# Patient Record
Sex: MALE | Race: WHITE | NOT HISPANIC OR LATINO | Employment: OTHER | URBAN - METROPOLITAN AREA
[De-identification: names, ages, dates, MRNs, and addresses within clinical notes are randomized per-mention and may not be internally consistent; named-entity substitution may affect disease eponyms.]

---

## 2017-01-11 ENCOUNTER — HOSPITAL ENCOUNTER (EMERGENCY)
Facility: MEDICAL CENTER | Age: 72
End: 2017-01-12
Attending: EMERGENCY MEDICINE
Payer: MEDICARE

## 2017-01-11 ENCOUNTER — APPOINTMENT (OUTPATIENT)
Dept: RADIOLOGY | Facility: MEDICAL CENTER | Age: 72
End: 2017-01-11
Attending: EMERGENCY MEDICINE
Payer: MEDICARE

## 2017-01-11 ENCOUNTER — APPOINTMENT (OUTPATIENT)
Dept: RADIOLOGY | Facility: MEDICAL CENTER | Age: 72
End: 2017-01-11
Payer: MEDICARE

## 2017-01-11 DIAGNOSIS — R07.9 CHEST PAIN, UNSPECIFIED TYPE: ICD-10-CM

## 2017-01-11 DIAGNOSIS — R10.13 EPIGASTRIC PAIN: ICD-10-CM

## 2017-01-11 LAB
ALBUMIN SERPL BCP-MCNC: 3.8 G/DL (ref 3.2–4.9)
ALBUMIN/GLOB SERPL: 1.3 G/DL
ALP SERPL-CCNC: 32 U/L (ref 30–99)
ALT SERPL-CCNC: 16 U/L (ref 2–50)
ANION GAP SERPL CALC-SCNC: 16 MMOL/L (ref 0–11.9)
APTT PPP: 30.2 SEC (ref 24.7–36)
AST SERPL-CCNC: 13 U/L (ref 12–45)
BASOPHILS # BLD AUTO: 0.6 % (ref 0–1.8)
BASOPHILS # BLD: 0.05 K/UL (ref 0–0.12)
BILIRUB SERPL-MCNC: 0.9 MG/DL (ref 0.1–1.5)
BNP SERPL-MCNC: 37 PG/ML (ref 0–100)
BUN SERPL-MCNC: 21 MG/DL (ref 8–22)
CALCIUM SERPL-MCNC: 9.5 MG/DL (ref 8.5–10.5)
CHLORIDE SERPL-SCNC: 98 MMOL/L (ref 96–112)
CO2 SERPL-SCNC: 20 MMOL/L (ref 20–33)
CREAT SERPL-MCNC: 0.98 MG/DL (ref 0.5–1.4)
DEPRECATED D DIMER PPP IA-ACNC: 426 NG/ML(D-DU)
EKG IMPRESSION: NORMAL
EOSINOPHIL # BLD AUTO: 0.05 K/UL (ref 0–0.51)
EOSINOPHIL NFR BLD: 0.6 % (ref 0–6.9)
ERYTHROCYTE [DISTWIDTH] IN BLOOD BY AUTOMATED COUNT: 44.2 FL (ref 35.9–50)
GFR SERPL CREATININE-BSD FRML MDRD: >60 ML/MIN/1.73 M 2
GLOBULIN SER CALC-MCNC: 2.9 G/DL (ref 1.9–3.5)
GLUCOSE SERPL-MCNC: 196 MG/DL (ref 65–99)
HCT VFR BLD AUTO: 45.7 % (ref 42–52)
HGB BLD-MCNC: 15.4 G/DL (ref 14–18)
IMM GRANULOCYTES # BLD AUTO: 0.04 K/UL (ref 0–0.11)
IMM GRANULOCYTES NFR BLD AUTO: 0.5 % (ref 0–0.9)
INR PPP: 0.99 (ref 0.87–1.13)
LACTATE BLD-SCNC: 1.4 MMOL/L (ref 0.5–2)
LIPASE SERPL-CCNC: 18 U/L (ref 11–82)
LYMPHOCYTES # BLD AUTO: 0.8 K/UL (ref 1–4.8)
LYMPHOCYTES NFR BLD: 9.3 % (ref 22–41)
MCH RBC QN AUTO: 29.9 PG (ref 27–33)
MCHC RBC AUTO-ENTMCNC: 33.7 G/DL (ref 33.7–35.3)
MCV RBC AUTO: 88.7 FL (ref 81.4–97.8)
MONOCYTES # BLD AUTO: 0.55 K/UL (ref 0–0.85)
MONOCYTES NFR BLD AUTO: 6.4 % (ref 0–13.4)
NEUTROPHILS # BLD AUTO: 7.09 K/UL (ref 1.82–7.42)
NEUTROPHILS NFR BLD: 82.6 % (ref 44–72)
NRBC # BLD AUTO: 0 K/UL
NRBC BLD AUTO-RTO: 0 /100 WBC
PLATELET # BLD AUTO: 279 K/UL (ref 164–446)
PMV BLD AUTO: 10.2 FL (ref 9–12.9)
POTASSIUM SERPL-SCNC: 4.2 MMOL/L (ref 3.6–5.5)
PROT SERPL-MCNC: 6.7 G/DL (ref 6–8.2)
PROTHROMBIN TIME: 13.4 SEC (ref 12–14.6)
RBC # BLD AUTO: 5.15 M/UL (ref 4.7–6.1)
SODIUM SERPL-SCNC: 134 MMOL/L (ref 135–145)
TROPONIN I SERPL-MCNC: <0.01 NG/ML (ref 0–0.04)
WBC # BLD AUTO: 8.6 K/UL (ref 4.8–10.8)

## 2017-01-11 PROCEDURE — 83605 ASSAY OF LACTIC ACID: CPT

## 2017-01-11 PROCEDURE — 85025 COMPLETE CBC W/AUTO DIFF WBC: CPT

## 2017-01-11 PROCEDURE — 71010 DX-CHEST-LIMITED (1 VIEW): CPT

## 2017-01-11 PROCEDURE — 36415 COLL VENOUS BLD VENIPUNCTURE: CPT

## 2017-01-11 PROCEDURE — 85730 THROMBOPLASTIN TIME PARTIAL: CPT

## 2017-01-11 PROCEDURE — 93005 ELECTROCARDIOGRAM TRACING: CPT

## 2017-01-11 PROCEDURE — 85379 FIBRIN DEGRADATION QUANT: CPT

## 2017-01-11 PROCEDURE — 84484 ASSAY OF TROPONIN QUANT: CPT

## 2017-01-11 PROCEDURE — 99284 EMERGENCY DEPT VISIT MOD MDM: CPT

## 2017-01-11 PROCEDURE — 83880 ASSAY OF NATRIURETIC PEPTIDE: CPT

## 2017-01-11 PROCEDURE — 85610 PROTHROMBIN TIME: CPT

## 2017-01-11 PROCEDURE — 83690 ASSAY OF LIPASE: CPT

## 2017-01-11 PROCEDURE — 80053 COMPREHEN METABOLIC PANEL: CPT

## 2017-01-11 ASSESSMENT — ENCOUNTER SYMPTOMS
DIAPHORESIS: 1
FEVER: 0
BLOOD IN STOOL: 0
SHORTNESS OF BREATH: 0
ROS GI COMMENTS: NEGATIVE HEMATEMESIS
COUGH: 0
NAUSEA: 1
ABDOMINAL PAIN: 0
DIARRHEA: 0
VOMITING: 1

## 2017-01-11 ASSESSMENT — PAIN SCALES - GENERAL: PAINLEVEL_OUTOF10: 5

## 2017-01-11 NOTE — ED AVS SNAPSHOT
1/12/2017          Kevin Jackson  [address] [place] [address]    Dear Kevin:    [facility] wants to ensure your discharge home is safe and you or your loved ones have had all your questions answered regarding your care after you leave the hospital.    You may receive a telephone call within two days of your discharge.  This call is to make certain you understand your discharge instructions as well as ensure we provided you with the best care possible during your stay with us.     The call will only last approximately 3-5 minutes and will be done by a nurse.    Once again, we want to ensure your discharge home is safe and that you have a clear understanding of any next steps in your care.  If you have any questions or concerns, please do not hesitate to contact us, we are here for you.  Thank you for choosing [facility] for your healthcare needs.    Sincerely,    Moisés Carreno    [facility]

## 2017-01-11 NOTE — ED AVS SNAPSHOT
After Visit Summary                                                                                                                Kevin Jackson   MRN: [id number]    Department:  [facility], Emergency Dept   Date of Visit:  1/11/2017            [facility], Emergency Dept    [address]    [facility] [address]    Phone:  [phone]      You were seen by     Oren Mar M.D.      Your Diagnosis Was     Chest pain, unspecified type     R07.9       These are the medications you received during your hospitalization from 01/11/2017 2031 to 01/12/2017 0235     Date/Time Order Dose Route Action    01/12/2017 0054 iohexol (OMNIPAQUE) 350 mg/mL 70 mL Intravenous Given      Follow-up Information     1. Follow up with Gallito Sprague M.D.. Call today.    Specialty:  Cardiology    Why:  at 8 am to be seen asap    Contact information    [address]  [address]  [facility] [address] [phone]        Medication Information     Review all of your home medications and newly ordered medications with your primary doctor and/or pharmacist as soon as possible. Follow medication instructions as directed by your doctor and/or pharmacist.     Please keep your complete medication list with you and share with your physician. Update the information when medications are discontinued, doses are changed, or new medications (including over-the-counter products) are added; and carry medication information at all times in the event of emergency situations.               Medication List      ASK your doctor about these medications        Instructions    aspirin EC 81 MG Tbec   Commonly known as:  ECOTRIN    Take 81 mg by mouth every day.   Dose:  81 mg       atorvastatin 40 MG Tabs   Commonly known as:  LIPITOR    Take 80 mg by mouth every evening.   Dose:  80 mg       * Dulaglutide 0.75 MG/0.5ML Sopn    Inject  as instructed.       * TRULICITY 0.75 MG/0.5ML Sopn   Generic drug:  Dulaglutide     Inject  as instructed.       HUMALOG MIX 50/50 (50-50) 100 UNIT/ML Susp   Generic drug:  INSULIN LISP PROT & LISP (HUM)    Inject 30 Units as instructed.   Dose:  30 Units       INVOKANA 100 MG Tabs   Generic drug:  Canagliflozin    Take 1 Tab by mouth every day.   Dose:  1 Tab       levothyroxine 50 MCG Tabs   Commonly known as:  SYNTHROID    Take 1 Tab by mouth every morning before breakfast.   Dose:  50 mcg       losartan-hydrochlorothiazide 50-12.5 MG per tablet   Commonly known as:  HYZAAR    Take 1 Tab by mouth every day.   Dose:  1 Tab       metformin 1000 MG tablet   Commonly known as:  GLUCOPHAGE    Take 1,000 mg by mouth 2 times a day, with meals.   Dose:  1000 mg       NON SPECIFIED    Shingles vaccine       primidone 50 MG Tabs   Commonly known as:  MYSOLINE    Doctor's comments:  Dr smith in [place]   Take 1 Tab by mouth every evening.   Dose:  50 mg       propranolol  MG Cp24   Commonly known as:  INDERAL LA    Take 1 Cap by mouth every day.   Dose:  120 mg       TOUJEO SOLOSTAR SC    Inject  as instructed.       TRILIPIX 135 MG Cpdr   Generic drug:  choline fenofibrate    Take  by mouth.       vitamin D 1000 UNIT Tabs   Commonly known as:  cholecalciferol    Take 1,000 Units by mouth 3 times a day.   Dose:  1000 Units       * Notice:  This list has 2 medication(s) that are the same as other medications prescribed for you. Read the directions carefully, and ask your doctor or other care provider to review them with you.            Procedures and tests performed during your visit     APTT    BTYPE NATRIURETIC PEPTIDE    CARDIAC MONITORING    CBC WITH DIFFERENTIAL    COMP METABOLIC PANEL    CONSENT FOR CONTRAST INJECTION    CT-CTA CHEST PULMONARY ARTERY W/ RECONS    D-DIMER    DX-CHEST-LIMITED (1 VIEW)    EKG (NOW)    ESTIMATED GFR    LACTIC ACID    LIPASE    O2 Protocol    PROTHROMBIN TIME    SALINE LOCK    TROPONIN    URINALYSIS        Discharge Instructions       You are leaving Against  My advice. If you change your mind, have new chest pain, shortness of breath or pass out return. Call Dr. Sprague's office at 8 am to be seen ASAP.   Chest Pain, Nonspecific  It is often hard to give a specific diagnosis for the cause of chest pain. There is always a chance that your pain could be related to something serious, like a heart attack or a blood clot in the lungs. You need to follow up with your caregiver for further evaluation. More lab tests or other studies such as X-rays, electrocardiography, stress testing, or cardiac imaging may be needed to find the cause of your pain.  Most of the time, nonspecific chest pain improves within 2 to 3 days with rest and mild pain medicine. For the next few days, avoid physical exertion or activities that bring on pain. Do not smoke. Avoid drinking alcohol. Call your caregiver for routine follow-up as advised.   SEEK IMMEDIATE MEDICAL CARE IF:  · You develop increased chest pain or pain that radiates to the arm, neck, jaw, back, or abdomen.   · You develop shortness of breath, increased coughing, or you start coughing up blood.   · You have severe back or abdominal pain, nausea, or vomiting.   · You develop severe weakness, fainting, fever, or chills.   Document Released: 12/18/2006 Document Revised: 03/11/2013 Document Reviewed: 06/06/2008  [employer]® Patient Information ©2013 [employer].          Patient Information     Patient Information    Following emergency treatment: all patient requiring follow-up care must return either to a private physician or a clinic if your condition worsens before you are able to obtain further medical attention, please return to the emergency room.     Billing Information    At [facility], we work to make the billing process streamlined for our patients.  Our Representatives are here to answer any questions you may have regarding your hospital bill.  If you have insurance coverage and have supplied your insurance information to  us, we will submit a claim to your insurer on your behalf.  Should you have any questions regarding your bill, we can be reached online or by phone as follows:  Online: You are able pay your bills online or live chat with our representatives about any billing questions you may have. We are here to help Monday - Friday from 8:00am to 7:30pm and 9:00am - 12:00pm on Saturdays.  Please visit [email or web address]  for more information.   Phone:  [phone] or [phone]    Please note that your emergency physician, surgeon, pathologist, radiologist, anesthesiologist, and other specialists are not employed by [facility] and will therefore bill separately for their services.  Please contact them directly for any questions concerning their bills at the numbers below:     Emergency Physician Services:  [phone]  [facility]:  [phone]  [facility]:  [phone]  [facility]:  [phone]    1. Your final bill may vary from the amount quoted upon discharge if all procedures are not complete at that time, or if your doctor has additional procedures of which we are not aware. You will receive an additional bill if you return to the Emergency Department at [facility] for suture removal regardless of the facility of which the sutures were placed.     2. Please arrange for settlement of this account at the emergency registration.    3. All self-pay accounts are due in full at the time of treatment.  If you are unable to meet this obligation then payment is expected within 4-5 days.     4. If you have had radiology studies (CT, X-ray, Ultrasound, MRI), you have received a preliminary result during your emergency department visit. Please contact the radiology department [phone] to receive a copy of your final result. Please discuss the Final result with your primary physician or with the follow up physician provided.     Crisis  Hotline:  [place] Crisis Hotline:  2-007-XRPTNOV or 1-394.618.4134  [place] Crisis Hotline:    1-991.699.5352 or 622-063-4088         ED Discharge Follow Up Questions    1. In order to provide you with very good care, we would like to follow up with a phone call in the next few days.  May we have your permission to contact you?     YES /  NO    2. What is the best phone number to call you? (       )_____-__________    3. What is the best time to call you?      Morning  /  Afternoon  /  Evening                   Patient Signature:  ____________________________________________________________    Date:  ____________________________________________________________      Your appointments     May 31, 2017  9:40 AM   Established Patient with Ky Hernandez M.D.   [facility] [address] [place] [address] ([facility])    [address] [place] [phone] [phone]           You will be receiving a confirmation call a few days before your appointment from our automated call confirmation system.

## 2017-01-12 ENCOUNTER — APPOINTMENT (OUTPATIENT)
Dept: RADIOLOGY | Facility: MEDICAL CENTER | Age: 72
End: 2017-01-12
Attending: EMERGENCY MEDICINE
Payer: MEDICARE

## 2017-01-12 VITALS
OXYGEN SATURATION: 90 % | TEMPERATURE: 98.2 F | WEIGHT: 219.58 LBS | BODY MASS INDEX: 29.1 KG/M2 | RESPIRATION RATE: 16 BRPM | HEIGHT: 73 IN | HEART RATE: 84 BPM | SYSTOLIC BLOOD PRESSURE: 98 MMHG | DIASTOLIC BLOOD PRESSURE: 65 MMHG

## 2017-01-12 LAB
APPEARANCE UR: CLEAR
BILIRUB UR QL STRIP.AUTO: NEGATIVE
COLOR UR: YELLOW
GLUCOSE UR STRIP.AUTO-MCNC: >1000 MG/DL
KETONES UR STRIP.AUTO-MCNC: 40 MG/DL
LEUKOCYTE ESTERASE UR QL STRIP.AUTO: NEGATIVE
MICRO URNS: ABNORMAL
NITRITE UR QL STRIP.AUTO: NEGATIVE
PH UR STRIP.AUTO: 5 [PH]
PROT UR QL STRIP: NEGATIVE MG/DL
RBC UR QL AUTO: NEGATIVE
SP GR UR STRIP.AUTO: 1.03

## 2017-01-12 PROCEDURE — 71275 CT ANGIOGRAPHY CHEST: CPT

## 2017-01-12 PROCEDURE — 700117 HCHG RX CONTRAST REV CODE 255: Performed by: EMERGENCY MEDICINE

## 2017-01-12 PROCEDURE — 81003 URINALYSIS AUTO W/O SCOPE: CPT

## 2017-01-12 RX ADMIN — IOHEXOL 70 ML: 350 INJECTION, SOLUTION INTRAVENOUS at 00:54

## 2017-01-12 NOTE — DISCHARGE INSTRUCTIONS
You are leaving Against My advice. If you change your mind, have new chest pain, shortness of breath or pass out return. Call Dr. Sprague's office at 8 am to be seen ASAP.   Chest Pain, Nonspecific  It is often hard to give a specific diagnosis for the cause of chest pain. There is always a chance that your pain could be related to something serious, like a heart attack or a blood clot in the lungs. You need to follow up with your caregiver for further evaluation. More lab tests or other studies such as X-rays, electrocardiography, stress testing, or cardiac imaging may be needed to find the cause of your pain.  Most of the time, nonspecific chest pain improves within 2 to 3 days with rest and mild pain medicine. For the next few days, avoid physical exertion or activities that bring on pain. Do not smoke. Avoid drinking alcohol. Call your caregiver for routine follow-up as advised.   SEEK IMMEDIATE MEDICAL CARE IF:  · You develop increased chest pain or pain that radiates to the arm, neck, jaw, back, or abdomen.   · You develop shortness of breath, increased coughing, or you start coughing up blood.   · You have severe back or abdominal pain, nausea, or vomiting.   · You develop severe weakness, fainting, fever, or chills.   Document Released: 12/18/2006 Document Revised: 03/11/2013 Document Reviewed: 06/06/2008  [employer]® Patient Information ©2013 [employer].

## 2017-01-12 NOTE — ED NOTES
dutch ambulated down hallway and maintained O2 sat of 92-94% on room air.  Denies and SOB with exertion.

## 2017-01-12 NOTE — ED NOTES
Patient to ED triage with complaints of epigastric pain, started at 0700 this am. Reports he cannot get comfortable. Does not radiate. Associated nausea. Denies SOB. Has not been able to eat today. He is a diabetic and has hx of hyperlipidemia. Reports FSBS 180 at 2000 today.     Pt educated on ED process and asked to wait in lobby until appropriate bed assignment can be made. Patient educated on importance of alerting staff to new or worsening symptoms or concerns.

## 2017-01-12 NOTE — ED PROVIDER NOTES
ED Provider Note    Scribed for Oren Mar M.D. by Marilee Arias. 1/11/2017, 11:04 PM.    Primary care provider: Ky Hernandez M.D.  Means of arrival: walk-in  History obtained from: patient  History limited by: none    CHIEF COMPLAINT  Chief Complaint   Patient presents with   • Epigastric Pain     since this am - associated nausea   • Chest Pain       HPI  Kevin Jackson is a 71 y.o. male who presents to the Emergency Department for severe chest pain described as a sharp pain onset earlier this morning. The pain did not radiate any where and it is not modified with movement, sitting up or laying down. He experienced associated sweating while walking into the ER tonight as well nausea and vomiting right after the onset of the chest pain which seemed to relieve the pain.  . Patient has never had a heart attack or stents placed and he does not normally where oxygen at home. He took 81mg of Aspirin this morning.  No complaints of blood in his vomit, shortness of breath, leg pain, leg swelling, abdominal pain, diarrhea, fevers, cough. blood in his stool, black stool. No recent travel, history of ulcers, or history of cancer. Patient's mother had a heart attack at 86 and father at 72.  At this time patient reports feeling 70-80% better with no pain but still an uncomfortable feeling is present.     REVIEW OF SYSTEMS  Review of Systems   Constitutional: Positive for diaphoresis. Negative for fever.   Respiratory: Negative for cough and shortness of breath.    Cardiovascular: Positive for chest pain. Negative for leg swelling.   Gastrointestinal: Positive for nausea and vomiting. Negative for abdominal pain, diarrhea, blood in stool and melena.        Negative hematemesis    All other systems reviewed and are negative.      PAST MEDICAL HISTORY   has a past medical history of Hyperlipidemia; Hypertension; Diabetes; Tremors of nervous system; and Muscle disorder.    SURGICAL HISTORY   has past surgical history  "that includes other orthopedic surgery; carpal tunnel release; and amputation, toe (2/2013).    SOCIAL HISTORY  Social History   Substance Use Topics   • Smoking status: Never Smoker    • Smokeless tobacco: Never Used   • Alcohol Use: No      History   Drug Use No       FAMILY HISTORY  Family History   Problem Relation Age of Onset   • Arthritis Mother    • Cancer Mother    • Hypertension Mother    • Heart Disease Mother    • Cancer Father    • Arthritis Father    • Genetic Father    • Diabetes Father    • Hyperlipidemia Father    • Stroke Father    • Heart Disease Father        CURRENT MEDICATIONS  Home Medications     Reviewed by Moisés Hill R.N. (Registered Nurse) on 01/11/17 at 2105  Med List Status: Partial    Medication Last Dose Status    aspirin EC (ECOTRIN) 81 MG TBEC 1/11/2017 Active    atorvastatin (LIPITOR) 40 MG TABS 1/10/2017 Active    Canagliflozin (INVOKANA) 100 MG TABS 1/11/2017 Active    Choline Fenofibrate (TRILIPIX) 135 MG CPDR 1/11/2017 Active    Dulaglutide (TRULICITY) 0.75 MG/0.5ML Solution Pen-injector monday Active    Dulaglutide 0.75 MG/0.5ML Solution Pen-injector 11/29/2016 Active    Insulin Glargine (TOUJEO SOLOSTAR SC) monday Active    INSULIN LISP PROT & LISP, HUM, (HUMALOG MIX 50/50) (50-50) 100 UNIT/ML Suspension 1/10/2017 Active    levothyroxine (SYNTHROID) 50 MCG TABS 1/11/2017 Active    losartan-hydrochlorothiazide (HYZAAR) 50-12.5 MG per tablet 11/29/2016 Active    metformin (GLUCOPHAGE) 1000 MG tablet 1/11/2017 Active    NON SPECIFIED 3/8/2016 Active    primidone (MYSOLINE) 50 MG Tab 1/10/2017 Active    propranolol CR (INDERAL LA) 120 MG CAPSULE SR 24 HR 11/29/2016 Active    vitamin D (CHOLECALCIFEROL) 1000 UNIT TABS 11/29/2016 Active                ALLERGIES  Allergies   Allergen Reactions   • Lipitor [Atorvastatin Calcium]      Pt tells me this medication had more of a \"side effect\" of \"dizziness\"; denies allergies   • Zocor [Simvastatin - High Dose]      Pt tells me this " "medication gave him \"side effect\" of feeling nauseated; denies allergies to medications       PHYSICAL EXAM  VITAL SIGNS: BP 98/65 mmHg  Pulse 91  Temp(Src) 36.8 °C (98.2 °F)  Resp 16  Ht 1.854 m (6' 1\")  Wt 99.6 kg (219 lb 9.3 oz)  BMI 28.98 kg/m2  SpO2 93%    Constitutional: Well developed, Well nourished, mild distress.   HENT: Normocephalic, Atraumatic, Oropharynx moist.   Eyes: Conjunctiva normal, No discharge.   Cardiovascular: Normal heart rate, Normal rhythm, No murmurs, equal pulses.   Pulmonary: Normal breath sounds, No respiratory distress, No wheezing, No rales, No rhonchi.  Chest: No reproducible chest wall tenderness or deformity.   Abdomen:Soft, No tenderness, No masses, no rebound, no guarding. Negative Leal's sign  Back: No CVA tenderness.   Musculoskeletal: No major deformities noted, No tenderness. No calf tenderness no chords  Skin: Warm, Dry, No erythema, No rash.   Neurologic: Alert & oriented x 3, Normal motor function,  No focal deficits noted.   Psychiatric: Affect normal, Judgment normal, Mood normal.     LABS  Results for orders placed or performed during the hospital encounter of 01/11/17   TROPONIN   Result Value Ref Range    Troponin I <0.01 0.00 - 0.04 ng/mL   BTYPE NATRIURETIC PEPTIDE   Result Value Ref Range    B Natriuretic Peptide 37 0 - 100 pg/mL   CBC WITH DIFFERENTIAL   Result Value Ref Range    WBC 8.6 4.8 - 10.8 K/uL    RBC 5.15 4.70 - 6.10 M/uL    Hemoglobin 15.4 14.0 - 18.0 g/dL    Hematocrit 45.7 42.0 - 52.0 %    MCV 88.7 81.4 - 97.8 fL    MCH 29.9 27.0 - 33.0 pg    MCHC 33.7 33.7 - 35.3 g/dL    RDW 44.2 35.9 - 50.0 fL    Platelet Count 279 164 - 446 K/uL    MPV 10.2 9.0 - 12.9 fL    Neutrophils-Polys 82.60 (H) 44.00 - 72.00 %    Lymphocytes 9.30 (L) 22.00 - 41.00 %    Monocytes 6.40 0.00 - 13.40 %    Eosinophils 0.60 0.00 - 6.90 %    Basophils 0.60 0.00 - 1.80 %    Immature Granulocytes 0.50 0.00 - 0.90 %    Nucleated RBC 0.00 /100 WBC    Neutrophils (Absolute) " 7.09 1.82 - 7.42 K/uL    Lymphs (Absolute) 0.80 (L) 1.00 - 4.80 K/uL    Monos (Absolute) 0.55 0.00 - 0.85 K/uL    Eos (Absolute) 0.05 0.00 - 0.51 K/uL    Baso (Absolute) 0.05 0.00 - 0.12 K/uL    Immature Granulocytes (abs) 0.04 0.00 - 0.11 K/uL    NRBC (Absolute) 0.00 K/uL   COMP METABOLIC PANEL   Result Value Ref Range    Sodium 134 (L) 135 - 145 mmol/L    Potassium 4.2 3.6 - 5.5 mmol/L    Chloride 98 96 - 112 mmol/L    Co2 20 20 - 33 mmol/L    Anion Gap 16.0 (H) 0.0 - 11.9    Glucose 196 (H) 65 - 99 mg/dL    Bun 21 8 - 22 mg/dL    Creatinine 0.98 0.50 - 1.40 mg/dL    Calcium 9.5 8.5 - 10.5 mg/dL    AST(SGOT) 13 12 - 45 U/L    ALT(SGPT) 16 2 - 50 U/L    Alkaline Phosphatase 32 30 - 99 U/L    Total Bilirubin 0.9 0.1 - 1.5 mg/dL    Albumin 3.8 3.2 - 4.9 g/dL    Total Protein 6.7 6.0 - 8.2 g/dL    Globulin 2.9 1.9 - 3.5 g/dL    A-G Ratio 1.3 g/dL   PROTHROMBIN TIME   Result Value Ref Range    PT 13.4 12.0 - 14.6 sec    INR 0.99 0.87 - 1.13   APTT   Result Value Ref Range    APTT 30.2 24.7 - 36.0 sec   LIPASE   Result Value Ref Range    Lipase 18 11 - 82 U/L   ESTIMATED GFR   Result Value Ref Range    GFR If African American >60 >60 mL/min/1.73 m 2    GFR If Non African American >60 >60 mL/min/1.73 m 2   LACTIC ACID   Result Value Ref Range    Lactic Acid 1.4 0.5 - 2.0 mmol/L   D-DIMER   Result Value Ref Range    D-Dimer Screen 426 (H) <250 ng/mL(D-DU)   URINALYSIS   Result Value Ref Range    Color Yellow     Character Clear     Specific Gravity 1.032 <1.035    Ph 5.0 5.0-8.0    Glucose >1000 (A) Negative mg/dL    Ketones 40 (A) Negative mg/dL    Protein Negative Negative mg/dL    Bilirubin Negative Negative    Nitrite Negative Negative    Leukocyte Esterase Negative Negative    Occult Blood Negative Negative    Micro Urine Req see below    EKG (NOW)   Result Value Ref Range    Report       [facility] Emergency Dept[facility]    Test Date:  2017-01-11  Pt Name:    YINA CHANDRA                  Department:  ER  MRN:        [id number]                      Room:  Gender:     NATALY                            Technician: [id number]  :        1945                   Requested By:ER TRIAGE PROTOCOL  Order #:    [id number]                    Reading MD:    Measurements  Intervals                                Axis  Rate:       92                           P:          47  NY:         176                          QRS:        -3  QRSD:       82                           T:          15  QT:         368  QTc:        456    Interpretive Statements  SINUS RHYTHM  BORDERLINE T WAVE ABNORMALITIES  No previous ECG available for comparison         All labs reviewed by me.    EKG  12 Lead EKG interpreted by me shows a normal sinus rhythm at a rate of 70. Axis normal. No ST elevations. T wave flattening 1,2,3, AVF, and AVL.   No  Old EKG. Final impression: non specific T wave flattening inferior and lateral leads.    RADIOLOGY  CT-CTA CHEST PULMONARY ARTERY W/ RECONS   Final Result         1.  No pulmonary embolus appreciated.   2.  Slight irregular hepatic contour suggests changes of mild cirrhosis.   3.  Atherosclerosis and atherosclerotic coronary artery disease      DX-CHEST-LIMITED (1 VIEW)   Final Result         1.  No acute cardiopulmonary disease.        The radiologist's interpretation of all radiological studies have been reviewed by me.    COURSE & MEDICAL DECISION MAKING  Pertinent Labs & Imaging studies reviewed. (See chart for details)    11:04 PM - Patient seen and examined at bedside. Ordered chest xray, Lactic Acid, D-Dimer, UA, Estimated GFR, Troponin, BTYPE Natriuretic Peptide, CBC, CMP, Prothrombin Time, APTT, Lipase, EKG to evaluate his symptoms. The differential diagnoses include but are not limited to: MI, PE, Pancreatitis. I informed the patient of the possible origins to his pain and that I would evaluate for both cardiac as well as pulmonary.     1:48 AM I re-evaluated patient at bedside and informed him that his  scans showed no sign of blood clots and that heart enzymes came back normal. Because of his medical history I informed him that I would like to keep him in the hospital over night and get a stress test tomorrow. Against my recommendations, and after several warnings, patient wished to sign out AMA.    2:10 AM Paged Dr. Pichardo, Cardiology    2:15 AM Spoke with Dr. Pichardo, Cardiology, about the patient's condition and a follow up for the patient. He recommends the patient call at 8AM tomorrow morning to schedule an appointment      The patient is leaving against medical advice.  I discussed with the patient the risks of leaving without receiving appropriate care to include permanent disability or death.  I have discussed possible alternatives.  The patient is not intoxicated.  The patient is a capable decision maker and understands the risks and benefits of their decision.  While I certainly disagree with the patient's decision, I respect the patient's autonomy and will not keep them here against their will.  They understand that their decision to leave can be reversed at any time and they can return to us at any time for any reason at all.      Medical Decision Making: Patient presents with chest pain and some shortness of breath as well as diaphoresis. I was concerned this could be acute coronary syndrome given the patient's risk factors. Patient's initial EKG is unremarkable. He has some flattening T waves are nonspecific. His initial troponin was negative. Patient did have a positive d-dimer therefore CT of the chest was done the rule out pulmonary embolism this is negative. Given this I think the patient would've benefited from a stress test but patient refused. Therefore to make sure the patient had adequate follow-up at the called and discussed the case with the patient's cardiologist. Do not think his aortic dissection CT of the chest was done does not show any dissection was a repair tearing did not go  patient's back.     The patient will return for new or worsening symptoms and is stable at the time of discharge.    The patient is referred to a primary physician for blood pressure management, diabetic screening, and for all other preventative health concerns.    DISPOSITION:  Patient will be discharged home in stable condition AMA.    FOLLOW UP:  Gallito Sprague M.D.  [address]  [address]  [facility] [address]  [phone]    Call today  at 8 am to be seen asap         FINAL IMPRESSION  1. Chest pain, unspecified type    2. Epigastric pain          Marilee ASTORGA (Scribe), am scribing for, and in the presence of, Oren Mar M.D.    Electronically signed by: Marilee Arias (Davibgema), 1/11/2017    Oren ASTORGA M.D. personally performed the services described in this documentation, as scribed by Marilee Arias in my presence, and it is both accurate and complete.    The note accurately reflects work and decisions made by me.  Oren Mar  1/12/2017  7:37 AM

## 2017-02-13 ENCOUNTER — OFFICE VISIT (OUTPATIENT)
Dept: URGENT CARE | Facility: CLINIC | Age: 72
End: 2017-02-13
Payer: MEDICARE

## 2017-02-13 VITALS
OXYGEN SATURATION: 93 % | BODY MASS INDEX: 29.93 KG/M2 | DIASTOLIC BLOOD PRESSURE: 80 MMHG | WEIGHT: 225.8 LBS | SYSTOLIC BLOOD PRESSURE: 140 MMHG | HEART RATE: 72 BPM | TEMPERATURE: 98.4 F | HEIGHT: 73 IN | RESPIRATION RATE: 16 BRPM

## 2017-02-13 DIAGNOSIS — E11.9 TYPE 2 DIABETES MELLITUS WITHOUT COMPLICATION, UNSPECIFIED LONG TERM INSULIN USE STATUS: ICD-10-CM

## 2017-02-13 DIAGNOSIS — J06.9 URI, ACUTE: ICD-10-CM

## 2017-02-13 DIAGNOSIS — R05.9 COUGH: ICD-10-CM

## 2017-02-13 PROCEDURE — G8482 FLU IMMUNIZE ORDER/ADMIN: HCPCS | Performed by: PHYSICIAN ASSISTANT

## 2017-02-13 PROCEDURE — 1036F TOBACCO NON-USER: CPT | Performed by: PHYSICIAN ASSISTANT

## 2017-02-13 PROCEDURE — 3045F PR MOST RECENT HEMOGLOBIN A1C LEVEL 7.0-9.0%: CPT | Performed by: PHYSICIAN ASSISTANT

## 2017-02-13 PROCEDURE — G8420 CALC BMI NORM PARAMETERS: HCPCS | Performed by: PHYSICIAN ASSISTANT

## 2017-02-13 PROCEDURE — 3017F COLORECTAL CA SCREEN DOC REV: CPT | Performed by: PHYSICIAN ASSISTANT

## 2017-02-13 PROCEDURE — 4040F PNEUMOC VAC/ADMIN/RCVD: CPT | Performed by: PHYSICIAN ASSISTANT

## 2017-02-13 PROCEDURE — 1101F PT FALLS ASSESS-DOCD LE1/YR: CPT | Performed by: PHYSICIAN ASSISTANT

## 2017-02-13 PROCEDURE — 99214 OFFICE O/P EST MOD 30 MIN: CPT | Mod: 25 | Performed by: PHYSICIAN ASSISTANT

## 2017-02-13 ASSESSMENT — ENCOUNTER SYMPTOMS
HEADACHES: 1
CHILLS: 0
SORE THROAT: 0
FEVER: 0
SPUTUM PRODUCTION: 0
DIARRHEA: 0
SHORTNESS OF BREATH: 0
DIZZINESS: 0
COUGH: 1
MYALGIAS: 1
ABDOMINAL PAIN: 0
NAUSEA: 0

## 2017-02-13 NOTE — MR AVS SNAPSHOT
"        Kevin Jackson   2017 8:30 AM   Office Visit   MRN: [id number]    Department:  [facility]   Dept Phone:  [phone]    Description:  Male : 1945   Provider:  Jose Luis Graves PA-C           Reason for Visit     Cough runny eyes,head ache x4days       Allergies as of 2017     Allergen Noted Reactions    Lipitor [Atorvastatin Calcium] 10/03/2011       Pt tells me this medication had more of a \"side effect\" of \"dizziness\"; denies allergies    Zocor [Simvastatin - High Dose] 10/03/2011       Pt tells me this medication gave him \"side effect\" of feeling nauseated; denies allergies to medications      You were diagnosed with     URI, acute   [868939]       Cough   [786.2.ICD-9-CM]         Vital Signs     Blood Pressure Pulse Temperature Respirations Height Weight    140/80 mmHg 72 36.9 °C (98.4 °F) 16 1.854 m (6' 1\") 102.422 kg (225 lb 12.8 oz)    Body Mass Index Oxygen Saturation Smoking Status             29.80 kg/m2 93% Never Smoker          Basic Information     Date Of Birth Sex Race Ethnicity Preferred Language    1945 Male White Non- English      Your appointments     May 31, 2017  9:40 AM   Established Patient with Ky Hernandez M.D.   [facility] [address] [place] [address] [id number] [phone]           You will be receiving a confirmation call a few days before your appointment from our automated call confirmation system.              Problem List              ICD-10-CM Priority Class Noted - Resolved    Coarse tremors- dr smith, neurologist, [place] G25.2   10/3/2011 - Present    Mixed hyperlipidemia- dr dwyer E78.2   10/3/2011 - Present    Essential hypertension I10   10/3/2011 - Present    Hypovitaminosis D E55.9   10/3/2011 - Present    Uncontrolled type 2 diabetes mellitus with complication, without long-term current use of insulin (HCC)-uncontrolled, with complications- dr browning E11.8, E11.65   10/2/2013 " - Present    BMI 31.0-31.9,adult Z68.31   10/2/2013 - Present      Health Maintenance        Date Due Completion Dates    IMM DTaP/Tdap/Td Vaccine (1 - Tdap) 5/26/1965 ---    IMM ZOSTER VACCINE 5/26/2006 ---    URINE ACR / MICROALBUMIN 8/2/2014 8/2/2013 (N/S), 9/4/2012    Override on 8/2/2013: (N/S)    RETINAL SCREENING 9/3/2015 9/3/2014    FASTING LIPID PROFILE 11/5/2015 11/5/2014, 1/15/2013, 9/4/2012    DIABETES MONOFILAMENT / LE EXAM 3/8/2017 3/8/2016, 3/8/2016, 11/5/2014 (N/S), 10/2/2013 (N/S)    Override on 11/5/2014: (N/S)    Override on 10/2/2013: (N/S)    A1C SCREENING 5/29/2017 11/29/2016, 11/5/2014, 8/2/2013 (N/S), 1/14/2013, 9/4/2012    Override on 8/2/2013: (N/S) (=9.3 DR ABBOTT)    SERUM CREATININE 1/11/2018 1/11/2017, 11/5/2014, 2/15/2013, 2/8/2013, 2/1/2013, 1/25/2013, 1/18/2013, 1/17/2013, 1/16/2013, 1/15/2013, 1/14/2013, 9/4/2012    COLONOSCOPY 12/11/2019 12/11/2014            Current Immunizations     13-VALENT PCV PREVNAR 11/29/2016    Influenza TIV (IM) 10/2/2013, 11/19/2012    Influenza Vaccine Adult HD 11/29/2016, 11/5/2014    Pneumococcal polysaccharide vaccine (PPSV-23) 11/19/2012      Below and/or attached are the medications your provider expects you to take. Review all of your home medications and newly ordered medications with your provider and/or pharmacist. Follow medication instructions as directed by your provider and/or pharmacist. Please keep your medication list with you and share with your provider. Update the information when medications are discontinued, doses are changed, or new medications (including over-the-counter products) are added; and carry medication information at all times in the event of emergency situations     Allergies:  LIPITOR - (reactions not documented)     ZOCOR - (reactions not documented)               Medications  Valid as of: February 13, 2017 -  9:07 AM    Generic Name Brand Name Tablet Size Instructions for use    Aspirin (Tablet Delayed Response)  ECOTRIN 81 MG Take 81 mg by mouth every day.        Atorvastatin Calcium (Tab) LIPITOR 40 MG Take 80 mg by mouth every evening.        Canagliflozin (Tab) Canagliflozin 100 MG Take 1 Tab by mouth every day.        Cholecalciferol (Tab) cholecalciferol 1000 UNIT Take 1,000 Units by mouth 3 times a day.        Choline Fenofibrate (CAPSULE DELAYED RELEASE) TRILIPIX 135 MG Take  by mouth.        Dulaglutide (Solution Pen-injector) Dulaglutide 0.75 MG/0.5ML Inject  as instructed.        Dulaglutide (Solution Pen-injector) Dulaglutide 0.75 MG/0.5ML Inject  as instructed.        Insulin Glargine   Inject  as instructed.        Insulin Lispro Prot & Lispro (Suspension) INSULIN LISP PROT & LISP (HUM) (50-50) 100 UNIT/ML Inject 30 Units as instructed.        Levothyroxine Sodium (Tab) SYNTHROID 50 MCG Take 1 Tab by mouth every morning before breakfast.        Losartan Potassium-HCTZ (Tab) HYZAAR 50-12.5 MG Take 1 Tab by mouth every day.        MetFORMIN HCl (Tab) GLUCOPHAGE 1000 MG Take 1,000 mg by mouth 2 times a day, with meals.        NON SPECIFIED   Shingles vaccine        Primidone (Tab) MYSOLINE 50 MG Take 1 Tab by mouth every evening.        Propranolol HCl (CAPSULE SR 24 HR) INDERAL  MG Take 1 Cap by mouth every day.        .                 Medicines prescribed today were sent to:     [id number] [place] - [address]    [address] [facility] [address]    Phone: [phone] Fax: [phone]    Open 24 Hours?: No      Medication refill instructions:       If your prescription bottle indicates you have medication refills left, it is not necessary to call your provider’s office. Please contact your pharmacy and they will refill your medication.    If your prescription bottle indicates you do not have any refills left, you may request refills at any time through one of the following ways: The online "[employer] system (except Urgent Care), by calling your provider’s office, or by asking your pharmacy to  contact your provider’s office with a refill request. Medication refills are processed only during regular business hours and may not be available until the next business day. Your provider may request additional information or to have a follow-up visit with you prior to refilling your medication.   *Please Note: Medication refills are assigned a new Rx number when refilled electronically. Your pharmacy may indicate that no refills were authorized even though a new prescription for the same medication is available at the pharmacy. Please request the medicine by name with the pharmacy before contacting your provider for a refill.           EverZero Access Code: [email or web address]  Expires: 3/15/2017  8:31 AM    EverZero  A secure, online tool to manage your health information     [employer]’s EverZero® is a secure, online tool that connects you to your personalized health information from the privacy of your home -- day or night - making it very easy for you to manage your healthcare. Once the activation process is completed, you can even access your medical information using the EverZero rudy, which is available for free in the Apple Rudy store or Google Play store.     EverZero provides the following levels of access (as shown below):   My Chart Features   [facility] Doctor [facility]  Specialists [facility]  Urgent  Care Non-[facility]  Primary Care  Doctor   Email your healthcare team securely and privately 24/7 X X X    Manage appointments: schedule your next appointment; view details of past/upcoming appointments X      Request prescription refills. X      View recent personal medical records, including lab and immunizations X X X X   View health record, including health history, allergies, medications X X X X   Read reports about your outpatient visits, procedures, consult and ER notes X X X X   See your discharge summary, which is a recap of your hospital and/or ER visit that includes your diagnosis, lab results, and  care plan. X X       How to register for [employer]:  1. Go to  [email or web address].  2. Click on the Sign Up Now box, which takes you to the New Member Sign Up page. You will need to provide the following information:  a. Enter your Hungrio Access Code exactly as it appears at the top of this page. (You will not need to use this code after you’ve completed the sign-up process. If you do not sign up before the expiration date, you must request a new code.)   b. Enter your date of birth.   c. Enter your home email address.   d. Click Submit, and follow the next screen’s instructions.  3. Create a Hungrio ID. This will be your Hungrio login ID and cannot be changed, so think of one that is secure and easy to remember.  4. Create a [employer] password. You can change your password at any time.  5. Enter your Password Reset Question and Answer. This can be used at a later time if you forget your password.   6. Enter your e-mail address. This allows you to receive e-mail notifications when new information is available in Hungrio.  7. Click Sign Up. You can now view your health information.    For assistance activating your Hungrio account, call [phone]

## 2017-02-13 NOTE — PROGRESS NOTES
"Subjective:      Kevin Jackson is a 71 y.o. male who presents with Cough    Current medications reviewed.  Past Medical History   Diagnosis Date   • Hyperlipidemia    • Hypertension    • Diabetes    • Tremors of nervous system    • Muscle disorder      Social History   Substance Use Topics   • Smoking status: Never Smoker    • Smokeless tobacco: Never Used   • Alcohol Use: No     Family History Reviewed: noncontributory      4-5 days with head ache, watery eyes, ears plugged but not painful, dry cough.   Denies sinus drainage, red/blurred vision, fevers.  Has been using DaQuill with decongestant and mucinex    Cough  Associated symptoms include headaches and myalgias. Pertinent negatives include no chest pain, chills, ear pain, fever, rash, sore throat or shortness of breath.       Review of Systems   Constitutional: Positive for malaise/fatigue. Negative for fever and chills.   HENT: Positive for congestion. Negative for ear pain and sore throat.    Respiratory: Positive for cough. Negative for sputum production and shortness of breath.    Cardiovascular: Negative for chest pain.   Gastrointestinal: Negative for nausea, abdominal pain and diarrhea.   Musculoskeletal: Positive for myalgias. Negative for joint pain.   Skin: Negative for rash.   Neurological: Positive for headaches. Negative for dizziness.   All other systems reviewed and are negative.         Objective:     /80 mmHg  Pulse 72  Temp(Src) 36.9 °C (98.4 °F)  Resp 16  Ht 1.854 m (6' 1\")  Wt 102.422 kg (225 lb 12.8 oz)  BMI 29.80 kg/m2  SpO2 93%     Physical Exam   Constitutional: He is oriented to person, place, and time. He appears well-developed and well-nourished.   HENT:   Right Ear: Tympanic membrane and ear canal normal. No middle ear effusion.   Left Ear: Ear canal normal. A middle ear effusion (100% cloudy) is present.   Nose: Mucosal edema (mild, mild erythema) present. Right sinus exhibits no maxillary sinus tenderness and no " frontal sinus tenderness. Left sinus exhibits no maxillary sinus tenderness and no frontal sinus tenderness.   Mouth/Throat: Posterior oropharyngeal erythema (mild) present. No posterior oropharyngeal edema.   Eyes: Pupils are equal, round, and reactive to light.   Cardiovascular: Normal rate and regular rhythm.    Pulmonary/Chest: Effort normal. He has decreased breath sounds in the right middle field and the left middle field. He has no wheezes. He has no rales.   Neurological: He is alert and oriented to person, place, and time.   Skin: Skin is warm and dry.   Nursing note and vitals reviewed.              Assessment/Plan:     1. URI, acute     2. Cough     3. Type 2 diabetes mellitus without complication, unspecified long term insulin use status (HCC)       Explained illness is associated with a virus and supportive treatment and rest is recommended treatment.  Illness should resolve on it's own with Rx meds. OTC meds discussed for symptom control for diabetes.  Follow up with pcp in 1-2 weeks if sx are worsening.  Patient reports understanding and agrees with plan.

## 2017-02-21 ENCOUNTER — OFFICE VISIT (OUTPATIENT)
Dept: URGENT CARE | Facility: CLINIC | Age: 72
End: 2017-02-21
Payer: MEDICARE

## 2017-02-21 VITALS
OXYGEN SATURATION: 98 % | HEART RATE: 72 BPM | DIASTOLIC BLOOD PRESSURE: 64 MMHG | WEIGHT: 224.6 LBS | TEMPERATURE: 96.6 F | SYSTOLIC BLOOD PRESSURE: 134 MMHG | RESPIRATION RATE: 16 BRPM | HEIGHT: 73 IN | BODY MASS INDEX: 29.77 KG/M2

## 2017-02-21 DIAGNOSIS — J01.40 ACUTE NON-RECURRENT PANSINUSITIS: ICD-10-CM

## 2017-02-21 PROCEDURE — 99214 OFFICE O/P EST MOD 30 MIN: CPT | Performed by: PHYSICIAN ASSISTANT

## 2017-02-21 PROCEDURE — 4040F PNEUMOC VAC/ADMIN/RCVD: CPT | Performed by: PHYSICIAN ASSISTANT

## 2017-02-21 PROCEDURE — G8420 CALC BMI NORM PARAMETERS: HCPCS | Performed by: PHYSICIAN ASSISTANT

## 2017-02-21 PROCEDURE — 1036F TOBACCO NON-USER: CPT | Performed by: PHYSICIAN ASSISTANT

## 2017-02-21 PROCEDURE — G8432 DEP SCR NOT DOC, RNG: HCPCS | Performed by: PHYSICIAN ASSISTANT

## 2017-02-21 PROCEDURE — G8482 FLU IMMUNIZE ORDER/ADMIN: HCPCS | Performed by: PHYSICIAN ASSISTANT

## 2017-02-21 PROCEDURE — 3017F COLORECTAL CA SCREEN DOC REV: CPT | Performed by: PHYSICIAN ASSISTANT

## 2017-02-21 PROCEDURE — 1101F PT FALLS ASSESS-DOCD LE1/YR: CPT | Performed by: PHYSICIAN ASSISTANT

## 2017-02-21 RX ORDER — AMOXICILLIN AND CLAVULANATE POTASSIUM 875; 125 MG/1; MG/1
1 TABLET, FILM COATED ORAL 2 TIMES DAILY
Qty: 20 TAB | Refills: 0 | Status: SHIPPED | OUTPATIENT
Start: 2017-02-21 | End: 2017-05-09

## 2017-02-21 ASSESSMENT — ENCOUNTER SYMPTOMS
SORE THROAT: 0
SPUTUM PRODUCTION: 0
ABDOMINAL PAIN: 0
HEADACHES: 1
CHILLS: 0
VOMITING: 0
SINUS PRESSURE: 1
WHEEZING: 0
DIZZINESS: 0
SWOLLEN GLANDS: 1
NAUSEA: 0
DIARRHEA: 0
FEVER: 0
PALPITATIONS: 0
COUGH: 1
MYALGIAS: 1
SHORTNESS OF BREATH: 0

## 2017-02-21 NOTE — PROGRESS NOTES
Subjective:      Kevin Jackson is a 71 y.o. male who presents with URI            Sinus Problem  This is a new problem. The current episode started 1 to 4 weeks ago. The problem has been gradually worsening since onset. There has been no fever. Associated symptoms include congestion, coughing, ear pain, headaches, sinus pressure and swollen glands. Pertinent negatives include no chills, shortness of breath or sore throat. Past treatments include oral decongestants and acetaminophen (OTC cough and cold). The treatment provided mild relief.       PMH:  has a past medical history of Hyperlipidemia; Hypertension; Diabetes; Tremors of nervous system; and Muscle disorder. He also has no past medical history of CAD (coronary artery disease).  MEDS:   Current outpatient prescriptions:   •  DM-Phenylephrine-Acetaminophen (SUYAPA-SELTZER PLS SINUS & COUGH PO), Take  by mouth., Disp: , Rfl:   •  DM-Phenylephrine-Acetaminophen (DAY TIME COLD/FLU RELIEF PO), Take  by mouth., Disp: , Rfl:   •  Dextromethorphan-Guaifenesin (TUSSIN DM PO), Take  by mouth., Disp: , Rfl:   •  Dulaglutide (TRULICITY) 0.75 MG/0.5ML Solution Pen-injector, Inject  as instructed., Disp: , Rfl:   •  INSULIN LISP PROT & LISP, HUM, (HUMALOG MIX 50/50) (50-50) 100 UNIT/ML Suspension, Inject 30 Units as instructed., Disp: , Rfl:   •  propranolol CR (INDERAL LA) 120 MG CAPSULE SR 24 HR, Take 1 Cap by mouth every day., Disp: 90 Cap, Rfl: 3  •  primidone (MYSOLINE) 50 MG Tab, Take 1 Tab by mouth every evening., Disp: 90 Tab, Rfl: 3  •  Canagliflozin (INVOKANA) 100 MG TABS, Take 1 Tab by mouth every day., Disp: , Rfl:   •  atorvastatin (LIPITOR) 40 MG TABS, Take 80 mg by mouth every evening., Disp: , Rfl:   •  levothyroxine (SYNTHROID) 50 MCG TABS, Take 1 Tab by mouth every morning before breakfast., Disp: 90 Tab, Rfl: 1  •  metformin (GLUCOPHAGE) 1000 MG tablet, Take 1,000 mg by mouth 2 times a day, with meals., Disp: , Rfl:   •  Choline Fenofibrate (TRILIPIX)  "135 MG CPDR, Take  by mouth., Disp: , Rfl:   •  aspirin EC (ECOTRIN) 81 MG TBEC, Take 81 mg by mouth every day., Disp: , Rfl:   •  vitamin D (CHOLECALCIFEROL) 1000 UNIT TABS, Take 1,000 Units by mouth 3 times a day., Disp: , Rfl:   •  losartan-hydrochlorothiazide (HYZAAR) 50-12.5 MG per tablet, Take 1 Tab by mouth every day., Disp: , Rfl:   •  Dulaglutide 0.75 MG/0.5ML Solution Pen-injector, Inject  as instructed., Disp: , Rfl:   •  Insulin Glargine (TOUJEO SOLOSTAR SC), Inject  as instructed., Disp: , Rfl:   •  NON SPECIFIED, Shingles vaccine, Disp: 1 Each, Rfl: 0  ALLERGIES:   Allergies   Allergen Reactions   • Lipitor [Atorvastatin Calcium]      Pt tells me this medication had more of a \"side effect\" of \"dizziness\"; denies allergies   • Zocor [Simvastatin - High Dose]      Pt tells me this medication gave him \"side effect\" of feeling nauseated; denies allergies to medications     SURGHX:   Past Surgical History   Procedure Laterality Date   • Other orthopedic surgery       right foot    • Carpal tunnel release     • Amputation, toe  2/2013     all 5 toes left foot     SOCHX:  reports that he has never smoked. He has never used smokeless tobacco. He reports that he does not drink alcohol or use illicit drugs.  FH: family history includes Arthritis in his father and mother; Cancer in his father and mother; Diabetes in his father; Genetic in his father; Heart Disease in his father and mother; Hyperlipidemia in his father; Hypertension in his mother; Stroke in his father.    Review of Systems   Constitutional: Negative for fever and chills.   HENT: Positive for congestion, ear pain and sinus pressure. Negative for sore throat.    Respiratory: Positive for cough. Negative for sputum production, shortness of breath and wheezing.    Cardiovascular: Negative for chest pain, palpitations and leg swelling.   Gastrointestinal: Negative for nausea, vomiting, abdominal pain and diarrhea.   Musculoskeletal: Positive for " "myalgias.   Neurological: Positive for headaches. Negative for dizziness.       Medications, Allergies, and current problem list reviewed today in Epic     Objective:     /64 mmHg  Pulse 72  Temp(Src) 35.9 °C (96.6 °F)  Resp 16  Ht 1.854 m (6' 0.99\")  Wt 101.878 kg (224 lb 9.6 oz)  BMI 29.64 kg/m2  SpO2 98%     Physical Exam   Constitutional: He is oriented to person, place, and time. He appears well-developed and well-nourished. No distress.   HENT:   Head: Normocephalic and atraumatic.   Right Ear: Hearing and external ear normal. Tympanic membrane is erythematous.   Left Ear: Hearing and external ear normal. Tympanic membrane is erythematous.   Nose: Mucosal edema present. Right sinus exhibits maxillary sinus tenderness and frontal sinus tenderness. Left sinus exhibits maxillary sinus tenderness.   Mouth/Throat: Normal dentition. No dental caries. Posterior oropharyngeal erythema present. No oropharyngeal exudate.   Eyes: Conjunctivae and EOM are normal. Pupils are equal, round, and reactive to light. Right eye exhibits no discharge. Left eye exhibits no discharge. No scleral icterus.   Neck: Normal range of motion. Neck supple.   Cardiovascular: Normal rate, regular rhythm and normal heart sounds.    Pulmonary/Chest: Effort normal and breath sounds normal. No respiratory distress. He has no wheezes.   Lymphadenopathy:        Head (right side): Submandibular adenopathy present.        Head (left side): Submandibular adenopathy present.     He has no cervical adenopathy.        Right cervical: No superficial cervical adenopathy present.       Left cervical: No superficial cervical adenopathy present.   Neurological: He is alert and oriented to person, place, and time.   Skin: Skin is warm and dry. He is not diaphoretic. No cyanosis. Nails show no clubbing.   Psychiatric: He has a normal mood and affect. His behavior is normal. Judgment and thought content normal.   Nursing note and vitals " reviewed.              Assessment/Plan:     1. Acute non-recurrent pansinusitis  amoxicillin-clavulanate (AUGMENTIN) 875-125 MG Tab     Take full course of antibiotic  Tylenol and Motrin for fever and pain  OTC meds as discussed  Increase fluids and rest  Nasal spray, nasal wash, Radha pot  Return to clinic or go to ED if symptoms worsen or persist. Indications for ED discussed at length. Patient voices understanding. Follow-up with your primary care provider in 3-5 days. Red flags discussed.    Please note that this dictation was created using voice recognition software. I have made every reasonable attempt to correct obvious errors, but I expect that there are errors of grammar and possibly content that I did not discover before finalizing the note.

## 2017-02-21 NOTE — MR AVS SNAPSHOT
"        Kevin Jackson   2017 8:15 AM   Office Visit   MRN: [id number]    Department:  [facility]   Dept Phone:  [phone]    Description:  Male : 1945   Provider:  Blake Luu PA-C           Reason for Visit     URI x2 weeks not feeling better since last visit, stuffy nose, fluid in ears, congestion, cough      Allergies as of 2017     Allergen Noted Reactions    Lipitor [Atorvastatin Calcium] 10/03/2011       Pt tells me this medication had more of a \"side effect\" of \"dizziness\"; denies allergies    Zocor [Simvastatin - High Dose] 10/03/2011       Pt tells me this medication gave him \"side effect\" of feeling nauseated; denies allergies to medications      You were diagnosed with     Acute non-recurrent pansinusitis   [[id number]]         Vital Signs     Blood Pressure Pulse Temperature Respirations Height Weight    134/64 mmHg 72 35.9 °C (96.6 °F) 16 1.854 m (6' 0.99\") 101.878 kg (224 lb 9.6 oz)    Body Mass Index Oxygen Saturation Smoking Status             29.64 kg/m2 98% Never Smoker          Basic Information     Date Of Birth Sex Race Ethnicity Preferred Language    1945 Male White Non- English      Your appointments     May 31, 2017  9:40 AM   Established Patient with Ky Hernandez M.D.   [address] [id number] [phone]           You will be receiving a confirmation call a few days before your appointment from our automated call confirmation system.              Problem List              ICD-10-CM Priority Class Noted - Resolved    Coarse tremors- dr smith, neurologist, [place] G25.2   10/3/2011 - Present    Mixed hyperlipidemia- dr dwyer E78.2   10/3/2011 - Present    Essential hypertension I10   10/3/2011 - Present    Hypovitaminosis D E55.9   10/3/2011 - Present    Uncontrolled type 2 diabetes mellitus with complication, without long-term current use of insulin (HCC)-uncontrolled, " with complications- dr browning E11.8, E11.65   10/2/2013 - Present    BMI 31.0-31.9,adult Z68.31   10/2/2013 - Present      Health Maintenance        Date Due Completion Dates    IMM DTaP/Tdap/Td Vaccine (1 - Tdap) 5/26/1965 ---    IMM ZOSTER VACCINE 5/26/2006 ---    URINE ACR / MICROALBUMIN 8/2/2014 8/2/2013 (N/S), 9/4/2012    Override on 8/2/2013: (N/S)    RETINAL SCREENING 9/3/2015 9/3/2014    FASTING LIPID PROFILE 11/5/2015 11/5/2014, 1/15/2013, 9/4/2012    DIABETES MONOFILAMENT / LE EXAM 3/8/2017 3/8/2016, 3/8/2016, 11/5/2014 (N/S), 10/2/2013 (N/S)    Override on 11/5/2014: (N/S)    Override on 10/2/2013: (N/S)    A1C SCREENING 5/29/2017 11/29/2016, 11/5/2014, 8/2/2013 (N/S), 1/14/2013, 9/4/2012    Override on 8/2/2013: (N/S) (=9.3 DR ABBOTT)    SERUM CREATININE 1/11/2018 1/11/2017, 11/5/2014, 2/15/2013, 2/8/2013, 2/1/2013, 1/25/2013, 1/18/2013, 1/17/2013, 1/16/2013, 1/15/2013, 1/14/2013, 9/4/2012    COLONOSCOPY 12/11/2019 12/11/2014            Current Immunizations     13-VALENT PCV PREVNAR 11/29/2016    Influenza TIV (IM) 10/2/2013, 11/19/2012    Influenza Vaccine Adult HD 11/29/2016, 11/5/2014    Pneumococcal polysaccharide vaccine (PPSV-23) 11/19/2012      Below and/or attached are the medications your provider expects you to take. Review all of your home medications and newly ordered medications with your provider and/or pharmacist. Follow medication instructions as directed by your provider and/or pharmacist. Please keep your medication list with you and share with your provider. Update the information when medications are discontinued, doses are changed, or new medications (including over-the-counter products) are added; and carry medication information at all times in the event of emergency situations     Allergies:  LIPITOR - (reactions not documented)     ZOCOR - (reactions not documented)               Medications  Valid as of: February 21, 2017 -  8:28 AM    Generic Name Brand Name Tablet Size  Instructions for use    Amoxicillin-Pot Clavulanate (Tab) AUGMENTIN 875-125 MG Take 1 Tab by mouth 2 times a day.        Aspirin (Tablet Delayed Response) ECOTRIN 81 MG Take 81 mg by mouth every day.        Atorvastatin Calcium (Tab) LIPITOR 40 MG Take 80 mg by mouth every evening.        Canagliflozin (Tab) Canagliflozin 100 MG Take 1 Tab by mouth every day.        Cholecalciferol (Tab) cholecalciferol 1000 UNIT Take 1,000 Units by mouth 3 times a day.        Choline Fenofibrate (CAPSULE DELAYED RELEASE) TRILIPIX 135 MG Take  by mouth.        Dextromethorphan-Guaifenesin   Take  by mouth.        DM-Phenylephrine-Acetaminophen   Take  by mouth.        DM-Phenylephrine-Acetaminophen   Take  by mouth.        Dulaglutide (Solution Pen-injector) Dulaglutide 0.75 MG/0.5ML Inject  as instructed.        Dulaglutide (Solution Pen-injector) Dulaglutide 0.75 MG/0.5ML Inject  as instructed.        Insulin Glargine   Inject  as instructed.        Insulin Lispro Prot & Lispro (Suspension) INSULIN LISP PROT & LISP (HUM) (50-50) 100 UNIT/ML Inject 30 Units as instructed.        Levothyroxine Sodium (Tab) SYNTHROID 50 MCG Take 1 Tab by mouth every morning before breakfast.        Losartan Potassium-HCTZ (Tab) HYZAAR 50-12.5 MG Take 1 Tab by mouth every day.        MetFORMIN HCl (Tab) GLUCOPHAGE 1000 MG Take 1,000 mg by mouth 2 times a day, with meals.        NON SPECIFIED   Shingles vaccine        Primidone (Tab) MYSOLINE 50 MG Take 1 Tab by mouth every evening.        Propranolol HCl (CAPSULE SR 24 HR) INDERAL  MG Take 1 Cap by mouth every day.        .                 Medicines prescribed today were sent to:     [id number] - [place] - [address]    [address] [place] [address]    Phone: [phone] Fax: [phone]    Open 24 Hours?: No      Medication refill instructions:       If your prescription bottle indicates you have medication refills left, it is not necessary to call your provider’s office.  Please contact your pharmacy and they will refill your medication.    If your prescription bottle indicates you do not have any refills left, you may request refills at any time through one of the following ways: The online AdviseHub system (except Urgent Care), by calling your provider’s office, or by asking your pharmacy to contact your provider’s office with a refill request. Medication refills are processed only during regular business hours and may not be available until the next business day. Your provider may request additional information or to have a follow-up visit with you prior to refilling your medication.   *Please Note: Medication refills are assigned a new Rx number when refilled electronically. Your pharmacy may indicate that no refills were authorized even though a new prescription for the same medication is available at the pharmacy. Please request the medicine by name with the pharmacy before contacting your provider for a refill.           AdviseHub Access Code: [email or web address]  Expires: 3/15/2017  8:31 AM    AdviseHub  A secure, online tool to manage your health information     [employer]’s AdviseHub® is a secure, online tool that connects you to your personalized health information from the privacy of your home -- day or night - making it very easy for you to manage your healthcare. Once the activation process is completed, you can even access your medical information using the AdviseHub rudy, which is available for free in the Apple Rudy store or Google Play store.     AdviseHub provides the following levels of access (as shown below):   My Chart Features   [facility] Doctor [facility]  Specialists [facility]  Urgent  Care Non-[facility]  Primary Care  Doctor   Email your healthcare team securely and privately 24/7 X X X    Manage appointments: schedule your next appointment; view details of past/upcoming appointments X      Request prescription refills. X      View recent personal medical records,  including lab and immunizations X X X X   View health record, including health history, allergies, medications X X X X   Read reports about your outpatient visits, procedures, consult and ER notes X X X X   See your discharge summary, which is a recap of your hospital and/or ER visit that includes your diagnosis, lab results, and care plan. X X       How to register for EpiSensor:  1. Go to  [email or web address].  2. Click on the Sign Up Now box, which takes you to the New Member Sign Up page. You will need to provide the following information:  a. Enter your EpiSensor Access Code exactly as it appears at the top of this page. (You will not need to use this code after you’ve completed the sign-up process. If you do not sign up before the expiration date, you must request a new code.)   b. Enter your date of birth.   c. Enter your home email address.   d. Click Submit, and follow the next screen’s instructions.  3. Create a EpiSensor ID. This will be your EpiSensor login ID and cannot be changed, so think of one that is secure and easy to remember.  4. Create a EpiSensor password. You can change your password at any time.  5. Enter your Password Reset Question and Answer. This can be used at a later time if you forget your password.   6. Enter your e-mail address. This allows you to receive e-mail notifications when new information is available in EpiSensor.  7. Click Sign Up. You can now view your health information.    For assistance activating your EpiSensor account, call [phone]

## 2017-03-09 DIAGNOSIS — G25.2 COARSE TREMORS: ICD-10-CM

## 2017-03-09 RX ORDER — PROPRANOLOL HYDROCHLORIDE 120 MG/1
120 CAPSULE, EXTENDED RELEASE ORAL DAILY
Qty: 90 CAP | Refills: 4 | Status: SHIPPED | OUTPATIENT
Start: 2017-03-09 | End: 2018-02-21 | Stop reason: SDUPTHER

## 2017-03-10 NOTE — TELEPHONE ENCOUNTER
Phone Number Called: [phone] (home)     Message: Left message for patient about prescription being approved.    Left Message for patient to call back: yes

## 2017-03-30 ENCOUNTER — TELEPHONE (OUTPATIENT)
Dept: MEDICAL GROUP | Age: 72
End: 2017-03-30

## 2017-03-30 DIAGNOSIS — Z11.59 NEED FOR HEPATITIS C SCREENING TEST: ICD-10-CM

## 2017-03-30 DIAGNOSIS — E78.2 MIXED HYPERLIPIDEMIA: ICD-10-CM

## 2017-03-30 DIAGNOSIS — Z12.5 SCREENING FOR PROSTATE CANCER: ICD-10-CM

## 2017-03-30 NOTE — TELEPHONE ENCOUNTER
1. Caller Name: Kevin                                         Call Back Number: [phone] (home)       Patient approves a detailed voicemail message: no    Patient requesting routine labs for his upcoming visit.  Patient also requesting lab orders for hep c and psa.  Please advise.

## 2017-03-31 NOTE — TELEPHONE ENCOUNTER
Phone Number Called: [phone]    Message: Called pt, no answer, left message that labs ordered.    Left Message for patient to call back: yes, if any questions

## 2017-04-27 ENCOUNTER — PATIENT OUTREACH (OUTPATIENT)
Dept: HEALTH INFORMATION MANAGEMENT | Facility: OTHER | Age: 72
End: 2017-04-27

## 2017-04-27 NOTE — PROGRESS NOTES
Outcome: Left Message    WebIZ Checked & Epic Updated:  yes    [facility] Verified: no    Attempt # 1

## 2017-05-01 NOTE — PROGRESS NOTES
Attempt #:1    WebIZ Checked & Epic Updated: yes  [facility]Connect Verified: no  Verify PCP: yes    Communication Preference Obtained: yes     Review Care Team: yes    Annual Wellness Visit Scheduling  1. Scheduling Status:Scheduled        Care Gap Scheduling (Attempt to Schedule EACH Overdue Care Gap!)     Health Maintenance Due   Topic Date Due   • IMM DTaP/Tdap/Td Vaccine (1 - Tdap) UNABLE TO DISCUSS   • URINE ACR / MICROALBUMIN  ORDERS IN    • RETINAL SCREENING  UNABLE TO DISCUSS   • FASTING LIPID PROFILE  ORDERS IN    • Annual Wellness Visit  SCHEDULED   • DIABETES MONOFILAMENT / LE EXAM  UNABLE TO DISCUSS          MyChart Activation: declined  MyChart Rudy: no  Virtual Visits: no  Opt In to Text Messages: no

## 2017-05-02 ENCOUNTER — TELEPHONE (OUTPATIENT)
Dept: MEDICAL GROUP | Age: 72
End: 2017-05-02

## 2017-05-02 NOTE — TELEPHONE ENCOUNTER
Phone Number Called: [phone] (home)     Message: returning patient vm regarding lab orders.  Left message for patient to return call.      Left Message for patient to call back: yes

## 2017-05-03 ENCOUNTER — TELEPHONE (OUTPATIENT)
Dept: MEDICAL GROUP | Age: 72
End: 2017-05-03

## 2017-05-03 DIAGNOSIS — E08.00 DIABETES MELLITUS DUE TO UNDERLYING CONDITION WITH HYPEROSMOLARITY WITHOUT COMA, WITHOUT LONG-TERM CURRENT USE OF INSULIN (HCC): ICD-10-CM

## 2017-05-03 NOTE — TELEPHONE ENCOUNTER
Order    MICROALBUMIN CREAT RATIO URINE [[id number]] (Order [id number])        Order was for clinic collect on 03/30/2017. If this is not what you intended to do, please, re-order as lab collect and discontinue original order. Thank you.    Pended through

## 2017-05-03 NOTE — TELEPHONE ENCOUNTER
Phone Number Called: [phone] (home)     Message: spoke with patient who states he needed lab orders.  Informed patient that labs have been orders, printed, and ready for pick-up.    Left Message for patient to call back: no

## 2017-05-09 ENCOUNTER — OFFICE VISIT (OUTPATIENT)
Dept: MEDICAL GROUP | Facility: MEDICAL CENTER | Age: 72
End: 2017-05-09
Payer: MEDICARE

## 2017-05-09 VITALS
HEART RATE: 68 BPM | TEMPERATURE: 97.2 F | WEIGHT: 222 LBS | DIASTOLIC BLOOD PRESSURE: 86 MMHG | OXYGEN SATURATION: 97 % | BODY MASS INDEX: 30.07 KG/M2 | SYSTOLIC BLOOD PRESSURE: 152 MMHG | HEIGHT: 72 IN

## 2017-05-09 DIAGNOSIS — E55.9 HYPOVITAMINOSIS D: ICD-10-CM

## 2017-05-09 DIAGNOSIS — Z00.00 MEDICARE ANNUAL WELLNESS VISIT, SUBSEQUENT: Primary | ICD-10-CM

## 2017-05-09 DIAGNOSIS — I10 ESSENTIAL HYPERTENSION: ICD-10-CM

## 2017-05-09 DIAGNOSIS — G25.2 COARSE TREMORS: ICD-10-CM

## 2017-05-09 DIAGNOSIS — E78.2 MIXED HYPERLIPIDEMIA: ICD-10-CM

## 2017-05-09 PROCEDURE — 1036F TOBACCO NON-USER: CPT | Performed by: NURSE PRACTITIONER

## 2017-05-09 PROCEDURE — G0439 PPPS, SUBSEQ VISIT: HCPCS | Performed by: NURSE PRACTITIONER

## 2017-05-09 PROCEDURE — G8510 SCR DEP NEG, NO PLAN REQD: HCPCS | Performed by: NURSE PRACTITIONER

## 2017-05-09 PROCEDURE — 3046F HEMOGLOBIN A1C LEVEL >9.0%: CPT | Mod: 8P | Performed by: NURSE PRACTITIONER

## 2017-05-09 RX ORDER — GLIMEPIRIDE 1 MG/1
1 TABLET ORAL EVERY MORNING
COMMUNITY
End: 2019-03-12

## 2017-05-09 ASSESSMENT — PATIENT HEALTH QUESTIONNAIRE - PHQ9: CLINICAL INTERPRETATION OF PHQ2 SCORE: 0

## 2017-05-09 NOTE — MR AVS SNAPSHOT
"        Kevin Jackson   2017 9:00 AM   Office Visit   MRN: [id number]    Department:  [facility]   Dept Phone:  [phone]    Description:  Male : 1945   Provider:  ALE Chiang           Reason for Visit     Annual Exam subsequent      Allergies as of 2017     Allergen Noted Reactions    Zocor [Simvastatin - High Dose] 10/03/2011       Pt tells me this medication gave him \"side effect\" of feeling nauseated; denies allergies to medications      You were diagnosed with     Medicare annual wellness visit, subsequent   [326377]  -  Primary     Coarse tremors   [593334]       Mixed hyperlipidemia   [272.2.ICD-9-CM]       Essential hypertension   [5818180]       Hypovitaminosis D   [426492]       Uncontrolled type 2 diabetes mellitus with complication, without long-term current use of insulin (CMS-[facility])   [0822909]       BMI 31.0-31.9,adult   [417722]         Vital Signs     Blood Pressure Pulse Temperature Height Weight Body Mass Index    152/86 mmHg 68 36.2 °C (97.2 °F) 1.829 m (6' 0.01\") 100.699 kg (222 lb) 30.10 kg/m2    Oxygen Saturation Smoking Status                97% Never Smoker           Basic Information     Date Of Birth Sex Race Ethnicity Preferred Language    1945 Male White Non- English      Your appointments     May 31, 2017  9:40 AM   Established Patient with Ky Hernandez M.D.   [address] [id number] [phone]           You will be receiving a confirmation call a few days before your appointment from our automated call confirmation system.              Problem List              ICD-10-CM Priority Class Noted - Resolved    Coarse tremors- dr smith, neurologist, [place] G25.2   10/3/2011 - Present    Mixed hyperlipidemia- dr dwyer E78.2   10/3/2011 - Present    Essential hypertension I10   10/3/2011 - Present    Hypovitaminosis D E55.9   10/3/2011 - Present    Uncontrolled " type 2 diabetes mellitus with complication, without long-term current use of insulin (HCC)-uncontrolled, with complications- dr browning E11.8, E11.65   10/2/2013 - Present    BMI 31.0-31.9,adult Z68.31   10/2/2013 - Present      Health Maintenance        Date Due Completion Dates    IMM DTaP/Tdap/Td Vaccine (1 - Tdap) 5/26/1965 ---    URINE ACR / MICROALBUMIN 8/2/2014 8/2/2013 (N/S), 9/4/2012    Override on 8/2/2013: (N/S)    RETINAL SCREENING 9/3/2015 9/3/2014    FASTING LIPID PROFILE 11/5/2015 11/5/2014, 1/15/2013, 9/4/2012    DIABETES MONOFILAMENT / LE EXAM 3/8/2017 3/8/2016, 3/8/2016, 11/5/2014 (N/S), 10/2/2013 (N/S)    Override on 11/5/2014: (N/S)    Override on 10/2/2013: (N/S)    A1C SCREENING 5/29/2017 11/29/2016, 11/5/2014, 8/2/2013 (N/S), 1/14/2013, 9/4/2012    Override on 8/2/2013: (N/S) (=9.3 DR ABBOTT)    SERUM CREATININE 1/11/2018 1/11/2017, 11/5/2014, 2/15/2013, 2/8/2013, 2/1/2013, 1/25/2013, 1/18/2013, 1/17/2013, 1/16/2013, 1/15/2013, 1/14/2013, 9/4/2012    COLONOSCOPY 12/11/2019 12/11/2014            Current Immunizations     13-VALENT PCV PREVNAR 11/29/2016    Influenza TIV (IM) 10/2/2013, 11/19/2012    Influenza Vaccine Adult HD 11/29/2016, 11/5/2014    Pneumococcal polysaccharide vaccine (PPSV-23) 11/19/2012    SHINGLES VACCINE 12/16/2014      Below and/or attached are the medications your provider expects you to take. Review all of your home medications and newly ordered medications with your provider and/or pharmacist. Follow medication instructions as directed by your provider and/or pharmacist. Please keep your medication list with you and share with your provider. Update the information when medications are discontinued, doses are changed, or new medications (including over-the-counter products) are added; and carry medication information at all times in the event of emergency situations     Allergies:  ZOCOR - (reactions not documented)               Medications  Valid as of: May 09, 2017 -   9:13 AM    Generic Name Brand Name Tablet Size Instructions for use    Aspirin (Tablet Delayed Response) ECOTRIN 81 MG Take 81 mg by mouth every day.        Atorvastatin Calcium (Tab) LIPITOR 40 MG Take 80 mg by mouth every evening.        Canagliflozin (Tab) Canagliflozin 100 MG Take 1 Tab by mouth every day.        Cholecalciferol (Tab) cholecalciferol 1000 UNIT Take 1,000 Units by mouth 3 times a day.        Choline Fenofibrate (CAPSULE DELAYED RELEASE) TRILIPIX 135 MG Take  by mouth.        Exenatide (Pen-injector) Exenatide ER 2 MG Inject  as instructed.        Glimepiride (Tab) AMARYL 1 MG Take 1 mg by mouth every morning.        Insulin Lispro Prot & Lispro (Suspension) INSULIN LISP PROT & LISP (HUM) (50-50) 100 UNIT/ML Inject 30 Units as instructed.        Levothyroxine Sodium (Tab) SYNTHROID 50 MCG Take 1 Tab by mouth every morning before breakfast.        Losartan Potassium-HCTZ (Tab) HYZAAR 50-12.5 MG Take 1 Tab by mouth every day.        MetFORMIN HCl (Tab) GLUCOPHAGE 1000 MG Take 1,000 mg by mouth 2 times a day, with meals.        NON SPECIFIED   Shingles vaccine        Primidone (Tab) MYSOLINE 50 MG Take 1 Tab by mouth every evening.        Propranolol HCl (CAPSULE SR 24 HR) INDERAL  MG Take 1 Cap by mouth every day.        .                 Medicines prescribed today were sent to:     [facility] [address]    [address] [place] [address] #[facility] [address]    Phone: [phone] Fax: [phone]    Open 24 Hours?: No      Medication refill instructions:       If your prescription bottle indicates you have medication refills left, it is not necessary to call your provider’s office. Please contact your pharmacy and they will refill your medication.    If your prescription bottle indicates you do not have any refills left, you may request refills at any time through one of the following ways: The online HomeTouch system (except Urgent Care), by calling your provider’s  office, or by asking your pharmacy to contact your provider’s office with a refill request. Medication refills are processed only during regular business hours and may not be available until the next business day. Your provider may request additional information or to have a follow-up visit with you prior to refilling your medication.   *Please Note: Medication refills are assigned a new Rx number when refilled electronically. Your pharmacy may indicate that no refills were authorized even though a new prescription for the same medication is available at the pharmacy. Please request the medicine by name with the pharmacy before contacting your provider for a refill.        Instructions    Continue with care through Ky Hernandez M.D..  Next Medicare Annual Wellness Visit is due in one year.    Continue care with specialists you are seeing for your chronic problems.    Attached is some preventative information for you to read.          Fall Prevention and Home Safety  Falls cause injuries and can affect all age groups. It is possible to prevent falls.   HOW TO PREVENT FALLS  · Wear shoes with rubber soles that do not have an opening for your toes.   · Keep the inside and outside of your house well lit.   · Use night lights throughout your home.   · Remove clutter from floors.   · Clean up floor spills.   · Remove throw rugs or fasten them to the floor with carpet tape.   · Do not place electrical cords across pathways.   · Put grab bars by your tub, shower, and toilet. Do not use towel bars as grab bars.   · Put handrails on both sides of the stairway. Fix loose handrails.   · Do not climb on stools or stepladders, if possible.   · Do not wax your floors.   · Repair uneven or unsafe sidewalks, walkways, or stairs.   · Keep items you use a lot within reach.   · Be aware of pets.   · Keep emergency numbers next to the telephone.   · Put smoke detectors in your home and near bedrooms.   Ask your doctor what other  things you can do to prevent falls.  Document Released: 10/14/2010 Document Revised: 06/18/2013 Document Reviewed: 03/19/2013  WhoWantsMeCare® Patient Information ©2013 [facility].    Exercise to Stay Healthy      Exercise helps you become and stay healthy.  EXERCISE IDEAS AND TIPS  Choose exercises that:  · You enjoy.   · Fit into your day.   You do not need to exercise really hard to be healthy. You can do exercises at a slow or medium level and stay healthy. You can:  · Stretch before and after working out.   · Try yoga, Pilates, or pati chi.   · Lift weights.   · Walk fast, swim, jog, run, climb stairs, bicycle, dance, or rollerskate.   · Take aerobic classes.   Exercises that burn about 150 calories:  · Running 1 ½ miles in 15 minutes.   · Playing volleyball for 45 to 60 minutes.   · Washing and waxing a car for 45 to 60 minutes.   · Playing touch football for 45 minutes.   · Walking 1 ¾ miles in 35 minutes.   · Pushing a stroller 1 ½ miles in 30 minutes.   · Playing basketball for 30 minutes.   · Raking leaves for 30 minutes.   · Bicycling 5 miles in 30 minutes.   · Walking 2 miles in 30 minutes.   · Dancing for 30 minutes.   · Shoveling snow for 15 minutes.   · Swimming laps for 20 minutes.   · Walking up stairs for 15 minutes.   · Bicycling 4 miles in 15 minutes.   · Gardening for 30 to 45 minutes.   · Jumping rope for 15 minutes.   · Washing windows or floors for 45 to 60 minutes.     One suggestion is to start walking for 10 minutes after each meal.  This will help with digestion and help you to metabolize your meal.       For Weight Loss -   Recommend portion sizes with more fruits/vegetables/high fiber foods.  Stay away from white bread/pastas/white rice/white potatoes.  Increase Fluid intake to 6-8 glasses of water daily.   Eliminate soda's (diet and regular) from your fluid intake.      Document Released: 01/20/2012 Document Revised: 03/11/2013 Document Reviewed: 01/20/2012  ExitCare® Patient Information  ©2013 [employer].    Fat and Cholesterol Control Diet  Cholesterol is a wax-like substance. It comes from your liver and is found in certain foods. There is good (HDL) and bad (LDL) cholesterol. Too much cholesterol in your blood can affect your heart. Certain foods can lower or raise your cholesterol. Eat foods that are low in cholesterol.  Saturated and trans fats are bad fats found in foods that will raise your cholesterol. Do not eat foods that are high in saturated and trans fats.  FOODS HIGHER IN SATURATED AND TRANS FATS  · Dairy products, such as whole milk, eggs, cheese, cream, and butter.   · Fatty meats, such as hot dogs, sausage, and salami.   · Fried foods.   · Trans fats which are found in margarine and pre-made cookies, crackers, and baked goods.   · Tropical oils, such as coconut and palm oils.   Read package labels at the store. Do not buy products that use saturated or trans fats or hydrogenated oils. Find foods labeled:  · Low-fat.   · Low-saturated fat.   · Trans-fat-free.   · Low-cholesterol.   FOODS LOWER IN CHOLESTEROL  ·  Fruit.   · Vegetables.   · Beans, peas, and lentils.   · Fish.   · Lean meat, such as chicken (without skin) or ground turkey.   · Grains, such as barley, rice, couscous, bulgur wheat, and pasta.   · Heart-healthy tub margarine.   PREPARING YOUR FOOD  · Broil, bake, steam, or roast foods. Do not lundberg food.   · Use non-stick cooking sprays.   · Use lemon or herbs to flavor food instead of using butter or stick margarine.   · Use nonfat yogurt, salsa, or low-fat dressings for salads.   Document Released: 06/18/2013 Document Reviewed: 06/18/2013  ExitCare® Patient Information ©2013 [employer].    Recommend annual flu vaccine.  Next due in Fall, 2015.  If you decide not to have the flu vaccine, recommend good handwashing and staying out of crowds during flu season.        Basic Carbohydrate Counting for Diabetes Mellitus  Carbohydrate counting is a method for keeping track  "of the amount of carbohydrates you eat. Eating carbohydrates naturally increases the level of sugar (glucose) in your blood, so it is important for you to know the amount that is okay for you to have in every meal. Carbohydrate counting helps keep the level of glucose in your blood within normal limits. The amount of carbohydrates allowed is different for every person. A dietitian can help you calculate the amount that is right for you. Once you know the amount of carbohydrates you can have, you can count the carbohydrates in the foods you want to eat.  Carbohydrates are found in the following foods:  · Grains, such as breads and cereals.  · Dried beans and soy products.  · Starchy vegetables, such as potatoes, peas, and corn.  · Fruit and fruit juices.  · Milk and yogurt.  · Sweets and snack foods, such as cake, cookies, candy, chips, soft drinks, and fruit drinks.  CARBOHYDRATE COUNTING  There are two ways to count the carbohydrates in your food. You can use either of the methods or a combination of both.  Reading the \"Nutrition Facts\" on Packaged Food  The \"Nutrition Facts\" is an area that is included on the labels of almost all packaged food and beverages in the [place]. It includes the serving size of that food or beverage and information about the nutrients in each serving of the food, including the grams (g) of carbohydrate per serving.   Decide the number of servings of this food or beverage that you will be able to eat or drink. Multiply that number of servings by the number of grams of carbohydrate that is listed on the label for that serving. The total will be the amount of carbohydrates you will be having when you eat or drink this food or beverage.  Learning Standard Serving Sizes of Food  When you eat food that is not packaged or does not include \"Nutrition Facts\" on the label, you need to measure the servings in order to count the amount of carbohydrates. A serving of most carbohydrate-rich " foods contains about 15 g of carbohydrates. The following list includes serving sizes of carbohydrate-rich foods that provide 15 g of carbohydrate per serving:    · 1 slice of bread (1 oz) or 1 six-inch tortilla.    · ½ of a hamburger bun or English muffin.  · 4-6 crackers.  · ¾ cup unsweetened dry cereal.    · ½ cup hot cereal.  ·  cup rice or pasta.    · ½ cup mashed potatoes or ¼ of a large baked potato.  · 1 cup fresh fruit or one small piece of fruit.    · ½ cup canned or frozen fruit or fruit juice.  · 1 cup milk.  ·  cup plain fat-free yogurt or yogurt sweetened with artificial sweeteners.  · ½ cup cooked dried beans or starchy vegetable, such as peas, corn, or potatoes.    Decide the number of standard-size servings that you will eat. Multiply that number of servings by 15 (the grams of carbohydrates in that serving). For example, if you eat 2 cups of strawberries, you will have eaten 2 servings and 30 g of carbohydrates (2 servings x 15 g = 30 g). For foods such as soups and casseroles, in which more than one food is mixed in, you will need to count the carbohydrates in each food that is included.  EXAMPLE OF CARBOHYDRATE COUNTING  Sample Dinner   · 3 oz chicken breast.  ·  cup of brown rice.  · ½ cup of corn.  · 1 cup milk.    · 1 cup strawberries with sugar-free whipped topping.    Carbohydrate Calculation   Step 1: Identify the foods that contain carbohydrates:   · Rice.    · Corn.    · Milk.    · Strawberries.  Step 2: Calculate the number of servings eaten of each:   · 2 servings of rice.    · 1 serving of corn.    · 1 serving of milk.    · 1 serving of strawberries.  Step 3:  Multiply each of those number of servings by 15 g:   · 2 servings of rice x 15 g = 30 g.    · 1 serving of corn x 15 g = 15 g.    · 1 serving of milk x 15 g = 15 g.    · 1 serving of strawberries x 15 g = 15 g.  Step 4: Add together all of the amounts to find the total grams of carbohydrates eaten: 30 g + 15 g + 15 g + 15 g = 75  g.     This information is not intended to replace advice given to you by your health care provider. Make sure you discuss any questions you have with your health care provider.     Document Released: 12/18/2006 Document Revised: 01/08/2016 Document Reviewed: 11/14/2014  [employer] Interactive Patient Education ©2016 [employer].            Cloud Theory Access Code: [email or web address]  Expires: 6/8/2017  9:13 AM    Cloud Theory  A secure, online tool to manage your health information     [email or web address]’s Cloud Theory® is a secure, online tool that connects you to your personalized health information from the privacy of your home -- day or night - making it very easy for you to manage your healthcare. Once the activation process is completed, you can even access your medical information using the Cloud Theory rudy, which is available for free in the Apple Rudy store or Google Play store.     Cloud Theory provides the following levels of access (as shown below):   My Chart Features   [facility] Primary Care Doctor [facility]  Specialists [facility]  Urgent  [facility]  Primary Care  Doctor   Email your healthcare team securely and privately 24/7 X X X    Manage appointments: schedule your next appointment; view details of past/upcoming appointments X      Request prescription refills. X      View recent personal medical records, including lab and immunizations X X X X   View health record, including health history, allergies, medications X X X X   Read reports about your outpatient visits, procedures, consult and ER notes X X X X   See your discharge summary, which is a recap of your hospital and/or ER visit that includes your diagnosis, lab results, and care plan. X X       How to register for Cloud Theory:  1. Go to  [email or web address].  2. Click on the Sign Up Now box, which takes you to the New Member Sign Up page. You will need to provide the following information:  a. Enter your Cloud Theory Access Code exactly as it appears at the top of this page.  (You will not need to use this code after you’ve completed the sign-up process. If you do not sign up before the expiration date, you must request a new code.)   b. Enter your date of birth.   c. Enter your home email address.   d. Click Submit, and follow the next screen’s instructions.  3. Create a Newstag ID. This will be your Newstag login ID and cannot be changed, so think of one that is secure and easy to remember.  4. Create a Newstag password. You can change your password at any time.  5. Enter your Password Reset Question and Answer. This can be used at a later time if you forget your password.   6. Enter your e-mail address. This allows you to receive e-mail notifications when new information is available in Newstag.  7. Click Sign Up. You can now view your health information.    For assistance activating your Newstag account, call [phone]

## 2017-05-09 NOTE — ASSESSMENT & PLAN NOTE
Patient aware of relationship between weight and health and working on diet  Followed by Ky Hernandez M.D..

## 2017-05-09 NOTE — ASSESSMENT & PLAN NOTE
Chronic condition managed with current medical regimen  1/11/2017   Glucose 196 (H) 65 - 99 mg/dL Final          11/2016   Glycohemoglobin      8.4   Homes testing    Continue with current meds, recent formulary change, pt not happy with new meds  Followed by endocrinology q 3 months

## 2017-05-09 NOTE — PATIENT INSTRUCTIONS
Continue with care through Ky Hernandez M.D..  Next Medicare Annual Wellness Visit is due in one year.    Continue care with specialists you are seeing for your chronic problems.    Attached is some preventative information for you to read.          Fall Prevention and Home Safety  Falls cause injuries and can affect all age groups. It is possible to prevent falls.   HOW TO PREVENT FALLS  · Wear shoes with rubber soles that do not have an opening for your toes.   · Keep the inside and outside of your house well lit.   · Use night lights throughout your home.   · Remove clutter from floors.   · Clean up floor spills.   · Remove throw rugs or fasten them to the floor with carpet tape.   · Do not place electrical cords across pathways.   · Put grab bars by your tub, shower, and toilet. Do not use towel bars as grab bars.   · Put handrails on both sides of the stairway. Fix loose handrails.   · Do not climb on stools or stepladders, if possible.   · Do not wax your floors.   · Repair uneven or unsafe sidewalks, walkways, or stairs.   · Keep items you use a lot within reach.   · Be aware of pets.   · Keep emergency numbers next to the telephone.   · Put smoke detectors in your home and near bedrooms.   Ask your doctor what other things you can do to prevent falls.  Document Released: 10/14/2010 Document Revised: 06/18/2013 Document Reviewed: 03/19/2013  ExitCare® Patient Information ©2013 [employer].    Exercise to Stay Healthy      Exercise helps you become and stay healthy.  EXERCISE IDEAS AND TIPS  Choose exercises that:  · You enjoy.   · Fit into your day.   You do not need to exercise really hard to be healthy. You can do exercises at a slow or medium level and stay healthy. You can:  · Stretch before and after working out.   · Try yoga, Pilates, or pati chi.   · Lift weights.   · Walk fast, swim, jog, run, climb stairs, bicycle, dance, or rollerskate.   · Take aerobic classes.   Exercises that burn about 150  calories:  · Running 1 ½ miles in 15 minutes.   · Playing volleyball for 45 to 60 minutes.   · Washing and waxing a car for 45 to 60 minutes.   · Playing touch football for 45 minutes.   · Walking 1 ¾ miles in 35 minutes.   · Pushing a stroller 1 ½ miles in 30 minutes.   · Playing basketball for 30 minutes.   · Raking leaves for 30 minutes.   · Bicycling 5 miles in 30 minutes.   · Walking 2 miles in 30 minutes.   · Dancing for 30 minutes.   · Shoveling snow for 15 minutes.   · Swimming laps for 20 minutes.   · Walking up stairs for 15 minutes.   · Bicycling 4 miles in 15 minutes.   · Gardening for 30 to 45 minutes.   · Jumping rope for 15 minutes.   · Washing windows or floors for 45 to 60 minutes.     One suggestion is to start walking for 10 minutes after each meal.  This will help with digestion and help you to metabolize your meal.       For Weight Loss -   Recommend portion sizes with more fruits/vegetables/high fiber foods.  Stay away from white bread/pastas/white rice/white potatoes.  Increase Fluid intake to 6-8 glasses of water daily.   Eliminate soda's (diet and regular) from your fluid intake.      Document Released: 01/20/2012 Document Revised: 03/11/2013 Document Reviewed: 01/20/2012  ExitCare® Patient Information ©2013 [employer].    Fat and Cholesterol Control Diet  Cholesterol is a wax-like substance. It comes from your liver and is found in certain foods. There is good (HDL) and bad (LDL) cholesterol. Too much cholesterol in your blood can affect your heart. Certain foods can lower or raise your cholesterol. Eat foods that are low in cholesterol.  Saturated and trans fats are bad fats found in foods that will raise your cholesterol. Do not eat foods that are high in saturated and trans fats.  FOODS HIGHER IN SATURATED AND TRANS FATS  · Dairy products, such as whole milk, eggs, cheese, cream, and butter.   · Fatty meats, such as hot dogs, sausage, and salami.   · Fried foods.   · Trans fats which  are found in margarine and pre-made cookies, crackers, and baked goods.   · Tropical oils, such as coconut and palm oils.   Read package labels at the store. Do not buy products that use saturated or trans fats or hydrogenated oils. Find foods labeled:  · Low-fat.   · Low-saturated fat.   · Trans-fat-free.   · Low-cholesterol.   FOODS LOWER IN CHOLESTEROL  ·  Fruit.   · Vegetables.   · Beans, peas, and lentils.   · Fish.   · Lean meat, such as chicken (without skin) or ground turkey.   · Grains, such as barley, rice, couscous, bulgur wheat, and pasta.   · Heart-healthy tub margarine.   PREPARING YOUR FOOD  · Broil, bake, steam, or roast foods. Do not lundberg food.   · Use non-stick cooking sprays.   · Use lemon or herbs to flavor food instead of using butter or stick margarine.   · Use nonfat yogurt, salsa, or low-fat dressings for salads.   Document Released: 06/18/2013 Document Reviewed: 06/18/2013  ExitCare® Patient Information ©2013 [employer].    Recommend annual flu vaccine.  Next due in Fall, 2015.  If you decide not to have the flu vaccine, recommend good handwashing and staying out of crowds during flu season.        Basic Carbohydrate Counting for Diabetes Mellitus  Carbohydrate counting is a method for keeping track of the amount of carbohydrates you eat. Eating carbohydrates naturally increases the level of sugar (glucose) in your blood, so it is important for you to know the amount that is okay for you to have in every meal. Carbohydrate counting helps keep the level of glucose in your blood within normal limits. The amount of carbohydrates allowed is different for every person. A dietitian can help you calculate the amount that is right for you. Once you know the amount of carbohydrates you can have, you can count the carbohydrates in the foods you want to eat.  Carbohydrates are found in the following foods:  · Grains, such as breads and cereals.  · Dried beans and soy products.  · Starchy vegetables,  "such as potatoes, peas, and corn.  · Fruit and fruit juices.  · Milk and yogurt.  · Sweets and snack foods, such as cake, cookies, candy, chips, soft drinks, and fruit drinks.  CARBOHYDRATE COUNTING  There are two ways to count the carbohydrates in your food. You can use either of the methods or a combination of both.  Reading the \"Nutrition Facts\" on Packaged Food  The \"Nutrition Facts\" is an area that is included on the labels of almost all packaged food and beverages in the [place]. It includes the serving size of that food or beverage and information about the nutrients in each serving of the food, including the grams (g) of carbohydrate per serving.   Decide the number of servings of this food or beverage that you will be able to eat or drink. Multiply that number of servings by the number of grams of carbohydrate that is listed on the label for that serving. The total will be the amount of carbohydrates you will be having when you eat or drink this food or beverage.  Learning Standard Serving Sizes of Food  When you eat food that is not packaged or does not include \"Nutrition Facts\" on the label, you need to measure the servings in order to count the amount of carbohydrates. A serving of most carbohydrate-rich foods contains about 15 g of carbohydrates. The following list includes serving sizes of carbohydrate-rich foods that provide 15 g of carbohydrate per serving:    · 1 slice of bread (1 oz) or 1 six-inch tortilla.    · ½ of a hamburger bun or English muffin.  · 4-6 crackers.  · ¾ cup unsweetened dry cereal.    · ½ cup hot cereal.  ·  cup rice or pasta.    · ½ cup mashed potatoes or ¼ of a large baked potato.  · 1 cup fresh fruit or one small piece of fruit.    · ½ cup canned or frozen fruit or fruit juice.  · 1 cup milk.  ·  cup plain fat-free yogurt or yogurt sweetened with artificial sweeteners.  · ½ cup cooked dried beans or starchy vegetable, such as peas, corn, or potatoes.    Decide the number " of standard-size servings that you will eat. Multiply that number of servings by 15 (the grams of carbohydrates in that serving). For example, if you eat 2 cups of strawberries, you will have eaten 2 servings and 30 g of carbohydrates (2 servings x 15 g = 30 g). For foods such as soups and casseroles, in which more than one food is mixed in, you will need to count the carbohydrates in each food that is included.  EXAMPLE OF CARBOHYDRATE COUNTING  Sample Dinner   · 3 oz chicken breast.  ·  cup of brown rice.  · ½ cup of corn.  · 1 cup milk.    · 1 cup strawberries with sugar-free whipped topping.    Carbohydrate Calculation   Step 1: Identify the foods that contain carbohydrates:   · Rice.    · Corn.    · Milk.    · Strawberries.  Step 2: Calculate the number of servings eaten of each:   · 2 servings of rice.    · 1 serving of corn.    · 1 serving of milk.    · 1 serving of strawberries.  Step 3:  Multiply each of those number of servings by 15 g:   · 2 servings of rice x 15 g = 30 g.    · 1 serving of corn x 15 g = 15 g.    · 1 serving of milk x 15 g = 15 g.    · 1 serving of strawberries x 15 g = 15 g.  Step 4: Add together all of the amounts to find the total grams of carbohydrates eaten: 30 g + 15 g + 15 g + 15 g = 75 g.     This information is not intended to replace advice given to you by your health care provider. Make sure you discuss any questions you have with your health care provider.     Document Released: 12/18/2006 Document Revised: 01/08/2016 Document Reviewed: 11/14/2014  [employer] Interactive Patient Education ©2016 [employer]

## 2017-05-09 NOTE — ASSESSMENT & PLAN NOTE
Chronic condition managed with current medical regimen  Last labs ~ 3 yrs ago WNL   Continue with current meds, diet and exercise  Followed by Ky Hernandez M.D..

## 2017-05-09 NOTE — ASSESSMENT & PLAN NOTE
Chronic condition managed with current medical regimen  Stable per review   Continue with current meds  Followed by Ky Hernandez M.D..

## 2017-05-09 NOTE — PROGRESS NOTES
CC:   Medicare Annual Wellness Visit    HPI:  Kevin is a 71 y.o. here for Medicare Annual Wellness Visit    Patient Active Problem List    Diagnosis Date Noted   • Uncontrolled type 2 diabetes mellitus with complication, without long-term current use of insulin (HCC)-uncontrolled, with complications- dr browning 10/02/2013   • BMI 31.0-31.9,adult 10/02/2013   • Coarse tremors- dr smith, neurologist, [place] 10/03/2011   • Mixed hyperlipidemia- dr dwyer 10/03/2011   • Essential hypertension 10/03/2011   • Hypovitaminosis D 10/03/2011     Current Outpatient Prescriptions   Medication Sig Dispense Refill   • Exenatide ER (BYDUREON) 2 MG Pen-injector Inject  as instructed.     • glimepiride (AMARYL) 1 MG tablet Take 1 mg by mouth every morning.     • propranolol CR (INDERAL LA) 120 MG CAPSULE SR 24 HR Take 1 Cap by mouth every day. 90 Cap 4   • INSULIN LISP PROT & LISP, HUM, (HUMALOG MIX 50/50) (50-50) 100 UNIT/ML Suspension Inject 30 Units as instructed.     • primidone (MYSOLINE) 50 MG Tab Take 1 Tab by mouth every evening. 90 Tab 3   • Canagliflozin (INVOKANA) 100 MG TABS Take 1 Tab by mouth every day.     • NON SPECIFIED Shingles vaccine 1 Each 0   • atorvastatin (LIPITOR) 40 MG TABS Take 80 mg by mouth every evening.     • levothyroxine (SYNTHROID) 50 MCG TABS Take 1 Tab by mouth every morning before breakfast. 90 Tab 1   • metformin (GLUCOPHAGE) 1000 MG tablet Take 1,000 mg by mouth 2 times a day, with meals.     • Choline Fenofibrate (TRILIPIX) 135 MG CPDR Take  by mouth.     • aspirin EC (ECOTRIN) 81 MG TBEC Take 81 mg by mouth every day.     • vitamin D (CHOLECALCIFEROL) 1000 UNIT TABS Take 1,000 Units by mouth 3 times a day.     • losartan-hydrochlorothiazide (HYZAAR) 50-12.5 MG per tablet Take 1 Tab by mouth every day.       No current facility-administered medications for this visit.      Current supplements: no  Chronic narcotic pain medicines: no  Allergies: Zocor  Exercise: yes  Current social  contact/activities: Has support of family and friends  Socially rides motorcycle friends    Screening:  Depression Screening    Little interest or pleasure in doing things?  0 - not at all  Feeling down, depressed , or hopeless? 0 - not at all  Trouble falling or staying asleep, or sleeping too much?     Feeling tired or having little energy?     Poor appetite or overeating?     Feeling bad about yourself - or that you are a failure or have let yourself or your family down?    Trouble concentrating on things, such as reading the newspaper or watching television?    Moving or speaking so slowly that other people could have noticed.  Or the opposite - being so fidgety or restless that you have been moving around a lot more than usual?     Thoughts that you would be better off dead, or of hurting yourself?     Patient Health Questionnaire Score:    If depressive symptoms identified deferred to follow up visit unless specifically addressed in assessment and plan.      Screening for Cognitive Impairment    Three Minute Recall (banana, sunrise, fence)  3/3    Draw clock face with all 12 numbers set to the hand to show 10 minures past 11 o'clock  1 5  If cognitive concerns identified deferred to follow up visit unless specifically addressed in assessment and plan.    Fall Risk Assessment    Has the patient had two or more falls in the last year or any fall with injury in the last year?  No  If Fall Risk identified deferred to follow up visit unless specifically addressed in assessment and plan.    Safety Assessment    Throw rugs on floor.  Yes  Handrails on all stairs.  Yes  Good lighting in all hallways.  Yes  Difficulty hearing.  No  Patient counseled about all safety risks that were identified.    Functional Assessment ADLs    Are there any barriers preventing you from cooking for yourself or meeting nutritional needs?  No.    Are there any barriers preventing you from driving safely or obtaining transportation?  No.     Are there any barriers preventing you from using a telephone or calling for help?  No.    Are there any barriers preventing you from shopping?  No.    Are there any barriers preventing you from taking care of your own finances?  No.    Are there any barriers preventing you from managing your medications?  No.    Are currently engaing any exercise or physical activity?  Yes.       Health Maintenance Summary                IMM DTaP/Tdap/Td Vaccine Overdue 5/26/1965     URINE ACR / MICROALBUMIN Overdue 8/2/2014      Not specified 8/2/2013      Patient has more history with this topic...    RETINAL SCREENING Overdue 9/3/2015      Done 9/3/2014 AMB REFERRAL FOR RETINAL SCREENING EXAM    FASTING LIPID PROFILE Overdue 11/5/2015      Done 11/5/2014 LIPID PROFILE     Patient has more history with this topic...    Annual Wellness Visit Overdue 8/17/2016      Done 8/17/2015     DIABETES MONOFILAMENT / LE EXAM Overdue 3/8/2017      Done 3/8/2016 AMB DIABETIC MONOFILAMENT LOWER EXTREMITY EXAM     Patient has more history with this topic...    A1C SCREENING Next Due 5/29/2017      Done 11/29/2016 POCT A1C     Patient has more history with this topic...    SERUM CREATININE Next Due 1/11/2018      Done 1/11/2017 COMP METABOLIC PANEL (A)     Patient has more history with this topic...    COLONOSCOPY Next Due 12/11/2019      Done 12/11/2014 AMB REFERRAL TO GI FOR COLONOSCOPY          Patient Care Team:  Ky Hernandez M.D. as PCP - General  Jesse Pérez M.D. as Consulting Physician (Neurology)  Stephanie Patterson M.D. as Consulting Physician (Endocrinology)  Rusty Britton M.D. as Consulting Physician (Urology)  Gallito Sprague M.D. as Consulting Physician (Cardiology)  Rusty Gonzales M.D. as Consulting Physician (Ophthalmology)        Social History   Substance Use Topics   • Smoking status: Never Smoker    • Smokeless tobacco: Never Used   • Alcohol Use: 0.6 oz/week     1 Cans of beer per week       Family History  "  Problem Relation Age of Onset   • Arthritis Mother    • Cancer Mother    • Hypertension Mother    • Heart Disease Mother    • Cancer Father      colon   • Arthritis Father    • Genetic Father    • Diabetes Father    • Hyperlipidemia Father    • Stroke Father    • Heart Disease Father      He  has a past medical history of Hyperlipidemia; Hypertension; Diabetes; Tremors of nervous system; and Muscle disorder. He also has no past medical history of CAD (coronary artery disease).   Past Surgical History   Procedure Laterality Date   • Other orthopedic surgery       right foot    • Carpal tunnel release     • Amputation, toe  2/2013     all 5 toes left foot       ROS:    Ostomy or other tubes or amputations: no  Chronic oxygen use no  Last eye exam 9/2016  : Denies incontinence.   Gait: Uses no assistive device   Hearing excellent.    Dentition good    Exam: Blood pressure 152/86, pulse 68, temperature 36.2 °C (97.2 °F), height 1.829 m (6' 0.01\"), weight 100.699 kg (222 lb), SpO2 97 %. Body mass index is 30.1 kg/(m^2).  Alert, oriented in no acute distress.  Eye contact is good, speech goal directed, affect calm      Assessment and Plan. The following treatment and monitoring plan is recommended for all problems as listed below:   Coarse tremors- dr pérez, neurologist, [place]  Chronic condition managed with current medical regimen  Stable per review, states most of time well controlled with episodes unable to hold a fork or cup   Continue with current meds  Followed by neuro Dr Pérez q 12 months       Mixed hyperlipidemia- dr dwyer  Chronic condition managed with current medical regimen  Last labs ~ 3 yrs ago WNL   Continue with current meds, diet and exercise  Followed by Ky Hernandez M.D..        Essential hypertension  Chronic condition managed with current medical regimen  Stable per review   Continue with current meds  Followed by Ky Hernandez M.D..        Hypovitaminosis D  Chronic condition " managed with current medical regimen  Stable per review   Continue with current meds  Followed by Ky Hernandez M.D..        Uncontrolled type 2 diabetes mellitus with complication, without long-term current use of insulin (HCC)-uncontrolled, with complications- dr browning  Chronic condition managed with current medical regimen  1/11/2017   Glucose 196 (H) 65 - 99 mg/dL Final          11/2016   Glycohemoglobin      8.4   Homes testing    Continue with current meds, recent formulary change, pt not happy with new meds  Followed by endocrinology q 3 months        BMI 31.0-31.9,adult  Patient aware of relationship between weight and health and working on diet  Followed by Ky Hernandez M.D..         Health Care Screening recommendations reviewed with patient today: per Patient Instructions  DPA/Advanced directive: .has an advanced directive - recom a copy be provided    Next office visit for recheck of chronic medical conditions is due with Ky Hernandez M.D. 5/31/2017

## 2017-05-24 LAB — HBA1C MFR BLD: 8.5 % (ref ?–5.8)

## 2017-05-26 ENCOUNTER — TELEPHONE (OUTPATIENT)
Dept: MEDICAL GROUP | Age: 72
End: 2017-05-26

## 2017-05-26 NOTE — TELEPHONE ENCOUNTER
----- Message from Daniela Dyson M.D. sent at 5/26/2017 12:49 PM PDT -----  Plan to discuss the results with Dr. Hernandez in upcoming appointment on 5/31/17.    Daniela Dyson M.D., covering for Dr. Hernandez

## 2017-05-26 NOTE — TELEPHONE ENCOUNTER
Phone Number Called: [phone] (home)     Message: LVM for patient to call back regarding note below    Left Message for patient to call back: yes

## 2017-05-30 NOTE — TELEPHONE ENCOUNTER
Phone Number Called: [phone] (home)     Message: patient informed and reminded of upcoming appt    Left Message for patient to call back: no

## 2017-05-31 ENCOUNTER — OFFICE VISIT (OUTPATIENT)
Dept: MEDICAL GROUP | Age: 72
End: 2017-05-31
Payer: MEDICARE

## 2017-05-31 ENCOUNTER — HOSPITAL ENCOUNTER (OUTPATIENT)
Dept: LAB | Facility: MEDICAL CENTER | Age: 72
End: 2017-05-31
Attending: INTERNAL MEDICINE
Payer: MEDICARE

## 2017-05-31 VITALS
HEART RATE: 66 BPM | HEIGHT: 72 IN | SYSTOLIC BLOOD PRESSURE: 126 MMHG | OXYGEN SATURATION: 93 % | BODY MASS INDEX: 30.61 KG/M2 | WEIGHT: 226 LBS | DIASTOLIC BLOOD PRESSURE: 54 MMHG | TEMPERATURE: 97.2 F

## 2017-05-31 DIAGNOSIS — I10 ESSENTIAL HYPERTENSION: ICD-10-CM

## 2017-05-31 DIAGNOSIS — E55.9 HYPOVITAMINOSIS D: ICD-10-CM

## 2017-05-31 DIAGNOSIS — G25.2 COARSE TREMORS: ICD-10-CM

## 2017-05-31 DIAGNOSIS — Z11.59 NEED FOR HEPATITIS C SCREENING TEST: ICD-10-CM

## 2017-05-31 DIAGNOSIS — Z23 NEED FOR VACCINATION: ICD-10-CM

## 2017-05-31 DIAGNOSIS — E78.2 MIXED HYPERLIPIDEMIA: ICD-10-CM

## 2017-05-31 LAB — HCV AB SER QL: NEGATIVE

## 2017-05-31 PROCEDURE — 86803 HEPATITIS C AB TEST: CPT | Mod: GY

## 2017-05-31 PROCEDURE — 36415 COLL VENOUS BLD VENIPUNCTURE: CPT | Mod: GY

## 2017-05-31 PROCEDURE — 99214 OFFICE O/P EST MOD 30 MIN: CPT | Performed by: INTERNAL MEDICINE

## 2017-05-31 ASSESSMENT — ENCOUNTER SYMPTOMS
CONSTITUTIONAL NEGATIVE: 1
RESPIRATORY NEGATIVE: 1
EYES NEGATIVE: 1
GASTROINTESTINAL NEGATIVE: 1
PSYCHIATRIC NEGATIVE: 1
NEUROLOGICAL NEGATIVE: 1
MUSCULOSKELETAL NEGATIVE: 1
CARDIOVASCULAR NEGATIVE: 1
HYPERTENSION: 1

## 2017-05-31 NOTE — Clinical Note
Request for Medical Records    Patient Name: Kevin Jackson    : 1945      Dear Doctor: Hudson Luna    The above named patient receives primary care at the [facility] by Ky Hernandez M.D..  The patient informs us that you are his eye care Provider.    Please fax a copy of the most recent eye exam to [phone] or answer the  questions below and fax this sheet back to us at the above number.  Attached is a signed Release of Information.      Date of last eye exam: _____________    Retinal eye exam summary:        Please select the choice(s) that apply.    ____ No diabetic retinopathy    ____    Diabetic retinopathy present      Printed Name and Credentials: __________________________________    Signature of Eye Care Provider: _________________________________    We appreciate your assistance and collaboration in providing efficient patient care!    Kindest Regards,    DARIANA JORDAN  [address] [place] [address] [phone]

## 2017-05-31 NOTE — MR AVS SNAPSHOT
"        Kevin Jackson   2017 9:40 AM   Office Visit   MRN: [id number]    Department:  [address] [place] [address]   Dept Phone:  [phone]    Description:  Male : 1945   Provider:  Ky Hernandez M.D.           Reason for Visit     Hypertension lab review      Allergies as of 2017     Allergen Noted Reactions    Zocor [Simvastatin - High Dose] 10/03/2011       Pt tells me this medication gave him \"side effect\" of feeling nauseated; denies allergies to medications      You were diagnosed with     Need for hepatitis C screening test   [232307]       Uncontrolled type 2 diabetes mellitus with complication, without long-term current use of insulin (CMS-[facility])   [9069786]       Mixed hyperlipidemia   [272.2.ICD-9-CM]       Essential hypertension   [2773381]       Hypovitaminosis D   [324187]       Coarse tremors   [505634]       Need for vaccination   [719396]         Vital Signs     Blood Pressure Pulse Temperature Height Weight Body Mass Index    126/54 mmHg 66 36.2 °C (97.2 °F) 1.829 m (6') 102.513 kg (226 lb) 30.64 kg/m2    Oxygen Saturation Smoking Status                93% Never Smoker           Basic Information     Date Of Birth Sex Race Ethnicity Preferred Language    1945 Male White Non- English      Your appointments     2017  8:20 AM   Established Patient with Ky Hernandez M.D.   [address] [id number] [phone]           You will be receiving a confirmation call a few days before your appointment from our automated call confirmation system.              Problem List              ICD-10-CM Priority Class Noted - Resolved    Coarse tremors- dr smith, neurologist, [place] G25.2   10/3/2011 - Present    Mixed hyperlipidemia- dr dwyer E78.2   10/3/2011 - Present    Essential hypertension I10   10/3/2011 - Present    Hypovitaminosis D E55.9   10/3/2011 - Present    Uncontrolled type 2 diabetes " mellitus with complication, without long-term current use of insulin (HCC)-uncontrolled, with complications- dr browning E11.8, E11.65   10/2/2013 - Present    BMI 31.0-31.9,adult Z68.31   10/2/2013 - Present      Health Maintenance        Date Due Completion Dates    IMM DTaP/Tdap/Td Vaccine (1 - Tdap) 5/26/1965 ---    RETINAL SCREENING 9/3/2015 9/3/2014    DIABETES MONOFILAMENT / LE EXAM 3/8/2017 3/8/2016, 3/8/2016, 11/5/2014 (N/S), 10/2/2013 (N/S)    Override on 11/5/2014: (N/S)    Override on 10/2/2013: (N/S)    A1C SCREENING 11/24/2017 5/24/2017, 5/24/2017, 11/29/2016, 11/5/2014, 8/2/2013 (N/S), 1/14/2013, 9/4/2012    Override on 8/2/2013: (N/S) (=9.3 DR ABBOTT)    FASTING LIPID PROFILE 5/24/2018 5/24/2017, 11/5/2014, 1/15/2013, 9/4/2012    URINE ACR / MICROALBUMIN 5/24/2018 5/24/2017, 8/2/2013 (N/S), 9/4/2012    Override on 8/2/2013: (N/S)    SERUM CREATININE 5/24/2018 5/24/2017, 1/11/2017, 11/5/2014, 2/15/2013, 2/8/2013, 2/1/2013, 1/25/2013, 1/18/2013, 1/17/2013, 1/16/2013, 1/15/2013, 1/14/2013, 9/4/2012    COLONOSCOPY 12/11/2019 12/11/2014            Current Immunizations     13-VALENT PCV PREVNAR 11/29/2016    Influenza TIV (IM) 10/2/2013, 11/19/2012    Influenza Vaccine Adult HD 11/29/2016, 11/5/2014    Pneumococcal polysaccharide vaccine (PPSV-23) 11/19/2012    SHINGLES VACCINE 12/16/2014    Tdap Vaccine  Incomplete      Below and/or attached are the medications your provider expects you to take. Review all of your home medications and newly ordered medications with your provider and/or pharmacist. Follow medication instructions as directed by your provider and/or pharmacist. Please keep your medication list with you and share with your provider. Update the information when medications are discontinued, doses are changed, or new medications (including over-the-counter products) are added; and carry medication information at all times in the event of emergency situations     Allergies:  ZOCOR - (reactions  not documented)               Medications  Valid as of: May 31, 2017 - 10:15 AM    Generic Name Brand Name Tablet Size Instructions for use    Aspirin (Tablet Delayed Response) ECOTRIN 81 MG Take 81 mg by mouth every day.        Atorvastatin Calcium (Tab) LIPITOR 40 MG Take 80 mg by mouth every evening.        Canagliflozin (Tab) Canagliflozin 100 MG Take 1 Tab by mouth every day.        Cholecalciferol (Tab) cholecalciferol 1000 UNIT Take 1,000 Units by mouth 3 times a day.        Choline Fenofibrate (CAPSULE DELAYED RELEASE) TRILIPIX 135 MG Take  by mouth.        Exenatide (Pen-injector) Exenatide ER 2 MG Inject  as instructed.        Glimepiride (Tab) AMARYL 1 MG Take 1 mg by mouth every morning.        Insulin Lispro Prot & Lispro (Suspension) INSULIN LISP PROT & LISP (HUM) (50-50) 100 UNIT/ML Inject 30 Units as instructed.        Levothyroxine Sodium (Tab) SYNTHROID 50 MCG Take 1 Tab by mouth every morning before breakfast.        Losartan Potassium-HCTZ (Tab) HYZAAR 50-12.5 MG Take 1 Tab by mouth every day.        MetFORMIN HCl (Tab) GLUCOPHAGE 1000 MG Take 1,000 mg by mouth 2 times a day, with meals.        NON SPECIFIED   Shingles vaccine        Primidone (Tab) MYSOLINE 50 MG Take 1 Tab by mouth every evening.        Propranolol HCl (CAPSULE SR 24 HR) INDERAL  MG Take 1 Cap by mouth every day.        .                 Medicines prescribed today were sent to:     [facility] [address] [place] [id number] [facility] [address]    [address] [facility] #[facility] [address]    Phone: [phone] Fax: [phone]    Open 24 Hours?: No      Medication refill instructions:       If your prescription bottle indicates you have medication refills left, it is not necessary to call your provider’s office. Please contact your pharmacy and they will refill your medication.    If your prescription bottle indicates you do not have any refills left, you may request refills at any time through one of the following ways: The  online FaceAlert[employer] system (except Urgent Care), by calling your provider’s office, or by asking your pharmacy to contact your provider’s office with a refill request. Medication refills are processed only during regular business hours and may not be available until the next business day. Your provider may request additional information or to have a follow-up visit with you prior to refilling your medication.   *Please Note: Medication refills are assigned a new Rx number when refilled electronically. Your pharmacy may indicate that no refills were authorized even though a new prescription for the same medication is available at the pharmacy. Please request the medicine by name with the pharmacy before contacting your provider for a refill.        Your To Do List     Future Labs/Procedures Complete By Expires    HEP C VIRUS ANTIBODY  As directed 5/31/2018         FaceAlerta Access Code: [email or web address]  Expires: 6/8/2017  9:13 AM    FaceAlerta  A secure, online tool to manage your health information     [employer]’s FaceAlerta® is a secure, online tool that connects you to your personalized health information from the privacy of your home -- day or night - making it very easy for you to manage your healthcare. Once the activation process is completed, you can even access your medical information using the FaceAlerta rudy, which is available for free in the Apple Rudy store or Google Play store.     FaceAlerta provides the following levels of access (as shown below):   My Chart Features   [facility] Doctor [facility]  Urgent  Care Non-[facility]  Primary Care  Doctor   Email your healthcare team securely and privately 24/7 X X X    Manage appointments: schedule your next appointment; view details of past/upcoming appointments X      Request prescription refills. X      View recent personal medical records, including lab and immunizations X X X X   View health record, including health history, allergies,  medications X X X X   Read reports about your outpatient visits, procedures, consult and ER notes X X X X   See your discharge summary, which is a recap of your hospital and/or ER visit that includes your diagnosis, lab results, and care plan. X X       How to register for [facility]:  1. Go to  [email or web address].  2. Click on the Sign Up Now box, which takes you to the New Member Sign Up page. You will need to provide the following information:  a. Enter your [facility] Access Code exactly as it appears at the top of this page. (You will not need to use this code after you’ve completed the sign-up process. If you do not sign up before the expiration date, you must request a new code.)   b. Enter your date of birth.   c. Enter your home email address.   d. Click Submit, and follow the next screen’s instructions.  3. Create a [facility] ID. This will be your [facility] login ID and cannot be changed, so think of one that is secure and easy to remember.  4. Create a [facility] password. You can change your password at any time.  5. Enter your Password Reset Question and Answer. This can be used at a later time if you forget your password.   6. Enter your e-mail address. This allows you to receive e-mail notifications when new information is available in [facility].  7. Click Sign Up. You can now view your health information.    For assistance activating your [facility] account, call [phone]

## 2017-05-31 NOTE — PROGRESS NOTES
Subjective:      Kevin Jackson is a 72 y.o. male who presents with Hypertension  The patient is here for followup of chronic medical problems listed below. The patient is compliant with medications and having no side effects from them. Denies chest pain, abdominal pain, dyspnea, myalgias, or cough.   Patient Active Problem List    Diagnosis Date Noted   • Uncontrolled type 2 diabetes mellitus with complication, without long-term current use of insulin (HCC)-uncontrolled, with complications- dr browning 10/02/2013   • BMI 31.0-31.9,adult 10/02/2013   • Coarse tremors- dr smith, neurologist, [place] 10/03/2011   • Mixed hyperlipidemia- dr dwyer 10/03/2011   • Essential hypertension 10/03/2011   • Hypovitaminosis D 10/03/2011        .[facility]  Outpatient Prescriptions Prior to Visit   Medication Sig Dispense Refill   • Exenatide ER (BYDUREON) 2 MG Pen-injector Inject  as instructed.     • glimepiride (AMARYL) 1 MG tablet Take 1 mg by mouth every morning.     • propranolol CR (INDERAL LA) 120 MG CAPSULE SR 24 HR Take 1 Cap by mouth every day. 90 Cap 4   • INSULIN LISP PROT & LISP, HUM, (HUMALOG MIX 50/50) (50-50) 100 UNIT/ML Suspension Inject 30 Units as instructed.     • primidone (MYSOLINE) 50 MG Tab Take 1 Tab by mouth every evening. 90 Tab 3   • Canagliflozin (INVOKANA) 100 MG TABS Take 1 Tab by mouth every day.     • atorvastatin (LIPITOR) 40 MG TABS Take 80 mg by mouth every evening.     • levothyroxine (SYNTHROID) 50 MCG TABS Take 1 Tab by mouth every morning before breakfast. 90 Tab 1   • metformin (GLUCOPHAGE) 1000 MG tablet Take 1,000 mg by mouth 2 times a day, with meals.     • Choline Fenofibrate (TRILIPIX) 135 MG CPDR Take  by mouth.     • aspirin EC (ECOTRIN) 81 MG TBEC Take 81 mg by mouth every day.     • vitamin D (CHOLECALCIFEROL) 1000 UNIT TABS Take 1,000 Units by mouth 3 times a day.     • losartan-hydrochlorothiazide (HYZAAR) 50-12.5 MG per tablet Take 1 Tab by mouth every day.     • NON SPECIFIED  "Shingles vaccine 1 Each 0     No facility-administered medications prior to visit.     Allergies   Allergen Reactions   • Zocor [Simvastatin - High Dose]      Pt tells me this medication gave him \"side effect\" of feeling nauseated; denies allergies to medications              Hypertension        Review of Systems   Constitutional: Negative.    HENT: Negative.    Eyes: Negative.    Respiratory: Negative.    Cardiovascular: Negative.    Gastrointestinal: Negative.    Genitourinary: Negative.    Musculoskeletal: Negative.    Skin: Negative.    Neurological: Negative.    Endo/Heme/Allergies: Negative.    Psychiatric/Behavioral: Negative.           Objective:     /54 mmHg  Pulse 66  Temp(Src) 36.2 °C (97.2 °F)  Ht 1.829 m (6')  Wt 102.513 kg (226 lb)  BMI 30.64 kg/m2  SpO2 93%     Physical Exam   Constitutional: He is oriented to person, place, and time. He appears well-developed and well-nourished. No distress.   HENT:   Head: Normocephalic and atraumatic.   Right Ear: External ear normal.   Left Ear: External ear normal.   Mouth/Throat: Oropharynx is clear and moist. No oropharyngeal exudate.   Eyes: Conjunctivae and EOM are normal. Pupils are equal, round, and reactive to light. Right eye exhibits no discharge.   Neck: Normal range of motion. Neck supple. No JVD present. No tracheal deviation present. No thyromegaly present.   Cardiovascular: Normal rate, regular rhythm, normal heart sounds and intact distal pulses.  Exam reveals no gallop.    Pulmonary/Chest: Effort normal and breath sounds normal. No respiratory distress. He has no wheezes. He has no rales. He exhibits no tenderness.   Abdominal: Soft. Bowel sounds are normal. He exhibits no distension and no mass. There is no tenderness. There is no rebound and no guarding. No hernia.   Genitourinary: Guaiac negative stool. No penile tenderness.   Musculoskeletal: He exhibits no edema or tenderness.   Lymphadenopathy:     He has no cervical adenopathy. "   Neurological: He is alert and oriented to person, place, and time. He has normal reflexes. He displays normal reflexes. No cranial nerve deficit. He exhibits normal muscle tone. Coordination normal.   Skin: Skin is warm and dry. No rash noted. He is not diaphoretic. No erythema. No pallor.   Psychiatric: He has a normal mood and affect. His behavior is normal. Judgment and thought content normal.   Nursing note and vitals reviewed.    Abstract on 05/26/2017   Component Date Value   • Glycohemoglobin 05/24/2017 8.5       Lab Results   Component Value Date/Time    GLYCOHEMOGLOBIN 8.5 05/24/2017    GLYCOHEMOGLOBIN 8.4 11/29/2016 02:22 PM     Lab Results   Component Value Date/Time    SODIUM 134* 01/11/2017 09:05 PM    POTASSIUM 4.2 01/11/2017 09:05 PM    CHLORIDE 98 01/11/2017 09:05 PM    CO2 20 01/11/2017 09:05 PM    GLUCOSE 196* 01/11/2017 09:05 PM    BUN 21 01/11/2017 09:05 PM    CREATININE 0.98 01/11/2017 09:05 PM    ALKALINE PHOSPHATASE 32 01/11/2017 09:05 PM    AST(SGOT) 13 01/11/2017 09:05 PM    ALT(SGPT) 16 01/11/2017 09:05 PM    TOTAL BILIRUBIN 0.9 01/11/2017 09:05 PM     Lab Results   Component Value Date/Time    INR 0.99 01/11/2017 09:05 PM    INR 0.99 01/14/2013 05:50 PM     Lab Results   Component Value Date/Time    CHOLESTEROL, 11/05/2014 10:08 AM    LDL 83 11/05/2014 10:08 AM    HDL 46 11/05/2014 10:08 AM    TRIGLYCERIDES 119 11/05/2014 10:08 AM       No results found for: TESTOSTERONE  No results found for: TSH  Lab Results   Component Value Date/Time    FREE T-4 1.01 11/05/2014 10:08 AM    FREE T-4 1.13 01/14/2013 05:50 PM     No results found for: URICACID  No components found for: VITB12  Lab Results   Component Value Date/Time    25-HYDROXY   VITAMIN D 25 27* 11/05/2014 10:08 AM    25-HYDROXY   VITAMIN D 25 23* 09/04/2012 12:37 PM                  Assessment/Plan:     1. Need for hepatitis C screening test     - HEP C VIRUS ANTIBODY; Future    2. Uncontrolled type 2 diabetes mellitus with  complication, without long-term current use of insulin (HCC)-uncontrolled, with complications- dr browning   Under good control. Continue same regimen.    - Diabetic Monofilament Lower Extremity Exam    3. Mixed hyperlipidemia- dr dwyer Under good control. Continue same regimen.       4. Essential hypertension Under good control. Continue same regimen.       5. Hypovitaminosis D Under good control. Continue same regimen.        6. Coarse tremors- dr smith, neurologist, [place] Under good control. Continue same regimen.       7. Need for vaccination Under good control. Continue same regimen.     - TDAP VACCINE =>7YO IM    30 minute face-to-face encounter took place today.  More than half of this time was spent in the coordination of care of the above problems, as well as counseling.

## 2017-05-31 NOTE — Clinical Note
[facility]  Ky Hernandez M.D.  [address] [facility] [facility] [address]  [place] [address]  Fax: [phone]   Authorization for Release/Disclosure of   Protected Health Information   Name: YINA JACKSON : 1945 SSN: [id number]   Address: [address] [place] [address] [place] [address] Phone:    [phone] (home)    I authorize the entity listed below to release/disclose the PHI below to:   [facility]/Ky Hernandez M.D. and Ky Hernandez M.D.   Provider or Entity Name:  Dr. Hudson Luna   Address   City, [facility], [facility]   Phone:      Fax:     Reason for request: continuity of care   Information to be released:    [  ] LAST COLONOSCOPY,  including any PATH REPORT and follow-up  [  ] LAST FIT/COLOGUARD RESULT [  ] LAST DEXA  [  ] LAST MAMMOGRAM  [  ] LAST PAP  [  ] LAST LABS [  ] RETINA EXAM REPORT  [  ] IMMUNIZATION RECORDS  [  ] Release all info      [  ] Check here and initial the line next to each item to release ALL health information INCLUDING  _____ Care and treatment for drug and / or alcohol abuse  _____ HIV testing, infection status, or AIDS  _____ Genetic Testing    DATES OF SERVICE OR TIME PERIOD TO BE DISCLOSED: _____________  I understand and acknowledge that:  * This Authorization may be revoked at any time by you in writing, except if your health information has already been used or disclosed.  * Your health information that will be used or disclosed as a result of you signing this authorization could be re-disclosed by the recipient. If this occurs, your re-disclosed health information may no longer be protected by State or Federal laws.  * You may refuse to sign this Authorization. Your refusal will not affect your ability to obtain treatment.  * This Authorization becomes effective upon signing and will  on (date) __________.      If no date is indicated, this Authorization will  one (1) year from the signature date.    Name: Yina Jackson    Signature:   Date:     2017       PLEASE  FAX REQUESTED RECORDS BACK TO: [phone]

## 2017-08-15 ENCOUNTER — OFFICE VISIT (OUTPATIENT)
Dept: MEDICAL GROUP | Age: 72
End: 2017-08-15
Payer: MEDICARE

## 2017-08-15 ENCOUNTER — HOSPITAL ENCOUNTER (OUTPATIENT)
Facility: MEDICAL CENTER | Age: 72
End: 2017-08-15
Attending: INTERNAL MEDICINE
Payer: MEDICARE

## 2017-08-15 ENCOUNTER — HOSPITAL ENCOUNTER (OUTPATIENT)
Dept: LAB | Facility: MEDICAL CENTER | Age: 72
End: 2017-08-15
Attending: INTERNAL MEDICINE
Payer: MEDICARE

## 2017-08-15 VITALS
HEART RATE: 65 BPM | TEMPERATURE: 97.2 F | OXYGEN SATURATION: 96 % | WEIGHT: 225 LBS | SYSTOLIC BLOOD PRESSURE: 122 MMHG | HEIGHT: 72 IN | DIASTOLIC BLOOD PRESSURE: 60 MMHG | BODY MASS INDEX: 30.48 KG/M2

## 2017-08-15 DIAGNOSIS — G89.29 CHRONIC ABDOMINAL PAIN: ICD-10-CM

## 2017-08-15 DIAGNOSIS — K21.00 GASTROESOPHAGEAL REFLUX DISEASE WITH ESOPHAGITIS: ICD-10-CM

## 2017-08-15 DIAGNOSIS — I10 ESSENTIAL HYPERTENSION: ICD-10-CM

## 2017-08-15 DIAGNOSIS — E78.2 MIXED HYPERLIPIDEMIA: ICD-10-CM

## 2017-08-15 DIAGNOSIS — R10.9 CHRONIC ABDOMINAL PAIN: ICD-10-CM

## 2017-08-15 DIAGNOSIS — E03.4 HYPOTHYROIDISM DUE TO ACQUIRED ATROPHY OF THYROID: ICD-10-CM

## 2017-08-15 DIAGNOSIS — E55.9 HYPOVITAMINOSIS D: ICD-10-CM

## 2017-08-15 DIAGNOSIS — E66.9 OBESITY (BMI 30.0-34.9): ICD-10-CM

## 2017-08-15 LAB
BASOPHILS # BLD AUTO: 0.9 % (ref 0–1.8)
BASOPHILS # BLD: 0.06 K/UL (ref 0–0.12)
CREAT UR-MCNC: 40.2 MG/DL
EOSINOPHIL # BLD AUTO: 0.21 K/UL (ref 0–0.51)
EOSINOPHIL NFR BLD: 3.1 % (ref 0–6.9)
ERYTHROCYTE [DISTWIDTH] IN BLOOD BY AUTOMATED COUNT: 46.5 FL (ref 35.9–50)
ERYTHROCYTE [SEDIMENTATION RATE] IN BLOOD BY WESTERGREN METHOD: 9 MM/HOUR (ref 0–20)
HBA1C MFR BLD: 7.7 % (ref ?–5.8)
HCT VFR BLD AUTO: 43.3 % (ref 42–52)
HGB BLD-MCNC: 14.4 G/DL (ref 14–18)
IMM GRANULOCYTES # BLD AUTO: 0.02 K/UL (ref 0–0.11)
IMM GRANULOCYTES NFR BLD AUTO: 0.3 % (ref 0–0.9)
INT CON NEG: NEGATIVE
INT CON POS: POSITIVE
LYMPHOCYTES # BLD AUTO: 2.14 K/UL (ref 1–4.8)
LYMPHOCYTES NFR BLD: 31.2 % (ref 22–41)
MCH RBC QN AUTO: 30.1 PG (ref 27–33)
MCHC RBC AUTO-ENTMCNC: 33.3 G/DL (ref 33.7–35.3)
MCV RBC AUTO: 90.6 FL (ref 81.4–97.8)
MICROALBUMIN UR-MCNC: <0.7 MG/DL
MICROALBUMIN/CREAT UR: NORMAL MG/G (ref 0–30)
MONOCYTES # BLD AUTO: 0.66 K/UL (ref 0–0.85)
MONOCYTES NFR BLD AUTO: 9.6 % (ref 0–13.4)
NEUTROPHILS # BLD AUTO: 3.76 K/UL (ref 1.82–7.42)
NEUTROPHILS NFR BLD: 54.9 % (ref 44–72)
NRBC # BLD AUTO: 0 K/UL
NRBC BLD AUTO-RTO: 0 /100 WBC
PLATELET # BLD AUTO: 331 K/UL (ref 164–446)
PMV BLD AUTO: 10.6 FL (ref 9–12.9)
RBC # BLD AUTO: 4.78 M/UL (ref 4.7–6.1)
WBC # BLD AUTO: 6.9 K/UL (ref 4.8–10.8)

## 2017-08-15 PROCEDURE — 99000 SPECIMEN HANDLING OFFICE-LAB: CPT | Performed by: INTERNAL MEDICINE

## 2017-08-15 PROCEDURE — 99215 OFFICE O/P EST HI 40 MIN: CPT | Mod: 25 | Performed by: INTERNAL MEDICINE

## 2017-08-15 PROCEDURE — 83036 HEMOGLOBIN GLYCOSYLATED A1C: CPT | Performed by: INTERNAL MEDICINE

## 2017-08-15 PROCEDURE — 85652 RBC SED RATE AUTOMATED: CPT

## 2017-08-15 PROCEDURE — 36415 COLL VENOUS BLD VENIPUNCTURE: CPT

## 2017-08-15 PROCEDURE — 83013 H PYLORI (C-13) BREATH: CPT

## 2017-08-15 PROCEDURE — 85025 COMPLETE CBC W/AUTO DIFF WBC: CPT

## 2017-08-15 RX ORDER — DULAGLUTIDE 0.75 MG/.5ML
INJECTION, SOLUTION SUBCUTANEOUS
Refills: 3 | COMMUNITY
Start: 2017-07-18 | End: 2019-03-12 | Stop reason: SDUPTHER

## 2017-08-15 RX ORDER — CANAGLIFLOZIN 300 MG/1
150 TABLET, FILM COATED ORAL
Refills: 1 | COMMUNITY
Start: 2017-07-17 | End: 2018-03-12

## 2017-08-15 RX ORDER — OMEPRAZOLE 40 MG/1
40 CAPSULE, DELAYED RELEASE ORAL DAILY
Qty: 30 CAP | Refills: 0 | Status: SHIPPED | OUTPATIENT
Start: 2017-08-15 | End: 2019-03-12

## 2017-08-15 RX ORDER — INSULIN LISPRO 100 [IU]/ML
INJECTION, SUSPENSION SUBCUTANEOUS
Refills: 0 | COMMUNITY
Start: 2017-07-25 | End: 2019-03-12 | Stop reason: SDUPTHER

## 2017-08-15 ASSESSMENT — ENCOUNTER SYMPTOMS
ROS GI COMMENTS: 1
MUSCULOSKELETAL NEGATIVE: 1
NEUROLOGICAL NEGATIVE: 1
PSYCHIATRIC NEGATIVE: 1
GASTROINTESTINAL NEGATIVE: 1
CARDIOVASCULAR NEGATIVE: 1
RESPIRATORY NEGATIVE: 1
CONSTITUTIONAL NEGATIVE: 1
EYES NEGATIVE: 1

## 2017-08-15 NOTE — MR AVS SNAPSHOT
"        Kevin Jackson   8/15/2017 9:20 AM   Office Visit   MRN: [id number]    Department:  [address] [place] [address]   Dept Phone:  [phone]    Description:  Male : 1945   Provider:  Ky Hernandez M.D.           Reason for Visit     GI Problem abdominal pain x 6 months, pt notices it more when he coughs or sneezes and also about half hour after he eats       Allergies as of 8/15/2017     Allergen Noted Reactions    Zocor [Simvastatin - High Dose] 10/03/2011       Pt tells me this medication gave him \"side effect\" of feeling nauseated; denies allergies to medications      You were diagnosed with     Chronic abdominal pain   [997763]       Gastroesophageal reflux disease with esophagitis   [6966901]       Uncontrolled type 2 diabetes mellitus with complication, without long-term current use of insulin (CMS-[facility])   [0938008]       Obesity (BMI 30.0-34.9)   [467265]       Essential hypertension   [6958818]       Mixed hyperlipidemia   [272.2.ICD-9-CM]       Hypovitaminosis D   [679983]       Hypothyroidism due to acquired atrophy of thyroid   [9569111]         Vital Signs     Blood Pressure Pulse Temperature Height Weight Body Mass Index    122/60 mmHg 65 36.2 °C (97.2 °F) 1.829 m (6' 0.01\") 102.059 kg (225 lb) 30.51 kg/m2    Oxygen Saturation Smoking Status                96% Never Smoker           Basic Information     Date Of Birth Sex Race Ethnicity Preferred Language    1945 Male White Non- English      Your appointments     Sep 13, 2017  9:20 AM   Established Patient with Ky Hernandez M.D.   [place] [address] [facility] [id number] [phone]           You will be receiving a confirmation call a few days before your appointment from our automated call confirmation system.            2017  8:20 AM   Established Patient with Ky Hernandez M.D.   [place] [address] [facility] " [id number]   [phone]           You will be receiving a confirmation call a few days before your appointment from our automated call confirmation system.              Problem List              ICD-10-CM Priority Class Noted - Resolved    Coarse tremors- dr smith, neurologist, [place] G25.2   10/3/2011 - Present    Mixed hyperlipidemia- dr dwyer E78.2   10/3/2011 - Present    Essential hypertension I10   10/3/2011 - Present    Hypovitaminosis D E55.9   10/3/2011 - Present    Uncontrolled type 2 diabetes mellitus with complication, without long-term current use of insulin (HCC)-uncontrolled, with complications- dr browning E11.8, E11.65   10/2/2013 - Present    Obesity (BMI 30.0-34.9) E66.9   10/2/2013 - Present    Chronic abdominal pain R10.9, G89.29   8/15/2017 - Present    Gastroesophageal reflux disease with esophagitis K21.0   8/15/2017 - Present    Hypothyroidism due to acquired atrophy of thyroid- dr browning E03.4   8/15/2017 - Present      Health Maintenance        Date Due Completion Dates    IMM DTaP/Tdap/Td Vaccine (1 - Tdap) 5/26/1965 ---    RETINAL SCREENING 9/3/2015 9/3/2014    IMM INFLUENZA (1) 9/1/2017 11/29/2016, 11/5/2014, 10/2/2013, 11/19/2012    A1C SCREENING 11/24/2017 5/24/2017, 5/24/2017, 11/29/2016, 11/5/2014, 8/2/2013 (N/S), 1/14/2013, 9/4/2012    Override on 8/2/2013: (N/S) (=9.3 DR ABBOTT)    FASTING LIPID PROFILE 5/24/2018 5/24/2017, 11/5/2014, 1/15/2013, 9/4/2012    URINE ACR / MICROALBUMIN 5/24/2018 5/24/2017, 8/2/2013 (N/S), 9/4/2012    Override on 8/2/2013: (N/S)    SERUM CREATININE 5/24/2018 5/24/2017, 1/11/2017, 11/5/2014, 2/15/2013, 2/8/2013, 2/1/2013, 1/25/2013, 1/18/2013, 1/17/2013, 1/16/2013, 1/15/2013, 1/14/2013, 9/4/2012    DIABETES MONOFILAMENT / LE EXAM 5/31/2018 5/31/2017, 3/8/2016, 3/8/2016, 11/5/2014 (N/S), 10/2/2013 (N/S)    Override on 11/5/2014: (N/S)    Override on 10/2/2013: (N/S)    COLONOSCOPY 12/11/2019 12/11/2014            Current Immunizations     13-VALENT  PCV PREVNAR 11/29/2016    Influenza TIV (IM) 10/2/2013, 11/19/2012    Influenza Vaccine Adult HD 11/29/2016, 11/5/2014    Pneumococcal polysaccharide vaccine (PPSV-23) 11/19/2012    SHINGLES VACCINE 12/16/2014    Tdap Vaccine  Deferred (Patient Refused)      Below and/or attached are the medications your provider expects you to take. Review all of your home medications and newly ordered medications with your provider and/or pharmacist. Follow medication instructions as directed by your provider and/or pharmacist. Please keep your medication list with you and share with your provider. Update the information when medications are discontinued, doses are changed, or new medications (including over-the-counter products) are added; and carry medication information at all times in the event of emergency situations     Allergies:  ZOCOR - (reactions not documented)               Medications  Valid as of: August 15, 2017 - 10:30 AM    Generic Name Brand Name Tablet Size Instructions for use    Aspirin (Tablet Delayed Response) ECOTRIN 81 MG Take 81 mg by mouth every day.        Atorvastatin Calcium (Tab) LIPITOR 40 MG Take 80 mg by mouth every evening.        Canagliflozin (Tab) Canagliflozin 100 MG Take 1 Tab by mouth every day.        Canagliflozin (Tab) INVOKANA 300  mg.        Cholecalciferol (Tab) cholecalciferol 1000 UNIT Take 1,000 Units by mouth 3 times a day.        Choline Fenofibrate (CAPSULE DELAYED RELEASE) TRILIPIX 135 MG Take  by mouth.        Dulaglutide (Solution Pen-injector) TRULICITY 0.75 MG/0.5ML         Exenatide (Pen-injector) Exenatide ER 2 MG Inject  as instructed.        Glimepiride (Tab) AMARYL 1 MG Take 1 mg by mouth every morning.        Insulin Lispro Prot & Lispro (Suspension) INSULIN LISP PROT & LISP (HUM) (50-50) 100 UNIT/ML Inject 30 Units as instructed.        Insulin Lispro Prot & Lispro (Suspension Pen-injector) HUMALOG MIX 50/50 KWIKPEN (50-50) 100 UNIT/ML         Levothyroxine  Sodium (Tab) SYNTHROID 50 MCG Take 1 Tab by mouth every morning before breakfast.        Losartan Potassium-HCTZ (Tab) HYZAAR 50-12.5 MG Take 1 Tab by mouth every day.        MetFORMIN HCl (Tab) GLUCOPHAGE 1000 MG Take 1,000 mg by mouth 2 times a day, with meals.        NON SPECIFIED   Shingles vaccine        Omeprazole (CAPSULE DELAYED RELEASE) PRILOSEC 40 MG Take 1 Cap by mouth every day.        Primidone (Tab) MYSOLINE 50 MG Take 1 Tab by mouth every evening.        Propranolol HCl (CAPSULE SR 24 HR) INDERAL  MG Take 1 Cap by mouth every day.        .                 Medicines prescribed today were sent to:     [facility], [place] - [address]    [address] [facility] [address]    Phone: [phone] Fax: [phone]    Open 24 Hours?: No      Medication refill instructions:       If your prescription bottle indicates you have medication refills left, it is not necessary to call your provider’s office. Please contact your pharmacy and they will refill your medication.    If your prescription bottle indicates you do not have any refills left, you may request refills at any time through one of the following ways: The online [employer] system (except Urgent Care), by calling your provider’s office, or by asking your pharmacy to contact your provider’s office with a refill request. Medication refills are processed only during regular business hours and may not be available until the next business day. Your provider may request additional information or to have a follow-up visit with you prior to refilling your medication.   *Please Note: Medication refills are assigned a new Rx number when refilled electronically. Your pharmacy may indicate that no refills were authorized even though a new prescription for the same medication is available at the pharmacy. Please request the medicine by name with the pharmacy before contacting your provider for a refill.        Your To Do List      Future Labs/Procedures Complete By Expires    CBC WITH DIFFERENTIAL  As directed 8/15/2018    CT-ABDOMEN-PELVIS WITH & W/O  As directed 8/15/2018    H. PYLORI, UREA BREATH TEST, ADULT  As directed 8/15/2018    MICROALBUMIN CREAT RATIO URINE  As directed 8/15/2018    US-ABDOMEN COMPLETE SURVEY  As directed 8/15/2018    WESTERGREN SED RATE  As directed 8/15/2018         [employer] Access Code: Activation code not generated  Current [employer] Status: Active

## 2017-08-16 ENCOUNTER — TELEPHONE (OUTPATIENT)
Dept: MEDICAL GROUP | Age: 72
End: 2017-08-16

## 2017-08-16 LAB — UREA BREATH TEST QL: NEGATIVE

## 2017-08-16 NOTE — PROGRESS NOTES
"Subjective:      Kevin Jackson is a 72 y.o. male who presents with GI Problem  The patient is here for followup of chronic medical problems listed below. The patient is compliant with medications and having no side effects from them. Denies chest pain, abdominal pain, dyspnea, myalgias, or cough.   Patient Active Problem List    Diagnosis Date Noted   • Chronic abdominal pain 08/15/2017   • Gastroesophageal reflux disease with esophagitis 08/15/2017   • Hypothyroidism due to acquired atrophy of thyroid- dr abbott 08/15/2017   • Uncontrolled type 2 diabetes mellitus with complication, without long-term current use of insulin (HCC)-uncontrolled, with complications- dr browning 10/02/2013   • Obesity (BMI 30.0-34.9) 10/02/2013   • Coarse tremors- dr smith, neurologist, [place] 10/03/2011   • Mixed hyperlipidemia- dr dwyer 10/03/2011   • Essential hypertension 10/03/2011   • Hypovitaminosis D 10/03/2011     Allergies   Allergen Reactions   • Zocor [Simvastatin - High Dose]      Pt tells me this medication gave him \"side effect\" of feeling nauseated; denies allergies to medications     Outpatient Prescriptions Prior to Visit   Medication Sig Dispense Refill   • glimepiride (AMARYL) 1 MG tablet Take 1 mg by mouth every morning.     • propranolol CR (INDERAL LA) 120 MG CAPSULE SR 24 HR Take 1 Cap by mouth every day. 90 Cap 4   • INSULIN LISP PROT & LISP, HUM, (HUMALOG MIX 50/50) (50-50) 100 UNIT/ML Suspension Inject 30 Units as instructed.     • NON SPECIFIED Shingles vaccine 1 Each 0   • atorvastatin (LIPITOR) 40 MG TABS Take 80 mg by mouth every evening.     • levothyroxine (SYNTHROID) 50 MCG TABS Take 1 Tab by mouth every morning before breakfast. 90 Tab 1   • metformin (GLUCOPHAGE) 1000 MG tablet Take 1,000 mg by mouth 2 times a day, with meals.     • aspirin EC (ECOTRIN) 81 MG TBEC Take 81 mg by mouth every day.     • vitamin D (CHOLECALCIFEROL) 1000 UNIT TABS Take 1,000 Units by mouth 3 times a day.     • " "losartan-hydrochlorothiazide (HYZAAR) 50-12.5 MG per tablet Take 1 Tab by mouth every day.     • Exenatide ER (BYDUREON) 2 MG Pen-injector Inject  as instructed.     • primidone (MYSOLINE) 50 MG Tab Take 1 Tab by mouth every evening. (Patient not taking: Reported on 8/15/2017) 90 Tab 3   • Canagliflozin (INVOKANA) 100 MG TABS Take 1 Tab by mouth every day.     • Choline Fenofibrate (TRILIPIX) 135 MG CPDR Take  by mouth.       No facility-administered medications prior to visit.              GI Problem        Review of Systems   Constitutional: Negative.    HENT: Negative.    Eyes: Negative.    Respiratory: Negative.    Cardiovascular: Negative.    Gastrointestinal: Negative.         1   Genitourinary: Negative.    Musculoskeletal: Negative.    Skin: Negative.    Neurological: Negative.    Endo/Heme/Allergies: Negative.    Psychiatric/Behavioral: Negative.           Objective:     /60 mmHg  Pulse 65  Temp(Src) 36.2 °C (97.2 °F)  Ht 1.829 m (6' 0.01\")  Wt 102.059 kg (225 lb)  BMI 30.51 kg/m2  SpO2 96%     Physical Exam   Constitutional: He is oriented to person, place, and time. He appears well-developed and well-nourished. No distress.   HENT:   Head: Normocephalic and atraumatic.   Right Ear: External ear normal.   Left Ear: External ear normal.   Mouth/Throat: Oropharynx is clear and moist. No oropharyngeal exudate.   Eyes: Conjunctivae and EOM are normal. Pupils are equal, round, and reactive to light. Right eye exhibits no discharge.   Neck: Normal range of motion. Neck supple. No JVD present. No tracheal deviation present. No thyromegaly present.   Cardiovascular: Normal rate, regular rhythm, normal heart sounds and intact distal pulses.  Exam reveals no gallop.    Pulmonary/Chest: Effort normal and breath sounds normal. No respiratory distress. He has no wheezes. He has no rales. He exhibits no tenderness.   Abdominal: Soft. Bowel sounds are normal. He exhibits no distension and no mass. There is no " tenderness. There is no rebound and no guarding. No hernia.   Genitourinary: Guaiac negative stool. No penile tenderness.   Musculoskeletal: He exhibits no edema or tenderness.   Lymphadenopathy:     He has no cervical adenopathy.   Neurological: He is alert and oriented to person, place, and time. He has normal reflexes. He displays normal reflexes. No cranial nerve deficit. He exhibits normal muscle tone. Coordination normal.   Skin: Skin is warm and dry. No rash noted. He is not diaphoretic. No erythema. No pallor.   Psychiatric: He has a normal mood and affect. His behavior is normal. Judgment and thought content normal.   Nursing note and vitals reviewed.  No visits with results within 1 Month(s) from this visit.  Latest known visit with results is:    Hospital Outpatient Visit on 05/31/2017   Component Date Value   • Hepatitis C Antibody 05/31/2017 Negative       Lab Results   Component Value Date/Time    GLYCOHEMOGLOBIN 7.7 08/15/2017 10:30 AM    GLYCOHEMOGLOBIN 8.5 05/24/2017     Lab Results   Component Value Date/Time    SODIUM 134* 01/11/2017 09:05 PM    POTASSIUM 4.2 01/11/2017 09:05 PM    CHLORIDE 98 01/11/2017 09:05 PM    CO2 20 01/11/2017 09:05 PM    GLUCOSE 196* 01/11/2017 09:05 PM    BUN 21 01/11/2017 09:05 PM    CREATININE 0.98 01/11/2017 09:05 PM    ALKALINE PHOSPHATASE 32 01/11/2017 09:05 PM    AST(SGOT) 13 01/11/2017 09:05 PM    ALT(SGPT) 16 01/11/2017 09:05 PM    TOTAL BILIRUBIN 0.9 01/11/2017 09:05 PM     Lab Results   Component Value Date/Time    INR 0.99 01/11/2017 09:05 PM    INR 0.99 01/14/2013 05:50 PM     Lab Results   Component Value Date/Time    CHOLESTEROL, 11/05/2014 10:08 AM    LDL 83 11/05/2014 10:08 AM    HDL 46 11/05/2014 10:08 AM    TRIGLYCERIDES 119 11/05/2014 10:08 AM       No results found for: TESTOSTERONE  No results found for: TSH  Lab Results   Component Value Date/Time    FREE T-4 1.01 11/05/2014 10:08 AM    FREE T-4 1.13 01/14/2013 05:50 PM     No results found  for: URICACID  No components found for: VITB12  Lab Results   Component Value Date/Time    25-HYDROXY   VITAMIN D 25 27* 11/05/2014 10:08 AM    25-HYDROXY   VITAMIN D 25 23* 09/04/2012 12:37 PM                    Assessment/Plan:     1. Chronic abdominal pain-this is new problem which has been going for 6 months. It is not related to food or position or exertion. It occurs daily and has diffuse aching pain that radiates in the back. Abdomen CT scan of his abdomen rule out pancreatic CVA and ultrasound rule out gallstones. He'll need referral to GI if these are negative for upper endoscopy to rule out ulcer. Finally we will order H. pylori breath test and start on a trial of omeprazole for one month 40 a day and see him back at that time to assess response.     - CT-ABDOMEN-PELVIS WITH & W/O; Future  - US-ABDOMEN COMPLETE SURVEY; Future  - POCT Urinalysis  - CBC WITH DIFFERENTIAL; Future  - WESTERGREN SED RATE; Future  - H. PYLORI, UREA BREATH TEST, ADULT; Future  - omeprazole (PRILOSEC) 40 MG delayed-release capsule; Take 1 Cap by mouth every day.  Dispense: 30 Cap; Refill: 0    2. Gastroesophageal reflux disease with esophagitisUnder good control. Continue same regimen.        3. Uncontrolled type 2 diabetes mellitus with complication, without long-term current use of insulin (HCC)-uncontrolled, with complications- dr Landrum good control. Continue same regimen.   - TRULICITY 0.75 MG/0.5ML Solution Pen-injector; ; Refill: 3  - HUMALOG MIX 50/50 KWIKPEN (50-50) 100 UNIT/ML Suspension Pen-injector; ; Refill: 0  - INVOKANA 300 MG Tab; 150 mg.; Refill: 1  - POCT  A1C  - MICROALBUMIN CREAT RATIO URINE; Future    4. Obesity (BMI 30.0-34.9)   diet/exercise/lose 15 lbs.; patient counseled         5. Essential hypertensionUnder good control. Continue same regimen.          6. Mixed hyperlipidemia- dr Tovar good control. Continue same regimen.       7. Hypovitaminosis DUnder good control. Continue same regimen.         8. Hypothyroidism due to acquired atrophy of thyroid- dr Landrum good control. Continue same regimen.      40 minute face-to-face encounter took place today.  More than half of this time was spent in the coordination of care of the above problems, as well as counseling.

## 2017-08-16 NOTE — TELEPHONE ENCOUNTER
Phone Number Called: [phone] (home)     Message: LM for patient to return call regarding his results.    Left Message for patient to call back: yes

## 2017-08-16 NOTE — TELEPHONE ENCOUNTER
----- Message from Ky Hernandez M.D. sent at 8/16/2017  2:24 PM PDT -----  All tests were negative or normal including the breath test done to check for possible H. pylori infection.

## 2017-08-22 ENCOUNTER — TELEPHONE (OUTPATIENT)
Dept: MEDICAL GROUP | Age: 72
End: 2017-08-22

## 2017-08-22 DIAGNOSIS — I10 ESSENTIAL HYPERTENSION: ICD-10-CM

## 2017-08-23 ENCOUNTER — HOSPITAL ENCOUNTER (OUTPATIENT)
Dept: LAB | Facility: MEDICAL CENTER | Age: 72
End: 2017-08-23
Attending: FAMILY MEDICINE
Payer: MEDICARE

## 2017-08-23 DIAGNOSIS — I10 ESSENTIAL HYPERTENSION: ICD-10-CM

## 2017-08-23 LAB
ANION GAP SERPL CALC-SCNC: 13 MMOL/L (ref 0–11.9)
BUN SERPL-MCNC: 22 MG/DL (ref 8–22)
CALCIUM SERPL-MCNC: 10.4 MG/DL (ref 8.5–10.5)
CHLORIDE SERPL-SCNC: 102 MMOL/L (ref 96–112)
CO2 SERPL-SCNC: 24 MMOL/L (ref 20–33)
CREAT SERPL-MCNC: 1.06 MG/DL (ref 0.5–1.4)
FASTING STATUS PATIENT QL REPORTED: NORMAL
GFR SERPL CREATININE-BSD FRML MDRD: >60 ML/MIN/1.73 M 2
GLUCOSE SERPL-MCNC: 132 MG/DL (ref 65–99)
POTASSIUM SERPL-SCNC: 4.3 MMOL/L (ref 3.6–5.5)
SODIUM SERPL-SCNC: 139 MMOL/L (ref 135–145)

## 2017-08-23 PROCEDURE — 80048 BASIC METABOLIC PNL TOTAL CA: CPT

## 2017-08-23 PROCEDURE — 36415 COLL VENOUS BLD VENIPUNCTURE: CPT

## 2017-08-23 NOTE — TELEPHONE ENCOUNTER
1. Caller Name: [facility]                                         Call Back Number: [phone]      Patient approves a detailed voicemail message: no    pt needs a BUN ordered in order to have imaging done.

## 2017-08-24 ENCOUNTER — APPOINTMENT (OUTPATIENT)
Dept: LAB | Facility: MEDICAL CENTER | Age: 72
End: 2017-08-24
Payer: MEDICARE

## 2017-09-04 ENCOUNTER — HOSPITAL ENCOUNTER (OUTPATIENT)
Dept: RADIOLOGY | Facility: MEDICAL CENTER | Age: 72
End: 2017-09-04
Attending: INTERNAL MEDICINE
Payer: MEDICARE

## 2017-09-04 DIAGNOSIS — K21.00 GASTROESOPHAGEAL REFLUX DISEASE WITH ESOPHAGITIS: ICD-10-CM

## 2017-09-04 DIAGNOSIS — R10.9 CHRONIC ABDOMINAL PAIN: ICD-10-CM

## 2017-09-04 DIAGNOSIS — G89.29 CHRONIC ABDOMINAL PAIN: ICD-10-CM

## 2017-09-04 PROCEDURE — 700117 HCHG RX CONTRAST REV CODE 255: Performed by: INTERNAL MEDICINE

## 2017-09-04 PROCEDURE — 74177 CT ABD & PELVIS W/CONTRAST: CPT

## 2017-09-04 PROCEDURE — 76700 US EXAM ABDOM COMPLETE: CPT

## 2017-09-04 RX ADMIN — IOHEXOL 100 ML: 350 INJECTION, SOLUTION INTRAVENOUS at 10:35

## 2017-09-07 ENCOUNTER — TELEPHONE (OUTPATIENT)
Dept: MEDICAL GROUP | Age: 72
End: 2017-09-07

## 2017-09-07 DIAGNOSIS — N40.1 BENIGN NON-NODULAR PROSTATIC HYPERPLASIA WITH LOWER URINARY TRACT SYMPTOMS: ICD-10-CM

## 2017-09-12 ENCOUNTER — TELEPHONE (OUTPATIENT)
Dept: MEDICAL GROUP | Age: 72
End: 2017-09-12

## 2017-09-12 NOTE — TELEPHONE ENCOUNTER
ESTABLISHED PATIENT PRE-VISIT PLANNING     Note: Patient will not be contacted if there is no indication to call.     1.  Reviewed notes from the last few office visits within the medical group: Yes    2.  If any orders were placed at last visit or intended to be done for this visit (i.e. 6 mos follow-up), do we have Results/Consult Notes?        •  Labs - Labs ordered, completed and results are in chart.       •  Imaging - Imaging ordered, completed and results are in chart.       •  Referrals - Referral ordered, patient has NOT been seen.    3. Is this appointment scheduled as a Hospital Follow-Up? No    4.  Immunizations were updated in Epic using WebIZ?: Epic matches WebIZ       •  Web Iz Recommendations: FLU and TD    5.  Patient is due for the following Health Maintenance Topics:   Health Maintenance Due   Topic Date Due   • IMM DTaP/Tdap/Td Vaccine (1 - Tdap) 05/26/1965   • RETINAL SCREENING  09/03/2015   • IMM INFLUENZA (1) 09/01/2017           6.  Patient was NOT informed to arrive 15 min prior to their scheduled appointment and bring in their medication bottles.

## 2017-09-13 ENCOUNTER — OFFICE VISIT (OUTPATIENT)
Dept: MEDICAL GROUP | Age: 72
End: 2017-09-13
Payer: MEDICARE

## 2017-09-13 VITALS
HEIGHT: 72 IN | SYSTOLIC BLOOD PRESSURE: 118 MMHG | WEIGHT: 226 LBS | BODY MASS INDEX: 30.61 KG/M2 | OXYGEN SATURATION: 90 % | HEART RATE: 78 BPM | TEMPERATURE: 96.4 F | DIASTOLIC BLOOD PRESSURE: 80 MMHG

## 2017-09-13 DIAGNOSIS — K21.00 GASTROESOPHAGEAL REFLUX DISEASE WITH ESOPHAGITIS: ICD-10-CM

## 2017-09-13 DIAGNOSIS — Z23 NEED FOR INFLUENZA VACCINATION: ICD-10-CM

## 2017-09-13 DIAGNOSIS — R10.9 CHRONIC ABDOMINAL PAIN: ICD-10-CM

## 2017-09-13 DIAGNOSIS — E03.4 HYPOTHYROIDISM DUE TO ACQUIRED ATROPHY OF THYROID: ICD-10-CM

## 2017-09-13 DIAGNOSIS — N40.1 BENIGN NON-NODULAR PROSTATIC HYPERPLASIA WITH LOWER URINARY TRACT SYMPTOMS: ICD-10-CM

## 2017-09-13 DIAGNOSIS — E55.9 HYPOVITAMINOSIS D: ICD-10-CM

## 2017-09-13 DIAGNOSIS — G89.29 CHRONIC ABDOMINAL PAIN: ICD-10-CM

## 2017-09-13 DIAGNOSIS — E66.9 OBESITY (BMI 30.0-34.9): ICD-10-CM

## 2017-09-13 DIAGNOSIS — G25.2 COARSE TREMORS: ICD-10-CM

## 2017-09-13 DIAGNOSIS — I10 ESSENTIAL HYPERTENSION: ICD-10-CM

## 2017-09-13 DIAGNOSIS — E78.2 MIXED HYPERLIPIDEMIA: ICD-10-CM

## 2017-09-13 PROCEDURE — 99214 OFFICE O/P EST MOD 30 MIN: CPT | Mod: 25 | Performed by: INTERNAL MEDICINE

## 2017-09-13 PROCEDURE — 90662 IIV NO PRSV INCREASED AG IM: CPT | Performed by: INTERNAL MEDICINE

## 2017-09-13 PROCEDURE — G0008 ADMIN INFLUENZA VIRUS VAC: HCPCS | Performed by: INTERNAL MEDICINE

## 2017-09-13 RX ORDER — TAMSULOSIN HYDROCHLORIDE 0.4 MG/1
0.4 CAPSULE ORAL
Qty: 90 CAP | Refills: 4 | Status: SHIPPED | OUTPATIENT
Start: 2017-09-13 | End: 2018-09-30 | Stop reason: SDUPTHER

## 2017-09-13 ASSESSMENT — ENCOUNTER SYMPTOMS
CONSTITUTIONAL NEGATIVE: 1
NEUROLOGICAL NEGATIVE: 1
PSYCHIATRIC NEGATIVE: 1
EYES NEGATIVE: 1
RESPIRATORY NEGATIVE: 1
GASTROINTESTINAL NEGATIVE: 1
MUSCULOSKELETAL NEGATIVE: 1
CARDIOVASCULAR NEGATIVE: 1

## 2017-09-13 NOTE — LETTER
[facility]  Ky Hernandez M.D.  [address]  [place] [address]  Fax: [phone]   Authorization for Release/Disclosure of   Protected Health Information   Name: YINA JACKSON : 1945 SSN: [id number]   Address: [address] [place] [address]   [place] [address] Phone:    [phone] (home)    I authorize the entity listed below to release/disclose the PHI below to:   [facility]/Ky Hernandez M.D. and Ky Hernandez M.D.   Provider or Entity Name:  Rusty Gonzales M.D.   Address   [facility]   Phone:      Fax:  [phone]   Reason for request: continuity of care   Information to be released:    [  ] LAST COLONOSCOPY,  including any PATH REPORT and follow-up  [  ] LAST FIT/COLOGUARD RESULT [  ] LAST DEXA  [  ] LAST MAMMOGRAM  [  ] LAST PAP  [  ] LAST LABS [ X ] RETINA EXAM REPORT  [  ] IMMUNIZATION RECORDS  [  ] Release all info      [  ] Check here and initial the line next to each item to release ALL health information INCLUDING  _____ Care and treatment for drug and / or alcohol abuse  _____ HIV testing, infection status, or AIDS  _____ Genetic Testing    DATES OF SERVICE OR TIME PERIOD TO BE DISCLOSED: __2016___________  I understand and acknowledge that:  * This Authorization may be revoked at any time by you in writing, except if your health information has already been used or disclosed.  * Your health information that will be used or disclosed as a result of you signing this authorization could be re-disclosed by the recipient. If this occurs, your re-disclosed health information may no longer be protected by State or Federal laws.  * You may refuse to sign this Authorization. Your refusal will not affect your ability to obtain treatment.  * This Authorization becomes effective upon signing and will  on (date) __________.      If no date is indicated, this Authorization will  one (1) year from the signature date.    Name: Yina Jackson    Signature:   Date:          9/13/2017       PLEASE FAX REQUESTED RECORDS BACK TO: [phone]

## 2017-09-13 NOTE — PROGRESS NOTES
"Subjective:      Kevin Jackson is a 72 y.o. male who presents with Abdominal Pain (CT scan review)  The patient is here for followup of chronic medical problems listed below. The patient is compliant with medications and having no side effects from them. Denies chest pain, abdominal pain, dyspnea, myalgias, or cough.   Patient Active Problem List    Diagnosis Date Noted   • Chronic abdominal pain 08/15/2017   • Gastroesophageal reflux disease with esophagitis 08/15/2017   • Hypothyroidism due to acquired atrophy of thyroid- dr browning 08/15/2017   • Benign non-nodular prostatic hyperplasia with lower urinary tract symptoms 11/05/2014   • Uncontrolled type 2 diabetes mellitus with complication, without long-term current use of insulin (HCC)-uncontrolled, with complications- dr browning 10/02/2013   • Obesity (BMI 30.0-34.9) 10/02/2013   • Coarse tremors- dr smith, neurologist, [place] 10/03/2011   • Mixed hyperlipidemia- dr dwyer 10/03/2011   • Essential hypertension 10/03/2011   • Hypovitaminosis D 10/03/2011     Allergies   Allergen Reactions   • Zocor [Simvastatin - High Dose]      Pt tells me this medication gave him \"side effect\" of feeling nauseated; denies allergies to medications         Outpatient Medications Prior to Visit   Medication Sig Dispense Refill   • TRULICITY 0.75 MG/0.5ML Solution Pen-injector   3   • HUMALOG MIX 50/50 KWIKPEN (50-50) 100 UNIT/ML Suspension Pen-injector   0   • INVOKANA 300 MG Tab 150 mg.  1   • omeprazole (PRILOSEC) 40 MG delayed-release capsule Take 1 Cap by mouth every day. 30 Cap 0   • Exenatide ER (BYDUREON) 2 MG Pen-injector Inject  as instructed.     • glimepiride (AMARYL) 1 MG tablet Take 1 mg by mouth every morning.     • propranolol CR (INDERAL LA) 120 MG CAPSULE SR 24 HR Take 1 Cap by mouth every day. 90 Cap 4   • INSULIN LISP PROT & LISP, HUM, (HUMALOG MIX 50/50) (50-50) 100 UNIT/ML Suspension Inject 30 Units as instructed.     • primidone (MYSOLINE) 50 MG Tab " Take 1 Tab by mouth every evening. (Patient not taking: Reported on 8/15/2017) 90 Tab 3   • Canagliflozin (INVOKANA) 100 MG TABS Take 1 Tab by mouth every day.     • NON SPECIFIED Shingles vaccine 1 Each 0   • atorvastatin (LIPITOR) 40 MG TABS Take 80 mg by mouth every evening.     • levothyroxine (SYNTHROID) 50 MCG TABS Take 1 Tab by mouth every morning before breakfast. 90 Tab 1   • metformin (GLUCOPHAGE) 1000 MG tablet Take 1,000 mg by mouth 2 times a day, with meals.     • Choline Fenofibrate (TRILIPIX) 135 MG CPDR Take  by mouth.     • aspirin EC (ECOTRIN) 81 MG TBEC Take 81 mg by mouth every day.     • vitamin D (CHOLECALCIFEROL) 1000 UNIT TABS Take 1,000 Units by mouth 3 times a day.     • losartan-hydrochlorothiazide (HYZAAR) 50-12.5 MG per tablet Take 1 Tab by mouth every day.       No facility-administered medications prior to visit.              HPI    Review of Systems   Constitutional: Negative.    HENT: Negative.    Eyes: Negative.    Respiratory: Negative.    Cardiovascular: Negative.    Gastrointestinal: Negative.    Genitourinary: Negative.    Musculoskeletal: Negative.    Skin: Negative.    Neurological: Negative.    Endo/Heme/Allergies: Negative.    Psychiatric/Behavioral: Negative.           Objective:     /80   Pulse 78   Temp (!) 35.8 °C (96.4 °F)   Ht 1.829 m (6')   Wt 102.5 kg (226 lb)   SpO2 90%   BMI 30.65 kg/m²      Physical Exam   Constitutional: He is oriented to person, place, and time. He appears well-developed and well-nourished. No distress.   HENT:   Head: Normocephalic and atraumatic.   Right Ear: External ear normal.   Left Ear: External ear normal.   Nose: Nose normal.   Mouth/Throat: Oropharynx is clear and moist. No oropharyngeal exudate.   Eyes: Conjunctivae and EOM are normal. Pupils are equal, round, and reactive to light. Right eye exhibits no discharge. Left eye exhibits no discharge. No scleral icterus.   Neck: Normal range of motion. Neck supple. No JVD  present. No tracheal deviation present. No thyromegaly present.   Cardiovascular: Normal rate, regular rhythm, normal heart sounds and intact distal pulses.  Exam reveals no gallop and no friction rub.    No murmur heard.  Pulmonary/Chest: Effort normal and breath sounds normal. No stridor. No respiratory distress. He has no wheezes. He has no rales. He exhibits no tenderness.   Abdominal: Soft. Bowel sounds are normal. He exhibits no distension and no mass. There is no tenderness. There is no rebound and no guarding.   Musculoskeletal: Normal range of motion. He exhibits no edema or tenderness.   Lymphadenopathy:     He has no cervical adenopathy.   Neurological: He is alert and oriented to person, place, and time. He has normal reflexes. He displays normal reflexes. He exhibits normal muscle tone. Coordination normal.   Skin: Skin is warm and dry. No rash noted. He is not diaphoretic. No erythema. No pallor.   Psychiatric: He has a normal mood and affect. His behavior is normal. Judgment and thought content normal.     Hospital Outpatient Visit on 08/23/2017   Component Date Value   • Sodium 08/23/2017 139    • Potassium 08/23/2017 4.3    • Chloride 08/23/2017 102    • Co2 08/23/2017 24    • Glucose 08/23/2017 132*   • Bun 08/23/2017 22    • Creatinine 08/23/2017 1.06    • Calcium 08/23/2017 10.4    • Anion Gap 08/23/2017 13.0*   • Fasting Status 08/23/2017 Non-Fasting    • GFR If  08/23/2017 >60    • GFR If Non  Ameri* 08/23/2017 >60    Hospital Outpatient Visit on 08/15/2017   Component Date Value   • WBC 08/15/2017 6.9    • RBC 08/15/2017 4.78    • Hemoglobin 08/15/2017 14.4    • Hematocrit 08/15/2017 43.3    • MCV 08/15/2017 90.6    • MCH 08/15/2017 30.1    • MCHC 08/15/2017 33.3*   • RDW 08/15/2017 46.5    • Platelet Count 08/15/2017 331    • MPV 08/15/2017 10.6    • Neutrophils-Polys 08/15/2017 54.90    • Lymphocytes 08/15/2017 31.20    • Monocytes 08/15/2017 9.60    • Eosinophils  08/15/2017 3.10    • Basophils 08/15/2017 0.90    • Immature Granulocytes 08/15/2017 0.30    • Nucleated RBC 08/15/2017 0.00    • Neutrophils (Absolute) 08/15/2017 3.76    • Lymphs (Absolute) 08/15/2017 2.14    • Monos (Absolute) 08/15/2017 0.66    • Eos (Absolute) 08/15/2017 0.21    • Baso (Absolute) 08/15/2017 0.06    • Immature Granulocytes (a* 08/15/2017 0.02    • NRBC (Absolute) 08/15/2017 0.00    • Sed Rate Westergren 08/15/2017 9    • H Pylori Breath Test 08/16/2017 Negative    • Height 08/16/2017 73    • Weight 08/16/2017 224    Hospital Outpatient Visit on 08/15/2017   Component Date Value   • Creatinine, Urine 08/15/2017 40.20    • Microalbumin, Urine Rand* 08/15/2017 <0.7    • Micro Alb Creat Ratio 08/15/2017 see below    Office Visit on 08/15/2017   Component Date Value   • Glycohemoglobin 08/15/2017 7.7    • Internal Control Negative 08/15/2017 Negative    • Internal Control Positive 08/15/2017 Positive       Lab Results   Component Value Date/Time    HBA1C 7.7 08/15/2017 10:30 AM    HBA1C 8.5 05/24/2017     Lab Results   Component Value Date/Time    SODIUM 139 08/23/2017 11:08 AM    POTASSIUM 4.3 08/23/2017 11:08 AM    CHLORIDE 102 08/23/2017 11:08 AM    CO2 24 08/23/2017 11:08 AM    GLUCOSE 132 (H) 08/23/2017 11:08 AM    BUN 22 08/23/2017 11:08 AM    CREATININE 1.06 08/23/2017 11:08 AM    ALKPHOSPHAT 32 01/11/2017 09:05 PM    ASTSGOT 13 01/11/2017 09:05 PM    ALTSGPT 16 01/11/2017 09:05 PM    TBILIRUBIN 0.9 01/11/2017 09:05 PM     Lab Results   Component Value Date/Time    INR 0.99 01/11/2017 09:05 PM    INR 0.99 01/14/2013 05:50 PM     Lab Results   Component Value Date/Time    CHOLSTRLTOT 153 11/05/2014 10:08 AM    LDL 83 11/05/2014 10:08 AM    HDL 46 11/05/2014 10:08 AM    TRIGLYCERIDE 119 11/05/2014 10:08 AM       No results found for: TESTOSTERONE  No results found for: TSH  Lab Results   Component Value Date/Time    FREET4 1.01 11/05/2014 10:08 AM    FREET4 1.13 01/14/2013 05:50 PM     No  results found for: URICACID  No components found for: VITB12  Lab Results   Component Value Date/Time    25HYDROXY 27 (L) 11/05/2014 10:08 AM    25HYDROXY 23 (L) 09/04/2012 12:37 PM               Assessment/Plan:     1. Chronic abdominal pain         This has persisted but is somewhat tolerable and better.   He thinks as a cause the pain from one of his diabetic meds the BOYD LIC IT Y. He is willing to put up with this however to keep his blood sugar under good control.    CT of his abdomen was unremarkable except for some mild prostate enlargement and   prostate complications.      2. Hypothyroidism due to acquired atrophy of thyroid- dr browning   Under good control. Continue same regimen.    - TSH; Future    3. Gastroesophageal reflux disease with esophagitis    Under good control. Continue same regimen.  4. Benign non-nodular prostatic hyperplasia with lower urinary tract symptoms      - tamsulosin (FLOMAX) 0.4 MG capsule; Take 1 Cap by mouth ONE-HALF HOUR AFTER BREAKFAST.  Dispense: 90 Cap; Refill: 4    5. Uncontrolled type 2 diabetes mellitus with complication, without long-term current use of insulin (HCC)-uncontrolled, with complications- dr browning    Under good control. Continue same regimen.  - COMP METABOLIC PANEL; Future  - LIPID PROFILE; Future  - CBC WITH DIFFERENTIAL; Future  - HEMOGLOBIN A1C; Future  - MICROALBUMIN CREAT RATIO URINE; Future  - Diabetic Monofilament Lower Extremity Exam  - REFERRAL TO OPHTHALMOLOGY    6. Obesity (BMI 30.0-34.9)    diet/exercise/lose 15 lbs.; patient counseled      7. Coarse tremors- dr smith, neurologist, [place]     Under good control. Continue same regimen.    8. Mixed hyperlipidemia- dr dwyer      Under good control. Continue same regimen.  9. Essential hypertension      Under good control. Continue same regimen.    10. Hypovitaminosis D Under good control. Continue same regimen.   - VITAMIN D,25 HYDROXY; Future    11. Need for influenza vaccination     -  INFLUENZA VACCINE, HIGH DOSE (65+ ONLY)    30 minute face-to-face encounter took place today.  More than half of this time was spent in the coordination of care of the above problems, as well as counseling.

## 2017-10-25 ENCOUNTER — OFFICE VISIT (OUTPATIENT)
Dept: MEDICAL GROUP | Age: 72
End: 2017-10-25
Payer: MEDICARE

## 2017-10-25 VITALS
TEMPERATURE: 97.9 F | HEIGHT: 72 IN | OXYGEN SATURATION: 94 % | HEART RATE: 77 BPM | WEIGHT: 227 LBS | DIASTOLIC BLOOD PRESSURE: 60 MMHG | BODY MASS INDEX: 30.75 KG/M2 | SYSTOLIC BLOOD PRESSURE: 122 MMHG

## 2017-10-25 DIAGNOSIS — E03.4 HYPOTHYROIDISM DUE TO ACQUIRED ATROPHY OF THYROID: ICD-10-CM

## 2017-10-25 DIAGNOSIS — E78.2 MIXED HYPERLIPIDEMIA: ICD-10-CM

## 2017-10-25 DIAGNOSIS — E66.9 OBESITY (BMI 30.0-34.9): ICD-10-CM

## 2017-10-25 DIAGNOSIS — I10 ESSENTIAL HYPERTENSION: ICD-10-CM

## 2017-10-25 DIAGNOSIS — B09 VIRAL EXANTHEM: ICD-10-CM

## 2017-10-25 PROCEDURE — 99214 OFFICE O/P EST MOD 30 MIN: CPT | Performed by: INTERNAL MEDICINE

## 2017-10-25 RX ORDER — HYDROXYZINE 50 MG/1
50 TABLET, FILM COATED ORAL 3 TIMES DAILY PRN
Qty: 30 TAB | Refills: 0 | Status: SHIPPED | OUTPATIENT
Start: 2017-10-25 | End: 2019-03-12

## 2017-10-25 RX ORDER — TRIAMCINOLONE ACETONIDE 40 MG/ML
40 INJECTION, SUSPENSION INTRA-ARTICULAR; INTRAMUSCULAR ONCE
Status: COMPLETED | OUTPATIENT
Start: 2017-10-25 | End: 2017-10-25

## 2017-10-25 RX ADMIN — TRIAMCINOLONE ACETONIDE 40 MG: 40 INJECTION, SUSPENSION INTRA-ARTICULAR; INTRAMUSCULAR at 16:57

## 2017-10-25 ASSESSMENT — ENCOUNTER SYMPTOMS
CARDIOVASCULAR NEGATIVE: 1
MUSCULOSKELETAL NEGATIVE: 1
CONSTITUTIONAL NEGATIVE: 1
NEUROLOGICAL NEGATIVE: 1
EYES NEGATIVE: 1
PSYCHIATRIC NEGATIVE: 1
RESPIRATORY NEGATIVE: 1
GASTROINTESTINAL NEGATIVE: 1

## 2017-10-26 NOTE — PROGRESS NOTES
"Subjective:      Kevin Jackson is a 72 y.o. male who presents with Medication Reaction (possible reaction to Tamsulosin, pt has itching all over body x 2 weeks)  The patient is here for followup of chronic medical problems listed below. The patient is compliant with medications and having no side effects from them. Denies chest pain, abdominal pain, dyspnea, myalgias, or cough.   Patient Active Problem List    Diagnosis Date Noted   • Viral exanthem 10/25/2017   • Chronic abdominal pain 08/15/2017   • Gastroesophageal reflux disease with esophagitis 08/15/2017   • Hypothyroidism due to acquired atrophy of thyroid- dr browning 08/15/2017   • Benign non-nodular prostatic hyperplasia with lower urinary tract symptoms 11/05/2014   • Uncontrolled type 2 diabetes mellitus with complication, without long-term current use of insulin (HCC)-uncontrolled, with complications- dr browning 10/02/2013   • Obesity (BMI 30.0-34.9) 10/02/2013   • Coarse tremors- dr smith, neurologist, [place] 10/03/2011   • Mixed hyperlipidemia- dr dwyer 10/03/2011   • Essential hypertension 10/03/2011   • Hypovitaminosis D 10/03/2011     Allergies   Allergen Reactions   • Zocor [Simvastatin - High Dose]      Pt tells me this medication gave him \"side effect\" of feeling nauseated; denies allergies to medications     Outpatient Medications Prior to Visit   Medication Sig Dispense Refill   • tamsulosin (FLOMAX) 0.4 MG capsule Take 1 Cap by mouth ONE-HALF HOUR AFTER BREAKFAST. 90 Cap 4   • TRULICITY 0.75 MG/0.5ML Solution Pen-injector   3   • HUMALOG MIX 50/50 KWIKPEN (50-50) 100 UNIT/ML Suspension Pen-injector   0   • INVOKANA 300 MG Tab 150 mg.  1   • omeprazole (PRILOSEC) 40 MG delayed-release capsule Take 1 Cap by mouth every day. 30 Cap 0   • glimepiride (AMARYL) 1 MG tablet Take 1 mg by mouth every morning.     • propranolol CR (INDERAL LA) 120 MG CAPSULE SR 24 HR Take 1 Cap by mouth every day. 90 Cap 4   • INSULIN LISP PROT & LISP, HUM, " "(HUMALOG MIX 50/50) (50-50) 100 UNIT/ML Suspension Inject 30 Units as instructed.     • primidone (MYSOLINE) 50 MG Tab Take 1 Tab by mouth every evening. 90 Tab 3   • NON SPECIFIED Shingles vaccine 1 Each 0   • atorvastatin (LIPITOR) 40 MG TABS Take 80 mg by mouth every evening.     • levothyroxine (SYNTHROID) 50 MCG TABS Take 1 Tab by mouth every morning before breakfast. 90 Tab 1   • metformin (GLUCOPHAGE) 1000 MG tablet Take 1,000 mg by mouth 2 times a day, with meals.     • aspirin EC (ECOTRIN) 81 MG TBEC Take 81 mg by mouth every day.     • vitamin D (CHOLECALCIFEROL) 1000 UNIT TABS Take 1,000 Units by mouth 3 times a day.     • losartan-hydrochlorothiazide (HYZAAR) 50-12.5 MG per tablet Take 1 Tab by mouth every day.     • Exenatide ER (BYDUREON) 2 MG Pen-injector Inject  as instructed.     • Canagliflozin (INVOKANA) 100 MG TABS Take 1 Tab by mouth every day.     • Choline Fenofibrate (TRILIPIX) 135 MG CPDR Take  by mouth.       No facility-administered medications prior to visit.        I   HPI    Review of Systems   Constitutional: Negative.    HENT: Negative.    Eyes: Negative.    Respiratory: Negative.    Cardiovascular: Negative.    Gastrointestinal: Negative.    Genitourinary: Negative.    Musculoskeletal: Negative.    Skin: Negative.    Neurological: Negative.    Endo/Heme/Allergies: Negative.    Psychiatric/Behavioral: Negative.           Objective:     /60   Pulse 77   Temp 36.6 °C (97.9 °F)   Ht 1.829 m (6' 0.01\")   Wt 103 kg (227 lb)   SpO2 94%   BMI 30.78 kg/m²      Physical Exam   Constitutional: He is oriented to person, place, and time. He appears well-developed and well-nourished. No distress.   HENT:   Head: Normocephalic and atraumatic.   Right Ear: External ear normal.   Left Ear: External ear normal.   Nose: Nose normal.   Mouth/Throat: Oropharynx is clear and moist. No oropharyngeal exudate.   Eyes: Conjunctivae and EOM are normal. Pupils are equal, round, and reactive to " light. Right eye exhibits no discharge. Left eye exhibits no discharge. No scleral icterus.   Neck: Normal range of motion. Neck supple. No JVD present. No tracheal deviation present. No thyromegaly present.   Cardiovascular: Normal rate, regular rhythm, normal heart sounds and intact distal pulses.  Exam reveals no gallop and no friction rub.    No murmur heard.  Pulmonary/Chest: Effort normal and breath sounds normal. No stridor. No respiratory distress. He has no wheezes. He has no rales. He exhibits no tenderness.   Abdominal: Soft. Bowel sounds are normal. He exhibits no distension and no mass. There is no tenderness. There is no rebound and no guarding.   Musculoskeletal: Normal range of motion. He exhibits no edema or tenderness.   Lymphadenopathy:     He has no cervical adenopathy.   Neurological: He is alert and oriented to person, place, and time. He has normal reflexes. He displays normal reflexes. He exhibits normal muscle tone. Coordination normal.   Skin: Skin is warm and dry. No rash noted. He is not diaphoretic. No erythema. No pallor.   Psychiatric: He has a normal mood and affect. His behavior is normal. Judgment and thought content normal.          No visits with results within 1 Month(s) from this visit.   Latest known visit with results is:   Hospital Outpatient Visit on 08/23/2017   Component Date Value   • Sodium 08/23/2017 139    • Potassium 08/23/2017 4.3    • Chloride 08/23/2017 102    • Co2 08/23/2017 24    • Glucose 08/23/2017 132*   • Bun 08/23/2017 22    • Creatinine 08/23/2017 1.06    • Calcium 08/23/2017 10.4    • Anion Gap 08/23/2017 13.0*   • Fasting Status 08/23/2017 Non-Fasting    • GFR If  08/23/2017 >60    • GFR If Non  Ameri* 08/23/2017 >60       Lab Results   Component Value Date/Time    HBA1C 7.7 08/15/2017 10:30 AM    HBA1C 8.5 05/24/2017     Lab Results   Component Value Date/Time    SODIUM 139 08/23/2017 11:08 AM    POTASSIUM 4.3 08/23/2017 11:08 AM     CHLORIDE 102 08/23/2017 11:08 AM    CO2 24 08/23/2017 11:08 AM    GLUCOSE 132 (H) 08/23/2017 11:08 AM    BUN 22 08/23/2017 11:08 AM    CREATININE 1.06 08/23/2017 11:08 AM    ALKPHOSPHAT 32 01/11/2017 09:05 PM    ASTSGOT 13 01/11/2017 09:05 PM    ALTSGPT 16 01/11/2017 09:05 PM    TBILIRUBIN 0.9 01/11/2017 09:05 PM     Lab Results   Component Value Date/Time    INR 0.99 01/11/2017 09:05 PM    INR 0.99 01/14/2013 05:50 PM     Lab Results   Component Value Date/Time    CHOLSTRLTOT 153 11/05/2014 10:08 AM    LDL 83 11/05/2014 10:08 AM    HDL 46 11/05/2014 10:08 AM    TRIGLYCERIDE 119 11/05/2014 10:08 AM       No results found for: TESTOSTERONE  No results found for: TSH  Lab Results   Component Value Date/Time    FREET4 1.01 11/05/2014 10:08 AM    FREET4 1.13 01/14/2013 05:50 PM     No results found for: URICACID  No components found for: VITB12  Lab Results   Component Value Date/Time    25HYDROXY 27 (L) 11/05/2014 10:08 AM    25HYDROXY 23 (L) 09/04/2012 12:37 PM          Assessment/Plan:     1. Viral exanthem- new prob  Kenalog 40 im and atarax 50 tid prn       2. Uncontrolled type 2 diabetes mellitus with complication, without long-term current use of insulin (HCC)-uncontrolled, with complications- dr browning   Under good control. Continue same regimen.  - Diabetic Monofilament Lower Extremity Exam  - REFERRAL TO OPHTHALMOLOGY    3. Essential hypertension    Under good control. Continue same regimen.    4. Mixed hyperlipidemia- dr dweyr   Under good control. Continue same regimen.    5. Obesity (BMI 30.0-34.9)    diet/exercise/lose 15 lbs.; patient counseled    - Patient identified as having weight management issue.  Appropriate orders and counseling given.    6. Hypothyroidism due to acquired atrophy of thyroid- dr browning         Under good control. Continue same regimen.    30 minute face-to-face encounter took place today.  More than half of this time was spent in the coordination of care of the above problems,  as well as counseling.

## 2017-11-01 ENCOUNTER — TELEPHONE (OUTPATIENT)
Dept: MEDICAL GROUP | Age: 72
End: 2017-11-01

## 2017-11-01 NOTE — TELEPHONE ENCOUNTER
MEDICATION PRIOR AUTHORIZATION NEEDED:    1. Name of Medication: hydrOXYzine HCl (ATARAX) 50 MG Tab    2. Requested By (Name of Pharmacy):   [facility] #[id number] - [place], [place] - [address]  [address] [place] [address]  Phone: [phone] Fax: [phone]     3. Is insurance on file current? Yes Cardholder ID : [id number]    4. What is the name & phone number of the 3rd party payor? [phone]    Would you like a PAR started?

## 2017-11-07 NOTE — TELEPHONE ENCOUNTER
1. Name of Medication & Dose: Hydroxyzine    2. Name of Prescription Coverage Company & Phone #: [employer]    3. Date Prior Auth Submitted: 11/07/17    4. What information was given to obtain insurance decision? Medication prescribed for itching.     5. Prior Auth Letter Approved or Denied?  Status:Approved;Coverage Start Date:10/08/2017;Coverage End Date:11/07/2018;    6. Action Taken: Pharmacy/Patient Notified yes

## 2017-11-07 NOTE — TELEPHONE ENCOUNTER
Ky Hernandez M.D.  Vidya Hartman, [facility]   Caller: Unspecified (6 days ago,  2:34 PM)             Yes

## 2017-11-22 LAB
25(OH)D3+25(OH)D2 SERPL-MCNC: 31.7 NG/ML (ref 30–100)
ALBUMIN SERPL-MCNC: 4.2 G/DL (ref 3.5–4.8)
ALBUMIN/CREAT UR: <4.3 MG/G CREAT (ref 0–30)
ALBUMIN/GLOB SERPL: 1.7 {RATIO} (ref 1.2–2.2)
ALP SERPL-CCNC: 27 IU/L (ref 39–117)
ALT SERPL-CCNC: 21 IU/L (ref 0–44)
AST SERPL-CCNC: 16 IU/L (ref 0–40)
BASOPHILS # BLD AUTO: 0 X10E3/UL (ref 0–0.2)
BASOPHILS NFR BLD AUTO: 1 %
BILIRUB SERPL-MCNC: 0.4 MG/DL (ref 0–1.2)
BUN SERPL-MCNC: 30 MG/DL (ref 8–27)
BUN/CREAT SERPL: 24 (ref 10–24)
CALCIUM SERPL-MCNC: 10.1 MG/DL (ref 8.6–10.2)
CHLORIDE SERPL-SCNC: 98 MMOL/L (ref 96–106)
CHOLEST SERPL-MCNC: 135 MG/DL (ref 100–199)
CO2 SERPL-SCNC: 26 MMOL/L (ref 18–29)
COMMENT 011824: NORMAL
CREAT SERPL-MCNC: 1.24 MG/DL (ref 0.76–1.27)
CREAT UR-MCNC: 69.3 MG/DL
EOSINOPHIL # BLD AUTO: 0.1 X10E3/UL (ref 0–0.4)
EOSINOPHIL NFR BLD AUTO: 2 %
ERYTHROCYTE [DISTWIDTH] IN BLOOD BY AUTOMATED COUNT: 14.5 % (ref 12.3–15.4)
GFR SERPLBLD CREATININE-BSD FMLA CKD-EPI: 58 ML/MIN/1.73
GFR SERPLBLD CREATININE-BSD FMLA CKD-EPI: 67 ML/MIN/1.73
GLOBULIN SER CALC-MCNC: 2.5 G/DL (ref 1.5–4.5)
GLUCOSE SERPL-MCNC: 183 MG/DL (ref 65–99)
HBA1C MFR BLD: 8.9 % (ref 4.8–5.6)
HCT VFR BLD AUTO: 43.7 % (ref 37.5–51)
HDLC SERPL-MCNC: 49 MG/DL
HGB BLD-MCNC: 14.6 G/DL (ref 12.6–17.7)
IMM GRANULOCYTES # BLD: 0 X10E3/UL (ref 0–0.1)
IMM GRANULOCYTES NFR BLD: 0 %
IMMATURE CELLS  115398: NORMAL
LDLC SERPL CALC-MCNC: 74 MG/DL (ref 0–99)
LYMPHOCYTES # BLD AUTO: 2.3 X10E3/UL (ref 0.7–3.1)
LYMPHOCYTES NFR BLD AUTO: 34 %
MCH RBC QN AUTO: 29.9 PG (ref 26.6–33)
MCHC RBC AUTO-ENTMCNC: 33.4 G/DL (ref 31.5–35.7)
MCV RBC AUTO: 89 FL (ref 79–97)
MICROALBUMIN UR-MCNC: <3 UG/ML
MONOCYTES # BLD AUTO: 0.7 X10E3/UL (ref 0.1–0.9)
MONOCYTES NFR BLD AUTO: 10 %
MORPHOLOGY BLD-IMP: NORMAL
NEUTROPHILS # BLD AUTO: 3.5 X10E3/UL (ref 1.4–7)
NEUTROPHILS NFR BLD AUTO: 53 %
NRBC BLD AUTO-RTO: NORMAL %
PLATELET # BLD AUTO: 340 X10E3/UL (ref 150–379)
POTASSIUM SERPL-SCNC: 4.2 MMOL/L (ref 3.5–5.2)
PROT SERPL-MCNC: 6.7 G/DL (ref 6–8.5)
RBC # BLD AUTO: 4.89 X10E6/UL (ref 4.14–5.8)
SODIUM SERPL-SCNC: 138 MMOL/L (ref 134–144)
TRIGL SERPL-MCNC: 59 MG/DL (ref 0–149)
TSH SERPL DL<=0.005 MIU/L-ACNC: 2.02 UIU/ML (ref 0.45–4.5)
VLDLC SERPL CALC-MCNC: 12 MG/DL (ref 5–40)
WBC # BLD AUTO: 6.6 X10E3/UL (ref 3.4–10.8)

## 2017-12-07 ENCOUNTER — OFFICE VISIT (OUTPATIENT)
Dept: MEDICAL GROUP | Age: 72
End: 2017-12-07
Payer: MEDICARE

## 2017-12-07 VITALS
BODY MASS INDEX: 30.75 KG/M2 | HEART RATE: 68 BPM | HEIGHT: 72 IN | WEIGHT: 227 LBS | DIASTOLIC BLOOD PRESSURE: 60 MMHG | SYSTOLIC BLOOD PRESSURE: 102 MMHG | TEMPERATURE: 98.1 F | OXYGEN SATURATION: 97 %

## 2017-12-07 DIAGNOSIS — E66.9 OBESITY (BMI 30.0-34.9): ICD-10-CM

## 2017-12-07 DIAGNOSIS — G25.2 COARSE TREMORS: ICD-10-CM

## 2017-12-07 DIAGNOSIS — E55.9 HYPOVITAMINOSIS D: ICD-10-CM

## 2017-12-07 DIAGNOSIS — K21.00 GASTROESOPHAGEAL REFLUX DISEASE WITH ESOPHAGITIS: ICD-10-CM

## 2017-12-07 DIAGNOSIS — G89.29 CHRONIC ABDOMINAL PAIN: ICD-10-CM

## 2017-12-07 DIAGNOSIS — E03.4 HYPOTHYROIDISM DUE TO ACQUIRED ATROPHY OF THYROID: ICD-10-CM

## 2017-12-07 DIAGNOSIS — B09 VIRAL EXANTHEM: ICD-10-CM

## 2017-12-07 DIAGNOSIS — E78.2 MIXED HYPERLIPIDEMIA: ICD-10-CM

## 2017-12-07 DIAGNOSIS — I10 ESSENTIAL HYPERTENSION: ICD-10-CM

## 2017-12-07 DIAGNOSIS — R10.9 CHRONIC ABDOMINAL PAIN: ICD-10-CM

## 2017-12-07 DIAGNOSIS — N40.1 BENIGN NON-NODULAR PROSTATIC HYPERPLASIA WITH LOWER URINARY TRACT SYMPTOMS: ICD-10-CM

## 2017-12-07 PROCEDURE — 99215 OFFICE O/P EST HI 40 MIN: CPT | Performed by: INTERNAL MEDICINE

## 2017-12-07 RX ORDER — ATORVASTATIN CALCIUM 80 MG/1
80 TABLET, FILM COATED ORAL EVERY EVENING
Qty: 90 TAB | Refills: 4 | Status: SHIPPED | DISCHARGE
Start: 2017-12-07 | End: 2019-03-12 | Stop reason: SDUPTHER

## 2017-12-07 ASSESSMENT — ENCOUNTER SYMPTOMS
PSYCHIATRIC NEGATIVE: 1
NEUROLOGICAL NEGATIVE: 1
CARDIOVASCULAR NEGATIVE: 1
MUSCULOSKELETAL NEGATIVE: 1
CONSTITUTIONAL NEGATIVE: 1
EYES NEGATIVE: 1
RESPIRATORY NEGATIVE: 1
GASTROINTESTINAL NEGATIVE: 1

## 2017-12-07 NOTE — PROGRESS NOTES
"Subjective:      Kevin Jackson is a 72 y.o. male who presents with Hypertension (evaluation on blood work results )  The patient is here for followup of chronic medical problems listed below. The patient is compliant with medications and having no side effects from them. Denies chest pain, abdominal pain, dyspnea, myalgias, or cough.   Patient Active Problem List    Diagnosis Date Noted   • Viral exanthem 10/25/2017   • Chronic abdominal pain 08/15/2017   • Gastroesophageal reflux disease with esophagitis 08/15/2017   • Hypothyroidism due to acquired atrophy of thyroid- dr browning 08/15/2017   • Benign non-nodular prostatic hyperplasia with lower urinary tract symptoms 11/05/2014   • Uncontrolled type 2 diabetes mellitus with complication, without long-term current use of insulin (HCC)-uncontrolled, with complications- dr browning 10/02/2013   • Obesity (BMI 30.0-34.9) 10/02/2013   • Coarse tremors- dr smith, neurologist, [place] 10/03/2011   • Mixed hyperlipidemia- dr dwyer 10/03/2011   • Essential hypertension 10/03/2011   • Hypovitaminosis D 10/03/2011     Allergies   Allergen Reactions   • Zocor [Simvastatin - High Dose]      Pt tells me this medication gave him \"side effect\" of feeling nauseated; denies allergies to medications     Outpatient Medications Prior to Visit   Medication Sig Dispense Refill   • hydrOXYzine HCl (ATARAX) 50 MG Tab Take 1 Tab by mouth 3 times a day as needed for Itching. 30 Tab 0   • tamsulosin (FLOMAX) 0.4 MG capsule Take 1 Cap by mouth ONE-HALF HOUR AFTER BREAKFAST. 90 Cap 4   • HUMALOG MIX 50/50 KWIKPEN (50-50) 100 UNIT/ML Suspension Pen-injector   0   • INVOKANA 300 MG Tab 150 mg.  1   • propranolol CR (INDERAL LA) 120 MG CAPSULE SR 24 HR Take 1 Cap by mouth every day. 90 Cap 4   • primidone (MYSOLINE) 50 MG Tab Take 1 Tab by mouth every evening. 90 Tab 3   • NON SPECIFIED Shingles vaccine 1 Each 0   • atorvastatin (LIPITOR) 40 MG TABS Take 80 mg by mouth every evening.     • " "levothyroxine (SYNTHROID) 50 MCG TABS Take 1 Tab by mouth every morning before breakfast. 90 Tab 1   • metformin (GLUCOPHAGE) 1000 MG tablet Take 1,000 mg by mouth 2 times a day, with meals.     • aspirin EC (ECOTRIN) 81 MG TBEC Take 81 mg by mouth every day.     • vitamin D (CHOLECALCIFEROL) 1000 UNIT TABS Take 1,000 Units by mouth 3 times a day.     • TRULICITY 0.75 MG/0.5ML Solution Pen-injector   3   • omeprazole (PRILOSEC) 40 MG delayed-release capsule Take 1 Cap by mouth every day. (Patient not taking: Reported on 12/7/2017) 30 Cap 0   • Exenatide ER (BYDUREON) 2 MG Pen-injector Inject  as instructed.     • glimepiride (AMARYL) 1 MG tablet Take 1 mg by mouth every morning.     • INSULIN LISP PROT & LISP, HUM, (HUMALOG MIX 50/50) (50-50) 100 UNIT/ML Suspension Inject 30 Units as instructed.     • Canagliflozin (INVOKANA) 100 MG TABS Take 1 Tab by mouth every day.     • Choline Fenofibrate (TRILIPIX) 135 MG CPDR Take  by mouth.     • losartan-hydrochlorothiazide (HYZAAR) 50-12.5 MG per tablet Take 1 Tab by mouth every day.       No facility-administered medications prior to visit.                HPI    Review of Systems   Constitutional: Negative.    HENT: Negative.    Eyes: Negative.    Respiratory: Negative.    Cardiovascular: Negative.    Gastrointestinal: Negative.    Genitourinary: Negative.    Musculoskeletal: Negative.    Skin: Negative.    Neurological: Negative.    Endo/Heme/Allergies: Negative.    Psychiatric/Behavioral: Negative.           Objective:     /60   Pulse 68   Temp 36.7 °C (98.1 °F)   Ht 1.829 m (6' 0.01\")   Wt 103 kg (227 lb)   SpO2 97%   BMI 30.78 kg/m²      Physical Exam   Constitutional: He is oriented to person, place, and time. He appears well-developed and well-nourished. No distress.   HENT:   Head: Normocephalic and atraumatic.   Right Ear: External ear normal.   Left Ear: External ear normal.   Nose: Nose normal.   Mouth/Throat: Oropharynx is clear and moist. No " oropharyngeal exudate.   Eyes: Conjunctivae and EOM are normal. Pupils are equal, round, and reactive to light. Right eye exhibits no discharge. Left eye exhibits no discharge. No scleral icterus.   Neck: Normal range of motion. Neck supple. No JVD present. No tracheal deviation present. No thyromegaly present.   Cardiovascular: Normal rate, regular rhythm, normal heart sounds and intact distal pulses.  Exam reveals no gallop and no friction rub.    No murmur heard.  Pulmonary/Chest: Effort normal and breath sounds normal. No stridor. No respiratory distress. He has no wheezes. He has no rales. He exhibits no tenderness.   Abdominal: Soft. Bowel sounds are normal. He exhibits no distension and no mass. There is no tenderness. There is no rebound and no guarding.   Musculoskeletal: Normal range of motion. He exhibits no edema or tenderness.   Lymphadenopathy:     He has no cervical adenopathy.   Neurological: He is alert and oriented to person, place, and time. He has normal reflexes. He displays normal reflexes. He exhibits normal muscle tone. Coordination normal.   Skin: Skin is warm and dry. No rash noted. He is not diaphoretic. No erythema. No pallor.   Psychiatric: He has a normal mood and affect. His behavior is normal. Judgment and thought content normal.     Orders Only on 11/21/2017   Component Date Value   • WBC 11/22/2017 6.6    • RBC 11/22/2017 4.89    • Hemoglobin 11/22/2017 14.6    • Hematocrit 11/22/2017 43.7    • MCV 11/22/2017 89    • MCH 11/22/2017 29.9    • MCHC 11/22/2017 33.4    • RDW 11/22/2017 14.5    • Platelet Count 11/22/2017 340    • Neutrophils-Polys 11/22/2017 53    • Lymphocytes 11/22/2017 34    • Monocytes 11/22/2017 10    • Eosinophils 11/22/2017 2    • Basophils 11/22/2017 1    • Immature Cells 11/22/2017 CANCELED    • Neutrophils (Absolute) 11/22/2017 3.5    • Lymphs (Absolute) 11/22/2017 2.3    • Monos (Absolute) 11/22/2017 0.7    • Eos (Absolute) 11/22/2017 0.1    • Baso (Absolute)  11/22/2017 0.0    • Immature Granulocytes 11/22/2017 0    • Immature Granulocytes (a* 11/22/2017 0.0    • Nucleated RBC 11/22/2017 CANCELED    • Comments-Diff 11/22/2017 CANCELED    • Glucose 11/22/2017 183*   • Bun 11/22/2017 30*   • Creatinine 11/22/2017 1.24    • GFR If Non  [employer]meri* 11/22/2017 58*   • GFR If  11/22/2017 67    • Bun-Creatinine Ratio 11/22/2017 24    • Sodium 11/22/2017 138    • Potassium 11/22/2017 4.2    • Chloride 11/22/2017 98    • Co2 11/22/2017 26    • Calcium 11/22/2017 10.1    • Total Protein 11/22/2017 6.7    • Albumin 11/22/2017 4.2    • Globulin 11/22/2017 2.5    • A-G Ratio 11/22/2017 1.7    • Total Bilirubin 11/22/2017 0.4    • Alkaline Phosphatase 11/22/2017 27*   • AST(SGOT) 11/22/2017 16    • ALT(SGPT) 11/22/2017 21    • Cholesterol,Tot 11/22/2017 135    • Triglycerides 11/22/2017 59    • HDL 11/22/2017 49    • VLDL Cholesterol Calc 11/22/2017 12    • LDL 11/22/2017 74    • Comment: 11/22/2017 CANCELED    • Creatinine, Random Urine 11/22/2017 69.3    • Microalbumin, Urine Rand* 11/22/2017 <3.0    • Albumin / Creatinine Rat* 11/22/2017 <4.3    • Glycohemoglobin 11/22/2017 8.9*   • TSH 11/22/2017 2.020    • 25-Hydroxy   Vitamin D 25 11/22/2017 31.7       Lab Results   Component Value Date/Time    HBA1C 8.9 (H) 11/21/2017 07:21 AM    HBA1C 7.7 08/15/2017 10:30 AM    HBA1C 8.5 05/24/2017     Lab Results   Component Value Date/Time    SODIUM 138 11/21/2017 07:21 AM    SODIUM 139 08/23/2017 11:08 AM    POTASSIUM 4.2 11/21/2017 07:21 AM    POTASSIUM 4.3 08/23/2017 11:08 AM    CHLORIDE 98 11/21/2017 07:21 AM    CHLORIDE 102 08/23/2017 11:08 AM    CO2 26 11/21/2017 07:21 AM    CO2 24 08/23/2017 11:08 AM    GLUCOSE 183 (H) 11/21/2017 07:21 AM    GLUCOSE 132 (H) 08/23/2017 11:08 AM    BUN 30 (H) 11/21/2017 07:21 AM    BUN 22 08/23/2017 11:08 AM    CREATININE 1.24 11/21/2017 07:21 AM    CREATININE 1.06 08/23/2017 11:08 AM    BUNCREATRAT 24 11/21/2017 07:21 AM     ALKPHOSPHAT 27 (L) 11/21/2017 07:21 AM    ALKPHOSPHAT 32 01/11/2017 09:05 PM    ASTSGOT 16 11/21/2017 07:21 AM    ASTSGOT 13 01/11/2017 09:05 PM    ALTSGPT 21 11/21/2017 07:21 AM    ALTSGPT 16 01/11/2017 09:05 PM    TBILIRUBIN 0.4 11/21/2017 07:21 AM    TBILIRUBIN 0.9 01/11/2017 09:05 PM     Lab Results   Component Value Date/Time    INR 0.99 01/11/2017 09:05 PM    INR 0.99 01/14/2013 05:50 PM     Lab Results   Component Value Date/Time    CHOLSTRLTOT 135 11/21/2017 07:21 AM    CHOLSTRLTOT 153 11/05/2014 10:08 AM    LDL 74 11/21/2017 07:21 AM    LDL 83 11/05/2014 10:08 AM    HDL 49 11/21/2017 07:21 AM    HDL 46 11/05/2014 10:08 AM    TRIGLYCERIDE 59 11/21/2017 07:21 AM    TRIGLYCERIDE 119 11/05/2014 10:08 AM       No results found for: TESTOSTERONE  Lab Results   Component Value Date/Time    TSH 2.020 11/21/2017 07:21 AM     Lab Results   Component Value Date/Time    FREET4 1.01 11/05/2014 10:08 AM    FREET4 1.13 01/14/2013 05:50 PM     No results found for: URICACID  No components found for: VITB12  Lab Results   Component Value Date/Time    25HYDROXY 31.7 11/21/2017 07:21 AM    25HYDROXY 27 (L) 11/05/2014 10:08 AM    25HYDROXY 23 (L) 09/04/2012 12:37 PM                Assessment/Plan:     1. Uncontrolled type 2 diabetes mellitus with complication, without long-term current use of insulin (HCC)-uncontrolled, with complications- dr browning   per dr browning    2. Essential hypertension   Under good control. Continue same regimen.    3. Mixed hyperlipidemia- dr dwyer    Under good control. Continue same regimen.      4. Obesity (BMI 30.0-34.9)    diet/exercise/lose 15 lbs.; patient counseled      5. Chronic abdominal pain   neg ct abd; w/u in progress    6. Gastroesophageal reflux disease with esophagitis   Under good control. Continue same regimen.      7. Hypothyroidism due to acquired atrophy of thyroid- dr browning    Under good control. Continue same regimen.  8. Viral exanthem   rresolved    9. Benign non-nodular  prostatic hyperplasia with lower urinary tract symptoms    Under good control. Continue same regimen.    10. Hypovitaminosis D    Under good control. Continue same regimen.    11. Coarse tremors- dr smith, neurologist, [place]        Under good control. Continue same regimen.      40 minute face-to-face encounter took place today.  More than half of this time was spent in the coordination of care of the above problems, as well as counseling.

## 2018-01-01 NOTE — ED AVS SNAPSHOT
[employer] Access Code: Activation code not generated  Current [employer] Status: Patient Declined    [employer]  A secure, online tool to manage your health information     [employer]’s [employer]® is a secure, online tool that connects you to your personalized health information from the privacy of your home -- day or night - making it very easy for you to manage your healthcare. Once the activation process is completed, you can even access your medical information using the [employer] rudy, which is available for free in the Apple Rudy store or Google Play store.     [employer] provides the following levels of access (as shown below):   My Chart Features   [facility] Primary Care Doctor [facility]  Specialists [facility]  Urgent  Care Non-[facility]  Primary Care  Doctor   Email your healthcare team securely and privately 24/7 X X X X   Manage appointments: schedule your next appointment; view details of past/upcoming appointments X      Request prescription refills. X      View recent personal medical records, including lab and immunizations X X X X   View health record, including health history, allergies, medications X X X X   Read reports about your outpatient visits, procedures, consult and ER notes X X X X   See your discharge summary, which is a recap of your hospital and/or ER visit that includes your diagnosis, lab results, and care plan. X X       How to register for [employer]:  1. Go to  [email or web address].  2. Click on the Sign Up Now box, which takes you to the New Member Sign Up page. You will need to provide the following information:  a. Enter your [employer] Access Code exactly as it appears at the top of this page. (You will not need to use this code after you’ve completed the sign-up process. If you do not sign up before the expiration date, you must request a new code.)   b. Enter your date of birth.   c. Enter your home email address.   d. Click Submit, and follow the next screen’s instructions.  3. Create a [employer] ID.  This will be your [employer] login ID and cannot be changed, so think of one that is secure and easy to remember.  4. Create a [employer] password. You can change your password at any time.  5. Enter your Password Reset Question and Answer. This can be used at a later time if you forget your password.   6. Enter your e-mail address. This allows you to receive e-mail notifications when new information is available in [employer].  7. Click Sign Up. You can now view your health information.    For assistance activating your [employer] account, call [phone]         Jaundice, , from prematurity

## 2018-01-11 ENCOUNTER — OFFICE VISIT (OUTPATIENT)
Dept: URGENT CARE | Facility: CLINIC | Age: 73
End: 2018-01-11
Payer: MEDICARE

## 2018-01-11 VITALS
SYSTOLIC BLOOD PRESSURE: 104 MMHG | DIASTOLIC BLOOD PRESSURE: 80 MMHG | RESPIRATION RATE: 16 BRPM | HEIGHT: 72 IN | WEIGHT: 223 LBS | HEART RATE: 82 BPM | BODY MASS INDEX: 30.2 KG/M2 | OXYGEN SATURATION: 91 % | TEMPERATURE: 98 F

## 2018-01-11 DIAGNOSIS — J06.9 VIRAL URI WITH COUGH: ICD-10-CM

## 2018-01-11 PROCEDURE — 99213 OFFICE O/P EST LOW 20 MIN: CPT | Performed by: NURSE PRACTITIONER

## 2018-01-11 ASSESSMENT — ENCOUNTER SYMPTOMS
COUGH: 1
NAUSEA: 0
FEVER: 0
SHORTNESS OF BREATH: 0
CHILLS: 0
SORE THROAT: 0
SPUTUM PRODUCTION: 0
EYE DISCHARGE: 0
HEADACHES: 0
ORTHOPNEA: 0
MYALGIAS: 1
WHEEZING: 0
DIARRHEA: 0

## 2018-01-12 NOTE — PROGRESS NOTES
Subjective:      Kevin Jackson is a 72 y.o. male who presents with Sinus Problem (x 3 days, nasal congestion, stuffy and runny nose, watery eyes, ear fullness ) and Cough (x 3 days, nonproductive cough and bodyaches)            HPI New problem. Mr. Jackson is a 72 year old male with 3 day history of cough and congestion as well as watery eyes. Denies fever, chills, sore throat, headache, shortness of breath. He has not really taken any medication for this. Has had flu shot.  Zocor [simvastatin - high dose]  Current Outpatient Prescriptions on File Prior to Visit   Medication Sig Dispense Refill   • atorvastatin (LIPITOR) 80 MG tablet Take 1 Tab by mouth every evening. 90 Tab 4   • hydrOXYzine HCl (ATARAX) 50 MG Tab Take 1 Tab by mouth 3 times a day as needed for Itching. 30 Tab 0   • tamsulosin (FLOMAX) 0.4 MG capsule Take 1 Cap by mouth ONE-HALF HOUR AFTER BREAKFAST. 90 Cap 4   • TRULICITY 0.75 MG/0.5ML Solution Pen-injector   3   • HUMALOG MIX 50/50 KWIKPEN (50-50) 100 UNIT/ML Suspension Pen-injector   0   • omeprazole (PRILOSEC) 40 MG delayed-release capsule Take 1 Cap by mouth every day. 30 Cap 0   • glimepiride (AMARYL) 1 MG tablet Take 1 mg by mouth every morning.     • propranolol CR (INDERAL LA) 120 MG CAPSULE SR 24 HR Take 1 Cap by mouth every day. 90 Cap 4   • INSULIN LISP PROT & LISP, HUM, (HUMALOG MIX 50/50) (50-50) 100 UNIT/ML Suspension Inject 30 Units as instructed.     • primidone (MYSOLINE) 50 MG Tab Take 1 Tab by mouth every evening. 90 Tab 3   • Canagliflozin (INVOKANA) 100 MG TABS Take 1 Tab by mouth every day.     • levothyroxine (SYNTHROID) 50 MCG TABS Take 1 Tab by mouth every morning before breakfast. 90 Tab 1   • metformin (GLUCOPHAGE) 1000 MG tablet Take 1,000 mg by mouth 2 times a day, with meals.     • Choline Fenofibrate (TRILIPIX) 135 MG CPDR Take  by mouth.     • aspirin EC (ECOTRIN) 81 MG TBEC Take 81 mg by mouth every day.     • vitamin D (CHOLECALCIFEROL) 1000 UNIT TABS Take  "1,000 Units by mouth 3 times a day.     • losartan-hydrochlorothiazide (HYZAAR) 50-12.5 MG per tablet Take 1 Tab by mouth every day.     • INVOKANA 300 MG Tab 150 mg.  1   • Exenatide ER (BYDUREON) 2 MG Pen-injector Inject  as instructed.     • NON SPECIFIED Shingles vaccine 1 Each 0     No current facility-administered medications on file prior to visit.      Social History     Social History   • Marital status: Single     Spouse name: N/A   • Number of children: N/A   • Years of education: N/A     Occupational History   • Not on file.     Social History Main Topics   • Smoking status: Never Smoker   • Smokeless tobacco: Never Used   • Alcohol use 0.6 oz/week     1 Cans of beer per week   • Drug use: No   • Sexual activity: Yes     Partners: Female     Other Topics Concern   • Not on file     Social History Narrative   • No narrative on file     family history includes Arthritis in his father and mother; Cancer in his father and mother; Diabetes in his father; Genetic in his father; Heart Disease in his father and mother; Hyperlipidemia in his father; Hypertension in his mother; Stroke in his father.      Review of Systems   Constitutional: Positive for malaise/fatigue. Negative for chills and fever.   HENT: Positive for congestion. Negative for sore throat.    Eyes: Negative for discharge.   Respiratory: Positive for cough. Negative for sputum production, shortness of breath and wheezing.    Cardiovascular: Negative for chest pain and orthopnea.   Gastrointestinal: Negative for diarrhea and nausea.   Musculoskeletal: Positive for myalgias.   Neurological: Negative for headaches.   Endo/Heme/Allergies: Negative for environmental allergies.          Objective:     /80   Pulse 82   Temp 36.7 °C (98 °F)   Resp 16   Ht 1.829 m (6' 0.01\")   Wt 101.2 kg (223 lb)   SpO2 91%   BMI 30.24 kg/m²      Physical Exam   Constitutional: He is oriented to person, place, and time. He appears well-developed and " well-nourished. No distress.   Well appearing   HENT:   Head: Normocephalic and atraumatic.   Right Ear: External ear and ear canal normal. Tympanic membrane is not injected and not perforated. No middle ear effusion.   Left Ear: External ear and ear canal normal. Tympanic membrane is not injected and not perforated.  No middle ear effusion.   Nose: Mucosal edema present.   Mouth/Throat: Posterior oropharyngeal erythema present. No oropharyngeal exudate.   Eyes: Conjunctivae are normal. Right eye exhibits no discharge. Left eye exhibits no discharge.   Neck: Normal range of motion. Neck supple.   Cardiovascular: Normal rate, regular rhythm and normal heart sounds.    No murmur heard.  Pulmonary/Chest: Effort normal and breath sounds normal. No respiratory distress.   Musculoskeletal: Normal range of motion.   Normal movement of all 4 extremities.   Lymphadenopathy:     He has no cervical adenopathy.        Right: No supraclavicular adenopathy present.        Left: No supraclavicular adenopathy present.   Neurological: He is alert and oriented to person, place, and time. Gait normal.   Skin: Skin is warm and dry.   Psychiatric: He has a normal mood and affect. His behavior is normal. Thought content normal.   Nursing note and vitals reviewed.              Assessment/Plan:     1. Viral URI with cough       Viral illness at this time with no indication for antibiotics. Reviewed with patient expected course of illness and also reviewed OTC medications that may be used for symptom relief. Follow up 7-10 days if not improving.

## 2018-02-20 ENCOUNTER — HOSPITAL ENCOUNTER (OUTPATIENT)
Dept: LAB | Facility: MEDICAL CENTER | Age: 73
End: 2018-02-20
Attending: PHYSICIAN ASSISTANT
Payer: MEDICARE

## 2018-02-20 ENCOUNTER — HOSPITAL ENCOUNTER (OUTPATIENT)
Dept: LAB | Facility: MEDICAL CENTER | Age: 73
End: 2018-02-20
Attending: INTERNAL MEDICINE
Payer: MEDICARE

## 2018-02-20 DIAGNOSIS — N40.1 BENIGN NON-NODULAR PROSTATIC HYPERPLASIA WITH LOWER URINARY TRACT SYMPTOMS: ICD-10-CM

## 2018-02-20 DIAGNOSIS — E78.2 MIXED HYPERLIPIDEMIA: ICD-10-CM

## 2018-02-20 DIAGNOSIS — E03.4 HYPOTHYROIDISM DUE TO ACQUIRED ATROPHY OF THYROID: ICD-10-CM

## 2018-02-20 LAB
25(OH)D3 SERPL-MCNC: 24 NG/ML (ref 30–100)
ALBUMIN SERPL BCP-MCNC: 3.9 G/DL (ref 3.2–4.9)
ALBUMIN/GLOB SERPL: 1.4 G/DL
ALP SERPL-CCNC: 18 U/L (ref 30–99)
ALT SERPL-CCNC: 24 U/L (ref 2–50)
ANION GAP SERPL CALC-SCNC: 8 MMOL/L (ref 0–11.9)
AST SERPL-CCNC: 18 U/L (ref 12–45)
BASOPHILS # BLD AUTO: 0.6 % (ref 0–1.8)
BASOPHILS # BLD: 0.05 K/UL (ref 0–0.12)
BILIRUB SERPL-MCNC: 0.4 MG/DL (ref 0.1–1.5)
BUN SERPL-MCNC: 20 MG/DL (ref 8–22)
CALCIUM SERPL-MCNC: 9.9 MG/DL (ref 8.5–10.5)
CHLORIDE SERPL-SCNC: 102 MMOL/L (ref 96–112)
CHOLEST SERPL-MCNC: 103 MG/DL (ref 100–199)
CO2 SERPL-SCNC: 28 MMOL/L (ref 20–33)
CREAT SERPL-MCNC: 1.24 MG/DL (ref 0.5–1.4)
CREAT UR-MCNC: 88.9 MG/DL
EOSINOPHIL # BLD AUTO: 0.15 K/UL (ref 0–0.51)
EOSINOPHIL NFR BLD: 1.9 % (ref 0–6.9)
ERYTHROCYTE [DISTWIDTH] IN BLOOD BY AUTOMATED COUNT: 49.4 FL (ref 35.9–50)
EST. AVERAGE GLUCOSE BLD GHB EST-MCNC: 189 MG/DL
GLOBULIN SER CALC-MCNC: 2.8 G/DL (ref 1.9–3.5)
GLUCOSE SERPL-MCNC: 179 MG/DL (ref 65–99)
HBA1C MFR BLD: 8.2 % (ref 0–5.6)
HCT VFR BLD AUTO: 44.1 % (ref 42–52)
HDLC SERPL-MCNC: 46 MG/DL
HGB BLD-MCNC: 14.4 G/DL (ref 14–18)
IMM GRANULOCYTES # BLD AUTO: 0.03 K/UL (ref 0–0.11)
IMM GRANULOCYTES NFR BLD AUTO: 0.4 % (ref 0–0.9)
LDLC SERPL CALC-MCNC: 46 MG/DL
LYMPHOCYTES # BLD AUTO: 2.3 K/UL (ref 1–4.8)
LYMPHOCYTES NFR BLD: 29.3 % (ref 22–41)
MCH RBC QN AUTO: 29.6 PG (ref 27–33)
MCHC RBC AUTO-ENTMCNC: 32.7 G/DL (ref 33.7–35.3)
MCV RBC AUTO: 90.7 FL (ref 81.4–97.8)
MICROALBUMIN UR-MCNC: <0.7 MG/DL
MICROALBUMIN/CREAT UR: NORMAL MG/G (ref 0–30)
MONOCYTES # BLD AUTO: 0.77 K/UL (ref 0–0.85)
MONOCYTES NFR BLD AUTO: 9.8 % (ref 0–13.4)
NEUTROPHILS # BLD AUTO: 4.55 K/UL (ref 1.82–7.42)
NEUTROPHILS NFR BLD: 58 % (ref 44–72)
NRBC # BLD AUTO: 0 K/UL
NRBC BLD-RTO: 0 /100 WBC
PLATELET # BLD AUTO: 405 K/UL (ref 164–446)
PMV BLD AUTO: 10.7 FL (ref 9–12.9)
POTASSIUM SERPL-SCNC: 4.1 MMOL/L (ref 3.6–5.5)
PROT SERPL-MCNC: 6.7 G/DL (ref 6–8.2)
PSA SERPL-MCNC: 1.91 NG/ML (ref 0–4)
RBC # BLD AUTO: 4.86 M/UL (ref 4.7–6.1)
SODIUM SERPL-SCNC: 138 MMOL/L (ref 135–145)
T4 FREE SERPL-MCNC: 1.23 NG/DL (ref 0.53–1.43)
TRIGL SERPL-MCNC: 57 MG/DL (ref 0–149)
TSH SERPL DL<=0.005 MIU/L-ACNC: 1.21 UIU/ML (ref 0.38–5.33)
WBC # BLD AUTO: 7.9 K/UL (ref 4.8–10.8)

## 2018-02-20 PROCEDURE — 82570 ASSAY OF URINE CREATININE: CPT

## 2018-02-20 PROCEDURE — 84443 ASSAY THYROID STIM HORMONE: CPT

## 2018-02-20 PROCEDURE — 84439 ASSAY OF FREE THYROXINE: CPT

## 2018-02-20 PROCEDURE — 82043 UR ALBUMIN QUANTITATIVE: CPT

## 2018-02-20 PROCEDURE — 83036 HEMOGLOBIN GLYCOSYLATED A1C: CPT | Mod: GA

## 2018-02-20 PROCEDURE — 36415 COLL VENOUS BLD VENIPUNCTURE: CPT

## 2018-02-20 PROCEDURE — 85025 COMPLETE CBC W/AUTO DIFF WBC: CPT

## 2018-02-20 PROCEDURE — 80053 COMPREHEN METABOLIC PANEL: CPT

## 2018-02-20 PROCEDURE — 80061 LIPID PANEL: CPT

## 2018-02-20 PROCEDURE — 82306 VITAMIN D 25 HYDROXY: CPT

## 2018-02-20 PROCEDURE — 84153 ASSAY OF PSA TOTAL: CPT

## 2018-02-21 DIAGNOSIS — G25.2 COARSE TREMORS: ICD-10-CM

## 2018-02-22 RX ORDER — PROPRANOLOL HYDROCHLORIDE 120 MG/1
CAPSULE, EXTENDED RELEASE ORAL
Qty: 90 CAP | Refills: 4 | Status: SHIPPED | OUTPATIENT
Start: 2018-02-22 | End: 2019-03-03 | Stop reason: SDUPTHER

## 2018-03-08 ENCOUNTER — OFFICE VISIT (OUTPATIENT)
Dept: MEDICAL GROUP | Age: 73
End: 2018-03-08
Payer: MEDICARE

## 2018-03-08 VITALS
DIASTOLIC BLOOD PRESSURE: 68 MMHG | HEIGHT: 72 IN | TEMPERATURE: 97.3 F | BODY MASS INDEX: 30.88 KG/M2 | WEIGHT: 228 LBS | OXYGEN SATURATION: 97 % | SYSTOLIC BLOOD PRESSURE: 104 MMHG | HEART RATE: 72 BPM

## 2018-03-08 DIAGNOSIS — E55.9 HYPOVITAMINOSIS D: ICD-10-CM

## 2018-03-08 DIAGNOSIS — E78.2 MIXED HYPERLIPIDEMIA: ICD-10-CM

## 2018-03-08 DIAGNOSIS — G25.2 COARSE TREMORS: ICD-10-CM

## 2018-03-08 DIAGNOSIS — E66.9 OBESITY (BMI 30.0-34.9): ICD-10-CM

## 2018-03-08 DIAGNOSIS — K21.00 GASTROESOPHAGEAL REFLUX DISEASE WITH ESOPHAGITIS: ICD-10-CM

## 2018-03-08 DIAGNOSIS — E03.4 HYPOTHYROIDISM DUE TO ACQUIRED ATROPHY OF THYROID: ICD-10-CM

## 2018-03-08 DIAGNOSIS — N40.1 BENIGN NON-NODULAR PROSTATIC HYPERPLASIA WITH LOWER URINARY TRACT SYMPTOMS: ICD-10-CM

## 2018-03-08 DIAGNOSIS — I10 ESSENTIAL HYPERTENSION: ICD-10-CM

## 2018-03-08 PROBLEM — B09 VIRAL EXANTHEM: Status: RESOLVED | Noted: 2017-10-25 | Resolved: 2018-03-08

## 2018-03-08 PROBLEM — G89.29 CHRONIC ABDOMINAL PAIN: Status: RESOLVED | Noted: 2017-08-15 | Resolved: 2018-03-08

## 2018-03-08 PROBLEM — R10.9 CHRONIC ABDOMINAL PAIN: Status: RESOLVED | Noted: 2017-08-15 | Resolved: 2018-03-08

## 2018-03-08 PROCEDURE — 99214 OFFICE O/P EST MOD 30 MIN: CPT | Performed by: INTERNAL MEDICINE

## 2018-03-08 RX ORDER — CHLORTHALIDONE 25 MG/1
25 TABLET ORAL DAILY
COMMUNITY
End: 2019-03-12 | Stop reason: SDUPTHER

## 2018-03-08 ASSESSMENT — ENCOUNTER SYMPTOMS
PSYCHIATRIC NEGATIVE: 1
CONSTITUTIONAL NEGATIVE: 1
NEUROLOGICAL NEGATIVE: 1
EYES NEGATIVE: 1
RESPIRATORY NEGATIVE: 1
CARDIOVASCULAR NEGATIVE: 1
MUSCULOSKELETAL NEGATIVE: 1
GASTROINTESTINAL NEGATIVE: 1

## 2018-03-08 NOTE — PROGRESS NOTES
"Subjective:      Kevin Jackson is a 72 y.o. male who presents with No chief complaint on file.  The patient is here for followup of chronic medical problems listed below. The patient is compliant with medications and having no side effects from them. Denies chest pain, abdominal pain, dyspnea, myalgias, or cough.   Patient Active Problem List    Diagnosis Date Noted   • Gastroesophageal reflux disease with esophagitis 08/15/2017   • Hypothyroidism due to acquired atrophy of thyroid- dr browning 08/15/2017   • Benign non-nodular prostatic hyperplasia with lower urinary tract symptoms 11/05/2014   • Uncontrolled type 2 diabetes mellitus with complication, without long-term current use of insulin (HCC)-uncontrolled, with complications- dr browning 10/02/2013   • Obesity (BMI 30.0-34.9) 10/02/2013   • Coarse tremors- dr smith, neurologist, [place] 10/03/2011   • Mixed hyperlipidemia- dr dwyer 10/03/2011   • Essential hypertension 10/03/2011   • Hypovitaminosis D 10/03/2011     Allergies   Allergen Reactions   • Zocor [Simvastatin - High Dose]      Pt tells me this medication gave him \"side effect\" of feeling nauseated; denies allergies to medications     Outpatient Medications Prior to Visit   Medication Sig Dispense Refill   • atorvastatin (LIPITOR) 80 MG tablet Take 1 Tab by mouth every evening. 90 Tab 4   • tamsulosin (FLOMAX) 0.4 MG capsule Take 1 Cap by mouth ONE-HALF HOUR AFTER BREAKFAST. 90 Cap 4   • TRULICITY 0.75 MG/0.5ML Solution Pen-injector   3   • HUMALOG MIX 50/50 KWIKPEN (50-50) 100 UNIT/ML Suspension Pen-injector   0   • INVOKANA 300 MG Tab 150 mg.  1   • glimepiride (AMARYL) 1 MG tablet Take 1 mg by mouth every morning.     • INSULIN LISP PROT & LISP, HUM, (HUMALOG MIX 50/50) (50-50) 100 UNIT/ML Suspension Inject 30 Units as instructed.     • primidone (MYSOLINE) 50 MG Tab Take 1 Tab by mouth every evening. 90 Tab 3   • levothyroxine (SYNTHROID) 50 MCG TABS Take 1 Tab by mouth every morning before " breakfast. 90 Tab 1   • metformin (GLUCOPHAGE) 1000 MG tablet Take 1,000 mg by mouth 2 times a day, with meals.     • Choline Fenofibrate (TRILIPIX) 135 MG CPDR Take  by mouth.     • aspirin EC (ECOTRIN) 81 MG TBEC Take 81 mg by mouth every day.     • vitamin D (CHOLECALCIFEROL) 1000 UNIT TABS Take 1,000 Units by mouth 3 times a day.     • losartan-hydrochlorothiazide (HYZAAR) 50-12.5 MG per tablet Take 1 Tab by mouth every day.     • propranolol CR (INDERAL LA) 120 MG CAPSULE SR 24 HR TAKE 1 CAPSULE BY MOUTH ONCE DAILY (GENERIC FOR INDERAL ER) 90 Cap 4   • hydrOXYzine HCl (ATARAX) 50 MG Tab Take 1 Tab by mouth 3 times a day as needed for Itching. 30 Tab 0   • omeprazole (PRILOSEC) 40 MG delayed-release capsule Take 1 Cap by mouth every day. 30 Cap 0   • Exenatide ER (BYDUREON) 2 MG Pen-injector Inject  as instructed.     • NON SPECIFIED Shingles vaccine 1 Each 0   • Canagliflozin (INVOKANA) 100 MG TABS Take 1 Tab by mouth every day.       No facility-administered medications prior to visit.                HPI    Review of Systems   Constitutional: Negative.    HENT: Negative.    Eyes: Negative.    Respiratory: Negative.    Cardiovascular: Negative.    Gastrointestinal: Negative.    Genitourinary: Negative.    Musculoskeletal: Negative.    Skin: Negative.    Neurological: Negative.    Endo/Heme/Allergies: Negative.    Psychiatric/Behavioral: Negative.           Objective:     /68   Pulse 72   Temp 36.3 °C (97.3 °F)   Ht 1.829 m (6')   Wt 103.4 kg (228 lb)   SpO2 97%   BMI 30.92 kg/m²      Physical Exam   Constitutional: He is oriented to person, place, and time. He appears well-developed and well-nourished. No distress.   HENT:   Head: Normocephalic and atraumatic.   Right Ear: External ear normal.   Left Ear: External ear normal.   Nose: Nose normal.   Mouth/Throat: Oropharynx is clear and moist. No oropharyngeal exudate.   Eyes: Conjunctivae and EOM are normal. Pupils are equal, round, and reactive to  light. Right eye exhibits no discharge. Left eye exhibits no discharge. No scleral icterus.   Neck: Normal range of motion. Neck supple. No JVD present. No tracheal deviation present. No thyromegaly present.   Cardiovascular: Normal rate, regular rhythm, normal heart sounds and intact distal pulses.  Exam reveals no gallop and no friction rub.    No murmur heard.  Pulmonary/Chest: Effort normal and breath sounds normal. No stridor. No respiratory distress. He has no wheezes. He has no rales. He exhibits no tenderness.   Abdominal: Soft. Bowel sounds are normal. He exhibits no distension and no mass. There is no tenderness. There is no rebound and no guarding.   Musculoskeletal: Normal range of motion. He exhibits no edema or tenderness.   Lymphadenopathy:     He has no cervical adenopathy.   Neurological: He is alert and oriented to person, place, and time. He has normal reflexes. He displays normal reflexes. He exhibits normal muscle tone. Coordination normal.   Skin: Skin is warm and dry. No rash noted. He is not diaphoretic. No erythema. No pallor.   Psychiatric: He has a normal mood and affect. His behavior is normal. Judgment and thought content normal.     Hospital Outpatient Visit on 02/20/2018   Component Date Value   • 25-Hydroxy   Vitamin D 25 02/20/2018 24*   • Free T-4 02/20/2018 1.23    Hospital Outpatient Visit on 02/20/2018   Component Date Value   • Prostatic Specific Antig* 02/20/2018 1.91    • Sodium 02/20/2018 138    • Potassium 02/20/2018 4.1    • Chloride 02/20/2018 102    • Co2 02/20/2018 28    • Anion Gap 02/20/2018 8.0    • Glucose 02/20/2018 179*   • Bun 02/20/2018 20    • Creatinine 02/20/2018 1.24    • Calcium 02/20/2018 9.9    • AST(SGOT) 02/20/2018 18    • ALT(SGPT) 02/20/2018 24    • Alkaline Phosphatase 02/20/2018 18*   • Total Bilirubin 02/20/2018 0.4    • Albumin 02/20/2018 3.9    • Total Protein 02/20/2018 6.7    • Globulin 02/20/2018 2.8    • A-G Ratio 02/20/2018 1.4    • WBC  02/20/2018 7.9    • RBC 02/20/2018 4.86    • Hemoglobin 02/20/2018 14.4    • Hematocrit 02/20/2018 44.1    • MCV 02/20/2018 90.7    • MCH 02/20/2018 29.6    • MCHC 02/20/2018 32.7*   • RDW 02/20/2018 49.4    • Platelet Count 02/20/2018 405    • MPV 02/20/2018 10.7    • Neutrophils-Polys 02/20/2018 58.00    • Lymphocytes 02/20/2018 29.30    • Monocytes 02/20/2018 9.80    • Eosinophils 02/20/2018 1.90    • Basophils 02/20/2018 0.60    • Immature Granulocytes 02/20/2018 0.40    • Nucleated RBC 02/20/2018 0.00    • Neutrophils (Absolute) 02/20/2018 4.55    • Lymphs (Absolute) 02/20/2018 2.30    • Monos (Absolute) 02/20/2018 0.77    • Eos (Absolute) 02/20/2018 0.15    • Baso (Absolute) 02/20/2018 0.05    • Immature Granulocytes (a* 02/20/2018 0.03    • NRBC (Absolute) 02/20/2018 0.00    • Glycohemoglobin 02/20/2018 8.2*   • Est Avg Glucose 02/20/2018 189    • Creatinine, Urine 02/20/2018 88.90    • Microalbumin, Urine Rand* 02/20/2018 <0.7    • Micro Alb Creat Ratio 02/20/2018 see below    • TSH 02/20/2018 1.210    • Cholesterol,Tot 02/20/2018 103    • Triglycerides 02/20/2018 57    • HDL 02/20/2018 46    • LDL 02/20/2018 46    • GFR If  02/20/2018 >60    • GFR If Non  Ameri* 02/20/2018 57*      Lab Results   Component Value Date/Time    HBA1C 8.2 (H) 02/20/2018 08:21 AM    HBA1C 8.9 (H) 11/21/2017 07:21 AM    HBA1C 7.7 08/15/2017 10:30 AM     Lab Results   Component Value Date/Time    SODIUM 138 02/20/2018 08:21 AM    POTASSIUM 4.1 02/20/2018 08:21 AM    CHLORIDE 102 02/20/2018 08:21 AM    CO2 28 02/20/2018 08:21 AM    GLUCOSE 179 (H) 02/20/2018 08:21 AM    BUN 20 02/20/2018 08:21 AM    CREATININE 1.24 02/20/2018 08:21 AM    BUNCREATRAT 24 11/21/2017 07:21 AM    ALKPHOSPHAT 18 (L) 02/20/2018 08:21 AM    ASTSGOT 18 02/20/2018 08:21 AM    ALTSGPT 24 02/20/2018 08:21 AM    TBILIRUBIN 0.4 02/20/2018 08:21 AM     Lab Results   Component Value Date/Time    INR 0.99 01/11/2017 09:05 PM    INR 0.99  01/14/2013 05:50 PM     Lab Results   Component Value Date/Time    CHOLSTRLTOT 103 02/20/2018 08:21 AM    LDL 46 02/20/2018 08:21 AM    HDL 46 02/20/2018 08:21 AM    TRIGLYCERIDE 57 02/20/2018 08:21 AM       No results found for: TESTOSTERONE  Lab Results   Component Value Date/Time    TSH 2.020 11/21/2017 07:21 AM     Lab Results   Component Value Date/Time    FREET4 1.23 02/20/2018 08:18 AM    FREET4 1.01 11/05/2014 10:08 AM     No results found for: URICACID  No components found for: VITB12  Lab Results   Component Value Date/Time    25HYDROXY 24 (L) 02/20/2018 08:18 AM    25HYDROXY 31.7 11/21/2017 07:21 AM    25HYDROXY 27 (L) 11/05/2014 10:08 AM                 Assessment/Plan:     1. Uncontrolled type 2 diabetes mellitus with complication, without long-term current use of insulin (HCC)-uncontrolled, with complications- dr browning       Under good control. Continue same regimen.'    - COMP METABOLIC PANEL; Future  - LIPID PROFILE; Future  - CBC WITH DIFFERENTIAL; Future  - MICROALBUMIN CREAT RATIO URINE; Future    2. Hypothyroidism due to acquired atrophy of thyroid- dr browning        Under good control. Continue same regimen.'- TSH; Future    3. Gastroesophageal reflux disease with esophagitis         Under good control. Continue same regimen.'  4. Benign non-nodular prostatic hyperplasia with lower urinary tract symptoms         Under good control. Continue same regimen.'    5. Obesity (BMI 30.0-34.9)    diet/exercise/lose 15 lbs.; patient counseled      6. Mixed hyperlipidemia- dr dwyer         Under good control. Continue same regimen.'    7. Essential hypertension        Under good control. Continue same regimen.'  - chlorthalidone (HYGROTON) 25 MG Tab; Take 25 mg by mouth every day.    8. Hypovitaminosis D        Under good control. Continue same regimen.'    9. Coarse tremors- dr smith, neurologist, [place]         Under good control. Continue same regimen.'    30 minute face-to-face encounter took  place today.  More than half of this time was spent in the coordination of care of the above problems, as well as counseling.

## 2018-05-16 ENCOUNTER — HOSPITAL ENCOUNTER (OUTPATIENT)
Dept: LAB | Facility: MEDICAL CENTER | Age: 73
End: 2018-05-16
Attending: INTERNAL MEDICINE
Payer: MEDICARE

## 2018-05-16 DIAGNOSIS — E03.4 HYPOTHYROIDISM DUE TO ACQUIRED ATROPHY OF THYROID: ICD-10-CM

## 2018-05-16 LAB
ALBUMIN SERPL BCP-MCNC: 3.8 G/DL (ref 3.2–4.9)
ALBUMIN/GLOB SERPL: 1.5 G/DL
ALP SERPL-CCNC: 21 U/L (ref 30–99)
ALT SERPL-CCNC: 30 U/L (ref 2–50)
ANION GAP SERPL CALC-SCNC: 7 MMOL/L (ref 0–11.9)
AST SERPL-CCNC: 19 U/L (ref 12–45)
BASOPHILS # BLD AUTO: 0.8 % (ref 0–1.8)
BASOPHILS # BLD: 0.05 K/UL (ref 0–0.12)
BILIRUB SERPL-MCNC: 0.4 MG/DL (ref 0.1–1.5)
BUN SERPL-MCNC: 24 MG/DL (ref 8–22)
CALCIUM SERPL-MCNC: 10.1 MG/DL (ref 8.5–10.5)
CHLORIDE SERPL-SCNC: 105 MMOL/L (ref 96–112)
CHOLEST SERPL-MCNC: 129 MG/DL (ref 100–199)
CO2 SERPL-SCNC: 27 MMOL/L (ref 20–33)
CREAT SERPL-MCNC: 1.26 MG/DL (ref 0.5–1.4)
CREAT UR-MCNC: 88.3 MG/DL
EOSINOPHIL # BLD AUTO: 0.2 K/UL (ref 0–0.51)
EOSINOPHIL NFR BLD: 3.2 % (ref 0–6.9)
ERYTHROCYTE [DISTWIDTH] IN BLOOD BY AUTOMATED COUNT: 49 FL (ref 35.9–50)
GLOBULIN SER CALC-MCNC: 2.6 G/DL (ref 1.9–3.5)
GLUCOSE SERPL-MCNC: 186 MG/DL (ref 65–99)
HCT VFR BLD AUTO: 43.1 % (ref 42–52)
HDLC SERPL-MCNC: 46 MG/DL
HGB BLD-MCNC: 13.9 G/DL (ref 14–18)
IMM GRANULOCYTES # BLD AUTO: 0.02 K/UL (ref 0–0.11)
IMM GRANULOCYTES NFR BLD AUTO: 0.3 % (ref 0–0.9)
LDLC SERPL CALC-MCNC: 63 MG/DL
LYMPHOCYTES # BLD AUTO: 2.33 K/UL (ref 1–4.8)
LYMPHOCYTES NFR BLD: 36.9 % (ref 22–41)
MCH RBC QN AUTO: 29.6 PG (ref 27–33)
MCHC RBC AUTO-ENTMCNC: 32.3 G/DL (ref 33.7–35.3)
MCV RBC AUTO: 91.9 FL (ref 81.4–97.8)
MICROALBUMIN UR-MCNC: <0.7 MG/DL
MICROALBUMIN/CREAT UR: NORMAL MG/G (ref 0–30)
MONOCYTES # BLD AUTO: 0.66 K/UL (ref 0–0.85)
MONOCYTES NFR BLD AUTO: 10.4 % (ref 0–13.4)
NEUTROPHILS # BLD AUTO: 3.06 K/UL (ref 1.82–7.42)
NEUTROPHILS NFR BLD: 48.4 % (ref 44–72)
NRBC # BLD AUTO: 0 K/UL
NRBC BLD-RTO: 0 /100 WBC
PLATELET # BLD AUTO: 367 K/UL (ref 164–446)
PMV BLD AUTO: 11 FL (ref 9–12.9)
POTASSIUM SERPL-SCNC: 3.9 MMOL/L (ref 3.6–5.5)
PROT SERPL-MCNC: 6.4 G/DL (ref 6–8.2)
RBC # BLD AUTO: 4.69 M/UL (ref 4.7–6.1)
SODIUM SERPL-SCNC: 139 MMOL/L (ref 135–145)
TRIGL SERPL-MCNC: 100 MG/DL (ref 0–149)
TSH SERPL DL<=0.005 MIU/L-ACNC: 1.77 UIU/ML (ref 0.38–5.33)
WBC # BLD AUTO: 6.3 K/UL (ref 4.8–10.8)

## 2018-05-16 PROCEDURE — 82570 ASSAY OF URINE CREATININE: CPT

## 2018-05-16 PROCEDURE — 80053 COMPREHEN METABOLIC PANEL: CPT

## 2018-05-16 PROCEDURE — 36415 COLL VENOUS BLD VENIPUNCTURE: CPT

## 2018-05-16 PROCEDURE — 80061 LIPID PANEL: CPT

## 2018-05-16 PROCEDURE — 85025 COMPLETE CBC W/AUTO DIFF WBC: CPT

## 2018-05-16 PROCEDURE — 84443 ASSAY THYROID STIM HORMONE: CPT

## 2018-05-16 PROCEDURE — 82043 UR ALBUMIN QUANTITATIVE: CPT

## 2018-08-21 LAB — HBA1C MFR BLD: 8.1 % (ref ?–5.8)

## 2018-09-10 ENCOUNTER — OFFICE VISIT (OUTPATIENT)
Dept: MEDICAL GROUP | Age: 73
End: 2018-09-10
Payer: MEDICARE

## 2018-09-10 VITALS
OXYGEN SATURATION: 91 % | WEIGHT: 226 LBS | HEIGHT: 72 IN | RESPIRATION RATE: 14 BRPM | HEART RATE: 61 BPM | SYSTOLIC BLOOD PRESSURE: 112 MMHG | TEMPERATURE: 97 F | BODY MASS INDEX: 30.61 KG/M2 | DIASTOLIC BLOOD PRESSURE: 64 MMHG

## 2018-09-10 DIAGNOSIS — N40.1 BENIGN NON-NODULAR PROSTATIC HYPERPLASIA WITH LOWER URINARY TRACT SYMPTOMS: ICD-10-CM

## 2018-09-10 DIAGNOSIS — I10 ESSENTIAL HYPERTENSION: ICD-10-CM

## 2018-09-10 DIAGNOSIS — K21.00 GASTROESOPHAGEAL REFLUX DISEASE WITH ESOPHAGITIS: ICD-10-CM

## 2018-09-10 DIAGNOSIS — E66.9 OBESITY (BMI 30.0-34.9): ICD-10-CM

## 2018-09-10 DIAGNOSIS — E78.2 MIXED HYPERLIPIDEMIA: ICD-10-CM

## 2018-09-10 DIAGNOSIS — G25.2 COARSE TREMORS: ICD-10-CM

## 2018-09-10 DIAGNOSIS — Z23 NEED FOR INFLUENZA VACCINATION: ICD-10-CM

## 2018-09-10 DIAGNOSIS — E03.4 HYPOTHYROIDISM DUE TO ACQUIRED ATROPHY OF THYROID: ICD-10-CM

## 2018-09-10 DIAGNOSIS — E55.9 HYPOVITAMINOSIS D: ICD-10-CM

## 2018-09-10 PROCEDURE — 99214 OFFICE O/P EST MOD 30 MIN: CPT | Mod: 25 | Performed by: INTERNAL MEDICINE

## 2018-09-10 PROCEDURE — G0008 ADMIN INFLUENZA VIRUS VAC: HCPCS | Performed by: INTERNAL MEDICINE

## 2018-09-10 PROCEDURE — 90662 IIV NO PRSV INCREASED AG IM: CPT | Performed by: INTERNAL MEDICINE

## 2018-09-10 ASSESSMENT — PATIENT HEALTH QUESTIONNAIRE - PHQ9: CLINICAL INTERPRETATION OF PHQ2 SCORE: 0

## 2018-09-10 ASSESSMENT — ENCOUNTER SYMPTOMS
CARDIOVASCULAR NEGATIVE: 1
EYES NEGATIVE: 1
CONSTITUTIONAL NEGATIVE: 1
RESPIRATORY NEGATIVE: 1
MUSCULOSKELETAL NEGATIVE: 1
PSYCHIATRIC NEGATIVE: 1
NEUROLOGICAL NEGATIVE: 1
GASTROINTESTINAL NEGATIVE: 1

## 2018-09-10 ASSESSMENT — PAIN SCALES - GENERAL: PAINLEVEL: NO PAIN

## 2018-09-10 NOTE — PROGRESS NOTES
"Subjective:      Kevin Jackson is a 73 y.o. male who presents with No chief complaint on file.    The patient is here for followup of chronic medical problems listed below. The patient is compliant with medications and having no side effects from them. Denies chest pain, abdominal pain, dyspnea, myalgias, or cough.   Patient Active Problem List    Diagnosis Date Noted   • Gastroesophageal reflux disease with esophagitis 08/15/2017   • Hypothyroidism due to acquired atrophy of thyroid- dr browning 08/15/2017   • Benign non-nodular prostatic hyperplasia with lower urinary tract symptoms 11/05/2014   • Uncontrolled type 2 diabetes mellitus with complication, with long-term current use of insulin ([facility])- dr browning 10/02/2013   • Obesity (BMI 30.0-34.9) 10/02/2013   • Coarse tremors- dr smith, neurologist, [place] 10/03/2011   • Mixed hyperlipidemia- dr dwyer 10/03/2011   • Essential hypertension 10/03/2011   • Hypovitaminosis D 10/03/2011     Allergies   Allergen Reactions   • Zocor [Simvastatin - High Dose]      Pt tells me this medication gave him \"side effect\" of feeling nauseated; denies allergies to medications     Outpatient Medications Prior to Visit   Medication Sig Dispense Refill   • chlorthalidone (HYGROTON) 25 MG Tab Take 25 mg by mouth every day.     • propranolol CR (INDERAL LA) 120 MG CAPSULE SR 24 HR TAKE 1 CAPSULE BY MOUTH ONCE DAILY (GENERIC FOR INDERAL ER) 90 Cap 4   • atorvastatin (LIPITOR) 80 MG tablet Take 1 Tab by mouth every evening. 90 Tab 4   • hydrOXYzine HCl (ATARAX) 50 MG Tab Take 1 Tab by mouth 3 times a day as needed for Itching. 30 Tab 0   • tamsulosin (FLOMAX) 0.4 MG capsule Take 1 Cap by mouth ONE-HALF HOUR AFTER BREAKFAST. 90 Cap 4   • TRULICITY 0.75 MG/0.5ML Solution Pen-injector   3   • HUMALOG MIX 50/50 KWIKPEN (50-50) 100 UNIT/ML Suspension Pen-injector   0   • INVOKANA 300 MG Tab 150 mg.  1   • omeprazole (PRILOSEC) 40 MG delayed-release capsule Take 1 Cap by mouth every " day. 30 Cap 0   • Exenatide ER (BYDUREON) 2 MG Pen-injector Inject  as instructed.     • glimepiride (AMARYL) 1 MG tablet Take 1 mg by mouth every morning.     • INSULIN LISP PROT & LISP, HUM, (HUMALOG MIX 50/50) (50-50) 100 UNIT/ML Suspension Inject 30 Units as instructed.     • primidone (MYSOLINE) 50 MG Tab Take 1 Tab by mouth every evening. 90 Tab 3   • NON SPECIFIED Shingles vaccine 1 Each 0   • levothyroxine (SYNTHROID) 50 MCG TABS Take 1 Tab by mouth every morning before breakfast. 90 Tab 1   • metformin (GLUCOPHAGE) 1000 MG tablet Take 1,000 mg by mouth 2 times a day, with meals.     • Choline Fenofibrate (TRILIPIX) 135 MG CPDR Take  by mouth.     • aspirin EC (ECOTRIN) 81 MG TBEC Take 81 mg by mouth every day.     • vitamin D (CHOLECALCIFEROL) 1000 UNIT TABS Take 1,000 Units by mouth 3 times a day.     • losartan-hydrochlorothiazide (HYZAAR) 50-12.5 MG per tablet Take 1 Tab by mouth every day.       No facility-administered medications prior to visit.              HPI    Review of Systems   Constitutional: Negative.    HENT: Negative.    Eyes: Negative.    Respiratory: Negative.    Cardiovascular: Negative.    Gastrointestinal: Negative.    Genitourinary: Negative.    Musculoskeletal: Negative.    Skin: Negative.    Neurological: Negative.    Endo/Heme/Allergies: Negative.    Psychiatric/Behavioral: Negative.           Objective:     /64   Pulse 61   Temp 36.1 °C (97 °F)   Resp 14   Ht 1.829 m (6')   Wt 102.5 kg (226 lb)   SpO2 91%   BMI 30.65 kg/m²      Physical Exam   Constitutional: He is oriented to person, place, and time. He appears well-developed and well-nourished. No distress.   HENT:   Head: Normocephalic and atraumatic.   Right Ear: External ear normal.   Left Ear: External ear normal.   Nose: Nose normal.   Mouth/Throat: Oropharynx is clear and moist. No oropharyngeal exudate.   Eyes: Pupils are equal, round, and reactive to light. Conjunctivae and EOM are normal. Right eye exhibits  no discharge. Left eye exhibits no discharge. No scleral icterus.   Neck: Normal range of motion. Neck supple. No JVD present. No tracheal deviation present. No thyromegaly present.   Cardiovascular: Normal rate, regular rhythm, normal heart sounds and intact distal pulses.  Exam reveals no gallop and no friction rub.    No murmur heard.  Pulmonary/Chest: Effort normal and breath sounds normal. No stridor. No respiratory distress. He has no wheezes. He has no rales. He exhibits no tenderness.   Abdominal: Soft. Bowel sounds are normal. He exhibits no distension and no mass. There is no tenderness. There is no rebound and no guarding.   Musculoskeletal: Normal range of motion. He exhibits no edema or tenderness.   Lymphadenopathy:     He has no cervical adenopathy.   Neurological: He is alert and oriented to person, place, and time. He has normal reflexes. He displays normal reflexes. He exhibits normal muscle tone. Coordination normal.   Skin: Skin is warm and dry. No rash noted. He is not diaphoretic. No erythema. No pallor.   Psychiatric: He has a normal mood and affect. His behavior is normal. Judgment and thought content normal.     No visits with results within 1 Month(s) from this visit.   Latest known visit with results is:   Hospital Outpatient Visit on 05/16/2018   Component Date Value   • TSH 05/16/2018 1.770    • Sodium 05/16/2018 139    • Potassium 05/16/2018 3.9    • Chloride 05/16/2018 105    • Co2 05/16/2018 27    • Anion Gap 05/16/2018 7.0    • Glucose 05/16/2018 186*   • Bun 05/16/2018 24*   • Creatinine 05/16/2018 1.26    • Calcium 05/16/2018 10.1    • AST(SGOT) 05/16/2018 19    • ALT(SGPT) 05/16/2018 30    • Alkaline Phosphatase 05/16/2018 21*   • Total Bilirubin 05/16/2018 0.4    • Albumin 05/16/2018 3.8    • Total Protein 05/16/2018 6.4    • Globulin 05/16/2018 2.6    • A-G Ratio 05/16/2018 1.5    • Cholesterol,Tot 05/16/2018 129    • Triglycerides 05/16/2018 100    • HDL 05/16/2018 46    • LDL  05/16/2018 63    • WBC 05/16/2018 6.3    • RBC 05/16/2018 4.69*   • Hemoglobin 05/16/2018 13.9*   • Hematocrit 05/16/2018 43.1    • MCV 05/16/2018 91.9    • MCH 05/16/2018 29.6    • MCHC 05/16/2018 32.3*   • RDW 05/16/2018 49.0    • Platelet Count 05/16/2018 367    • MPV 05/16/2018 11.0    • Neutrophils-Polys 05/16/2018 48.40    • Lymphocytes 05/16/2018 36.90    • Monocytes 05/16/2018 10.40    • Eosinophils 05/16/2018 3.20    • Basophils 05/16/2018 0.80    • Immature Granulocytes 05/16/2018 0.30    • Nucleated RBC 05/16/2018 0.00    • Neutrophils (Absolute) 05/16/2018 3.06    • Lymphs (Absolute) 05/16/2018 2.33    • Monos (Absolute) 05/16/2018 0.66    • Eos (Absolute) 05/16/2018 0.20    • Baso (Absolute) 05/16/2018 0.05    • Immature Granulocytes (a* 05/16/2018 0.02    • NRBC (Absolute) 05/16/2018 0.00    • Creatinine, Urine 05/16/2018 88.30    • Microalbumin, Urine Rand* 05/16/2018 <0.7    • Micro Alb Creat Ratio 05/16/2018 see below    • GFR If  05/16/2018 >60    • GFR If Non  Ameri* 05/16/2018 56*      Lab Results   Component Value Date/Time    HBA1C 8.1 08/21/2018    HBA1C 8.2 (H) 02/20/2018 08:21 AM     Lab Results   Component Value Date/Time    SODIUM 139 05/16/2018 06:23 AM    POTASSIUM 3.9 05/16/2018 06:23 AM    CHLORIDE 105 05/16/2018 06:23 AM    CO2 27 05/16/2018 06:23 AM    GLUCOSE 186 (H) 05/16/2018 06:23 AM    BUN 24 (H) 05/16/2018 06:23 AM    CREATININE 1.26 05/16/2018 06:23 AM    BUNCREATRAT 24 11/21/2017 07:21 AM    ALKPHOSPHAT 21 (L) 05/16/2018 06:23 AM    ASTSGOT 19 05/16/2018 06:23 AM    ALTSGPT 30 05/16/2018 06:23 AM    TBILIRUBIN 0.4 05/16/2018 06:23 AM     Lab Results   Component Value Date/Time    INR 0.99 01/11/2017 09:05 PM    INR 0.99 01/14/2013 05:50 PM     Lab Results   Component Value Date/Time    CHOLSTRLTOT 129 05/16/2018 06:23 AM    LDL 63 05/16/2018 06:23 AM    HDL 46 05/16/2018 06:23 AM    TRIGLYCERIDE 100 05/16/2018 06:23 AM       No results found for:  TESTOSTERONE  Lab Results   Component Value Date/Time    TSH 2.020 11/21/2017 07:21 AM     Lab Results   Component Value Date/Time    FREET4 1.23 02/20/2018 08:18 AM    FREET4 1.01 11/05/2014 10:08 AM     No results found for: URICACID  No components found for: VITB12  Lab Results   Component Value Date/Time    25HYDROXY 24 (L) 02/20/2018 08:18 AM    25HYDROXY 31.7 11/21/2017 07:21 AM    25HYDROXY 27 (L) 11/05/2014 10:08 AM                 Assessment/Plan:     1. Uncontrolled type 2 diabetes mellitus with complication, with long-term current use of insulin (HCC)- dr browning   Under good control. Continue same regimen.    - POCT Hemoglobin A1C  - REFERRAL TO OPHTHALMOLOGIC VITREORETINAL SURGERY  - HEMOGLOBIN A1C; Future  - MICROALBUMIN CREAT RATIO URINE; Future  - Diabetic Monofilament LE Exam    2. Mixed hyperlipidemia- dr dwyer      Under good control. Continue same regimen.- LIPID PROFILE; Future  - CBC WITH DIFFERENTIAL; Future  - COMP METABOLIC PANEL; Future  - TSH; Future    3. Essential hypertension Under good control. Continue same regimen.       4. Coarse tremors- dr smith, neurologist, [place] Under good control. Continue same regimen.     5. Hypovitaminosis D Under good control. Continue same regimen.       6. Obesity (BMI 30.0-34.9) diet/exercise/lose 15 lbs.; patient counseled       - Patient identified as having weight management issue.  Appropriate orders and counseling given.    7. Benign non-nodular prostatic hyperplasia with lower urinary tract symptoms     Under good control. Continue same regimen.    8. Gastroesophageal reflux disease with esophagitis      Under good control. Continue same regimen.    9. Hypothyroidism due to acquired atrophy of thyroid- dr browning     Under good control. Continue same regimen.- TSH; Future    10. Need for influenza vaccination     - INFLUENZA VACCINE, HIGH DOSE (65+ ONLY)    30 minute face-to-face encounter took place today.  More than half of this time was  spent in the coordination of care of the above problems, as well as counseling.

## 2018-10-04 ENCOUNTER — APPOINTMENT (RX ONLY)
Dept: URBAN - METROPOLITAN AREA CLINIC 20 | Facility: CLINIC | Age: 73
Setting detail: DERMATOLOGY
End: 2018-10-04

## 2018-10-04 DIAGNOSIS — L82.1 OTHER SEBORRHEIC KERATOSIS: ICD-10-CM

## 2018-10-04 PROBLEM — D48.5 NEOPLASM OF UNCERTAIN BEHAVIOR OF SKIN: Status: ACTIVE | Noted: 2018-10-04

## 2018-10-04 PROBLEM — L57.0 ACTINIC KERATOSIS: Status: ACTIVE | Noted: 2018-10-04

## 2018-10-04 PROCEDURE — ? BIOPSY BY SHAVE METHOD

## 2018-10-04 PROCEDURE — 11100: CPT

## 2018-10-04 PROCEDURE — 99212 OFFICE O/P EST SF 10 MIN: CPT | Mod: 25

## 2018-10-04 ASSESSMENT — LOCATION DETAILED DESCRIPTION DERM: LOCATION DETAILED: RIGHT MEDIAL UPPER BACK

## 2018-10-04 ASSESSMENT — LOCATION ZONE DERM: LOCATION ZONE: TRUNK

## 2018-10-04 ASSESSMENT — LOCATION SIMPLE DESCRIPTION DERM: LOCATION SIMPLE: RIGHT UPPER BACK

## 2018-10-04 NOTE — PROCEDURE: BIOPSY BY SHAVE METHOD
Lab Facility: 
Anesthesia Volume In Cc: 0.5
Biopsy Type: H and E
Type Of Destruction Used: Curettage
Detail Level: Detailed
Anesthesia Type: 1% lidocaine with 1:100,000 epinephrine and 408mcg clindamycin/ml and a 1:10 solution of 8.4% sodium bicarbonate
Bill 79147 For Specimen Handling/Conveyance To Laboratory?: no
Lab: [id number]
Wound Care: Vaseline
X Size Of Lesion In Cm: 0
Electrodesiccation And Curettage Text: The wound bed was treated with electrodesiccation and curettage after the biopsy was performed.
Electrodesiccation Text: The wound bed was treated with electrodesiccation after the biopsy was performed.
Notification Instructions: Patient will be notified of biopsy results. However, patient instructed to call the office if not contacted within 2 weeks.
Size Of Lesion In Cm: 1.3
Dressing: Band-Aid
Was A Bandage Applied: Yes
Consent: Written consent was obtained and risks were reviewed including but not limited to scarring, infection, bleeding, scabbing, incomplete removal, nerve damage and allergy to anesthesia.
Silver Nitrate Text: The wound bed was treated with silver nitrate after the biopsy was performed.
Depth Of Biopsy: dermis
Post-Care Instructions: I reviewed with the patient in detail post-care instructions. Patient is to keep the biopsy site dry overnight, and then apply bacitracin twice daily until healed. Patient may apply hydrogen peroxide soaks to remove any crusting.
Hemostasis: Drysol and Electrocautery
Biopsy Method: Personna blade
Billing Type: Third-Party Bill

## 2018-10-12 ENCOUNTER — RX ONLY (OUTPATIENT)
Age: 73
Setting detail: RX ONLY
End: 2018-10-12

## 2018-10-12 RX ORDER — CLOBETASOL PROPIONATE 0.5 MG/G
CREAM TOPICAL
Qty: 1 | Refills: 1 | Status: ERX

## 2018-10-29 ENCOUNTER — HOSPITAL ENCOUNTER (OUTPATIENT)
Dept: LAB | Facility: MEDICAL CENTER | Age: 73
End: 2018-10-29
Attending: UROLOGY
Payer: MEDICARE

## 2018-10-29 LAB — PSA SERPL-MCNC: 2.17 NG/ML (ref 0–4)

## 2018-10-29 PROCEDURE — 36415 COLL VENOUS BLD VENIPUNCTURE: CPT

## 2018-10-29 PROCEDURE — 84153 ASSAY OF PSA TOTAL: CPT

## 2018-12-06 ENCOUNTER — APPOINTMENT (RX ONLY)
Dept: URBAN - METROPOLITAN AREA CLINIC 20 | Facility: CLINIC | Age: 73
Setting detail: DERMATOLOGY
End: 2018-12-06

## 2018-12-06 DIAGNOSIS — L20.89 OTHER ATOPIC DERMATITIS: ICD-10-CM

## 2018-12-06 PROBLEM — L20.84 INTRINSIC (ALLERGIC) ECZEMA: Status: ACTIVE | Noted: 2018-12-06

## 2018-12-06 PROCEDURE — ? ADDITIONAL NOTES

## 2018-12-06 PROCEDURE — ? INTRALESIONAL KENALOG

## 2018-12-06 PROCEDURE — ? COUNSELING

## 2018-12-06 PROCEDURE — 11900 INJECT SKIN LESIONS </W 7: CPT

## 2018-12-06 ASSESSMENT — LOCATION ZONE DERM: LOCATION ZONE: TRUNK

## 2018-12-06 ASSESSMENT — LOCATION SIMPLE DESCRIPTION DERM: LOCATION SIMPLE: ABDOMEN

## 2018-12-06 ASSESSMENT — LOCATION DETAILED DESCRIPTION DERM: LOCATION DETAILED: PERIUMBILICAL SKIN

## 2018-12-06 NOTE — PROCEDURE: INTRALESIONAL KENALOG
Kenalog Preparation: Kenalog
Include Z78.9 (Other Specified Conditions Influencing Health Status) As An Associated Diagnosis?: No
X Size Of Lesion In Cm (Optional): 0
Detail Level: Detailed
Concentration Of Kenalog Solution Injected (Mg/Ml): 20.0
Medical Necessity Clause: This procedure was medically necessary because the lesions that were treated were:
Size Of Lesion (Optional): 1.5
Total Volume (Ccs): 0.1
Consent: The risks of atrophy were reviewed with the patient.

## 2019-01-07 ENCOUNTER — APPOINTMENT (RX ONLY)
Dept: URBAN - METROPOLITAN AREA CLINIC 20 | Facility: CLINIC | Age: 74
Setting detail: DERMATOLOGY
End: 2019-01-07

## 2019-01-07 DIAGNOSIS — L20.89 OTHER ATOPIC DERMATITIS: ICD-10-CM

## 2019-01-07 PROBLEM — L20.84 INTRINSIC (ALLERGIC) ECZEMA: Status: ACTIVE | Noted: 2019-01-07

## 2019-01-07 PROCEDURE — ? COUNSELING

## 2019-01-07 PROCEDURE — ? ADDITIONAL NOTES

## 2019-01-07 PROCEDURE — 99212 OFFICE O/P EST SF 10 MIN: CPT

## 2019-01-07 PROCEDURE — ? PRESCRIPTION MEDICATION MANAGEMENT

## 2019-01-07 ASSESSMENT — LOCATION ZONE DERM: LOCATION ZONE: TRUNK

## 2019-01-07 ASSESSMENT — LOCATION DETAILED DESCRIPTION DERM: LOCATION DETAILED: PERIUMBILICAL SKIN

## 2019-01-07 ASSESSMENT — LOCATION SIMPLE DESCRIPTION DERM: LOCATION SIMPLE: ABDOMEN

## 2019-01-07 NOTE — PROCEDURE: PRESCRIPTION MEDICATION MANAGEMENT
Detail Level: Zone
Modify Regimen: Clobetasol Cream once a day every other day. 2 weeks on 1 week off.
Render In Strict Bullet Format?: No

## 2019-02-04 ENCOUNTER — OFFICE VISIT (OUTPATIENT)
Dept: MEDICAL GROUP | Age: 74
End: 2019-02-04
Payer: MEDICARE

## 2019-02-04 VITALS
BODY MASS INDEX: 31.34 KG/M2 | SYSTOLIC BLOOD PRESSURE: 122 MMHG | TEMPERATURE: 96.5 F | HEART RATE: 74 BPM | OXYGEN SATURATION: 90 % | HEIGHT: 72 IN | WEIGHT: 231.4 LBS | DIASTOLIC BLOOD PRESSURE: 80 MMHG

## 2019-02-04 DIAGNOSIS — E03.4 HYPOTHYROIDISM DUE TO ACQUIRED ATROPHY OF THYROID: ICD-10-CM

## 2019-02-04 DIAGNOSIS — E78.2 MIXED HYPERLIPIDEMIA: ICD-10-CM

## 2019-02-04 DIAGNOSIS — K21.00 GASTROESOPHAGEAL REFLUX DISEASE WITH ESOPHAGITIS: ICD-10-CM

## 2019-02-04 DIAGNOSIS — E66.9 OBESITY (BMI 30.0-34.9): ICD-10-CM

## 2019-02-04 DIAGNOSIS — N40.1 BENIGN NON-NODULAR PROSTATIC HYPERPLASIA WITH LOWER URINARY TRACT SYMPTOMS: ICD-10-CM

## 2019-02-04 DIAGNOSIS — I10 ESSENTIAL HYPERTENSION: ICD-10-CM

## 2019-02-04 DIAGNOSIS — R94.31 ABNORMAL FINDING ON EKG: ICD-10-CM

## 2019-02-04 PROCEDURE — 99215 OFFICE O/P EST HI 40 MIN: CPT | Performed by: INTERNAL MEDICINE

## 2019-02-04 PROCEDURE — 93000 ELECTROCARDIOGRAM COMPLETE: CPT | Performed by: INTERNAL MEDICINE

## 2019-02-04 ASSESSMENT — ENCOUNTER SYMPTOMS
PSYCHIATRIC NEGATIVE: 1
RESPIRATORY NEGATIVE: 1
CONSTITUTIONAL NEGATIVE: 1
CARDIOVASCULAR NEGATIVE: 1
NEUROLOGICAL NEGATIVE: 1
EYES NEGATIVE: 1
GASTROINTESTINAL NEGATIVE: 1
MUSCULOSKELETAL NEGATIVE: 1

## 2019-02-04 ASSESSMENT — PATIENT HEALTH QUESTIONNAIRE - PHQ9: CLINICAL INTERPRETATION OF PHQ2 SCORE: 0

## 2019-02-04 NOTE — PROGRESS NOTES
Subjective:      Kevin Jackson is a 73 y.o. male who presents for an Abnormal EKG        HPI  He had an abnormal EKG completed on 01/26/19 at [facility] which indicated atrial fibrillation. He does not have a copy of the EKG for me to review however. Negative for palpitations, chest pain or shortness of breath.  He is followed by Dr. Sprague (Cardiology). He had a tooth extraction completed recently and has since taken an excessive amount of Tylenol on a daily basis for residual dental pain and right ear pain.      In addition, chronic medical problems listed below were reviewed. He has a history of uncontrolled type II diabetes. He was evaluated by Dr. Browning (Endocrinology) in 12/2018 and had an A1c of 8.9 in her office.       Patient Active Problem List   Diagnosis   • Coarse tremors- dr smith, neurologist, [place]   • Mixed hyperlipidemia- dr sprague   • Essential hypertension   • Hypovitaminosis D   • Uncontrolled type 2 diabetes mellitus with complication, with long-term current use of insulin ([facility])- dr browning   • Obesity (BMI 30.0-34.9)   • Benign non-nodular prostatic hyperplasia with lower urinary tract symptoms   • Gastroesophageal reflux disease with esophagitis   • Hypothyroidism due to acquired atrophy of thyroid- dr browning       Outpatient Medications Prior to Visit   Medication Sig Dispense Refill   • tamsulosin (FLOMAX) 0.4 MG capsule TAKE ONE CAPSULE BY MOUTH 30 MINUTES AFTER BREAKFAST DAILY 90 Cap 4   • chlorthalidone (HYGROTON) 25 MG Tab Take 25 mg by mouth every day.     • propranolol CR (INDERAL LA) 120 MG CAPSULE SR 24 HR TAKE 1 CAPSULE BY MOUTH ONCE DAILY (GENERIC FOR INDERAL ER) 90 Cap 4   • atorvastatin (LIPITOR) 80 MG tablet Take 1 Tab by mouth every evening. 90 Tab 4   • hydrOXYzine HCl (ATARAX) 50 MG Tab Take 1 Tab by mouth 3 times a day as needed for Itching. 30 Tab 0   • TRULICITY 0.75 MG/0.5ML Solution Pen-injector   3   • HUMALOG MIX 50/50 KWIKPEN (50-50) 100 UNIT/ML Suspension  "Pen-injector   0   • omeprazole (PRILOSEC) 40 MG delayed-release capsule Take 1 Cap by mouth every day. 30 Cap 0   • glimepiride (AMARYL) 1 MG tablet Take 1 mg by mouth every morning.     • primidone (MYSOLINE) 50 MG Tab Take 1 Tab by mouth every evening. 90 Tab 3   • NON SPECIFIED Shingles vaccine 1 Each 0   • levothyroxine (SYNTHROID) 50 MCG TABS Take 1 Tab by mouth every morning before breakfast. 90 Tab 1   • metformin (GLUCOPHAGE) 1000 MG tablet Take 1,000 mg by mouth 2 times a day, with meals.     • Choline Fenofibrate (TRILIPIX) 135 MG CPDR Take  by mouth.     • aspirin EC (ECOTRIN) 81 MG TBEC Take 81 mg by mouth every day.     • losartan-hydrochlorothiazide (HYZAAR) 50-12.5 MG per tablet Take 1 Tab by mouth every day.     • INVOKANA 300 MG Tab 150 mg.  1   • Exenatide ER (BYDUREON) 2 MG Pen-injector Inject  as instructed.     • INSULIN LISP PROT & LISP, HUM, (HUMALOG MIX 50/50) (50-50) 100 UNIT/ML Suspension Inject 30 Units as instructed.     • vitamin D (CHOLECALCIFEROL) 1000 UNIT TABS Take 1,000 Units by mouth 3 times a day.       No facility-administered medications prior to visit.         Allergies   Allergen Reactions   • Zocor [Simvastatin - High Dose]      Pt tells me this medication gave him \"side effect\" of feeling nauseated; denies allergies to medications       Review of Systems   Constitutional: Negative.    HENT: Positive for ear pain (right).         Positive dental pain.   Eyes: Negative.    Respiratory: Negative.    Cardiovascular: Negative.    Gastrointestinal: Negative.    Genitourinary: Negative.    Musculoskeletal: Negative.    Skin: Negative.    Neurological: Negative.    Endo/Heme/Allergies: Negative.    Psychiatric/Behavioral: Negative.    All other systems reviewed and are negative.    See HPI for further details.       Objective:     /80 (BP Location: Left arm, Patient Position: Sitting, BP Cuff Size: Adult)   Pulse 74   Temp 35.8 °C (96.5 °F) (Temporal)   Ht 1.829 m (6')   " Wt 105 kg (231 lb 6.4 oz)   SpO2 90%   BMI 31.38 kg/m²     Physical Exam   Constitutional: Oriented to person, place, and time. Appears well-developed and well-nourished. No distress.   Head: Normocephalic and atraumatic.   Right Ear: External ear normal.   Left Ear: External ear normal.   Nose: Nose normal.   Mouth/Throat: Oropharynx is clear and moist. No oropharyngeal exudate.   Eyes: Pupils are equal, round, and reactive to light. Conjunctivae and EOM are normal. Right eye exhibits no discharge. Left eye exhibits no discharge. No scleral icterus.   Neck: Normal range of motion. Neck supple. No JVD present. No tracheal deviation present. No thyromegaly present. No bruit.  Cardiovascular: Normal rate, regular rhythm, normal heart sounds and intact distal pulses.  Exam reveals no gallop and no friction rub.    No murmur heard.  Pulmonary/Chest: Effort normal. No stridor. No respiratory distress. No wheezing or rales. No tenderness.   Abdominal: Soft. Bowel sounds are normal. No distension and no mass. There is no tenderness. There is no rebound and no guarding. No hernia. No pulsatile masses.  Musculoskeletal: Normal range of motion No edema or tenderness.   Lymphadenopathy: No cervical adenopathy.   Neurological: Alert and oriented to person, place, and time. Normal reflexes. Normal reflexes. No cranial nerve deficit. Normal muscle tone. Coordination normal.   Skin: Skin is warm and dry. No rash noted. Not diaphoretic. No erythema. No pallor.   Psychiatric: Normal mood and affect. Behavior is normal. Judgment and thought content normal.     Nursing note and vitals reviewed.       No visits with results within 1 Month(s) from this visit.   Latest known visit with results is:   Hospital Outpatient Visit on 10/29/2018   Component Date Value   • Prostatic Specific Antig* 10/29/2018 2.17       Lab Results   Component Value Date/Time    HBA1C 8.1 08/21/2018    HBA1C 8.2 (H) 02/20/2018 08:21 AM     Lab Results    Component Value Date/Time    SODIUM 139 05/16/2018 06:23 AM    POTASSIUM 3.9 05/16/2018 06:23 AM    CHLORIDE 105 05/16/2018 06:23 AM    CO2 27 05/16/2018 06:23 AM    GLUCOSE 186 (H) 05/16/2018 06:23 AM    BUN 24 (H) 05/16/2018 06:23 AM    CREATININE 1.26 05/16/2018 06:23 AM    BUNCREATRAT 24 11/21/2017 07:21 AM    ALKPHOSPHAT 21 (L) 05/16/2018 06:23 AM    ASTSGOT 19 05/16/2018 06:23 AM    ALTSGPT 30 05/16/2018 06:23 AM    TBILIRUBIN 0.4 05/16/2018 06:23 AM     Lab Results   Component Value Date/Time    INR 0.99 01/11/2017 09:05 PM    INR 0.99 01/14/2013 05:50 PM     Lab Results   Component Value Date/Time    CHOLSTRLTOT 129 05/16/2018 06:23 AM    LDL 63 05/16/2018 06:23 AM    HDL 46 05/16/2018 06:23 AM    TRIGLYCERIDE 100 05/16/2018 06:23 AM       No results found for: TESTOSTERONE  Lab Results   Component Value Date/Time    TSH 2.020 11/21/2017 07:21 AM     Lab Results   Component Value Date/Time    FREET4 1.23 02/20/2018 08:18 AM    FREET4 1.01 11/05/2014 10:08 AM     No results found for: URICACID  No components found for: VITB12  Lab Results   Component Value Date/Time    25HYDROXY 24 (L) 02/20/2018 08:18 AM    25HYDROXY 31.7 11/21/2017 07:21 AM    25HYDROXY 27 (L) 11/05/2014 10:08 AM          Assessment/Plan:     1. Abnormal finding on EKG-the EKG done in office today and reviewed by me clearly shows normal sinus rhythm.  However does show a lot of artifact in lead I, and perhaps this is what was seen on the patient's [facility] screening.  We will need to see the actual tracing from that encounter to determine whether or not he truly has paroxysmal atrial fibrillation or not.    I have pended an order for a 24-hour Holter monitor to look for paroxysmal atrial fib pending review of this EKG.  If the EKG reviewed by me and/or his cardiologist does confirm sinus rhythm then it will be up to the patient and his cardiologist as to whether or not to do an event monitor to look for PAF.      Normal EKG in office with  a normal sinus rhythm. Plan to evaluate for arrhythmia with holter monitor testing.  - EKG  - HOLTER MONITOR/EVENT RECORDER -Cardiology Performed; Future    2. Uncontrolled type 2 diabetes mellitus with complication, with long-term current use of insulin ([facility])- dr browning  Continue the same regimen as prescribed by Dr. rBowning.    3. Obesity (BMI 30.0-34.9)  Advised patient diet/exercise/lose 15 lbs.       4. Mixed hyperlipidemia- dr dwyer  Continue the same regimen of Atorvastatin.    5. Essential hypertension  Blood pressure is stable at 122/80 today.  Advised patient follow the DASH diet as well as maintain a low sodium diet. Continue the same medication regimen.    6. Hypothyroidism due to acquired atrophy of thyroid- dr browning  Continue the same regimen of Levothyroxine.    7. Gastroesophageal reflux disease with esophagitis  Under good control.     8. Benign non-nodular prostatic hyperplasia with lower urinary tract symptoms  Under good control.      --Follow up as scheduled.    30 minute face-to-face encounter took place today.  More than half of this time was spent in the coordination of care of the above problems, as well as counseling.      IFrancisca (Scribe), am scribing for, and in the presence of, Ky Hernandez M.D.    Electronically signed by: Francisca Arroyo (Dav[place]), 2/4/2019    IKy M.D., personally performed the services described in this documentation, as scribed by Francisca Arroyo in my presence, and it is both accurate and complete.

## 2019-02-06 ENCOUNTER — TELEPHONE (OUTPATIENT)
Dept: MEDICAL GROUP | Age: 74
End: 2019-02-06

## 2019-02-07 NOTE — TELEPHONE ENCOUNTER
Please call patient and tell him I reviewed the EKG tracing he brought off from his [facility] screening results recently.    His EKG does not show atrial fibrillation.  Rather  shows a normal sinus rhythm with some baseline artifact that can be confused with atrial fibrillation.    Therefore since his EKG in the office was normal with no atrial fibrillation yesterday, and because [facility] without atrial fibrillation he can follow-up with his cardiology Dr. for further evaluation and management of this.  I do not feel he needs to proceed with a Holter monitor at this point.  That would be up to his cardiologist.    Also I cannot find the EKG that we did yesterday anywhere in his chart.  Please find it and scan it in.  If you cannot find and have the patient come back and do another EKG so we can put it in the chart and documented.  If you do find it please Let me know where it is in the chart so I can re-review it.

## 2019-02-11 NOTE — TELEPHONE ENCOUNTER
Phone Number Called: [phone] (home)       Message: LVM for patient to call us back to discuss note below.     Left Message for patient to call back: Yes

## 2019-02-20 ENCOUNTER — HOSPITAL ENCOUNTER (OUTPATIENT)
Dept: LAB | Facility: MEDICAL CENTER | Age: 74
End: 2019-02-20
Attending: PHYSICIAN ASSISTANT
Payer: MEDICARE

## 2019-02-20 LAB
25(OH)D3 SERPL-MCNC: 26 NG/ML (ref 30–100)
ALBUMIN SERPL BCP-MCNC: 4 G/DL (ref 3.2–4.9)
ALBUMIN/GLOB SERPL: 1.7 G/DL
ALP SERPL-CCNC: 20 U/L (ref 30–99)
ALT SERPL-CCNC: 28 U/L (ref 2–50)
ANION GAP SERPL CALC-SCNC: 7 MMOL/L (ref 0–11.9)
AST SERPL-CCNC: 20 U/L (ref 12–45)
BILIRUB SERPL-MCNC: 0.4 MG/DL (ref 0.1–1.5)
BUN SERPL-MCNC: 27 MG/DL (ref 8–22)
CALCIUM SERPL-MCNC: 10.6 MG/DL (ref 8.5–10.5)
CHLORIDE SERPL-SCNC: 100 MMOL/L (ref 96–112)
CHOLEST SERPL-MCNC: 140 MG/DL (ref 100–199)
CO2 SERPL-SCNC: 31 MMOL/L (ref 20–33)
CREAT SERPL-MCNC: 1.31 MG/DL (ref 0.5–1.4)
CREAT UR-MCNC: 106.1 MG/DL
EST. AVERAGE GLUCOSE BLD GHB EST-MCNC: 212 MG/DL
FASTING STATUS PATIENT QL REPORTED: NORMAL
GLOBULIN SER CALC-MCNC: 2.3 G/DL (ref 1.9–3.5)
GLUCOSE SERPL-MCNC: 140 MG/DL (ref 65–99)
HBA1C MFR BLD: 9 % (ref 0–5.6)
HDLC SERPL-MCNC: 45 MG/DL
LDLC SERPL CALC-MCNC: 71 MG/DL
MICROALBUMIN UR-MCNC: <0.7 MG/DL
MICROALBUMIN/CREAT UR: NORMAL MG/G (ref 0–30)
POTASSIUM SERPL-SCNC: 4.1 MMOL/L (ref 3.6–5.5)
PROT SERPL-MCNC: 6.3 G/DL (ref 6–8.2)
SODIUM SERPL-SCNC: 138 MMOL/L (ref 135–145)
T4 FREE SERPL-MCNC: 1.32 NG/DL (ref 0.53–1.43)
TRIGL SERPL-MCNC: 120 MG/DL (ref 0–149)
TSH SERPL DL<=0.005 MIU/L-ACNC: 1.24 UIU/ML (ref 0.38–5.33)

## 2019-02-20 PROCEDURE — 80061 LIPID PANEL: CPT

## 2019-02-20 PROCEDURE — 82043 UR ALBUMIN QUANTITATIVE: CPT

## 2019-02-20 PROCEDURE — 36415 COLL VENOUS BLD VENIPUNCTURE: CPT | Mod: GA

## 2019-02-20 PROCEDURE — 80053 COMPREHEN METABOLIC PANEL: CPT

## 2019-02-20 PROCEDURE — 82570 ASSAY OF URINE CREATININE: CPT

## 2019-02-20 PROCEDURE — 84443 ASSAY THYROID STIM HORMONE: CPT

## 2019-02-20 PROCEDURE — 82306 VITAMIN D 25 HYDROXY: CPT

## 2019-02-20 PROCEDURE — 84439 ASSAY OF FREE THYROXINE: CPT

## 2019-02-20 PROCEDURE — 83036 HEMOGLOBIN GLYCOSYLATED A1C: CPT | Mod: GA

## 2019-02-21 ENCOUNTER — APPOINTMENT (RX ONLY)
Dept: URBAN - METROPOLITAN AREA CLINIC 20 | Facility: CLINIC | Age: 74
Setting detail: DERMATOLOGY
End: 2019-02-21

## 2019-02-21 DIAGNOSIS — L20.89 OTHER ATOPIC DERMATITIS: ICD-10-CM

## 2019-02-21 DIAGNOSIS — L82.1 OTHER SEBORRHEIC KERATOSIS: ICD-10-CM

## 2019-02-21 PROBLEM — L20.84 INTRINSIC (ALLERGIC) ECZEMA: Status: ACTIVE | Noted: 2019-02-21

## 2019-02-21 PROCEDURE — ? ADDITIONAL NOTES

## 2019-02-21 PROCEDURE — ? COUNSELING

## 2019-02-21 PROCEDURE — ? PRESCRIPTION SAMPLES PROVIDED

## 2019-02-21 PROCEDURE — 99213 OFFICE O/P EST LOW 20 MIN: CPT

## 2019-02-21 ASSESSMENT — LOCATION SIMPLE DESCRIPTION DERM
LOCATION SIMPLE: LEFT FOREARM
LOCATION SIMPLE: ABDOMEN

## 2019-02-21 ASSESSMENT — LOCATION ZONE DERM
LOCATION ZONE: ARM
LOCATION ZONE: TRUNK

## 2019-02-21 ASSESSMENT — LOCATION DETAILED DESCRIPTION DERM
LOCATION DETAILED: PERIUMBILICAL SKIN
LOCATION DETAILED: LEFT VENTRAL PROXIMAL FOREARM

## 2019-02-21 NOTE — PROCEDURE: ADDITIONAL NOTES
Detail Level: Simple
Additional Notes: We will start using duoderm. Apply small piece to affected area and leave it for three days, wash the area and reapply.
Additional Notes: Pt has had for many years.  Resolving at this time with nothing susp. of cancer.  Pt to email photo in one month.  He will also RTC if lesion changes at all.

## 2019-03-03 DIAGNOSIS — G25.2 COARSE TREMORS: ICD-10-CM

## 2019-03-05 ENCOUNTER — APPOINTMENT (RX ONLY)
Dept: URBAN - METROPOLITAN AREA CLINIC 20 | Facility: CLINIC | Age: 74
Setting detail: DERMATOLOGY
End: 2019-03-05

## 2019-03-05 DIAGNOSIS — L20.89 OTHER ATOPIC DERMATITIS: ICD-10-CM

## 2019-03-05 PROBLEM — L20.84 INTRINSIC (ALLERGIC) ECZEMA: Status: ACTIVE | Noted: 2019-03-05

## 2019-03-05 PROCEDURE — ? ADDITIONAL NOTES

## 2019-03-05 PROCEDURE — 99212 OFFICE O/P EST SF 10 MIN: CPT

## 2019-03-05 PROCEDURE — ? COUNSELING

## 2019-03-05 RX ORDER — PROPRANOLOL HYDROCHLORIDE 120 MG/1
CAPSULE, EXTENDED RELEASE ORAL
Qty: 90 CAP | Refills: 4 | Status: SHIPPED | OUTPATIENT
Start: 2019-03-05 | End: 2019-03-12 | Stop reason: SDUPTHER

## 2019-03-05 ASSESSMENT — LOCATION DETAILED DESCRIPTION DERM: LOCATION DETAILED: PERIUMBILICAL SKIN

## 2019-03-05 ASSESSMENT — LOCATION ZONE DERM: LOCATION ZONE: TRUNK

## 2019-03-05 ASSESSMENT — LOCATION SIMPLE DESCRIPTION DERM: LOCATION SIMPLE: ABDOMEN

## 2019-03-05 NOTE — PROCEDURE: ADDITIONAL NOTES
Detail Level: Simple
Additional Notes: Irritated from adhesive on duoderm.  Discontinue.  [facility] only.  If lesion flares or changes, RTC.

## 2019-03-12 ENCOUNTER — TELEPHONE (OUTPATIENT)
Dept: MEDICAL GROUP | Age: 74
End: 2019-03-12

## 2019-03-12 ENCOUNTER — OFFICE VISIT (OUTPATIENT)
Dept: MEDICAL GROUP | Age: 74
End: 2019-03-12
Payer: MEDICARE

## 2019-03-12 VITALS
OXYGEN SATURATION: 90 % | TEMPERATURE: 97.3 F | HEIGHT: 72 IN | WEIGHT: 227.6 LBS | SYSTOLIC BLOOD PRESSURE: 104 MMHG | HEART RATE: 70 BPM | BODY MASS INDEX: 30.83 KG/M2 | DIASTOLIC BLOOD PRESSURE: 62 MMHG

## 2019-03-12 DIAGNOSIS — G25.0 BENIGN ESSENTIAL TREMOR: ICD-10-CM

## 2019-03-12 DIAGNOSIS — E55.9 HYPOVITAMINOSIS D: ICD-10-CM

## 2019-03-12 DIAGNOSIS — K21.00 GASTROESOPHAGEAL REFLUX DISEASE WITH ESOPHAGITIS: ICD-10-CM

## 2019-03-12 DIAGNOSIS — E66.9 OBESITY (BMI 30.0-34.9): ICD-10-CM

## 2019-03-12 DIAGNOSIS — I10 ESSENTIAL HYPERTENSION: ICD-10-CM

## 2019-03-12 DIAGNOSIS — E78.2 MIXED HYPERLIPIDEMIA: ICD-10-CM

## 2019-03-12 DIAGNOSIS — E03.4 HYPOTHYROIDISM DUE TO ACQUIRED ATROPHY OF THYROID: ICD-10-CM

## 2019-03-12 DIAGNOSIS — N40.1 BENIGN NON-NODULAR PROSTATIC HYPERPLASIA WITH LOWER URINARY TRACT SYMPTOMS: ICD-10-CM

## 2019-03-12 PROCEDURE — 99214 OFFICE O/P EST MOD 30 MIN: CPT | Performed by: INTERNAL MEDICINE

## 2019-03-12 RX ORDER — PROPRANOLOL HYDROCHLORIDE 120 MG/1
120 CAPSULE, EXTENDED RELEASE ORAL EVERY MORNING
Qty: 90 CAP | Refills: 4
Start: 2019-03-12 | End: 2020-03-23

## 2019-03-12 RX ORDER — LOSARTAN POTASSIUM AND HYDROCHLOROTHIAZIDE 12.5; 5 MG/1; MG/1
1 TABLET ORAL DAILY
Qty: 30 TAB | Refills: 11 | Status: SHIPPED | DISCHARGE
Start: 2019-03-12 | End: 2019-08-08 | Stop reason: CLARIF

## 2019-03-12 RX ORDER — PRIMIDONE 50 MG/1
50 TABLET ORAL EVERY EVENING
Qty: 90 TAB | Refills: 3 | Status: SHIPPED | DISCHARGE
Start: 2019-03-12 | End: 2019-03-12 | Stop reason: SDUPTHER

## 2019-03-12 RX ORDER — CHLORTHALIDONE 25 MG/1
25 TABLET ORAL DAILY
Qty: 30 TAB | Refills: 11 | Status: ON HOLD | DISCHARGE
Start: 2019-03-12 | End: 2019-09-19

## 2019-03-12 RX ORDER — DULAGLUTIDE 0.75 MG/.5ML
1.5 INJECTION, SOLUTION SUBCUTANEOUS
Qty: 5 PEN | Refills: 11 | Status: SHIPPED | DISCHARGE
Start: 2019-03-12 | End: 2019-04-02

## 2019-03-12 RX ORDER — GLIMEPIRIDE 4 MG/1
4 TABLET ORAL EVERY MORNING
Qty: 30 TAB | Refills: 11 | Status: SHIPPED | DISCHARGE
Start: 2019-03-12

## 2019-03-12 RX ORDER — LEVOTHYROXINE SODIUM 0.05 MG/1
50 TABLET ORAL
Qty: 30 TAB | Refills: 11 | Status: SHIPPED | DISCHARGE
Start: 2019-03-12

## 2019-03-12 RX ORDER — ATORVASTATIN CALCIUM 80 MG/1
80 TABLET, FILM COATED ORAL EVERY EVENING
Qty: 90 TAB | Refills: 4 | Status: SHIPPED | DISCHARGE
Start: 2019-03-12 | End: 2019-12-06

## 2019-03-12 RX ORDER — INSULIN LISPRO 100 [IU]/ML
30 INJECTION, SUSPENSION SUBCUTANEOUS DAILY
Qty: 10 PEN | Refills: 11 | Status: ON HOLD | DISCHARGE
Start: 2019-03-12 | End: 2019-09-05

## 2019-03-12 RX ORDER — TAMSULOSIN HYDROCHLORIDE 0.4 MG/1
0.4 CAPSULE ORAL DAILY
Qty: 90 CAP | Refills: 4 | Status: ON HOLD | DISCHARGE
Start: 2019-03-12 | End: 2019-09-19 | Stop reason: SDUPTHER

## 2019-03-12 RX ORDER — PRIMIDONE 50 MG/1
50 TABLET ORAL EVERY EVENING
Qty: 90 TAB | Refills: 3 | Status: SHIPPED | DISCHARGE
Start: 2019-03-12 | End: 2021-09-30

## 2019-03-12 RX ORDER — LOSARTAN POTASSIUM AND HYDROCHLOROTHIAZIDE 12.5; 5 MG/1; MG/1
1 TABLET ORAL DAILY
Qty: 30 TAB | Refills: 11 | Status: SHIPPED | OUTPATIENT
Start: 2019-03-12 | End: 2019-03-12 | Stop reason: SDUPTHER

## 2019-03-12 ASSESSMENT — ENCOUNTER SYMPTOMS
GASTROINTESTINAL NEGATIVE: 1
CONSTITUTIONAL NEGATIVE: 1
RESPIRATORY NEGATIVE: 1
NEUROLOGICAL NEGATIVE: 1
CARDIOVASCULAR NEGATIVE: 1
EYES NEGATIVE: 1
MUSCULOSKELETAL NEGATIVE: 1
PSYCHIATRIC NEGATIVE: 1

## 2019-03-12 NOTE — PROGRESS NOTES
Subjective:      Kevin Jackson is a 73 y.o. male who presents with Follow-Up        HPI    The patient is here for followup of chronic medical problems listed below. The patient is compliant with medications and having no side effects from them. Denies chest pain, abdominal pain, dyspnea, myalgias, or cough. Patient forgot to bring his medication list with him today.    He has a history of uncontrolled type II diabetes. He takes Humalog insluin, Trulicity, and glipizide. He was evaluated by Dr. Patterson, Endocrinology, in 12/2018 and had an A1c of 8.9 in her office. Most recent A1C from 2/20/2019 is 9.0 with an elevated glucose of 140 as well. He is no longer taking his Invokana or Bydureon.    For his hypertension, he is taking both 25 mg chlorthalidone twice daily and losartan-hydrochlorothiazide. He knows he is taking his losartan, but is unsure if the tablet also contains hydrochlorothiazide. He is unsure who prescribes his chlorthalidone.    Regarding his hyperlipidemia, he is taking atorvastatin prescribed by Dr. Dwyer. His podiatrist, Dr. Son, prescribed him Pletal. His most recent vascular non invasive was normal.    He will see Dr. Dwyer who would like to have the patient wear a Holter monitor because of his prior abnormal EKG from 1/26/2019 demonstrating atrial fibrillation. He is taking 81 mg aspirin, be he is not taking a blood thinner.    For his BPH, he is taking Flomax prescribed by his urologist.    His Neurologist, Dr. Smith, prescribes his primidone for his coarse tremor. He takes his propanolol with his primidone because it makes him feel sedated.    For his GERD, he is no longer taking omeprazole.    The patient's Atarax was prescribed for a rash two years ago. He is no longer taking the Atarax.                Patient Active Problem List   Diagnosis   • Coarse tremors- dr smith, neurologist, [place]   • Mixed hyperlipidemia- dr dwyer   • Essential hypertension   • Hypovitaminosis D    • Uncontrolled type 2 diabetes mellitus with complication, with long-term current use of insulin ([facility])- dr browning   • Obesity (BMI 30.0-34.9)   • Benign non-nodular prostatic hyperplasia with lower urinary tract symptoms   • Gastroesophageal reflux disease with esophagitis   • Hypothyroidism due to acquired atrophy of thyroid- dr browning       Outpatient Medications Prior to Visit   Medication Sig Dispense Refill   • propranolol CR (INDERAL LA) 120 MG CAPSULE SR 24 HR TAKE ONE CAPSULE BY MOUTH EVERY DAY 90 Cap 4   • tamsulosin (FLOMAX) 0.4 MG capsule TAKE ONE CAPSULE BY MOUTH 30 MINUTES AFTER BREAKFAST DAILY 90 Cap 4   • chlorthalidone (HYGROTON) 25 MG Tab Take 25 mg by mouth every day.     • atorvastatin (LIPITOR) 80 MG tablet Take 1 Tab by mouth every evening. 90 Tab 4   • TRULICITY 0.75 MG/0.5ML Solution Pen-injector   3   • HUMALOG MIX 50/50 KWIKPEN (50-50) 100 UNIT/ML Suspension Pen-injector   0   • glimepiride (AMARYL) 1 MG tablet Take 1 mg by mouth every morning.     • primidone (MYSOLINE) 50 MG Tab Take 1 Tab by mouth every evening. 90 Tab 3   • levothyroxine (SYNTHROID) 50 MCG TABS Take 1 Tab by mouth every morning before breakfast. 90 Tab 1   • metformin (GLUCOPHAGE) 1000 MG tablet Take 1,000 mg by mouth 2 times a day, with meals.     • Choline Fenofibrate (TRILIPIX) 135 MG CPDR Take  by mouth.     • aspirin EC (ECOTRIN) 81 MG TBEC Take 81 mg by mouth every day.     • vitamin D (CHOLECALCIFEROL) 1000 UNIT TABS Take 1,000 Units by mouth 3 times a day.     • losartan-hydrochlorothiazide (HYZAAR) 50-12.5 MG per tablet Take 1 Tab by mouth every day.     • hydrOXYzine HCl (ATARAX) 50 MG Tab Take 1 Tab by mouth 3 times a day as needed for Itching. 30 Tab 0   • INVOKANA 300 MG Tab 150 mg.  1   • omeprazole (PRILOSEC) 40 MG delayed-release capsule Take 1 Cap by mouth every day. 30 Cap 0   • Exenatide ER (BYDUREON) 2 MG Pen-injector Inject  as instructed.     • INSULIN LISP PROT & LISP, HUM, (HUMALOG MIX 50/50)  "(50-50) 100 UNIT/ML Suspension Inject 30 Units as instructed.     • NON SPECIFIED Shingles vaccine 1 Each 0     No facility-administered medications prior to visit.         Allergies   Allergen Reactions   • Zocor [Simvastatin - High Dose]      Pt tells me this medication gave him \"side effect\" of feeling nauseated; denies allergies to medications       Review of Systems   Constitutional: Negative.    HENT: Negative.    Eyes: Negative.    Respiratory: Negative.    Cardiovascular: Negative.    Gastrointestinal: Negative.    Genitourinary: Negative.    Musculoskeletal: Negative.    Skin: Negative.    Neurological: Negative.    Endo/Heme/Allergies: Negative.    Psychiatric/Behavioral: Negative.             Objective:     /62 (BP Location: Left arm, Patient Position: Sitting, BP Cuff Size: Adult)   Pulse 70   Temp 36.3 °C (97.3 °F) (Temporal)   Ht 1.829 m (6')   Wt 103.2 kg (227 lb 9.6 oz)   SpO2 90%   BMI 30.87 kg/m²     Physical Exam   Constitutional: Oriented to person, place, and time. Appears well-developed and well-nourished. No distress.   Head: Normocephalic and atraumatic.   Right Ear: External ear normal.   Left Ear: External ear normal.   Nose: Nose normal.   Mouth/Throat: Oropharynx is clear and moist. No oropharyngeal exudate.   Eyes: Pupils are equal, round, and reactive to light. Conjunctivae and EOM are normal. Right eye exhibits no discharge. Left eye exhibits no discharge. No scleral icterus.   Neck: Normal range of motion. Neck supple. No JVD present. No tracheal deviation present. No thyromegaly present.   Cardiovascular: Normal rate, regular rhythm, normal heart sounds and intact distal pulses.  Exam reveals no gallop and no friction rub.    No murmur heard.  Pulmonary/Chest: Effort normal. No stridor. No respiratory distress. No wheezing or rales. No tenderness.   Musculoskeletal: Normal range of motion No edema or tenderness.   Lymphadenopathy: No cervical adenopathy.   Neurological: " Alert and oriented to person, place, and time. Normal reflexes. Normal reflexes. No cranial nerve deficit. Normal muscle tone. Coordination normal.   Skin: Skin is warm and dry. No rash noted. Not diaphoretic. No erythema. No pallor.   Psychiatric: Normal mood and affect. Behavior is normal. Judgment and thought content normal.   Nursing note and vitals reviewed.      Lab Results   Component Value Date/Time    HBA1C 9.0 (H) 02/20/2019 06:32 AM    HBA1C 8.1 08/21/2018     Lab Results   Component Value Date/Time    SODIUM 138 02/20/2019 06:32 AM    POTASSIUM 4.1 02/20/2019 06:32 AM    CHLORIDE 100 02/20/2019 06:32 AM    CO2 31 02/20/2019 06:32 AM    GLUCOSE 140 (H) 02/20/2019 06:32 AM    BUN 27 (H) 02/20/2019 06:32 AM    CREATININE 1.31 02/20/2019 06:32 AM    BUNCREATRAT 24 11/21/2017 07:21 AM    ALKPHOSPHAT 20 (L) 02/20/2019 06:32 AM    ASTSGOT 20 02/20/2019 06:32 AM    ALTSGPT 28 02/20/2019 06:32 AM    TBILIRUBIN 0.4 02/20/2019 06:32 AM     Lab Results   Component Value Date/Time    INR 0.99 01/11/2017 09:05 PM    INR 0.99 01/14/2013 05:50 PM     Lab Results   Component Value Date/Time    CHOLSTRLTOT 140 02/20/2019 06:32 AM    LDL 71 02/20/2019 06:32 AM    HDL 45 02/20/2019 06:32 AM    TRIGLYCERIDE 120 02/20/2019 06:32 AM       No results found for: TESTOSTERONE  Lab Results   Component Value Date/Time    TSH 2.020 11/21/2017 07:21 AM     Lab Results   Component Value Date/Time    FREET4 1.32 02/20/2019 06:32 AM    FREET4 1.23 02/20/2018 08:18 AM     No results found for: URICACID  No components found for: VITB12  Lab Results   Component Value Date/Time    25HYDROXY 26 (L) 02/20/2019 06:32 AM    25HYDROXY 24 (L) 02/20/2018 08:18 AM          Assessment/Plan:     1. Uncontrolled type 2 diabetes mellitus with complication, with long-term current use of insulin ([facility])- dr patterson  Most recent A1C from 2/20/2019 is 9.0 with an elevated glucose of 140 as well. This is followed by Dr. Patterson, Endocrinology. Continue the same  regimen as prescribed by Dr. Browning.  - Lipid Profile; Future  - Comp Metabolic Panel; Future  - CBC WITH DIFFERENTIAL; Future  - HEMOGLOBIN A1C; Future  - MICROALBUMIN CREAT RATIO URINE; Future    2. Mixed hyperlipidemia- dr dwyer  Continue the same regimen of Atorvastatin. If his circulation is normal there is no real indication for the vasodilator Pletal prescribed by his podiatrist. If his feet feel better then he may continue to take it.  - Comp Metabolic Panel; Future    3. Hypothyroidism due to acquired atrophy of thyroid- dr browning  Continue the same regimen of Levothyroxine.  - TSH; Future    4. Benign non-nodular prostatic hyperplasia with lower urinary tract symptoms  Under good control. Continue same regimen of Flomax prescribed by his urologist.    5. Obesity (BMI 30.0-34.9)   diet/exercise/lose 15 lbs.; patient counseled    6. Gastroesophageal reflux disease with esophagitis  Stable. He is no longer taking omeprazole.    7. Essential hypertension  Under good control. Continue same regimen. I will contact his pharmacy regarding who prescribes his chlorthalidone and if his losartan prescription also contains hydrochlorothiazide.    8. Hypovitaminosis D  Under good control. Continue same regimen.      40 minute face-to-face encounter took place today.  More than half of this time was spent in the coordination of care of the above problems, as well as counseling.     I, Fredo Gustafson (Scribe), am scribing for, and in the presence of, Ky Hernandez M.D..    Electronically signed by: Fredo Gustafson (Thaddeus), 3/12/2019    I, Ky Hernandez M.D., personally performed the services described in this documentation, as scribed by Fredo Gustafson in my presence, and it is both accurate and complete.

## 2019-03-12 NOTE — TELEPHONE ENCOUNTER
Called pharmacy [id number] - [place], [place] - [address]  Phone: [phone] Fax: [phone]      Patient current list of medications:  Tamsulosin 0.4MG  Jardiance 25MG   Losartan-hydrochlorothiazide 50-12.5MG   Gabapentin 100MG    Chlorithalidone   [id number]  Primidone 50mg  glimeperide 4MG  Humalog 30IU  Trulicity 1.5MG  Metformin 1000MG  Fenofribate Acid 135MG

## 2019-03-13 ENCOUNTER — TELEPHONE (OUTPATIENT)
Dept: MEDICAL GROUP | Age: 74
End: 2019-03-13

## 2019-03-13 DIAGNOSIS — Z13.9 ENCOUNTER FOR SCREENING: ICD-10-CM

## 2019-03-13 NOTE — TELEPHONE ENCOUNTER
Pt dropped off records from another provider's office.     Please feel free to call the patient should there be any questions

## 2019-03-29 ENCOUNTER — TELEPHONE (OUTPATIENT)
Dept: MEDICAL GROUP | Age: 74
End: 2019-03-29

## 2019-03-29 NOTE — TELEPHONE ENCOUNTER
PT called stating he had be referred to [facility] for a test with Simona Urbano, however he does not know what the test is or who Dr. Urbano is. I did not see any recent orders or referrals in his chart.

## 2019-03-29 NOTE — TELEPHONE ENCOUNTER
I do not know either.  He would have to call Dr. Branham's office and find out the answers his questions.  It might possibly have something to do with his request to have the life screening battery of tests done, but I am not sure.

## 2019-04-01 NOTE — TELEPHONE ENCOUNTER
1. Caller Name: Kevin-patient, [phone] (home) called again questioning about a referral to [facility] Vyas. Advised pt per Dr. Hernandez notes on 03/29/19 to contact Dr. Vyas's office to obtain the answers to his referral question. Provided Pt w/[facility] scheduling and referrals phone number: [phone].

## 2019-04-02 ENCOUNTER — OFFICE VISIT (OUTPATIENT)
Dept: MEDICAL GROUP | Age: 74
End: 2019-04-02
Payer: MEDICARE

## 2019-04-02 VITALS
DIASTOLIC BLOOD PRESSURE: 66 MMHG | TEMPERATURE: 96.7 F | HEIGHT: 72 IN | BODY MASS INDEX: 30.53 KG/M2 | HEART RATE: 67 BPM | OXYGEN SATURATION: 96 % | SYSTOLIC BLOOD PRESSURE: 108 MMHG | WEIGHT: 225.4 LBS

## 2019-04-02 DIAGNOSIS — G25.0 BENIGN ESSENTIAL TREMOR: ICD-10-CM

## 2019-04-02 DIAGNOSIS — I73.9 PVD (PERIPHERAL VASCULAR DISEASE) (HCC): ICD-10-CM

## 2019-04-02 DIAGNOSIS — I48.91 ATRIAL FIBRILLATION, TRANSIENT (HCC): ICD-10-CM

## 2019-04-02 DIAGNOSIS — E66.9 OBESITY (BMI 30.0-34.9): ICD-10-CM

## 2019-04-02 DIAGNOSIS — E03.4 HYPOTHYROIDISM DUE TO ACQUIRED ATROPHY OF THYROID: ICD-10-CM

## 2019-04-02 DIAGNOSIS — E78.2 MIXED HYPERLIPIDEMIA: ICD-10-CM

## 2019-04-02 DIAGNOSIS — E08.41 DIABETIC MONONEUROPATHY ASSOCIATED WITH DIABETES MELLITUS DUE TO UNDERLYING CONDITION (HCC): ICD-10-CM

## 2019-04-02 DIAGNOSIS — I10 ESSENTIAL HYPERTENSION: ICD-10-CM

## 2019-04-02 PROBLEM — G60.9 IDIOPATHIC PERIPHERAL NEUROPATHY: Status: RESOLVED | Noted: 2019-04-02 | Resolved: 2019-04-02

## 2019-04-02 PROBLEM — G60.9 IDIOPATHIC PERIPHERAL NEUROPATHY: Status: ACTIVE | Noted: 2019-04-02

## 2019-04-02 PROCEDURE — 99213 OFFICE O/P EST LOW 20 MIN: CPT | Performed by: INTERNAL MEDICINE

## 2019-04-02 RX ORDER — GABAPENTIN 300 MG/1
600-1200 CAPSULE ORAL 3 TIMES DAILY
COMMUNITY
Start: 2019-03-25 | End: 2019-09-26

## 2019-04-02 RX ORDER — SEMAGLUTIDE 1.34 MG/ML
0.25 INJECTION, SOLUTION SUBCUTANEOUS
Status: ON HOLD | DISCHARGE
Start: 2019-04-02 | End: 2019-09-05

## 2019-04-02 RX ORDER — SEMAGLUTIDE 1.34 MG/ML
INJECTION, SOLUTION SUBCUTANEOUS
COMMUNITY
Start: 2019-03-27 | End: 2019-04-02 | Stop reason: SDUPTHER

## 2019-04-02 RX ORDER — PENTOXIFYLLINE 400 MG/1
TABLET, EXTENDED RELEASE ORAL
COMMUNITY
Start: 2019-01-08 | End: 2019-04-02 | Stop reason: SDUPTHER

## 2019-04-02 RX ORDER — PENTOXIFYLLINE 400 MG/1
400 TABLET, EXTENDED RELEASE ORAL
Qty: 90 TAB | Status: SHIPPED | DISCHARGE
Start: 2019-04-02 | End: 2019-09-26

## 2019-04-02 ASSESSMENT — ENCOUNTER SYMPTOMS
DIZZINESS: 1
RESPIRATORY NEGATIVE: 1
CONSTITUTIONAL NEGATIVE: 1
CARDIOVASCULAR NEGATIVE: 1
GASTROINTESTINAL NEGATIVE: 1
EYES NEGATIVE: 1
PSYCHIATRIC NEGATIVE: 1

## 2019-04-02 NOTE — PROGRESS NOTES
Subjective:      Kevin Jackson is a 73 y.o. male who presents with Follow-Up        HPI    PVD (peripheral vascular disease)   Patient is reporting chronic pain to the plantar surface of his feet bilaterally which has become severe over the last few months. His pain is exacerbated with walking and he experiences only mild relief at rest. He is currently on pentoxifylline 400 mg.     The patient is also here for followup of chronic medical problems listed below. The patient is compliant with medications and having no side effects from them. Denies chest pain, abdominal pain, dyspnea, myalgias, or cough.    Uncontrolled type 2 diabetes mellitus with complication  Patient was started on Ozempic 0.25 mg weekly on 3/27/19 for regulation of his type 2 diabetes. He also takes glimepiride 4 mg, humalog 30 units, and metformin 1000 mg x2 daily. He is followed by Dr. Patterson, endocrinology. A1C on 2/20/19 was 9.0.    Essential hypertension  He is taking chlorthalidone 25 mg and losartan-HTC 50-12.5 mg for treatment of his hypertension but is experiencing some recent dizziness.     Mixed hyperlipidemia  Patient takes atorvastatin 80 mg PO for regulation of his chronic hyperlipidemia. Lipid profile on 2/20/19 shows a total cholesterol of 140, triglycerides of 120, HDL of 45, and LDL of 71. He does not smoke cigarettes.    Atrial fibrillation  Patient is followed by Dr. Sprague, cardiology. Dr. Sprague did not notice a-fib on an EKG conducted in office on 3/12/19. Patient was going to be placed on a Zio patch to assess for intermittent a-fib as both the EKG in-office and during his life screening did not reveal any abnormalities. Technicians at Dr. Sprague's office stated that the Zio patch is no longer used for this purpose. Patient denies heart palpitations.     Patient Active Problem List   Diagnosis   • Mixed hyperlipidemia- dr sprague   • Essential hypertension- DR SPRAGUE   • Hypovitaminosis D   • Uncontrolled type 2 diabetes  mellitus with complication, with long-term current use of insulin (HCC)- dr browning   • Obesity (BMI 30.0-34.9)   • Benign non-nodular prostatic hyperplasia with lower urinary tract symptoms- [facility]   • Gastroesophageal reflux disease with esophagitis- PPI PRN   • Hypothyroidism due to acquired atrophy of thyroid- dr browning   • Benign essential tremor- DR OLIVAS, [facility]   • Encounter for screening- [facility] screening 2019- neg  abd US for AAA, MARELY, carotid US, EKG (no A.Fib to my review)       Outpatient Medications Prior to Visit   Medication Sig Dispense Refill   • glimepiride (AMARYL) 4 MG Tab Take 1 Tab by mouth every morning. 30 Tab 11   • HUMALOG MIX 50/50 KWIKPEN (50-50) 100 UNIT/ML Suspension Pen-injector Inject 30 Units as instructed every day. 10 PEN 11   • Empagliflozin (JARDIANCE) 25 MG Tab Take 25 mg by mouth every day. 90 Tab 4   • metformin (GLUCOPHAGE) 1000 MG tablet Take 1 Tab by mouth 2 times a day, with meals. 60 Tab 11   • propranolol CR (INDERAL LA) 120 MG CAPSULE SR 24 HR Take 1 Cap by mouth every morning. 90 Cap 4   • tamsulosin (FLOMAX) 0.4 MG capsule Take 1 Cap by mouth every day. 90 Cap 4   • primidone (MYSOLINE) 50 MG Tab Take 1 Tab by mouth every evening. 90 Tab 3   • atorvastatin (LIPITOR) 80 MG tablet Take 1 Tab by mouth every evening. 90 Tab 4   • chlorthalidone (HYGROTON) 25 MG Tab Take 1 Tab by mouth every day. 30 Tab 11   • choline fenofibrate (TRILIPIX) 135 MG CAPSULE DELAYED RELEASE Take 1 Cap by mouth every day. 30 Tab 11   • levothyroxine (SYNTHROID) 50 MCG Tab Take 1 Tab by mouth every morning before breakfast. 30 Tab 11   • losartan-hydrochlorothiazide (HYZAAR) 50-12.5 MG per tablet Take 1 Tab by mouth every day. 30 Tab 11   • aspirin EC (ECOTRIN) 81 MG TBEC Take 81 mg by mouth every day.     • vitamin D (CHOLECALCIFEROL) 1000 UNIT TABS Take 1,000 Units by mouth 3 times a day.     • TRULICITY 0.75 MG/0.5ML Solution Pen-injector Inject 1.5 mg as instructed every 7 days.  "(Patient not taking: Reported on 4/2/2019) 5 PEN 11     No facility-administered medications prior to visit.         Allergies   Allergen Reactions   • Zocor [Simvastatin - High Dose]      Pt tells me this medication gave him \"side effect\" of feeling nauseated; denies allergies to medications       Review of Systems   Constitutional: Negative.    HENT: Negative.    Eyes: Negative.    Respiratory: Negative.    Cardiovascular: Negative.    Gastrointestinal: Negative.    Genitourinary: Negative.    Musculoskeletal:        Positive for bilateral foot pain.   Skin: Negative.    Neurological: Positive for dizziness.   Endo/Heme/Allergies: Negative.    Psychiatric/Behavioral: Negative.    All other systems reviewed and are negative.           Objective:     /66 (BP Location: Left arm, Patient Position: Sitting, BP Cuff Size: Adult)   Pulse 67   Temp 35.9 °C (96.7 °F) (Temporal)   Ht 1.829 m (6')   Wt 102.2 kg (225 lb 6.4 oz)   SpO2 96%   BMI 30.57 kg/m²     Physical Exam   Constitutional: Oriented to person, place, and time. Appears well-developed and well-nourished. No distress.   Head: Normocephalic and atraumatic.   Right Ear: External ear normal.   Left Ear: External ear normal.   Nose: Nose normal.   Mouth/Throat: Oropharynx is clear and moist. No oropharyngeal exudate.   Eyes: Pupils are equal, round, and reactive to light. Conjunctivae and EOM are normal. Right eye exhibits no discharge. Left eye exhibits no discharge. No scleral icterus.   Neck: Normal range of motion. Neck supple. No JVD present. No tracheal deviation present. No thyromegaly present.   Cardiovascular: Normal rate, regular rhythm, normal heart sounds. Absent DP and PT pulses bilaterally.  Exam reveals no gallop and no friction rub.    No murmur heard.  Pulmonary/Chest: Effort normal. No stridor. No respiratory distress. No wheezing or rales. No tenderness.   Abdominal: Soft. Bowel sounds are normal. No distension and no mass. There is " no tenderness. There is no rebound and no guarding. No hernia.   Musculoskeletal: Normal range of motion No edema or tenderness.   Lymphadenopathy: No cervical adenopathy.   Neurological: Alert and oriented to person, place, and time. Normal reflexes. Normal reflexes. No cranial nerve deficit. Normal muscle tone. Coordination normal. Diminished sensation in feet.  Skin: Third right toe has is entirely erythematous and has 1 cm area of erythema with ulceration over dorsal mid-toe. Skin is warm and dry. No rash noted. Not diaphoretic. No pallor.   Psychiatric: Normal mood and affect. Behavior is normal. Judgment and thought content normal.   Nursing note and vitals reviewed.    Lab Results   Component Value Date/Time    HBA1C 9.0 (H) 02/20/2019 06:32 AM    HBA1C 8.1 08/21/2018     Lab Results   Component Value Date/Time    SODIUM 138 02/20/2019 06:32 AM    POTASSIUM 4.1 02/20/2019 06:32 AM    CHLORIDE 100 02/20/2019 06:32 AM    CO2 31 02/20/2019 06:32 AM    GLUCOSE 140 (H) 02/20/2019 06:32 AM    BUN 27 (H) 02/20/2019 06:32 AM    CREATININE 1.31 02/20/2019 06:32 AM    BUNCREATRAT 24 11/21/2017 07:21 AM    ALKPHOSPHAT 20 (L) 02/20/2019 06:32 AM    ASTSGOT 20 02/20/2019 06:32 AM    ALTSGPT 28 02/20/2019 06:32 AM    TBILIRUBIN 0.4 02/20/2019 06:32 AM     Lab Results   Component Value Date/Time    INR 0.99 01/11/2017 09:05 PM    INR 0.99 01/14/2013 05:50 PM     Lab Results   Component Value Date/Time    CHOLSTRLTOT 140 02/20/2019 06:32 AM    LDL 71 02/20/2019 06:32 AM    HDL 45 02/20/2019 06:32 AM    TRIGLYCERIDE 120 02/20/2019 06:32 AM       No results found for: TESTOSTERONE  Lab Results   Component Value Date/Time    TSH 2.020 11/21/2017 07:21 AM     Lab Results   Component Value Date/Time    FREET4 1.32 02/20/2019 06:32 AM    FREET4 1.23 02/20/2018 08:18 AM     No results found for: URICACID  No components found for: VITB12  Lab Results   Component Value Date/Time    25HYDROXY 26 (L) 02/20/2019 06:32 AM    25HYDROXY 24  (L) 02/20/2018 08:18 AM          Assessment/Plan:     1. PVD (peripheral vascular disease) ([facility])  Patient reports worsening pain to his bilateral feet which is likely due to vascular disease. He can continue follow up with Dr. Sprague but he will be referred to vascular surgery as well. He is currently taking pentoxifylline. Will continue to monitor.   - REFERRAL TO VASCULAR SURGERY  - pentoxifylline CR (TRENTAL) 400 MG CR tablet; Take 1 Tab by mouth 3 times a day, with meals.  Dispense: 90 Tab    2. Mixed hyperlipidemia- dr sprague  Under good control with atorvastatin. Continue same regimen. Followed by Dr. Sprague.    3. Essential hypertension- DR SPRAGUE  Under good control with chlorthalidone and losartan. Continue same regimen. Followed by Dr. Sprague.    4. Uncontrolled type 2 diabetes mellitus with complication, with long-term current use of insulin ([facility])- dr browning  Under good control with glimepiride, humalog, metformin, and ozempic. Continue same regimen.   - OZEMPIC 0.25 or 0.5 MG/DOSE Solution Pen-injector; Inject 0.25 mg as instructed every 7 days.    5. Obesity (BMI 30.0-34.9)  Advised diet/exercise/lose 15 lbs.; patient counseled     6. Hypothyroidism due to acquired atrophy of thyroid- dr browning  Under good control. Continue same regimen. Followed by Dr. Browning.     7. Benign essential tremor- DR PÉREZ, [facility]  Under good control. Followed by Dr. Pérez, neurology.    8. Atrial fibrillation, transient ([facility])  Recent EKG's were negative for atrial fibrillation. Ordered a Holter monitor to assess for intermittent a-fib. He will continue to see Dr. Sprague for further treatment.   - Holter Monitor / Event Recorder; Future    9. Diabetic mononeuropathy associated with diabetes mellitus due to underlying condition ([facility])  Patient is currently taking gabapentin for treatment of his neuropathy. Will continue to monitor.   - gabapentin (NEURONTIN) 100 MG Cap;        Patient agrees to return for follow up in 3  months.    40 minute face-to-face encounter took place today.  More than half of this time was spent in the coordination of care of the above problems, as well as counseling.     IChintan (Scribe), am scribing for, and in the presence of, Ky Hernandez M.D..    Electronically signed by: Chintan Rutledge (Scribe), 4/2/2019    I, Ky Hernandez M.D., personally performed the services described in this documentation, as scribed by Chintan Rutledge in my presence, and it is both accurate and complete.

## 2019-04-17 ENCOUNTER — APPOINTMENT (RX ONLY)
Dept: URBAN - METROPOLITAN AREA CLINIC 20 | Facility: CLINIC | Age: 74
Setting detail: DERMATOLOGY
End: 2019-04-17

## 2019-04-17 DIAGNOSIS — L23.0 ALLERGIC CONTACT DERMATITIS DUE TO METALS: ICD-10-CM

## 2019-04-17 DIAGNOSIS — L21.8 OTHER SEBORRHEIC DERMATITIS: ICD-10-CM

## 2019-04-17 DIAGNOSIS — D18.0 HEMANGIOMA: ICD-10-CM

## 2019-04-17 DIAGNOSIS — L81.4 OTHER MELANIN HYPERPIGMENTATION: ICD-10-CM

## 2019-04-17 DIAGNOSIS — L82.1 OTHER SEBORRHEIC KERATOSIS: ICD-10-CM

## 2019-04-17 DIAGNOSIS — D22 MELANOCYTIC NEVI: ICD-10-CM

## 2019-04-17 PROBLEM — D18.01 HEMANGIOMA OF SKIN AND SUBCUTANEOUS TISSUE: Status: ACTIVE | Noted: 2019-04-17

## 2019-04-17 PROBLEM — D48.5 NEOPLASM OF UNCERTAIN BEHAVIOR OF SKIN: Status: ACTIVE | Noted: 2019-04-17

## 2019-04-17 PROBLEM — D22.5 MELANOCYTIC NEVI OF TRUNK: Status: ACTIVE | Noted: 2019-04-17

## 2019-04-17 PROCEDURE — ? BIOPSY BY SHAVE METHOD

## 2019-04-17 PROCEDURE — 11102 TANGNTL BX SKIN SINGLE LES: CPT

## 2019-04-17 PROCEDURE — ? COUNSELING

## 2019-04-17 PROCEDURE — ? ADDITIONAL NOTES

## 2019-04-17 PROCEDURE — 99214 OFFICE O/P EST MOD 30 MIN: CPT | Mod: 25

## 2019-04-17 ASSESSMENT — LOCATION SIMPLE DESCRIPTION DERM
LOCATION SIMPLE: INFERIOR FOREHEAD
LOCATION SIMPLE: RIGHT CHEEK
LOCATION SIMPLE: LEFT FOREARM
LOCATION SIMPLE: LEFT CHEEK
LOCATION SIMPLE: ABDOMEN
LOCATION SIMPLE: CHEST
LOCATION SIMPLE: RIGHT FOREARM
LOCATION SIMPLE: UPPER BACK
LOCATION SIMPLE: NOSE

## 2019-04-17 ASSESSMENT — LOCATION DETAILED DESCRIPTION DERM
LOCATION DETAILED: RIGHT CENTRAL MALAR CHEEK
LOCATION DETAILED: RIGHT VENTRAL PROXIMAL FOREARM
LOCATION DETAILED: LEFT VENTRAL PROXIMAL FOREARM
LOCATION DETAILED: INFERIOR THORACIC SPINE
LOCATION DETAILED: SUPERIOR THORACIC SPINE
LOCATION DETAILED: INFERIOR MID FOREHEAD
LOCATION DETAILED: NASAL DORSUM
LOCATION DETAILED: LEFT MEDIAL INFERIOR CHEST
LOCATION DETAILED: LEFT CENTRAL MALAR CHEEK
LOCATION DETAILED: PERIUMBILICAL SKIN

## 2019-04-17 ASSESSMENT — SEVERITY ASSESSMENT: SEVERITY: MILD

## 2019-04-17 ASSESSMENT — LOCATION ZONE DERM
LOCATION ZONE: NOSE
LOCATION ZONE: ARM
LOCATION ZONE: FACE
LOCATION ZONE: TRUNK

## 2019-04-17 NOTE — PROCEDURE: BIOPSY BY SHAVE METHOD
Detail Level: Detailed
Biopsy Method: Personna blade
Curettage Text: The wound bed was treated with curettage after the biopsy was performed.
Notification Instructions: Patient will be notified of biopsy results. However, patient instructed to call the office if not contacted within 2 weeks.
Lab: [id number]
Bill For Surgical Tray: no
Silver Nitrate Text: The wound bed was treated with silver nitrate after the biopsy was performed.
Hemostasis: Drysol
Cryotherapy Text: The wound bed was treated with cryotherapy after the biopsy was performed.
Lab Facility: 
Consent: Written consent was obtained and risks were reviewed including but not limited to scarring, infection, bleeding, scabbing, incomplete removal, nerve damage and allergy to anesthesia.
Anesthesia Type: 1% lidocaine with 1:100,000 epinephrine and a 1:10 solution of 8.4% sodium bicarbonate
Size Of Lesion In Cm: 1
X Size Of Lesion In Cm: 0
Biopsy Type: H and E
Depth Of Biopsy: dermis
Wound Care: Petrolatum
Render Post-Care Instructions In Note?: yes
Electrodesiccation Text: The wound bed was treated with electrodesiccation after the biopsy was performed.
Dressing: pressure dressing with telfa
Post-Care Instructions: I reviewed with the patient in detail post-care instructions. Patient is to keep the biopsy site dry overnight. Gentle cleansing daily.  Apply petroleum ointment daily until healed. Patient may apply hydrogen peroxide soaks to remove any crusting.
Type Of Destruction Used: Curettage
Billing Type: Third-Party Bill
Anesthesia Volume In Cc: 2
Electrodesiccation And Curettage Text: The wound bed was treated with electrodesiccation and curettage after the biopsy was performed.

## 2019-04-17 NOTE — PROCEDURE: ADDITIONAL NOTES
Detail Level: Detailed
Additional Notes: advised to use clobetasol as needed. Cover metal button on pants with duct tape

## 2019-05-14 ENCOUNTER — NON-PROVIDER VISIT (OUTPATIENT)
Dept: CARDIOLOGY | Facility: MEDICAL CENTER | Age: 74
End: 2019-05-14
Payer: MEDICARE

## 2019-05-14 ENCOUNTER — TELEPHONE (OUTPATIENT)
Dept: CARDIOLOGY | Facility: MEDICAL CENTER | Age: 74
End: 2019-05-14

## 2019-05-14 DIAGNOSIS — I48.91 ATRIAL FIBRILLATION, TRANSIENT (HCC): ICD-10-CM

## 2019-05-15 ENCOUNTER — APPOINTMENT (RX ONLY)
Dept: URBAN - METROPOLITAN AREA CLINIC 20 | Facility: CLINIC | Age: 74
Setting detail: DERMATOLOGY
End: 2019-05-15

## 2019-05-15 PROBLEM — C44.41 BASAL CELL CARCINOMA OF SKIN OF SCALP AND NECK: Status: ACTIVE | Noted: 2019-05-15

## 2019-05-15 PROCEDURE — ? COUNSELING

## 2019-05-15 PROCEDURE — 17272 DSTR MAL LES S/N/H/F/G 1.1-2: CPT

## 2019-05-15 PROCEDURE — ? CURETTAGE AND DESTRUCTION

## 2019-05-15 NOTE — PROCEDURE: CURETTAGE AND DESTRUCTION
Render Post-Care Instructions In Note?: yes
Anesthesia Volume In Cc: 3
Bill For Surgical Tray: no
Additional Information: (Optional): The wound was cleaned, and a pressure dressing was applied.  The patient received detailed post-op instructions.
Body Location Override (Optional - Billing Will Still Be Based On Selected Body Map Location If Applicable): right posterior neck
Post-Care Instructions: I reviewed with the patient in detail post-care instructions. Patient is to keep the area dry for 48 hours, and not to engage in any swimming until the area is healed. Should the patient develop any fevers, chills, bleeding, severe pain patient will contact the office immediately.
Size Of Lesion After Curettage: 1.4
What Was Performed First?: Curettage
Accession # (Optional): [id number]
Bill As A Line Item Or As Units: Line Item
Cautery Type: electrodesiccation
Size Of Lesion In Cm: 1
Consent was obtained from the patient. The risks, benefits and alternatives to therapy were discussed in detail. Specifically, the risks of infection, scarring, bleeding, prolonged wound healing, nerve injury, incomplete removal, allergy to anesthesia and recurrence were addressed. Alternatives to ED&C, such as: surgical removal and XRT were also discussed.  Prior to the procedure, the treatment site was clearly identified and confirmed by the patient. All components of Universal Protocol/PAUSE Rule completed.
Anesthesia Type: 1% lidocaine with epinephrine
Detail Level: Detailed

## 2019-06-03 PROCEDURE — 0298T PR EXT ECG > 48HR TO 21 DAY REVIEW AND INTERPRETATN: CPT | Performed by: INTERNAL MEDICINE

## 2019-06-03 PROCEDURE — 0296T PR EXT ECG > 48HR TO 21 DAY RCRD W/CONECT INTL RCRD: CPT | Performed by: INTERNAL MEDICINE

## 2019-06-04 ENCOUNTER — HOSPITAL ENCOUNTER (OUTPATIENT)
Dept: LAB | Facility: MEDICAL CENTER | Age: 74
End: 2019-06-04
Attending: INTERNAL MEDICINE
Payer: MEDICARE

## 2019-06-04 DIAGNOSIS — E03.4 HYPOTHYROIDISM DUE TO ACQUIRED ATROPHY OF THYROID: ICD-10-CM

## 2019-06-04 DIAGNOSIS — E78.2 MIXED HYPERLIPIDEMIA: ICD-10-CM

## 2019-06-04 LAB
ALBUMIN SERPL BCP-MCNC: 3.7 G/DL (ref 3.2–4.9)
ALBUMIN/GLOB SERPL: 1.4 G/DL
ALP SERPL-CCNC: 22 U/L (ref 30–99)
ALT SERPL-CCNC: 22 U/L (ref 2–50)
ANION GAP SERPL CALC-SCNC: 8 MMOL/L (ref 0–11.9)
AST SERPL-CCNC: 19 U/L (ref 12–45)
BASOPHILS # BLD AUTO: 0.7 % (ref 0–1.8)
BASOPHILS # BLD: 0.05 K/UL (ref 0–0.12)
BILIRUB SERPL-MCNC: 0.4 MG/DL (ref 0.1–1.5)
BUN SERPL-MCNC: 32 MG/DL (ref 8–22)
CALCIUM SERPL-MCNC: 10.2 MG/DL (ref 8.5–10.5)
CHLORIDE SERPL-SCNC: 99 MMOL/L (ref 96–112)
CHOLEST SERPL-MCNC: 116 MG/DL (ref 100–199)
CO2 SERPL-SCNC: 27 MMOL/L (ref 20–33)
CREAT SERPL-MCNC: 1.94 MG/DL (ref 0.5–1.4)
CREAT UR-MCNC: 102.9 MG/DL
EOSINOPHIL # BLD AUTO: 0.22 K/UL (ref 0–0.51)
EOSINOPHIL NFR BLD: 3.3 % (ref 0–6.9)
ERYTHROCYTE [DISTWIDTH] IN BLOOD BY AUTOMATED COUNT: 50.1 FL (ref 35.9–50)
EST. AVERAGE GLUCOSE BLD GHB EST-MCNC: 186 MG/DL
FASTING STATUS PATIENT QL REPORTED: NORMAL
GLOBULIN SER CALC-MCNC: 2.7 G/DL (ref 1.9–3.5)
GLUCOSE SERPL-MCNC: 132 MG/DL (ref 65–99)
HBA1C MFR BLD: 8.1 % (ref 0–5.6)
HCT VFR BLD AUTO: 41.3 % (ref 42–52)
HDLC SERPL-MCNC: 45 MG/DL
HGB BLD-MCNC: 13.3 G/DL (ref 14–18)
IMM GRANULOCYTES # BLD AUTO: 0.03 K/UL (ref 0–0.11)
IMM GRANULOCYTES NFR BLD AUTO: 0.4 % (ref 0–0.9)
LDLC SERPL CALC-MCNC: 54 MG/DL
LYMPHOCYTES # BLD AUTO: 2.02 K/UL (ref 1–4.8)
LYMPHOCYTES NFR BLD: 30.1 % (ref 22–41)
MCH RBC QN AUTO: 29.6 PG (ref 27–33)
MCHC RBC AUTO-ENTMCNC: 32.2 G/DL (ref 33.7–35.3)
MCV RBC AUTO: 91.8 FL (ref 81.4–97.8)
MICROALBUMIN UR-MCNC: <0.7 MG/DL
MICROALBUMIN/CREAT UR: NORMAL MG/G (ref 0–30)
MONOCYTES # BLD AUTO: 0.74 K/UL (ref 0–0.85)
MONOCYTES NFR BLD AUTO: 11 % (ref 0–13.4)
NEUTROPHILS # BLD AUTO: 3.66 K/UL (ref 1.82–7.42)
NEUTROPHILS NFR BLD: 54.5 % (ref 44–72)
NRBC # BLD AUTO: 0 K/UL
NRBC BLD-RTO: 0 /100 WBC
PLATELET # BLD AUTO: 452 K/UL (ref 164–446)
PMV BLD AUTO: 10.6 FL (ref 9–12.9)
POTASSIUM SERPL-SCNC: 4.1 MMOL/L (ref 3.6–5.5)
PROT SERPL-MCNC: 6.4 G/DL (ref 6–8.2)
RBC # BLD AUTO: 4.5 M/UL (ref 4.7–6.1)
SODIUM SERPL-SCNC: 134 MMOL/L (ref 135–145)
TRIGL SERPL-MCNC: 87 MG/DL (ref 0–149)
TSH SERPL DL<=0.005 MIU/L-ACNC: 2.1 UIU/ML (ref 0.38–5.33)
WBC # BLD AUTO: 6.7 K/UL (ref 4.8–10.8)

## 2019-06-04 PROCEDURE — 80061 LIPID PANEL: CPT

## 2019-06-04 PROCEDURE — 84443 ASSAY THYROID STIM HORMONE: CPT

## 2019-06-04 PROCEDURE — 85025 COMPLETE CBC W/AUTO DIFF WBC: CPT

## 2019-06-04 PROCEDURE — 82043 UR ALBUMIN QUANTITATIVE: CPT

## 2019-06-04 PROCEDURE — 36415 COLL VENOUS BLD VENIPUNCTURE: CPT

## 2019-06-04 PROCEDURE — 82570 ASSAY OF URINE CREATININE: CPT

## 2019-06-04 PROCEDURE — 83036 HEMOGLOBIN GLYCOSYLATED A1C: CPT | Mod: GA

## 2019-06-04 PROCEDURE — 80053 COMPREHEN METABOLIC PANEL: CPT

## 2019-06-18 ENCOUNTER — TELEPHONE (OUTPATIENT)
Dept: MEDICAL GROUP | Age: 74
End: 2019-06-18

## 2019-06-18 NOTE — TELEPHONE ENCOUNTER
VOICEMAIL  1. Caller Name: Kevin Jackson                        Call Back Number: [phone] (home)       2. Message: Our office called him in regards to his monitor. Check encounters and there was no  Documentation.   3. Patient approves office to leave a detailed voicemail/MyChart message: no

## 2019-06-18 NOTE — TELEPHONE ENCOUNTER
Phone Number Called: [phone] (home)       Call outcome: spoke to patient regarding message below    Message: patient was wondering about Ziopatch results. Informed him of Doctor Mary's note sent on 06/03/2019. Explained to him that Dr. Hernandez can answer further questions at upcoming appointment tomorrow at 3:40pm

## 2019-06-19 ENCOUNTER — APPOINTMENT (OUTPATIENT)
Dept: MEDICAL GROUP | Age: 74
End: 2019-06-19
Payer: MEDICARE

## 2019-06-20 ENCOUNTER — OFFICE VISIT (OUTPATIENT)
Dept: MEDICAL GROUP | Age: 74
End: 2019-06-20
Payer: MEDICARE

## 2019-06-20 VITALS
SYSTOLIC BLOOD PRESSURE: 118 MMHG | TEMPERATURE: 96.8 F | HEART RATE: 83 BPM | HEIGHT: 72 IN | WEIGHT: 222.4 LBS | DIASTOLIC BLOOD PRESSURE: 78 MMHG | BODY MASS INDEX: 30.12 KG/M2 | OXYGEN SATURATION: 92 %

## 2019-06-20 DIAGNOSIS — E66.9 OBESITY (BMI 30.0-34.9): ICD-10-CM

## 2019-06-20 DIAGNOSIS — L97.512 DIABETIC ULCER OF TOE OF RIGHT FOOT ASSOCIATED WITH TYPE 2 DIABETES MELLITUS, WITH FAT LAYER EXPOSED (HCC): ICD-10-CM

## 2019-06-20 DIAGNOSIS — E03.4 HYPOTHYROIDISM DUE TO ACQUIRED ATROPHY OF THYROID: ICD-10-CM

## 2019-06-20 DIAGNOSIS — I73.9 PVD (PERIPHERAL VASCULAR DISEASE) (HCC): ICD-10-CM

## 2019-06-20 DIAGNOSIS — K21.00 GASTROESOPHAGEAL REFLUX DISEASE WITH ESOPHAGITIS: ICD-10-CM

## 2019-06-20 DIAGNOSIS — E55.9 HYPOVITAMINOSIS D: ICD-10-CM

## 2019-06-20 DIAGNOSIS — E11.621 DIABETIC ULCER OF TOE OF RIGHT FOOT ASSOCIATED WITH TYPE 2 DIABETES MELLITUS, WITH FAT LAYER EXPOSED (HCC): ICD-10-CM

## 2019-06-20 DIAGNOSIS — G25.0 BENIGN ESSENTIAL TREMOR: ICD-10-CM

## 2019-06-20 DIAGNOSIS — I48.91 ATRIAL FIBRILLATION, TRANSIENT (HCC): ICD-10-CM

## 2019-06-20 DIAGNOSIS — E08.41 DIABETIC MONONEUROPATHY ASSOCIATED WITH DIABETES MELLITUS DUE TO UNDERLYING CONDITION (HCC): ICD-10-CM

## 2019-06-20 DIAGNOSIS — I10 ESSENTIAL HYPERTENSION: ICD-10-CM

## 2019-06-20 DIAGNOSIS — I47.29 NONSUSTAINED VENTRICULAR TACHYCARDIA (HCC): ICD-10-CM

## 2019-06-20 DIAGNOSIS — E78.2 MIXED HYPERLIPIDEMIA: ICD-10-CM

## 2019-06-20 DIAGNOSIS — N40.1 BENIGN NON-NODULAR PROSTATIC HYPERPLASIA WITH LOWER URINARY TRACT SYMPTOMS: ICD-10-CM

## 2019-06-20 PROCEDURE — 99215 OFFICE O/P EST HI 40 MIN: CPT | Performed by: INTERNAL MEDICINE

## 2019-06-20 RX ORDER — CEPHALEXIN 500 MG/1
CAPSULE ORAL
COMMUNITY
Start: 2019-06-14 | End: 2019-08-08 | Stop reason: SDUPTHER

## 2019-06-20 ASSESSMENT — ENCOUNTER SYMPTOMS
PSYCHIATRIC NEGATIVE: 1
RESPIRATORY NEGATIVE: 1
CONSTITUTIONAL NEGATIVE: 1
EYES NEGATIVE: 1
CARDIOVASCULAR NEGATIVE: 1
NEUROLOGICAL NEGATIVE: 1
GASTROINTESTINAL NEGATIVE: 1
MUSCULOSKELETAL NEGATIVE: 1

## 2019-06-20 NOTE — PROGRESS NOTES
Subjective:      Kevin Jackson is a 74 y.o. male who presents with Follow-Up        HPI    The patient is here for followup of chronic medical problems listed below. The patient is compliant with medications and having no side effects from them. Denies chest pain, abdominal pain, dyspnea, myalgias, or cough.      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 He is tolerating these medications well without side effects. He currently has a wound on the tip of his right second toe. The infection has not reached bone. Patient is being seen at wound care up at [facility] and has dried gangrene at the tip of the toe. There are plans to treat the toe with hyperbarics followed by surgery to remove the tip of it, which will be performed by Dr. Son.. He is taking Keflex 500 mg to help prevent any worsening of the infection. Patient followed by Dr. Bates, as well as Dr. Mohsen, (Cardiology) for treatment of these symptoms  And has plans to have an angiopassty performed on the right leg in July 2019. Glucose from 6/4/19 was elevated at 132 and A1C is elevated at 8.1. Patient is also followed By Dr. Son for podiatry.    Mixed hyperlipidemia  Patient is taking choline fenofibrate 135 mg, atorvastatin 80 mg,  which he is tolerating well without side effects. Lipid panel from 6/4/19 shows all results are within normal limits. He was previously seeing Dr. Sprague, however is looking into leaving due to complications with being seen.    Hypothyroidism due to acquired atrophy of thyroid- dr browning  Patient is taking levothyroxine 50 mcg, which he is tolerating well without side effects.    Gastroesophageal reflux disease with esophagitis  Patient is manging through his own efforts of a controlled diet. He has no new complaints or concerns at this time.    Benign non-nodular  prostatic hyperplasia with lower urinary tract symptoms- [facility]  Patient is taking tamsulosin 0.4 mg, which he is tolerating well without side effects. He does not complain of any urinary retention or frequency today.    Benign essential tremor  Patient is taking primidone 50 mg, propanolol 120 mg. He is tolerating these medications well without side effects. He is followed by Dr. Rapp for neurologic needs.    Nonsustained ventricular tachycardia  It was previously unclear whether the patient has atrial fibrillation or not. He wore a Zio patch from may 14th to may 28th which showed he has a heart murmur however no other findings have been found. He is currently taking aspirin 81 mg. Further review was able to locate the Zio patch results which show he has episodes of PVC's, however Atrial Fibrillation was not identified.     PVD (peripheral vascular disease)  Patient is taking pentoxifyline 400 mg, which he is tolerating well without side effects.    Diabetic mononeuropathy associated with diabetes mellitus due to underlying condition  Patient is taking gabapentin 100 mg, which he is tolerating well without side effects.     Essential hypertension- DR SPRAGUE  Patient is taking Losartan-HCTZ 50-12.5 mg, chlorthalidone 25 mg, , which he is tolerating well without side effects. Pressures today are 118/78. Patient was previously following Dr. Sprague however is looking into leaving due to complications with seeing him.    Hypovitaminosis D  Patient is taking cholecalciferol 1000 units, which he is tolerating well without side effects.      Patient Active Problem List   Diagnosis   • Mixed hyperlipidemia- dr sprague   • Essential hypertension- DR SPRAGUE   • Hypovitaminosis D   • Uncontrolled type 2 diabetes mellitus with complication, with long-term current use of insulin (HCC)- dr browning   • Obesity (BMI 30.0-34.9)   • Benign non-nodular prostatic hyperplasia with lower urinary tract symptoms- [facility]   •  Gastroesophageal reflux disease with esophagitis- PPI PRN   • Hypothyroidism due to acquired atrophy of thyroid- dr browning   • Benign essential tremor- DR OLIVAS, [facility]   • Encounter for screening- [facility] screening 2019- neg  abd US for AAA, MARELY, carotid US, EKG (no A.Fib to my review)   • PVD (peripheral vascular disease) (HCC)   • Atrial fibrillation, transient (HCC)   • Diabetic mononeuropathy associated with diabetes mellitus due to underlying condition (HCC)       Outpatient Medications Prior to Visit   Medication Sig Dispense Refill   • gabapentin (NEURONTIN) 100 MG Cap      • OZEMPIC 0.25 or 0.5 MG/DOSE Solution Pen-injector Inject 0.25 mg as instructed every 7 days.     • pentoxifylline CR (TRENTAL) 400 MG CR tablet Take 1 Tab by mouth 3 times a day, with meals. 90 Tab    • glimepiride (AMARYL) 4 MG Tab Take 1 Tab by mouth every morning. 30 Tab 11   • HUMALOG MIX 50/50 KWIKPEN (50-50) 100 UNIT/ML Suspension Pen-injector Inject 30 Units as instructed every day. 10 PEN 11   • Empagliflozin (JARDIANCE) 25 MG Tab Take 25 mg by mouth every day. 90 Tab 4   • metformin (GLUCOPHAGE) 1000 MG tablet Take 1 Tab by mouth 2 times a day, with meals. 60 Tab 11   • propranolol CR (INDERAL LA) 120 MG CAPSULE SR 24 HR Take 1 Cap by mouth every morning. 90 Cap 4   • tamsulosin (FLOMAX) 0.4 MG capsule Take 1 Cap by mouth every day. 90 Cap 4   • primidone (MYSOLINE) 50 MG Tab Take 1 Tab by mouth every evening. 90 Tab 3   • atorvastatin (LIPITOR) 80 MG tablet Take 1 Tab by mouth every evening. 90 Tab 4   • chlorthalidone (HYGROTON) 25 MG Tab Take 1 Tab by mouth every day. 30 Tab 11   • choline fenofibrate (TRILIPIX) 135 MG CAPSULE DELAYED RELEASE Take 1 Cap by mouth every day. 30 Tab 11   • levothyroxine (SYNTHROID) 50 MCG Tab Take 1 Tab by mouth every morning before breakfast. 30 Tab 11   • losartan-hydrochlorothiazide (HYZAAR) 50-12.5 MG per tablet Take 1 Tab by mouth every day. 30 Tab 11   • aspirin EC (ECOTRIN) 81 MG  "TBEC Take 81 mg by mouth every day.     • vitamin D (CHOLECALCIFEROL) 1000 UNIT TABS Take 1,000 Units by mouth 3 times a day.       No facility-administered medications prior to visit.         Allergies   Allergen Reactions   • Zocor [Simvastatin - High Dose]      Pt tells me this medication gave him \"side effect\" of feeling nauseated; denies allergies to medications       Review of Systems   Constitutional: Negative.    HENT: Negative.    Eyes: Negative.    Respiratory: Negative.    Cardiovascular: Negative.    Gastrointestinal: Negative.    Genitourinary: Negative.    Musculoskeletal: Negative.    Skin: Negative for itching and rash.        Positive: Toe wound   Neurological: Negative.    Endo/Heme/Allergies: Negative.    Psychiatric/Behavioral: Negative.    All other systems reviewed and are negative.       Objective:     /78 (BP Location: Right arm, Patient Position: Sitting, BP Cuff Size: Adult)   Pulse 83   Temp 36 °C (96.8 °F) (Temporal)   Ht 1.829 m (6')   Wt 100.9 kg (222 lb 6.4 oz)   SpO2 92%   BMI 30.16 kg/m²     Physical Exam   Constitutional: Oriented to person, place, and time. Appears well-developed and well-nourished. No distress.   Head: Normocephalic and atraumatic.   Right Ear: External ear normal.   Left Ear: External ear normal.   Nose: Nose normal.   Mouth/Throat: Oropharynx is clear and moist. No oropharyngeal exudate.   Eyes: Pupils are equal, round, and reactive to light. Conjunctivae and EOM are normal. Right eye exhibits no discharge. Left eye exhibits no discharge. No scleral icterus.   Neck: Normal range of motion. Neck supple. No JVD present. No tracheal deviation present. No thyromegaly present.   Cardiovascular: Normal rate, regular rhythm, normal heart sounds and intact distal pulses.  Exam reveals no gallop and no friction rub.    No murmur heard.  Pulmonary/Chest: Effort normal. No stridor. No respiratory distress. No wheezing or rales. No tenderness.   Abdominal: Soft. " Bowel sounds are normal. No distension and no mass. There is no tenderness. There is no rebound and no guarding. No hernia.   Musculoskeletal: Normal range of motion No edema or tenderness.   Lymphadenopathy: No cervical adenopathy.   Neurological: Alert and oriented to person, place, and time. Normal reflexes. Normal reflexes. No cranial nerve deficit. Normal muscle tone. Coordination normal.   Skin: Skin is warm and dry. No rash noted. Not diaphoretic. No erythema. No pallor.   Psychiatric: Normal mood and affect. Behavior is normal. Judgment and thought content normal.   Nursing note and vitals reviewed.      Lab Results   Component Value Date/Time    HBA1C 8.1 (H) 06/04/2019 06:36 AM    HBA1C 9.0 (H) 02/20/2019 06:32 AM     Lab Results   Component Value Date/Time    SODIUM 134 (L) 06/04/2019 06:36 AM    POTASSIUM 4.1 06/04/2019 06:36 AM    CHLORIDE 99 06/04/2019 06:36 AM    CO2 27 06/04/2019 06:36 AM    GLUCOSE 132 (H) 06/04/2019 06:36 AM    BUN 32 (H) 06/04/2019 06:36 AM    CREATININE 1.94 (H) 06/04/2019 06:36 AM    BUNCREATRAT 24 11/21/2017 07:21 AM    ALKPHOSPHAT 22 (L) 06/04/2019 06:36 AM    ASTSGOT 19 06/04/2019 06:36 AM    ALTSGPT 22 06/04/2019 06:36 AM    TBILIRUBIN 0.4 06/04/2019 06:36 AM     Lab Results   Component Value Date/Time    INR 0.99 01/11/2017 09:05 PM    INR 0.99 01/14/2013 05:50 PM     Lab Results   Component Value Date/Time    CHOLSTRLTOT 116 06/04/2019 06:36 AM    LDL 54 06/04/2019 06:36 AM    HDL 45 06/04/2019 06:36 AM    TRIGLYCERIDE 87 06/04/2019 06:36 AM       No results found for: TESTOSTERONE  Lab Results   Component Value Date/Time    TSH 2.020 11/21/2017 07:21 AM     Lab Results   Component Value Date/Time    FREET4 1.32 02/20/2019 06:32 AM    FREET4 1.23 02/20/2018 08:18 AM     No results found for: URICACID  No components found for: VITB12  Lab Results   Component Value Date/Time    25HYDROXY 26 (L) 02/20/2019 06:32 AM    25HYDROXY 24 (L) 02/20/2018 08:18 AM           Assessment/Plan:             1. Uncontrolled type 2 diabetes mellitus with complication, with long-term current use of insulin (HCC)- dr browning  - Under control. Continue same regimen of medications as well as following Dr. Browning for problems and concerns.  diet/exercise/lose 15 lbs.; patient counseled    2. Obesity (BMI 30.0-34.9)  - diet/exercise/lose 15 lbs.; patient counseled    3. Mixed hyperlipidemia- dr dwyer  - Under good control. Continue same regimen of medications    4. Hypothyroidism due to acquired atrophy of thyroid- dr browning  Under good control. Continue same regimen of levothyroxine    5. Gastroesophageal reflux disease with esophagitis- PPI PRN  - Patient will continue to manage through his own efforts of diet and medications    6. Benign non-nodular prostatic hyperplasia with lower urinary tract symptoms- [facility]  - Under good control. Continue same regimen of tamsulosin    7. Benign essential tremor- DR PÉREZ,  NEUROLOGY  - Under good control. Continue same regimen of medications and follow Dr. Pérez for any concerns or complaints.    8. Nonsustained ventricular tachycardia (HCC)  Review of the patients Zio monitor x 2 wks shows that he has episoes of non-sustained tachycardia, occasional PVCs, and occasional PACs, but he does not have atrial fibrillation.  He remained in normal sinus rhythm throughout.  He will continue aspirin 81 mg but does not require anticoagulants.  Patient was reassured to the benign nature of these findings and that he does not need anticoagulation.  However because he does have evidence of peripheral vascular disease by history (see below) he should stay on aspirin 81 mg daily    9. PVD (peripheral vascular disease) ([facility])- dr mohsen (cardilogy) at [facility]; arterial angioplasty right leg tbd july2019   - Continue to follow with Dr. Mohsen, Cardiology, for evaluation and treatment. Continue regimen of associated medications as well.    10. Diabetic mononeuropathy  associated with diabetes mellitus due to underlying condition (HCC)  Under good control. Continue same regimen of gabapentin    11. Essential hypertension- DR VIZCARRA  Under good control. Continue same regimen of hypertensive medications    12. 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    13. Hypovitaminosis D  Under good control. Continue same regimen of cholecalciferol.     Other orders  - cephALEXin (KEFLEX) 500 MG Cap;     40 minute face-to-face encounter took place today.  More than half of this time was spent in the coordination of care of the above problems, as well as counseling.     IDemar (Scribe), am scribing for, and in the presence of, Ky Hernandez M.D..    Electronically signed by: Demar Head (Scribe), 6/20/2019    IKy M.D., personally performed the services described in this documentation, as scribed by Demar Head in my presence, and it is both accurate and complete.

## 2019-07-08 ENCOUNTER — OFFICE VISIT (OUTPATIENT)
Dept: MEDICAL GROUP | Age: 74
End: 2019-07-08
Payer: MEDICARE

## 2019-07-08 VITALS
DIASTOLIC BLOOD PRESSURE: 76 MMHG | BODY MASS INDEX: 29.53 KG/M2 | WEIGHT: 218 LBS | HEIGHT: 72 IN | HEART RATE: 79 BPM | SYSTOLIC BLOOD PRESSURE: 118 MMHG | OXYGEN SATURATION: 91 % | TEMPERATURE: 97.3 F

## 2019-07-08 DIAGNOSIS — M79.673 ISCHEMIC FOOT PAIN AT REST: ICD-10-CM

## 2019-07-08 DIAGNOSIS — I99.8 ISCHEMIC FOOT PAIN AT REST: ICD-10-CM

## 2019-07-08 DIAGNOSIS — I96 GANGRENE OF TOE OF RIGHT FOOT (HCC): ICD-10-CM

## 2019-07-08 DIAGNOSIS — I73.9 PVD (PERIPHERAL VASCULAR DISEASE) (HCC): ICD-10-CM

## 2019-07-08 PROCEDURE — 99215 OFFICE O/P EST HI 40 MIN: CPT | Performed by: INTERNAL MEDICINE

## 2019-07-08 ASSESSMENT — ENCOUNTER SYMPTOMS
CARDIOVASCULAR NEGATIVE: 1
CONSTITUTIONAL NEGATIVE: 1
RESPIRATORY NEGATIVE: 1

## 2019-07-08 NOTE — PROGRESS NOTES
"Subjective:      Kevin Jackson is a 74 y.o. male who presents with Foot Pain (right foot)        HPI  The patient is here for followup of chronic medical problems listed below. The patient is compliant with medications and having no side effects from them. Denies chest pain, abdominal pain, dyspnea, myalgias, or cough.      1. PVD (peripheral vascular disease) ([facility])- dr mohsen (cardilogy) at [facility]; arterial angioplasty right leg tbd july2019; rx trental (dr hernandes podiatry)  2. Ischemic foot pain at rest ([facility])- s/p angioplasty by dr mohsen at [facility] 7/2/19- failed  3. Gangrene of toe of right foot ([facility])  Patient has a history of peripheral vascular disease. Patient had recent angioplasty performed on 7/2/19 by Dr. Mohsen, Cardiology at [facility]. He was started on Xarelto which he has taken as prescribed. Since surgery, patient complains of severe right foot pain described as \"soreness\". He describes pain and numbness mostly felt over his plantar right foot. Patient's right 2nd toe digit is necrotic appearing at distal tip of toe.  Patient denies any fever, chest pain, or shortness of breath.                 Patient Active Problem List   Diagnosis   • Mixed hyperlipidemia- dr dwyer   • Essential hypertension- DR DWYER   • Hypovitaminosis D   • Uncontrolled type 2 diabetes mellitus with complication, with long-term current use of insulin ([facility])- dr browning   • Obesity (BMI 30.0-34.9)   • Benign non-nodular prostatic hyperplasia with lower urinary tract symptoms- [facility]   • Gastroesophageal reflux disease with esophagitis- PPI PRN   • Hypothyroidism due to acquired atrophy of thyroid- dr browning   • Benign essential tremor- DR OLIVAS, [facility]   • Encounter for screening- [facility] screening 2019- neg  abd US for AAA, MARELY, carotid US, EKG (no A.Fib to my review)   • PVD (peripheral vascular disease) ([facility])- dr mohsen (cardilogy) at [facility]; arterial angioplasty right leg tbd july2019; rx trental (dr hernandes podiatry) "   • Diabetic mononeuropathy associated with diabetes mellitus due to underlying condition ([facility])-m rx gabapentin   • 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 guillermo  [facility]  wound care    • Nonsustained ventricular tachycardia ([facility])- ziopatch may 2019; PAC's and PVC's, NSR; NO A. FIB   • Ischemic foot pain at rest ([facility])       Outpatient Medications Prior to Visit   Medication Sig Dispense Refill   • cephALEXin (KEFLEX) 500 MG Cap      • gabapentin (NEURONTIN) 100 MG Cap      • OZEMPIC 0.25 or 0.5 MG/DOSE Solution Pen-injector Inject 0.25 mg as instructed every 7 days.     • pentoxifylline CR (TRENTAL) 400 MG CR tablet Take 1 Tab by mouth 3 times a day, with meals. 90 Tab    • glimepiride (AMARYL) 4 MG Tab Take 1 Tab by mouth every morning. 30 Tab 11   • HUMALOG MIX 50/50 KWIKPEN (50-50) 100 UNIT/ML Suspension Pen-injector Inject 30 Units as instructed every day. 10 PEN 11   • Empagliflozin (JARDIANCE) 25 MG Tab Take 25 mg by mouth every day. 90 Tab 4   • metformin (GLUCOPHAGE) 1000 MG tablet Take 1 Tab by mouth 2 times a day, with meals. 60 Tab 11   • propranolol CR (INDERAL LA) 120 MG CAPSULE SR 24 HR Take 1 Cap by mouth every morning. 90 Cap 4   • tamsulosin (FLOMAX) 0.4 MG capsule Take 1 Cap by mouth every day. 90 Cap 4   • primidone (MYSOLINE) 50 MG Tab Take 1 Tab by mouth every evening. 90 Tab 3   • atorvastatin (LIPITOR) 80 MG tablet Take 1 Tab by mouth every evening. 90 Tab 4   • chlorthalidone (HYGROTON) 25 MG Tab Take 1 Tab by mouth every day. 30 Tab 11   • choline fenofibrate (TRILIPIX) 135 MG CAPSULE DELAYED RELEASE Take 1 Cap by mouth every day. 30 Tab 11   • levothyroxine (SYNTHROID) 50 MCG Tab Take 1 Tab by mouth every morning before breakfast. 30 Tab 11   • losartan-hydrochlorothiazide (HYZAAR) 50-12.5 MG per tablet Take 1 Tab by mouth every day. 30 Tab 11   • aspirin EC (ECOTRIN) 81 MG TBEC  "Take 81 mg by mouth every day.     • vitamin D (CHOLECALCIFEROL) 1000 UNIT TABS Take 1,000 Units by mouth 3 times a day.       No facility-administered medications prior to visit.         Allergies   Allergen Reactions   • Zocor [Simvastatin - High Dose]      Pt tells me this medication gave him \"side effect\" of feeling nauseated; denies allergies to medications       Review of Systems   Constitutional: Negative.    Respiratory: Negative.    Cardiovascular: Negative.    Musculoskeletal:        Positive for right foot pain and numbness.    All other systems reviewed and are negative.       Objective:     /76 (BP Location: Right arm, Patient Position: Sitting, BP Cuff Size: Adult)   Pulse 79   Temp 36.3 °C (97.3 °F) (Temporal)   Ht 1.829 m (6')   Wt 98.9 kg (218 lb)   SpO2 91%   BMI 29.57 kg/m²     Physical Exam   Constitutional: Oriented to person, place, and time. Appears well-developed and well-nourished. No distress.   Head: Normocephalic and atraumatic.   Right Ear: External ear normal.   Left Ear: External ear normal.   Nose: Nose normal.   Mouth/Throat: Oropharynx is clear and moist. No oropharyngeal exudate.   Eyes: Pupils are equal, round, and reactive to light. Conjunctivae and EOM are normal. Right eye exhibits no discharge. Left eye exhibits no discharge. No scleral icterus.   Neck: Normal range of motion. Neck supple. No JVD present. No tracheal deviation present. No thyromegaly present.   Cardiovascular: Normal rate, regular rhythm, normal heart sounds and intact distal pulses.  Exam reveals no gallop and no friction rub.    No murmur heard.  Pulmonary/Chest: Effort normal. No stridor. No respiratory distress. No wheezing or rales. No tenderness.   Abdominal: Soft. Bowel sounds are normal. No distension and no mass. There is no tenderness. There is no rebound and no guarding. No hernia.   Musculoskeletal: Normal range of motion No tenderness.   Lymphadenopathy: No cervical adenopathy. "   Neurological: Alert and oriented to person, place, and time. Normal reflexes. Normal reflexes. No cranial nerve deficit. Normal muscle tone. Coordination normal.   Skin: Skin is warm and dry. No pallor.   Psychiatric: Normal mood and affect. Behavior is normal. Judgment and thought content normal.   Nursing note and vitals reviewed.    Extremities:   Right distal phalanx is necrotic . No distal pulses in right foot.       Lab Results   Component Value Date/Time    HBA1C 8.1 (H) 06/04/2019 06:36 AM    HBA1C 9.0 (H) 02/20/2019 06:32 AM     Lab Results   Component Value Date/Time    SODIUM 134 (L) 06/04/2019 06:36 AM    POTASSIUM 4.1 06/04/2019 06:36 AM    CHLORIDE 99 06/04/2019 06:36 AM    CO2 27 06/04/2019 06:36 AM    GLUCOSE 132 (H) 06/04/2019 06:36 AM    BUN 32 (H) 06/04/2019 06:36 AM    CREATININE 1.94 (H) 06/04/2019 06:36 AM    BUNCREATRAT 24 11/21/2017 07:21 AM    ALKPHOSPHAT 22 (L) 06/04/2019 06:36 AM    ASTSGOT 19 06/04/2019 06:36 AM    ALTSGPT 22 06/04/2019 06:36 AM    TBILIRUBIN 0.4 06/04/2019 06:36 AM     Lab Results   Component Value Date/Time    INR 0.99 01/11/2017 09:05 PM    INR 0.99 01/14/2013 05:50 PM     Lab Results   Component Value Date/Time    CHOLSTRLTOT 116 06/04/2019 06:36 AM    LDL 54 06/04/2019 06:36 AM    HDL 45 06/04/2019 06:36 AM    TRIGLYCERIDE 87 06/04/2019 06:36 AM       No results found for: TESTOSTERONE  Lab Results   Component Value Date/Time    TSH 2.020 11/21/2017 07:21 AM     Lab Results   Component Value Date/Time    FREET4 1.32 02/20/2019 06:32 AM    FREET4 1.23 02/20/2018 08:18 AM     No results found for: URICACID  No components found for: VITB12  Lab Results   Component Value Date/Time    25HYDROXY 26 (L) 02/20/2019 06:32 AM    25HYDROXY 24 (L) 02/20/2018 08:18 AM        Assessment/Plan:     1. PVD (peripheral vascular disease) (HCC)- dr mohsen (cardilogy) at [facility]; arterial angioplasty right leg tbd july2019; rx shawnntal (dr hernandes podiatry)  - Chronic problem. Patient is  s/p arterial angioplasty of right leg on 7/2/19 by Dr. Mohsen, Cardiology at [facility]. Patient was referred to Vascular surgery.   - Rivaroxaban (XARELTO PO); Take  by mouth.  - REFERRAL TO VASCULAR SURGERY    2. Ischemic foot pain at rest (HCC)- s/p angioplasty by dr mohsen at [facility] 7/2/19- failed  - This is a new problem. No pulses in right foot. Circulation has not improved in right 2nd toe. Patient is instructed to present to the ER for his gangrenous right 2nd toe.   - REFERRAL TO VASCULAR SURGERY    3. Gangrene of toe of right foot (HCC)- 2nd toe, distal one half is black and necrotic, with erythematous base  - This is a new problem. Patient is instructed to present to the ER for his gangrenous right 2nd toe.   - REFERRAL TO VASCULAR SURGERY         40 minute face-to-face encounter took place today.  More than half of this time was spent in the coordination of care of the above problems, as well as counseling.     IDemar (Thaddeus), am scribing for, and in the presence of, Ky Hernandez M.D..    Electronically signed by: Demar San (Thaddeus), 7/8/2019    IKy M.D., personally performed the services described in this documentation, as scribed by Demar San in my presence, and it is both accurate and complete.

## 2019-07-09 ENCOUNTER — APPOINTMENT (OUTPATIENT)
Dept: RADIOLOGY | Facility: MEDICAL CENTER | Age: 74
End: 2019-07-09
Attending: EMERGENCY MEDICINE
Payer: MEDICARE

## 2019-07-09 ENCOUNTER — HOSPITAL ENCOUNTER (EMERGENCY)
Facility: MEDICAL CENTER | Age: 74
End: 2019-07-09
Attending: EMERGENCY MEDICINE
Payer: MEDICARE

## 2019-07-09 VITALS
SYSTOLIC BLOOD PRESSURE: 141 MMHG | RESPIRATION RATE: 18 BRPM | BODY MASS INDEX: 28.34 KG/M2 | TEMPERATURE: 98.1 F | HEART RATE: 70 BPM | HEIGHT: 73 IN | OXYGEN SATURATION: 94 % | WEIGHT: 213.85 LBS | DIASTOLIC BLOOD PRESSURE: 78 MMHG

## 2019-07-09 DIAGNOSIS — I96 DRY GANGRENE (HCC): ICD-10-CM

## 2019-07-09 DIAGNOSIS — I73.9 PAD (PERIPHERAL ARTERY DISEASE) (HCC): ICD-10-CM

## 2019-07-09 LAB
ALBUMIN SERPL BCP-MCNC: 4 G/DL (ref 3.2–4.9)
ALBUMIN/GLOB SERPL: 1.2 G/DL
ALP SERPL-CCNC: 29 U/L (ref 30–99)
ALT SERPL-CCNC: 23 U/L (ref 2–50)
ANION GAP SERPL CALC-SCNC: 9 MMOL/L (ref 0–11.9)
AST SERPL-CCNC: 17 U/L (ref 12–45)
BASOPHILS # BLD AUTO: 0.9 % (ref 0–1.8)
BASOPHILS # BLD: 0.07 K/UL (ref 0–0.12)
BILIRUB SERPL-MCNC: 0.5 MG/DL (ref 0.1–1.5)
BUN SERPL-MCNC: 31 MG/DL (ref 8–22)
CALCIUM SERPL-MCNC: 11.2 MG/DL (ref 8.5–10.5)
CHLORIDE SERPL-SCNC: 98 MMOL/L (ref 96–112)
CO2 SERPL-SCNC: 28 MMOL/L (ref 20–33)
CREAT SERPL-MCNC: 1.33 MG/DL (ref 0.5–1.4)
EOSINOPHIL # BLD AUTO: 0.19 K/UL (ref 0–0.51)
EOSINOPHIL NFR BLD: 2.4 % (ref 0–6.9)
ERYTHROCYTE [DISTWIDTH] IN BLOOD BY AUTOMATED COUNT: 47.5 FL (ref 35.9–50)
GLOBULIN SER CALC-MCNC: 3.3 G/DL (ref 1.9–3.5)
GLUCOSE SERPL-MCNC: 146 MG/DL (ref 65–99)
HCT VFR BLD AUTO: 43.8 % (ref 42–52)
HGB BLD-MCNC: 14.2 G/DL (ref 14–18)
IMM GRANULOCYTES # BLD AUTO: 0.03 K/UL (ref 0–0.11)
IMM GRANULOCYTES NFR BLD AUTO: 0.4 % (ref 0–0.9)
LYMPHOCYTES # BLD AUTO: 2.39 K/UL (ref 1–4.8)
LYMPHOCYTES NFR BLD: 29.8 % (ref 22–41)
MCH RBC QN AUTO: 29 PG (ref 27–33)
MCHC RBC AUTO-ENTMCNC: 32.4 G/DL (ref 33.7–35.3)
MCV RBC AUTO: 89.4 FL (ref 81.4–97.8)
MONOCYTES # BLD AUTO: 0.81 K/UL (ref 0–0.85)
MONOCYTES NFR BLD AUTO: 10.1 % (ref 0–13.4)
NEUTROPHILS # BLD AUTO: 4.54 K/UL (ref 1.82–7.42)
NEUTROPHILS NFR BLD: 56.4 % (ref 44–72)
NRBC # BLD AUTO: 0 K/UL
NRBC BLD-RTO: 0 /100 WBC
PLATELET # BLD AUTO: 436 K/UL (ref 164–446)
PMV BLD AUTO: 9.8 FL (ref 9–12.9)
POTASSIUM SERPL-SCNC: 3.8 MMOL/L (ref 3.6–5.5)
PROT SERPL-MCNC: 7.3 G/DL (ref 6–8.2)
RBC # BLD AUTO: 4.9 M/UL (ref 4.7–6.1)
SODIUM SERPL-SCNC: 135 MMOL/L (ref 135–145)
WBC # BLD AUTO: 8 K/UL (ref 4.8–10.8)

## 2019-07-09 PROCEDURE — 80053 COMPREHEN METABOLIC PANEL: CPT

## 2019-07-09 PROCEDURE — 99284 EMERGENCY DEPT VISIT MOD MDM: CPT

## 2019-07-09 PROCEDURE — 73660 X-RAY EXAM OF TOE(S): CPT | Mod: RT

## 2019-07-09 PROCEDURE — 85025 COMPLETE CBC W/AUTO DIFF WBC: CPT

## 2019-07-09 PROCEDURE — 93926 LOWER EXTREMITY STUDY: CPT | Mod: RT

## 2019-07-09 RX ORDER — HYDROCODONE BITARTRATE AND ACETAMINOPHEN 5; 325 MG/1; MG/1
1 TABLET ORAL EVERY 4 HOURS PRN
Qty: 20 TAB | Refills: 0 | Status: SHIPPED | OUTPATIENT
Start: 2019-07-09 | End: 2019-07-12

## 2019-07-09 NOTE — ED NOTES
Right foot pink, warm and dry;dorsalis pedal/ posterior pedal pulse unable to be palpated.     Pain to foot, states increased ever since

## 2019-07-09 NOTE — ED TRIAGE NOTES
Chief Complaint   Patient presents with   • Wound Check     pt sent by MD for wound check. pt to be diabetic and has wound on right side second toe. pt reports wound to be there for months and has been gettting worse. pt denies drainage/fever/chills.    • Toe Pain     Explained to pt triage process, made pt aware to tell this RN/staff of any changes/concerns, pt verbalized understanding of process and instructions given. Pt to ER lobby.

## 2019-07-09 NOTE — ED PROVIDER NOTES
ED Provider Note    ED Provider Note    Primary care provider: Ky Hernandez M.D.  Means of arrival: Walk-in  History obtained from: Patient    CHIEF COMPLAINT  Chief Complaint   Patient presents with   • Wound Check     pt sent by MD for wound check. pt to be diabetic and has wound on right side second toe. pt reports wound to be there for months and has been gettting worse. pt denies drainage/fever/chills.    • Toe Pain     Seen at 10:27 AM.   HPI  Kevin Jackson is a 74 y.o. male who presents to the Emergency Department with a gangrenous right second toe.  The patient has been dealing with this for the past 6 or 7 months.  He underwent a balloon angioplasty of the right lower leg July 2 by Dr. Mohsen.  Since this time he has had persistent pain in the heel and foot causing him difficulty sleeping.  He went to his primary care physician yesterday and was told he should come to the emergency department.  He went to a podiatrist, Dr. Cueva today who did not feel that an amputation would be beneficial if he still has persistent ischemia and also told him to go to the emergency department.  Therefore he is here for evaluation.    He is on Xarelto, Plavix and aspirin.  He is also on Keflex but is not sure why.  He denies any recent fevers, chills, chest pain, numbness, weakness.    REVIEW OF SYSTEMS  See HPI,   Remainder of ROS negative.     PAST MEDICAL HISTORY   has a past medical history of Diabetes; Hyperlipidemia; Hypertension; Muscle disorder; and Tremors of nervous system.    SURGICAL HISTORY   has a past surgical history that includes other orthopedic surgery; carpal tunnel release; and amputation, toe (2/2013).    SOCIAL HISTORY  Social History   Substance Use Topics   • Smoking status: Never Smoker   • Smokeless tobacco: Never Used   • Alcohol use 0.6 oz/week     1 Cans of beer per week      History   Drug Use No       FAMILY HISTORY  Family History   Problem Relation Age of Onset   • Arthritis  "Mother    • Cancer Mother    • Hypertension Mother    • Heart Disease Mother    • Cancer Father         colon   • Arthritis Father    • Genetic Father    • Diabetes Father    • Hyperlipidemia Father    • Stroke Father    • Heart Disease Father        CURRENT MEDICATIONS  Reviewed.  See Encounter Summary.     ALLERGIES  Allergies   Allergen Reactions   • Zocor [Simvastatin - High Dose]      Pt tells me this medication gave him \"side effect\" of feeling nauseated; denies allergies to medications       PHYSICAL EXAM  VITAL SIGNS: /79   Pulse 67   Temp 36.7 °C (98.1 °F) (Temporal)   Resp 20   Ht 1.854 m (6' 1\")   Wt 97 kg (213 lb 13.5 oz)   SpO2 96%   BMI 28.21 kg/m²   Constitutional: Awake, alert in no apparent distress.  HENT: Normocephalic, Bilateral external ears normal. Nose normal.   Eyes: Conjunctiva normal, non-icteric, EOMI.    Thorax & Lungs: Easy unlabored respirations, Clear to ascultation bilaterally.  Cardiovascular: Regular rate, Regular rhythm, No murmurs, rubs or gallops.  No palpable pulses in the right foot.  Abdomen:  Soft, nontender, nondistended, normal active bowel sounds.   :    Skin: Visualized skin is  Dry, No erythema, No rash.   Musculoskeletal: The right foot is warm without any mottling, there are no palpable pulses but the sensation is intact to light touch.  The second toe has necrosis of the distal phalanx with only mild reactive erythema at the level of viable tissue.  Neurologic: Alert, Grossly non-focal.   Psychiatric: Normal affect, Normal mood  Lymphatic:  No cervical LAD        RADIOLOGY  US-EXTREMITY ARTERY LOWER UNILAT RIGHT         DX-TOE(S) 2+ RIGHT    (Results Pending)         COURSE & MEDICAL DECISION MAKING  Pertinent Labs & Imaging studies reviewed. (See chart for details)    Differential diagnoses include but are not limited to: Chronic limb ischemia, less likely acute limb ischemia    10:27 AM - Medical record reviewed, patient seen and examined at " bedside.    11:30 AM-discussed the case with Dr. Alvarez, vascular surgery.  The ultrasound results were reviewed.  This is a chronic issue.  Dr. Alvarez recommends he follow-up with his interventional cardiologist, if there is any other concern the interventional cardiologist can refer him back to vascular surgery but based on the occlusion level this is not amenable to vascular surgery at this time.  Given the chronicity catheter directed TPA would not be beneficial.      Decision Making:  This is a 74 y.o. year old male who presents with what appears to be chronic limb ischemia.  The patient has normal sensation distally, the skin is warm.  He does not have an acute ischemic limb.  He has some necrosis of the tip of the second digit that the patient states is been there for several months.  He has already seen interventional cardiology who performed a balloon angioplasty down to the level of the foot on July 2.  He is supposed to follow-up for a repeat angiogram in 2 weeks.  Unfortunately the patient has complete occlusion of both his dorsalis pedis and his anterior tibialis.  There are multiple collaterals seen and clinically he has fairly decent perfusion of the foot.  At this time there is no emergent vascular surgical need as discussed with our vascular surgeon on call.  In addition it would be unlikely that he would be a good candidate for any type of vascular repair.    He is maximally medically managed at this time, he is on Xarelto, aspirin, Plavix and a statin.  He is a non-smoker.  Medically I have no additional benefits.  I see no acute infection today either.    He is directed to follow-up with his cardiologist, I will also give him a follow-up for Dr. Alvarez.    His primary issue is having pain, particularly at night.  He is already on Neurontin.  Given his vascular status anti-inflammatories are not beneficial.  Therefore he will be given a small amount of opioid analgesia.    In prescribing  controlled substances to this patient, I certify that I have obtained and reviewed the medical history of Kevin Jackson. I have also made a good yony effort to obtain applicable records from other providers who have treated the patient and records did not demonstrate any increased risk of substance abuse that would prevent me from prescribing controlled substances.     I have conducted a physical exam and documented it. I have reviewed Mr. Jackson’s prescription history as maintained by the [place] Prescription Monitoring Program.     I have assessed the patient’s risk for abuse, dependency, and addiction using the validated Opioid Risk Tool available at https://www.mdcalc.com/iaddpw-cwsb-anqx-ort-narcotic-abuse.     Given the above, I believe the benefits of controlled substance therapy outweigh the risks. The reasons for prescribing controlled substances include non-narcotic, oral analgesic alternatives are contraindicated. Accordingly, I have discussed the risk and benefits, treatment plan, and alternative therapies with the patient.         Discharge Medications:  New Prescriptions    HYDROCODONE-ACETAMINOPHEN (NORCO) 5-325 MG TAB PER TABLET    Take 1 Tab by mouth every four hours as needed for up to 3 days.       The patient was discharged home (see d/c instructions) and parent was told to return immediately for any signs or symptoms listed, or any worsening at all.  The patient's parent verbally agreed to the discharge precautions and follow-up plan which is documented in EPIC.    The patient's blood pressure is elevated today. >120/80. I have referred them to primary care for follow up.       FINAL IMPRESSION  1. Dry gangrene (HCC)    2. PAD (peripheral artery disease) ([facility])

## 2019-07-26 ENCOUNTER — HOSPITAL ENCOUNTER (OUTPATIENT)
Dept: LAB | Facility: MEDICAL CENTER | Age: 74
End: 2019-07-26
Attending: SURGERY
Payer: MEDICARE

## 2019-07-26 LAB
ANION GAP SERPL CALC-SCNC: 11 MMOL/L (ref 0–11.9)
BASOPHILS # BLD AUTO: 0.8 % (ref 0–1.8)
BASOPHILS # BLD: 0.06 K/UL (ref 0–0.12)
BUN SERPL-MCNC: 26 MG/DL (ref 8–22)
CALCIUM SERPL-MCNC: 10 MG/DL (ref 8.5–10.5)
CHLORIDE SERPL-SCNC: 97 MMOL/L (ref 96–112)
CO2 SERPL-SCNC: 26 MMOL/L (ref 20–33)
CREAT SERPL-MCNC: 1.15 MG/DL (ref 0.5–1.4)
EOSINOPHIL # BLD AUTO: 0.23 K/UL (ref 0–0.51)
EOSINOPHIL NFR BLD: 3.2 % (ref 0–6.9)
ERYTHROCYTE [DISTWIDTH] IN BLOOD BY AUTOMATED COUNT: 48.6 FL (ref 35.9–50)
FASTING STATUS PATIENT QL REPORTED: NORMAL
GLUCOSE SERPL-MCNC: 148 MG/DL (ref 65–99)
HCT VFR BLD AUTO: 42.7 % (ref 42–52)
HGB BLD-MCNC: 13.6 G/DL (ref 14–18)
IMM GRANULOCYTES # BLD AUTO: 0.02 K/UL (ref 0–0.11)
IMM GRANULOCYTES NFR BLD AUTO: 0.3 % (ref 0–0.9)
LYMPHOCYTES # BLD AUTO: 2.08 K/UL (ref 1–4.8)
LYMPHOCYTES NFR BLD: 29.3 % (ref 22–41)
MCH RBC QN AUTO: 28.5 PG (ref 27–33)
MCHC RBC AUTO-ENTMCNC: 31.9 G/DL (ref 33.7–35.3)
MCV RBC AUTO: 89.5 FL (ref 81.4–97.8)
MONOCYTES # BLD AUTO: 0.8 K/UL (ref 0–0.85)
MONOCYTES NFR BLD AUTO: 11.3 % (ref 0–13.4)
NEUTROPHILS # BLD AUTO: 3.9 K/UL (ref 1.82–7.42)
NEUTROPHILS NFR BLD: 55.1 % (ref 44–72)
NRBC # BLD AUTO: 0 K/UL
NRBC BLD-RTO: 0 /100 WBC
PLATELET # BLD AUTO: 339 K/UL (ref 164–446)
PMV BLD AUTO: 10.7 FL (ref 9–12.9)
POTASSIUM SERPL-SCNC: 3.6 MMOL/L (ref 3.6–5.5)
RBC # BLD AUTO: 4.77 M/UL (ref 4.7–6.1)
SODIUM SERPL-SCNC: 134 MMOL/L (ref 135–145)
WBC # BLD AUTO: 7.1 K/UL (ref 4.8–10.8)

## 2019-07-26 PROCEDURE — 80048 BASIC METABOLIC PNL TOTAL CA: CPT

## 2019-07-26 PROCEDURE — 85025 COMPLETE CBC W/AUTO DIFF WBC: CPT

## 2019-07-26 PROCEDURE — 36415 COLL VENOUS BLD VENIPUNCTURE: CPT

## 2019-08-08 ENCOUNTER — OFFICE VISIT (OUTPATIENT)
Dept: MEDICAL GROUP | Age: 74
End: 2019-08-08
Payer: MEDICARE

## 2019-08-08 VITALS
HEART RATE: 69 BPM | WEIGHT: 218.6 LBS | SYSTOLIC BLOOD PRESSURE: 114 MMHG | TEMPERATURE: 98.6 F | OXYGEN SATURATION: 98 % | BODY MASS INDEX: 28.97 KG/M2 | HEIGHT: 73 IN | DIASTOLIC BLOOD PRESSURE: 58 MMHG

## 2019-08-08 DIAGNOSIS — E03.4 HYPOTHYROIDISM DUE TO ACQUIRED ATROPHY OF THYROID: ICD-10-CM

## 2019-08-08 DIAGNOSIS — M79.673 ISCHEMIC FOOT PAIN AT REST: ICD-10-CM

## 2019-08-08 DIAGNOSIS — Z79.01 ANTICOAGULATED: ICD-10-CM

## 2019-08-08 DIAGNOSIS — E78.2 MIXED HYPERLIPIDEMIA: ICD-10-CM

## 2019-08-08 DIAGNOSIS — L97.512 DIABETIC ULCER OF TOE OF RIGHT FOOT ASSOCIATED WITH TYPE 2 DIABETES MELLITUS, WITH FAT LAYER EXPOSED (HCC): ICD-10-CM

## 2019-08-08 DIAGNOSIS — I99.8 ISCHEMIC FOOT PAIN AT REST: ICD-10-CM

## 2019-08-08 DIAGNOSIS — I73.9 PVD (PERIPHERAL VASCULAR DISEASE) (HCC): ICD-10-CM

## 2019-08-08 DIAGNOSIS — E11.621 DIABETIC ULCER OF TOE OF RIGHT FOOT ASSOCIATED WITH TYPE 2 DIABETES MELLITUS, WITH FAT LAYER EXPOSED (HCC): ICD-10-CM

## 2019-08-08 DIAGNOSIS — E55.9 HYPOVITAMINOSIS D: ICD-10-CM

## 2019-08-08 DIAGNOSIS — I10 ESSENTIAL HYPERTENSION: ICD-10-CM

## 2019-08-08 LAB — HBA1C MFR BLD: 7.2 % (ref 0–5.6)

## 2019-08-08 PROCEDURE — 99215 OFFICE O/P EST HI 40 MIN: CPT | Performed by: INTERNAL MEDICINE

## 2019-08-08 RX ORDER — CEPHALEXIN 500 MG/1
500 CAPSULE ORAL 4 TIMES DAILY
Qty: 56 CAP | Refills: 0 | Status: SHIPPED | OUTPATIENT
Start: 2019-08-08 | End: 2019-09-03 | Stop reason: SDUPTHER

## 2019-08-08 RX ORDER — LOSARTAN POTASSIUM 50 MG/1
50 TABLET ORAL DAILY
Status: ON HOLD | COMMUNITY
End: 2019-09-19 | Stop reason: SDUPTHER

## 2019-08-08 ASSESSMENT — ENCOUNTER SYMPTOMS
NEUROLOGICAL NEGATIVE: 1
MUSCULOSKELETAL NEGATIVE: 1
PSYCHIATRIC NEGATIVE: 1
RESPIRATORY NEGATIVE: 1
CARDIOVASCULAR NEGATIVE: 1
CONSTITUTIONAL NEGATIVE: 1
EYES NEGATIVE: 1
GASTROINTESTINAL NEGATIVE: 1
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
## 2019-08-08 NOTE — PROGRESS NOTES
Subjective:      Kevin Jackson is a 74 y.o. male who presents with Diabetes Mellitus    HPI    The patient is here for followup of chronic medical problems listed below. The patient is compliant with medications and having no side effects from them. Denies chest pain, abdominal pain, dyspnea, myalgias, or cough.    1. Uncontrolled type 2 diabetes mellitus with complication, with long-term current use of insulin ([facility])- dr browning  Refer to diabetic RN note.    2. Mixed hyperlipidemia- dr dwyer  Patient is taking atorvastatin 80 m, which he is tolerating well without side effects. No recent lipid panel since last office visit. Most recent lipids from 6/4/19 shows all results were within normal limits.    3. Hypovitaminosis D  Most recent vitamin D labs from 2/20 showed levels were low at 26. He is not found to be taking any Vitamin D supplements.    4. 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 guillermo  [facility]  wound care   Refer to diabetic RN note. Further discussion was had regarding plan for amputating the toe vs other treatment options. Patient is following wound care, however notes that he is having difficulty getting his antibiotics to control the wound infection.    5. Essential hypertension- DR DWYER  Patient is taking losartan 50 mg, which he is tolerating well without side effects. Pressures today are 114/58.     6. Ischemic foot pain at rest ([facility])  Patient has chronic ischemic foot pain. This is thought to be a combination of his neuropathy as well as his foot ulcer. He takes ibuprofen at night. Most recent GFR was over 60.    7. PVD (peripheral vascular disease) ([facility])- dr mohsen (cardilogy) at [facility]; arterial angioplasty right leg tbd july2019; rx trental (dr hernandes podiatry)  Chronic known history. Patient followed by Dr. Mohsen, Cardiology for his peripheral vascular disease and is now  anticoagulated on Xarelto and Plavix to try to improve vascularity. Arterial right leg angioplasty was performed in July. 8. Hypothyroidism due to acquired atrophy of thyroid- dr browning  Patient is taking levothyroxine 50 mcg, which he is tolerating well without side effects. Most recent TSH was 2.1 on 6/4      Patient Active Problem List   Diagnosis   • Mixed hyperlipidemia- dr dwyer   • Essential hypertension- DR DWYER   • Hypovitaminosis D   • Uncontrolled type 2 diabetes mellitus with complication, with long-term current use of insulin ([facility])- dr browning   • Obesity (BMI 30.0-34.9)   • Benign non-nodular prostatic hyperplasia with lower urinary tract symptoms- [facility]   • Gastroesophageal reflux disease with esophagitis- PPI PRN   • Hypothyroidism due to acquired atrophy of thyroid- dr browning   • Benign essential tremor- DR OLIVAS,  NEUROLOGY   • Encounter for screening- [facility] screening 2019- neg  abd US for AAA, MARELY, carotid US, EKG (no A.Fib to my review)   • PVD (peripheral vascular disease) ([facility])- dr mohsen (cardilogy) at [facility]; arterial angioplasty right leg tbd july2019; rx trental (dr hernandes podiatry)   • Diabetic mononeuropathy associated with diabetes mellitus due to underlying condition ([facility])-m rx gabapentin   • Diabetic ulcer of toe of right foot associated with type 2 diabetes mellitus, with fat layer exposed ([facility])- amputation distal 2nd toe (dr hernandes); dr. guillermo  [facility]  wound care    • Nonsustained ventricular tachycardia (HCC)- ziopatch may 2019; PAC's and PVC's, NSR; NO A. FIB   • Ischemic foot pain at rest ([facility])       Outpatient Medications Prior to Visit   Medication Sig Dispense Refill   • Rivaroxaban (XARELTO PO) Take  by mouth.     • cephALEXin (KEFLEX) 500 MG Cap      • gabapentin (NEURONTIN) 100 MG Cap      • OZEMPIC 0.25 or 0.5 MG/DOSE Solution Pen-injector Inject 0.25 mg as instructed every 7 days.     • pentoxifylline CR (TRENTAL) 400 MG CR tablet Take 1 Tab by mouth 3 times  "a day, with meals. 90 Tab    • glimepiride (AMARYL) 4 MG Tab Take 1 Tab by mouth every morning. 30 Tab 11   • HUMALOG MIX 50/50 KWIKPEN (50-50) 100 UNIT/ML Suspension Pen-injector Inject 30 Units as instructed every day. 10 PEN 11   • Empagliflozin (JARDIANCE) 25 MG Tab Take 25 mg by mouth every day. 90 Tab 4   • metformin (GLUCOPHAGE) 1000 MG tablet Take 1 Tab by mouth 2 times a day, with meals. 60 Tab 11   • propranolol CR (INDERAL LA) 120 MG CAPSULE SR 24 HR Take 1 Cap by mouth every morning. 90 Cap 4   • tamsulosin (FLOMAX) 0.4 MG capsule Take 1 Cap by mouth every day. 90 Cap 4   • primidone (MYSOLINE) 50 MG Tab Take 1 Tab by mouth every evening. 90 Tab 3   • atorvastatin (LIPITOR) 80 MG tablet Take 1 Tab by mouth every evening. 90 Tab 4   • chlorthalidone (HYGROTON) 25 MG Tab Take 1 Tab by mouth every day. 30 Tab 11   • choline fenofibrate (TRILIPIX) 135 MG CAPSULE DELAYED RELEASE Take 1 Cap by mouth every day. 30 Tab 11   • levothyroxine (SYNTHROID) 50 MCG Tab Take 1 Tab by mouth every morning before breakfast. 30 Tab 11   • losartan-hydrochlorothiazide (HYZAAR) 50-12.5 MG per tablet Take 1 Tab by mouth every day. 30 Tab 11   • aspirin EC (ECOTRIN) 81 MG TBEC Take 81 mg by mouth every day.     • vitamin D (CHOLECALCIFEROL) 1000 UNIT TABS Take 1,000 Units by mouth 3 times a day.       No facility-administered medications prior to visit.         Allergies   Allergen Reactions   • Zocor [Simvastatin - High Dose]      Pt tells me this medication gave him \"side effect\" of feeling nauseated; denies allergies to medications       Review of Systems   Constitutional: Negative.    HENT: Negative.    Eyes: Negative.    Respiratory: Negative.    Cardiovascular: Negative.    Gastrointestinal: Negative.    Genitourinary: Negative.    Musculoskeletal: Negative.    Skin: Negative for itching and rash.        Positive: Foot ulcer   Neurological: Negative.    Endo/Heme/Allergies: Negative.    Psychiatric/Behavioral: Negative.  " "  All other systems reviewed and are negative.           Objective:     /58 (BP Location: Left arm, Patient Position: Sitting, BP Cuff Size: Adult)   Pulse 69   Temp 37 °C (98.6 °F) (Temporal)   Ht 1.854 m (6' 1\")   Wt 99.2 kg (218 lb 9.6 oz)   SpO2 98%   BMI 28.84 kg/m²     Physical Exam   Constitutional: Oriented to person, place, and time. Appears well-developed and well-nourished. No distress.   Head: Normocephalic and atraumatic.   Right Ear: External ear normal.   Left Ear: External ear normal.   Nose: Nose normal.   Mouth/Throat: Oropharynx is clear and moist. No oropharyngeal exudate.   Eyes: Pupils are equal, round, and reactive to light. Conjunctivae and EOM are normal. Right eye exhibits no discharge. Left eye exhibits no discharge. No scleral icterus.   Neck: Normal range of motion. Neck supple. No JVD present. No tracheal deviation present. No thyromegaly present.   Cardiovascular: Normal rate, regular rhythm, normal heart sounds and intact distal pulses.  Exam reveals no gallop and no friction rub.    No murmur heard.  Pulmonary/Chest: Effort normal. No stridor. No respiratory distress. No wheezing or rales. No tenderness.   Abdominal: Soft. Bowel sounds are normal. No distension and no mass. There is no tenderness. There is no rebound and no guarding. No hernia.   Musculoskeletal: Normal range of motion No edema or tenderness.   Lymphadenopathy: No cervical adenopathy.   Neurological: Alert and oriented to person, place, and time. Normal reflexes. Normal reflexes. No cranial nerve deficit. Normal muscle tone. Coordination normal.   Skin: Skin is warm and dry. No rash noted. Not diaphoretic. No erythema. No pallor. Ulceration to the lateral aspect of the left heel  Psychiatric: Normal mood and affect. Behavior is normal. Judgment and thought content normal.   Nursing note and vitals reviewed.      Lab Results   Component Value Date/Time    HBA1C 8.1 (H) 06/04/2019 06:36 AM    HBA1C 9.0 (H) " 02/20/2019 06:32 AM     Lab Results   Component Value Date/Time    SODIUM 134 (L) 07/26/2019 08:53 AM    POTASSIUM 3.6 07/26/2019 08:53 AM    CHLORIDE 97 07/26/2019 08:53 AM    CO2 26 07/26/2019 08:53 AM    GLUCOSE 148 (H) 07/26/2019 08:53 AM    BUN 26 (H) 07/26/2019 08:53 AM    CREATININE 1.15 07/26/2019 08:53 AM    BUNCREATRAT 24 11/21/2017 07:21 AM    ALKPHOSPHAT 29 (L) 07/09/2019 10:40 AM    ASTSGOT 17 07/09/2019 10:40 AM    ALTSGPT 23 07/09/2019 10:40 AM    TBILIRUBIN 0.5 07/09/2019 10:40 AM     Lab Results   Component Value Date/Time    INR 0.99 01/11/2017 09:05 PM    INR 0.99 01/14/2013 05:50 PM     Lab Results   Component Value Date/Time    CHOLSTRLTOT 116 06/04/2019 06:36 AM    LDL 54 06/04/2019 06:36 AM    HDL 45 06/04/2019 06:36 AM    TRIGLYCERIDE 87 06/04/2019 06:36 AM       No results found for: TESTOSTERONE  Lab Results   Component Value Date/Time    TSH 2.020 11/21/2017 07:21 AM     Lab Results   Component Value Date/Time    FREET4 1.32 02/20/2019 06:32 AM    FREET4 1.23 02/20/2018 08:18 AM     No results found for: URICACID  No components found for: VITB12  Lab Results   Component Value Date/Time    25HYDROXY 26 (L) 02/20/2019 06:32 AM    25HYDROXY 24 (L) 02/20/2018 08:18 AM          Assessment/Plan:     1. Uncontrolled type 2 diabetes mellitus with complication, with long-term current use of insulin ([facility])- dr browning  - No changes warranted per diabetic RN at this time. Plan for fasting labs prior to next office visit to continue to monitor.   - HEMOGLOBIN A1C; Future  - Comp Metabolic Panel; Future  - MICROALBUMIN CREAT RATIO URINE; Future    2. Mixed hyperlipidemia- dr dwyer  - Under good control. Continue same regimen of atorvastatin. Plan for fasting labs prior to next office visit to continue to monitor.  - Lipid Profile; Future    3. Hypovitaminosis D  - Plan for fasting labs prior to next office visit to continue to monitor.  - VITAMIN D,25 HYDROXY; Future    4. Diabetic ulcer of toe of right  foot associated with type 2 diabetes mellitus, with fat layer exposed ([facility])- amputation distal 2nd toe (dr hernandes); dr. guillermo  [facility]  wound care   - Will continue to seek care with wound clinic and resume antibiotics  - cephALEXin (KEFLEX) 500 MG Cap; Take 1 Cap by mouth 4 times a day for 14 days.  Dispense: 56 Cap; Refill: 0    5. Essential hypertension- DR VIZCARRA  - Under good control. Continue same regimen.    6. Ischemic foot pain at rest ([facility])  - Discontinue ibuprofen. He will continue DM treatment as discussed with DM RN (gabapentin), and follow care with wound clinic    7. PVD (peripheral vascular disease) ([facility])- dr mohsen (cardilogy) at [facility]; arterial angioplasty right leg tbd july2019; rx trental (dr hernandes podiatry)  - Patient will continue Xarelto and Plavix anticoagulation for treatment as well as follow care with Dr. Mohsen, Cardiology.    8. Hypothyroidism due to acquired atrophy of thyroid- dr browning  - Under good control. Continue same regimen of levothyroxine    9. Anticoagulated-xarelto dr mohsen ([facility] interv cardilolgist) for pvd  - See #7    Other orders  - losartan (COZAAR) 50 MG Tab; Take 50 mg by mouth every day.      40 minute face-to-face encounter took place today.  More than half of this time was spent in the coordination of care of the above problems, as well as counseling.     Demar ASTORGA (Thaddeus), am scribing for, and in the presence of, Ky Hernandez M.D..    Electronically signed by: Demar Head (Thaddeus), 8/8/2019    Ky ASTORGA M.D., personally performed the services described in this documentation, as scribed by Demar Head in my presence, and it is both accurate and complete.

## 2019-09-03 ENCOUNTER — HOSPITAL ENCOUNTER (OUTPATIENT)
Dept: RADIOLOGY | Facility: MEDICAL CENTER | Age: 74
DRG: 240 | End: 2019-09-03
Attending: SURGERY | Admitting: SURGERY
Payer: MEDICARE

## 2019-09-03 DIAGNOSIS — L97.512 DIABETIC ULCER OF TOE OF RIGHT FOOT ASSOCIATED WITH TYPE 2 DIABETES MELLITUS, WITH FAT LAYER EXPOSED (HCC): ICD-10-CM

## 2019-09-03 DIAGNOSIS — Z01.810 PRE-OPERATIVE CARDIOVASCULAR EXAMINATION: ICD-10-CM

## 2019-09-03 DIAGNOSIS — Z01.811 PRE-OPERATIVE RESPIRATORY EXAMINATION: ICD-10-CM

## 2019-09-03 DIAGNOSIS — E11.621 DIABETIC ULCER OF TOE OF RIGHT FOOT ASSOCIATED WITH TYPE 2 DIABETES MELLITUS, WITH FAT LAYER EXPOSED (HCC): ICD-10-CM

## 2019-09-03 DIAGNOSIS — Z01.812 PRE-OPERATIVE LABORATORY EXAMINATION: ICD-10-CM

## 2019-09-03 LAB
ANION GAP SERPL CALC-SCNC: 13 MMOL/L (ref 0–11.9)
BASOPHILS # BLD AUTO: 0.5 % (ref 0–1.8)
BASOPHILS # BLD: 0.05 K/UL (ref 0–0.12)
BUN SERPL-MCNC: 30 MG/DL (ref 8–22)
CALCIUM SERPL-MCNC: 10.1 MG/DL (ref 8.5–10.5)
CHLORIDE SERPL-SCNC: 98 MMOL/L (ref 96–112)
CO2 SERPL-SCNC: 27 MMOL/L (ref 20–33)
CREAT SERPL-MCNC: 1.01 MG/DL (ref 0.5–1.4)
EKG IMPRESSION: NORMAL
EOSINOPHIL # BLD AUTO: 0.16 K/UL (ref 0–0.51)
EOSINOPHIL NFR BLD: 1.8 % (ref 0–6.9)
ERYTHROCYTE [DISTWIDTH] IN BLOOD BY AUTOMATED COUNT: 45.9 FL (ref 35.9–50)
GLUCOSE SERPL-MCNC: 212 MG/DL (ref 65–99)
HCT VFR BLD AUTO: 42.6 % (ref 42–52)
HGB BLD-MCNC: 14.1 G/DL (ref 14–18)
IMM GRANULOCYTES # BLD AUTO: 0.04 K/UL (ref 0–0.11)
IMM GRANULOCYTES NFR BLD AUTO: 0.4 % (ref 0–0.9)
LYMPHOCYTES # BLD AUTO: 1.67 K/UL (ref 1–4.8)
LYMPHOCYTES NFR BLD: 18.3 % (ref 22–41)
MCH RBC QN AUTO: 29.4 PG (ref 27–33)
MCHC RBC AUTO-ENTMCNC: 33.1 G/DL (ref 33.7–35.3)
MCV RBC AUTO: 88.8 FL (ref 81.4–97.8)
MONOCYTES # BLD AUTO: 0.87 K/UL (ref 0–0.85)
MONOCYTES NFR BLD AUTO: 9.5 % (ref 0–13.4)
NEUTROPHILS # BLD AUTO: 6.35 K/UL (ref 1.82–7.42)
NEUTROPHILS NFR BLD: 69.5 % (ref 44–72)
NRBC # BLD AUTO: 0 K/UL
NRBC BLD-RTO: 0 /100 WBC
PLATELET # BLD AUTO: 372 K/UL (ref 164–446)
PMV BLD AUTO: 10 FL (ref 9–12.9)
POTASSIUM SERPL-SCNC: 3.6 MMOL/L (ref 3.6–5.5)
RBC # BLD AUTO: 4.8 M/UL (ref 4.7–6.1)
SODIUM SERPL-SCNC: 138 MMOL/L (ref 135–145)
WBC # BLD AUTO: 9.1 K/UL (ref 4.8–10.8)

## 2019-09-03 PROCEDURE — 71045 X-RAY EXAM CHEST 1 VIEW: CPT

## 2019-09-03 PROCEDURE — 85025 COMPLETE CBC W/AUTO DIFF WBC: CPT

## 2019-09-03 PROCEDURE — 93005 ELECTROCARDIOGRAM TRACING: CPT | Performed by: SURGERY

## 2019-09-03 PROCEDURE — 80048 BASIC METABOLIC PNL TOTAL CA: CPT

## 2019-09-03 PROCEDURE — 93010 ELECTROCARDIOGRAM REPORT: CPT | Performed by: INTERNAL MEDICINE

## 2019-09-03 PROCEDURE — 36415 COLL VENOUS BLD VENIPUNCTURE: CPT

## 2019-09-03 RX ORDER — CEPHALEXIN 500 MG/1
CAPSULE ORAL
Qty: 56 CAP | Refills: 0 | Status: ON HOLD | OUTPATIENT
Start: 2019-09-03 | End: 2019-09-10

## 2019-09-03 RX ORDER — CLOPIDOGREL BISULFATE 75 MG/1
75 TABLET ORAL DAILY
Status: ON HOLD | COMMUNITY
End: 2019-09-19 | Stop reason: SDUPTHER

## 2019-09-03 NOTE — PROGRESS NOTES
Pre admit appt complete. Dr Amezcua's office notified that patient has not received pre op instructions for his blood thinners- MERVIN Dunn, at Dr Amezcua's to address with MD and call patient directly. Anesthesia notified of patient's diabetic medications and asked to contact patient with pre-op instructions.

## 2019-09-05 ENCOUNTER — HOSPITAL ENCOUNTER (INPATIENT)
Facility: MEDICAL CENTER | Age: 74
LOS: 5 days | DRG: 240 | End: 2019-09-10
Attending: SURGERY | Admitting: SURGERY
Payer: MEDICARE

## 2019-09-05 ENCOUNTER — ANESTHESIA (OUTPATIENT)
Dept: SURGERY | Facility: MEDICAL CENTER | Age: 74
DRG: 240 | End: 2019-09-05
Payer: MEDICARE

## 2019-09-05 ENCOUNTER — ANESTHESIA EVENT (OUTPATIENT)
Dept: SURGERY | Facility: MEDICAL CENTER | Age: 74
DRG: 240 | End: 2019-09-05
Payer: MEDICARE

## 2019-09-05 LAB
GLUCOSE BLD-MCNC: 116 MG/DL (ref 65–99)
GLUCOSE BLD-MCNC: 136 MG/DL (ref 65–99)
GLUCOSE BLD-MCNC: 159 MG/DL (ref 65–99)

## 2019-09-05 PROCEDURE — A6223 GAUZE >16<=48 NO W/SAL W/O B: HCPCS | Performed by: SURGERY

## 2019-09-05 PROCEDURE — 160028 HCHG SURGERY MINUTES - 1ST 30 MINS LEVEL 3: Performed by: SURGERY

## 2019-09-05 PROCEDURE — 160039 HCHG SURGERY MINUTES - EA ADDL 1 MIN LEVEL 3: Performed by: SURGERY

## 2019-09-05 PROCEDURE — 88307 TISSUE EXAM BY PATHOLOGIST: CPT

## 2019-09-05 PROCEDURE — 88311 DECALCIFY TISSUE: CPT

## 2019-09-05 PROCEDURE — 700101 HCHG RX REV CODE 250: Performed by: ANESTHESIOLOGY

## 2019-09-05 PROCEDURE — A9270 NON-COVERED ITEM OR SERVICE: HCPCS | Performed by: SURGERY

## 2019-09-05 PROCEDURE — 700112 HCHG RX REV CODE 229: Performed by: SURGERY

## 2019-09-05 PROCEDURE — 700102 HCHG RX REV CODE 250 W/ 637 OVERRIDE(OP): Performed by: SURGERY

## 2019-09-05 PROCEDURE — 160009 HCHG ANES TIME/MIN: Performed by: SURGERY

## 2019-09-05 PROCEDURE — A9270 NON-COVERED ITEM OR SERVICE: HCPCS | Performed by: ANESTHESIOLOGY

## 2019-09-05 PROCEDURE — 500440 HCHG DRESSING, STERILE ROLL (KERLIX): Performed by: SURGERY

## 2019-09-05 PROCEDURE — 700111 HCHG RX REV CODE 636 W/ 250 OVERRIDE (IP)

## 2019-09-05 PROCEDURE — 0Y6H0Z2 DETACHMENT AT RIGHT LOWER LEG, MID, OPEN APPROACH: ICD-10-PCS | Performed by: SURGERY

## 2019-09-05 PROCEDURE — L1830 KO IMMOB CANVAS LONG PRE OTS: HCPCS | Performed by: SURGERY

## 2019-09-05 PROCEDURE — A6403 STERILE GAUZE>16 <= 48 SQ IN: HCPCS | Performed by: SURGERY

## 2019-09-05 PROCEDURE — 700105 HCHG RX REV CODE 258: Performed by: ANESTHESIOLOGY

## 2019-09-05 PROCEDURE — 501838 HCHG SUTURE GENERAL: Performed by: SURGERY

## 2019-09-05 PROCEDURE — 500881 HCHG PACK, EXTREMITY: Performed by: SURGERY

## 2019-09-05 PROCEDURE — 700102 HCHG RX REV CODE 250 W/ 637 OVERRIDE(OP): Performed by: ANESTHESIOLOGY

## 2019-09-05 PROCEDURE — 700111 HCHG RX REV CODE 636 W/ 250 OVERRIDE (IP): Performed by: ANESTHESIOLOGY

## 2019-09-05 PROCEDURE — 770006 HCHG ROOM/CARE - MED/SURG/GYN SEMI*

## 2019-09-05 PROCEDURE — 160002 HCHG RECOVERY MINUTES (STAT): Performed by: SURGERY

## 2019-09-05 PROCEDURE — 82962 GLUCOSE BLOOD TEST: CPT

## 2019-09-05 PROCEDURE — 700111 HCHG RX REV CODE 636 W/ 250 OVERRIDE (IP): Performed by: SURGERY

## 2019-09-05 PROCEDURE — 160048 HCHG OR STATISTICAL LEVEL 1-5: Performed by: SURGERY

## 2019-09-05 PROCEDURE — 160036 HCHG PACU - EA ADDL 30 MINS PHASE I: Performed by: SURGERY

## 2019-09-05 PROCEDURE — 700105 HCHG RX REV CODE 258: Performed by: SURGERY

## 2019-09-05 PROCEDURE — 160035 HCHG PACU - 1ST 60 MINS PHASE I: Performed by: SURGERY

## 2019-09-05 RX ORDER — SODIUM CHLORIDE, SODIUM LACTATE, POTASSIUM CHLORIDE, CALCIUM CHLORIDE 600; 310; 30; 20 MG/100ML; MG/100ML; MG/100ML; MG/100ML
INJECTION, SOLUTION INTRAVENOUS EVERY 6 HOURS
Status: ACTIVE | OUTPATIENT
Start: 2019-09-05 | End: 2019-09-05

## 2019-09-05 RX ORDER — SODIUM CHLORIDE, SODIUM LACTATE, POTASSIUM CHLORIDE, CALCIUM CHLORIDE 600; 310; 30; 20 MG/100ML; MG/100ML; MG/100ML; MG/100ML
INJECTION, SOLUTION INTRAVENOUS CONTINUOUS
Status: ACTIVE | OUTPATIENT
Start: 2019-09-05 | End: 2019-09-05

## 2019-09-05 RX ORDER — PROPRANOLOL HCL 60 MG
120 CAPSULE, EXTENDED RELEASE 24HR ORAL EVERY MORNING
Status: DISCONTINUED | OUTPATIENT
Start: 2019-09-06 | End: 2019-09-10 | Stop reason: HOSPADM

## 2019-09-05 RX ORDER — SODIUM CHLORIDE 9 MG/ML
INJECTION, SOLUTION INTRAVENOUS
Status: DISCONTINUED | OUTPATIENT
Start: 2019-09-05 | End: 2019-09-05 | Stop reason: SURG

## 2019-09-05 RX ORDER — ATORVASTATIN CALCIUM 80 MG/1
80 TABLET, FILM COATED ORAL EVERY EVENING
Status: DISCONTINUED | OUTPATIENT
Start: 2019-09-05 | End: 2019-09-10 | Stop reason: HOSPADM

## 2019-09-05 RX ORDER — PRIMIDONE 50 MG/1
50 TABLET ORAL EVERY EVENING
Status: DISCONTINUED | OUTPATIENT
Start: 2019-09-05 | End: 2019-09-10 | Stop reason: HOSPADM

## 2019-09-05 RX ORDER — HALOPERIDOL 5 MG/ML
1 INJECTION INTRAMUSCULAR
Status: DISCONTINUED | OUTPATIENT
Start: 2019-09-05 | End: 2019-09-05 | Stop reason: HOSPADM

## 2019-09-05 RX ORDER — ONDANSETRON 2 MG/ML
8 INJECTION INTRAMUSCULAR; INTRAVENOUS EVERY 4 HOURS PRN
Status: DISCONTINUED | OUTPATIENT
Start: 2019-09-05 | End: 2019-09-10 | Stop reason: HOSPADM

## 2019-09-05 RX ORDER — TAMSULOSIN HYDROCHLORIDE 0.4 MG/1
0.4 CAPSULE ORAL DAILY
Status: DISCONTINUED | OUTPATIENT
Start: 2019-09-05 | End: 2019-09-10 | Stop reason: HOSPADM

## 2019-09-05 RX ORDER — GABAPENTIN 300 MG/1
600 CAPSULE ORAL 3 TIMES DAILY
Status: DISCONTINUED | OUTPATIENT
Start: 2019-09-06 | End: 2019-09-10 | Stop reason: HOSPADM

## 2019-09-05 RX ORDER — HYDROCODONE BITARTRATE AND ACETAMINOPHEN 10; 325 MG/1; MG/1
1 TABLET ORAL
Status: DISCONTINUED | OUTPATIENT
Start: 2019-09-05 | End: 2019-09-06

## 2019-09-05 RX ORDER — GABAPENTIN 400 MG/1
1200 CAPSULE ORAL
Status: DISCONTINUED | OUTPATIENT
Start: 2019-09-05 | End: 2019-09-10 | Stop reason: HOSPADM

## 2019-09-05 RX ORDER — CEFAZOLIN SODIUM 1 G/3ML
INJECTION, POWDER, FOR SOLUTION INTRAMUSCULAR; INTRAVENOUS PRN
Status: DISCONTINUED | OUTPATIENT
Start: 2019-09-05 | End: 2019-09-05 | Stop reason: SURG

## 2019-09-05 RX ORDER — HYDROMORPHONE HYDROCHLORIDE 1 MG/ML
0.5 INJECTION, SOLUTION INTRAMUSCULAR; INTRAVENOUS; SUBCUTANEOUS
Status: DISCONTINUED | OUTPATIENT
Start: 2019-09-05 | End: 2019-09-10 | Stop reason: HOSPADM

## 2019-09-05 RX ORDER — DOCUSATE SODIUM 100 MG/1
100 CAPSULE, LIQUID FILLED ORAL 2 TIMES DAILY
Status: DISCONTINUED | OUTPATIENT
Start: 2019-09-05 | End: 2019-09-10 | Stop reason: HOSPADM

## 2019-09-05 RX ORDER — LOSARTAN POTASSIUM 50 MG/1
50 TABLET ORAL DAILY
Status: DISCONTINUED | OUTPATIENT
Start: 2019-09-05 | End: 2019-09-10 | Stop reason: HOSPADM

## 2019-09-05 RX ORDER — HEPARIN SODIUM 5000 [USP'U]/ML
5000 INJECTION, SOLUTION INTRAVENOUS; SUBCUTANEOUS EVERY 8 HOURS
Status: DISCONTINUED | OUTPATIENT
Start: 2019-09-06 | End: 2019-09-10 | Stop reason: HOSPADM

## 2019-09-05 RX ORDER — ONDANSETRON 2 MG/ML
4 INJECTION INTRAMUSCULAR; INTRAVENOUS
Status: DISCONTINUED | OUTPATIENT
Start: 2019-09-05 | End: 2019-09-05 | Stop reason: HOSPADM

## 2019-09-05 RX ORDER — LEVOTHYROXINE SODIUM 0.05 MG/1
50 TABLET ORAL
Status: DISCONTINUED | OUTPATIENT
Start: 2019-09-06 | End: 2019-09-10 | Stop reason: HOSPADM

## 2019-09-05 RX ORDER — CHLORTHALIDONE 25 MG/1
25 TABLET ORAL DAILY
Status: DISCONTINUED | OUTPATIENT
Start: 2019-09-05 | End: 2019-09-10 | Stop reason: HOSPADM

## 2019-09-05 RX ORDER — INSULIN GLARGINE 100 [IU]/ML
0.2 INJECTION, SOLUTION SUBCUTANEOUS EVERY EVENING
Status: DISCONTINUED | OUTPATIENT
Start: 2019-09-05 | End: 2019-09-10 | Stop reason: HOSPADM

## 2019-09-05 RX ORDER — MAGNESIUM SULFATE HEPTAHYDRATE 40 MG/ML
INJECTION, SOLUTION INTRAVENOUS PRN
Status: DISCONTINUED | OUTPATIENT
Start: 2019-09-05 | End: 2019-09-05 | Stop reason: SURG

## 2019-09-05 RX ORDER — ONDANSETRON 2 MG/ML
INJECTION INTRAMUSCULAR; INTRAVENOUS PRN
Status: DISCONTINUED | OUTPATIENT
Start: 2019-09-05 | End: 2019-09-05 | Stop reason: SURG

## 2019-09-05 RX ORDER — LIDOCAINE HYDROCHLORIDE 10 MG/ML
INJECTION, SOLUTION EPIDURAL; INFILTRATION; INTRACAUDAL; PERINEURAL
Status: COMPLETED
Start: 2019-09-05 | End: 2019-09-05

## 2019-09-05 RX ORDER — HYDROCODONE BITARTRATE AND ACETAMINOPHEN 10; 325 MG/1; MG/1
1 TABLET ORAL
Status: ON HOLD | COMMUNITY
Start: 2019-07-12 | End: 2019-09-19 | Stop reason: SDUPTHER

## 2019-09-05 RX ORDER — METOCLOPRAMIDE HYDROCHLORIDE 5 MG/ML
INJECTION INTRAMUSCULAR; INTRAVENOUS PRN
Status: DISCONTINUED | OUTPATIENT
Start: 2019-09-05 | End: 2019-09-05 | Stop reason: SURG

## 2019-09-05 RX ADMIN — EPHEDRINE SULFATE 5 MG: 50 INJECTION INTRAMUSCULAR; INTRAVENOUS; SUBCUTANEOUS at 15:13

## 2019-09-05 RX ADMIN — HALOPERIDOL LACTATE 1 MG: 5 INJECTION, SOLUTION INTRAMUSCULAR at 16:25

## 2019-09-05 RX ADMIN — PRIMIDONE 50 MG: 50 TABLET ORAL at 20:08

## 2019-09-05 RX ADMIN — GABAPENTIN 1200 MG: 400 CAPSULE ORAL at 20:07

## 2019-09-05 RX ADMIN — MAGNESIUM SULFATE IN WATER 4 G: 40 INJECTION, SOLUTION INTRAVENOUS at 13:12

## 2019-09-05 RX ADMIN — PROPOFOL 200 MG: 10 INJECTION, EMULSION INTRAVENOUS at 13:12

## 2019-09-05 RX ADMIN — CEFAZOLIN 2 G: 330 INJECTION, POWDER, FOR SOLUTION INTRAMUSCULAR; INTRAVENOUS at 13:12

## 2019-09-05 RX ADMIN — SODIUM CHLORIDE: 9 INJECTION, SOLUTION INTRAVENOUS at 15:38

## 2019-09-05 RX ADMIN — HYDROCODONE BITARTRATE AND ACETAMINOPHEN 1 TABLET: 10; 325 TABLET ORAL at 20:07

## 2019-09-05 RX ADMIN — ONDANSETRON 4 MG: 2 INJECTION INTRAMUSCULAR; INTRAVENOUS at 15:35

## 2019-09-05 RX ADMIN — LIDOCAINE HYDROCHLORIDE 0.5 ML: 10 INJECTION, SOLUTION EPIDURAL; INFILTRATION; INTRACAUDAL at 11:45

## 2019-09-05 RX ADMIN — HYDROMORPHONE HYDROCHLORIDE 0.5 MG: 1 INJECTION, SOLUTION INTRAMUSCULAR; INTRAVENOUS; SUBCUTANEOUS at 18:53

## 2019-09-05 RX ADMIN — METOCLOPRAMIDE 20 MG: 5 INJECTION, SOLUTION INTRAMUSCULAR; INTRAVENOUS at 13:16

## 2019-09-05 RX ADMIN — Medication 0.5 ML: at 11:45

## 2019-09-05 RX ADMIN — FENTANYL CITRATE 50 MCG: 50 INJECTION, SOLUTION INTRAMUSCULAR; INTRAVENOUS at 16:35

## 2019-09-05 RX ADMIN — FENTANYL CITRATE 50 MCG: 50 INJECTION, SOLUTION INTRAMUSCULAR; INTRAVENOUS at 17:15

## 2019-09-05 RX ADMIN — FENTANYL CITRATE 50 MCG: 50 INJECTION, SOLUTION INTRAMUSCULAR; INTRAVENOUS at 16:10

## 2019-09-05 RX ADMIN — EPHEDRINE SULFATE 5 MG: 50 INJECTION INTRAMUSCULAR; INTRAVENOUS; SUBCUTANEOUS at 15:11

## 2019-09-05 RX ADMIN — TAMSULOSIN HYDROCHLORIDE 0.4 MG: 0.4 CAPSULE ORAL at 18:52

## 2019-09-05 RX ADMIN — SODIUM CHLORIDE: 9 INJECTION, SOLUTION INTRAVENOUS at 14:56

## 2019-09-05 RX ADMIN — SODIUM CHLORIDE, POTASSIUM CHLORIDE, SODIUM LACTATE AND CALCIUM CHLORIDE: 600; 310; 30; 20 INJECTION, SOLUTION INTRAVENOUS at 11:48

## 2019-09-05 RX ADMIN — DOCUSATE SODIUM 100 MG: 100 CAPSULE, LIQUID FILLED ORAL at 20:07

## 2019-09-05 RX ADMIN — LIDOCAINE HYDROCHLORIDE 100 MG: 20 INJECTION, SOLUTION INTRAVENOUS at 13:12

## 2019-09-05 RX ADMIN — HYDROCODONE BITARTRATE AND ACETAMINOPHEN 30 ML: 7.5; 325 SOLUTION ORAL at 16:30

## 2019-09-05 ASSESSMENT — PAIN SCALES - GENERAL: PAIN_LEVEL: 5

## 2019-09-05 ASSESSMENT — COGNITIVE AND FUNCTIONAL STATUS - GENERAL
SUGGESTED CMS G CODE MODIFIER DAILY ACTIVITY: CK
TURNING FROM BACK TO SIDE WHILE IN FLAT BAD: A LITTLE
DRESSING REGULAR LOWER BODY CLOTHING: A LITTLE
STANDING UP FROM CHAIR USING ARMS: A LOT
WALKING IN HOSPITAL ROOM: A LOT
CLIMB 3 TO 5 STEPS WITH RAILING: TOTAL
DAILY ACTIVITIY SCORE: 17
PERSONAL GROOMING: A LITTLE
MOVING FROM LYING ON BACK TO SITTING ON SIDE OF FLAT BED: A LOT
MOVING TO AND FROM BED TO CHAIR: A LOT
TOILETING: A LOT
HELP NEEDED FOR BATHING: A LOT
MOBILITY SCORE: 12
DRESSING REGULAR UPPER BODY CLOTHING: A LITTLE
SUGGESTED CMS G CODE MODIFIER MOBILITY: CL

## 2019-09-05 ASSESSMENT — LIFESTYLE VARIABLES
HOW MANY TIMES IN THE PAST YEAR HAVE YOU HAD 5 OR MORE DRINKS IN A DAY: 0
ON A TYPICAL DAY WHEN YOU DRINK ALCOHOL HOW MANY DRINKS DO YOU HAVE: 0
EVER HAD A DRINK FIRST THING IN THE MORNING TO STEADY YOUR NERVES TO GET RID OF A HANGOVER: NO
EVER FELT BAD OR GUILTY ABOUT YOUR DRINKING: NO
CONSUMPTION TOTAL: NEGATIVE
DOES PATIENT WANT TO STOP DRINKING: NO
ALCOHOL_USE: NO
AVERAGE NUMBER OF DAYS PER WEEK YOU HAVE A DRINK CONTAINING ALCOHOL: 0
HAVE PEOPLE ANNOYED YOU BY CRITICIZING YOUR DRINKING: NO
TOTAL SCORE: 0
HAVE YOU EVER FELT YOU SHOULD CUT DOWN ON YOUR DRINKING: NO
TOTAL SCORE: 0
TOTAL SCORE: 0
EVER_SMOKED: NEVER

## 2019-09-05 ASSESSMENT — PATIENT HEALTH QUESTIONNAIRE - PHQ9
1. LITTLE INTEREST OR PLEASURE IN DOING THINGS: NOT AT ALL
SUM OF ALL RESPONSES TO PHQ9 QUESTIONS 1 AND 2: 0
2. FEELING DOWN, DEPRESSED, IRRITABLE, OR HOPELESS: NOT AT ALL

## 2019-09-05 NOTE — ANESTHESIA PREPROCEDURE EVALUATION
Relevant Problems   CARDIAC   (+) Essential hypertension- DR VIZCARRA      GI   (+) Gastroesophageal reflux disease with esophagitis- PPI PRN      ENDO   (+) Hypothyroidism due to acquired atrophy of thyroid- dr browning   (+) Uncontrolled type 2 diabetes mellitus with complication, with long-term current use of insulin (HCC)- dr browning       Physical Exam    Airway   Mallampati: II  TM distance: <3 FB  Neck ROM: full       Cardiovascular   Rhythm: regular  Rate: normal     Dental - normal exam         Pulmonary   Breath sounds clear to auscultation     Abdominal    Neurological - normal exam                 Anesthesia Plan    ASA 3   ASA physical status 3 criteria: diabetes - poorly controlled    Plan - general       Airway plan will be LMA        Induction: intravenous          Informed Consent:    Anesthetic plan and risks discussed with patient.

## 2019-09-05 NOTE — ANESTHESIA POSTPROCEDURE EVALUATION
Patient: Kevin Jackson    Procedure Summary     Date:  09/05/19 Room / Location:  [facility] 08 / SURGERY [facility]    Anesthesia Start:  1306 Anesthesia Stop:  1548    Procedure:  AMPUTATION, BELOW KNEE (Right Leg Lower) Diagnosis:  (ATHEROSCLEROSIS OF NATIVE ARTERIES OF EXTREMITIES)    Surgeon:  Shivam Amezcua M.D. Responsible Provider:  Carlos Cardenas M.D.    Anesthesia Type:  general ASA Status:  3          Final Anesthesia Type: general  Last vitals  BP   Blood Pressure : 121/74, NIBP: 104/65    Temp   36.7 °C (98.1 °F)    Pulse   Pulse: 84, Heart Rate (Monitored): 82   Resp   14    SpO2   95 %      Anesthesia Post Evaluation    Patient location during evaluation: PACU  Patient participation: complete - patient participated  Level of consciousness: awake and alert  Pain score: 5    Airway patency: patent  Anesthetic complications: no  Cardiovascular status: hemodynamically stable  Respiratory status: acceptable  Hydration status: euvolemic    PONV: none           Nurse Pain Score: 5 (NPRS)

## 2019-09-05 NOTE — OP REPORT
OPERATIVE REPORT      Patient:  Kevin Jackson     Procedure Date:  09/05/19    Indication:  The patient presents for R BKA for a gangrenous 2nd toe    Pre-Operative Diagnosis:  DM  PAD  HTN    Post-Operative Diagnosis:  Same    Procedure:  Right below-knee amputation    Surgeon:  Shivam Amezcua M.D., MD    Assistant:  None    Anesthesia:  General endotracheal    EBL:  150cc    Specimen:  None    Complications:  None    Findings:  Pre-operative angiography confirmed the patient has minimal tibial arterial disease to the level of the ankle. However, all three tibials occlude at the ankle and there is essentially no flow in the foot. He developed a 2nd toe ulcer which rapidly progressed to gangrene of the entire toe and compromise of the 3rd toe. Due to infection risk and severe chronic pain, he elected for below-knee amputation.    There was pulsatile inflow in the AT and PT during the procedure.    Procedure:  Informed consent was obtained from the patient in the pre-operative holding area. All risks, benefits, and alternatives were explained and he elected to proceed. The patient was brought to the operating room and placed on the table in a supine position. General anesthesia was induced, chace-operative antibiotics were given, and a time-out was performed verifying the correct site of surgery. The patient was prepped and draped in the usual sterile fashion.    The planned incision was marked on the skin to create a long posterior flap using the gastroc and soleus muscles with planned tibia transection 11cm below the tibial tuberosity. A scalpel was used to incise skin down to the periosteum anteriorly. Bovie was used to divide the fascia of the anterior, lateral, and posterior compartments.     The saphenous vein was isolated, ligated and divided. Muscle bellies of the anterior and lateral compartments were divided with bovie and the anterior tibial artery and vein and superficial peroneal  nerve were identified. The vein was ligated and divided. The AT was clamped proximally and distally, divided and suture ligated. Traction was placed on the nerve which was then sharply transected and the proximal stump ligated with suture.    Fascial attachments to the tibia were taken down and bluntly dissected back several centimeters. The tibia was transected with a reciprocating saw. The fibula was dissected out and muscular attachments were taken down with bovie. The fibula was divided with the saw approxiately 1cm above the tibial transection. The gastroc and soleus were sharply taken down with a knife to create the posterior flap and the leg was amputated. The posterior tibial artery was suture ligated as were several patent side branches.    Excess muscle was excised to debulk the flap and hemostasis was obtained. The gastroc fascia was brought up and sutured to fascia over the anterior aspect of the tibia with 2-0 vicryl. Fascia around the incision was closed with interrupted 2-0 vicryl and skin was closed with interrupted 2-0 nylon. An occlusive dressing was applied, followed by an ACE wrap and knee immobilizer. The patient was recovered from anesthesia and taken to PACU in stable condition.      Shivam Amezcua M.D.  Vascular Surgeon  [facility]

## 2019-09-05 NOTE — ANESTHESIA TIME REPORT
Anesthesia Start and Stop Event Times     Date Time Event    9/5/2019 1249 Ready for Procedure     1306 Anesthesia Start     1548 Anesthesia Stop        Responsible Staff  09/05/19    Name Role Begin End    Carlos Cardenas M.D. Anesth 1306 1541        Preop Diagnosis (Free Text):  Pre-op Diagnosis     ATHEROSCLEROSIS OF NATIVE ARTERIES OF EXTREMITIES        Preop Diagnosis (Codes):    Post op Diagnosis  Atherosclerosis      Premium Reason  A. 3PM - 7AM    Comments:

## 2019-09-05 NOTE — ANESTHESIA PROCEDURE NOTES
Airway  Date/Time: 9/5/2019 1:11 PM  Performed by: Carlos Cardenas M.D.  Authorized by: Carlos Cardenas M.D.     Location:  OR  Urgency:  Elective  Indications for Airway Management:  Anesthesia  Spontaneous Ventilation: absent    Sedation Level:  Deep  Preoxygenated: Yes    Final Airway Type:  Supraglottic airway  Final Supraglottic Airway:  Standard LMA  SGA Size:  5  Number of Attempts at Approach:  1

## 2019-09-06 LAB
GLUCOSE BLD-MCNC: 136 MG/DL (ref 65–99)
GLUCOSE BLD-MCNC: 154 MG/DL (ref 65–99)
GLUCOSE BLD-MCNC: 157 MG/DL (ref 65–99)
GLUCOSE BLD-MCNC: 214 MG/DL (ref 65–99)
PATHOLOGY CONSULT NOTE: NORMAL

## 2019-09-06 PROCEDURE — 700111 HCHG RX REV CODE 636 W/ 250 OVERRIDE (IP): Performed by: SURGERY

## 2019-09-06 PROCEDURE — 700112 HCHG RX REV CODE 229: Performed by: SURGERY

## 2019-09-06 PROCEDURE — 770006 HCHG ROOM/CARE - MED/SURG/GYN SEMI*

## 2019-09-06 PROCEDURE — A9270 NON-COVERED ITEM OR SERVICE: HCPCS | Performed by: SURGERY

## 2019-09-06 PROCEDURE — 700102 HCHG RX REV CODE 250 W/ 637 OVERRIDE(OP): Performed by: SURGERY

## 2019-09-06 PROCEDURE — 82962 GLUCOSE BLOOD TEST: CPT

## 2019-09-06 RX ORDER — HYDROCODONE BITARTRATE AND ACETAMINOPHEN 5; 325 MG/1; MG/1
1-2 TABLET ORAL EVERY 4 HOURS PRN
Status: DISCONTINUED | OUTPATIENT
Start: 2019-09-06 | End: 2019-09-10 | Stop reason: HOSPADM

## 2019-09-06 RX ADMIN — INSULIN LISPRO 6 UNITS: 100 INJECTION, SOLUTION INTRAVENOUS; SUBCUTANEOUS at 12:01

## 2019-09-06 RX ADMIN — DOCUSATE SODIUM 100 MG: 100 CAPSULE, LIQUID FILLED ORAL at 06:22

## 2019-09-06 RX ADMIN — ASPIRIN 81 MG: 81 TABLET, COATED ORAL at 17:57

## 2019-09-06 RX ADMIN — INSULIN LISPRO 6 UNITS: 100 INJECTION, SOLUTION INTRAVENOUS; SUBCUTANEOUS at 06:29

## 2019-09-06 RX ADMIN — DOCUSATE SODIUM 100 MG: 100 CAPSULE, LIQUID FILLED ORAL at 18:04

## 2019-09-06 RX ADMIN — GABAPENTIN 1200 MG: 400 CAPSULE ORAL at 20:51

## 2019-09-06 RX ADMIN — HYDROMORPHONE HYDROCHLORIDE 0.5 MG: 1 INJECTION, SOLUTION INTRAMUSCULAR; INTRAVENOUS; SUBCUTANEOUS at 00:59

## 2019-09-06 RX ADMIN — HEPARIN SODIUM 5000 UNITS: 5000 INJECTION INTRAVENOUS; SUBCUTANEOUS at 12:04

## 2019-09-06 RX ADMIN — INSULIN LISPRO 6 UNITS: 100 INJECTION, SOLUTION INTRAVENOUS; SUBCUTANEOUS at 18:03

## 2019-09-06 RX ADMIN — PROPRANOLOL HYDROCHLORIDE 120 MG: 60 CAPSULE, EXTENDED RELEASE ORAL at 06:21

## 2019-09-06 RX ADMIN — INSULIN GLARGINE 19 UNITS: 100 INJECTION, SOLUTION SUBCUTANEOUS at 17:58

## 2019-09-06 RX ADMIN — HEPARIN SODIUM 5000 UNITS: 5000 INJECTION INTRAVENOUS; SUBCUTANEOUS at 03:15

## 2019-09-06 RX ADMIN — GABAPENTIN 600 MG: 300 CAPSULE ORAL at 18:04

## 2019-09-06 RX ADMIN — CHLORTHALIDONE 25 MG: 25 TABLET ORAL at 06:22

## 2019-09-06 RX ADMIN — ATORVASTATIN CALCIUM 80 MG: 80 TABLET, FILM COATED ORAL at 17:57

## 2019-09-06 RX ADMIN — GABAPENTIN 600 MG: 300 CAPSULE ORAL at 06:21

## 2019-09-06 RX ADMIN — HEPARIN SODIUM 5000 UNITS: 5000 INJECTION INTRAVENOUS; SUBCUTANEOUS at 20:51

## 2019-09-06 RX ADMIN — PRIMIDONE 50 MG: 50 TABLET ORAL at 17:57

## 2019-09-06 RX ADMIN — GABAPENTIN 600 MG: 300 CAPSULE ORAL at 12:04

## 2019-09-06 RX ADMIN — LOSARTAN POTASSIUM 50 MG: 50 TABLET ORAL at 06:22

## 2019-09-06 RX ADMIN — HYDROMORPHONE HYDROCHLORIDE 0.5 MG: 1 INJECTION, SOLUTION INTRAMUSCULAR; INTRAVENOUS; SUBCUTANEOUS at 09:13

## 2019-09-06 RX ADMIN — LEVOTHYROXINE SODIUM 50 MCG: 50 TABLET ORAL at 06:21

## 2019-09-06 NOTE — CARE PLAN
Problem: Safety  Goal: Will remain free from injury  Outcome: PROGRESSING AS EXPECTED  Goal: Will remain free from falls  Outcome: PROGRESSING AS EXPECTED     Problem: Pain Management  Goal: Pain level will decrease to patient's comfort goal  Outcome: PROGRESSING AS EXPECTED  Note:   Controlled with current PRN meds     Problem: Skin Integrity  Goal: Risk for impaired skin integrity will decrease  Outcome: PROGRESSING AS EXPECTED  Note:   New RBKA dressing CDI and immobilizer in place. No bleeding/drainage noted.     Problem: Respiratory:  Goal: Respiratory status will improve  Outcome: PROGRESSING AS EXPECTED     Problem: Urinary Elimination:  Goal: Ability to reestablish a normal urinary elimination pattern will improve  Outcome: PROGRESSING SLOWER THAN EXPECTED  Flowsheets (Taken 9/6/2019 0412)  Urinary Elimination: Catheter (Document on LDA)  Note:   Timmons catheter placed 6 hours post op for retention.

## 2019-09-06 NOTE — PROGRESS NOTES
2 RN skin check completed with Ky Blair RN.    Devices in place oxygen, right leg immobilizer and SCD left leg.  Skin assessed under devices. No signs of bleeding to new RBKA dressing. Bilateral ears Red/blanching.    Scar/small scab to left foot (all 5 toes previously amputated)    The following interventions in place: Encouraged frequent position changes. Right leg to remain in immobilizer, elevated on 2 pillows per order.

## 2019-09-06 NOTE — PROGRESS NOTES
Pt arrived from PACU at 1805. Vitals started, oriented to room. Repositioned for comfort, medicated for pain with Dilaudid.

## 2019-09-07 LAB
GLUCOSE BLD-MCNC: 130 MG/DL (ref 65–99)
GLUCOSE BLD-MCNC: 214 MG/DL (ref 65–99)
GLUCOSE BLD-MCNC: 226 MG/DL (ref 65–99)
GLUCOSE BLD-MCNC: 251 MG/DL (ref 65–99)

## 2019-09-07 PROCEDURE — 770006 HCHG ROOM/CARE - MED/SURG/GYN SEMI*

## 2019-09-07 PROCEDURE — 700112 HCHG RX REV CODE 229: Performed by: SURGERY

## 2019-09-07 PROCEDURE — 82962 GLUCOSE BLOOD TEST: CPT

## 2019-09-07 PROCEDURE — A9270 NON-COVERED ITEM OR SERVICE: HCPCS | Performed by: SURGERY

## 2019-09-07 PROCEDURE — 700102 HCHG RX REV CODE 250 W/ 637 OVERRIDE(OP): Performed by: SURGERY

## 2019-09-07 PROCEDURE — 700111 HCHG RX REV CODE 636 W/ 250 OVERRIDE (IP): Performed by: SURGERY

## 2019-09-07 RX ADMIN — INSULIN LISPRO 6 UNITS: 100 INJECTION, SOLUTION INTRAVENOUS; SUBCUTANEOUS at 06:45

## 2019-09-07 RX ADMIN — GABAPENTIN 1200 MG: 400 CAPSULE ORAL at 21:19

## 2019-09-07 RX ADMIN — HEPARIN SODIUM 5000 UNITS: 5000 INJECTION INTRAVENOUS; SUBCUTANEOUS at 05:15

## 2019-09-07 RX ADMIN — PRIMIDONE 50 MG: 50 TABLET ORAL at 17:04

## 2019-09-07 RX ADMIN — HEPARIN SODIUM 5000 UNITS: 5000 INJECTION INTRAVENOUS; SUBCUTANEOUS at 12:09

## 2019-09-07 RX ADMIN — ASPIRIN 81 MG: 81 TABLET, COATED ORAL at 17:04

## 2019-09-07 RX ADMIN — DOCUSATE SODIUM 100 MG: 100 CAPSULE, LIQUID FILLED ORAL at 05:16

## 2019-09-07 RX ADMIN — TAMSULOSIN HYDROCHLORIDE 0.4 MG: 0.4 CAPSULE ORAL at 05:16

## 2019-09-07 RX ADMIN — CHLORTHALIDONE 25 MG: 25 TABLET ORAL at 05:16

## 2019-09-07 RX ADMIN — LEVOTHYROXINE SODIUM 50 MCG: 50 TABLET ORAL at 05:15

## 2019-09-07 RX ADMIN — GABAPENTIN 600 MG: 300 CAPSULE ORAL at 05:16

## 2019-09-07 RX ADMIN — INSULIN GLARGINE 19 UNITS: 100 INJECTION, SOLUTION SUBCUTANEOUS at 16:57

## 2019-09-07 RX ADMIN — GABAPENTIN 600 MG: 300 CAPSULE ORAL at 12:09

## 2019-09-07 RX ADMIN — GABAPENTIN 600 MG: 300 CAPSULE ORAL at 17:04

## 2019-09-07 RX ADMIN — INSULIN LISPRO 6 UNITS: 100 INJECTION, SOLUTION INTRAVENOUS; SUBCUTANEOUS at 16:55

## 2019-09-07 RX ADMIN — ATORVASTATIN CALCIUM 80 MG: 80 TABLET, FILM COATED ORAL at 05:16

## 2019-09-07 RX ADMIN — HEPARIN SODIUM 5000 UNITS: 5000 INJECTION INTRAVENOUS; SUBCUTANEOUS at 21:19

## 2019-09-07 RX ADMIN — LOSARTAN POTASSIUM 50 MG: 50 TABLET ORAL at 05:15

## 2019-09-07 RX ADMIN — PROPRANOLOL HYDROCHLORIDE 120 MG: 60 CAPSULE, EXTENDED RELEASE ORAL at 05:15

## 2019-09-07 NOTE — PROGRESS NOTES
Assumed care of pt from Torsten JEFFRIES this am.  Pt A&O x4, uses call bell appropriately  On RA, drops to 80s when asleep, needs 1L  No cardiac issues.  Immobilizer over R stump in place, no drainage noted.  Tolerating diabetic diet, bs covered at lunch and dinner  Timmons in place, obtained order to d/c in am  +bm today, 2x assist to pivot onto bsc.  Up to chair for several hours today. Needs 2x assist plus FWW.  C/o pain 10/10, 1 dose of IV Dilaudid given, pt stated relief rest of day.  POC discussed with pt and wife.  Bed low and locked, call light/belongings within reach  Hourly rounding in place.

## 2019-09-07 NOTE — PROGRESS NOTES
Bedside report received.  Assessment complete.  A&O x 4. Patient calls appropriately.  Patient up with x2 assist. Bed alarm on.   Patient has 0/10 pain.   Denies N&V. Tolerating diabetic diet.  Right BKA with immobilizer in place.   - void, - flatus, - BM.  Patient denies SOB.  SCD's on LLE.  Review plan with of care with patient. Call light and personal belongings with in reach. Hourly rounding in place. All needs met at this time.

## 2019-09-07 NOTE — PROGRESS NOTES
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    Doing well today. Primary complaint is phantom pain in the amputated foot.    Vitals:    09/06/19 1550   BP: 134/80   Pulse: 98   Resp: 18   Temp: 37.1 °C (98.7 °F)   SpO2: 92%     NAD  Resting comfortably  No stridor  RLE dressings c/d/i, no strikethrough    No new labs    A/P 74M POD #1 s/p R BKA    Dressing change william  Ok for OOB to chair  PT eval william. Ok to remove the knee immobilizer to range the knee  SW for expected need for SNF next week    Shivam Amezcua MD  Vascular Surgeon  [facility]  Office: [phone]

## 2019-09-08 LAB
GLUCOSE BLD-MCNC: 215 MG/DL (ref 65–99)
GLUCOSE BLD-MCNC: 229 MG/DL (ref 65–99)
GLUCOSE BLD-MCNC: 237 MG/DL (ref 65–99)
GLUCOSE BLD-MCNC: 253 MG/DL (ref 65–99)

## 2019-09-08 PROCEDURE — 82962 GLUCOSE BLOOD TEST: CPT

## 2019-09-08 PROCEDURE — 700111 HCHG RX REV CODE 636 W/ 250 OVERRIDE (IP): Performed by: SURGERY

## 2019-09-08 PROCEDURE — 770006 HCHG ROOM/CARE - MED/SURG/GYN SEMI*

## 2019-09-08 PROCEDURE — 700112 HCHG RX REV CODE 229: Performed by: SURGERY

## 2019-09-08 PROCEDURE — 97162 PT EVAL MOD COMPLEX 30 MIN: CPT

## 2019-09-08 PROCEDURE — A9270 NON-COVERED ITEM OR SERVICE: HCPCS | Performed by: SURGERY

## 2019-09-08 PROCEDURE — 700102 HCHG RX REV CODE 250 W/ 637 OVERRIDE(OP): Performed by: SURGERY

## 2019-09-08 RX ADMIN — INSULIN LISPRO 6 UNITS: 100 INJECTION, SOLUTION INTRAVENOUS; SUBCUTANEOUS at 11:48

## 2019-09-08 RX ADMIN — INSULIN LISPRO 6 UNITS: 100 INJECTION, SOLUTION INTRAVENOUS; SUBCUTANEOUS at 06:28

## 2019-09-08 RX ADMIN — HEPARIN SODIUM 5000 UNITS: 5000 INJECTION INTRAVENOUS; SUBCUTANEOUS at 14:33

## 2019-09-08 RX ADMIN — LEVOTHYROXINE SODIUM 50 MCG: 50 TABLET ORAL at 06:23

## 2019-09-08 RX ADMIN — GABAPENTIN 600 MG: 300 CAPSULE ORAL at 17:34

## 2019-09-08 RX ADMIN — HEPARIN SODIUM 5000 UNITS: 5000 INJECTION INTRAVENOUS; SUBCUTANEOUS at 05:01

## 2019-09-08 RX ADMIN — PROPRANOLOL HYDROCHLORIDE 120 MG: 60 CAPSULE, EXTENDED RELEASE ORAL at 05:01

## 2019-09-08 RX ADMIN — INSULIN LISPRO 6 UNITS: 100 INJECTION, SOLUTION INTRAVENOUS; SUBCUTANEOUS at 17:40

## 2019-09-08 RX ADMIN — GABAPENTIN 1200 MG: 400 CAPSULE ORAL at 21:15

## 2019-09-08 RX ADMIN — CHLORTHALIDONE 25 MG: 25 TABLET ORAL at 05:01

## 2019-09-08 RX ADMIN — GABAPENTIN 600 MG: 300 CAPSULE ORAL at 11:51

## 2019-09-08 RX ADMIN — LOSARTAN POTASSIUM 50 MG: 50 TABLET ORAL at 05:01

## 2019-09-08 RX ADMIN — TAMSULOSIN HYDROCHLORIDE 0.4 MG: 0.4 CAPSULE ORAL at 05:02

## 2019-09-08 RX ADMIN — ASPIRIN 81 MG: 81 TABLET, COATED ORAL at 17:43

## 2019-09-08 RX ADMIN — DOCUSATE SODIUM 100 MG: 100 CAPSULE, LIQUID FILLED ORAL at 17:35

## 2019-09-08 RX ADMIN — PRIMIDONE 50 MG: 50 TABLET ORAL at 17:35

## 2019-09-08 RX ADMIN — ATORVASTATIN CALCIUM 80 MG: 80 TABLET, FILM COATED ORAL at 17:35

## 2019-09-08 RX ADMIN — GABAPENTIN 600 MG: 300 CAPSULE ORAL at 05:01

## 2019-09-08 RX ADMIN — INSULIN GLARGINE 19 UNITS: 100 INJECTION, SOLUTION SUBCUTANEOUS at 17:35

## 2019-09-08 RX ADMIN — DOCUSATE SODIUM 100 MG: 100 CAPSULE, LIQUID FILLED ORAL at 05:01

## 2019-09-08 RX ADMIN — HEPARIN SODIUM 5000 UNITS: 5000 INJECTION INTRAVENOUS; SUBCUTANEOUS at 21:15

## 2019-09-08 ASSESSMENT — COGNITIVE AND FUNCTIONAL STATUS - GENERAL
MOVING FROM LYING ON BACK TO SITTING ON SIDE OF FLAT BED: UNABLE
CLIMB 3 TO 5 STEPS WITH RAILING: A LOT
SUGGESTED CMS G CODE MODIFIER MOBILITY: CL
STANDING UP FROM CHAIR USING ARMS: A LITTLE
WALKING IN HOSPITAL ROOM: A LITTLE
MOVING TO AND FROM BED TO CHAIR: UNABLE
TURNING FROM BACK TO SIDE WHILE IN FLAT BAD: UNABLE
MOBILITY SCORE: 11

## 2019-09-08 ASSESSMENT — GAIT ASSESSMENTS
DISTANCE (FEET): 10
ASSISTIVE DEVICE: FRONT WHEEL WALKER
GAIT LEVEL OF ASSIST: MINIMAL ASSIST

## 2019-09-08 NOTE — PROGRESS NOTES
Progress Note    POD #2 s/p R BKA for diabetic foot wounds/toe gangrene    Doing well, pain controlled today.    Vitals:    09/07/19 1906   BP: 119/68   Pulse: 97   Resp: 18   Temp: 36.6 °C (97.9 °F)   SpO2: 95%     NAD  RLE dressings taken down. Stump c/d/i    No labs    A/P  POD #2 s/p R BKA    Doing well.  Reapply xeroform, kerlex, and ACE to stump.  Knee immobilizer at all times unless doing PT or ranging the joint.    Place stump  tomorrow.    SW for placement. Expect he'll be ready early next week.    Shivam Amezcua MD  Vascular Surgeon  [facility]  Office: [phone]

## 2019-09-08 NOTE — PROGRESS NOTES
Bedside report completed.  A&O x4.  In no acute distress.   VSS. On 1L NC   Ambulating with assistance of 2  Tolerating diabetic diet, denies N/V.  declines pain  R BKA, dressings and immobilizer in place  Last BM 9/7/19 per pt report  Using bedside urinal to void.    Call light and personal belongings within reach.  SCDs in place.  Family at bedside. POC discussed and all questions answered. No additional needs at this time.

## 2019-09-08 NOTE — THERAPY
"Physical Therapy Evaluation completed.   Bed Mobility:  Supine to Sit: Minimal Assist  Transfers: Sit to Stand: Minimal Assist  Gait: Level Of Assist: Minimal Assist with Front-Wheel Walker       Plan of Care: Will benefit from Physical Therapy 4 times per week  Discharge Recommendations: Equipment: Will Continue to Assess for Equipment Needs. Post-acute therapy Discharge to a transitional care facility for continued skilled therapy services.    See \"Rehab Therapy-Acute\" Patient Summary Report for complete documentation.       Pt is a 73 y/o male presenting to acute setting s/p R BKA for diabetic foot wounds/toe gangrene.  Pt very motivated to mobilize, able to demonstrate full RLE knee and hip ROM without c/o pain and verbalized understanding for immobilizer when not performing ROM or mobility with therapy.  Pt able to perform hop gait technique within room using FWW and min assist for pacing and sequencing.  PT to continue working with Pt in acute setting for continued functional mobility and recommend DC to acute inpatient rehab setting as Pt appears able to tolerate daily therapy and has excellent home support and motivation.   "

## 2019-09-08 NOTE — CARE PLAN
Hand hygiene performed prior to and after entering pt room. Gloves worn during patient interactions.     Patient is a low fall risk. Patient educated to call for assistance. Call light left within reach of patient.

## 2019-09-09 LAB
GLUCOSE BLD-MCNC: 210 MG/DL (ref 65–99)
GLUCOSE BLD-MCNC: 243 MG/DL (ref 65–99)
GLUCOSE BLD-MCNC: 263 MG/DL (ref 65–99)
GLUCOSE BLD-MCNC: 282 MG/DL (ref 65–99)

## 2019-09-09 PROCEDURE — 770006 HCHG ROOM/CARE - MED/SURG/GYN SEMI*

## 2019-09-09 PROCEDURE — A9270 NON-COVERED ITEM OR SERVICE: HCPCS | Performed by: SURGERY

## 2019-09-09 PROCEDURE — 700102 HCHG RX REV CODE 250 W/ 637 OVERRIDE(OP): Performed by: SURGERY

## 2019-09-09 PROCEDURE — 82962 GLUCOSE BLOOD TEST: CPT | Mod: 91

## 2019-09-09 PROCEDURE — 700111 HCHG RX REV CODE 636 W/ 250 OVERRIDE (IP): Performed by: SURGERY

## 2019-09-09 PROCEDURE — 97116 GAIT TRAINING THERAPY: CPT

## 2019-09-09 PROCEDURE — 700112 HCHG RX REV CODE 229: Performed by: SURGERY

## 2019-09-09 PROCEDURE — 97530 THERAPEUTIC ACTIVITIES: CPT

## 2019-09-09 RX ADMIN — DOCUSATE SODIUM 100 MG: 100 CAPSULE, LIQUID FILLED ORAL at 16:46

## 2019-09-09 RX ADMIN — HEPARIN SODIUM 5000 UNITS: 5000 INJECTION INTRAVENOUS; SUBCUTANEOUS at 05:28

## 2019-09-09 RX ADMIN — GABAPENTIN 600 MG: 300 CAPSULE ORAL at 12:00

## 2019-09-09 RX ADMIN — LOSARTAN POTASSIUM 50 MG: 50 TABLET ORAL at 05:27

## 2019-09-09 RX ADMIN — INSULIN GLARGINE 19 UNITS: 100 INJECTION, SOLUTION SUBCUTANEOUS at 16:41

## 2019-09-09 RX ADMIN — INSULIN LISPRO 6 UNITS: 100 INJECTION, SOLUTION INTRAVENOUS; SUBCUTANEOUS at 16:41

## 2019-09-09 RX ADMIN — HEPARIN SODIUM 5000 UNITS: 5000 INJECTION INTRAVENOUS; SUBCUTANEOUS at 14:43

## 2019-09-09 RX ADMIN — GABAPENTIN 1200 MG: 400 CAPSULE ORAL at 20:25

## 2019-09-09 RX ADMIN — GABAPENTIN 600 MG: 300 CAPSULE ORAL at 16:46

## 2019-09-09 RX ADMIN — TAMSULOSIN HYDROCHLORIDE 0.4 MG: 0.4 CAPSULE ORAL at 05:28

## 2019-09-09 RX ADMIN — CHLORTHALIDONE 25 MG: 25 TABLET ORAL at 05:27

## 2019-09-09 RX ADMIN — INSULIN LISPRO 6 UNITS: 100 INJECTION, SOLUTION INTRAVENOUS; SUBCUTANEOUS at 06:32

## 2019-09-09 RX ADMIN — INSULIN LISPRO 6 UNITS: 100 INJECTION, SOLUTION INTRAVENOUS; SUBCUTANEOUS at 11:44

## 2019-09-09 RX ADMIN — ATORVASTATIN CALCIUM 80 MG: 80 TABLET, FILM COATED ORAL at 16:47

## 2019-09-09 RX ADMIN — LEVOTHYROXINE SODIUM 50 MCG: 50 TABLET ORAL at 06:10

## 2019-09-09 RX ADMIN — PRIMIDONE 50 MG: 50 TABLET ORAL at 16:47

## 2019-09-09 RX ADMIN — GABAPENTIN 600 MG: 300 CAPSULE ORAL at 05:27

## 2019-09-09 RX ADMIN — DOCUSATE SODIUM 100 MG: 100 CAPSULE, LIQUID FILLED ORAL at 05:28

## 2019-09-09 RX ADMIN — ASPIRIN 81 MG: 81 TABLET, COATED ORAL at 16:46

## 2019-09-09 RX ADMIN — PROPRANOLOL HYDROCHLORIDE 120 MG: 60 CAPSULE, EXTENDED RELEASE ORAL at 05:27

## 2019-09-09 ASSESSMENT — COGNITIVE AND FUNCTIONAL STATUS - GENERAL
MOVING TO AND FROM BED TO CHAIR: A LITTLE
SUGGESTED CMS G CODE MODIFIER MOBILITY: CK
STANDING UP FROM CHAIR USING ARMS: A LITTLE
WALKING IN HOSPITAL ROOM: A LOT
TURNING FROM BACK TO SIDE WHILE IN FLAT BAD: A LITTLE
MOVING FROM LYING ON BACK TO SITTING ON SIDE OF FLAT BED: A LITTLE
CLIMB 3 TO 5 STEPS WITH RAILING: A LOT
MOBILITY SCORE: 16

## 2019-09-09 ASSESSMENT — GAIT ASSESSMENTS
GAIT LEVEL OF ASSIST: MINIMAL ASSIST
ASSISTIVE DEVICE: FRONT WHEEL WALKER
DISTANCE (FEET): 20
DEVIATION: SHUFFLED GAIT

## 2019-09-09 NOTE — PROGRESS NOTES
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 Got up ambulating w/ walker w/ PT today.    Vitals:    09/09/19 0800   BP: 103/61   Pulse: 81   Resp: 15   Temp: 36.4 °C (97.5 °F)   SpO2: 96%     NAD  RLE dressings taken down. Stump c/d/i    No labs    A/P  POD # 4 s/p R BKA    Working w/ PT  Ready for d/c to rehab when bed available  Will ask OT to eval as well  Needs stump     Shivam Amezcua MD  Vascular Surgeon  [facility]  Office: [phone]

## 2019-09-09 NOTE — DISCHARGE PLANNING
Aware of PMR referral from Dr. Amezcua. 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  Would appreciate initial OT eval to assist with determination for post acute needs. No Physiatry consult as yet per protocol. TCC following. Thank you for the referral.

## 2019-09-09 NOTE — PROGRESS NOTES
Bedside report completed.  A&O x4.  In no acute distress.   VSS. On 0.5L NC   Ambulating with assistance of two  Tolerating diabetic diet, denies N/V.  declines pain  Skin: R BKA site with dressings in place and immobilizer; sacrum is red and blanching, mepilex applied  Last BM 9/7/19  Using bedside urinal to void.    Call light and personal belongings within reach.  SCDs in place.  Family at bedside. POC discussed and all questions answered. No additional needs at this time.

## 2019-09-09 NOTE — PROGRESS NOTES
Received report of patient at start of shift. Patient is AOx4, no complaints of pain. Assessment complete, on 0.5L O2 via NC. Attempted to remove oxygen as patient's baseline is room air. Patient desaturated into mid/upper 80%, oxygen replaced. Incentive spirometer at bedside, education provided. Immobilizer in place to RLE with ace bandage to stump, clean/dry/intact. Wife at bedside throughout shift so far. Bed alarm on, safety precautions in place.

## 2019-09-09 NOTE — CARE PLAN
Problem: Skin Integrity  Goal: Risk for impaired skin integrity will decrease  Outcome: PROGRESSING AS EXPECTED  Note:   Blanchable redness noted to sacrum. Mepilex in place. Patient encouraged to turn frequently while in bed.     Problem: Safety  Goal: Will remain free from falls  Outcome: PROGRESSING AS EXPECTED  Note:   Bed locked in lowest position, treaded socks in place, call light within reach. Pt instructed to call for assistance.

## 2019-09-09 NOTE — PROGRESS NOTES
MD had requested stump  for patient, spoke with traction, this is not something that we supply in house, it will have to be made by an outside company.  Will discuss with vascular surgeon today.

## 2019-09-09 NOTE — THERAPY
"Pt making progress.  Ambulation distance is slowly increasing and his sit to stand transfers are slowly improving.  He is very motivated and will have good support at d/c.  He may be an appropriate candidate for in pt therapy prior to d/c home.  PT will continue to follow and address goals set at initial eval.  Physical Therapy Treatment completed.   Bed Mobility:  Supine to Sit: (P) Minimal Assist  Transfers: Sit to Stand: (P) Minimal Assist  Gait: Level Of Assist: (P) Minimal Assist with Front-Wheel Walker     20  Plan of Care: Will benefit from Physical Therapy 4 times per week  Discharge Recommendations: Equipment: Will Continue to Assess for Equipment Needs. Post-acute therapy Currently anticipate no further skilled therapy needs once patient is discharged from the inpatient setting.     See \"Rehab Therapy-Acute\" Patient Summary Report for complete documentation.       "

## 2019-09-10 ENCOUNTER — HOSPITAL ENCOUNTER (INPATIENT)
Facility: REHABILITATION | Age: 74
LOS: 10 days | DRG: 560 | End: 2019-09-20
Attending: PHYSICAL MEDICINE & REHABILITATION | Admitting: PHYSICAL MEDICINE & REHABILITATION
Payer: MEDICARE

## 2019-09-10 VITALS
RESPIRATION RATE: 16 BRPM | TEMPERATURE: 96.7 F | OXYGEN SATURATION: 93 % | HEART RATE: 78 BPM | SYSTOLIC BLOOD PRESSURE: 110 MMHG | DIASTOLIC BLOOD PRESSURE: 67 MMHG | HEIGHT: 73 IN | BODY MASS INDEX: 27.87 KG/M2 | WEIGHT: 210.32 LBS

## 2019-09-10 DIAGNOSIS — I73.9 PVD (PERIPHERAL VASCULAR DISEASE) (HCC): ICD-10-CM

## 2019-09-10 DIAGNOSIS — E78.2 MIXED HYPERLIPIDEMIA: ICD-10-CM

## 2019-09-10 DIAGNOSIS — N40.1 BENIGN NON-NODULAR PROSTATIC HYPERPLASIA WITH LOWER URINARY TRACT SYMPTOMS: ICD-10-CM

## 2019-09-10 DIAGNOSIS — E78.5 HLD (HYPERLIPIDEMIA): ICD-10-CM

## 2019-09-10 DIAGNOSIS — I10 HTN (HYPERTENSION): ICD-10-CM

## 2019-09-10 DIAGNOSIS — G25.2 COARSE TREMORS: ICD-10-CM

## 2019-09-10 DIAGNOSIS — Z89.511 S/P BKA (BELOW KNEE AMPUTATION) UNILATERAL, RIGHT (HCC): ICD-10-CM

## 2019-09-10 LAB
GLUCOSE BLD-MCNC: 215 MG/DL (ref 65–99)
GLUCOSE BLD-MCNC: 255 MG/DL (ref 65–99)
GLUCOSE BLD-MCNC: 264 MG/DL (ref 65–99)
GLUCOSE BLD-MCNC: 272 MG/DL (ref 65–99)

## 2019-09-10 PROCEDURE — 700111 HCHG RX REV CODE 636 W/ 250 OVERRIDE (IP): Performed by: PHYSICAL MEDICINE & REHABILITATION

## 2019-09-10 PROCEDURE — 700102 HCHG RX REV CODE 250 W/ 637 OVERRIDE(OP): Performed by: PHYSICAL MEDICINE & REHABILITATION

## 2019-09-10 PROCEDURE — 82962 GLUCOSE BLOOD TEST: CPT | Mod: 91

## 2019-09-10 PROCEDURE — 94760 N-INVAS EAR/PLS OXIMETRY 1: CPT

## 2019-09-10 PROCEDURE — A9270 NON-COVERED ITEM OR SERVICE: HCPCS | Performed by: SURGERY

## 2019-09-10 PROCEDURE — 90471 IMMUNIZATION ADMIN: CPT

## 2019-09-10 PROCEDURE — 97166 OT EVAL MOD COMPLEX 45 MIN: CPT

## 2019-09-10 PROCEDURE — 82962 GLUCOSE BLOOD TEST: CPT

## 2019-09-10 PROCEDURE — 700102 HCHG RX REV CODE 250 W/ 637 OVERRIDE(OP): Performed by: SURGERY

## 2019-09-10 PROCEDURE — A9270 NON-COVERED ITEM OR SERVICE: HCPCS | Performed by: PHYSICAL MEDICINE & REHABILITATION

## 2019-09-10 PROCEDURE — 99222 1ST HOSP IP/OBS MODERATE 55: CPT | Performed by: PHYSICAL MEDICINE & REHABILITATION

## 2019-09-10 PROCEDURE — 90662 IIV NO PRSV INCREASED AG IM: CPT | Performed by: SURGERY

## 2019-09-10 PROCEDURE — 700112 HCHG RX REV CODE 229: Performed by: SURGERY

## 2019-09-10 PROCEDURE — 3E0234Z INTRODUCTION OF SERUM, TOXOID AND VACCINE INTO MUSCLE, PERCUTANEOUS APPROACH: ICD-10-PCS | Performed by: SURGERY

## 2019-09-10 PROCEDURE — 700111 HCHG RX REV CODE 636 W/ 250 OVERRIDE (IP): Performed by: SURGERY

## 2019-09-10 PROCEDURE — 770010 HCHG ROOM/CARE - REHAB SEMI PRIVAT*

## 2019-09-10 RX ORDER — AMOXICILLIN 250 MG
2 CAPSULE ORAL 2 TIMES DAILY
Status: DISCONTINUED | OUTPATIENT
Start: 2019-09-10 | End: 2019-09-16

## 2019-09-10 RX ORDER — TRAZODONE HYDROCHLORIDE 50 MG/1
50 TABLET ORAL
Status: DISCONTINUED | OUTPATIENT
Start: 2019-09-10 | End: 2019-09-20 | Stop reason: HOSPADM

## 2019-09-10 RX ORDER — TAMSULOSIN HYDROCHLORIDE 0.4 MG/1
0.4 CAPSULE ORAL DAILY
Status: CANCELLED | OUTPATIENT
Start: 2019-09-11

## 2019-09-10 RX ORDER — GABAPENTIN 300 MG/1
600 CAPSULE ORAL 3 TIMES DAILY
Status: CANCELLED | OUTPATIENT
Start: 2019-09-10

## 2019-09-10 RX ORDER — GABAPENTIN 400 MG/1
1200 CAPSULE ORAL
Status: DISCONTINUED | OUTPATIENT
Start: 2019-09-10 | End: 2019-09-20 | Stop reason: HOSPADM

## 2019-09-10 RX ORDER — DEXTROSE MONOHYDRATE 25 G/50ML
50 INJECTION, SOLUTION INTRAVENOUS
Status: DISCONTINUED | OUTPATIENT
Start: 2019-09-10 | End: 2019-09-11

## 2019-09-10 RX ORDER — ACETAMINOPHEN 325 MG/1
650 TABLET ORAL EVERY 4 HOURS PRN
Status: DISCONTINUED | OUTPATIENT
Start: 2019-09-10 | End: 2019-09-20 | Stop reason: HOSPADM

## 2019-09-10 RX ORDER — AMOXICILLIN 250 MG
1 CAPSULE ORAL 2 TIMES DAILY
Status: DISCONTINUED | OUTPATIENT
Start: 2019-09-10 | End: 2019-09-10 | Stop reason: HOSPADM

## 2019-09-10 RX ORDER — CHLORTHALIDONE 25 MG/1
25 TABLET ORAL DAILY
Status: DISCONTINUED | OUTPATIENT
Start: 2019-09-11 | End: 2019-09-18

## 2019-09-10 RX ORDER — TAMSULOSIN HYDROCHLORIDE 0.4 MG/1
0.4 CAPSULE ORAL DAILY
Status: DISCONTINUED | OUTPATIENT
Start: 2019-09-11 | End: 2019-09-12

## 2019-09-10 RX ORDER — POLYVINYL ALCOHOL 14 MG/ML
1 SOLUTION/ DROPS OPHTHALMIC PRN
Status: DISCONTINUED | OUTPATIENT
Start: 2019-09-10 | End: 2019-09-20 | Stop reason: HOSPADM

## 2019-09-10 RX ORDER — GABAPENTIN 400 MG/1
1200 CAPSULE ORAL
Status: CANCELLED | OUTPATIENT
Start: 2019-09-10

## 2019-09-10 RX ORDER — CHLORTHALIDONE 25 MG/1
25 TABLET ORAL DAILY
Status: CANCELLED | OUTPATIENT
Start: 2019-09-11

## 2019-09-10 RX ORDER — GABAPENTIN 300 MG/1
600 CAPSULE ORAL 3 TIMES DAILY
Status: DISCONTINUED | OUTPATIENT
Start: 2019-09-10 | End: 2019-09-20 | Stop reason: HOSPADM

## 2019-09-10 RX ORDER — LEVOTHYROXINE SODIUM 0.05 MG/1
50 TABLET ORAL
Status: CANCELLED | OUTPATIENT
Start: 2019-09-11

## 2019-09-10 RX ORDER — HYDRALAZINE HYDROCHLORIDE 25 MG/1
25 TABLET, FILM COATED ORAL EVERY 8 HOURS PRN
Status: DISCONTINUED | OUTPATIENT
Start: 2019-09-10 | End: 2019-09-20 | Stop reason: HOSPADM

## 2019-09-10 RX ORDER — TRAMADOL HYDROCHLORIDE 50 MG/1
50 TABLET ORAL EVERY 4 HOURS PRN
Status: DISCONTINUED | OUTPATIENT
Start: 2019-09-10 | End: 2019-09-20 | Stop reason: HOSPADM

## 2019-09-10 RX ORDER — PRIMIDONE 50 MG/1
50 TABLET ORAL EVERY EVENING
Status: CANCELLED | OUTPATIENT
Start: 2019-09-10

## 2019-09-10 RX ORDER — PROPRANOLOL HCL 60 MG
120 CAPSULE, EXTENDED RELEASE 24HR ORAL EVERY MORNING
Status: CANCELLED | OUTPATIENT
Start: 2019-09-11

## 2019-09-10 RX ORDER — HEPARIN SODIUM 5000 [USP'U]/ML
5000 INJECTION, SOLUTION INTRAVENOUS; SUBCUTANEOUS EVERY 8 HOURS
Status: DISCONTINUED | OUTPATIENT
Start: 2019-09-10 | End: 2019-09-20 | Stop reason: HOSPADM

## 2019-09-10 RX ORDER — INSULIN GLARGINE 100 [IU]/ML
0.2 INJECTION, SOLUTION SUBCUTANEOUS EVERY EVENING
Status: CANCELLED | OUTPATIENT
Start: 2019-09-10

## 2019-09-10 RX ORDER — INSULIN GLARGINE 100 [IU]/ML
0.2 INJECTION, SOLUTION SUBCUTANEOUS EVERY EVENING
Status: DISCONTINUED | OUTPATIENT
Start: 2019-09-10 | End: 2019-09-11

## 2019-09-10 RX ORDER — LOSARTAN POTASSIUM 25 MG/1
50 TABLET ORAL DAILY
Status: DISCONTINUED | OUTPATIENT
Start: 2019-09-11 | End: 2019-09-20 | Stop reason: HOSPADM

## 2019-09-10 RX ORDER — ATORVASTATIN CALCIUM 40 MG/1
80 TABLET, FILM COATED ORAL EVERY EVENING
Status: DISCONTINUED | OUTPATIENT
Start: 2019-09-10 | End: 2019-09-20 | Stop reason: HOSPADM

## 2019-09-10 RX ORDER — ONDANSETRON 2 MG/ML
4 INJECTION INTRAMUSCULAR; INTRAVENOUS 4 TIMES DAILY PRN
Status: DISCONTINUED | OUTPATIENT
Start: 2019-09-10 | End: 2019-09-20 | Stop reason: HOSPADM

## 2019-09-10 RX ORDER — ATORVASTATIN CALCIUM 40 MG/1
80 TABLET, FILM COATED ORAL EVERY EVENING
Status: CANCELLED | OUTPATIENT
Start: 2019-09-10

## 2019-09-10 RX ORDER — BISACODYL 10 MG
10 SUPPOSITORY, RECTAL RECTAL
Status: DISCONTINUED | OUTPATIENT
Start: 2019-09-10 | End: 2019-09-16

## 2019-09-10 RX ORDER — HEPARIN SODIUM 5000 [USP'U]/ML
5000 INJECTION, SOLUTION INTRAVENOUS; SUBCUTANEOUS EVERY 8 HOURS
Status: CANCELLED | OUTPATIENT
Start: 2019-09-10

## 2019-09-10 RX ORDER — HYDROCODONE BITARTRATE AND ACETAMINOPHEN 5; 325 MG/1; MG/1
1-2 TABLET ORAL EVERY 4 HOURS PRN
Status: DISCONTINUED | OUTPATIENT
Start: 2019-09-10 | End: 2019-09-20 | Stop reason: HOSPADM

## 2019-09-10 RX ORDER — ALUMINA, MAGNESIA, AND SIMETHICONE 2400; 2400; 240 MG/30ML; MG/30ML; MG/30ML
20 SUSPENSION ORAL
Status: DISCONTINUED | OUTPATIENT
Start: 2019-09-10 | End: 2019-09-20 | Stop reason: HOSPADM

## 2019-09-10 RX ORDER — POLYETHYLENE GLYCOL 3350 17 G/17G
1 POWDER, FOR SOLUTION ORAL
Status: DISCONTINUED | OUTPATIENT
Start: 2019-09-10 | End: 2019-09-16

## 2019-09-10 RX ORDER — HYDROCODONE BITARTRATE AND ACETAMINOPHEN 5; 325 MG/1; MG/1
1-2 TABLET ORAL EVERY 4 HOURS PRN
Status: CANCELLED | OUTPATIENT
Start: 2019-09-10

## 2019-09-10 RX ORDER — ONDANSETRON 4 MG/1
4 TABLET, ORALLY DISINTEGRATING ORAL 4 TIMES DAILY PRN
Status: DISCONTINUED | OUTPATIENT
Start: 2019-09-10 | End: 2019-09-20 | Stop reason: HOSPADM

## 2019-09-10 RX ORDER — ECHINACEA PURPUREA EXTRACT 125 MG
2 TABLET ORAL PRN
Status: DISCONTINUED | OUTPATIENT
Start: 2019-09-10 | End: 2019-09-20 | Stop reason: HOSPADM

## 2019-09-10 RX ORDER — PROPRANOLOL HCL 60 MG
120 CAPSULE, EXTENDED RELEASE 24HR ORAL EVERY MORNING
Status: DISCONTINUED | OUTPATIENT
Start: 2019-09-11 | End: 2019-09-20 | Stop reason: HOSPADM

## 2019-09-10 RX ORDER — LOSARTAN POTASSIUM 50 MG/1
50 TABLET ORAL DAILY
Status: CANCELLED | OUTPATIENT
Start: 2019-09-11

## 2019-09-10 RX ORDER — PRIMIDONE 50 MG/1
50 TABLET ORAL EVERY EVENING
Status: DISCONTINUED | OUTPATIENT
Start: 2019-09-10 | End: 2019-09-20 | Stop reason: HOSPADM

## 2019-09-10 RX ORDER — LEVOTHYROXINE SODIUM 0.05 MG/1
50 TABLET ORAL
Status: DISCONTINUED | OUTPATIENT
Start: 2019-09-11 | End: 2019-09-20 | Stop reason: HOSPADM

## 2019-09-10 RX ADMIN — INSULIN LISPRO 5 UNITS: 100 INJECTION, SOLUTION INTRAVENOUS; SUBCUTANEOUS at 22:13

## 2019-09-10 RX ADMIN — INSULIN LISPRO 6 UNITS: 100 INJECTION, SOLUTION INTRAVENOUS; SUBCUTANEOUS at 17:18

## 2019-09-10 RX ADMIN — INSULIN GLARGINE 19 UNITS: 100 INJECTION, SOLUTION SUBCUTANEOUS at 22:06

## 2019-09-10 RX ADMIN — INFLUENZA A VIRUS A/MICHIGAN/45/2015 X-275 (H1N1) ANTIGEN (FORMALDEHYDE INACTIVATED), INFLUENZA A VIRUS A/SINGAPORE/INFIMH-16-0019/2016 IVR-186 (H3N2) ANTIGEN (FORMALDEHYDE INACTIVATED), AND INFLUENZA B VIRUS B/MARYLAND/15/2016 BX-69A (A B/COLORADO/6/2017-LIKE VIRUS) ANTIGEN (FORMALDEHYDE INACTIVATED) 0.5 ML: 60; 60; 60 INJECTION, SUSPENSION INTRAMUSCULAR at 14:18

## 2019-09-10 RX ADMIN — ATORVASTATIN CALCIUM 80 MG: 40 TABLET, FILM COATED ORAL at 21:52

## 2019-09-10 RX ADMIN — HEPARIN SODIUM 5000 UNITS: 5000 INJECTION INTRAVENOUS; SUBCUTANEOUS at 06:20

## 2019-09-10 RX ADMIN — INSULIN LISPRO 5 UNITS: 100 INJECTION, SOLUTION INTRAVENOUS; SUBCUTANEOUS at 17:16

## 2019-09-10 RX ADMIN — DOCUSATE SODIUM 100 MG: 100 CAPSULE, LIQUID FILLED ORAL at 06:20

## 2019-09-10 RX ADMIN — HEPARIN SODIUM 5000 UNITS: 5000 INJECTION, SOLUTION INTRAVENOUS; SUBCUTANEOUS at 21:54

## 2019-09-10 RX ADMIN — ASPIRIN 81 MG: 81 TABLET, COATED ORAL at 17:14

## 2019-09-10 RX ADMIN — PRIMIDONE 50 MG: 50 TABLET ORAL at 21:53

## 2019-09-10 RX ADMIN — TAMSULOSIN HYDROCHLORIDE 0.4 MG: 0.4 CAPSULE ORAL at 06:20

## 2019-09-10 RX ADMIN — GABAPENTIN 1200 MG: 400 CAPSULE ORAL at 21:51

## 2019-09-10 RX ADMIN — SENNOSIDES, DOCUSATE SODIUM 1 TABLET: 50; 8.6 TABLET, FILM COATED ORAL at 12:55

## 2019-09-10 RX ADMIN — INSULIN LISPRO 5 UNITS: 100 INJECTION, SOLUTION INTRAVENOUS; SUBCUTANEOUS at 05:00

## 2019-09-10 RX ADMIN — GABAPENTIN 600 MG: 300 CAPSULE ORAL at 06:20

## 2019-09-10 RX ADMIN — LOSARTAN POTASSIUM 50 MG: 50 TABLET ORAL at 06:20

## 2019-09-10 RX ADMIN — CHLORTHALIDONE 25 MG: 25 TABLET ORAL at 06:19

## 2019-09-10 RX ADMIN — GABAPENTIN 600 MG: 300 CAPSULE ORAL at 17:14

## 2019-09-10 RX ADMIN — LEVOTHYROXINE SODIUM 50 MCG: 50 TABLET ORAL at 06:19

## 2019-09-10 RX ADMIN — SENNOSIDES, DOCUSATE SODIUM 2 TABLET: 50; 8.6 TABLET, FILM COATED ORAL at 21:52

## 2019-09-10 RX ADMIN — HEPARIN SODIUM 5000 UNITS: 5000 INJECTION INTRAVENOUS; SUBCUTANEOUS at 14:17

## 2019-09-10 RX ADMIN — INSULIN LISPRO 6 UNITS: 100 INJECTION, SOLUTION INTRAVENOUS; SUBCUTANEOUS at 11:17

## 2019-09-10 RX ADMIN — INSULIN LISPRO 6 UNITS: 100 INJECTION, SOLUTION INTRAVENOUS; SUBCUTANEOUS at 06:14

## 2019-09-10 RX ADMIN — GABAPENTIN 600 MG: 300 CAPSULE ORAL at 12:55

## 2019-09-10 RX ADMIN — PROPRANOLOL HYDROCHLORIDE 120 MG: 60 CAPSULE, EXTENDED RELEASE ORAL at 06:19

## 2019-09-10 ASSESSMENT — COGNITIVE AND FUNCTIONAL STATUS - GENERAL
SUGGESTED CMS G CODE MODIFIER DAILY ACTIVITY: CK
HELP NEEDED FOR BATHING: A LITTLE
DRESSING REGULAR LOWER BODY CLOTHING: A LITTLE
DRESSING REGULAR UPPER BODY CLOTHING: A LITTLE
TOILETING: A LITTLE
PERSONAL GROOMING: A LITTLE
DAILY ACTIVITIY SCORE: 19

## 2019-09-10 ASSESSMENT — PATIENT HEALTH QUESTIONNAIRE - PHQ9
SUM OF ALL RESPONSES TO PHQ9 QUESTIONS 1 AND 2: 0
2. FEELING DOWN, DEPRESSED, IRRITABLE, OR HOPELESS: NOT AT ALL
1. LITTLE INTEREST OR PLEASURE IN DOING THINGS: NOT AT ALL

## 2019-09-10 ASSESSMENT — LIFESTYLE VARIABLES
ALCOHOL_USE: NO
EVER_SMOKED: NEVER

## 2019-09-10 ASSESSMENT — ACTIVITIES OF DAILY LIVING (ADL): TOILETING: INDEPENDENT

## 2019-09-10 NOTE — CONSULTS
Physical Medicine and Rehabilitation Consultation         Initial Consult      Date of Consultation: 9/10/2019  Consulting provider: Shivam Amezcua MD  Reason for consultation: assess for acute inpatient rehab appropriateness  LOS: 5 Day(s)    Chief complaint: POD#5 Right BKA for diabetic foot wound/toe gangrene    HPI: The patient is a 74 y.o. male with a past medical history of diabetes mellitus, peripheral artery disease, hypertension, presented on 9/5/2019 10:58 AM for a planned right BKA.  Patient had developed a second toe ulcer which rapidly progressed to gangrene of his entire toe and compromise of the third toe.  He also had chronic severe pain.  Preoperative angiography found occlusion and blood flow below the ankle contributing to this.  Patient elected to have below-knee amputation, which was performed by Dr. Shivam Amezcua MD on 9/5/2019.    The patient currently reports he is doing well.  Patient had phantom pain this morning. No BM today. He is excited to come to rehab and get his mobility back. Patient enjoys riding his motorcycle, metal detecting, and hiking in the hills. 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     ROS:  Pertinent positives are listed in HPI, all other systems reviewed and are negative    Social Hx:  Pre-morbidly, this patient lived in a single level home with Two  steps to enter, with spouse.   Employment: Retired  for [employer]  Tobacco: no   Alcohol: no   Drugs: no     Current level of function:  The patient was evaluated by acute care Physical Therapy and Occupational Therapy; currently requiring minimal assistance for mobility and moderate assistance for ADLs    PT: From Eliza Tse's note on 09/09: Bed Mobility:  Supine to Sit: (P) Minimal Assist  Transfers: Sit to Stand: (P) Minimal Assist  Gait: Level Of Assist: (P) Minimal Assist with Front-Wheel Walker         OT: From Bridget Eaton's note on 09/10:  "Functional Status:  SPV supine>sit, Sarai sit>stand (elevated bed height), Sarai functional mobility of household distance bed to toilet, ModA toilet txf    Restrictions: NWB R LE    PMH:  Past Medical History:   Diagnosis Date   • Arrhythmia 01/2018    possible afib per pt, ekg x 2 following were \"OK\"   • Bowel habit changes     constipation diarrhea   • Diabetes    • High cholesterol    • Hyperlipidemia    • Hypertension    • Muscle disorder    • Pain     right foot   • Tremors of nervous system        PSH:  Past Surgical History:   Procedure Laterality Date   • KNEE AMPUTATION BELOW Right 9/5/2019    Procedure: AMPUTATION, BELOW KNEE;  Surgeon: Shivam Amezcua M.D.;  [facility];  Service: Vascular   • AMPUTATION, TOE  2/2013    all 5 toes left foot   • CARPAL TUNNEL RELEASE     • OTHER ORTHOPEDIC SURGERY      right foot        FHX:  Family History   Problem Relation Age of Onset   • Arthritis Mother    • Cancer Mother    • Hypertension Mother    • Heart Disease Mother    • Cancer Father         colon   • Arthritis Father    • Genetic Disorder Father    • Diabetes Father    • Hyperlipidemia Father    • Stroke Father    • Heart Disease Father        Medications:  Current Facility-Administered Medications   Medication Dose   • HYDROcodone-acetaminophen (NORCO) 5-325 MG per tablet 1-2 Tab  1-2 Tab   • aspirin EC (ECOTRIN) tablet 81 mg  81 mg   • atorvastatin (LIPITOR) tablet 80 mg  80 mg   • chlorthalidone (HYGROTON) tablet 25 mg  25 mg   • gabapentin (NEURONTIN) capsule 600 mg  600 mg   • levothyroxine (SYNTHROID) tablet 50 mcg  50 mcg   • losartan (COZAAR) tablet 50 mg  50 mg   • primidone (MYSOLINE) tablet 50 mg  50 mg   • propranolol LA (INDERAL LA) capsule 120 mg  120 mg   • tamsulosin (FLOMAX) capsule 0.4 mg  0.4 mg   • Pharmacy Consult Request ...Pain Management Review 1 Each  1 Each   • ondansetron (ZOFRAN) syringe/vial injection 8 mg  8 mg   • heparin injection 5,000 Units  5,000 Units   • " "HYDROmorphone pf (DILAUDID) injection 0.5 mg  0.5 mg   • docusate sodium (COLACE) capsule 100 mg  100 mg   • insulin glargine (LANTUS) injection 19 Units  0.2 Units/kg/day    And   • insulin lispro (HUMALOG) injection 6 Units  0.2 Units/kg/day    And   • insulin lispro (HUMALOG) injection 2-9 Units  2-9 Units    And   • glucose 4 g chewable tablet 16 g  16 g    And   • DEXTROSE 10% BOLUS 250 mL  250 mL   • gabapentin (NEURONTIN) capsule 1,200 mg  1,200 mg       Allergies:  Allergies   Allergen Reactions   • Zocor [Simvastatin - High Dose]      Pt tells me this medication gave him \"side effect\" of feeling nauseated; denies allergies to medications       Physical Exam:  Vitals: /67   Pulse 78   Temp 35.9 °C (96.7 °F) (Temporal)   Resp 16   Ht 1.854 m (6' 1\")   Wt 95.4 kg (210 lb 5.1 oz)   SpO2 93%   Gen: NAD  Head:  NC/AT  Eyes/ Nose/ Mouth: PERRLA, moist mucous membranes  Cardio: RRR, no mumurs  Pulm: CTAB, with normal respiratory effort  Abd: Soft NTND, active bowel sounds,   Ext: Right BKA, knee immobilizer applied. Stump in ACE wrap with guaze underneath. No soak through.     Mental status:  A&Ox4 (person, place, date, situation) answers questions appropriately follows commands  Speech: fluent, no aphasia or dysarthria    CRANIAL NERVES:  2,3: visual acuity grossly intact, PERRL  3,4,6: EOMI bilaterally, no nystagmus or diplopia  5: sensation intact to light touch bilaterally and symmetric  7: no facial asymmetry  8: hearing grossly intact  9,10: symmetric palate elevation  11: SCM/Trapezius strength 5/5 bilaterally  12: tongue protrudes midline    Motor:      Shoulder flexors:  Bilateral -  5/5  Elbow flexors:  Bilateral -  5/5  Wrist extensors:  Bilateral -  5/5  Elbow extensors:  Bilateral -  5/5  Finger flexors:  Bilateral -  5/5  Finger abductors:  Bilateral -  5/5  Hip flexors:  Bilateral -  5/5  Knee ext:  Right -  Not tested, Left -  5/5  Dorsiflexors:  Left -  5/5  Plantar flexors:  Left -  " 5/5   Negative Pronator drift bilaterally    Labs: Reviewed and significant for   No results for input(s): RBC, HEMOGLOBIN, HEMATOCRIT, PLATELETCT, PROTHROMBTM, APTT, INR, IRON, FERRITIN, TOTIRONBC in the last 72 hours.      Recent Results (from the past 24 hour(s))   ACCU-CHEK GLUCOSE    Collection Time: 09/09/19 11:39 AM   Result Value Ref Range    Glucose - Accu-Ck 263 (H) 65 - 99 mg/dL   ACCU-CHEK GLUCOSE    Collection Time: 09/09/19  4:36 PM   Result Value Ref Range    Glucose - Accu-Ck 282 (H) 65 - 99 mg/dL   ACCU-CHEK GLUCOSE    Collection Time: 09/09/19  8:31 PM   Result Value Ref Range    Glucose - Accu-Ck 243 (H) 65 - 99 mg/dL   ACCU-CHEK GLUCOSE    Collection Time: 09/10/19  5:25 AM   Result Value Ref Range    Glucose - Accu-Ck 215 (H) 65 - 99 mg/dL       ASSESSMENT:  Patient is a 74 y.o. male POD #5 Right BKA     Rehabilitation: Impaired ADLs and mobility  - Patient is a good candidate for IPR. He has needs with PT/OT and has good family support at home.   - Continue PT/OT while pending insurance authorization      Right BKA  - Stump  to be applied as soon as possible to limit edema and promote good shape of the residual limb   - Knee immobilizer to stay applied while not participating in therapy. This will help prevent contracture of the residual limb   - tight glucose control to promote wound healing     Phantom pain   -  Gabapentin 600 mg TID and 1200mg QHS, total 4g/day.   - Patient is at the max dose/day of gabapentin, consider switching to lyrica 100mg BID if symptoms continue. Can increase to a total daily dose of up to 330mg/day.   - PT and OT to use modalities to desensitize residual limb     Bowels and Bladder   - No BM today, continue to monitor for constipation and give fleet enema if he goes >48hrs without BM.   - colace 100mg BID   - adding senokot BID scheduled   - Continue Flomax 0.4mg daily     DVT PPX  - heparin 5k Q8  - ambulate with PT daily     Discussed with pt, summarized  hospitalization and care, options for next step of care  Labs reviewed     Thank you for allowing us to participate in the care of this patient.         Discussed with patient about recommendations for and plan for rehabilitation as follows. Patient with multiple co-morbidities(including but not limited to diabetes, HLD, phantom pain, and HTN ); and functional deficits in mobility/self-care, and Moderate de-conditioning.     Pre-morbidly, this patient lived in a single level home with Two  steps to enter, with spouse. The patient was evaluated by acute care Physical Therapy and Occupational Therapy; currently requiring minimal assistance for mobility and maximal assistance for ADLs    The patient is a(n) Very Good candidate for an acute inpatient rehabilitation program with a coordinated program of care at an intensity and frequency not available at a lower level of care.     Note: if patient continues to progress while waiting for medical clearance, and no longer requires 2 of of 3 therapy services (PT/OT/SLP) at a CGA/Minimal assistance level, patient will no longer need acute inpatient rehabilitation.    This recommendation is substantiated by the patient's current medical condition with intervention and assessment of medical issues requiring an acute level of care for patient's safety and maximum outcome. A coordinated program of care will be provided by an interdisciplinary team including physical therapy, occupational therapy, physiatry and rehab nursing. Rehab goals include improved mobility, self-care management, strength and conditioning/endurance, pain management, bowel and bladder management, mood and affect, and safety with independent home management including caregiver training.     Estimated length of stay is approximately 10-14 days. Rehab potential: Very Good. Disposition: to pre-morbid independent living setting with supportive care of patient's spouse. We will continue to follow with you in  anticipation of discharge to acute inpatient rehabilitation when medically stable to do so at the discretion of his  attending physician. Thank you for allowing us to participate in his care. Please call with any questions regarding this recommendation.    Alphonso Bashir,    Physical Medicine and Rehabilitation   9/10/2019

## 2019-09-10 NOTE — DISCHARGE PLANNING
Tcc   Rehab is able to accept pt today. Dr. Jonn Rubin is accepting MD.   Notes from Dr. Amezcua indicate pt is ready for transfer to Rehab today.      Messages left with CM / KENRICK re: acceptance as transport time is pending.     Addemdum:   Spoke with CM; pt medically cleared for rehab. Transport set up at 2pm.

## 2019-09-10 NOTE — PREADMISSION SCREENING NOTE
"  Pre-Admission Screening Form    Patient Information:   Name: Kevin Jackson     MRN: [id number]       : 1945      Age: 74 y.o.   Gender: male      Race: White [7]       Marital Status: Single [1]  Family Contact: Jerome De La Rosa        Relationship: Friend [5]  Home Phone: [phone]           Cell Phone:   Advanced Directives: None  Code Status:  FULL  Current Attending Provider: Shivam Amezcua M.D.  Referring Physician: Dr. Shivam Amezcua     Physiatrist Consult: Dr. Alphonso Bashir       Referral Date: 2019  Primary Payor Source:  MEDICARE  Secondary Payor Source:  [facility]    Medical Information:   Date of Admission to Acute Care Sett[place]2019  Room Number: T423/02  Rehabilitation Diagnosis: 05.4 Unilateral LE Below Knee Amputation (BKA)  Immunization History   Administered Date(s) Administered   • Influenza Seasonal Injectable 10/02/2013   • Influenza TIV (IM) 2012, 10/02/2013   • Influenza Vaccine Adult HD 2014, 10/31/2015, 2016, 2017, 09/10/2018   • Pneumococcal Conjugate Vaccine (Prevnar/PCV-13) 2016, 2016   • Pneumococcal polysaccharide vaccine (PPSV-23) 2012   • Tdap Vaccine 2017   • Zoster Vaccine Live (ZVL) (Zostavax) 2014     Allergies   Allergen Reactions   • Zocor [Simvastatin - High Dose]      Pt tells me this medication gave him \"side effect\" of feeling nauseated; denies allergies to medications     Past Medical History:   Diagnosis Date   • Arrhythmia 2018    possible afib per pt, ekg x 2 following were \"OK\"   • Bowel habit changes     constipation diarrhea   • Diabetes    • High cholesterol    • Hyperlipidemia    • Hypertension    • Muscle disorder    • Pain     right foot   • Tremors of nervous system      Past Surgical History:   Procedure Laterality Date   • KNEE AMPUTATION BELOW Right 2019    Procedure: AMPUTATION, BELOW KNEE;  Surgeon: Shivam Amezcua M.D.;  Location: SURGERY [facility];  Service: Vascular   • AMPUTATION, " TOE  2/2013    all 5 toes left foot   • CARPAL TUNNEL RELEASE     • OTHER ORTHOPEDIC SURGERY      right foot        History Leading to Admission, Conditions that Caused the Need for Rehab (CMS):       Alphonso Bashir D.O.   Physician   Physical Medicine & Rehab                                                        Physical Medicine and Rehabilitation Consultation                                                                                      Initial Consult        Date of Consultation: 9/10/2019  Consulting provider: Shivam Amezcua MD  Reason for consultation: assess for acute inpatient rehab appropriateness  LOS: 5 Day(s)     Chief complaint: POD#5 Right BKA for diabetic foot wound/toe gangrene     HPI: The patient is a 74 y.o. male with a past medical history of diabetes mellitus, peripheral artery disease, hypertension, presented on 9/5/2019 10:58 AM for a planned right BKA.  Patient had developed a second toe ulcer which rapidly progressed to gangrene of his entire toe and compromise of the third toe.  He also had chronic severe pain.  Preoperative angiography found occlusion and blood flow below the ankle contributing to this.  Patient elected to have below-knee amputation, which was performed by Dr. Shivam Amezcua MD on 9/5/2019.     The patient currently reports he is doing well.  Patient had phantom pain this morning. No BM today. He is excited to come to rehab and get his mobility back. Patient enjoys riding his motorcycle, metal detecting, and hiking in the hills. 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      ROS:  Pertinent positives are listed in HPI, all other systems reviewed and are negative     Social Hx:  Pre-morbidly, this patient lived in a single level home with Two  steps to enter, with spouse.   Employment: Retired  for [employer]  Tobacco: no   Alcohol: no   Drugs: no      Current level of function:  The patient was  "evaluated by acute care Physical Therapy and Occupational Therapy; currently requiring minimal assistance for mobility and moderate assistance for ADLs     PT: From Eliza Tse's note on 09/09: Bed Mobility:  Supine to Sit: (P) Minimal Assist  Transfers: Sit to Stand: (P) Minimal Assist  Gait: Level Of Assist: (P) Minimal Assist with Front-Wheel Walker          OT: From Bridget Eaton's note on 09/10: Functional Status:  SPV supine>sit, Sarai sit>stand (elevated bed height), Sarai functional mobility of household distance bed to toilet, ModA toilet txf     Restrictions: NWB R LE     PMH:  Past Medical History        Past Medical History:   Diagnosis Date   • Arrhythmia 01/2018     possible afib per pt, ekg x 2 following were \"OK\"   • Bowel habit changes       constipation diarrhea   • Diabetes     • High cholesterol     • Hyperlipidemia     • Hypertension     • Muscle disorder     • Pain       right foot   • Tremors of nervous system              PSH:  Past Surgical History         Past Surgical History:   Procedure Laterality Date   • KNEE AMPUTATION BELOW Right 9/5/2019     Procedure: AMPUTATION, BELOW KNEE;  Surgeon: Shivam Amezcua M.D.;  Location: SURGERY [facility];  Service: Vascular   • AMPUTATION, TOE   2/2013     all 5 toes left foot   • CARPAL TUNNEL RELEASE       • OTHER ORTHOPEDIC SURGERY         right foot             FHX:  Family History         Family History   Problem Relation Age of Onset   • Arthritis Mother     • Cancer Mother     • Hypertension Mother     • Heart Disease Mother     • Cancer Father           colon   • Arthritis Father     • Genetic Disorder Father     • Diabetes Father     • Hyperlipidemia Father     • Stroke Father     • Heart Disease Father              Medications:       Current Facility-Administered Medications   Medication Dose   • HYDROcodone-acetaminophen (NORCO) 5-325 MG per tablet 1-2 Tab  1-2 Tab   • aspirin EC (ECOTRIN) tablet 81 mg  81 mg   • atorvastatin (LIPITOR) " "tablet 80 mg  80 mg   • chlorthalidone (HYGROTON) tablet 25 mg  25 mg   • gabapentin (NEURONTIN) capsule 600 mg  600 mg   • levothyroxine (SYNTHROID) tablet 50 mcg  50 mcg   • losartan (COZAAR) tablet 50 mg  50 mg   • primidone (MYSOLINE) tablet 50 mg  50 mg   • propranolol LA (INDERAL LA) capsule 120 mg  120 mg   • tamsulosin (FLOMAX) capsule 0.4 mg  0.4 mg   • Pharmacy Consult Request ...Pain Management Review 1 Each  1 Each   • ondansetron (ZOFRAN) syringe/vial injection 8 mg  8 mg   • heparin injection 5,000 Units  5,000 Units   • HYDROmorphone pf (DILAUDID) injection 0.5 mg  0.5 mg   • docusate sodium (COLACE) capsule 100 mg  100 mg   • insulin glargine (LANTUS) injection 19 Units  0.2 Units/kg/day     And   • insulin lispro (HUMALOG) injection 6 Units  0.2 Units/kg/day     And   • insulin lispro (HUMALOG) injection 2-9 Units  2-9 Units     And   • glucose 4 g chewable tablet 16 g  16 g     And   • DEXTROSE 10% BOLUS 250 mL  250 mL   • gabapentin (NEURONTIN) capsule 1,200 mg  1,200 mg         Allergies:        Allergies   Allergen Reactions   • Zocor [Simvastatin - High Dose]         Pt tells me this medication gave him \"side effect\" of feeling nauseated; denies allergies to medications         Physical Exam:  Vitals: /67   Pulse 78   Temp 35.9 °C (96.7 °F) (Temporal)   Resp 16   Ht 1.854 m (6' 1\")   Wt 95.4 kg (210 lb 5.1 oz)   SpO2 93%   Gen: NAD  Head:  NC/AT  Eyes/ Nose/ Mouth: PERRLA, moist mucous membranes  Cardio: RRR, no mumurs  Pulm: CTAB, with normal respiratory effort  Abd: Soft NTND, active bowel sounds,   Ext: Right BKA, knee immobilizer applied. Stump in ACE wrap with guaze underneath. No soak through.      Mental status:  A&Ox4 (person, place, date, situation) answers questions appropriately follows commands  Speech: fluent, no aphasia or dysarthria     CRANIAL NERVES:  2,3: visual acuity grossly intact, PERRL  3,4,6: EOMI bilaterally, no nystagmus or diplopia  5: sensation intact to " light touch bilaterally and symmetric  7: no facial asymmetry  8: hearing grossly intact  9,10: symmetric palate elevation  11: SCM/Trapezius strength 5/5 bilaterally  12: tongue protrudes midline     Motor:                            Shoulder flexors:  Bilateral -  5/5  Elbow flexors:  Bilateral -  5/5  Wrist extensors:  Bilateral -  5/5  Elbow extensors:  Bilateral -  5/5  Finger flexors:  Bilateral -  5/5  Finger abductors:  Bilateral -  5/5  Hip flexors:  Bilateral -  5/5  Knee ext:  Right -  Not tested, Left -  5/5  Dorsiflexors:  Left -  5/5  Plantar flexors:  Left -  5/5   Negative Pronator drift bilaterally     Labs: Reviewed and significant for   No results for input(s): RBC, HEMOGLOBIN, HEMATOCRIT, PLATELETCT, PROTHROMBTM, APTT, INR, IRON, FERRITIN, TOTIRONBC in the last 72 hours.      Recent Results         Recent Results (from the past 24 hour(s))   ACCU-CHEK GLUCOSE     Collection Time: 09/09/19 11:39 AM   Result Value Ref Range     Glucose - Accu-Ck 263 (H) 65 - 99 mg/dL   ACCU-CHEK GLUCOSE     Collection Time: 09/09/19  4:36 PM   Result Value Ref Range     Glucose - Accu-Ck 282 (H) 65 - 99 mg/dL   ACCU-CHEK GLUCOSE     Collection Time: 09/09/19  8:31 PM   Result Value Ref Range     Glucose - Accu-Ck 243 (H) 65 - 99 mg/dL   ACCU-CHEK GLUCOSE     Collection Time: 09/10/19  5:25 AM   Result Value Ref Range     Glucose - Accu-Ck 215 (H) 65 - 99 mg/dL            ASSESSMENT:  Patient is a 74 y.o. male POD #5 Right BKA      Rehabilitation: Impaired ADLs and mobility  - Patient is a good candidate for IPR. He has needs with PT/OT and has good family support at home.   - Continue PT/OT while pending insurance authorization      Right BKA  - Stump  to be applied as soon as possible to limit edema and promote good shape of the residual limb   - Knee immobilizer to stay applied while not participating in therapy. This will help prevent contracture of the residual limb   - tight glucose control to promote  wound healing      Phantom pain   -  Gabapentin 600 mg TID and 1200mg QHS, total 4g/day.   - Patient is at the max dose/day of gabapentin, consider switching to lyrica 100mg BID if symptoms continue. Can increase to a total daily dose of up to 330mg/day.   - PT and OT to use modalities to desensitize residual limb      Bowels and Bladder   - No BM today, continue to monitor for constipation and give fleet enema if he goes >48hrs without BM.   - colace 100mg BID   - adding senokot BID scheduled   - Continue Flomax 0.4mg daily      DVT PPX  - heparin 5k Q8  - ambulate with PT daily      Discussed with pt, summarized hospitalization and care, options for next step of care  Labs reviewed      Thank you for allowing us to participate in the care of this patient.            Discussed with patient about recommendations for and plan for rehabilitation as follows. Patient with multiple co-morbidities(including but not limited to diabetes, HLD, phantom pain, and HTN ); and functional deficits in mobility/self-care, and Moderate de-conditioning.      Pre-morbidly, this patient lived in a single level home with Two  steps to enter, with spouse. The patient was evaluated by acute care Physical Therapy and Occupational Therapy; currently requiring minimal assistance for mobility and maximal assistance for ADLs     The patient is a(n) Very Good candidate for an acute inpatient rehabilitation program with a coordinated program of care at an intensity and frequency not available at a lower level of care.      Note: if patient continues to progress while waiting for medical clearance, and no longer requires 2 of of 3 therapy services (PT/OT/SLP) at a CGA/Minimal assistance level, patient will no longer need acute inpatient rehabilitation.     This recommendation is substantiated by the patient's current medical condition with intervention and assessment of medical issues requiring an acute level of care for patient's safety and  maximum outcome. A coordinated program of care will be provided by an interdisciplinary team including physical therapy, occupational therapy, physiatry and rehab nursing. Rehab goals include improved mobility, self-care management, strength and conditioning/endurance, pain management, bowel and bladder management, mood and affect, and safety with independent home management including caregiver training.      Estimated length of stay is approximately 10-14 days. Rehab potential: Very Good. Disposition: to pre-morbid independent living setting with supportive care of patient's spouse. We will continue to follow with you in anticipation of discharge to acute inpatient rehabilitation when medically stable to do so at the discretion of his  attending physician. Thank you for allowing us to participate in his care. Please call with any questions regarding this recommendation.     Alphonso Bashir, DO   Physical Medicine and Rehabilitation   9/10/2019                    Jared Ayala R.N.   Registered Nurse                       Bedside report received.  Assessment complete.  A&O x 4. Patient calls appropriately.  Patient ambulates with 2 assist. Bed alarm on.  Immobilizer on R BKA   Patient has 0/10 pain.   Denies N&V. Tolerating diabetic diet..  + void, + flatus, - BM.  Patient denies SOB.  SCD's on LLE.  VSS  O2 at 0.5 to 1 LPM via nasal cannula.  Review plan with of care with patient. Call light and personal belongings with in reach. Hourly rounding in place. All needs met at this time.            Co-morbidities: Please see below.  Potential Risk - Complications: Pain and Pressure Ulcer; non weight bearing RLE  Level of Risk: Medium    Ongoing Medical Management Needed (Medical/Nursing Needs):   Patient Active Problem List    Diagnosis Date Noted   • Ischemic foot pain at rest (HCC) 07/08/2019   • Diabetic ulcer of toe of right foot associated with type 2 diabetes mellitus, with fat layer exposed (HCC)- amputation  "distal 2nd toe (dr hernandes); dr. guillermo  [facility]  wound care  06/20/2019   • Nonsustained ventricular tachycardia (HCC)- ziopatch may 2019; PAC's and PVC's, NSR; NO A. FIB 06/20/2019   • PVD (peripheral vascular disease) (Formerly [facility])- dr mohsen (cardilogy) at [facility]; arterial angioplasty right leg tbd july2019; rx trental (dr hernandes podiatry) 04/02/2019   • Diabetic mononeuropathy associated with diabetes mellitus due to underlying condition (Formerly [facility])-m rx gabapentin 04/02/2019   • Encounter for screening- [facility] screening 2019- neg  abd US for AAA, MARELY, carotid US, EKG (no A.Fib to my review) 03/13/2019   • Benign essential tremor- DR OLIVAS,  NEUROLOGY 03/12/2019   • Gastroesophageal reflux disease with esophagitis- PPI PRN 08/15/2017   • Hypothyroidism due to acquired atrophy of thyroid- dr browning 08/15/2017   • Benign non-nodular prostatic hyperplasia with lower urinary tract symptoms- [facility] 11/05/2014   • Uncontrolled type 2 diabetes mellitus with complication, with long-term current use of insulin (Former[facility])- dr browning 10/02/2013   • Obesity (BMI 30.0-34.9) 10/02/2013   • Mixed hyperlipidemia- dr dwyer 10/03/2011   • Essential hypertension- DR DWYER 10/03/2011   • Hypovitaminosis D 10/03/2011       Current Vital Signs:   Temperature: 35.9 °C (96.7 °F) Pulse: 78 Respiration: 16 Blood Pressure : 110/67  Weight: 95.4 kg (210 lb 5.1 oz) Height: 185.4 cm (6' 1\")  Pulse Oximetry: 93 % O2 (LPM): 1      Completed Laboratory Reports:  Recent Labs     09/07/19  1142 09/07/19  1648 09/07/19  2118 09/08/19  0625 09/08/19  1130 09/08/19  1651 09/08/19  2116 09/09/19  0609 09/09/19  1139 09/09/19  1636 09/09/19  2031 09/10/19  0525   POCGLUCOSE 214* 251* 226* 229* 237* 215* 253* 210* 263* 282* 243* 215*     Additional Labs: Not Applicable    Prior Living Situation:   Housing / Facility: 1 Story House  Steps Into Home: 2  Steps In Home: 0  Lives with - Patient's Self Care Capacity: Spouse  Equipment Owned: Single Point Cane    Prior " Level of Function / Living Situation:   Physical Therapy: Prior Services: None, Home-Independent  Housing / Facility: [address] [facility]  Steps Into Home: 2  Steps In Home: 0  Equipment Owned: Single Point Cane  Lives with - Patient's Self Care Capacity: Spouse  Bed Mobility: Independent  Transfer Status: Independent  Ambulation: Independent  Distance Ambulation (Feet): 150  Assistive Devices Used: None  Stairs: Independent  Current Level of Function:   Level Of Assist: Minimal Assist  Assistive Device: Front Wheel Walker  Distance (Feet): 20  Deviation: Shuffled Gait  # of Stairs Climbed: 0  Weight Bearing Status: NWB RLE BKA site   Skilled Intervention: Verbal Cuing, Tactile Cuing, Facilitation  Supine to Sit: Minimal Assist  Sit to Supine: Stand by Assist  Scooting: Stand by Assist  Rolling: Minimum Assist to Lt.  Skilled Intervention: Verbal Cuing  Sit to Stand: Minimal Assist  Bed, Chair, Wheelchair Transfer: Minimal Assist  Transfer Method: Stand Pivot  Skilled Intervention: Verbal Cuing, Facilitation, Tactile Cuing  Sitting in Chair: NT  Sitting Edge of Bed: 5-6 min  Standin-6 min total  Occupational Therapy:      Prior Services: None, Home-Independent  Housing / Facility: [address] [facility]  Current Level of Function:      Speech Language Pathology:      Rehabilitation Prognosis/Potential: Good  Estimated Length of Stay: 10-14  Days     Nursing:   Orientation : Oriented x 4  Continent    Scope/Intensity of Services Recommended:  Physical Therapy: 1.5 hr / day  5 days / week. Therapeutic Interventions Required: Maximize Endurance, Mobility, Strength and Safety  Occupational Therapy: 1.5 hr / day 5 days / week. Therapeutic Interventions Required: Maximize Self Care, ADLs, IADLs and Energy Conservation  Rehabilitation Nursin/7. Therapeutic Interventions Required: Monitor Pain, Skin, Wound(s), Vital Signs, Intake and Output, Labs, Safety and Family Training  Rehabilitation Physician: 3 - 5 days / week.  Therapeutic Interventions Required: Medical Management  Respiratory Care: Evauation and Treat . Therapeutic Interventions Required: Per Protocol  Prosthetics/Orthotics: Referral made by [facility] to [facility]-pt unable to remember name.  5 days / week. Therapeutic Interventions Required: Prosthetic     Rehabilitation Goals and Plan (Expected frequency & duration of treatment in the IRF):   Return to the Community and Independent Level of Care  Anticipated Date of Rehabilitation Admission: 9/10/2019  Patient/Family oriented IRF level of care/facility/plan: Yes  Patient/Family willing to participate in IRF care/facility/plan: Yes  Patient able to tolerate IRF level of care proposed: Yes  Patient has potential to benefit IRF level of care proposed: Yes  Comments: Not Applicable    Special Needs or Precautions - Medical Necessity:  Safety Concerns/Precautions:  Fall Risk / High Risk for Falls and Balance  Pain Management  Diet:   DIET ORDERS (From admission to next 24h)     Start     Ordered    09/05/19 1836  Diet Order Diabetic  ALL MEALS     Question:  Diet:  Answer:  Diabetic    09/05/19 1836                Anticipated Discharge Destination / Patient/Family Goal:  Destination: Home with Assistance Support System: Spouse; Single story home w/ 2 steps to enter; no railings; walk in shower; spouse works full time.  Pt indep prior to hospitalization/  Anticipated home health services: OT, PT and Nursing  Previously used HH service/ provider: None   Anticipated DME Needs: Single Point Cane  Outpatient Services: Not Applicable  Alternative resources to address additional identified needs:   Pt is working with a prosthetic co-unable to remember name of company.   Pre-Screen Completed: 9/10/2019 11:08 AM BRYANNA Evangelista, CCM

## 2019-09-10 NOTE — PROGRESS NOTES
Patient admitted to facility at 1450 via wheelchair; accompanied by hospital transport.  Patient assisted to room and positioned in bed for comfort and safety; call light within reach.  Patient assisted with stowing belongings and oriented to room and facility.  Admission assessment performed and documented in computer.  Admission paperwork completed; signed copies placed in chart. 2RN skin check by ronaldo RN and  RN, photos uploaded in Epic. Will continue to monitor.

## 2019-09-10 NOTE — DISCHARGE PLANNING
Anticipated Discharge Disposition: Acute Inpatient Rehab.    Action: María with [facility] informed LSW this patient is accepted and the accepting MD is Dr. Rubin.    LSW sent a message via Tiger Text to Dr. Amezcua whom confirmed Medical Clearance.    Per María, the transfer is scheduled for today at 2:00pm, LSW notified Dr. Amezcua via [employer] Text.    LSW completed the [employer] Form. Patient and RN have been notified of the transfer arrangements.    Barriers to Discharge: None.    Plan: Acute Rehab, pending Transfer Order and Discharge Summary.

## 2019-09-10 NOTE — CARE PLAN
Problem: Safety  Goal: Will remain free from falls   Outcome: MET  Note:   Bed locked in lowest position, treaded socks in place, call light within reach, room near nursing station. Patient experienced no falls this hospitalization.      Problem: Discharge Barriers/Planning  Goal: Patient's continuum of care needs will be met  Outcome: PROGRESSING AS EXPECTED  Note:   Patient discharged today to [facility]. Discharge teaching provided. All questions answered.

## 2019-09-10 NOTE — DISCHARGE PLANNING
TCC  I received tc from pt's spouse, Jan @ [phone] re status of acceptance @ Rehab.  Informed her Physiatry will complete consult today and I will follow up with her.     Prior living situation:   S/p R BKA on 9/5 by Dr. Shivam Amezcua.     IR Spouse works full time.   They reside in [facility] in [place]; 2 steps to enter; no railings; walk in shower; no dme   PT indep w/ adl's/ iadl's/ gait w/ device/ driving prior to surgery.   PCP Dr. Ky Hernandez.   Pt will be home alone when spouse is at work.

## 2019-09-10 NOTE — DISCHARGE INSTRUCTIONS
Discharge Instructions    Discharged to in-patient rehab by [facility] with escort. Discharged via wheelchair, hospital escort: Yes.  Special equipment needed: Oxygen    Be sure to schedule a follow-up appointment with your primary care doctor or any specialists as instructed.     Discharge Plan:   Diet Plan: Discussed  Activity Level: Discussed  Confirmed Follow up Appointment: (Follow up with [facility])  Confirmed Symptoms Management: Discussed  Medication Reconciliation Updated: Yes  Influenza Vaccine Indication: Indicated: 65 years and older    I understand that a diet low in cholesterol, fat, and sodium is recommended for good health. Unless I have been given specific instructions below for another diet, I accept this instruction as my diet prescription.   Other diet: Diabetic    Special Instructions: None    · Is patient discharged on Warfarin / Coumadin?   No     Depression / Suicide Risk    As you are discharged from this [facility] facility, it is important to learn how to keep safe from harming yourself.    Recognize the warning signs:  · Abrupt changes in personality, positive or negative- including increase in energy   · Giving away possessions  · Change in eating patterns- significant weight changes-  positive or negative  · Change in sleeping patterns- unable to sleep or sleeping all the time   · Unwillingness or inability to communicate  · Depression  · Unusual sadness, discouragement and loneliness  · Talk of wanting to die  · Neglect of personal appearance   · Rebelliousness- reckless behavior  · Withdrawal from people/activities they love  · Confusion- inability to concentrate     If you or a loved one observes any of these behaviors or has concerns about self-harm, here's what you can do:  · Talk about it- your feelings and reasons for harming yourself  · Remove any means that you might use to hurt yourself (examples: pills, rope, extension cords, firearm)  · Get professional help  from the community (Mental Health, Substance Abuse, psychological counseling)  · Do not be alone:Call your Safe Contact- someone whom you trust who will be there for you.  · Call your local CRISIS HOTLINE 222-6600 or 396-221-6686  · Call your local Children's Mobile Crisis Response Team [place] (796) 963-8807 or www.[employer]  · Call the toll free National Suicide Prevention Hotlines   · National Suicide Prevention Lifeline 863-075-MVIZ (2888)  · National Hope Line Network 800-SUICIDE (711-8157)        Gangrene  Introduction  Gangrene is a medical condition caused by a lack of blood supply to an area of your body. Oxygen travels through your blood, so less blood means less oxygen. Without oxygen, your body tissue will start to die.  Gangrene can affect many parts of your body. Gangrene is most common on the skin (external gangrene), but it can also affect internal parts of the body (internal gangrene).  Gangrene requires emergency treatment.  What are the causes?  Any condition that cuts off blood supply can cause gangrene. Common causes include:  · Injuries.  · Blood vessel disease.  · Diabetes.  · Infections.  What increases the risk?  You may be at increased risk for gangrene if you have:  · A serious injury.  · Recent surgery.  · Diabetes.  · A weak body defense system (immune system).  · Narrowing of your arteries (arteriosclerosis).  · An immune system disease that causes your arteries to constrict (Raynaud disease).  · A history of IV drug use.  What are the signs or symptoms?  The signs and symptoms depend on what part of your body is affected. If you have external gangrene, you may have:  · Severe pain followed by loss of feeling.  · Redness and swelling.  · Crackling sounds when you press on a swollen area of skin.  · A wound with bad-smelling drainage.  · Changes in the color of your skin. It may turn red, blue, black, or white.  · Fever and chills.  If you have internal gangrene, you may  have:  · Fever and chills.  · Confusion.  · Dizziness.  · Severe pain.  · Rapid heartbeat and breathing.  · Loss of appetite.  · Nausea or vomiting.  How is this diagnosed?  To diagnose gangrene, your health care provider will take your medical history and do a physical exam. Tests may also be done to help in making the diagnosis. These may include:  · X-rays of your blood vessels after injection of a dye (arteriogram).  · Blood tests to look for an infection in your blood.  · Imaging studies such as a CT scan or MRI.  · Taking a swab from a draining wound to look for bacteria in the laboratory (culture).  · Taking a piece of tissue (biopsy) to look for cell death in the laboratory.  · A surgical procedure to look for gangrene inside your body.  How is this treated?  Treatment for gangrene depends on the cause and the area of your body that is affected. Gangrene is usually treated in the hospital. It is very important to start treatment early before gangrene spreads. Treatment may include the following:  · Medicine. You may get high doses of antibiotic medicine for gangrene caused by infection. The antibiotics may be given directly into a vein through an IV tube.  · Surgery. Surgical options may include:  ¨ Debridement. This is surgery to remove dead tissue. You may need to have debridement several times.  ¨ Bypass or angioplasty. This surgery improves the blood supply to an affected area.  ¨ Amputation. Sometimes it is necessary to remove a body part.  · Oxygen therapy. This involves treatment in a chamber designed to provide high levels of oxygen (hyperbaric oxygen therapy). It can improve the oxygen supply to an affected area.  · Supportive care. This may include:  ¨ IV fluids and nutrients.  ¨ Pain medicines.  ¨ Blood thinners to prevent blood clots.  Follow these instructions at home:  · Take medicines only as directed by your health care provider.  · If you were prescribed an antibiotic medicine, finish it all  even if you start to feel better.  · Clean any cuts or scratches with an antiseptic.  · Cover any open wounds.  · Check wounds for signs of infection. Watch for redness or swelling.  · Do not use any tobacco products, including cigarettes, chewing tobacco, or electronic cigarettes. If you need help quitting, ask your health care provider.  · Limit alcohol intake to no more than 1 drink per day for nonpregnant women and 2 drinks per day for men. One drink equals 12 ounces of beer, 5 ounces of wine, or 1½ ounces of hard liquor.  · Eat a healthy diet and maintain a healthy weight.  · Get some exercise on most days of the week. Ask your health care provider to suggest activities that are safe for you.  · If you have diabetes or another blood vessel disease, check your feet often for signs of injury or infection.  · After any surgery you have, follow your health care provider's instructions carefully.  · Keep all follow-up visits as directed by your health care provider. This is important.  Contact a health care provider if:  · You have a wound or sore that is not healing.  · You have color changes (white, red, blue, or black) in an area of your skin.  · You have a wound or sore with smelly drainage.  Get help right away if:  · You have rapidly worsening pain at the site of a skin infection or wound.  · You lose sensation at the site of a skin infection or wound.  · You have unexplained:  ¨ Fever.  ¨ Chills.  ¨ Confusion.  ¨ Fainting.  This information is not intended to replace advice given to you by your health care provider. Make sure you discuss any questions you have with your health care provider.  Document Released: 10/19/2005 Document Revised: 05/25/2017 Document Reviewed: 02/11/2015  © 2017 Elsevier      Living With an Amputation  The most common causes of amputation from the hip down include:  · Diseases that:  ¨ Reduce the blood flow to an area of your body.  ¨ Decrease your body's ability to fight  infection.  · Traumatic injuries that cause significant damage to body tissues.  · Birth defects.  · Cancerous lumps (malignant tumors).  Amputation above the hip is usually the result of trauma or birth defect. Disease is a less common cause.  Living with an amputation can be challenging, but you can still live a long, productive life.  WHAT ARE THE COMMON CHALLENGES OF LIVING WITH AN AMPUTATION?  The most common challenges are mobility and self-care. With some new habits, though, it is often possible to do all of the activities you used to do.  You may be able to use a device that substitutes for your limb (prosthesis). The prosthesis helps you adapt more quickly to these challenges. Your health care provider can help select a prosthesis to meet your needs. A person who helps you choose and fits you with a prosthesis (prosthetist) may also help.  A rehabilitation program can also help you gain mobility and self-reliance. Your rehabilitation team may include:  · Physicians.  · Physical and occupational therapists.  · Prosthetists.  · Nurses.  · Social workers.  · Psychologists.  · Dietitians.  Your rehabilitation team will help you with all aspects of recovery and returning to work, home, sports, and your community. You will learn to:  · Get around safely.  · Adjust your home.  · Exercise.  · Use a prosthetic.  · Work through emotional challenges.  · Connect with other people who have gone through the same experience.  Additional challenges may include:  · Grieving period.  · Body image issues.  · Lifestyle issues, such as sex.  · Maintaining a healthy weight.  These issues are normal. Discuss these with your rehabilitation team.  WHEN CAN I RETURN TO MY REGULAR ACTIVITIES?   Returning to your normal activities is part of healing. Changes can often be made to equipment that allow you to return to a sport or hobby. Some [employer] design special equipment for this. Discuss all of your leisure interests with your  health care provider and prosthetist.  WHEN CAN I RETURN TO WORK?  When you are ready to return to work, your therapists can perform job site evaluations and make recommendations to help you perform your job. You may not be able to return to your same job. Your local Office of Vocational Rehabilitation can assist you in job retraining.  FOR MORE INFORMATION:  Visit these online resources. You can find tips on everything from getting dressed and using bathrooms to driving and travel considerations. These resources can also connect you to a network of emotional support, activities, and innovations. You may also search the Internet to find a local support group.  · Amputee Coalition: http://www.amputee-coalition.org/ensuring-fall-safety/http://www.amputee-coalition.org/ensuring-fall-safety/  · Amputee Support Groups: http://amputee.supportMandoyo.Lumics/http://amputee.[employer].Lumics  · Daily Strength: http://www.dailystrength.org/c/Amputees/support-grouphttp://www.dailystApp in the [employer]h.org/c/Amputees/support-group  · National Amputee Foundation: http://www.nationalamputation.org/http://www.nationalamputation.org  · National Center on Health, Physical Activity and Disability: http://www.nchpad.org/http://www.nchpad.org  · Disabled Sports USA: http://www.disabledsportsusa.orghttp://www.disabledsportsusa.org  · American Academy of Orthotists and Prosthetists: http://www.oandp.org/http://www.oandp.org     This information is not intended to replace advice given to you by your health care provider. Make sure you discuss any questions you have with your health care provider.     Document Released: 09/09/2003 Document Revised: 01/08/2016 Document Reviewed: 05/05/2015  Elsevier Interactive Patient Education ©2016 [employer]      Stump and Prosthesis Care  When an arm or leg is removed, it is important to care for the artificial body part that replaces it (prosthesis) and for the remaining end of the arm or leg (stump). Caring for  the stump and prosthesis will help you to be comfortable, active, and healthy.  How to care for your stump  Cleaning Your Skin  · Wash your stump with a mild antibacterial soap at least once per day.  · Wash your stump after getting dirty or sweaty.  · After washing your stump, pat it dry and let it air-dry for 5-10 minutes.  · Do not soak your stump in a warm or hot bath for longer than 20 minutes at a time.  · Avoid shaving hair on the stump. Hair that grows out after being shaved is more easily irritated by the prosthesis.  Using Skin Care Products  · Apply ointment to your surgical scar if your health care provider instructed you to do so. This can keep the scar soft and help it heal.  · Do not put creams and lotions on your stump unless your health care provider says it is okay. If your health care provider says it is okay to put creams and lotions on your stump, do not use lotions that contain petroleum jelly.  · Do not use skin care products with an alcohol base. These products can be harmful to your skin. They can also damage the lining of the prosthesis.  · Consider using an antiperspirant spray on the skin of the stump if you are prone to sweating.  Other Instructions  · Every day, look closely at the skin on your stump. Use a mirror with a long handle to check areas you cannot see, or ask a friend or family member to check those areas. Look for areas that appear reddish, swollen, or irritated. Pay extra attention to places where the stump and prosthesis rub together.  How to care for your prosthesis  Cleaning Your Prosthesis  · Use hot water and antibacterial soap to wash your prosthesis.  Attaching Your Prosthesis  · Make sure your prosthesis is clean before you attach it to your stump. All the parts that touch your skin should be clean and dry.  · Be sure you understand how to attach the prosthesis. A prosthetic specialist (prosthetist) can show you how to do this. It is a good idea to practice several  times while he or she watches.  · If you were given wraps or socks to wear under the prosthesis, make sure to wear them.  Other Instructions  · Exercise and move your prosthesis as recommended by your physical therapist.  · Follow your health care provider's instructions about the length of time you should wear your prosthesis. You will likely need to limit the amount of time you wear your prosthesis at first. You may be instructed to increase the time you wear your prosthesis a little bit each day.  Contact a health care provider if:  · The prosthesis does not seem to fit correctly.  · You have an itchy rash or a sore on your stump.  · Sweating between the stump and the prosthesis is heavy and efforts to control the sweating do not work.  Get help right away if:  · Your stump is red, swollen, painful to the touch, or hot.  · A bad smell develops around the stump.  · There is a sore on your stump that is not healing.  · Your stump is colder than the upper part of the limb.  · Skin on your stump turns gray or black.  · There is any drainage coming from your stump.  This information is not intended to replace advice given to you by your health care provider. Make sure you discuss any questions you have with your health care provider.  Document Released: 03/14/2011 Document Revised: 08/13/2017 Document Reviewed: 12/14/2015  Elsevier Interactive Patient Education © 2017 Elsevier Inc.

## 2019-09-10 NOTE — CARE PLAN
Problem: Communication  Goal: The ability to communicate needs accurately and effectively will improve  Outcome: PROGRESSING AS EXPECTED     Problem: Safety  Goal: Will remain free from falls  Description  Patient is a low fall risk. Patient educated to call for assistance. Call light left within reach of patient.     Outcome: PROGRESSING AS EXPECTED     Problem: Knowledge Deficit  Goal: Knowledge of disease process/condition, treatment plan, diagnostic tests, and medications will improve  Outcome: PROGRESSING AS EXPECTED     Problem: Pain Management  Goal: Pain level will decrease to patient's comfort goal  Outcome: PROGRESSING AS EXPECTED

## 2019-09-10 NOTE — PROGRESS NOTES
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   Able to work w/ PT and ambulate w/ walker yesterday.  OT and physiatry ordered yesterday afternoon.    Vitals:    09/09/19 2020   BP: 118/69   Pulse: 82   Resp: 16   Temp: 36.3 °C (97.4 °F)   SpO2: 95%     NAD  RLE dressings taken down. Stump c/d/i    No labs    A/P  POD # 5 s/p R BKA    Ready for d/c to rehab when bed available  Stump  can be placed at any time    Will plan on seeing him back in clinic in 3-4wks for suture removal    Shivam Amezcua MD  Vascular Surgeon  [facility]  Office: [phone]

## 2019-09-10 NOTE — FLOWSHEET NOTE
09/10/19 1525   Type of Assessment   Assessment Yes   Patient History   Pulmonary Diagnosis no   Surgical Procedures R BKA   Home O2 No   Home Treatments/Frequency No   COPD Risk Screening   Do you have a history of COPD? No   Smoking History   Have you ever smoked Never   Level Of Consciousness   Level of Consciousness Alert   Respiratory WDL   Respiratory (WDL) X   Chest Exam   Respiration 18   Pulse 78   Oximetry   #Pulse Oximetry (Single Determination) Yes   Oxygen   Home O2 Use Prior To Admission? No   Pulse Oximetry 95 %   O2 (LPM) 1   O2 Daily Delivery Respiratory  Silicone Nasal Cannula   Room Air Challenge Fail  (dropped to 88% after 3 minutes)   Protocol Pathways   Protocol Pathways Yes   O2 Protocol   O2 Protocol Indications Room Air SpO2 Less Than 90%   Continuous oxygen initiated for: SpO2 Less Than 90% by ABG or Oximetry in Last 24 hrs   O2 Protocol Goals/Outcome SpO2 greater than 90% with exertion

## 2019-09-10 NOTE — PROGRESS NOTES
Late entry - Received report of patient at start of shift. Patient is AOx4, no complaints of pain. Scheduled medications administered. Patient pending discharge today to [facility]. Safety precautions in place.

## 2019-09-10 NOTE — PROGRESS NOTES
Bedside report received.  Assessment complete.  A&O x 4. Patient calls appropriately.  Patient ambulates with 2 assist. Bed alarm on.  Immobilizer on R BKA   Patient has 0/10 pain.   Denies N&V. Tolerating diabetic diet..  + void, + flatus, - BM.  Patient denies SOB.  SCD's on LLE.  VSS  O2 at 0.5 to 1 LPM via nasal cannula.  Review plan with of care with patient. Call light and personal belongings with in reach. Hourly rounding in place. All needs met at this time.

## 2019-09-10 NOTE — DISCHARGE SUMMARY
Discharge Summary      Reason for Admission  Diabetic foot wound/gangrene     Admission Date  9/5/2019    CODE STATUS  Full Code    HPI & HOSPITAL COURSE  This is a 74 y.o. male admitted for post-op care s/p R BKA for gangrene. He had an uncomplicated post-op course.       Therefore, he is discharged in good and stable condition to an inpatient rehabilitation hospital.    The patient met 2-midnight criteria for an inpatient stay at the time of discharge.    Discharge Date  09/10/19      FOLLOW UP ITEMS POST DISCHARGE  Stump  to be placed    DISCHARGE DIAGNOSES  Active Problems:    * No active hospital problems. *  Resolved Problems:    * No resolved hospital problems. *      FOLLOW UP  Future Appointments   Date Time Provider Department Center   11/14/2019  8:20 AM Ky Hernandez M.D. [address] Amezcua MD  [facility]  [address] [place] [address]    MEDICATIONS ON DISCHARGE     Medication List      ASK your doctor about these medications      Instructions   aspirin EC 81 MG Tbec  Commonly known as:  ECOTRIN   Take 81 mg by mouth every day.  Dose:  81 mg     atorvastatin 80 MG tablet  Commonly known as:  LIPITOR   Doctor's comments:  DR VIZCARRA  Take 1 Tab by mouth every evening.  Dose:  80 mg     cephALEXin 500 MG Caps  Commonly known as:  KEFLEX   TAKE ONE CAPSULE BY MOUTH FOUR TIMES A DAY FOR 14 DAYS     chlorthalidone 25 MG Tabs  Commonly known as:  HYGROTON   Doctor's comments:  DR VIZCARRA  Take 1 Tab by mouth every day.  Dose:  25 mg     Diclofenac Sodium 1 % Gel   Apply 1 Dose to skin as directed 3 times a day.  Dose:  1 Dose     Empagliflozin 25 MG Tabs   Doctor's comments:  DR OSHEA  Take 25 mg by mouth every day.  Dose:  25 mg     gabapentin 300 MG Caps  Commonly known as:  NEURONTIN   Take 600-1,200 mg by mouth 3 times a day. 1200 mg at hs  Dose:  600-1,200 mg     glimepiride 4 MG Tabs  Commonly known as:  AMARYL   Doctor's comments:  DR OSHEA  Take 1 Tab by  "mouth every morning.  Dose:  4 mg     HUMALOG MIX 50/50 (50-50) 100 UNIT/ML Susp  Generic drug:  INSULIN LISP PROT & LISP (HUM)  Ask about: Which instructions should I use?   Inject 35 Units as instructed every evening. 20 units  Dose:  35 Units     HYDROcodone/acetaminophen  MG Tabs  Commonly known as:  NORCO   Take 1 Tab by mouth 1 time daily as needed.  Dose:  1 Tab     levothyroxine 50 MCG Tabs  Commonly known as:  SYNTHROID   Doctor's comments:  DR OSHEA  Take 1 Tab by mouth every morning before breakfast.  Dose:  50 mcg     losartan 50 MG Tabs  Commonly known as:  COZAAR   Take 50 mg by mouth every day.  Dose:  50 mg     metformin 1000 MG tablet  Commonly known as:  GLUCOPHAGE   Doctor's comments:  DR OSHEA  Take 1 Tab by mouth 2 times a day, with meals.  Dose:  1,000 mg     OZEMPIC 1 MG/DOSE Sopn  Generic drug:  Semaglutide  Ask about: Which instructions should I use?   Inject 1 mg as instructed every 7 days.  Dose:  1 mg     pentoxifylline  MG CR tablet  Commonly known as:  TRENTAL   Doctor's comments:  Dr hernandes  Take 1 Tab by mouth 3 times a day, with meals.  Dose:  400 mg     PLAVIX 75 MG Tabs  Generic drug:  clopidogrel   Take 75 mg by mouth every day.  Dose:  75 mg     primidone 50 MG Tabs  Commonly known as:  MYSOLINE   Doctor's comments:  DR OLIVAS,  NEUROLOGY  Take 1 Tab by mouth every evening.  Dose:  50 mg     propranolol  MG Cp24  Commonly known as:  INDERAL LA   Take 1 Cap by mouth every morning.  Dose:  120 mg     tamsulosin 0.4 MG capsule  Commonly known as:  FLOMAX   Doctor's comments:  [place] [facility]  Take 1 Cap by mouth every day.  Dose:  0.4 mg            Allergies  Allergies   Allergen Reactions   • Zocor [Simvastatin - High Dose]      Pt tells me this medication gave him \"side effect\" of feeling nauseated; denies allergies to medications       DIET  Orders Placed This Encounter   Procedures   • Diet Order Diabetic     Standing Status:   Standing     Number of " Occurrences:   1     Order Specific Question:   Diet:     Answer:   Diabetic [3]       ACTIVITY  Non-weight bearing to right stump for at least 6 weeks.  Ok to participate in PT/rehab and range the knee joint.    CONSULTATIONS  Physical Medicine and rehab    PROCEDURES  Right below-knee amputation    LABORATORY  Lab Results   Component Value Date    SODIUM 138 09/03/2019    POTASSIUM 3.6 09/03/2019    CHLORIDE 98 09/03/2019    CO2 27 09/03/2019    GLUCOSE 212 (H) 09/03/2019    BUN 30 (H) 09/03/2019    CREATININE 1.01 09/03/2019        Lab Results   Component Value Date    WBC 9.1 09/03/2019    HEMOGLOBIN 14.1 09/03/2019    HEMATOCRIT 42.6 09/03/2019    PLATELETCT 372 09/03/2019        Total time of the discharge process exceeds 15 minutes.

## 2019-09-10 NOTE — PROGRESS NOTES
Discharging patient home per physician order.  [facility].  Patient demonstrated understanding of discharge instructions, follow up, and medications. Ambulating with one person assistance, voiding without difficulty, pain well controlled, tolerating oral medications, oxygen saturation greater than 90%, tolerating diet. Educational handouts on gangrene, stump/prosthesis care, and living with an amputation given and discussed.  Verbalized understanding of discharge instructions and educational handouts.  All questions answered.  Belongings with patient at time of discharge.

## 2019-09-10 NOTE — CARE PLAN
Problem: Safety  Goal: Will remain free from injury  Note:   Pt educated to use call light when in need of assistance, call light and personal belongings within reach.

## 2019-09-10 NOTE — THERAPY
"Occupational Therapy Evaluation completed.   Functional Status:  SPV supine>sit, Sarai sit>stand (elevated bed height), Sarai functional mobility of household distance bed to toilet, ModA toilet txf (1 LOB in BR), ModA LB dress (managing immobilizer)   Plan of Care: Will benefit from Occupational Therapy 4 times per week  Discharge Recommendations:  Equipment: Will Continue to Assess for Equipment Needs. Post-acute therapy Recommend post-acute placement for additional occupational therapy services prior to discharge home. Patient can tolerate post-acute therapies at a 5x/week frequency.    See \"Rehab Therapy-Acute\" Patient Summary Report for complete documentation.    Pt is 73yo male s/p R BKA d/t gangrenous 2nd toe, pmhx includes previous L TMA. Pt presents to OT eval motivated to participate, moved to EOB at SPV level. Pt required Sarai for sit>stand, Sarai functional mobility w/FWW, and ModA toilet txf with cues for FWW mgmt and hand placement. 1 LOB noted in BR standing from toilet, required ModA steadying assist. Pt will benefit from intensive inpatient therapy for ADL transfer retraining and to increase ADL independence as pts wife works full time and he will be home alone most of the day upon d/c home. Pt is interested in being fit for a prosthetic asap and get back to motorcycle riding with his friends, highly motivated to regain independent PLOF.   "

## 2019-09-11 PROBLEM — I10 HYPERTENSION, ESSENTIAL: Status: ACTIVE | Noted: 2019-09-11

## 2019-09-11 PROBLEM — E03.9 HYPOTHYROIDISM: Status: ACTIVE | Noted: 2019-09-11

## 2019-09-11 LAB
25(OH)D3 SERPL-MCNC: 19 NG/ML (ref 30–100)
ALBUMIN SERPL BCP-MCNC: 3.3 G/DL (ref 3.2–4.9)
ALBUMIN/GLOB SERPL: 1 G/DL
ALP SERPL-CCNC: 48 U/L (ref 30–99)
ALT SERPL-CCNC: 15 U/L (ref 2–50)
ANION GAP SERPL CALC-SCNC: 11 MMOL/L (ref 0–11.9)
AST SERPL-CCNC: 18 U/L (ref 12–45)
BASOPHILS # BLD AUTO: 0.8 % (ref 0–1.8)
BASOPHILS # BLD: 0.06 K/UL (ref 0–0.12)
BILIRUB SERPL-MCNC: 0.6 MG/DL (ref 0.1–1.5)
BUN SERPL-MCNC: 18 MG/DL (ref 8–22)
CALCIUM SERPL-MCNC: 9.7 MG/DL (ref 8.5–10.5)
CHLORIDE SERPL-SCNC: 97 MMOL/L (ref 96–112)
CO2 SERPL-SCNC: 29 MMOL/L (ref 20–33)
CREAT SERPL-MCNC: 0.86 MG/DL (ref 0.5–1.4)
EOSINOPHIL # BLD AUTO: 0.22 K/UL (ref 0–0.51)
EOSINOPHIL NFR BLD: 2.9 % (ref 0–6.9)
ERYTHROCYTE [DISTWIDTH] IN BLOOD BY AUTOMATED COUNT: 45.1 FL (ref 35.9–50)
EST. AVERAGE GLUCOSE BLD GHB EST-MCNC: 186 MG/DL
GLOBULIN SER CALC-MCNC: 3.3 G/DL (ref 1.9–3.5)
GLUCOSE BLD-MCNC: 241 MG/DL (ref 65–99)
GLUCOSE BLD-MCNC: 249 MG/DL (ref 65–99)
GLUCOSE BLD-MCNC: 265 MG/DL (ref 65–99)
GLUCOSE BLD-MCNC: 277 MG/DL (ref 65–99)
GLUCOSE SERPL-MCNC: 229 MG/DL (ref 65–99)
HBA1C MFR BLD: 8.1 % (ref 0–5.6)
HCT VFR BLD AUTO: 37.3 % (ref 42–52)
HGB BLD-MCNC: 11.9 G/DL (ref 14–18)
IMM GRANULOCYTES # BLD AUTO: 0.04 K/UL (ref 0–0.11)
IMM GRANULOCYTES NFR BLD AUTO: 0.5 % (ref 0–0.9)
LYMPHOCYTES # BLD AUTO: 1.74 K/UL (ref 1–4.8)
LYMPHOCYTES NFR BLD: 22.7 % (ref 22–41)
MCH RBC QN AUTO: 28.5 PG (ref 27–33)
MCHC RBC AUTO-ENTMCNC: 31.9 G/DL (ref 33.7–35.3)
MCV RBC AUTO: 89.2 FL (ref 81.4–97.8)
MONOCYTES # BLD AUTO: 0.87 K/UL (ref 0–0.85)
MONOCYTES NFR BLD AUTO: 11.3 % (ref 0–13.4)
NEUTROPHILS # BLD AUTO: 4.74 K/UL (ref 1.82–7.42)
NEUTROPHILS NFR BLD: 61.8 % (ref 44–72)
NRBC # BLD AUTO: 0 K/UL
NRBC BLD-RTO: 0 /100 WBC
PLATELET # BLD AUTO: 448 K/UL (ref 164–446)
PMV BLD AUTO: 10.1 FL (ref 9–12.9)
POTASSIUM SERPL-SCNC: 3.8 MMOL/L (ref 3.6–5.5)
PROT SERPL-MCNC: 6.6 G/DL (ref 6–8.2)
RBC # BLD AUTO: 4.18 M/UL (ref 4.7–6.1)
SODIUM SERPL-SCNC: 137 MMOL/L (ref 135–145)
T4 FREE SERPL-MCNC: 1.11 NG/DL (ref 0.53–1.43)
TSH SERPL DL<=0.005 MIU/L-ACNC: 10.8 UIU/ML (ref 0.38–5.33)
WBC # BLD AUTO: 7.7 K/UL (ref 4.8–10.8)

## 2019-09-11 PROCEDURE — 99223 1ST HOSP IP/OBS HIGH 75: CPT | Mod: AI | Performed by: PHYSICAL MEDICINE & REHABILITATION

## 2019-09-11 PROCEDURE — 85025 COMPLETE CBC W/AUTO DIFF WBC: CPT

## 2019-09-11 PROCEDURE — 36415 COLL VENOUS BLD VENIPUNCTURE: CPT

## 2019-09-11 PROCEDURE — 770010 HCHG ROOM/CARE - REHAB SEMI PRIVAT*

## 2019-09-11 PROCEDURE — 84439 ASSAY OF FREE THYROXINE: CPT

## 2019-09-11 PROCEDURE — 82306 VITAMIN D 25 HYDROXY: CPT

## 2019-09-11 PROCEDURE — 700111 HCHG RX REV CODE 636 W/ 250 OVERRIDE (IP): Performed by: PHYSICAL MEDICINE & REHABILITATION

## 2019-09-11 PROCEDURE — A9270 NON-COVERED ITEM OR SERVICE: HCPCS | Performed by: HOSPITALIST

## 2019-09-11 PROCEDURE — 700102 HCHG RX REV CODE 250 W/ 637 OVERRIDE(OP): Performed by: PHYSICAL MEDICINE & REHABILITATION

## 2019-09-11 PROCEDURE — 97116 GAIT TRAINING THERAPY: CPT

## 2019-09-11 PROCEDURE — 83036 HEMOGLOBIN GLYCOSYLATED A1C: CPT

## 2019-09-11 PROCEDURE — A9270 NON-COVERED ITEM OR SERVICE: HCPCS | Performed by: PHYSICAL MEDICINE & REHABILITATION

## 2019-09-11 PROCEDURE — 99223 1ST HOSP IP/OBS HIGH 75: CPT | Mod: AI | Performed by: HOSPITALIST

## 2019-09-11 PROCEDURE — 97166 OT EVAL MOD COMPLEX 45 MIN: CPT

## 2019-09-11 PROCEDURE — 84443 ASSAY THYROID STIM HORMONE: CPT

## 2019-09-11 PROCEDURE — 97530 THERAPEUTIC ACTIVITIES: CPT

## 2019-09-11 PROCEDURE — 700102 HCHG RX REV CODE 250 W/ 637 OVERRIDE(OP): Performed by: HOSPITALIST

## 2019-09-11 PROCEDURE — 94760 N-INVAS EAR/PLS OXIMETRY 1: CPT

## 2019-09-11 PROCEDURE — 80053 COMPREHEN METABOLIC PANEL: CPT

## 2019-09-11 PROCEDURE — 97110 THERAPEUTIC EXERCISES: CPT

## 2019-09-11 PROCEDURE — 82962 GLUCOSE BLOOD TEST: CPT

## 2019-09-11 PROCEDURE — 97535 SELF CARE MNGMENT TRAINING: CPT

## 2019-09-11 PROCEDURE — 97162 PT EVAL MOD COMPLEX 30 MIN: CPT

## 2019-09-11 RX ORDER — DEXTROSE MONOHYDRATE 25 G/50ML
50 INJECTION, SOLUTION INTRAVENOUS
Status: DISCONTINUED | OUTPATIENT
Start: 2019-09-11 | End: 2019-09-11

## 2019-09-11 RX ORDER — DEXTROSE MONOHYDRATE 25 G/50ML
50 INJECTION, SOLUTION INTRAVENOUS
Status: DISCONTINUED | OUTPATIENT
Start: 2019-09-11 | End: 2019-09-12

## 2019-09-11 RX ORDER — OMEPRAZOLE 20 MG/1
20 CAPSULE, DELAYED RELEASE ORAL DAILY
Status: DISCONTINUED | OUTPATIENT
Start: 2019-09-12 | End: 2019-09-20 | Stop reason: HOSPADM

## 2019-09-11 RX ORDER — CLOPIDOGREL BISULFATE 75 MG/1
75 TABLET ORAL DAILY
Status: DISCONTINUED | OUTPATIENT
Start: 2019-09-12 | End: 2019-09-20 | Stop reason: HOSPADM

## 2019-09-11 RX ORDER — INSULIN GLARGINE 100 [IU]/ML
0.2 INJECTION, SOLUTION SUBCUTANEOUS EVERY EVENING
Status: DISCONTINUED | OUTPATIENT
Start: 2019-09-11 | End: 2019-09-11

## 2019-09-11 RX ORDER — INSULIN GLARGINE 100 [IU]/ML
0.2 INJECTION, SOLUTION SUBCUTANEOUS EVERY EVENING
Status: DISCONTINUED | OUTPATIENT
Start: 2019-09-11 | End: 2019-09-12

## 2019-09-11 RX ADMIN — GABAPENTIN 1200 MG: 400 CAPSULE ORAL at 20:22

## 2019-09-11 RX ADMIN — ATORVASTATIN CALCIUM 80 MG: 40 TABLET, FILM COATED ORAL at 20:23

## 2019-09-11 RX ADMIN — INSULIN LISPRO 4 UNITS: 100 INJECTION, SOLUTION INTRAVENOUS; SUBCUTANEOUS at 20:48

## 2019-09-11 RX ADMIN — INSULIN LISPRO 5 UNITS: 100 INJECTION, SOLUTION INTRAVENOUS; SUBCUTANEOUS at 11:52

## 2019-09-11 RX ADMIN — GABAPENTIN 600 MG: 300 CAPSULE ORAL at 05:27

## 2019-09-11 RX ADMIN — HEPARIN SODIUM 5000 UNITS: 5000 INJECTION, SOLUTION INTRAVENOUS; SUBCUTANEOUS at 20:41

## 2019-09-11 RX ADMIN — HEPARIN SODIUM 5000 UNITS: 5000 INJECTION, SOLUTION INTRAVENOUS; SUBCUTANEOUS at 05:26

## 2019-09-11 RX ADMIN — INSULIN LISPRO 6 UNITS: 100 INJECTION, SOLUTION INTRAVENOUS; SUBCUTANEOUS at 17:31

## 2019-09-11 RX ADMIN — CHLORTHALIDONE 25 MG: 25 TABLET ORAL at 08:20

## 2019-09-11 RX ADMIN — SENNOSIDES, DOCUSATE SODIUM 2 TABLET: 50; 8.6 TABLET, FILM COATED ORAL at 08:20

## 2019-09-11 RX ADMIN — INSULIN LISPRO 6 UNITS: 100 INJECTION, SOLUTION INTRAVENOUS; SUBCUTANEOUS at 11:53

## 2019-09-11 RX ADMIN — HEPARIN SODIUM 5000 UNITS: 5000 INJECTION, SOLUTION INTRAVENOUS; SUBCUTANEOUS at 13:33

## 2019-09-11 RX ADMIN — LEVOTHYROXINE SODIUM 50 MCG: 50 TABLET ORAL at 07:34

## 2019-09-11 RX ADMIN — METFORMIN HYDROCHLORIDE 1000 MG: 500 TABLET, FILM COATED ORAL at 17:30

## 2019-09-11 RX ADMIN — INSULIN LISPRO 3 UNITS: 100 INJECTION, SOLUTION INTRAVENOUS; SUBCUTANEOUS at 07:25

## 2019-09-11 RX ADMIN — LOSARTAN POTASSIUM 50 MG: 25 TABLET ORAL at 08:20

## 2019-09-11 RX ADMIN — GABAPENTIN 600 MG: 300 CAPSULE ORAL at 17:30

## 2019-09-11 RX ADMIN — PRIMIDONE 50 MG: 50 TABLET ORAL at 20:23

## 2019-09-11 RX ADMIN — TAMSULOSIN HYDROCHLORIDE 0.4 MG: 0.4 CAPSULE ORAL at 08:20

## 2019-09-11 RX ADMIN — PROPRANOLOL HYDROCHLORIDE 120 MG: 60 CAPSULE, EXTENDED RELEASE ORAL at 08:20

## 2019-09-11 RX ADMIN — GABAPENTIN 600 MG: 300 CAPSULE ORAL at 11:56

## 2019-09-11 RX ADMIN — INSULIN LISPRO 6 UNITS: 100 INJECTION, SOLUTION INTRAVENOUS; SUBCUTANEOUS at 07:29

## 2019-09-11 RX ADMIN — ASPIRIN 81 MG: 81 TABLET, COATED ORAL at 17:30

## 2019-09-11 RX ADMIN — INSULIN GLARGINE 19 UNITS: 100 INJECTION, SOLUTION SUBCUTANEOUS at 20:47

## 2019-09-11 ASSESSMENT — BRIEF INTERVIEW FOR MENTAL STATUS (BIMS)
ASKED TO RECALL SOCK: YES, NO CUE REQUIRED
WHAT YEAR IS IT: CORRECT
BIMS SUMMARY SCORE: 15
WHAT DAY OF THE WEEK IS IT: CORRECT
INITIAL REPETITION OF BED BLUE SOCK - FIRST ATTEMPT: 3
ASKED TO RECALL BLUE: YES, NO CUE REQUIRED
ASKED TO RECALL BED: YES, NO CUE REQUIRED
WHAT MONTH IS IT: ACCURATE WITHIN 5 DAYS

## 2019-09-11 ASSESSMENT — ACTIVITIES OF DAILY LIVING (ADL): TOILETING: INDEPENDENT

## 2019-09-11 ASSESSMENT — LIFESTYLE VARIABLES
HAVE PEOPLE ANNOYED YOU BY CRITICIZING YOUR DRINKING: NO
ALCOHOL_USE: NO
TOTAL SCORE: 0
DOES PATIENT WANT TO STOP DRINKING: NO
TOTAL SCORE: 0
CONSUMPTION TOTAL: NEGATIVE
HOW MANY TIMES IN THE PAST YEAR HAVE YOU HAD 5 OR MORE DRINKS IN A DAY: 0
ON A TYPICAL DAY WHEN YOU DRINK ALCOHOL HOW MANY DRINKS DO YOU HAVE: 0
EVER FELT BAD OR GUILTY ABOUT YOUR DRINKING: NO
EVER HAD A DRINK FIRST THING IN THE MORNING TO STEADY YOUR NERVES TO GET RID OF A HANGOVER: NO
HAVE YOU EVER FELT YOU SHOULD CUT DOWN ON YOUR DRINKING: NO
AVERAGE NUMBER OF DAYS PER WEEK YOU HAVE A DRINK CONTAINING ALCOHOL: 0
TOTAL SCORE: 0

## 2019-09-11 NOTE — THERAPY
"Occupational Therapy   Initial Evaluation     Patient Name: Kevin Jackson  Age:  74 y.o., Sex:  male  Medical Record #: [id number]  Today's Date: 9/11/2019     Subjective    \"Good morning\"     Objective       09/11/19 0831   Prior Living Situation   Prior Services Home-Independent   Housing / Facility 1 Story House   Steps Into Home 2  (2 steps at front door/2 steps to/from garage and house)   Steps In Home 0   Rail None   Elevator No   Bathroom Set up Walk In Shower;Bathtub / Shower Combination;Shower Glass Doors;Shower Chair   Equipment Owned Hand Held Shower;Tub / Shower Seat   Lives with - Patient's Self Care Capacity Spouse   Comments Pt resides in a [facility] w/ wife in [place].  PLOF Indep + driving   Prior Level of ADL Function   Self Feeding Independent   Grooming / Hygiene Independent   Bathing Independent   Dressing Independent   Toileting Independent   Prior Level of IADL Function   Medication Management Independent   Laundry Independent   Kitchen Mobility Independent   Finances Independent   Home Management Independent   Shopping Independent   Prior Level Of Mobility Independent Without Device in Community;Independent With Steps in Community;Independent Without Device in Home;Independent With Steps in Home   Driving / Transportation Driving Independent   IRF-ELEANOR:  Prior Functioning: Everyday Activities   Self Care Independent   Indoor Mobility (Ambulation) Independent   Stairs Independent   Functional Cognition Independent   Prior Device Use None of the given options   Vitals   O2 Delivery None (Room Air)   Pain   Intervention Declines   Pain 0 - 10 Group   Therapist Pain Assessment 0   Cognition    Level of Consciousness Alert   IRF-ELEANOR:  Cognitive Pattern Assessment   Cognitive Pattern Assessment Used BIMS   IRF-ELEANOR:  Brief Interview for Mental Status (BIMS)   Repetition of Three Words (First Attempt) 3   Temporal Orientation: Able to Report the Correct Year Correct   Temporal Orientation: Able to Report the " "Correct Month Accurate within 5 days   Temporal Orientation: Able to Report the Correct Day of the Week Correct   Able to Recall \"Sock\" Yes, no cue required   Able to Recall \"Blue\" Yes, no cue required   Able to Recall \"Bed\" Yes, no cue required   BIMS Summary Score 15   Passive ROM Upper Body   Passive ROM Upper Body WDL   Active ROM Upper Body   Active ROM Upper Body  WDL   Dominant Hand Right   Strength Upper Body   Upper Body Strength  X   Comments Generalized weakness bilaterally   Sensation Upper Body   Upper Extremity Sensation  WDL   Upper Body Muscle Tone   Upper Body Muscle Tone  WDL   Balance Assessment   Sitting Balance (Static) Fair +   Sitting Balance (Dynamic) Fair   Standing Balance (Static) Fair   Standing Balance (Dynamic) Fair -   Coordination Upper Body   Comments Noted tremors - does not impede completion of task   IRF-ELEANOR:  Eating   Assistance Needed Independent   Bland Physical Assistance Level No physical assistance or only touching/steadying assist   CARE Score 6   Discharge Goal:  Assistance Needed Independent   Discharge Goal:  Score 6   IRF-ELEANOR:  Oral Hygiene   Assistance Needed Independent   Physical Assistance Level No physical assistance   CARE Score 6   Discharge Goal:  Assistance Needed Independent   Discharge Goal:  Physical Assistance Level No physical assistance or only touching/steadying assist   Discharge Goal:  Score 6   IRF-ELEANOR:  Shower/Bathe Self   Assistance Needed Physical assistance;Verbal cues;Supervision;Set-up / clean-up;Adaptive equipment   Physical Assistance Level Less than 25%   CARE Score 3   Discharge Goal:  Assistance Needed Adaptive equipment;Independent   Discharge Goal Score 6   IRF-ELEANOR:  Upper Body Dressing   Assistance Needed Supervision;Set-up / clean-up   Physical Assistance Level No physical assistance or only touching/steadying assist   CARE Score 4   Discharge Goal:  Assistance Needed Independent   Discharge Goal:  Physical Assistance Level No physical " assistance or only touching/steadying assist   Dischage Goal:  Score 6   IRF-ELEANOR:  Lower Body Dressing   Assistance Needed Physical assistance   Physical Assistance Level Less than 25%   CARE Score 3   Discharge Goal:  Assistance Needed Independent   Discharge Goal:  Physical Assistance Level No physical assistance or only touching/steadying assist   Discharge Goal:  Score 6   IRF ELEANOR:  Putting On/Taking Off Footwear   Assistance Needed Independent   Physical Assistance Level No physical assistance or only touching/steadying assist   CARE Score 6   Discharge Goal:  Assistance Needed Independent   Discharge Goal:  Physical Assistance Level No physical assistance or only touching/steadying assist   Discharge Goal:  Score 6   IRF-ELEANOR:  Toileting Hygiene   Assistance Needed Physical assistance;Verbal cues;Supervision;Set-up / clean-up;Adaptive equipment   Physical Assistance Level 25%-49%   CARE Score 3   Discharge Goal:  Assistance Needed Independent   Discharge Goal:  Physical Assistance Level No physical assistance or only touching/steadying assist   Discharge Goal:  Score 6   IRF-ELEANOR:  Toilet Transfer   Assistance Needed Physical assistance;Verbal cues;Supervision;Set-up / clean-up;Adaptive equipment   Physical Assistance Level Less than 25%   CARE Score 3   Discharge Goal:  Assistance Needed Adaptive equipment;Independent   Discharge Goal:  Physical Assistance Level No physical assistance or only touching/steadying assist   Discahrge Goal:  Score 6   IRF-ELEANOR:  Hearing, Speech, and Vision   Expression of Ideas and Wants 4   Understanding Verbal and Non-Verbal Content 4   Problem List   Problem List Decreased Active Daily Living Skills;Decreased Homemaking Skills;Decreased Upper Extremity Strength Right;Decreased Upper Extremity Strength Left;Decreased Functional Mobility;Decreased Activity Tolerance;Impaired Postural Control / Balance   Precautions   Precautions Non Weight Bearing Right Lower Extremity;Fall Risk    Comments IPOP RLE   Current Discharge Plan   Current Discharge Plan Return to Prior Living Situation   Benefit    Therapy Benefit Patient Would Benefit from Inpatient Rehab Occupational Therapy to Maximize [place] with ADLs, IADLs and Functional Mobility.   Interdisciplinary Plan of Care Collaboration   IDT Collaboration with  Nursing;Certified Nursing Assistant   Patient Position at End of Therapy Seated;Call Light within Reach;Tray Table within Reach;Phone within Reach;Self Releasing Lap Belt Applied   OT Total Time Spent   OT Individual Total Time Spent (Mins) 60   OT Charge Group   OT Self Care / ADL 1   OT Evaluation OT Evaluation Mod       FIM Eating Score:  6 - Modified Independent  Eating Description:  Increased time    FIM Grooming Score:  6 - Modified Independent  Grooming Description:  Increased time(wc level - groom/hygiene at sinkside)    FIM Bathing Score:  4 - Minimal Assistance  Bathing Description:  Grab bar, Tub bench, Long handled bath tool, Hand held shower, Increased time, Supervision for safety, Verbal cueing, Set-up of equipment, Initial preparation for task(CGA/Min assist shower task)    FIM Upper Body Dressin - Standby Prompting/Supervision or Set-up  Upper Body Dressing Description:  Increased time, Supervision for safety, Set-up of equipment    FIM Lower Body Dressing Score:  4 - Minimal Assistance  Lower Body Dressing Description:  Increased time, Supervision for safety, Set-up of equipment, Application of orthotic or brace(Min assist to don/doff pants at waistline in standing via grab bar, Mod Indep to don L socks/shoe/lace management, Sup/SBA to thread BLE's through pants/briefs, Min assist to manage knee extension brace)    FIM Toileting Body Dressing:  3 - Moderate Assistance  Toileting Description:  Grab bar, Increased time, Supervision for safety, Verbal cueing, Set-up of equipment, Initial preparation for task(Min assist to manage pants in standing via grab bar.)    FIM  Bed/Chair/Wheelchair Transfers Score:    Bed/Chair/Wheelchair Transfers Description:       FIM Toilet Transfer Score:  4 - Minimal Assistance  Toilet Transfer Description:  Grab bar, Increased time, Supervision for safety, Verbal cueing, Set-up of equipment, Initial preparation for task(CGA for SPT to/from manual wc and commode via grab bar.)    FIM Tub/Shower Transfers Score:  4 - Minimal Assistance  Tub/Shower Transfers Description:  Grab bar, Increased time, Supervision for safety, Verbal cueing, Set-up of equipment(CGA for SPT to/from manual wc and shower bench via grab bar.)    FIM Comprehension Score:  6 - Modified Independent  Comprehension Description:  Glasses    FIM Expression Score:  6 - Modified Independent  Expression Description:       FIM Social Interaction Score:  7 - Independent  Social Interaction Description:       FIM Problem Solving Score:  6 - Modified Independent  Problem Solving Description:  Therapy schedule    Assessment  This patient is a 74 y.o. male admitted for acute inpatient rehabilitation with S/P BKA (below knee amputation) unilateral, right   Additional factors influencing patient status / progress (ie: cognitive factors, co-morbidities, social support, etc): DM, peripheral artery disease, hypertension, BPH.  Limiters include NWB RLE, generalized weakness, impaired endurance and balance  .      Plan  Recommend Occupational Therapy  minutes per day 5-7 days per week for 2 weeks for the following treatments:  OT Group Therapy, OT Self Care/ADL, OT Neuro Re-Ed/Balance, OT Therapeutic Activity, OT Evaluation and OT Therapeutic Exercise.    Goals:  Long term and short term goals have been discussed with patient and they are in agreement.    Occupational Therapy Goals     Problem: Dressing     Dates: Start: 09/11/19       Description:     Goal: STG-Within one week, patient will dress UB     Dates: Start: 09/11/19       Description: 1) Individualized Goal:  Mod Indep for UB clothing  management  2) Interventions:    OT Self Care/ADL, OT Therapeutic Activity, OT Evaluation and OT Therapeutic Exercise             Goal: STG-Within one week, patient will dress LB     Dates: Start: 09/11/19       Description: 1) Individualized Goal:   Sup/SBA for LB clothing management w/ AE/DME PRN  2) Interventions:  OT Self Care/ADL, OT Therapeutic Activity, OT Evaluation and OT Therapeutic Exercise                   Problem: Functional Transfers     Dates: Start: 09/11/19       Description:     Goal: STG-Within one week, patient will transfer to toilet     Dates: Start: 09/11/19       Description: 1) Individualized Goal: Sup/SBA for commode transfer via DME PRN  2) Interventions:    OT Self Care/ADL, OT Therapeutic Activity, OT Evaluation and OT Therapeutic Exercise                   Problem: OT Long Term Goals     Dates: Start: 09/11/19       Description:     Goal: LTG-By discharge, patient will complete basic self care tasks     Dates: Start: 09/11/19       Description: 1) Individualized Goal: Mod Indep for basic ADL's w/ AE/DME PRN  2) Interventions:   OT Self Care/ADL, OT Therapeutic Activity, OT Evaluation and OT Therapeutic Exercise             Goal: LTG-By discharge, patient will perform bathroom transfers     Dates: Start: 09/11/19       Description: 1) Individualized Goal:  Mod Indep for commode and shower transfer via DME PRN  2) Interventions:   OT Self Care/ADL, OT Therapeutic Activity, OT Evaluation and OT Therapeutic Exercise                   Problem: Toileting     Dates: Start: 09/11/19       Description:     Goal: STG-Within one week, patient will complete toileting tasks     Dates: Start: 09/11/19       Description: 1) Individualized Goal: Min assist for toileting task including safe LB clothing management and toilet hygiene via DME PRN  2) Interventions:    OT Self Care/ADL, OT Therapeutic Activity, OT Evaluation and OT Therapeutic Exercise

## 2019-09-11 NOTE — ASSESSMENT & PLAN NOTE
Low blood pressures improved off Chlorthalidone  Continue Losartan and Propranolol  Flomax changed to qhs dosing to avoid diurnal orthostatic events

## 2019-09-11 NOTE — CARE PLAN
Problem: Safety  Goal: Will remain free from injury  Note:   Patient uses call light consistently and appropriately this shift.  Waits for assistance when needed and does not attempt self transfer.  Able to verbalize needs.  Will continue to monitor.      Problem: Venous Thromboembolism (VTW)/Deep Vein Thrombosis (DVT) Prevention:  Goal: Patient will participate in Venous Thrombosis (VTE)/Deep Vein Thrombosis (DVT)Prevention Measures  Note:   Pt on heparin every 8 hrs for dvt prophylaxis.

## 2019-09-11 NOTE — FLOWSHEET NOTE
09/11/19 1101   Events/Summary/Plan   Events/Summary/Plan 02 spot check, pt currently on RA   Respiratory WDL   Respiratory (WDL) X   Chest Exam   Respiration 18   Pulse 80   Oximetry   #Pulse Oximetry (Single Determination) Yes   Oxygen   Pulse Oximetry 95 %   O2 Daily Delivery Respiratory  Room Air with O2 Available

## 2019-09-11 NOTE — CONSULTS
"DATE OF SERVICE:  9/11/2019    REQUESTING PHYSICIAN:  Jonn Rubin MD    CHIEF COMPLAINT / REASON FOR CONSULTATION:   Hypertension  Diabetes  Abnormal TFT's    HISTORY OF PRESENT ILLNESS:  This is a 73 y/o male with a PMH significant for hypertension, diabetes, and peripheral artery disease  [place] [facility] [place].  The patient had developed a second toe ulcer which rapidly progressed to gangrene of his entire toes with compromise of the third toe.  He was having chronic severe pain.  Preoperative angiography found occlusion and blood flow below the ankle contributing to this.  The patient elected to have a right below-knee amputation done by Dr. Shivam Amezcua MD on 9/5/2019.  The patinet tolerated the procedure well.  There was no post-op complications.    Because of the patient's weakness and debility, Rehab was consulted, evaluated the patient, and was deemed a good Rehab candidate.  The patient was transferred over to the Rehab facility on 9/10/2019.      The patient denies fever, chills, nausea, vomiting, headaches, blurry vision, or chest pain.    REVIEW OF SYSTEMS: All review of systems are negative pre AMA and CMS criteria   except for that stated in the HPI.    PAST MEDICAL HISTORY:  Past Medical History:   Diagnosis Date   • Arrhythmia 01/2018    possible afib per pt, ekg x 2 following were \"OK\"   • Bowel habit changes     constipation diarrhea   • Diabetes    • High cholesterol    • Hyperlipidemia    • Hypertension    • Muscle disorder    • Pain     right foot   • Tremors of nervous system        PAST SURGICAL HISTORY:  Past Surgical History:   Procedure Laterality Date   • KNEE AMPUTATION BELOW Right 9/5/2019    Procedure: AMPUTATION, BELOW KNEE;  Surgeon: Shivam Amezcua M.D.;  Location: SURGERY [facility];  Service: Vascular   • AMPUTATION, TOE  2/2013    all 5 toes left foot   • CARPAL TUNNEL RELEASE     • OTHER ORTHOPEDIC SURGERY      right foot        Allergies   Allergen " "Reactions   • Zocor [Simvastatin - High Dose]      Pt tells me this medication gave him \"side effect\" of feeling nauseated; denies allergies to medications       CURRENT MEDICATIONS:    Current Facility-Administered Medications:   •  [START ON 9/12/2019] omeprazole  •  [START ON 9/12/2019] clopidogrel  •  [START ON 9/12/2019] vitamin D  •  Respiratory Care per Protocol  •  Pharmacy Consult Request  •  tramadol  •  hydrALAZINE  •  acetaminophen  •  senna-docusate **AND** polyethylene glycol/lytes **AND** magnesium hydroxide **AND** bisacodyl  •  artificial tears  •  benzocaine-menthol  •  mag hydrox-al hydrox-simeth  •  ondansetron **OR** ondansetron  •  traZODone  •  sodium chloride  •  heparin  •  insulin glargine **AND** insulin lispro **AND** insulin lispro **AND** Accu-Chek ACHS **AND** glucose **AND** dextrose 50%  •  aspirin EC  •  atorvastatin  •  chlorthalidone  •  gabapentin  •  gabapentin  •  HYDROcodone-acetaminophen  •  levothyroxine  •  losartan  •  primidone  •  propranolol CR  •  tamsulosin    Social History     Socioeconomic History   • Marital status: Single     Spouse name: Not on file   • Number of children: Not on file   • Years of education: Not on file   • Highest education level: Not on file   Occupational History   • Not on file   Social Needs   • Financial resource strain: Not on file   • Food insecurity:     Worry: Not on file     Inability: Not on file   • Transportation needs:     Medical: Not on file     Non-medical: Not on file   Tobacco Use   • Smoking status: Never Smoker   • Smokeless tobacco: Never Used   Substance and Sexual Activity   • Alcohol use: Not Currently     Alcohol/week: 0.6 oz     Types: 1 Cans of beer per week   • Drug use: No   • Sexual activity: Yes     Partners: Female   Lifestyle   • Physical activity:     Days per week: Not on file     Minutes per session: Not on file   • Stress: Not on file   Relationships   • Social connections:     Talks on phone: Not on file "     Gets together: Not on file     Attends Protestant service: Not on file     Active member of club or organization: Not on file     Attends meetings of clubs or organizations: Not on file     Relationship status: Not on file   • Intimate partner violence:     Fear of current or ex partner: Not on file     Emotionally abused: Not on file     Physically abused: Not on file     Forced sexual activity: Not on file   Other Topics Concern   • Not on file   Social History Narrative   • Not on file       FAMILY HISTORY:  was reviewed and is not pertinent to this consultation.    PHYSICAL EXAMINATION:  VITAL SIGNS:  Temp is 96.9, blood pressure is 120/80, heart rate is 81, respiratory rate is 18*.  GENERAL:  Patient was lying in bed in no distress.  HEENT:  Pupils were equal, round and reactive to light and accomodation.  Oral mucosa was pink and moist.  NECK:  Soft.  Supple.  No JVD.  HEART:  Regular rate and rhythm.  Normal S1 and S2.  No murmurs were appreciated.  LUNGS:  Are clear to auscultation bilaterally.  ABDOMEN:  Soft, non tender, non distended.  Bowels sound were positive in all four quadrants.  EXTREMITIES:  No clubbing, cyanosis.  There was no left lower extremity edema.  There was noted the right BKA with dressing C/D/I.  NEUROLOGIC:  Cranial nerves two through twelve were grossly intact.    LABS:  Lab Results   Component Value Date/Time    SODIUM 137 09/11/2019 05:25 AM    POTASSIUM 3.8 09/11/2019 05:25 AM    CHLORIDE 97 09/11/2019 05:25 AM    CO2 29 09/11/2019 05:25 AM    GLUCOSE 229 (H) 09/11/2019 05:25 AM    BUN 18 09/11/2019 05:25 AM    CREATININE 0.86 09/11/2019 05:25 AM    BUNCREATRAT 24 11/21/2017 07:21 AM      Lab Results   Component Value Date/Time    WBC 7.7 09/11/2019 05:25 AM    RBC 4.18 (L) 09/11/2019 05:25 AM    HEMOGLOBIN 11.9 (L) 09/11/2019 05:25 AM    HEMATOCRIT 37.3 (L) 09/11/2019 05:25 AM    MCV 89.2 09/11/2019 05:25 AM    MCH 28.5 09/11/2019 05:25 AM    MCHC 31.9 (L) 09/11/2019 05:25 AM     MPV 10.1 09/11/2019 05:25 AM    NEUTSPOLYS 61.80 09/11/2019 05:25 AM    LYMPHOCYTES 22.70 09/11/2019 05:25 AM    MONOCYTES 11.30 09/11/2019 05:25 AM    EOSINOPHILS 2.90 09/11/2019 05:25 AM    BASOPHILS 0.80 09/11/2019 05:25 AM    HYPOCHROMIA 1+ 01/19/2013 04:28 PM      Lab Results   Component Value Date/Time    PROTHROMBTM 13.4 01/11/2017 09:05 PM    INR 0.99 01/11/2017 09:05 PM        Mixed hyperlipidemia- dr dwyer  On Lipitor    S/P BKA (below knee amputation) unilateral, right ([facility])  Has hx of PVD/PAD and diabetes  S/P right BKA 2nd to diabetic foot wound/gangrene  Wound dressing C/D/I  No signs of infection    Uncontrolled type 2 diabetes mellitus with complication, with long-term current use of insulin ([facility])- dr patterson  Hba1c: 8.1 (6/4) --> 7.2 (8/8)  -282  On Lantus: 19 units qhs  On Humalog 6 units tid --> will d/c (9/11)  Will start Metformin: 1000 mg bid (9/11 at evening)  Note: home meds include Metformin 1000 mg bid, Jardiance 25 mg daily, Amaryl 4 mg qam, Ozempic .25 mg qweekly, and Insulin 50/50 qhs  Note: followed by Dr. Patterson  Cont to monitor    Hypertension, essential  BP ok  On Cozaar: 50 mg daily  On Propranolol: 120 mg qam  On Chlorthalidone: 25 mg daily  Monitor    Hypothyroidism  Has hx  TSH: 1.77 (5/16/2018) --> 1.24 (2/20/19) --> 10.8 (9/11)  FT4: 1.11 (9/11)  Likely subclinical  Note: TSH and FT4 has been good since 2013 and has been on the same Synthroid dose  Note: Would normally increase Synthroid but since pt has been good for a while on the same med dose,             I will wait and repeat the TFT's in about 1 week to see if it is trending down; if it is not,            I will then will consider increasing the dose  On Synthroid: 50 mcg daily      This case has been discussed with the attending Physiatrist.    Thank you for the consultation.  Will follow the patient with you.

## 2019-09-11 NOTE — THERAPY
"Physical Therapy   Initial Evaluation     Patient Name: Kevin Jackson  Age:  74 y.o., Sex:  male  Medical Record #: [id number]  Today's Date: 9/11/2019     Subjective    Patient agreeable to PT.     Objective       09/11/19 0931   Prior Living Situation   Prior Services Home-Independent   Housing / Facility 1 Story House   Steps Into Home 2  (discussed benefit of installing a ramp to enter/exit home)   Steps In Home 0   Rail None   Elevator No   Lives with - Patient's Self Care Capacity Spouse   Prior Level of Functional Mobility   Bed Mobility Independent   Transfer Status Independent   Ambulation Independent   Distance Ambulation (Feet)   (community distances)   Assistive Devices Used None   Wheelchair Independent   Stairs Independent   IRF-ELEANOR:  Prior Functioning: Everyday Activities   Self Care Independent   Indoor Mobility (Ambulation) Independent   Stairs Independent   Functional Cognition Independent   Prior Device Use None of the given options   Vitals   Pulse 88   Pulse Oximetry 97 %   O2 (LPM) 0  (\"1L wean\" per RT card)   O2 Delivery None (Room Air)   Vitals Comments resting, seated   Pain 0 - 10 Group   Location Leg   Location Orientation Right;Lower   Description Pressure   Comfort Goal 0   Therapist Pain Assessment During Activity  (only when standing or with pressure at residual limb end)   Cognition    Cognition / Consciousness X   Speech/ Communication Delayed Responses   Ability To Follow Commands 2 Step   Safety Awareness Impulsive   New Learning Impaired   Passive ROM Lower Body   Comments WNL except R BKA   Active ROM Lower Body    Comments WNL except R BKA   Strength Lower Body   Comments Grossly 4/5 BLE MMT except R BKA   Bed Mobility    Supine to Sit Supervised   Sit to Supine Supervised   Sit to Stand Minimal Assist  (Min A to SBA depending on FWW vs // bars, setup, & cues)   Scooting Supervised   Rolling Supervised   IRF-ELEANOR:  Roll Left and Right   Assistance Needed Supervision   Physical " Assistance Level No physical assistance or only touching/steadying assist   CARE Score 4   Discharge Goal:  Assistance Needed Adaptive equipment   Discharge Goal:  Physical Assistance Level No physical assistance or only touching/steadying assist   Discharge Goal:  Score 6   IRF-ELEANOR:  Sit to Lying   Assistance Needed Supervision   Physical Assistance Level No physical assistance or only touching/steadying assist   CARE Score 4   Discharge Goal:  Assistance Needed Adaptive equipment   Discharge Goal:  Physical Assistance Level No physical assistance or only touching/steadying assist   Discharge Goal:  Score 6   IRF-ELEANOR:  Lying to Sitting on Side of Bed   Assistance Needed Supervision   Physical Assistance Level No physical assistance or only touching/steadying assist   CARE Score 4   Discharge Goal:  Assistance Needed Adaptive equipment   Discharge Goal:  Physical Assistance Level No physical assistance or only touching/steadying assist   Discharge Goal:  Score 6   IRF-ELEANOR:  Sit to Stand   Assistance Needed Physical assistance   Physical Assistance Level Less than 25%   CARE Score 3   Discharge Goal:  Assistance Needed Verbal cues   Discharge Goal:  Physical Assistance Level No physical assistance or only touching/steadying assist   Discahrge Goal:  Score 4   IRF-ELEANOR:  Chair/Bed-to-Chair Transfer   Assistance Needed Physical assistance   Physical Assistance Level Less than 25%   CARE Score 3   Discharge Goal:  Assistance Needed Verbal cues   Discharge Goal:  Physical Assistance Level No physical assistance or only touching/steadying assist   Discharge Goal:  Score 4   IRF-ELEANOR:  Car Transfer   Reason if not Attempted Safety concerns   CARE Score 88   Discharge Goal:  Assistance Needed Verbal cues   Discharge Goal:  Physical Assistance Level No physical assistance or only touching/steadying assist   Discharge Goal:  Score 4   IRF ELEANOR:  Walking   Does the Patient Walk? Yes   IRF ELEANOR:  Walk 10 Feet   Assistance Needed  Physical assistance   Physical Assistance Level Less than 25%   CARE Score 3   Discharge Goal:  Assistance Needed Verbal cues   Discharge Goal:  Physical Assistance Level No physical assistance or only touching/steadying assist   Discharge Goal:  Score 4   IRF-ELEANOR:  Walk 50 Feet with Two Turns   Reason if not Attempted Safety concerns   CARE Score 88   Discharge Goal:  Assistance Needed Verbal cues   Discharge Goal:  Physical Assistance Level No physical assistance or only touching/steadying assist   Discharge Goal:  Score 4   IRF-ELEANOR:  Walk 150 Feet   Reason if not Attempted Safety concerns   CARE Score 88   Discharge Goal:  Assistance Needed Physical assistance   Discharge Goal:  Physical Assistance Level Total assistance   Discharge Goal:  Score 1   IRF ELEANOR:  Walking 10 Feet on Uneven Surfaces   Reason if not Attempted Safety concerns   CARE Score 88   Discharge Goal:  Assistance Needed Verbal cues   Discharge Goal:  Physical Assistance Level No physical assistance or only touching/steadying assist   Discharge Goal:  Score 4   IRF ELEANOR:  1 Step (Curb)   Reason if not Attempted Safety concerns   CARE Score 88   Discharge Goal:  Assistance Needed Verbal cues   Discharge Goal:  Physical Assistance Level No physical assistance or only touching/steadying assist   Discharge Goal:  Score 4   IRF-ELEANOR:  4 Steps   Reason if not Attempted Safety concerns   CARE Score 88   Discharge Goal:  Assistance Needed Verbal cues   Discharge Goal:  Physical Assistance Level No physical assistance or only touching/steadying assist   Discharge Goal:  Score 4   IRF ELEANOR:  12 Steps   Reason if not Attempted Safety concerns   CARE Score 88   Discharge Goal:  Assistance Needed Physical assistance   Discharge Goal:  Physical Assistance Level Total assistance   Discharge Goal:  Score 1   IRF ELEANOR:  Picking Up Object   Reason if not Attempted Safety concerns   CARE Score 88   Discharge Goal:  Assistance Needed Incidental touching   Discharge Goal:   Physical Assistance Level No physical assistance or only touching/steadying assist   Discharge Goal:  Score 4   IRF-ELEANOR:  Wheel 50 Feet with Two Turns   Indicate the Type of Wheelchair or Scooter Used Manual   Assistance Needed Physical assistance   Physical Assistance Level 25%-49%   CARE Score 3   Discharge Goal:  Assistance Needed Verbal cues   Discharge Goal:  Physical Assistance Level No physical assistance or only touching/steadying assist   Discharge Goal:  Score 4   IRF-ELEANOR:  Wheel 150 Feet   Indicate the Type of Wheelchair or Scooter Used Manual   Assistance Needed Physical assistance   Physical Assistance Level 25%-49%   CARE Score 3   Discharge Goal:  Assistance Needed Verbal cues   Discharge Goal:  Physical Assistance Level No physical assistance or only touching/steadying assist   Discharge Goal:  Score 4   Problem List    Problems Pain;Impaired Transfers;Impaired Ambulation;Impaired Balance;Safety Awareness Deficits / Cognition;Limited Knowledge of Post-Op Precautions;Decreased Activity Tolerance   Precautions   Precautions Non Weight Bearing Right Lower Extremity;Fall Risk   Comments IPOP RLE   Current Discharge Plan   Current Discharge Plan Return to Prior Living Situation   Interdisciplinary Plan of Care Collaboration   IDT Collaboration with  Other (See Comments);Occupational Therapist;Physician  (orthotist Shivam from [facility] and his assistant)   Collaboration Comments orthotist Shivam from [facility] and his assistant were both here for 30 minutes of PT eval to remove pt's R knee immobilizer and fit pt with RLE rigid post-op orthosis with lap belt; donned in sitting and adjusted in both sitting/standing; discussed pt's healing progress and orthotist will pursue compression when appopriate with  sock.  Also discussed pt's mild safety impulsiveness and slightly increased delay in verbal response with OT and MD.  Roomboard updated.   Benefit   Therapy Benefit Patient Would Benefit from Inpatient  "Rehabilitation Physical Therapy to Maximize Functional [place] with ADLs, IADLs and Mobility.   PT Total Time Spent   PT Individual Total Time Spent (Mins) 75   PT Charge Group   PT Gait Training 1   PT Therapeutic Activities 1   PT Evaluation PT Evaluation Mod     Discussed rehab expectations, orientation to facility, call light usage, R BKA expectations, precautions, safety limitations, lap belt usage, and education per education tab.  Also performed additional ambulation and w/c rep today.    FIM Bed/Chair/Wheelchair Transfers Score: 4 - Minimal Assistance  Bed/Chair/Wheelchair Transfers Description:  (Min A during standing portion, mild safety impulsivity, setup for w/c legrests, gait belt, cueing for safe performance, RLE IPOP donned throughout.  Pt declined FWW usage.)    FIM Walking Score:  1 - Total Assistance  Walking Description:  (1x 20 feet in // bars with CGA-Min A, setup, RLE IPOP, cueing for safety, gait belt.  1x 45 feet with FWW, gait belt, RLE IPOP, setup, Min A, cueing, and significantly increased time due to fatigue.)    FIM Wheelchair Score:  3 - Moderate Assistance  Wheelchair Description:  (Min-Mod A with curb cutouts, Min A with ramps, cueing for problem solving and new task learning.  Discussed w/c gloves with outdoor w/c mobility.  SBA on level terrain and SPV indoors.  Setup for B legrests, lap belt, RLE IPOP.  300 ft, 550 ft in/outdoor)    FIM Stairs Score:  0 - Not tested,unsafe activity  Stairs Description:       Assessment  Patient is 74 y.o. male with a diagnosis of R BKA.  Additional factors influencing patient status / progress (ie: cognitive factors, co-morbidities, social support, etc): diabetes mellitus, peripheral artery disease, hypertension, pt reports 2 steps to enter home without railing, lives with SO, IND PLOF, fair social support, mild safety and cognitive deficits, reports not using oxygen at home (current \"1L wean\"), good participation.      Plan  Recommend " Physical Therapy  minutes per day 5-6 days per week for 10 days for the following treatments:  PT Group Therapy, PT Prosthetic Training, PT Gait Training, PT Self Care/Home Eval, PT Therapeutic Exercises, PT Neuro Re-Ed/Balance, PT Therapeutic Activity and PT Manual Therapy.    Goals:  Long term and short term goals have been discussed with patient and they are in agreement.    Physical Therapy Problems     Problem: Mobility     Dates: Start: 09/11/19       Description:     Goal: STG-Within one week, patient will propel wheelchair community     Dates: Start: 09/11/19       Description: 1) Individualized goal:  SPV/setup indoors and outdoors for 500 feet, R stump board, RLE rigid post-op orthosis, cueing prn  2) Interventions:  PT Group Therapy, PT Prosthetic Training, PT Gait Training, PT Self Care/Home Eval, PT Therapeutic Exercises, PT Neuro Re-Ed/Balance, PT Therapeutic Activity and PT Manual Therapy             Goal: STG-Within one week, patient will ambulate household distance     Dates: Start: 09/11/19       Description: 1) Individualized goal:  SPV and setup, 50 feet indoors, FWW, hop-to pattern, RLE rigid post-op orthosis, cueing prn  2) Interventions:  PT Group Therapy, PT Prosthetic Training, PT Gait Training, PT Self Care/Home Eval, PT Therapeutic Exercises, PT Neuro Re-Ed/Balance, PT Therapeutic Activity and PT Manual Therapy           Goal: STG-Within one week, patient will ascend and descend four to six stairs     Dates: Start: 09/11/19       Description: 1) Individualized goal:  2 standard stairs to enter home, SBA, seated bumping approach, RLE rigid post-op orthosis, cueing prn  2) Interventions:  PT Group Therapy, PT Prosthetic Training, PT Gait Training, PT Self Care/Home Eval, PT Therapeutic Exercises, PT Neuro Re-Ed/Balance, PT Therapeutic Activity and PT Manual Therapy           Goal: STG-Within one week, patient will     Dates: Start: 09/11/19       Description: 1) Individualized goal:   Perform HEP for RLE strengthening and AROM  2) Interventions:  PT Group Therapy, PT Prosthetic Training, PT Gait Training, PT Self Care/Home Eval, PT Therapeutic Exercises, PT Neuro Re-Ed/Balance, PT Therapeutic Activity and PT Manual Therapy                 Problem: Mobility Transfers     Dates: Start: 09/11/19       Description:     Goal: STG-Within one week, patient will sit to stand     Dates: Start: 09/11/19       Description: 1) Individualized goal: SPV and setup, FWW, RLE rigid post-op orthosis, cueing prn  2) Interventions: PT Group Therapy, PT Prosthetic Training, PT Gait Training, PT Self Care/Home Eval, PT Therapeutic Exercises, PT Neuro Re-Ed/Balance, PT Therapeutic Activity and PT Manual Therapy             Goal: STG-Within one week, patient will transfer bed to chair     Dates: Start: 09/11/19       Description: 1) Individualized goal: SPV and setup, FWW, RLE rigid post-op orthosis, cueing prn  2) Interventions: PT Group Therapy, PT Prosthetic Training, PT Gait Training, PT Self Care/Home Eval, PT Therapeutic Exercises, PT Neuro Re-Ed/Balance, PT Therapeutic Activity and PT Manual Therapy             Goal: STG-Within one week, patient will transfer in/out of car     Dates: Start: 09/11/19       Description: 1) Individualized goal: SPV and setup, FWW, RLE rigid post-op orthosis, cueing prn  2) Interventions: PT Group Therapy, PT Prosthetic Training, PT Gait Training, PT Self Care/Home Eval, PT Therapeutic Exercises, PT Neuro Re-Ed/Balance, PT Therapeutic Activity and PT Manual Therapy                   Problem: PT-Long Term Goals     Dates: Start: 09/11/19       Description:     Goal: LTG-By discharge, patient will propel wheelchair     Dates: Start: 09/11/19       Description: 1) Individualized goal:  SPV/setup indoors and outdoors for 500 feet, R stump board, RLE rigid post-op orthosis, cueing prn  2) Interventions:  PT Group Therapy, PT Prosthetic Training, PT Gait Training, PT Self Care/Home Eval,  PT Therapeutic Exercises, PT Neuro Re-Ed/Balance, PT Therapeutic Activity and PT Manual Therapy           Goal: LTG-By discharge, patient will ambulate     Dates: Start: 09/11/19       Description: 1) Individualized goal:  SPV and setup, 50 feet indoors, FWW, hop-to pattern, RLE rigid post-op orthosis, cueing prn  2) Interventions:  PT Group Therapy, PT Prosthetic Training, PT Gait Training, PT Self Care/Home Eval, PT Therapeutic Exercises, PT Neuro Re-Ed/Balance, PT Therapeutic Activity and PT Manual Therapy             Goal: LTG-By discharge, patient will transfer one surface to another     Dates: Start: 09/11/19       Description: 1) Individualized goal: SPV and setup, FWW, RLE rigid post-op orthosis, cueing prn  2) Interventions: PT Group Therapy, PT Prosthetic Training, PT Gait Training, PT Self Care/Home Eval, PT Therapeutic Exercises, PT Neuro Re-Ed/Balance, PT Therapeutic Activity and PT Manual Therapy           Goal: LTG-By discharge, patient will perform home exercise program     Dates: Start: 09/11/19       Description: 1) Individualized goal:  Perform HEP for RLE strengthening and AROM  2) Interventions:  PT Group Therapy, PT Prosthetic Training, PT Gait Training, PT Self Care/Home Eval, PT Therapeutic Exercises, PT Neuro Re-Ed/Balance, PT Therapeutic Activity and PT Manual Therapy           Goal: LTG-By discharge, patient will ambulate up/down 4-6 stairs     Dates: Start: 09/11/19       Description: 1) Individualized goal:  2 standard stairs to enter home, SBA, seated bumping approach, RLE rigid post-op orthosis, cueing prn  2) Interventions:  PT Group Therapy, PT Prosthetic Training, PT Gait Training, PT Self Care/Home Eval, PT Therapeutic Exercises, PT Neuro Re-Ed/Balance, PT Therapeutic Activity and PT Manual Therapy             Goal: LTG-By discharge, patient will transfer in/out of a car     Dates: Start: 09/11/19       Description: 1) Individualized goal: SPV and setup, FWW, RLE rigid post-op  orthosis, cueing prn  2) Interventions: PT Group Therapy, PT Prosthetic Training, PT Gait Training, PT Self Care/Home Eval, PT Therapeutic Exercises, PT Neuro Re-Ed/Balance, PT Therapeutic Activity and PT Manual Therapy

## 2019-09-11 NOTE — REHAB-PHARMACY IDT TEAM NOTE
Pharmacy   Pharmacy  Antibiotics/Antifungals/Antivirals:  Medication:      Active Orders (From admission, onward)    None        Route:         None  Stop Date:  N/A  Reason:   Antihypertensives/Cardiac:  Medication:    Orders (72h ago, onward)     Start     Ordered    09/11/19 0900  chlorthalidone (HYGROTON) tablet 25 mg  DAILY      09/10/19 1506    09/11/19 0900  losartan (COZAAR) tablet 50 mg  DAILY      09/10/19 1506    09/11/19 0900  propranolol LA (INDERAL LA) capsule 120 mg  EVERY MORNING      09/10/19 1506    09/10/19 2100  atorvastatin (LIPITOR) tablet 80 mg  EVERY EVENING      09/10/19 1506    09/10/19 1507  hydrALAZINE (APRESOLINE) tablet 25 mg  EVERY 8 HOURS PRN      09/10/19 1507              Patient Vitals for the past 24 hrs:   BP Pulse   09/11/19 1405 103/66 87   09/11/19 1101 -- 80   09/11/19 0931 -- 88   09/11/19 0635 120/80 81   09/10/19 1835 122/74 76     Anticoagulation:  Medication:  heparin  INR:      Other key medications:      A review of the medication list reveals no issues at this time. Patient is currently on antihypertensive(s). Recommend home blood pressure monitoring/recording if antihypertensive(s) regimen(s) continue. Patient is currently on diabetic medication(s) and/or Insulin(s). Recommend home blood glucose monitoring/recording if these regimen(s) continue.    Section completed by:  Wiliam Walton [facility]

## 2019-09-11 NOTE — THERAPY
"Occupational Therapy  Daily Treatment     Patient Name: Kevin Jackson  Age:  74 y.o., Sex:  male  Medical Record #: [id number]  Today's Date: 9/11/2019     Precautions  Precautions: Non Weight Bearing Right Lower Extremity, Fall Risk  Comments: IPOP RLE         Subjective    \"This brace keeps falling off my leg\" - in reference to IPOP - swelling of RLE decreased, orthotist will adjust/replace this evening - Knee immobilizer placed on RLE     Objective       09/11/19 1431   Precautions   Precautions Non Weight Bearing Right Lower Extremity;Fall Risk   Comments IPOP RLE   Vitals   O2 Delivery None (Room Air)   Non Verbal Descriptors   Non Verbal Scale  Calm   Cognition    Level of Consciousness Alert   Sitting Upper Body Exercises   Upper Extremity Bike Level 2 Resistance  (FluidoBike, 18 mins, Level 2, 4.361km, rest break x 1)   Interdisciplinary Plan of Care Collaboration   Patient Position at End of Therapy In Bed;Call Light within Reach;Tray Table within Reach;Phone within Reach   OT Total Time Spent   OT Individual Total Time Spent (Mins) 30   OT Charge Group   OT Therapeutic Exercise  2       FIM Bed/Chair/Wheelchair Transfers Score: 4 - Minimal Assistance  Bed/Chair/Wheelchair Transfers Description:  Increased time, Supervision for safety, Set-up of equipment, Initial preparation for task(Squat pivot transfer to/from EOB sitting and manual wc.  VQ's and set up for safety)      Assessment    Pt was alert and cooperative w/ tx.  Limiters include NWB RLE, generalized weakness, impaired balance.    Plan    Cont'd OT sor strength, endurance, balance, AE/DME for ADL's and transfers    "

## 2019-09-11 NOTE — ASSESSMENT & PLAN NOTE
HbA1c 8.1  Decrease Lantus for hypoglycemic trends  Continue Metformin and Glimepiride  Will not need SSI on discharge  Outpt meds include Metformin 1000 mg bid, Jardiance 25 mg daily, Amaryl 4 mg qam, Ozempic 0.25 mg SQ qweekly, and Insulin 50/50 qhs  May use either Lantus or Insulin 50/50 on discharge but not both  Followed by outpt Endocrinology (Dr. Patterson)

## 2019-09-11 NOTE — ASSESSMENT & PLAN NOTE
TSH 10.8 and FT4 1.11  TFT's may be related to acute illness, therefore will not increase Synthroid at this time  F/U w/ outpt Endocrinologist (Dr. Patterson)

## 2019-09-11 NOTE — H&P
"REHABILITATION HISTORY AND PHYSICAL/POST ADMISSION PHYSICAL EVALUATION    Date of Admission: 9/10/2019  14:59 PM  Date of Service: 9/11/19  Kevin Jackson  [id number]    [facility] Code / Diagnosis to Support: 05.4 Unilateral LE Below Knee Amputation (BKA)  Etiologic Diagnosis: S/P BKA (below knee amputation) unilateral, right (HCC)    CC: R knee BKA, right leg pain    HPI:  Per Dr. Bashir’s PM&R note from 9/10/19:  The patient is a 74 y.o. male with a past medical history of diabetes mellitus, peripheral artery disease, hypertension, presented on 9/5/2019 10:58 AM for a planned right BKA. Patient had developed a second toe ulcer which rapidly progressed to gangrene of his entire toe and compromise of the third toe. He also had chronic severe pain. Preoperative angiography found occlusion and blood flow below the ankle contributing to this. Patient elected to have below-knee amputation, which was performed by Dr. Shivam Amezcua MD on 9/5/2019.     The patient currently reports he is doing well. Patient had phantom pain this morning. No BM today. He is excited to come to rehab and get his mobility back. Patient enjoys riding his motorcycle, metal detecting, and hiking in the hills.    Patient tolerated transfer over. He reports his biggest complaints is his poorly controlled diabetes. Discussed with patient that we will have a Hospitalist following him for his DM during his stay. He reports he has much better control on his home medications. He reports his pain is controlled in his RLE. He denies NVD. Denies SOB. Denies easy bruising or bleeding.     REVIEW OF SYSTEMS:     Comprehensive 14 point ROS was reviewed and all were negative except as noted elsewhere in this document.     PMH:  Past Medical History:   Diagnosis Date   • Arrhythmia 01/2018    possible afib per pt, ekg x 2 following were \"OK\"   • Bowel habit changes     constipation diarrhea   • Diabetes    • High cholesterol    • Hyperlipidemia    • Hypertension    • " Muscle disorder    • Pain     right foot   • Tremors of nervous system        PSH:  Past Surgical History:   Procedure Laterality Date   • KNEE AMPUTATION BELOW Right 9/5/2019    Procedure: AMPUTATION, BELOW KNEE;  Surgeon: Shivam mAezcua M.D.;  Location: SURGERY [facility];  Service: Vascular   • AMPUTATION, TOE  2/2013    all 5 toes left foot   • CARPAL TUNNEL RELEASE     • OTHER ORTHOPEDIC SURGERY      right foot        FAMILY HISTORY:  Family History   Problem Relation Age of Onset   • Arthritis Mother    • Cancer Mother    • Hypertension Mother    • Heart Disease Mother    • Cancer Father         colon   • Arthritis Father    • Genetic Disorder Father    • Diabetes Father    • Hyperlipidemia Father    • Stroke Father    • Heart Disease Father        MEDICATIONS:  Current Facility-Administered Medications   Medication Dose   • Respiratory Care per Protocol     • Pharmacy Consult Request ...Pain Management Review 1 Each  1 Each   • tramadol (ULTRAM) 50 MG tablet 50 mg  50 mg   • hydrALAZINE (APRESOLINE) tablet 25 mg  25 mg   • acetaminophen (TYLENOL) tablet 650 mg  650 mg   • senna-docusate (PERICOLACE or SENOKOT S) 8.6-50 MG per tablet 2 Tab  2 Tab    And   • polyethylene glycol/lytes (MIRALAX) PACKET 1 Packet  1 Packet    And   • magnesium hydroxide (MILK OF MAGNESIA) suspension 30 mL  30 mL    And   • bisacodyl (DULCOLAX) suppository 10 mg  10 mg   • artificial tears ophthalmic solution 1 Drop  1 Drop   • benzocaine-menthol (CEPACOL) lozenge 1 Lozenge  1 Lozenge   • mag hydrox-al hydrox-simeth (MAALOX PLUS ES or MYLANTA DS) suspension 20 mL  20 mL   • ondansetron (ZOFRAN ODT) dispertab 4 mg  4 mg    Or   • ondansetron (ZOFRAN) syringe/vial injection 4 mg  4 mg   • traZODone (DESYREL) tablet 50 mg  50 mg   • sodium chloride (OCEAN) 0.65 % nasal spray 2 Spray  2 Spray   • heparin injection 5,000 Units  5,000 Units   • insulin glargine (LANTUS) injection 19 Units  0.2 Units/kg/day    And   • insulin lispro  (HUMALOG) injection 6 Units  0.2 Units/kg/day    And   • insulin lispro (HUMALOG) injection 2-9 Units  2-9 Units    And   • glucose 4 g chewable tablet 16 g  16 g    And   • dextrose 50% (D50W) injection 50 mL  50 mL   • aspirin EC (ECOTRIN) tablet 81 mg  81 mg   • atorvastatin (LIPITOR) tablet 80 mg  80 mg   • chlorthalidone (HYGROTON) tablet 25 mg  25 mg   • gabapentin (NEURONTIN) capsule 1,200 mg  1,200 mg   • gabapentin (NEURONTIN) capsule 600 mg  600 mg   • HYDROcodone-acetaminophen (NORCO) 5-325 MG per tablet 1-2 Tab  1-2 Tab   • levothyroxine (SYNTHROID) tablet 50 mcg  50 mcg   • losartan (COZAAR) tablet 50 mg  50 mg   • primidone (MYSOLINE) tablet 50 mg  50 mg   • propranolol LA (INDERAL LA) capsule 120 mg  120 mg   • tamsulosin (FLOMAX) capsule 0.4 mg  0.4 mg       ALLERGIES:  Zocor [simvastatin - high dose]    PSYCHOSOCIAL HISTORY:  Housing / Facility: 1 Story House  Steps Into Home: 2  Steps In Home: 0  Lives with - Patient's Self Care Capacity: Spouse  Equipment Owned: Single Point Cane     Prior Level of Function / Living Situation:   Physical Therapy: Prior Services: None, Home-Independent  Housing / Facility: 1 Story House  Steps Into Home: 2  Steps In Home: 0  Equipment Owned: Single Point Cane  Lives with - Patient's Self Care Capacity: Spouse  Bed Mobility: Independent  Transfer Status: Independent  Ambulation: Independent  Distance Ambulation (Feet): 150  Assistive Devices Used: None  Stairs: Independent  Current Level of Function:   Level Of Assist: Minimal Assist  Assistive Device: Front Wheel Walker  Distance (Feet): 20  Deviation: Shuffled Gait  # of Stairs Climbed: 0  Weight Bearing Status: NWB RLE BKA site   Skilled Intervention: Verbal Cuing, Tactile Cuing, Facilitation  Supine to Sit: Minimal Assist  Sit to Supine: Stand by Assist  Scooting: Stand by Assist  Rolling: Minimum Assist to Lt.  Skilled Intervention: Verbal Cuing  Sit to Stand: Minimal Assist  Bed, Chair, Wheelchair Transfer:  "Minimal Assist  Transfer Method: Stand Pivot  Skilled Intervention: Verbal Cuing, Facilitation, Tactile Cuing  Sitting in Chair: NT  Sitting Edge of Bed: 5-6 min  Standin-6 min total  Occupational Therapy:      Prior Services: None, Home-Independent  Housing / Facility: [address]  Current Level of Function:    Occupational Therapy Evaluation completed.   Functional Status:  SPV supine>sit, Sarai sit>stand (elevated bed height), Sarai functional mobility of household distance bed to toilet, ModA toilet txf (1 LOB in BR), ModA LB dress (managing immobilizer)   Plan of Care: Will benefit from Occupational Therapy 4 times per week   Rehabilitation Prognosis/Potential: Good  Estimated Length of Stay: 10-14  Days        CURRENT LEVEL OF FUNCTION:   Same as level of function prior to admission to [facility]    PHYSICAL EXAM:     VITAL SIGNS:   height is 1.854 m (6' 1\") and weight is 90.9 kg (200 lb 8 oz). His temporal temperature is 36.1 °C (96.9 °F). His blood pressure is 120/80 and his pulse is 80. His respiration is 18 and oxygen saturation is 95%.     GENERAL: No apparent distress  HEENT: Normocephalic/atraumatic, EOMI and PERRL  CARDIAC: Regular rate and rhythm, normal S1, S2   LUNGS: Clear to auscultation   ABDOMINAL: bowel sounds present, soft, nontender and nondistended    EXTREMITIES: no contractures, spasticity, or edema.  No calf tenderness LLE. Right leg in IPOP in extension  NEURO:  Mental status:  A&Ox4 (person, place, date, situation) answers questions appropriately  Speech: fluent, no aphasia or dysarthria  Motor:  5/5 BUE  5/5 LLE  At least antigravity at R HF  Sensory: intact to light touch through out    RADIOLOGY:                       CXR 9/3/19  Impression       No acute cardiopulmonary disease.                       LABS:  Recent Labs     19  0525   SODIUM 137   POTASSIUM 3.8   CHLORIDE 97   CO2 29   GLUCOSE 229*   BUN 18   CREATININE 0.86   CALCIUM 9.7     Recent Labs "     09/11/19  0525   WBC 7.7   RBC 4.18*   HEMOGLOBIN 11.9*   HEMATOCRIT 37.3*   MCV 89.2   MCH 28.5   MCHC 31.9*   RDW 45.1   PLATELETCT 448*   MPV 10.1             PRIMARY REHAB DIAGNOSIS:    This patient is a 74 y.o. male admitted for acute inpatient rehabilitation with S/P BKA (below knee amputation) unilateral, right (HCC).    IMPAIRMENTS:   Cognitive  ADLs/IADLs  Mobility    SECONDARY DIAGNOSIS/MEDICAL CO-MORBIDITIES AFFECTING FUNCTION:  diabetes mellitus, peripheral artery disease, hypertension  BPH    RELEVANT CHANGES SINCE PREADMISSION EVALUATION:    Status unchanged    The patient's rehabilitation potential is Very Good  The patient's medical prognosis is good    PLAN:   Discussion and Recommendations:   1. The patient requires an acute inpatient rehabilitation program with a coordinated program of care at an intensity and frequency not available at a lower level of care. This recommendation is substantiated by the patient's medical physicians who recommend that the patient's intervention and assessment of medical issues needs to be done at an acute level of care for patient's safety and maximum outcome.   2. A coordinated program of care will be supplied by an interdisciplinary team of physical therapy, occupational therapy, rehab physician, rehab nursing, and, if needed, speech therapy and rehab psychology. Rehab team presents a patient-specific rehabilitation and education program concentrating on prevention of future problems related to accessibility, mobility, skin, bowel, bladder, sexuality, and psychosocial and medical/surgical problems.   3. Need for Rehabilitation Physician: The rehab physician will be evaluating the patient on a multi-weekly basis to help coordinate the program of care. The rehab physician communicates between medical physicians, therapists, and nurses to maximize the patient's potential outcome. Specific areas in which the rehab physician will be providing daily assessment  include the following:   A. Assessing the patient's heart rate and blood pressure response (vitals monitoring) to activity and making adjustments in medications or conservative measures as needed.   B. The rehab physician will be assessing the frequency at which the program can be increased to allow the patient to reach optimal functional outcome.   C. The rehab physician will also provide assessments in daily skin care, especially in light of patient's impairments in mobility.   D. The rehab physician will provide special expertise in understanding how to work with functional impairment and recommend appropriate interventions, compensatory techniques, and education that will facilitate the patient's outcome.   4. Rehab R.N.   The rehab RN will be working with patient to carry over in room mobility and activities of daily living when the patient is not in 3 hours of skilled therapy. Rehab nursing will be working in conjunction with rehab physician to address all the medical issues above and continue to assess laboratory work and discuss abnormalities with the treating physicians, assess vitals, and response to activity, and discuss and report abnormalities with the rehab physician. Rehab RN will also continue daily skin care, supervise bladder/bowel program, instruct in medication administration, and ensure patient safety.   5. Rehab Therapy: Therapies to treat at intensity and frequency of (may change after completion of evaluation by all therapeutic disciplines):       PT:  Physical therapy to address mobility, transfer, gait training and evaluation for adaptive equipment needs 1 and 1/2 hour/day at least 5 days/week for the duration of the ELOS (see below)       OT:  Occupational therapy to address ADLs, self-care, home management training, functional mobility/transfers and assistive device evaluation, and community re-integration 1 and 1/2 hour/day at least 5 days/week for the duration of the ELOS (see below).      Medical management / Rehabilitation Issues/ Adverse Potential as part of rehabilitation plan     REHABILITATION ISSUES/ADVERSE POTENTIAL:  1. R BKA - S/p R BKA on 9/5/19 with Dr. Amezcua. Patient demonstrates functional deficits in strength, balance, coordination, and ADL's. The patient requires therapy to correct these deficits prior to discharge. Patient is admitted to [facility] for comprehensive rehabilitation therapy, including physical, occupational and speech therapy.     Rehabilitation nursing monitors bowel and bladder control, educates on medication administration, co-morbidities and monitors patient safety.    2.  Neurostimulants: None at this time but continue to assess daily for need to initiate should status change.    3.  DVT prophylaxis:  Patient is on Heparin for anticoagulation upon transfer. Encourage OOB. Monitor daily for signs and symptoms of DVT including but not limited to swelling and pain to prevent the development of DVT that may interfere with therapies.    4.  GI prophylaxis:  On prilosec to prevent gastritis/dyspepsia which may interfere with therapies.    5.  Pain: No issues with pain currently / Controlled with APAP/Oxycodone    6.  Nutrition/Dysphagia: Dietician monitors nutrient intake, recommend supplements prn and provide nutrition education to pt/family to promote optimal nutrition for wound healing/recovery.     7.  Bladder/bowel:  Start bowel and bladder program as described below, to prevent constipation, urinary retention (which may lead to UTI), and urinary incontinence (which will impact upon pt's functional independence).   - Post void bladder scans, I&O cath for PVRs >400  - up to commode after meal     8.  Skin/dermal ulcer prophylaxis: Monitor for new skin conditions with q.2 h. turns as required to prevent the development of skin breakdown.     9.  Cognition/Behavior:  Psychologist Dr. Mandel provides adjustment counseling to illness and  psychosocial barriers that may be potential barriers to rehabilitation.     10. Respiratory therapy: RT performs O2 management prn, breathing retraining, pulmonary hygiene and bronchospasm management prn to optimize participation in therapies.     MEDICAL CO-MORBIDITIES/ADVERSE POTENTIAL AFFECTING FUNCTION:  R BKA - Patient with DM and PVD with right BKA on 9/5/19 with Dr. Amezcua.  -PT and OT for mobility and ADLs  -NWB RLE  -Continue ASA  -Previously on Plavix and Pentoxifylline. Check AM CBC and restart. 11.9 on admission.     DM with hyperglycemia - Patient with uncontrolled DM into 300s. On Lantus 19 and SSI on transfer. Previously on Empagliflozin 25 mg daily, Glimepiride 4 mg daily, Ozempic 1 mg q7days, and Metformin 1000 mg BID  -Continue to monitor. Continue Lantus and SSI. 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 Will continue to monitor.     HTN - Patient on Chlorthalidone 25 mg and Losartan 50 mg daily.     HLD - Patient on Atorvastatin 80 mg QHS.    Hypothyroidism - Patient on 50 mcg on transfer.     Essential Tremor - On Primidone 50 mg and Propranolol 120 mg daily    BPH - Patient on Tamsulosin 0.4 mg. Check PVRs    DVT Ppx - Patient on Heparin 5000 q8h on transfer.     I personally performed a complete drug regimen review and no potential clinically significant medication issues were identified.     Pt was seen today for 80 min, and entire time spent in face-to-face contact was >50% in counseling and coordination of care as detailed in A/P above.        GOALS/EXPECTED LEVEL OF FUNCTION BASED ON CURRENT MEDICAL AND FUNCTIONAL STATUS (may change based on patient's medical status and rate of impairment recovery):  Transfers:   Modified Independent  Mobility/Gait:   Modified Independent  ADL's:   Modified Independent    DISPOSITION: Discharge to pre-morbid independent living setting with the supportive care of patient's spouse.    ELOS: 7-14 days

## 2019-09-11 NOTE — THERAPY
Physical Therapy   Daily Treatment     Patient Name: Kevin Jackson  Age:  74 y.o., Sex:  male  Medical Record #: [id number]  Today's Date: 9/11/2019     Precautions  Precautions: Non Weight Bearing Right Lower Extremity, Fall Risk  Comments: IPOP RLE    Subjective    Patient agreeable to PT; received supine in bed after transfer back with CNA after toileting.     Objective       09/11/19 1531   Vitals   Pulse 88  (88-91 resting)   Pulse Oximetry 94 %  (91-98 resting)   O2 (LPM) 0   O2 Delivery None (Room Air)   Sitting Lower Body Exercises   Sitting Lower Body Exercises Yes   Scapular Depression 3 sets of 10  (Rickshaw, facing away; 40 lbs)   Lat Pull 3 sets of 10  (35 lbs)   Bed Mobility    Supine to Sit Supervised   Sit to Supine Supervised   Sit to Stand Maximal Assist  (Mod-Max A during car transfer; Min A-CGA with bed transfer)   Scooting Stand by Assist   Rolling Supervised   Interdisciplinary Plan of Care Collaboration   Patient Position at End of Therapy In Bed;Call Light within Reach;Tray Table within Reach;Phone within Reach   PT Total Time Spent   PT Individual Total Time Spent (Mins) 45   PT Charge Group   PT Therapeutic Exercise 1   PT Therapeutic Activities 2       Car transfer with Mod A for w/c to car and Max A car to w/c, setup, cueing with poor carryover, increased time.  Also placed B anti-tippers on pt's w/c. Reviewed lap belt usage 2x.  Pt still reports IND PLOF and specifically declines any visual deficits.  Pt declines any problems with manual w/c at this time.    FIM Bed/Chair/Wheelchair Transfers Score: 4 - Minimal Assistance  Bed/Chair/Wheelchair Transfers Description:  (Min A reach-pivot to w/c, CGA w/c to bed, max cueing for sequencing and setup, bed rail, increased time.  Pt perferred use of R knee immobilizer over R rigid post-op orthosis due to complexity of orthosis; will continue education.)    FIM Wheelchair Score:  3 - Moderate Assistance  Wheelchair Description:  Extra time,  Safety concerns, Supervision for safety, Verbal cueing(Mod-Max A with curb cutouts, Min A to CGA with ramps, SBA outdoor level with 3 instances of rolling off path, SPV indoors, cueing, setup.  2 reps up to 550 feet outdoors/indoors today.)      Assessment    Patient continues to have some safety and processing limitations; requires repetition with verbal cues to try and improve learning; improving endurance and activity tolerance; requires A with transfers due to impaired setup insight and declining FWW.    Plan    Safe FWW usage with all transfers; FWW ambulation safety; ther ex for general safety; outdoor w/c mobility with curb cutouts and ramps; and education for BKA.

## 2019-09-12 LAB
GLUCOSE BLD-MCNC: 210 MG/DL (ref 65–99)
GLUCOSE BLD-MCNC: 252 MG/DL (ref 65–99)
GLUCOSE BLD-MCNC: 272 MG/DL (ref 65–99)
GLUCOSE BLD-MCNC: 279 MG/DL (ref 65–99)

## 2019-09-12 PROCEDURE — 97116 GAIT TRAINING THERAPY: CPT

## 2019-09-12 PROCEDURE — 99232 SBSQ HOSP IP/OBS MODERATE 35: CPT | Performed by: HOSPITALIST

## 2019-09-12 PROCEDURE — A9270 NON-COVERED ITEM OR SERVICE: HCPCS | Performed by: HOSPITALIST

## 2019-09-12 PROCEDURE — 94760 N-INVAS EAR/PLS OXIMETRY 1: CPT

## 2019-09-12 PROCEDURE — 97110 THERAPEUTIC EXERCISES: CPT

## 2019-09-12 PROCEDURE — 700102 HCHG RX REV CODE 250 W/ 637 OVERRIDE(OP): Performed by: PHYSICAL MEDICINE & REHABILITATION

## 2019-09-12 PROCEDURE — 97530 THERAPEUTIC ACTIVITIES: CPT

## 2019-09-12 PROCEDURE — 770010 HCHG ROOM/CARE - REHAB SEMI PRIVAT*

## 2019-09-12 PROCEDURE — 82962 GLUCOSE BLOOD TEST: CPT | Mod: 91

## 2019-09-12 PROCEDURE — A9270 NON-COVERED ITEM OR SERVICE: HCPCS | Performed by: PHYSICAL MEDICINE & REHABILITATION

## 2019-09-12 PROCEDURE — 700102 HCHG RX REV CODE 250 W/ 637 OVERRIDE(OP): Performed by: HOSPITALIST

## 2019-09-12 PROCEDURE — 99233 SBSQ HOSP IP/OBS HIGH 50: CPT | Performed by: PHYSICAL MEDICINE & REHABILITATION

## 2019-09-12 PROCEDURE — 700111 HCHG RX REV CODE 636 W/ 250 OVERRIDE (IP): Performed by: PHYSICAL MEDICINE & REHABILITATION

## 2019-09-12 RX ORDER — TAMSULOSIN HYDROCHLORIDE 0.4 MG/1
0.8 CAPSULE ORAL DAILY
Status: DISCONTINUED | OUTPATIENT
Start: 2019-09-13 | End: 2019-09-18

## 2019-09-12 RX ORDER — INSULIN GLARGINE 100 [IU]/ML
30 INJECTION, SOLUTION SUBCUTANEOUS EVERY EVENING
Status: DISCONTINUED | OUTPATIENT
Start: 2019-09-12 | End: 2019-09-13

## 2019-09-12 RX ORDER — DEXTROSE MONOHYDRATE 25 G/50ML
50 INJECTION, SOLUTION INTRAVENOUS
Status: DISCONTINUED | OUTPATIENT
Start: 2019-09-12 | End: 2019-09-13

## 2019-09-12 RX ADMIN — INSULIN LISPRO 6 UNITS: 100 INJECTION, SOLUTION INTRAVENOUS; SUBCUTANEOUS at 11:59

## 2019-09-12 RX ADMIN — LOSARTAN POTASSIUM 50 MG: 25 TABLET ORAL at 08:35

## 2019-09-12 RX ADMIN — PROPRANOLOL HYDROCHLORIDE 120 MG: 60 CAPSULE, EXTENDED RELEASE ORAL at 08:20

## 2019-09-12 RX ADMIN — GABAPENTIN 600 MG: 300 CAPSULE ORAL at 05:22

## 2019-09-12 RX ADMIN — HEPARIN SODIUM 5000 UNITS: 5000 INJECTION, SOLUTION INTRAVENOUS; SUBCUTANEOUS at 15:33

## 2019-09-12 RX ADMIN — HEPARIN SODIUM 5000 UNITS: 5000 INJECTION, SOLUTION INTRAVENOUS; SUBCUTANEOUS at 05:22

## 2019-09-12 RX ADMIN — METFORMIN HYDROCHLORIDE 1000 MG: 500 TABLET, FILM COATED ORAL at 18:28

## 2019-09-12 RX ADMIN — ATORVASTATIN CALCIUM 80 MG: 40 TABLET, FILM COATED ORAL at 21:30

## 2019-09-12 RX ADMIN — INSULIN LISPRO 6 UNITS: 100 INJECTION, SOLUTION INTRAVENOUS; SUBCUTANEOUS at 21:37

## 2019-09-12 RX ADMIN — PRIMIDONE 50 MG: 50 TABLET ORAL at 21:30

## 2019-09-12 RX ADMIN — TAMSULOSIN HYDROCHLORIDE 0.4 MG: 0.4 CAPSULE ORAL at 08:21

## 2019-09-12 RX ADMIN — VITAMIN D, TAB 1000IU (100/BT) 2000 UNITS: 25 TAB at 08:20

## 2019-09-12 RX ADMIN — CHLORTHALIDONE 25 MG: 25 TABLET ORAL at 08:30

## 2019-09-12 RX ADMIN — CLOPIDOGREL BISULFATE 75 MG: 75 TABLET ORAL at 08:20

## 2019-09-12 RX ADMIN — GABAPENTIN 600 MG: 300 CAPSULE ORAL at 17:21

## 2019-09-12 RX ADMIN — ASPIRIN 81 MG: 81 TABLET, COATED ORAL at 17:25

## 2019-09-12 RX ADMIN — INSULIN GLARGINE 30 UNITS: 100 INJECTION, SOLUTION SUBCUTANEOUS at 21:37

## 2019-09-12 RX ADMIN — GABAPENTIN 1200 MG: 400 CAPSULE ORAL at 21:29

## 2019-09-12 RX ADMIN — LEVOTHYROXINE SODIUM 50 MCG: 50 TABLET ORAL at 08:20

## 2019-09-12 RX ADMIN — HEPARIN SODIUM 5000 UNITS: 5000 INJECTION, SOLUTION INTRAVENOUS; SUBCUTANEOUS at 21:32

## 2019-09-12 RX ADMIN — INSULIN LISPRO 6 UNITS: 100 INJECTION, SOLUTION INTRAVENOUS; SUBCUTANEOUS at 08:21

## 2019-09-12 RX ADMIN — INSULIN LISPRO 4 UNITS: 100 INJECTION, SOLUTION INTRAVENOUS; SUBCUTANEOUS at 17:22

## 2019-09-12 RX ADMIN — OMEPRAZOLE 20 MG: 20 CAPSULE, DELAYED RELEASE ORAL at 08:20

## 2019-09-12 RX ADMIN — METFORMIN HYDROCHLORIDE 1000 MG: 500 TABLET, FILM COATED ORAL at 08:19

## 2019-09-12 RX ADMIN — GABAPENTIN 600 MG: 300 CAPSULE ORAL at 12:10

## 2019-09-12 ASSESSMENT — ENCOUNTER SYMPTOMS
FEVER: 0
NERVOUS/ANXIOUS: 0
BLURRED VISION: 0
DIARRHEA: 0
COUGH: 0
DIZZINESS: 0

## 2019-09-12 NOTE — REHAB-OT IDT TEAM NOTE
Occupational Therapy   Activities of Daily Living  Eating Initial:  6 - Modified Independent  Eating Current:  6 - Modified Independent   Eating Description:  Increased time  Grooming Initial:  6 - Modified Independent  Grooming Current:  6 - Modified Independent   Grooming Description:  Increased time(wc level - groom/hygiene at sinkside)  Bathing Initial:  4 - Minimal Assistance  Bathing Current:  4 - Minimal Assistance   Bathing Description:  Grab bar, Tub bench, Long handled bath tool, Hand held shower, Increased time, Supervision for safety, Verbal cueing, Set-up of equipment, Initial preparation for task(CGA/Min assist shower task)  Upper Body Dressing Initial:  5 - Standby Prompting/Supervision or Set-up  Upper Body Dressing Current:  5 - Standby Prompting/Supervision or Set-up   Upper Body Dressing Description:  Increased time, Supervision for safety, Set-up of equipment  Lower Body Dressing Initial:  4 - Minimal Assistance  Lower Body Dressing Current:  4 - Minimal Assistance   Lower Body Dressing Description:  4 - Minimal Assistance  Toileting Initial:  3 - Moderate Assistance  Toileting Current:  3 - Moderate Assistance   Toileting Description:  Grab bar, Increased time, Supervision for safety, Verbal cueing, Set-up of equipment, Initial preparation for task(Min assist to manage pants in standing via grab bar.)  Toilet Transfer Initial:  4 - Minimal Assistance  Toilet Transfer Current:  4 - Minimal Assistance   Toilet Transfer Description:  4 - Minimal Assistance  Tub / Shower Transfer Initial:  4 - Minimal Assistance  Tub / Shower Transfer Current:  4 - Minimal Assistance   Tub / Shower Transfer Description:  Grab bar, Increased time, Supervision for safety, Verbal cueing, Set-up of equipment(CGA for SPT to/from manual wc and shower bench via grab bar.)  IADL:  TBD   Family Training/Education:  TBD   DME/DC Recommendations:  TBD     Strengths:  Independent PLOF, Motivated for self care and independence,  Pleasant and cooperative, Supportive family and Willingly participates in therapeutic activities  Barriers:  Decreased endurance, Generalized weakness and Limited mobility     # of short term goals set= 4    # of short term goals met= 0     Occupational Therapy Goals     Problem: Dressing     Dates: Start: 09/11/19       Description:     Goal: STG-Within one week, patient will dress UB     Dates: Start: 09/11/19       Description: 1) Individualized Goal:  Mod Indep for UB clothing management  2) Interventions:    OT Self Care/ADL, OT Therapeutic Activity, OT Evaluation and OT Therapeutic Exercise       Note:     Goal Note filed on 09/12/19 1148 by Sukumar Renner MS,OTR/L    Initial eval FIM scores - To be re-attempted                  Goal: STG-Within one week, patient will dress LB     Dates: Start: 09/11/19       Description: 1) Individualized Goal:   Sup/SBA for LB clothing management w/ AE/DME PRN  2) Interventions:  OT Self Care/ADL, OT Therapeutic Activity, OT Evaluation and OT Therapeutic Exercise       Note:     Goal Note filed on 09/12/19 1148 by Sukumar Renner MS,OTR/L    Initial eval FIM scores - To be re-attempted                        Problem: Functional Transfers     Dates: Start: 09/11/19       Description:     Goal: STG-Within one week, patient will transfer to toilet     Dates: Start: 09/11/19       Description: 1) Individualized Goal: Sup/SBA for commode transfer via DME PRN  2) Interventions:    OT Self Care/ADL, OT Therapeutic Activity, OT Evaluation and OT Therapeutic Exercise       Note:     Goal Note filed on 09/12/19 1148 by Sukumar Renner MS,OTR/L    Initial eval FIM scores - To be re-attempted                        Problem: OT Long Term Goals     Dates: Start: 09/11/19       Description:     Goal: LTG-By discharge, patient will complete basic self care tasks     Dates: Start: 09/11/19       Description: 1) Individualized Goal: Mod Indep for basic ADL's w/ AE/DME PRN  2) Interventions:   OT Self  Care/ADL, OT Therapeutic Activity, OT Evaluation and OT Therapeutic Exercise             Goal: LTG-By discharge, patient will perform bathroom transfers     Dates: Start: 09/11/19       Description: 1) Individualized Goal:  Mod Indep for commode and shower transfer via DME PRN  2) Interventions:   OT Self Care/ADL, OT Therapeutic Activity, OT Evaluation and OT Therapeutic Exercise                   Problem: Toileting     Dates: Start: 09/11/19       Description:     Goal: STG-Within one week, patient will complete toileting tasks     Dates: Start: 09/11/19       Description: 1) Individualized Goal: Min assist for toileting task including safe LB clothing management and toilet hygiene via DME PRN  2) Interventions:    OT Self Care/ADL, OT Therapeutic Activity, OT Evaluation and OT Therapeutic Exercise     Note:     Goal Note filed on 09/12/19 1148 by Sukumar Renner MS,OTR/L    Initial eval FIM scores - To be re-attempted                              Section completed by:  Sukumar Renner MS,OTR/L

## 2019-09-12 NOTE — THERAPY
09/12/19 1350   IDT Conference   IDT Conference I have reviewed and discussed the POC and barriers to discharge for this patient.  I am knowledgeable of the patient's needs and have attended the IDT Conference.   Interdisciplinary Plan of Care Collaboration   IDT Collaboration with    (IDT team)   Collaboration Comments IDT conference: celestine MATA completed yesterday, d/c anticipated around 9/20/19, pt compliance with rigid orthosis, monitor anterior of knee for pressure ulcer, mild cogn barriers, no supplemental oxygen.

## 2019-09-12 NOTE — REHAB-PT IDT TEAM NOTE
Physical Therapy   Mobility  Bed mobility:   SPV-SBA  Bed /Chair/Wheelchair Transfer Initial:  4 - Minimal Assistance  Bed /Chair/Wheelchair Transfer Current:  4 - Minimal Assistance   Bed/Chair/Wheelchair Transfer Description:  (Min A reach-pivot to w/c, CGA w/c to bed, max cueing for sequencing and setup, bed rail, increased time.  Pt perferred use of R knee immobilizer over R rigid post-op orthosis due to complexity of orthosis; will continue education.)  Walk Initial:  1 - Total Assistance  Walk Current:  1 - Total Assistance   Walk Description:  (1x 20 feet in // bars with CGA-Min A, setup, RLE IPOP, cueing for safety, gait belt.  1x 45 feet with FWW, gait belt, RLE IPOP, setup, Min A, cueing, and significantly increased time due to fatigue.)  Wheelchair Initial:  3 - Moderate Assistance  Wheelchair Current:  3 - Moderate Assistance   Wheelchair Description:  Extra time, Safety concerns, Supervision for safety, Verbal cueing(Mod-Max A with curb cutouts, Min A to CGA with ramps, SBA outdoor level with 3 instances of rolling off path, SPV indoors, cueing, setup.  2 reps up to 550 feet outdoors/indoors today.)  Stairs Initial:  0 - Not tested,unsafe activity  Stairs Current: 0 - Not tested,unsafe activity   Stairs Description:    Patient/Family Training/Education:  Initiated patient education; impaired carryover with 2-step directions, consider SLP eval for cognition  DME/DC Recommendations:  10 days; intermittent SPV likely; standard manual w/c with standard L legrest, R stump board, seat cushion, lap belt, B anti-tippers; Home health PT  Strengths:  Alert and oriented, Effective communication skills, Independent PLOF, Manages pain appropriately, Motivated for self care and independence, Pleasant and cooperative and Willingly participates in therapeutic activities  Barriers:   Decreased endurance, Home accessibility, Impulsive, Poor carryover of learning, Poor insight/denial of deficits and Other: variable  adherence with new R rigid orthosis (pt prefers R knee immobilizer)  # of short term goals set= 7 set yesterday after PT eval  # of short term goals met= PT eval completed yesterday; will monitor pt's progress towards goals  Physical Therapy Problems     Problem: Mobility     Dates: Start: 09/11/19       Description:     Goal: STG-Within one week, patient will propel wheelchair community     Dates: Start: 09/11/19       Description: 1) Individualized goal:  SPV/setup indoors and outdoors for 500 feet, R stump board, RLE rigid post-op orthosis, cueing prn  2) Interventions:  PT Group Therapy, PT Prosthetic Training, PT Gait Training, PT Self Care/Home Eval, PT Therapeutic Exercises, PT Neuro Re-Ed/Balance, PT Therapeutic Activity and PT Manual Therapy       Note:     Goal Note filed on 09/12/19 0858 by Trung Bhagat, PT    PT eval completed yesterday; will monitor pt's progress towards goals.                  Goal: STG-Within one week, patient will ambulate household distance     Dates: Start: 09/11/19       Description: 1) Individualized goal:  SPV and setup, 50 feet indoors, FWW, hop-to pattern, RLE rigid post-op orthosis, cueing prn  2) Interventions:  PT Group Therapy, PT Prosthetic Training, PT Gait Training, PT Self Care/Home Eval, PT Therapeutic Exercises, PT Neuro Re-Ed/Balance, PT Therapeutic Activity and PT Manual Therapy     Note:     Goal Note filed on 09/12/19 0858 by Trung Bhagat, PT    PT eval completed yesterday; will monitor pt's progress towards goals.                  Goal: STG-Within one week, patient will ascend and descend four to six stairs     Dates: Start: 09/11/19       Description: 1) Individualized goal:  2 standard stairs to enter home, SBA, seated bumping approach, RLE rigid post-op orthosis, cueing prn  2) Interventions:  PT Group Therapy, PT Prosthetic Training, PT Gait Training, PT Self Care/Home Eval, PT Therapeutic Exercises, PT Neuro Re-Ed/Balance, PT Therapeutic  Activity and PT Manual Therapy     Note:     Goal Note filed on 09/12/19 0858 by Trung Bhagat, PT    PT eval completed yesterday; will monitor pt's progress towards goals.                  Goal: STG-Within one week, patient will     Dates: Start: 09/11/19       Description: 1) Individualized goal:  Perform HEP for RLE strengthening and AROM  2) Interventions:  PT Group Therapy, PT Prosthetic Training, PT Gait Training, PT Self Care/Home Eval, PT Therapeutic Exercises, PT Neuro Re-Ed/Balance, PT Therapeutic Activity and PT Manual Therapy     Note:     Goal Note filed on 09/12/19 0858 by Trung Bhagat, PT    PT eval completed yesterday; will monitor pt's progress towards goals.                        Problem: Mobility Transfers     Dates: Start: 09/11/19       Description:     Goal: STG-Within one week, patient will sit to stand     Dates: Start: 09/11/19       Description: 1) Individualized goal: SPV and setup, FWW, RLE rigid post-op orthosis, cueing prn  2) Interventions: PT Group Therapy, PT Prosthetic Training, PT Gait Training, PT Self Care/Home Eval, PT Therapeutic Exercises, PT Neuro Re-Ed/Balance, PT Therapeutic Activity and PT Manual Therapy       Note:     Goal Note filed on 09/12/19 0858 by Trung Bhagat, PT    PT eval completed yesterday; will monitor pt's progress towards goals.                  Goal: STG-Within one week, patient will transfer bed to chair     Dates: Start: 09/11/19       Description: 1) Individualized goal: SPV and setup, FWW, RLE rigid post-op orthosis, cueing prn  2) Interventions: PT Group Therapy, PT Prosthetic Training, PT Gait Training, PT Self Care/Home Eval, PT Therapeutic Exercises, PT Neuro Re-Ed/Balance, PT Therapeutic Activity and PT Manual Therapy       Note:     Goal Note filed on 09/12/19 0858 by Trung Bhagat, PT    PT eval completed yesterday; will monitor pt's progress towards goals.                  Goal: STG-Within one week, patient  will transfer in/out of car     Dates: Start: 09/11/19       Description: 1) Individualized goal: SPV and setup, FWW, RLE rigid post-op orthosis, cueing prn  2) Interventions: PT Group Therapy, PT Prosthetic Training, PT Gait Training, PT Self Care/Home Eval, PT Therapeutic Exercises, PT Neuro Re-Ed/Balance, PT Therapeutic Activity and PT Manual Therapy       Note:     Goal Note filed on 09/12/19 0858 by Trung Bhagat, PT    PT eval completed yesterday; will monitor pt's progress towards goals.                        Problem: PT-Long Term Goals     Dates: Start: 09/11/19       Description:     Goal: LTG-By discharge, patient will propel wheelchair     Dates: Start: 09/11/19       Description: 1) Individualized goal:  SPV/setup indoors and outdoors for 500 feet, R stump board, RLE rigid post-op orthosis, cueing prn  2) Interventions:  PT Group Therapy, PT Prosthetic Training, PT Gait Training, PT Self Care/Home Eval, PT Therapeutic Exercises, PT Neuro Re-Ed/Balance, PT Therapeutic Activity and PT Manual Therapy           Goal: LTG-By discharge, patient will ambulate     Dates: Start: 09/11/19       Description: 1) Individualized goal:  SPV and setup, 50 feet indoors, FWW, hop-to pattern, RLE rigid post-op orthosis, cueing prn  2) Interventions:  PT Group Therapy, PT Prosthetic Training, PT Gait Training, PT Self Care/Home Eval, PT Therapeutic Exercises, PT Neuro Re-Ed/Balance, PT Therapeutic Activity and PT Manual Therapy             Goal: LTG-By discharge, patient will transfer one surface to another     Dates: Start: 09/11/19       Description: 1) Individualized goal: SPV and setup, FWW, RLE rigid post-op orthosis, cueing prn  2) Interventions: PT Group Therapy, PT Prosthetic Training, PT Gait Training, PT Self Care/Home Eval, PT Therapeutic Exercises, PT Neuro Re-Ed/Balance, PT Therapeutic Activity and PT Manual Therapy           Goal: LTG-By discharge, patient will perform home exercise program     Dates:  Start: 09/11/19       Description: 1) Individualized goal:  Perform HEP for RLE strengthening and AROM  2) Interventions:  PT Group Therapy, PT Prosthetic Training, PT Gait Training, PT Self Care/Home Eval, PT Therapeutic Exercises, PT Neuro Re-Ed/Balance, PT Therapeutic Activity and PT Manual Therapy           Goal: LTG-By discharge, patient will ambulate up/down 4-6 stairs     Dates: Start: 09/11/19       Description: 1) Individualized goal:  2 standard stairs to enter home, SBA, seated bumping approach, RLE rigid post-op orthosis, cueing prn  2) Interventions:  PT Group Therapy, PT Prosthetic Training, PT Gait Training, PT Self Care/Home Eval, PT Therapeutic Exercises, PT Neuro Re-Ed/Balance, PT Therapeutic Activity and PT Manual Therapy             Goal: LTG-By discharge, patient will transfer in/out of a car     Dates: Start: 09/11/19       Description: 1) Individualized goal: SPV and setup, FWW, RLE rigid post-op orthosis, cueing prn  2) Interventions: PT Group Therapy, PT Prosthetic Training, PT Gait Training, PT Self Care/Home Eval, PT Therapeutic Exercises, PT Neuro Re-Ed/Balance, PT Therapeutic Activity and PT Manual Therapy                           Section completed by:  Trung Bhagat, PT

## 2019-09-12 NOTE — CARE PLAN
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   The patient called Shivam from [facility] who suggested he take off the immobilizer and  sock from his BKA, stating he will be by to see the patient in the morning.      2300 Patient denies pain at this time.

## 2019-09-12 NOTE — THERAPY
Occupational Therapy  Daily Treatment     Patient Name: Kevin Jackson  Age:  74 y.o., Sex:  male  Medical Record #: [id number]  Today's Date: 9/12/2019     Precautions  Precautions: Non Weight Bearing Right Lower Extremity, Fall Risk  Comments: IPOP RLE         Subjective       Objective       09/12/19 1031   Precautions   Precautions Non Weight Bearing Right Lower Extremity;Fall Risk   Comments IPOP RLE   Vitals   O2 Delivery None (Room Air)   Pain   Intervention Declines   Pain 0 - 10 Group   Therapist Pain Assessment 0   Non Verbal Descriptors   Non Verbal Scale  Calm   Cognition    Level of Consciousness Alert   Sitting Upper Body Exercises   Upper Extremity Bike Level 3 Resistance  (FluidoBike, Level 3, 20 mins, rest break x 1, 4.154km)   Standing Upper Body Exercises   Comments Standing in II bars for Ladder Ball (LB) activity - pt standing at one end of II bars (gait belt and CGA, WB BUE's), 7 ladder balls placed 5' from end of starting position- LB ladder placed 6' beyond end of II bars.  Pt instructed to maneuver from strting position 5' to retrieve one LBV at a time, pivot and return to starting position to toss LB at ladder. 1LB = 5'x2=10'.  3x7 w/ rest break between sets.  21x10'= 210' w/ CGA via gait belt to hop/pivot on LLE and WB through BUE's using PB's.   Interdisciplinary Plan of Care Collaboration   IDT Collaboration with  Physician   Patient Position at End of Therapy Seated;Self Releasing Lap Belt Applied   Collaboration Comments CLOF, pt escorted to dining room for lunch.   OT Total Time Spent   OT Individual Total Time Spent (Mins) 60   OT Charge Group   OT Therapy Activity 2   OT Therapeutic Exercise  2         Assessment    Pt was alert and cooperative w/ tx.  Limiters include NWB RLE, fall risk, impaired strength/balance/endurance.    Plan    Cont'd OT sor strength, endurance, balance, AE/DME for ADL's and transfers

## 2019-09-12 NOTE — THERAPY
Occupational Therapy  Daily Treatment     Patient Name: Kevin Jackson  Age:  74 y.o., Sex:  male  Medical Record #: [id number]  Today's Date: 9/12/2019     Precautions  Precautions: Non Weight Bearing Right Lower Extremity, Fall Risk  Comments: IPOP RLE         Subjective       Objective     09/12/19 1431   Precautions   Precautions Non Weight Bearing Right Lower Extremity;Fall Risk   Comments IPOP RLE   Vitals   O2 Delivery None (Room Air)   Pain   Intervention Declines   Non Verbal Descriptors   Non Verbal Scale  Calm   Cognition    Level of Consciousness Alert   Sitting Upper Body Exercises   Chest Press 3 sets of 10  (Vertical Chest Press @ 25#'s)   Lat Pull 3 sets of 10  (45#'s)   Other Exercise Rickshaw:  Forward 3x10@50#'s, [id number]@45#'s   Interdisciplinary Plan of Care Collaboration   Patient Position at End of Therapy Seated;Self Releasing Lap Belt Applied;Call Light within Reach;Tray Table within Reach;Phone within Reach   OT Total Time Spent   OT Individual Total Time Spent (Mins) 30   OT Charge Group   OT Therapeutic Exercise  2       Assessment    Pt was alert and cooperative w/ tx.  Limiters include NWB RLE, IPOP RLE, Fall risk, limited endurance, balance and safety awareness.    Plan    Cont'd OT sor strength, endurance, balance, AE/DME for ADL's and transfers.  Conduct MOCA to assess cog

## 2019-09-12 NOTE — THERAPY
Physical Therapy   Daily Treatment     Patient Name: Kevin Jackson  Age:  74 y.o., Sex:  male  Medical Record #: [id number]  Today's Date: 9/12/2019     Precautions  Precautions: Non Weight Bearing Right Lower Extremity, Fall Risk  Comments: IPOP RLE    Subjective    Pt seated EOB upon arrival, he was awaiting assistance to transfer into the      Objective       09/12/19 1401   Bed Mobility    Sit to Stand Minimal Assist  (vc for technique/hand placement)   Interdisciplinary Plan of Care Collaboration   IDT Collaboration with  Therapy Tech   Patient Position at End of Therapy Seated;Self Releasing Lap Belt Applied   Collaboration Comments wc follow   PT Total Time Spent   PT Individual Total Time Spent (Mins) 30   PT Charge Group   PT Gait Training 1   PT Therapeutic Activities 1   Supervising Physical Therapist Arden Bhagat       FIM Walking Score:  1 - Total Assistance  Walking Description:  Walker, Extra time, Requires incidental assist, Supervision for safety, Verbal cueing, Safety concerns, Requires wheelchair follow(65' x 2 FWW hop-to/BKA pattern Arianne for balance and safety, wc follow )    edu pt on semi-circular wc propulsion pattern for decreased energy expenditure/prevention of shoulder injuries    SQPT Arianne no AD    Assessment    Pt continues to require vc for safety with transition from sit <> stand. Pt is able to demonstrate semi-circular wc propulsion pattern, SPV    Plan    Safe FWW usage with all transfers; FWW ambulation safety; ther ex for general safety; outdoor w/c mobility with curb cutouts and ramps; and education for BKA.

## 2019-09-12 NOTE — REHAB-NURSING IDT TEAM NOTE
Nursing   Nursing  Diet/Nutrition:  Diabetic and Thin Liquids  Medication Administration:  Whole with Liquid Wash  % consumed at meals in last 24 hours:  Consumed % of meals per documentation.  Meal/Snack Consumption for the past 24 hrs:   Oral Nutrition   09/11/19 1200 Lunch;Between % Consumed   09/11/19 0900 Breakfast;Between % Consumed     Snack schedule:  HS  Appetite:  Good  Fluid Intake/Output in past 24 hours: In: 720 [P.O.:720]  Out: 1725   Admit Weight:  Weight: 90.9 kg (200 lb 8 oz)  Weight Last 7 Days   [90.9 kg (200 lb 8 oz)] 90.9 kg (200 lb 8 oz) (09/10 1500)    Pain Issues:    Location:  Leg (09/11 1917)  Right;Lower (09/11 1917)         Severity:  neither Mild nor Moderate   Scheduled pain medications:  gabapentin (NEURONTIN)      PRN pain medications used in last 24 hours:  None   Non Pharmacologic Interventions:  warm blanket, cold pack, environmental changes, relaxation, repositioned and rest  Effectiveness of pain management plan:  good=patient states acceptable comfort after interventions    Bowel:    Bowel Assist Initial Score:  5 - Standby Prompting/Supervision or Set-up  Bowel Assist Current Score:  3 - Moderate Assistance  Bowl Accidents in last 7 days:     Last bowel movement: 09/11/19  Stool Description: Large, Brown, Loose     Usual bowel pattern:  daily  Scheduled bowel medications:  senna-docusate (PERICOLACE or SENOKOT S)   PRN bowel medications used in last 24 hours:  None  Nursing Interventions:  Increased time, Scheduled medication, Supervision, Verbal cueing  Effectiveness of bowel program:   fair=sometimes needs prn bowel meds for constipation  Bladder:    Bladder Assist Initial Score:  5 - Standby Prompting/Supervision or Set-up  Bladder Assist Current Score:  5 - Standby Prompting/Supervision or Set-up  Bladder Accidents in last 7 days:     PVR range for past 24-48 hours:  [15]  ()  Intermittent Catheterization:    Medications affecting bladder:  Flomax    Time  void schedule/voiding pattern:  Time void every 3 hours during the day and every 4 hours at night  Interventions:  Increased time, Supervision, Emptying of device, Urinal  Effectiveness of bladder training:  Good=regular, predictable, emptying of bladder, patient initiates time voiding    Diabetes:  Blood Sugar Frequency:  Before meals and at bedtime    Range of BS for last 48 hours:     Recent Labs     09/10/19  0525 09/10/19  1113 09/10/19  1713 09/10/19  2012 09/11/19  0722 09/11/19  1113 09/11/19  1729 09/11/19  2046   POCGLUCOSE 215* 264* 255* 272* 241* 277* 265* 249*     Scheduled diabetic medications:  metformin (GLUCOPHAGE)  and insulin glargine (LANTUS) injection   Sliding scale usage in past 24 hours:  Yes  Total Short acting insulin in the past 24 hours:  Humalog 18 Units  Total Long acting insulin in the past 24 hours:  Lantus 19 Units    Wound:         Patient Lines/Drains/Airways Status    Active Current Wounds     Name: Placement date: Placement time: Site: Days:    Wound 09/11/19 Pressure Injury Knee  09/11/19   1356   --  less than 1                   Interventions:    Effectiveness of intervention:  wound is unchanged     Sleep/wake cycle:   Average hours slept:  Sleeps 4-6 hours without waking  Sleep medication usage:  None    Patient/Family Training/Education:  Diabetes Management and General Self Care  Discharge Supply Recommendations:  Glucometer and Strips  Strengths: Alert and oriented, Willingly participates in therapeutic activities, Able to follow instructions, Supportive family, Pleasant and cooperative and Effective communication skills   Barriers:   Limited mobility       Nursing Problems     Problem: Bowel/Gastric:     Description:     Goal: Normal bowel function is maintained or improved     Description:           Goal: Will not experience complications related to bowel motility     Description:                 Problem: Communication     Description:     Goal: The ability to  communicate needs accurately and effectively will improve     Description:                 Problem: Discharge Barriers/Planning     Description:     Goal: Patient's continuum of care needs will be met     Description:                 Problem: Infection     Description:     Goal: Will remain free from infection     Description:                 Problem: Knowledge Deficit     Description:     Goal: Knowledge of disease process/condition, treatment plan, diagnostic tests, and medications will improve     Description:           Goal: Knowledge of the prescribed therapeutic regimen will improve     Description:                 Problem: Pain Management     Description:     Goal: Pain level will decrease to patient's comfort goal     Description:                 Problem: Respiratory:     Description:     Goal: Respiratory status will improve     Description:                 Problem: Safety     Description:     Goal: Will remain free from injury     Description:           Goal: Will remain free from falls     Description:                 Problem: Venous Thromboembolism (VTW)/Deep Vein Thrombosis (DVT) Prevention:     Description:     Goal: Patient will participate in Venous Thrombosis (VTE)/Deep Vein Thrombosis (DVT)Prevention Measures     Description:                        Long Term Goals:   At discharge patient will be able to function safely at home with support.    Section completed by:  Janelle Lopez L.P.N.

## 2019-09-12 NOTE — THERAPY
"Physical Therapy   Daily Treatment     Patient Name: Kevin Jackson  Age:  74 y.o., Sex:  male  Medical Record #: [id number]  Today's Date: 9/12/2019     Precautions  Precautions: (P) Non Weight Bearing Right Lower Extremity, Fall Risk  Comments: (P) IPOP R LE    Subjective    Pt up in wc, agreeable to PT     Objective       09/12/19 0831   Precautions   Precautions Non Weight Bearing Right Lower Extremity;Fall Risk   Comments IPOP R LE   Supine Lower Body Exercise   Supine Lower Body Exercises Yes   Bridges 3 sets of 15  (BKA positioning on bolster)   Hip Abduction 3 sets of 15;Side Lying;Right   (manual cues for proper from and control)   Straight Leg Raises Front;3 sets of 15;Right   Other Exercises 1 set x 10 reps of qs and GS with 10\" holds. prone hip flexior stretch x 10 minutes and prone knee extension 3 x 15   Sitting Lower Body Exercises   Hamstring Curl 3 sets of 15;Right   Bed Mobility    Supine to Sit Supervised   Sit to Supine Supervised   Sit to Stand Minimal Assist  (mat <> FWW, Min/CGA)   Scooting Stand by Assist   Neuro-Muscular Treatments   Neuro-Muscular Treatments Other (See Comments)  (use of mirrir in prone for R knee flexion)   Interdisciplinary Plan of Care Collaboration   Patient Position at End of Therapy Seated;Self Releasing Lap Belt Applied  (SPV in wc to room)   PT Total Time Spent   PT Individual Total Time Spent (Mins) 60   PT Charge Group   PT Therapeutic Exercise 3   PT Therapeutic Activities 1   Supervising Physical Therapist Linwood Bhagat       Transfer training-SPT with FWW wc <> mat with CGA/Arianne for steadying    BKA there ex as indicated above    BKA education specifically desensitization, purpose of IPOP, and prone hip flexor stretch    Assessment    Pt requires vc for correct hand placement with STS and transfer, poor carryover noted. Pt with improving transfer technique    Plan    Safe FWW usage with all transfers; FWW ambulation safety; ther ex for general safety; outdoor " w/c mobility with curb cutouts and ramps; and education for BKA.

## 2019-09-12 NOTE — REHAB-CM IDT TEAM NOTE
Case Management      DC Planning    DC destination/dispostion: Home w/ supportive spouse.     Referrals: TBD: prosthetics    DC Needs: DME for patient safety & mobility. OP vs HH therapies. Prosthetics f/u. MD f/u appts. Community resources.    Barriers to discharge:  Functional mobility deficits: new right BKA.     Strengths: Motivation. Supportive family. PLOF: independent, active lifestyle.    Section completed by:  Vilma Watson R.N.

## 2019-09-12 NOTE — PROGRESS NOTES
"Rehab Progress Note     Encounter Date: 9/12/2019    CC: R BKA, RLE pain, and delayed response time    Interval Events (Subjective)  Patient sitting up in room. Noted by therapy to have delayed response time of unknown cause. Discussed with patient and no history of stroke or neurologic disorder.  Denies increased pain but had limb pain overnight due to IPOP. Prosthetist came out this morning and tried different size. Discussed would have IDT later today to discuss discharge planning. He denies NVD.     IDT Team Meeting 9/12/2019    IVincent M.D., was present and led the interdisciplinary team conference on 9/12/2019.  I led the IDT conference and agree with the IDT conference documentation and plan of care as noted below.     RN:  Diet Regular   % Meal     Pain    Sleep    Bowel Continent   Bladder Continent   In's & Out's    Adjusting Insulin  IPOP not working     PT: just evaluated  Bed Mobility    Transfers Arianne   Mobility modA for wheelchair; totA for walking   Stairs    Impulsive     OT:  Eating    Grooming    Bathing Arianne   UB Dressing    LB Dressing Arianne   Toileting modA   Shower & Transfer Arianne   Hopping in parallel bars    CM:  Continues to work on disposition and DME needs.      DC/Disposition:  9/20/19    Objective:  VITAL SIGNS: /69   Pulse 98   Temp 36.4 °C (97.6 °F) (Oral)   Resp 18   Ht 1.854 m (6' 1\")   Wt 90.9 kg (200 lb 8 oz)   SpO2 90%   BMI 26.45 kg/m²   Gen: NAD  Psych: Mood and affect appropriate  CV: RRR, no edema  Resp: CTAB, no upper airway sounds  Abd: NTND  Neuro: AOx4, 5/5 BUE,     Recent Results (from the past 72 hour(s))   ACCU-CHEK GLUCOSE    Collection Time: 09/09/19  4:36 PM   Result Value Ref Range    Glucose - Accu-Ck 282 (H) 65 - 99 mg/dL   ACCU-CHEK GLUCOSE    Collection Time: 09/09/19  8:31 PM   Result Value Ref Range    Glucose - Accu-Ck 243 (H) 65 - 99 mg/dL   ACCU-CHEK GLUCOSE    Collection Time: 09/10/19  5:25 AM   Result Value Ref Range    " Glucose - Accu-Ck 215 (H) 65 - 99 mg/dL   ACCU-CHEK GLUCOSE    Collection Time: 09/10/19 11:13 AM   Result Value Ref Range    Glucose - Accu-Ck 264 (H) 65 - 99 mg/dL   ACCU-CHEK GLUCOSE    Collection Time: 09/10/19  5:13 PM   Result Value Ref Range    Glucose - Accu-Ck 255 (H) 65 - 99 mg/dL   ACCU-CHEK GLUCOSE    Collection Time: 09/10/19  8:12 PM   Result Value Ref Range    Glucose - Accu-Ck 272 (H) 65 - 99 mg/dL   CBC with Differential    Collection Time: 09/11/19  5:25 AM   Result Value Ref Range    WBC 7.7 4.8 - 10.8 K/uL    RBC 4.18 (L) 4.70 - 6.10 M/uL    Hemoglobin 11.9 (L) 14.0 - 18.0 g/dL    Hematocrit 37.3 (L) 42.0 - 52.0 %    MCV 89.2 81.4 - 97.8 fL    MCH 28.5 27.0 - 33.0 pg    MCHC 31.9 (L) 33.7 - 35.3 g/dL    RDW 45.1 35.9 - 50.0 fL    Platelet Count 448 (H) 164 - 446 K/uL    MPV 10.1 9.0 - 12.9 fL    Neutrophils-Polys 61.80 44.00 - 72.00 %    Lymphocytes 22.70 22.00 - 41.00 %    Monocytes 11.30 0.00 - 13.40 %    Eosinophils 2.90 0.00 - 6.90 %    Basophils 0.80 0.00 - 1.80 %    Immature Granulocytes 0.50 0.00 - 0.90 %    Nucleated RBC 0.00 /100 WBC    Neutrophils (Absolute) 4.74 1.82 - 7.42 K/uL    Lymphs (Absolute) 1.74 1.00 - 4.80 K/uL    Monos (Absolute) 0.87 (H) 0.00 - 0.85 K/uL    Eos (Absolute) 0.22 0.00 - 0.51 K/uL    Baso (Absolute) 0.06 0.00 - 0.12 K/uL    Immature Granulocytes (abs) 0.04 0.00 - 0.11 K/uL    NRBC (Absolute) 0.00 K/uL   Comp Metabolic Panel (CMP)    Collection Time: 09/11/19  5:25 AM   Result Value Ref Range    Sodium 137 135 - 145 mmol/L    Potassium 3.8 3.6 - 5.5 mmol/L    Chloride 97 96 - 112 mmol/L    Co2 29 20 - 33 mmol/L    Anion Gap 11.0 0.0 - 11.9    Glucose 229 (H) 65 - 99 mg/dL    Bun 18 8 - 22 mg/dL    Creatinine 0.86 0.50 - 1.40 mg/dL    Calcium 9.7 8.5 - 10.5 mg/dL    AST(SGOT) 18 12 - 45 U/L    ALT(SGPT) 15 2 - 50 U/L    Alkaline Phosphatase 48 30 - 99 U/L    Total Bilirubin 0.6 0.1 - 1.5 mg/dL    Albumin 3.3 3.2 - 4.9 g/dL    Total Protein 6.6 6.0 - 8.2 g/dL     Globulin 3.3 1.9 - 3.5 g/dL    A-G Ratio 1.0 g/dL   HEMOGLOBIN A1C    Collection Time: 09/11/19  5:25 AM   Result Value Ref Range    Glycohemoglobin 8.1 (H) 0.0 - 5.6 %    Est Avg Glucose 186 mg/dL   Vitamin D, 25-hydroxy (blood)    Collection Time: 09/11/19  5:25 AM   Result Value Ref Range    25-Hydroxy   Vitamin D 25 19 (L) 30 - 100 ng/mL   TSH with Reflex to FT4    Collection Time: 09/11/19  5:25 AM   Result Value Ref Range    TSH 10.800 (H) 0.380 - 5.330 uIU/mL   ESTIMATED GFR    Collection Time: 09/11/19  5:25 AM   Result Value Ref Range    GFR If African American >60 >60 mL/min/1.73 m 2    GFR If Non African American >60 >60 mL/min/1.73 m 2   FREE THYROXINE    Collection Time: 09/11/19  5:25 AM   Result Value Ref Range    Free T-4 1.11 0.53 - 1.43 ng/dL   ACCU-CHEK GLUCOSE    Collection Time: 09/11/19  7:22 AM   Result Value Ref Range    Glucose - Accu-Ck 241 (H) 65 - 99 mg/dL   ACCU-CHEK GLUCOSE    Collection Time: 09/11/19 11:13 AM   Result Value Ref Range    Glucose - Accu-Ck 277 (H) 65 - 99 mg/dL   ACCU-CHEK GLUCOSE    Collection Time: 09/11/19  5:29 PM   Result Value Ref Range    Glucose - Accu-Ck 265 (H) 65 - 99 mg/dL   ACCU-CHEK GLUCOSE    Collection Time: 09/11/19  8:46 PM   Result Value Ref Range    Glucose - Accu-Ck 249 (H) 65 - 99 mg/dL   ACCU-CHEK GLUCOSE    Collection Time: 09/12/19  7:55 AM   Result Value Ref Range    Glucose - Accu-Ck 272 (H) 65 - 99 mg/dL   ACCU-CHEK GLUCOSE    Collection Time: 09/12/19 11:49 AM   Result Value Ref Range    Glucose - Accu-Ck 252 (H) 65 - 99 mg/dL       Current Facility-Administered Medications   Medication Frequency   • insulin glargine (LANTUS) injection 30 Units Q EVENING    And   • insulin lispro (HUMALOG) injection 2-12 Units 4X/DAY ACHS    And   • glucose 4 g chewable tablet 16 g Q15 MIN PRN    And   • dextrose 50% (D50W) injection 50 mL Q15 MIN PRN   • omeprazole (PRILOSEC) capsule 20 mg DAILY   • clopidogrel (PLAVIX) tablet 75 mg DAILY   • vitamin D  (cholecalciferol) tablet 2,000 Units DAILY   • metFORMIN (GLUCOPHAGE) tablet 1,000 mg BID WITH MEALS   • Respiratory Care per Protocol Continuous RT   • Pharmacy Consult Request ...Pain Management Review 1 Each PHARMACY TO DOSE   • tramadol (ULTRAM) 50 MG tablet 50 mg Q4HRS PRN   • hydrALAZINE (APRESOLINE) tablet 25 mg Q8HRS PRN   • acetaminophen (TYLENOL) tablet 650 mg Q4HRS PRN   • senna-docusate (PERICOLACE or SENOKOT S) 8.6-50 MG per tablet 2 Tab BID    And   • polyethylene glycol/lytes (MIRALAX) PACKET 1 Packet QDAY PRN    And   • magnesium hydroxide (MILK OF MAGNESIA) suspension 30 mL QDAY PRN    And   • bisacodyl (DULCOLAX) suppository 10 mg QDAY PRN   • artificial tears ophthalmic solution 1 Drop PRN   • benzocaine-menthol (CEPACOL) lozenge 1 Lozenge Q2HRS PRN   • mag hydrox-al hydrox-simeth (MAALOX PLUS ES or MYLANTA DS) suspension 20 mL Q2HRS PRN   • ondansetron (ZOFRAN ODT) dispertab 4 mg 4X/DAY PRN    Or   • ondansetron (ZOFRAN) syringe/vial injection 4 mg 4X/DAY PRN   • traZODone (DESYREL) tablet 50 mg QHS PRN   • sodium chloride (OCEAN) 0.65 % nasal spray 2 Spray PRN   • heparin injection 5,000 Units Q8HRS   • aspirin EC (ECOTRIN) tablet 81 mg DAILY   • atorvastatin (LIPITOR) tablet 80 mg Q EVENING   • chlorthalidone (HYGROTON) tablet 25 mg DAILY   • gabapentin (NEURONTIN) capsule 1,200 mg QHS   • gabapentin (NEURONTIN) capsule 600 mg TID   • HYDROcodone-acetaminophen (NORCO) 5-325 MG per tablet 1-2 Tab Q4HRS PRN   • levothyroxine (SYNTHROID) tablet 50 mcg QAM AC   • losartan (COZAAR) tablet 50 mg DAILY   • primidone (MYSOLINE) tablet 50 mg Q EVENING   • propranolol LA (INDERAL LA) capsule 120 mg QAM   • tamsulosin (FLOMAX) capsule 0.4 mg DAILY       Orders Placed This Encounter   Procedures   • Diet Order Diabetic     Standing Status:   Standing     Number of Occurrences:   1     Order Specific Question:   Diet:     Answer:   Diabetic [3]       Assessment:  Active Hospital Problems    Diagnosis   •  *S/P BKA (below knee amputation) unilateral, right (HCC)   • Hypertension, essential   • Hypothyroidism   • PVD (peripheral vascular disease) ([facility])- dr mohsen (cardilogy) at [facility]; arterial angioplasty right leg tbd july2019; rx trental (dr hernandes podiatry)   • Hypothyroidism due to acquired atrophy of thyroid- dr browning   • Uncontrolled type 2 diabetes mellitus with complication, with long-term current use of insulin ([facility])- dr browning   • Mixed hyperlipidemia- dr dwyer   • Essential hypertension- DR DWYER       Medical Decision Making and Plan:  R BKA - Patient with DM and PVD with right BKA on 9/5/19 with Dr. Amezcua.  -PT and OT for mobility and ADLs  -NWB RLE  -Continue ASA  -Previously on Plavix and Pentoxifylline. Check AM CBC and restart. 11.9 on admission.   -Prosthetist exchanged rigid removal dressing as causing discomfort 9/12/19     DM with hyperglycemia - Patient with uncontrolled DM into 300s. On Lantus 19 and SSI on transfer. Previously on Empagliflozin 25 mg daily, Glimepiride 4 mg daily, Ozempic 1 mg q7days, and Metformin 1000 mg BID  -Continue to monitor. Continue Lantus and SSI. Consult hospitalist.   -Discussed with Hospitalist and increase Lantus to 30U.  A1c - 8.1. Restarted home Metformin 1000 mg BID    Neuropathy from DM - Patient on Gabapentin 600/600/600/1200 on transfer. Will continue to monitor.      HTN - Patient on Chlorthalidone 25 mg and Losartan 50 mg daily.      HLD - Patient on Atorvastatin 80 mg QHS.     Hypothyroidism - Patient on 50 mcg on transfer.      Essential Tremor - On Primidone 50 mg and Propranolol 120 mg daily     BPH - Patient on Tamsulosin 0.4 mg. Check PVRs - ~250, increase to 0.8 mg and monitor      Vitamin D - 19 on admission, start 2000 U daily.     DVT Ppx - Patient on Heparin 5000 q8h on transfer.      Total time:  36 minutes.  I spent greater than 50% of the time for patient care and coordination on this date, including unit/floor time, and face-to-face time  with the patient as per assessment and plan above.  Discussion included discharge planning, elevated PVRs, increase Flomax, and restarting home DM Medications. Monitor for hypoglycemia and hypotension.     Vincent Rubin M.D.

## 2019-09-12 NOTE — PROGRESS NOTES
Hospital Medicine Daily Progress Note    Date of Service  9/12/2019    Chief Complaint:  Hypertension  Diabetes  Abnormal TFT's    Interval History:  No significant events or changes since last visit    Review of Systems  Review of Systems   Constitutional: Negative for fever.   Eyes: Negative for blurred vision.   Respiratory: Negative for cough.    Cardiovascular: Negative for chest pain.   Gastrointestinal: Negative for diarrhea.   Musculoskeletal: Negative for joint pain.   Neurological: Negative for dizziness.   Psychiatric/Behavioral: The patient is not nervous/anxious.         Physical Exam  Temp:  [36.2 °C (97.1 °F)-36.7 °C (98 °F)] 36.7 °C (98 °F)  Pulse:  [71-88] 71  Resp:  [18] 18  BP: (103-131)/(66-73) 131/70  SpO2:  [90 %-97 %] 90 %    Physical Exam   Constitutional: No distress.   HENT:   Mouth/Throat: No oropharyngeal exudate.   Eyes: EOM are normal.   Neck: No JVD present. No tracheal deviation present.   Cardiovascular: Normal rate and regular rhythm.   No murmur heard.  Pulmonary/Chest: Effort normal and breath sounds normal.   Abdominal: Soft. Bowel sounds are normal.   Musculoskeletal: He exhibits no edema.   Has right BKA   Skin: Skin is warm. No rash noted.   Psychiatric: His behavior is normal.   Nursing note and vitals reviewed.      Fluids    Intake/Output Summary (Last 24 hours) at 9/12/2019 0927  Last data filed at 9/12/2019 0754  Gross per 24 hour   Intake 480 ml   Output 1230 ml   Net -750 ml       Laboratory  Recent Labs     09/11/19  0525   WBC 7.7   RBC 4.18*   HEMOGLOBIN 11.9*   HEMATOCRIT 37.3*   MCV 89.2   MCH 28.5   MCHC 31.9*   RDW 45.1   PLATELETCT 448*   MPV 10.1     Recent Labs     09/11/19  0525   SODIUM 137   POTASSIUM 3.8   CHLORIDE 97   CO2 29   GLUCOSE 229*   BUN 18   CREATININE 0.86   CALCIUM 9.7                   Imaging    Assessment/Plan  * S/P BKA (below knee amputation) unilateral, right (HCC)  Assessment & Plan  Has hx of PVD/PAD and diabetes  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 Abbott    Hypertension, essential  Assessment & Plan  BP ok  On Cozaar: 50 mg daily  On Propranolol: 120 mg qam  On Chlorthalidone: 25 mg daily  Monitor    Uncontrolled type 2 diabetes mellitus with complication, with long-term current use of insulin (HCC)- dr patterson- (present on admission)  Assessment & Plan  Hba1c: 8.1 (6/4) --> 7.2 (8/8) --> 8.1 (9/11)  BS this am 272  Off Humalog 6 units tid at meals (9/11)  On Lantus: 19 units qhs --> will increase to 30 units qhs  On Metformin: 1000 mg bid (9/11 at evening)  Note: home meds include Metformin 1000 mg bid, Jardiance 25 mg daily, Amaryl 4 mg qam, Ozempic .25 mg qweekly, and Insulin 50/50 qhs  Note: followed by Dr. Patterson  Cont to monitor    Mixed hyperlipidemia- dr dwyer- (present on admission)  Assessment & Plan  On Lipitor

## 2019-09-12 NOTE — REHAB-COLLABORATIVE ONGOING IDT TEAM NOTE
Weekly Interdisciplinary Team Conference Note    Weekly Interdisciplinary Team Conference # 1  Date:  9/12/2019    Clinicians present and reporting at team conference include the following:   MD: JUSTINA Rubin MD    RN:  eBlle Goodman RN   PT:   Arden Bhagat, PT, DPT  OT:  Sukumar Renner MS OTR/L    ST:  Not Applicable.   CM:  Vilma Watson RN, CCM  REC:  Not Applicable  RT:  Not Applicable  RPh:  Adrianna May, [facility]  All reporting clinicians have a working knowledge of this patient's plan of care.    Targeted DC Date:  9.20.19     Medical    Patient Active Problem List    Diagnosis Date Noted   • Hypertension, essential 09/11/2019   • Hypothyroidism 09/11/2019   • S/P BKA (below knee amputation) unilateral, right ([facility]) 09/10/2019   • Ischemic foot pain at rest ([facility]) 07/08/2019   • Diabetic ulcer of toe of right foot associated with type 2 diabetes mellitus, with fat layer exposed ([facility])- amputation distal 2nd toe (dr hernandes); dr. guillermo  [facility]  wound care  06/20/2019   • Nonsustained ventricular tachycardia ([facility])- ziopatch may 2019; PAC's and PVC's, NSR; NO A. FIB 06/20/2019   • PVD (peripheral vascular disease) ([facility])- dr mohsen (cardilogy) at [facility]; arterial angioplasty right leg tbd july2019; rx trental (dr hernandes podiatry) 04/02/2019   • Diabetic mononeuropathy associated with diabetes mellitus due to underlying condition ([facility])-m rx gabapentin 04/02/2019   • Encounter for screening- [employer] screening 2019- neg  abd US for AAA, MARELY, carotid US, EKG (no A.Fib to my review) 03/13/2019   • Benign essential tremor- DR OLIVAS, [facility] 03/12/2019   • Gastroesophageal reflux disease with esophagitis- PPI PRN 08/15/2017   • Hypothyroidism due to acquired atrophy of thyroid- dr browning 08/15/2017   • Benign non-nodular prostatic hyperplasia with lower urinary tract symptoms- [facility] 11/05/2014   • Uncontrolled type 2 diabetes mellitus with complication, with long-term current use of insulin ([facility])- dr abbott  10/02/2013   • Obesity (BMI 30.0-34.9) 10/02/2013   • Mixed hyperlipidemia- dr dwyer 10/03/2011   • Essential hypertension- DR DWYER 10/03/2011   • Hypovitaminosis D 10/03/2011     Results     Procedure Component Value Ref Range Date/Time    ACCU-CHEK GLUCOSE [[id number]]  (Abnormal) Collected:  09/12/19 1149    Order Status:  Completed Lab Status:  Final result Updated:  09/12/19 1205     Glucose - Accu-Ck 252 65 - 99 mg/dL     ACCU-CHEK GLUCOSE [[id number]]  (Abnormal) Collected:  09/12/19 0755    Order Status:  Completed Lab Status:  Final result Updated:  09/12/19 0819     Glucose - Accu-Ck 272 65 - 99 mg/dL     ACCU-CHEK GLUCOSE [[id number]]  (Abnormal) Collected:  09/11/19 2046    Order Status:  Completed Lab Status:  Final result Updated:  09/11/19 2236     Glucose - Accu-Ck 249 65 - 99 mg/dL     ACCU-CHEK GLUCOSE [[id number]]  (Abnormal) Collected:  09/11/19 1729    Order Status:  Completed Lab Status:  Final result Updated:  09/11/19 1747     Glucose - Accu-Ck 265 65 - 99 mg/dL     HEMOGLOBIN A1C [[id number]]  (Abnormal) Collected:  09/11/19 0525    Order Status:  Completed Lab Status:  Final result Updated:  09/11/19 1544    Specimen:  Blood      Glycohemoglobin 8.1 0.0 - 5.6 %      Comment: Increased risk for diabetes:  5.7 -6.4%  Diabetes:  >6.4%  Glycemic control for adults with diabetes:  <7.0%  The above interpretations are per ADA guidelines.  Diagnosis  of diabetes mellitus on the basis of elevated Hemoglobin A1c  should be confirmed by repeating the Hb A1c test.          Est Avg Glucose 186 mg/dL      Comment: The eAG calculation is based on the A1c-Derived Daily Glucose  (ADAG) study.  See the ADA's website for additional information.             Nursing  Diet/Nutrition:  Diabetic and Thin Liquids  Medication Administration:  Whole with Liquid Wash  % consumed at meals in last 24 hours:  Consumed % of meals per documentation.  Meal/Snack Consumption for the past 24 hrs:   Oral Nutrition      09/11/19 1200 Lunch;Between % Consumed   09/11/19 0900 Breakfast;Between % Consumed     Snack schedule:  HS  Appetite:  Good  Fluid Intake/Output in past 24 hours: In: 720 [P.O.:720]  Out: 1725   Admit Weight:  Weight: 90.9 kg (200 lb 8 oz)  Weight Last 7 Days   [90.9 kg (200 lb 8 oz)] 90.9 kg (200 lb 8 oz) (09/10 1500)    Pain Issues:    Location:  Leg (09/11 1917)  Right;Lower (09/11 1917)         Severity:  neither Mild nor Moderate   Scheduled pain medications:  gabapentin (NEURONTIN)      PRN pain medications used in last 24 hours:  None   Non Pharmacologic Interventions:  warm blanket, cold pack, environmental changes, relaxation, repositioned and rest  Effectiveness of pain management plan:  good=patient states acceptable comfort after interventions    Bowel:    Bowel Assist Initial Score:  5 - Standby Prompting/Supervision or Set-up  Bowel Assist Current Score:  3 - Moderate Assistance  Bowl Accidents in last 7 days:     Last bowel movement: 09/11/19  Stool Description: Large, Brown, Loose     Usual bowel pattern:  daily  Scheduled bowel medications:  senna-docusate (PERICOLACE or SENOKOT S)   PRN bowel medications used in last 24 hours:  None  Nursing Interventions:  Increased time, Scheduled medication, Supervision, Verbal cueing  Effectiveness of bowel program:   fair=sometimes needs prn bowel meds for constipation  Bladder:    Bladder Assist Initial Score:  5 - Standby Prompting/Supervision or Set-up  Bladder Assist Current Score:  5 - Standby Prompting/Supervision or Set-up  Bladder Accidents in last 7 days:     PVR range for past 24-48 hours:  [15]  ()  Intermittent Catheterization:    Medications affecting bladder:  Flomax    Time void schedule/voiding pattern:  Time void every 3 hours during the day and every 4 hours at night  Interventions:  Increased time, Supervision, Emptying of device, Urinal  Effectiveness of bladder training:  Good=regular, predictable, emptying of bladder,  patient initiates time voiding    Diabetes:  Blood Sugar Frequency:  Before meals and at bedtime    Range of BS for last 48 hours:     Recent Labs     09/10/19  0525 09/10/19  1113 09/10/19  1713 09/10/19  2012 09/11/19  0722 09/11/19  1113 09/11/19  1729 09/11/19  2046   POCGLUCOSE 215* 264* 255* 272* 241* 277* 265* 249*     Scheduled diabetic medications:  metformin (GLUCOPHAGE)  and insulin glargine (LANTUS) injection   Sliding scale usage in past 24 hours:  Yes  Total Short acting insulin in the past 24 hours:  Humalog 18 Units  Total Long acting insulin in the past 24 hours:  Lantus 19 Units    Wound:         Patient Lines/Drains/Airways Status    Active Current Wounds     Name: Placement date: Placement time: Site: Days:    Wound 09/11/19 Pressure Injury Knee  09/11/19   1356   --  less than 1                   Interventions:    Effectiveness of intervention:  wound is unchanged     Sleep/wake cycle:   Average hours slept:  Sleeps 4-6 hours without waking  Sleep medication usage:  None    Patient/Family Training/Education:  Diabetes Management and General Self Care  Discharge Supply Recommendations:  Glucometer and Strips  Strengths: Alert and oriented, Willingly participates in therapeutic activities, Able to follow instructions, Supportive family, Pleasant and cooperative and Effective communication skills   Barriers:   Limited mobility       Nursing Problems     Problem: Bowel/Gastric:     Description:     Goal: Normal bowel function is maintained or improved     Description:           Goal: Will not experience complications related to bowel motility     Description:                 Problem: Communication     Description:     Goal: The ability to communicate needs accurately and effectively will improve     Description:                 Problem: Discharge Barriers/Planning     Description:     Goal: Patient's continuum of care needs will be met     Description:                 Problem: Infection      Description:     Goal: Will remain free from infection     Description:                 Problem: Knowledge Deficit     Description:     Goal: Knowledge of disease process/condition, treatment plan, diagnostic tests, and medications will improve     Description:           Goal: Knowledge of the prescribed therapeutic regimen will improve     Description:                 Problem: Pain Management     Description:     Goal: Pain level will decrease to patient's comfort goal     Description:                 Problem: Respiratory:     Description:     Goal: Respiratory status will improve     Description:                 Problem: Safety     Description:     Goal: Will remain free from injury     Description:           Goal: Will remain free from falls     Description:                 Problem: Venous Thromboembolism (VTW)/Deep Vein Thrombosis (DVT) Prevention:     Description:     Goal: Patient will participate in Venous Thrombosis (VTE)/Deep Vein Thrombosis (DVT)Prevention Measures     Description:                        Long Term Goals:   At discharge patient will be able to function safely at home with support.    Section completed by:  Janelle Lopez L.P.N.           Mobility  Bed mobility:   SPV-SBA  Bed /Chair/Wheelchair Transfer Initial:  4 - Minimal Assistance  Bed /Chair/Wheelchair Transfer Current:  4 - Minimal Assistance   Bed/Chair/Wheelchair Transfer Description:  (Min A reach-pivot to w/c, CGA w/c to bed, max cueing for sequencing and setup, bed rail, increased time.  Pt perferred use of R knee immobilizer over R rigid post-op orthosis due to complexity of orthosis; will continue education.)  Walk Initial:  1 - Total Assistance  Walk Current:  1 - Total Assistance   Walk Description:  (1x 20 feet in // bars with CGA-Min A, setup, RLE IPOP, cueing for safety, gait belt.  1x 45 feet with FWW, gait belt, RLE IPOP, setup, Min A, cueing, and significantly increased time due to fatigue.)  Wheelchair Initial:  3  - Moderate Assistance  Wheelchair Current:  3 - Moderate Assistance   Wheelchair Description:  Extra time, Safety concerns, Supervision for safety, Verbal cueing(Mod-Max A with curb cutouts, Min A to CGA with ramps, SBA outdoor level with 3 instances of rolling off path, SPV indoors, cueing, setup.  2 reps up to 550 feet outdoors/indoors today.)  Stairs Initial:  0 - Not tested,unsafe activity  Stairs Current: 0 - Not tested,unsafe activity   Stairs Description:    Patient/Family Training/Education:  Initiated patient education; impaired carryover with 2-step directions, consider SLP eval for cognition  DME/DC Recommendations:  10 days; intermittent SPV likely; standard manual w/c with standard L legrest, R stump board, seat cushion, lap belt, B anti-tippers; Home health PT  Strengths:  Alert and oriented, Effective communication skills, Independent PLOF, Manages pain appropriately, Motivated for self care and independence, Pleasant and cooperative and Willingly participates in therapeutic activities  Barriers:   Decreased endurance, Home accessibility, Impulsive, Poor carryover of learning, Poor insight/denial of deficits and Other: variable adherence with new R rigid orthosis (pt prefers R knee immobilizer)  # of short term goals set= 7 set yesterday after PT eval  # of short term goals met= PT eval completed yesterday; will monitor pt's progress towards goals  Physical Therapy Problems     Problem: Mobility     Dates: Start: 09/11/19       Description:     Goal: STG-Within one week, patient will propel wheelchair community     Dates: Start: 09/11/19       Description: 1) Individualized goal:  SPV/setup indoors and outdoors for 500 feet, R stump board, RLE rigid post-op orthosis, cueing prn  2) Interventions:  PT Group Therapy, PT Prosthetic Training, PT Gait Training, PT Self Care/Home Eval, PT Therapeutic Exercises, PT Neuro Re-Ed/Balance, PT Therapeutic Activity and PT Manual Therapy       Note:     Goal Note  filed on 09/12/19 0858 by Trung Bhagat, PT    PT eval completed yesterday; will monitor pt's progress towards goals.                  Goal: STG-Within one week, patient will ambulate household distance     Dates: Start: 09/11/19       Description: 1) Individualized goal:  SPV and setup, 50 feet indoors, FWW, hop-to pattern, RLE rigid post-op orthosis, cueing prn  2) Interventions:  PT Group Therapy, PT Prosthetic Training, PT Gait Training, PT Self Care/Home Eval, PT Therapeutic Exercises, PT Neuro Re-Ed/Balance, PT Therapeutic Activity and PT Manual Therapy     Note:     Goal Note filed on 09/12/19 0858 by Trung Bhagat, PT    PT eval completed yesterday; will monitor pt's progress towards goals.                  Goal: STG-Within one week, patient will ascend and descend four to six stairs     Dates: Start: 09/11/19       Description: 1) Individualized goal:  2 standard stairs to enter home, SBA, seated bumping approach, RLE rigid post-op orthosis, cueing prn  2) Interventions:  PT Group Therapy, PT Prosthetic Training, PT Gait Training, PT Self Care/Home Eval, PT Therapeutic Exercises, PT Neuro Re-Ed/Balance, PT Therapeutic Activity and PT Manual Therapy     Note:     Goal Note filed on 09/12/19 0858 by Trung Bhagat, PT    PT eval completed yesterday; will monitor pt's progress towards goals.                  Goal: STG-Within one week, patient will     Dates: Start: 09/11/19       Description: 1) Individualized goal:  Perform HEP for RLE strengthening and AROM  2) Interventions:  PT Group Therapy, PT Prosthetic Training, PT Gait Training, PT Self Care/Home Eval, PT Therapeutic Exercises, PT Neuro Re-Ed/Balance, PT Therapeutic Activity and PT Manual Therapy     Note:     Goal Note filed on 09/12/19 0858 by Trung Bhagat, PT    PT eval completed yesterday; will monitor pt's progress towards goals.                        Problem: Mobility Transfers     Dates: Start: 09/11/19        Description:     Goal: STG-Within one week, patient will sit to stand     Dates: Start: 09/11/19       Description: 1) Individualized goal: SPV and setup, FWW, RLE rigid post-op orthosis, cueing prn  2) Interventions: PT Group Therapy, PT Prosthetic Training, PT Gait Training, PT Self Care/Home Eval, PT Therapeutic Exercises, PT Neuro Re-Ed/Balance, PT Therapeutic Activity and PT Manual Therapy       Note:     Goal Note filed on 09/12/19 0858 by Trung Bhagat, PT    PT eval completed yesterday; will monitor pt's progress towards goals.                  Goal: STG-Within one week, patient will transfer bed to chair     Dates: Start: 09/11/19       Description: 1) Individualized goal: SPV and setup, FWW, RLE rigid post-op orthosis, cueing prn  2) Interventions: PT Group Therapy, PT Prosthetic Training, PT Gait Training, PT Self Care/Home Eval, PT Therapeutic Exercises, PT Neuro Re-Ed/Balance, PT Therapeutic Activity and PT Manual Therapy       Note:     Goal Note filed on 09/12/19 0858 by Trung Bhagat, PT    PT eval completed yesterday; will monitor pt's progress towards goals.                  Goal: STG-Within one week, patient will transfer in/out of car     Dates: Start: 09/11/19       Description: 1) Individualized goal: SPV and setup, FWW, RLE rigid post-op orthosis, cueing prn  2) Interventions: PT Group Therapy, PT Prosthetic Training, PT Gait Training, PT Self Care/Home Eval, PT Therapeutic Exercises, PT Neuro Re-Ed/Balance, PT Therapeutic Activity and PT Manual Therapy       Note:     Goal Note filed on 09/12/19 0858 by Trung Bhagat, PT    PT eval completed yesterday; will monitor pt's progress towards goals.                        Problem: PT-Long Term Goals     Dates: Start: 09/11/19       Description:     Goal: LTG-By discharge, patient will propel wheelchair     Dates: Start: 09/11/19       Description: 1) Individualized goal:  SPV/setup indoors and outdoors for 500 feet, R stump  board, RLE rigid post-op orthosis, cueing prn  2) Interventions:  PT Group Therapy, PT Prosthetic Training, PT Gait Training, PT Self Care/Home Eval, PT Therapeutic Exercises, PT Neuro Re-Ed/Balance, PT Therapeutic Activity and PT Manual Therapy           Goal: LTG-By discharge, patient will ambulate     Dates: Start: 09/11/19       Description: 1) Individualized goal:  SPV and setup, 50 feet indoors, FWW, hop-to pattern, RLE rigid post-op orthosis, cueing prn  2) Interventions:  PT Group Therapy, PT Prosthetic Training, PT Gait Training, PT Self Care/Home Eval, PT Therapeutic Exercises, PT Neuro Re-Ed/Balance, PT Therapeutic Activity and PT Manual Therapy             Goal: LTG-By discharge, patient will transfer one surface to another     Dates: Start: 09/11/19       Description: 1) Individualized goal: SPV and setup, FWW, RLE rigid post-op orthosis, cueing prn  2) Interventions: PT Group Therapy, PT Prosthetic Training, PT Gait Training, PT Self Care/Home Eval, PT Therapeutic Exercises, PT Neuro Re-Ed/Balance, PT Therapeutic Activity and PT Manual Therapy           Goal: LTG-By discharge, patient will perform home exercise program     Dates: Start: 09/11/19       Description: 1) Individualized goal:  Perform HEP for RLE strengthening and AROM  2) Interventions:  PT Group Therapy, PT Prosthetic Training, PT Gait Training, PT Self Care/Home Eval, PT Therapeutic Exercises, PT Neuro Re-Ed/Balance, PT Therapeutic Activity and PT Manual Therapy           Goal: LTG-By discharge, patient will ambulate up/down 4-6 stairs     Dates: Start: 09/11/19       Description: 1) Individualized goal:  2 standard stairs to enter home, SBA, seated bumping approach, RLE rigid post-op orthosis, cueing prn  2) Interventions:  PT Group Therapy, PT Prosthetic Training, PT Gait Training, PT Self Care/Home Eval, PT Therapeutic Exercises, PT Neuro Re-Ed/Balance, PT Therapeutic Activity and PT Manual Therapy             Goal: LTG-By discharge,  patient will transfer in/out of a car     Dates: Start: 09/11/19       Description: 1) Individualized goal: SPV and setup, FWW, RLE rigid post-op orthosis, cueing prn  2) Interventions: PT Group Therapy, PT Prosthetic Training, PT Gait Training, PT Self Care/Home Eval, PT Therapeutic Exercises, PT Neuro Re-Ed/Balance, PT Therapeutic Activity and PT Manual Therapy                           Section completed by:  Trung Bhagat, PT    Activities of Daily Living  Eating Initial:  6 - Modified Independent  Eating Current:  6 - Modified Independent   Eating Description:  Increased time  Grooming Initial:  6 - Modified Independent  Grooming Current:  6 - Modified Independent   Grooming Description:  Increased time(wc level - groom/hygiene at sinkside)  Bathing Initial:  4 - Minimal Assistance  Bathing Current:  4 - Minimal Assistance   Bathing Description:  Grab bar, Tub bench, Long handled bath tool, Hand held shower, Increased time, Supervision for safety, Verbal cueing, Set-up of equipment, Initial preparation for task(CGA/Min assist shower task)  Upper Body Dressing Initial:  5 - Standby Prompting/Supervision or Set-up  Upper Body Dressing Current:  5 - Standby Prompting/Supervision or Set-up   Upper Body Dressing Description:  Increased time, Supervision for safety, Set-up of equipment  Lower Body Dressing Initial:  4 - Minimal Assistance  Lower Body Dressing Current:  4 - Minimal Assistance   Lower Body Dressing Description:  4 - Minimal Assistance  Toileting Initial:  3 - Moderate Assistance  Toileting Current:  3 - Moderate Assistance   Toileting Description:  Grab bar, Increased time, Supervision for safety, Verbal cueing, Set-up of equipment, Initial preparation for task(Min assist to manage pants in standing via grab bar.)  Toilet Transfer Initial:  4 - Minimal Assistance  Toilet Transfer Current:  4 - Minimal Assistance   Toilet Transfer Description:  4 - Minimal Assistance  Tub / Shower Transfer  Initial:  4 - Minimal Assistance  Tub / Shower Transfer Current:  4 - Minimal Assistance   Tub / Shower Transfer Description:  Grab bar, Increased time, Supervision for safety, Verbal cueing, Set-up of equipment(CGA for SPT to/from manual wc and shower bench via grab bar.)  IADL:  TBD   Family Training/Education:  TBD   DME/DC Recommendations:  TBD     Strengths:  Independent PLOF, Motivated for self care and independence, Pleasant and cooperative, Supportive family and Willingly participates in therapeutic activities  Barriers:  Decreased endurance, Generalized weakness and Limited mobility     # of short term goals set= 4    # of short term goals met= 0     Occupational Therapy Goals     Problem: Dressing     Dates: Start: 09/11/19       Description:     Goal: STG-Within one week, patient will dress UB     Dates: Start: 09/11/19       Description: 1) Individualized Goal:  Mod Indep for UB clothing management  2) Interventions:    OT Self Care/ADL, OT Therapeutic Activity, OT Evaluation and OT Therapeutic Exercise       Note:     Goal Note filed on 09/12/19 1148 by Sukumar Renner MS,OTR/L    Initial eval FIM scores - To be re-attempted                  Goal: STG-Within one week, patient will dress LB     Dates: Start: 09/11/19       Description: 1) Individualized Goal:   Sup/SBA for LB clothing management w/ AE/DME PRN  2) Interventions:  OT Self Care/ADL, OT Therapeutic Activity, OT Evaluation and OT Therapeutic Exercise       Note:     Goal Note filed on 09/12/19 1148 by Sukumar Renner MS,OTR/L    Initial eval FIM scores - To be re-attempted                        Problem: Functional Transfers     Dates: Start: 09/11/19       Description:     Goal: STG-Within one week, patient will transfer to toilet     Dates: Start: 09/11/19       Description: 1) Individualized Goal: Sup/SBA for commode transfer via DME PRN  2) Interventions:    OT Self Care/ADL, OT Therapeutic Activity, OT Evaluation and OT Therapeutic Exercise        Note:     Goal Note filed on 09/12/19 1148 by Sukumar Renner MS,OTR/L    Initial eval FIM scores - To be re-attempted                        Problem: OT Long Term Goals     Dates: Start: 09/11/19       Description:     Goal: LTG-By discharge, patient will complete basic self care tasks     Dates: Start: 09/11/19       Description: 1) Individualized Goal: Mod Indep for basic ADL's w/ AE/DME PRN  2) Interventions:   OT Self Care/ADL, OT Therapeutic Activity, OT Evaluation and OT Therapeutic Exercise             Goal: LTG-By discharge, patient will perform bathroom transfers     Dates: Start: 09/11/19       Description: 1) Individualized Goal:  Mod Indep for commode and shower transfer via DME PRN  2) Interventions:   OT Self Care/ADL, OT Therapeutic Activity, OT Evaluation and OT Therapeutic Exercise                   Problem: Toileting     Dates: Start: 09/11/19       Description:     Goal: STG-Within one week, patient will complete toileting tasks     Dates: Start: 09/11/19       Description: 1) Individualized Goal: Min assist for toileting task including safe LB clothing management and toilet hygiene via DME PRN  2) Interventions:    OT Self Care/ADL, OT Therapeutic Activity, OT Evaluation and OT Therapeutic Exercise     Note:     Goal Note filed on 09/12/19 1148 by Sukumar Renner MS,OTR/L    Initial eval FIM scores - To be re-attempted                              Section completed by:  Sukumar Renner MS,OTR/L             REHAB-Pharmacy IDT Team Note by Wliiam Walton RPH at 9/11/2019  3:34 PM  Version 1 of 1    Author:  Wiliam Walton RPH Service:  -- Author Type:  Pharmacist    Filed:  9/11/2019  3:35 PM Date of Service:  9/11/2019  3:34 PM Status:  Signed    :  Wiliam Walton RPH (Pharmacist)         Pharmacy   Pharmacy  Antibiotics/Antifungals/Antivirals:  Medication:      Active Orders (From admission, onward)    None        Route:         None  Stop Date:  N/A  Reason:    Antihypertensives/Cardiac:  Medication:    Orders (72h ago, onward)     Start     Ordered    09/11/19 0900  chlorthalidone (HYGROTON) tablet 25 mg  DAILY      09/10/19 1506    09/11/19 0900  losartan (COZAAR) tablet 50 mg  DAILY      09/10/19 1506    09/11/19 0900  propranolol LA (INDERAL LA) capsule 120 mg  EVERY MORNING      09/10/19 1506    09/10/19 2100  atorvastatin (LIPITOR) tablet 80 mg  EVERY EVENING      09/10/19 1506    09/10/19 1507  hydrALAZINE (APRESOLINE) tablet 25 mg  EVERY 8 HOURS PRN      09/10/19 1507              Patient Vitals for the past 24 hrs:   BP Pulse   09/11/19 1405 103/66 87   09/11/19 1101 -- 80   09/11/19 0931 -- 88   09/11/19 0635 120/80 81   09/10/19 1835 122/74 76     Anticoagulation:  Medication:  heparin  INR:      Other key medications:      A review of the medication list reveals no issues at this time. Patient is currently on antihypertensive(s). Recommend home blood pressure monitoring/recording if antihypertensive(s) regimen(s) continue. Patient is currently on diabetic medication(s) and/or Insulin(s). Recommend home blood glucose monitoring/recording if these regimen(s) continue.    Section completed by:  Wiliam Walton RPH[WR.1]        Attribution Spence     WR.1 - Wiliam Walton RPH on 9/11/2019  3:34 PM                    DC Planning    DC destination/dispostion: Home w/ supportive spouse.     Referrals: TBD: prosthetics    DC Needs: DME for patient safety & mobility. OP vs HH therapies. Prosthetics f/u. MD f/u appts. Community resources.    Barriers to discharge:  Functional mobility deficits: new right BKA.     Strengths: Motivation. Supportive family. PLOF: independent, active lifestyle.    Section completed by:  Vilma Watson R.N.    Summary: adjusting insulin for BS mgmt, did not tolerate orthotic overnight, impulsive, decreased safety awareness, [place] for orthotic.    Physician Summary  UJSTINA Rubin MD  participated and led team conference  discussion.

## 2019-09-13 LAB
GLUCOSE BLD-MCNC: 151 MG/DL (ref 65–99)
GLUCOSE BLD-MCNC: 155 MG/DL (ref 65–99)
GLUCOSE BLD-MCNC: 215 MG/DL (ref 65–99)
GLUCOSE BLD-MCNC: 278 MG/DL (ref 65–99)

## 2019-09-13 PROCEDURE — 99232 SBSQ HOSP IP/OBS MODERATE 35: CPT | Performed by: PHYSICAL MEDICINE & REHABILITATION

## 2019-09-13 PROCEDURE — 97535 SELF CARE MNGMENT TRAINING: CPT

## 2019-09-13 PROCEDURE — A9270 NON-COVERED ITEM OR SERVICE: HCPCS | Performed by: PHYSICAL MEDICINE & REHABILITATION

## 2019-09-13 PROCEDURE — 770010 HCHG ROOM/CARE - REHAB SEMI PRIVAT*

## 2019-09-13 PROCEDURE — 700111 HCHG RX REV CODE 636 W/ 250 OVERRIDE (IP): Performed by: PHYSICAL MEDICINE & REHABILITATION

## 2019-09-13 PROCEDURE — A9270 NON-COVERED ITEM OR SERVICE: HCPCS | Performed by: HOSPITALIST

## 2019-09-13 PROCEDURE — 700102 HCHG RX REV CODE 250 W/ 637 OVERRIDE(OP): Performed by: PHYSICAL MEDICINE & REHABILITATION

## 2019-09-13 PROCEDURE — 82962 GLUCOSE BLOOD TEST: CPT | Mod: 91

## 2019-09-13 PROCEDURE — 97110 THERAPEUTIC EXERCISES: CPT

## 2019-09-13 PROCEDURE — 700102 HCHG RX REV CODE 250 W/ 637 OVERRIDE(OP): Performed by: HOSPITALIST

## 2019-09-13 PROCEDURE — 97530 THERAPEUTIC ACTIVITIES: CPT

## 2019-09-13 PROCEDURE — G0515 COGNITIVE SKILLS DEVELOPMENT: HCPCS

## 2019-09-13 PROCEDURE — 99232 SBSQ HOSP IP/OBS MODERATE 35: CPT | Performed by: HOSPITALIST

## 2019-09-13 RX ORDER — GLIMEPIRIDE 2 MG/1
4 TABLET ORAL
Status: DISCONTINUED | OUTPATIENT
Start: 2019-09-13 | End: 2019-09-20 | Stop reason: HOSPADM

## 2019-09-13 RX ORDER — INSULIN GLARGINE 100 [IU]/ML
35 INJECTION, SOLUTION SUBCUTANEOUS EVERY EVENING
Status: DISCONTINUED | OUTPATIENT
Start: 2019-09-13 | End: 2019-09-15

## 2019-09-13 RX ORDER — DEXTROSE MONOHYDRATE 25 G/50ML
50 INJECTION, SOLUTION INTRAVENOUS
Status: DISCONTINUED | OUTPATIENT
Start: 2019-09-13 | End: 2019-09-15

## 2019-09-13 RX ADMIN — INSULIN LISPRO 4 UNITS: 100 INJECTION, SOLUTION INTRAVENOUS; SUBCUTANEOUS at 07:21

## 2019-09-13 RX ADMIN — HEPARIN SODIUM 5000 UNITS: 5000 INJECTION, SOLUTION INTRAVENOUS; SUBCUTANEOUS at 06:09

## 2019-09-13 RX ADMIN — ATORVASTATIN CALCIUM 80 MG: 40 TABLET, FILM COATED ORAL at 21:19

## 2019-09-13 RX ADMIN — HEPARIN SODIUM 5000 UNITS: 5000 INJECTION, SOLUTION INTRAVENOUS; SUBCUTANEOUS at 14:14

## 2019-09-13 RX ADMIN — CHLORTHALIDONE 25 MG: 25 TABLET ORAL at 07:46

## 2019-09-13 RX ADMIN — INSULIN GLARGINE 35 UNITS: 100 INJECTION, SOLUTION SUBCUTANEOUS at 21:25

## 2019-09-13 RX ADMIN — PROPRANOLOL HYDROCHLORIDE 120 MG: 60 CAPSULE, EXTENDED RELEASE ORAL at 08:25

## 2019-09-13 RX ADMIN — CLOPIDOGREL BISULFATE 75 MG: 75 TABLET ORAL at 08:25

## 2019-09-13 RX ADMIN — INSULIN LISPRO 2 UNITS: 100 INJECTION, SOLUTION INTRAVENOUS; SUBCUTANEOUS at 17:39

## 2019-09-13 RX ADMIN — VITAMIN D, TAB 1000IU (100/BT) 2000 UNITS: 25 TAB at 08:25

## 2019-09-13 RX ADMIN — GABAPENTIN 600 MG: 300 CAPSULE ORAL at 11:36

## 2019-09-13 RX ADMIN — ASPIRIN 81 MG: 81 TABLET, COATED ORAL at 17:35

## 2019-09-13 RX ADMIN — PRIMIDONE 50 MG: 50 TABLET ORAL at 21:19

## 2019-09-13 RX ADMIN — SENNOSIDES, DOCUSATE SODIUM 2 TABLET: 50; 8.6 TABLET, FILM COATED ORAL at 21:21

## 2019-09-13 RX ADMIN — LOSARTAN POTASSIUM 50 MG: 25 TABLET ORAL at 08:25

## 2019-09-13 RX ADMIN — METFORMIN HYDROCHLORIDE 1000 MG: 500 TABLET, FILM COATED ORAL at 08:25

## 2019-09-13 RX ADMIN — GABAPENTIN 1200 MG: 400 CAPSULE ORAL at 21:19

## 2019-09-13 RX ADMIN — INSULIN LISPRO 6 UNITS: 100 INJECTION, SOLUTION INTRAVENOUS; SUBCUTANEOUS at 11:41

## 2019-09-13 RX ADMIN — GLIMEPIRIDE 4 MG: 2 TABLET ORAL at 11:36

## 2019-09-13 RX ADMIN — GABAPENTIN 600 MG: 300 CAPSULE ORAL at 06:09

## 2019-09-13 RX ADMIN — OMEPRAZOLE 20 MG: 20 CAPSULE, DELAYED RELEASE ORAL at 08:25

## 2019-09-13 RX ADMIN — LEVOTHYROXINE SODIUM 50 MCG: 50 TABLET ORAL at 08:25

## 2019-09-13 RX ADMIN — TAMSULOSIN HYDROCHLORIDE 0.8 MG: 0.4 CAPSULE ORAL at 08:25

## 2019-09-13 RX ADMIN — HEPARIN SODIUM 5000 UNITS: 5000 INJECTION, SOLUTION INTRAVENOUS; SUBCUTANEOUS at 21:30

## 2019-09-13 RX ADMIN — INSULIN LISPRO 2 UNITS: 100 INJECTION, SOLUTION INTRAVENOUS; SUBCUTANEOUS at 21:24

## 2019-09-13 RX ADMIN — METFORMIN HYDROCHLORIDE 1000 MG: 500 TABLET, FILM COATED ORAL at 17:35

## 2019-09-13 RX ADMIN — GABAPENTIN 600 MG: 300 CAPSULE ORAL at 17:35

## 2019-09-13 ASSESSMENT — MONTREAL COGNITIVE ASSESSMENT (MOCA)
4. NAME EACH OF THE THREE ANIMALS SHOWN: 3
9. REPEAT EACH SENTENCE: 0
ORIENTATION SUBSCORE: 6
WHAT LEVEL OF EDUCATION WAS ATTAINED: HIGH SCHOOL EDUCATION
VISUOSPATIAL/EXECUTIVE SUBSCORE: 2
10. [FLUENCY] NAME WORDS STARTING WITH DESIGNATED LETTER: 0
LEVEL OF SEVERITY: MODERATE COGNITIVE IMPAIRMENT
6. READ LIST OF DIGITS [FORWARD/BACKWARD]: 0
WHAT IS THE TOTAL SCORE (OUT OF 30): 14
11. FOR EACH PAIR OF WORDS, WHAT CATEGORY DO THEY BELONG TO (OUT OF 2): 1
12. MEMORY INDEX SCORE: 1
8. SERIAL SUBTRACTION OF 7S: 0
7. [VIGILENCE] TAP WHEN HEARING DESIGNATED LETTER: 1

## 2019-09-13 ASSESSMENT — ENCOUNTER SYMPTOMS
NAUSEA: 0
ABDOMINAL PAIN: 0
CHILLS: 0
SHORTNESS OF BREATH: 0
FEVER: 0
VOMITING: 0
NERVOUS/ANXIOUS: 0
DIARRHEA: 0

## 2019-09-13 NOTE — THERAPY
"Occupational Therapy  Daily Treatment     Patient Name: Kevin Jackson  Age:  74 y.o., Sex:  male  Medical Record #: [id number]  Today's Date: 9/13/2019     Precautions  Precautions: Non Weight Bearing Right Lower Extremity, Fall Risk  Comments: IPOP RLE      Subjective    \"It's frustrating to enter a room and forget why you came there\" patient stated following MoCA assessment. Stated he has noticed decreased memory for several years.      Objective       09/13/19 1031   Precautions   Precautions Non Weight Bearing Right Lower Extremity;Fall Risk   Comments IPOP RLE   Cognition    Cognition / Consciousness X   Speech/ Communication Delayed Responses   Level of Consciousness Alert   New Learning Impaired   Bed Mobility    Supine to Sit Stand by Assist   Sit to Supine Stand by Assist   Scooting Stand by Assist   Rolling Supervised   MoCA ([place] Cognitive Assessment)   Visuospatial/Executive 2   Naming 3   Attention - Digits 0   Attention - Letters 1   Attention - Serial 7 Subtraction 0   Language - Repeat 0   Language - Fluency 0   Language - Abstraction 1   Delayed Recall 1   Orientation 6   Level of Education 0   Total 14   Level of Severity Moderate cognitive impairment   Interdisciplinary Plan of Care Collaboration   IDT Collaboration with  Occupational Therapist  (Primary OT)   Patient Position at End of Therapy Seated  (In dining room)   Collaboration Comments CLOF, cog status/MoCA results   OT Total Time Spent   OT Individual Total Time Spent (Mins) 60   OT Charge Group   OT Self Care / ADL 2   OT Cognitive Skill Development 1   OT Therapy Activity 1     FIM Bed/Chair/Wheelchair Transfers Score: 5 - Standby Prompting/Supervision or Set-up  Bed/Chair/Wheelchair Transfers Description:  Increased time, Supervision for safety, Verbal cueing, Set-up of equipment(Patient transferred wc<>bed with CGA to SBA for safety, min-mod cues for set-up/sequencing.,and increased time to complete task)    Pt performed w/c " community mobility with min-mod assist to stay in center of curb (rather than veering left). SBA with w/c mobility indoors.     Assessment    Patient tolerated OT session well with focus on cognitive assessment, functional transfers and w/c mobility (both indoors and outdoors). Patient presents with cognitive impairments in the areas of visuospatial/executive, memory/delayed recall, attention, language and abstraction (based on MoCA findings). Pt demonstrates improvement with functional transfers however continues to benefit from verbal cues for set-up and safety.     Plan    ST eval recommended. Cont'd OT for strength, endurance, balance, AE/DME for ADL's and transfers

## 2019-09-13 NOTE — DISCHARGE PLANNING
"Met w/ patient & SO Jerome after IDT conference to review team recommendations & targeted DC date 9.20.19. Patient states \"I'm not staying here for 5-6 wks.\" Reassured patient he will improve w/ therapies, but will need DME for discharge & ongoing OP therapies. Discussed Medicare coverage for one assistive mobility device. Reviewed options for acquiring FWW through [employer] (if qualifies), ordering through [employer], [employer] or [employer]. States \"I am over income for [employer].\" Discussed [employer] option. Questions answered. Emotional support provided.  "

## 2019-09-13 NOTE — PROGRESS NOTES
[facility] Medicine Daily Progress Note    Date of Service  9/13/2019    Chief Complaint:  Hypertension  Diabetes  Abnormal TFT's    Interval History:  No significant events or changes since last visit    Review of Systems  Review of Systems   Constitutional: Negative for chills and fever.   Respiratory: Negative for shortness of breath.    Cardiovascular: Negative for chest pain.   Gastrointestinal: Negative for abdominal pain, diarrhea, nausea and vomiting.   Psychiatric/Behavioral: The patient is not nervous/anxious.         Physical Exam  Temp:  [36.3 °C (97.3 °F)-36.6 °C (97.8 °F)] 36.6 °C (97.8 °F)  Pulse:  [76-98] 76  Resp:  [16-18] 18  BP: (105-120)/(69-74) 120/71  SpO2:  [90 %-95 %] 95 %    Physical Exam   Constitutional: He appears well-nourished.   HENT:   Head: Atraumatic.   Eyes: Pupils are equal, round, and reactive to light. Conjunctivae are normal.   Neck: Normal range of motion. Neck supple.   Cardiovascular: Normal rate and regular rhythm.   Pulmonary/Chest: Effort normal and breath sounds normal.   Abdominal: Soft. Bowel sounds are normal.   Musculoskeletal:   Has right BKA   Skin: Skin is warm.   Psychiatric: His behavior is normal.   Nursing note and vitals reviewed.      Fluids    Intake/Output Summary (Last 24 hours) at 9/13/2019 1031  Last data filed at 9/13/2019 0900  Gross per 24 hour   Intake 600 ml   Output 2400 ml   Net -1800 ml       Laboratory  Recent Labs     09/11/19  0525   WBC 7.7   RBC 4.18*   HEMOGLOBIN 11.9*   HEMATOCRIT 37.3*   MCV 89.2   MCH 28.5   MCHC 31.9*   RDW 45.1   PLATELETCT 448*   MPV 10.1     Recent Labs     09/11/19  0525   SODIUM 137   POTASSIUM 3.8   CHLORIDE 97   CO2 29   GLUCOSE 229*   BUN 18   CREATININE 0.86   CALCIUM 9.7                   Imaging    Assessment/Plan  * S/P BKA (below knee amputation) unilateral, right (HCC)  Assessment & Plan  Has hx of PVD/PAD and diabetes  S/P right BKA 2nd to diabetic foot wound/gangrene  Wound dressing C/D/I  No signs of  infection    Hypothyroidism  Assessment & Plan  Has hx  TSH: 1.77 (5/16/2018) --> 1.24 (2/20/19) --> 10.8 (9/11)  FT4: 1.11 (9/11)  Likely subclinical  Note: TSH and FT4 has been good since 2013 and has been on the same Synthroid dose  Note: Would normally increase Synthroid but since pt has been good for a while on the same med dose,             I will wait and repeat the TFT's in about 1 week to see if it is trending down; if it is not,            I will then will consider increasing the dose  On Synthroid: 50 mcg daily  Note: followed by Dr. Abbott    Hypertension, essential  Assessment & Plan  BP ok  On Cozaar: 50 mg daily  On Propranolol: 120 mg qam  On Chlorthalidone: 25 mg daily  Monitor    Uncontrolled type 2 diabetes mellitus with complication, with long-term current use of insulin (HCC)- dr patterson- (present on admission)  Assessment & Plan  Hba1c: 8.1 (6/4) --> 7.2 (8/8) --> 8.1 (9/11)  BS still in the 200's   BS this am: 215 (9/13)  Off Humalog 6 units tid at meals (9/11)  On Lantus: 19 units qhs --> 30 units qhs (9/12) --> will increase to 35 units qhs (9/13)  On Metformin: 1000 mg bid (9/11 at evening)  Will start Amaryl 4 mg daily (9/13)  Note: home meds include Metformin 1000 mg bid, Jardiance 25 mg daily, Amaryl 4 mg qam, Ozempic .25 mg qweekly, and Insulin 50/50 qhs  Note: followed by Dr. Patterson  Cont to monitor    Mixed hyperlipidemia- dr dwyer- (present on admission)  Assessment & Plan  On Lipitor

## 2019-09-13 NOTE — DISCHARGE PLANNING
"CASE MANAGEMENT INITIAL ASSESSMENT    Admit Date:  9/10/2019     I met with patient to discuss role of case management / discharge planning / team conference.   Patient is a  74 y.o. male transferred from [facility] 9.10.19 s/p right BKA for diabetic wound, gangrenous 2nd toe, PAD on 9.5.19.  PMHx: DM2, PAD, HTN, chronic pain syndrome, prev osteomyelitis w/ left forefoot amputation 2013.  PLOF: independent, active lifestyle, rides motorcycle, lives w/ SO, SSH w/ 2 GABO, no prev services or DME.  States \"we have acquired a shower chair & hand held shower head.\" Asking about assistance having grab bars installed in bathroom. States \"I don't have anyone in [place] who can help me. My friend in [place] could install them, but that is a long way for them to come to help.\" Suggested he speak w/ Jerome, SO, about contacting friends for assistance.   Discussed possibility of having ramp installed. States \"absolutely not. I am not going make those kind of changes. I am going to walk, not depend on a wheelchair.\"   States \"I have a good friend, Jared, who will be able to drive me to OP therapy & appt. I'd like to stay w/in [facility] for therapies when I leave here.\"  TRINA Cano, works FT days. Patient will be alone during day at home, but friend, Jared, can assist if needed.  Asking about surgical f/u appt, wants to make sure \"Shivam from [facility] knows when my appt is so he can be there.\"    PCP: Dr. Ky Hernandez  [facility] SGY: Dr. Shivam Amezcua    Admitting MD: Dr. Jonn Rubin    Diagnosis: 05.4 Unilateral LE below knee amputation (BKA)  S/P BKA (below knee amputation) unilateral, right (HCC)    Co-morbidities:   Patient Active Problem List    Diagnosis Date Noted   • Hypertension, essential 09/11/2019   • Hypothyroidism 09/11/2019   • S/P BKA (below knee amputation) unilateral, right (HCC) 09/10/2019   • Ischemic foot pain at rest (HCC) 07/08/2019   • Diabetic ulcer of toe of right foot associated with type 2 diabetes mellitus, with fat layer " exposed ([facility])- amputation distal 2nd toe (dr hernandes); dr. guillermo  [facility]  wound care  06/20/2019   • Nonsustained ventricular tachycardia ([facility])- ziopatch may 2019; PAC's and PVC's, NSR; NO A. FIB 06/20/2019   • PVD (peripheral vascular disease) ([facility])- dr mohsen (cardilogy) at [facility]; arterial angioplasty right leg tbd july2019; rx trental (dr hernandes podiatry) 04/02/2019   • Diabetic mononeuropathy associated with diabetes mellitus due to underlying condition ([facility])-m rx gabapentin 04/02/2019   • Encounter for screening- [facility] screening 2019- neg  abd US for AAA, MARELY, carotid US, EKG (no A.Fib to my review) 03/13/2019   • Benign essential tremor- DR OLIVAS,  NEUROLOGY 03/12/2019   • Gastroesophageal reflux disease with esophagitis- PPI PRN 08/15/2017   • Hypothyroidism due to acquired atrophy of thyroid- dr browning 08/15/2017   • Benign non-nodular prostatic hyperplasia with lower urinary tract symptoms- [facility] 11/05/2014   • Uncontrolled type 2 diabetes mellitus with complication, with long-term current use of insulin ([facility])- dr browning 10/02/2013   • Obesity (BMI 30.0-34.9) 10/02/2013   • Mixed hyperlipidemia- dr dwyer 10/03/2011   • Essential hypertension- DR DWYER 10/03/2011   • Hypovitaminosis D 10/03/2011     Prior Living Situation:  Housing / Facility: 1 Story House  Lives with - Patient's Self Care Capacity: Significant Other    Prior Level of Function:  Medication Management: Independent  Finances: Independent  Home Management: Independent  Shopping: Independent  Prior Level Of Mobility: Independent Without Device in Community, Independent With Steps in Community, Independent Without Device in Home, Independent With Steps in Home  Driving / Transportation: Driving Independent(rides motorcycle also)    Support Systems:  Primary : Jerome Isaacmayte MENA [phone]  Other support systems: Jared Finch, no phone # provided  Advance Directives: No  Power of  (Name & Phone): none    Previous  Services Utilized:   Equipment Owned: Hand Held Shower, Tub / Shower Seat  Prior Services: Home-Independent    Other Information:  Occupation (Pre-Hospital Vocational): Retired Due To Age(retired [employer] de[employer])     Primary Payor Source: Medicare A, Medicare B  Secondary Payor Source: Supplemental Insurance(AARP)  Primary Care Practitioner : Dr. Ky Hernandez  Other MDs: Dr. Shivam Casillas SGY    Patient / Family Goal:  Patient / Family Goal: To ride my bike again, To walk w/o device, To take care of myself    Additional Case Management Questions:  Have you ever received case management services for yourself or a family member? I'm not sure, but probably.    Do you feel you have and an understanding of what services  provide? Yes, thank you.    Do you have any additional questions regarding case management? How long before I'll be walking w/ the prosthetic?        CASE MANAGEMENT PLAN OF CARE   Individualized Goals:   1. To ride my bike again  2. To walk w/o device  3. To take care of myself    Barriers:   1. Functional mobility deficits: right BKA  2. Limited support system locally      Plan:  1. Continue to follow patient through hospitalization and provide discharge planning in collaboration with patient, family, physicians and ancillary services.     2. Utilize community resources to ensure a safe discharge.

## 2019-09-13 NOTE — FLOWSHEET NOTE
09/12/19 1830   Events/Summary/Plan   Events/Summary/Plan O2 spot check, DC O2 orders   Chest Exam   Respiration 16   Pulse 76   Oximetry   #Pulse Oximetry (Single Determination) Yes   Oxygen   Home O2 Use Prior To Admission? No   Pulse Oximetry 95 %   O2 Daily Delivery Respiratory  Room Air with O2 Available   Room Air Challenge Pass

## 2019-09-13 NOTE — THERAPY
Occupational Therapy  Daily Treatment     Patient Name: Kevin Jackson  Age:  74 y.o., Sex:  male  Medical Record #: [id number]  Today's Date: 9/13/2019     Precautions  Precautions: Non Weight Bearing Right Lower Extremity, Fall Risk  Comments: IPOP RLE         Subjective       Objective       09/13/19 1431   Precautions   Precautions Non Weight Bearing Right Lower Extremity;Fall Risk   Comments IPOP RLE   Vitals   O2 Delivery None (Room Air)   Non Verbal Descriptors   Non Verbal Scale  Calm   Cognition    Level of Consciousness Alert   Sitting Upper Body Exercises   Upper Extremity Bike Level 3 Resistance  (FluidoBike, Level 3, Forward 10 mins, Reverse 10 mins, 3.770)   Interdisciplinary Plan of Care Collaboration   Patient Position at End of Therapy In Bed;Bed Alarm On;Call Light within Reach;Tray Table within Reach;Phone within Reach   OT Total Time Spent   OT Individual Total Time Spent (Mins) 30   OT Charge Group   OT Therapeutic Exercise  2       Assessment    Pt was alert and cooperative w/ tx. Limiters include impaired endurance, standing balance and safety awareness.  Therapist spoke w/ physician of outcome of MOCA test - recommended ST eval.    Plan    Cont'd OT for strength, endurance, balance, AE/DME for ADL's and transfers

## 2019-09-13 NOTE — WOUND TEAM
"[facility]  Inpatient Services  Initial Wound and Skin Care Evaluation    Admission Date: 9/10/2019     Consult Date: 9/11/19   HPI, PMH, SH: Reviewed    Unit where seen by Wound Team: [id number]     WOUND CONSULT RELATED TO:  Evaluation of right anterior knee pressure injury and blister distal chace wound right BKA stump     SUBJECTIVE:  \"I think it looks a little better\"      Self Report / Pain Level:  Phantom pain PRN       OBJECTIVE:  Knee immobilizer in place with adhesive foam over knee and dressing intact BKA incision    WOUND TYPE, LOCATION, CHARACTERISTICS (Pressure Injuries: location, stage, POA or date identified)       Pressure Injury 09/10/19 Knee DTI anterior right knee (Active)   Pressure Injury Stage DTPI 9/12/2019  4:50 PM   Site Assessment Clean;Intact 9/12/2019  4:50 PM   Chace-wound Assessment Clean;Intact 9/12/2019  4:50 PM   Margins Attached edges 9/12/2019  4:50 PM   Wound Length (cm) 1.5 cm 9/12/2019  4:50 PM   Wound Width (cm) 3 cm 9/12/2019  4:50 PM   Wound Surface Area (cm^2) 4.5 cm^2 9/12/2019  4:50 PM   Tunneling 0 cm 9/12/2019  4:50 PM   Undermining 0 cm 9/12/2019  4:50 PM   Closure Secondary intention 9/12/2019  4:50 PM   Drainage Amount None 9/12/2019  4:50 PM   Treatments Site care 9/12/2019  4:50 PM   Cleansing Not Applicable 9/12/2019  4:50 PM   Periwound Protectant Not Applicable 9/12/2019  4:50 PM   Dressing Options Mepilex 9/12/2019  4:50 PM   Dressing Cleansing/Solutions Not Applicable 9/12/2019  4:50 PM   Dressing Changed New 9/12/2019  4:50 PM   Dressing Status Intact 9/12/2019  4:50 PM   Dressing Change Frequency Every 48 hrs 9/12/2019  4:50 PM   NEXT Dressing Change  09/14/19 9/12/2019  4:50 PM   NEXT Weekly Photo (Inpatient Only) 09/14/19 9/12/2019  4:50 PM   WOUND NURSE ONLY - Odor None 9/12/2019  4:50 PM   WOUND NURSE ONLY - Exposed Structures None 9/12/2019  4:50 PM   WOUND NURSE ONLY - Tissue Type and Percentage light purple 100% 9/12/2019  4:50 PM   WOUND " NURSE ONLY - Time Spent with Patient (mins) 60 9/12/2019  4:50 PM        Incision 09/07/19 Leg (Active)   Site Assessment Clean;Dry;Intact 9/12/2019  4:50 PM   Aby-wound Assessment Clean;Intact;Pink 9/12/2019  4:50 PM   Margins Attached edges 9/12/2019  4:50 PM   Tunneling 0 cm 9/12/2019  4:50 PM   Undermining 0 cm 9/12/2019  4:50 PM   Closure Surface sutures 9/12/2019  4:50 PM   Drainage Amount None 9/12/2019  4:50 PM   Treatments Site care 9/12/2019  4:50 PM   Periwound Protectant Not Applicable 9/12/2019  4:50 PM   Dressing Options Dry Gauze;Elastic Wrap;Roll Gauze 9/12/2019  4:50 PM   Dressing Changed Changed 9/12/2019  4:50 PM   Dressing Status Intact 9/12/2019  4:50 PM   Dressing Change Frequency Every 48 hrs 9/12/2019  4:50 PM   NEXT Dressing Change  09/14/19 9/12/2019  4:50 PM   NEXT Weekly Photo (Inpatient Only) 09/14/19 9/12/2019  4:50 PM   WOUND NURSE ONLY - Odor None 9/12/2019  4:50 PM   WOUND NURSE ONLY - Exposed Structures None 9/12/2019  4:50 PM     Vascular:    BKA    Lab Values:    Lab Results   Component Value Date/Time    WBC 7.7 09/11/2019 05:25 AM    RBC 4.18 (L) 09/11/2019 05:25 AM    HEMOGLOBIN 11.9 (L) 09/11/2019 05:25 AM    HEMATOCRIT 37.3 (L) 09/11/2019 05:25 AM        Lab Results   Component Value Date/Time    HBA1C 8.1 (H) 09/11/2019 05:25 AM           Culture:  NA      INTERVENTIONS BY WOUND TEAM:  Met with patient and removed immobilizer and dressing revealing non blanching discolored area anterior right knee.  Measurements taken and covered with mepilex dressing to promote off loading.  Unwrapped BKA stump and xeroform left in place.  Blister area intact and flat.  Dry dressings reapplied over incision and secured with roll gauze and ACE wrap and immobilizer replaced.  Discussed with staff RN.    Dressing Selection:  See above         Interdisciplinary consultation: Patient, Bedside RN    EVALUATION: clean appearing incision with resolving serum blister distal stump and resolving  pressure injury anterior knee which should continue to improve with mepilex dressing in place    Factors affecting wound healing: DM, pressure   Goals: Steady decrease in wound area and depth weekly.    NURSING PLAN OF CARE ORDERS (X):    Dressing changes: See Dressing Care orders: X updated  Skin care: See Skin Care orders:   Rectal tube care: See Rectal Tube Care orders:   Other orders:    RSKIN: CURRENT (X) ORDERED (O):   Q shift Eagle:  X  Q shift pressure point assessments:  X  Pressure redistribution mattress   X         BRINA          Bariatric BRINA         Bariatric foam           Heel float boots          Float Heels off Bed with Pillows  X             Barrier wipes         Barrier Cream         Barrier paste          Sacral silicone dressing         Silicone O2 tubing         Anchorfast         Cannula fixation Device (Tender )          Gray Foam Ear protectors           Trach with Optifoam split foam                 Waffle cushion        Waffle Overlay         Rectal tube or BMS         Antifungal tx      Interdry          Reposition q 2 hours   X     Up to chair  X      Ambulate      PT/OT        Dietician        Diabetes Education      PO  X   TF     TPN     NPO   # days   Other        WOUND TEAM PLAN OF CARE (X):   NPWT change 3 x week:        Dressing changes by wound team:       Follow up as needed:   X next week    Other (explain):     Anticipated discharge plans (X):   SNF:           Home Care:           Outpatient Wound Center:            Self Care:            Other:    TBD with probable no wound care indicated

## 2019-09-13 NOTE — THERAPY
Physical Therapy   Daily Treatment     Patient Name: Kevin Jackson  Age:  74 y.o., Sex:  male  Medical Record #: [id number]  Today's Date: 9/13/2019     Precautions  Precautions: Non Weight Bearing Right Lower Extremity, Fall Risk  Comments: IPOP RLE    Subjective    Pt ready for PT     Objective       09/13/19 0901   Sitting Lower Body Exercises   Nustep Resistance Level 5;Time (See Comments)  (15' B UE and L LE for endurance/strength)   Interdisciplinary Plan of Care Collaboration   Patient Position at End of Therapy Seated;Self Releasing Lap Belt Applied   PT Total Time Spent   PT Individual Total Time Spent (Mins) 30   PT Charge Group   PT Therapeutic Exercise 1   PT Therapeutic Activities 1   Supervising Physical Therapist Arden Bhagat       FIM Walking Score:  2 - Max Assistance  Walking Description:  Walker, Extra time, Requires incidental assist, Supervision for safety, Verbal cueing, Safety concerns(60' x 1 FWW Arianne, IPOP, vc for safe walker management and proximity)    Transfer training wc <> mat x 4 trials with FWW, emphasis on wc set up and safety    Instructed pt on wc positioning and set up for transfer, removal of stump board/foot rest     Assessment    Pt was able to amb without a wc follow today, pt demonstrates improving activity tolerance and continues to demonstrate decreased safety awareness and impulsivity at times requiring vc for sequenicng    Plan    Safe FWW usage with all transfers; FWW ambulation safety; ther ex for general safety; outdoor w/c mobility with curb cutouts and ramps; and education for BKA.

## 2019-09-14 LAB
GLUCOSE BLD-MCNC: 158 MG/DL (ref 65–99)
GLUCOSE BLD-MCNC: 171 MG/DL (ref 65–99)
GLUCOSE BLD-MCNC: 195 MG/DL (ref 65–99)
GLUCOSE BLD-MCNC: 197 MG/DL (ref 65–99)

## 2019-09-14 PROCEDURE — 82962 GLUCOSE BLOOD TEST: CPT

## 2019-09-14 PROCEDURE — 92523 SPEECH SOUND LANG COMPREHEN: CPT

## 2019-09-14 PROCEDURE — 97535 SELF CARE MNGMENT TRAINING: CPT

## 2019-09-14 PROCEDURE — 97110 THERAPEUTIC EXERCISES: CPT

## 2019-09-14 PROCEDURE — 700111 HCHG RX REV CODE 636 W/ 250 OVERRIDE (IP): Performed by: PHYSICAL MEDICINE & REHABILITATION

## 2019-09-14 PROCEDURE — 700102 HCHG RX REV CODE 250 W/ 637 OVERRIDE(OP): Performed by: PHYSICAL MEDICINE & REHABILITATION

## 2019-09-14 PROCEDURE — A9270 NON-COVERED ITEM OR SERVICE: HCPCS | Performed by: PHYSICAL MEDICINE & REHABILITATION

## 2019-09-14 PROCEDURE — A9270 NON-COVERED ITEM OR SERVICE: HCPCS | Performed by: HOSPITALIST

## 2019-09-14 PROCEDURE — 770010 HCHG ROOM/CARE - REHAB SEMI PRIVAT*

## 2019-09-14 PROCEDURE — 99232 SBSQ HOSP IP/OBS MODERATE 35: CPT | Performed by: HOSPITALIST

## 2019-09-14 PROCEDURE — 700102 HCHG RX REV CODE 250 W/ 637 OVERRIDE(OP): Performed by: HOSPITALIST

## 2019-09-14 RX ADMIN — ATORVASTATIN CALCIUM 80 MG: 40 TABLET, FILM COATED ORAL at 22:32

## 2019-09-14 RX ADMIN — PRIMIDONE 50 MG: 50 TABLET ORAL at 22:32

## 2019-09-14 RX ADMIN — LEVOTHYROXINE SODIUM 50 MCG: 50 TABLET ORAL at 08:22

## 2019-09-14 RX ADMIN — CHLORTHALIDONE 25 MG: 25 TABLET ORAL at 08:26

## 2019-09-14 RX ADMIN — INSULIN GLARGINE 35 UNITS: 100 INJECTION, SOLUTION SUBCUTANEOUS at 22:25

## 2019-09-14 RX ADMIN — OMEPRAZOLE 20 MG: 20 CAPSULE, DELAYED RELEASE ORAL at 08:21

## 2019-09-14 RX ADMIN — LOSARTAN POTASSIUM 50 MG: 25 TABLET ORAL at 08:20

## 2019-09-14 RX ADMIN — GABAPENTIN 600 MG: 300 CAPSULE ORAL at 17:39

## 2019-09-14 RX ADMIN — PROPRANOLOL HYDROCHLORIDE 120 MG: 60 CAPSULE, EXTENDED RELEASE ORAL at 08:18

## 2019-09-14 RX ADMIN — HEPARIN SODIUM 5000 UNITS: 5000 INJECTION, SOLUTION INTRAVENOUS; SUBCUTANEOUS at 05:15

## 2019-09-14 RX ADMIN — INSULIN LISPRO 2 UNITS: 100 INJECTION, SOLUTION INTRAVENOUS; SUBCUTANEOUS at 22:25

## 2019-09-14 RX ADMIN — VITAMIN D, TAB 1000IU (100/BT) 2000 UNITS: 25 TAB at 08:18

## 2019-09-14 RX ADMIN — HEPARIN SODIUM 5000 UNITS: 5000 INJECTION, SOLUTION INTRAVENOUS; SUBCUTANEOUS at 14:05

## 2019-09-14 RX ADMIN — HEPARIN SODIUM 5000 UNITS: 5000 INJECTION, SOLUTION INTRAVENOUS; SUBCUTANEOUS at 22:33

## 2019-09-14 RX ADMIN — INSULIN LISPRO 2 UNITS: 100 INJECTION, SOLUTION INTRAVENOUS; SUBCUTANEOUS at 17:39

## 2019-09-14 RX ADMIN — INSULIN LISPRO 2 UNITS: 100 INJECTION, SOLUTION INTRAVENOUS; SUBCUTANEOUS at 08:13

## 2019-09-14 RX ADMIN — GLIMEPIRIDE 4 MG: 2 TABLET ORAL at 08:21

## 2019-09-14 RX ADMIN — INSULIN LISPRO 2 UNITS: 100 INJECTION, SOLUTION INTRAVENOUS; SUBCUTANEOUS at 11:59

## 2019-09-14 RX ADMIN — GABAPENTIN 1200 MG: 400 CAPSULE ORAL at 22:31

## 2019-09-14 RX ADMIN — METFORMIN HYDROCHLORIDE 1000 MG: 500 TABLET, FILM COATED ORAL at 08:21

## 2019-09-14 RX ADMIN — METFORMIN HYDROCHLORIDE 1000 MG: 500 TABLET, FILM COATED ORAL at 17:39

## 2019-09-14 RX ADMIN — GABAPENTIN 600 MG: 300 CAPSULE ORAL at 05:14

## 2019-09-14 RX ADMIN — TAMSULOSIN HYDROCHLORIDE 0.8 MG: 0.4 CAPSULE ORAL at 08:20

## 2019-09-14 RX ADMIN — CLOPIDOGREL BISULFATE 75 MG: 75 TABLET ORAL at 08:21

## 2019-09-14 RX ADMIN — ASPIRIN 81 MG: 81 TABLET, COATED ORAL at 17:39

## 2019-09-14 RX ADMIN — GABAPENTIN 600 MG: 300 CAPSULE ORAL at 11:58

## 2019-09-14 ASSESSMENT — ENCOUNTER SYMPTOMS
HEADACHES: 0
VOMITING: 0
PALPITATIONS: 0
FEVER: 0
DIZZINESS: 0
SHORTNESS OF BREATH: 0
NAUSEA: 0
BLURRED VISION: 0
HALLUCINATIONS: 0

## 2019-09-14 NOTE — PROGRESS NOTES
Received patient during shift change, report rec'd from day shift RN. Resting in bed, VS stable on room air. Per report continent of B&B, min assist for transfers. A&O x 4, able to make needs known. Bed in low position, call light within reach.

## 2019-09-14 NOTE — THERAPY
Physical Therapy   Daily Treatment     Patient Name: Kevin Jackson  Age:  74 y.o., Sex:  male  Medical Record #: [id number]  Today's Date: 9/13/2019     Precautions  Precautions: Non Weight Bearing Right Lower Extremity, Fall Risk  Comments: IPOP RLE    Subjective    Patient agreeable to PT.     Objective       09/13/19 1301   Vitals   O2 (LPM) 0   O2 Delivery None (Room Air)   Supine Lower Body Exercise   Bridges 3 sets of 10;Two Legged  (exercise ball with CGA-Min A)   Hip Abduction 3 sets of 10;Side Lying  (cueing, 5 lbs on R residual limb)   Straight Leg Raises 2 sets of 10  (max cueing)   Knee to Chest 2 sets of 10  (with exercise ball under R residual limb, Min-Mod A)   Other Exercises Prone lying for first 5 min on mat   Sitting Lower Body Exercises   Long Arc Quad 2 sets of 10  (5 lbs R residual)   Marching 2 sets of 10  (5 lbs just prox to R knee)   Hamstring Curl 2 sets of 10  (purple t-band)   Nustep Resistance Level 4  (8 min at end of session (prev 15 min); avg 52 spm, BUE/LLE)   Bed Mobility    Supine to Sit Contact Guard Assist   Sit to Supine Stand by Assist   Sit to Stand Minimal Assist  (FWW, setup, cueing, rigid orthosis, increased time)   Scooting Stand by Assist   Rolling   (SBA)   Interdisciplinary Plan of Care Collaboration   IDT Collaboration with  Physical Therapist Assistant (PTA);Other (See Comments)   Collaboration Comments treatment goals and participation; handoff at end of session to pt's Prosthetist Shivam from [place]   PT Total Time Spent   PT Individual Total Time Spent (Mins) 60   PT Charge Group   PT Therapeutic Exercise 3   PT Therapeutic Activities 1     Also worked for 2 reps of 2 min pt self- desensitization at R residual limb; reviewed don/doffing of a new rigid post-op orthosis (not a true IPOP) today with improved carryover and adherence.    FIM Bed/Chair/Wheelchair Transfers Score: 4 - Minimal Assistance  Bed/Chair/Wheelchair Transfers Description:  (Min A, setup and  cueing to use FWW, 1 mat transfer, increased time throughout, A prn with pt's new rigid post-op orthosis (much better fit, easier for pt to don, better adherence))    FIM Walking Score:  1 - Total Assistance  Walking Description:       FIM Wheelchair Score:  5 - Standby Prompting/Supervision or Set-up  Wheelchair Description:  (Setup and SBA indoors, 250 feet with good environmental clearance, uses R stump board)    FIM Stairs Score:  0 - Not tested,unsafe activity  Stairs Description:         Assessment    Patient has improving activity tolerance and strength; increased R hip flex/ABD discomfort and nerve pain following SLR even after max cueing provided for quality, resolves with positional changes.    Plan    Safe FWW usage with all transfers; FWW ambulation safety; ther ex for general safety; outdoor w/c mobility with curb cutouts and ramps; and education for BKA.

## 2019-09-14 NOTE — PROGRESS NOTES
[facility] Medicine Daily Progress Note    Date of Service  9/14/2019    Chief Complaint:  Hypertension  Diabetes  Abnormal TFT's    Interval History:  No significant events or changes since last visit    Review of Systems  Review of Systems   Constitutional: Negative for fever.   Eyes: Negative for blurred vision.   Respiratory: Negative for shortness of breath.    Cardiovascular: Negative for palpitations.   Gastrointestinal: Negative for nausea and vomiting.   Neurological: Negative for dizziness and headaches.   Psychiatric/Behavioral: Negative for hallucinations.        Physical Exam  Temp:  [36.4 °C (97.6 °F)-36.7 °C (98 °F)] 36.7 °C (98 °F)  Pulse:  [78-91] 88  Resp:  [16-18] 18  BP: (108-119)/(68-76) 119/76    Physical Exam   HENT:   Mouth/Throat: Oropharynx is clear and moist.   Eyes: No scleral icterus.   Cardiovascular: Normal rate and regular rhythm.   Pulmonary/Chest: Effort normal. No stridor. He has no wheezes. He has no rales.   Abdominal: Soft. Bowel sounds are normal.   Musculoskeletal:   Has right BKA   Skin: Skin is warm. He is not diaphoretic.   Psychiatric: His behavior is normal.   Nursing note and vitals reviewed.      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  Wound dressing C/D/I  No signs of infection    Hypothyroidism  Assessment & Plan  Has hx  TSH: 1.77 (5/16/2018) --> 1.24 (2/20/19) --> 2.1 (6/4) --> 10.8 (9/11)  FT4: 1.11 (9/11)  Likely subclinical  Note: TSH and FT4 has been good since 2013 and has been on the same Synthroid dose  Note: Would normally increase Synthroid but since pt has been good for a while on the same med dose,              I will wait and repeat the TFT's in about 1 week to see if it is trending down; if it is not,            I will then will consider increasing the dose  On Synthroid: 50 mcg daily  Note: followed by Dr. Abbott    Hypertension, essential  Assessment & Plan  BP ok  On Cozaar: 50 mg daily  On Propranolol: 120 mg qam  On Chlorthalidone: 25 mg daily  Monitor    Uncontrolled type 2 diabetes mellitus with complication, with long-term current use of insulin (HCC)- dr patterson- (present on admission)  Assessment & Plan  Hba1c: 8.1 (6/4) --> 7.2 (8/8) --> 8.1 (9/11)  BS better recently: 151-158  On Lantus: 19 units qhs --> 30 units qhs (9/12) --> 35 units qhs (9/13)  On Metformin: 1000 mg bid (9/11 at evening)  On Amaryl 4 mg daily (9/13)  Note: home meds include Metformin 1000 mg bid, Jardiance 25 mg daily, Amaryl 4 mg qam, Ozempic .25 mg qweekly, and Insulin 50/50 qhs  Note: followed by Dr. Patterson  Cont to monitor    Mixed hyperlipidemia- dr dwyer- (present on admission)  Assessment & Plan  On Lipitor

## 2019-09-14 NOTE — PROGRESS NOTES
"Rehab Progress Note     Encounter Date: 9/13/2019    CC: R BKA, RLE pain, and delayed response time    Interval Events (Subjective)  Patient sitting up in room. He reports the injury on the top of his knee is 50-60% healed. He has questions as it was originally just a bruise. Discussed deep tissue injuries and how they open up often to show what was beneath them like a blister.  He is still concerned about his blood sugars as he had better control before. Discussed with hospitalist and slowly adding back on home medications.     IDT Team Meeting 9/12/2019  DC/Disposition:  9/20/19    Objective:  VITAL SIGNS: /70   Pulse 91   Temp 36.6 °C (97.9 °F) (Temporal)   Resp 18   Ht 1.854 m (6' 1\")   Wt 90.9 kg (200 lb 8 oz)   SpO2 95%   BMI 26.45 kg/m²   Gen: NAD  Psych: Mood and affect appropriate  CV: RRR, no edema  Resp: CTAB, no upper airway sounds  Abd: NTND  Neuro: AOx4, 5/5 BUE  Unchanged from 9/13/19 except R HF 4/5     Recent Results (from the past 72 hour(s))   ACCU-CHEK GLUCOSE    Collection Time: 09/10/19  8:12 PM   Result Value Ref Range    Glucose - Accu-Ck 272 (H) 65 - 99 mg/dL   CBC with Differential    Collection Time: 09/11/19  5:25 AM   Result Value Ref Range    WBC 7.7 4.8 - 10.8 K/uL    RBC 4.18 (L) 4.70 - 6.10 M/uL    Hemoglobin 11.9 (L) 14.0 - 18.0 g/dL    Hematocrit 37.3 (L) 42.0 - 52.0 %    MCV 89.2 81.4 - 97.8 fL    MCH 28.5 27.0 - 33.0 pg    MCHC 31.9 (L) 33.7 - 35.3 g/dL    RDW 45.1 35.9 - 50.0 fL    Platelet Count 448 (H) 164 - 446 K/uL    MPV 10.1 9.0 - 12.9 fL    Neutrophils-Polys 61.80 44.00 - 72.00 %    Lymphocytes 22.70 22.00 - 41.00 %    Monocytes 11.30 0.00 - 13.40 %    Eosinophils 2.90 0.00 - 6.90 %    Basophils 0.80 0.00 - 1.80 %    Immature Granulocytes 0.50 0.00 - 0.90 %    Nucleated RBC 0.00 /100 WBC    Neutrophils (Absolute) 4.74 1.82 - 7.42 K/uL    Lymphs (Absolute) 1.74 1.00 - 4.80 K/uL    Monos (Absolute) 0.87 (H) 0.00 - 0.85 K/uL    Eos (Absolute) 0.22 0.00 - 0.51 " K/uL    Baso (Absolute) 0.06 0.00 - 0.12 K/uL    Immature Granulocytes (abs) 0.04 0.00 - 0.11 K/uL    NRBC (Absolute) 0.00 K/uL   Comp Metabolic Panel (CMP)    Collection Time: 09/11/19  5:25 AM   Result Value Ref Range    Sodium 137 135 - 145 mmol/L    Potassium 3.8 3.6 - 5.5 mmol/L    Chloride 97 96 - 112 mmol/L    Co2 29 20 - 33 mmol/L    Anion Gap 11.0 0.0 - 11.9    Glucose 229 (H) 65 - 99 mg/dL    Bun 18 8 - 22 mg/dL    Creatinine 0.86 0.50 - 1.40 mg/dL    Calcium 9.7 8.5 - 10.5 mg/dL    AST(SGOT) 18 12 - 45 U/L    ALT(SGPT) 15 2 - 50 U/L    Alkaline Phosphatase 48 30 - 99 U/L    Total Bilirubin 0.6 0.1 - 1.5 mg/dL    Albumin 3.3 3.2 - 4.9 g/dL    Total Protein 6.6 6.0 - 8.2 g/dL    Globulin 3.3 1.9 - 3.5 g/dL    A-G Ratio 1.0 g/dL   HEMOGLOBIN A1C    Collection Time: 09/11/19  5:25 AM   Result Value Ref Range    Glycohemoglobin 8.1 (H) 0.0 - 5.6 %    Est Avg Glucose 186 mg/dL   Vitamin D, 25-hydroxy (blood)    Collection Time: 09/11/19  5:25 AM   Result Value Ref Range    25-Hydroxy   Vitamin D 25 19 (L) 30 - 100 ng/mL   TSH with Reflex to FT4    Collection Time: 09/11/19  5:25 AM   Result Value Ref Range    TSH 10.800 (H) 0.380 - 5.330 uIU/mL   ESTIMATED GFR    Collection Time: 09/11/19  5:25 AM   Result Value Ref Range    GFR If African American >60 >60 mL/min/1.73 m 2    GFR If Non African American >60 >60 mL/min/1.73 m 2   FREE THYROXINE    Collection Time: 09/11/19  5:25 AM   Result Value Ref Range    Free T-4 1.11 0.53 - 1.43 ng/dL   ACCU-CHEK GLUCOSE    Collection Time: 09/11/19  7:22 AM   Result Value Ref Range    Glucose - Accu-Ck 241 (H) 65 - 99 mg/dL   ACCU-CHEK GLUCOSE    Collection Time: 09/11/19 11:13 AM   Result Value Ref Range    Glucose - Accu-Ck 277 (H) 65 - 99 mg/dL   ACCU-CHEK GLUCOSE    Collection Time: 09/11/19  5:29 PM   Result Value Ref Range    Glucose - Accu-Ck 265 (H) 65 - 99 mg/dL   ACCU-CHEK GLUCOSE    Collection Time: 09/11/19  8:46 PM   Result Value Ref Range    Glucose - Accu-Ck  249 (H) 65 - 99 mg/dL   ACCU-CHEK GLUCOSE    Collection Time: 09/12/19  7:55 AM   Result Value Ref Range    Glucose - Accu-Ck 272 (H) 65 - 99 mg/dL   ACCU-CHEK GLUCOSE    Collection Time: 09/12/19 11:49 AM   Result Value Ref Range    Glucose - Accu-Ck 252 (H) 65 - 99 mg/dL   ACCU-CHEK GLUCOSE    Collection Time: 09/12/19  5:01 PM   Result Value Ref Range    Glucose - Accu-Ck 210 (H) 65 - 99 mg/dL   ACCU-CHEK GLUCOSE    Collection Time: 09/12/19  8:53 PM   Result Value Ref Range    Glucose - Accu-Ck 279 (H) 65 - 99 mg/dL   ACCU-CHEK GLUCOSE    Collection Time: 09/13/19  7:20 AM   Result Value Ref Range    Glucose - Accu-Ck 215 (H) 65 - 99 mg/dL   ACCU-CHEK GLUCOSE    Collection Time: 09/13/19 11:35 AM   Result Value Ref Range    Glucose - Accu-Ck 278 (H) 65 - 99 mg/dL       Current Facility-Administered Medications   Medication Frequency   • insulin glargine (LANTUS) injection 35 Units Q EVENING    And   • insulin lispro (HUMALOG) injection 2-12 Units 4X/DAY ACHS    And   • glucose 4 g chewable tablet 16 g Q15 MIN PRN    And   • dextrose 50% (D50W) injection 50 mL Q15 MIN PRN   • glimepiride (AMARYL) tablet 4 mg QAM AC   • tamsulosin (FLOMAX) capsule 0.8 mg DAILY   • omeprazole (PRILOSEC) capsule 20 mg DAILY   • clopidogrel (PLAVIX) tablet 75 mg DAILY   • vitamin D (cholecalciferol) tablet 2,000 Units DAILY   • metFORMIN (GLUCOPHAGE) tablet 1,000 mg BID WITH MEALS   • Respiratory Care per Protocol Continuous RT   • tramadol (ULTRAM) 50 MG tablet 50 mg Q4HRS PRN   • hydrALAZINE (APRESOLINE) tablet 25 mg Q8HRS PRN   • acetaminophen (TYLENOL) tablet 650 mg Q4HRS PRN   • senna-docusate (PERICOLACE or SENOKOT S) 8.6-50 MG per tablet 2 Tab BID    And   • polyethylene glycol/lytes (MIRALAX) PACKET 1 Packet QDAY PRN    And   • magnesium hydroxide (MILK OF MAGNESIA) suspension 30 mL QDAY PRN    And   • bisacodyl (DULCOLAX) suppository 10 mg QDAY PRN   • artificial tears ophthalmic solution 1 Drop PRN   • benzocaine-menthol  (CEPACOL) lozenge 1 Lozenge Q2HRS PRN   • mag hydrox-al hydrox-simeth (MAALOX PLUS ES or MYLANTA DS) suspension 20 mL Q2HRS PRN   • ondansetron (ZOFRAN ODT) dispertab 4 mg 4X/DAY PRN    Or   • ondansetron (ZOFRAN) syringe/vial injection 4 mg 4X/DAY PRN   • traZODone (DESYREL) tablet 50 mg QHS PRN   • sodium chloride (OCEAN) 0.65 % nasal spray 2 Spray PRN   • heparin injection 5,000 Units Q8HRS   • aspirin EC (ECOTRIN) tablet 81 mg DAILY   • atorvastatin (LIPITOR) tablet 80 mg Q EVENING   • chlorthalidone (HYGROTON) tablet 25 mg DAILY   • gabapentin (NEURONTIN) capsule 1,200 mg QHS   • gabapentin (NEURONTIN) capsule 600 mg TID   • HYDROcodone-acetaminophen (NORCO) 5-325 MG per tablet 1-2 Tab Q4HRS PRN   • levothyroxine (SYNTHROID) tablet 50 mcg QAM AC   • losartan (COZAAR) tablet 50 mg DAILY   • primidone (MYSOLINE) tablet 50 mg Q EVENING   • propranolol LA (INDERAL LA) capsule 120 mg QAM       Orders Placed This Encounter   Procedures   • Diet Order Diabetic     Standing Status:   Standing     Number of Occurrences:   1     Order Specific Question:   Diet:     Answer:   Diabetic [3]       Assessment:  Active Hospital Problems    Diagnosis   • *S/P BKA (below knee amputation) unilateral, right (HCC)   • Hypertension, essential   • Hypothyroidism   • PVD (peripheral vascular disease) (HCC)- dr mohsen (cardilogy) at [facility]; arterial angioplasty right leg tbd july2019; rx trental (dr hernandes podiatry)   • Hypothyroidism due to acquired atrophy of thyroid- dr browning   • Uncontrolled type 2 diabetes mellitus with complication, with long-term current use of insulin (HCC)- dr browning   • Mixed hyperlipidemia- dr dwyer   • Essential hypertension- DR DWYER       Medical Decision Making and Plan:  R BKA - Patient with DM and PVD with right BKA on 9/5/19 with Dr. Amezcua.  -PT and OT for mobility and ADLs  -NWB RLE  -Continue ASA  -Previously on Plavix and Pentoxifylline. Check AM CBC and restart. 11.9 on admission.   -Prosthetist  exchanged rigid removal dressing as causing discomfort 9/12/19     DM with hyperglycemia - Patient with uncontrolled DM into 300s. On Lantus 19 and SSI on transfer. Previously on Empagliflozin 25 mg daily, Glimepiride 4 mg daily, Ozempic 1 mg q7days, and Metformin 1000 mg BID  -Continue to monitor. Continue Lantus and SSI. Consult hospitalist.   -Discussed with Hospitalist and increase Lantus to 30U.  A1c - 8.1. Restarted home Metformin 1000 mg BID. Restarted on Home Glimepiride. Discussed with hospitalist and increased to 35 U Lantus so concern for hypoglycemia if all restarted at once.     Neuropathy from DM - Patient on Gabapentin 600/600/600/1200 on transfer. Will continue to monitor.      HTN - Patient on Chlorthalidone 25 mg and Losartan 50 mg daily.      HLD - Patient on Atorvastatin 80 mg QHS.     Hypothyroidism - Patient on 50 mcg on transfer.      Essential Tremor - On Primidone 50 mg and Propranolol 120 mg daily     BPH - Patient on Tamsulosin 0.4 mg. Check PVRs - ~250, increase to 0.8 mg and monitor   -Improved < 75 on increased flomax      Vitamin D - 19 on admission, start 2000 U daily.     DVT Ppx - Patient on Heparin 5000 q8h on transfer.      Total time:  25 minutes.  I spent greater than 50% of the time for patient care, counseling, and coordination on this date, including unit/floor time, and face-to-face time with the patient as per interval events and assessment and plan above. Topics discussed included wound care on right knee, ongoing discussion with hospitalist about aggressive DM treatment and improved PVRs on increased flomax. No hypotension noted.     Vincent Rubin M.D.

## 2019-09-14 NOTE — THERAPY
"Occupational Therapy  Daily Treatment     Patient Name: Kevin Jackson  Age:  74 y.o., Sex:  male  Medical Record #: [id number]  Today's Date: 2019     Precautions  Precautions: Non Weight Bearing Right Lower Extremity, Fall Risk  Comments: IPOP RLE         Subjective  \"I was hoping to shower this morning\"     Objective       19 0831   Precautions   Precautions Non Weight Bearing Right Lower Extremity;Fall Risk   Comments IPOP RLE   Vitals   O2 Delivery None (Room Air)   Pain   Intervention Declines   Pain 0 - 10 Group   Therapist Pain Assessment 0   Non Verbal Descriptors   Non Verbal Scale  Calm   Cognition    Level of Consciousness Alert   Bed Mobility    Supine to Sit Supervised   Scooting Stand by Assist   Interdisciplinary Plan of Care Collaboration   IDT Collaboration with  Nursing;Certified Nursing Assistant   Patient Position at End of Therapy Seated;Chair Alarm On;Self Releasing Lap Belt Applied;Call Light within Reach;Tray Table within Reach;Phone within Reach   OT Total Time Spent   OT Individual Total Time Spent (Mins) 60   OT Charge Group   OT Self Care / ADL 4       FIM Eating Score:  6 - Modified Independent  Eating Description:  Increased time    FIM Grooming Score:  6 - Modified Independent  Grooming Description:  Increased time(WC level at sinkside)    FIM Bathing Score:  4 - Minimal Assistance  Bathing Description:       FIM Upper Body Dressin - Modified Independent  Upper Body Dressing Description:  Increased time(Don/doff pull over shirt)    FIM Lower Body Dressing Score:  5 - Standby Prompting/Supervsion or Set-up  Lower Body Dressing Description:  Increased time, Supervision for safety, Set-up of equipment(SBA in stand to manage pants/briefs at waist; Mod indep to thread BLE's through pants/briefs, Mod Indep to don/doff limb sock and IPOP, Mod Indep to don L sock, shoe and lace management)    FIM Toiletin - Standby Prompting/Supervision or Set-up  Toileting Description:  " Grab bar, Increased time, Supervision for safety, Verbal cueing, Set-up of equipment, Initial preparation for task(VQ's for safety)    FIM Toilet Transfer Score:  5 - Standby Prompting/Supervision or Set-up  Toilet Transfer Description:  Grab bar, Increased time, Supervision for safety, Verbal cueing, Set-up of equipment, Initial preparation for task(VQ's for safety and position of manual wc, Sup?SBA for SPT to/from manual wc and commode via grab bar.)    FIM Tub/Shower Transfers Score:  5 - Standby Prompting/Supervision or Set-up  Tub/Shower Transfers Description:  Grab bar, Increased time, Supervision for safety, Verbal cueing, Set-up of equipment, Initial preparation for task(SBAA for SPT to/from manual wc and shower bench via grab bar)      Assessment    Pt was alert and cooperative w/ tx.  VQ;'s to engage brakes on manual wc for transfers, VQ's to slow down and observe environment/setting.  Sup/SBA for bathroom transfers to/fom manual wc via grab bar.  Limiters include limited endurance, balance and safety awareness.    Plan    Cont'd OT for strength, endurance, balance, AE/DME for ADL's and transfers

## 2019-09-14 NOTE — THERAPY
"Physical Therapy   Daily Treatment     Patient Name: Kevin Jackson  Age:  74 y.o., Sex:  male  Medical Record #: [id number]  Today's Date: 9/14/2019     Precautions  Precautions: Non Weight Bearing Right Lower Extremity, Fall Risk  Comments: IPOP RLE     Subjective    Pt agreeable to care.      Objective     09/14/19 1501   Precautions   Precautions Non Weight Bearing Right Lower Extremity;Fall Risk   Comments IPOP RLE    Supine Lower Body Exercise   Bridges 2 sets of 10;Two Legged  (bolster)   Hip Abduction Side Lying;2 sets of 10;Right    Straight Leg Raises Front;Right;2 sets of 10   Knee to Chest 1 set of 10;Left   Other Exercises supine hip abduction 2 x 10, seated hamstring stretch on RLE 2 x 30\", right adductor stretch 2 x 30\" , prone hip extension RLE 2 x 10   Sitting Lower Body Exercises   Other Exercises rickshaw 45 lbs 2 x 15 fwd and 2 x 15 bwd    PT Total Time Spent   PT Individual Total Time Spent (Mins) 60   PT Charge Group   PT Therapeutic Exercise 4       FIM Bed/Chair/Wheelchair Transfers Score: 5 - Standby Prompting/Supervision or Set-up  Bed/Chair/Wheelchair Transfers Description:  (w/c<> mat table SBA )    Pt education on shrink sock, assisted pt in getting even pressure throughout residual limb.     Assessment    Pt doing well with mat exercise program.     Plan     COntinue ambulation with FWW, UE and LE strength, amputee education.     "

## 2019-09-14 NOTE — THERAPY
Speech Language Pathology   Initial Assessment     Patient Name: Kevin Jackson  AGE:  74 y.o., SEX:  male  Medical Record #: [id number]  Today's Date: 9/14/2019     Subjective    Pt pleasant and cooperative during evaluation.      Objective       09/14/19 0931   Prior Living Situation   Prior Services Home-Independent   Housing / Facility 1 Story House   Lives with - Patient's Self Care Capacity Significant Other   Prior Level Of Function   Communication Within Functional Limits   Swallow Within Functional Limits   Dentition Intact   Hearing Within Functional Limits for Evaluation   Vision Wears Corrective Lenses   Occupation (Pre-Hospital Vocational) Retired Due To Age   Expressive Language   Expressive Language (WDL) X   Explains Functions Of Objects Supervision (5)   Repeats Speech Accurately Within Functional Limits (6-7)   Automatic Language Appropriate Within Functional Limits (6-7)   Verbalizes Wants / Needs Within Functional Limits (6-7)   Sustain Dialogue Within Given Topic Supervision (5)   Word Finding Deficits Minimal (4)   Written Language Expression   Dominant Hand Right   Cognition   Attention to Task Supervision (5)   Simple Attention Supervision (5)   Simple Information Processing Within Functional Limits (6-7)   Complex Information Processing Minimal (4)   Simple Reasoning / Problem Solving Minimal (4)   Complex Reasoning  / Problem Solving Moderate (3)   Planning / Decision Making Minimal (4)   Medication Management  To Be Assessed   Social / Pragmatic Communication   Social / Pragmatic Communication WDL   Outcome Measures   Outcome Measures Utilized SCCAN   SCCAN (Scales of Cognitive and Communicative Ability for Neurorehabilitation)   Oral Expression - Raw Score 14   Oral Expression - Scale Performance Score 74   Orientation - Raw Score 12   Orientation - Scale Performance Score 100   Memory - Raw Score 9   Memory - Scale Performance Score 47   Speech Comprehension - Raw Score 12   Speech  Comprehension - Scale Performance Score 92   Reading Comprehension - Raw Score 10   Reading Comprehension - Scale Performance Score 83   Writing - Raw Score 5   Writing - Scale Performance Score 71   Attention - Raw Score 13   Attention - Scale Performance Score 81   Problem Solving - Raw Score 19   Problem Solving - Scale Performance Score 83   SCCAN Total Raw Score 73   SCCAN Degree of Severity Mild Impairment   Problem List   Problem List Cognitive-Linguistic Deficits;Memory Deficit   Current Discharge Plan   Current Discharge Plan Return to Prior Living Situation   Benefit   Therapy Benefit Patient would benefit from Inpatient Rehab Speech-Language Pathology to address above identified deficits.   Interdisciplinary Plan of Care Collaboration   IDT Collaboration with  Family / Caregiver   Patient Position at End of Therapy Seated;Self Releasing Lap Belt Applied;Family / Friend in Room   Collaboration Comments Results of eval and POC   Speech Language Pathologist Assigned   Assigned SLP / Pager # [phone]   SLP Total Time Spent   SLP Individual Total Time Spent (Mins) 60   SLP Charge Group   Charges Yes   SLP Speech Language Evaluation Speech Sound Language Comprehension       FIM Eating Score:     Eating Description:       FIM Comprehension Score:  5 - Stand-by Prompting/Supervision or Set-up  Comprehension Description:  Glasses, Verbal cues    FIM Expression Score:  5 - Stand-by Prompting/Supervision or Set-up  Expression Description:  Verbal cueing    FIM Social Interaction Score:  6 - Modified Independent  Social Interaction Description:  Increased time    FIM Problem Solving Score:  6 - Modified Independent  Problem Solving Description:  Therapy schedule    FIM Memory Score:  4 - Minimal Assistance  Memory Description:  Verbal cueing, Therapy schedule    Assessment  The Scales of Cognitive and Communicative Ability for Neurorehabilitation was administered. Pt obtained a SCCAN raw score of 73 indicative of a  MILD cognitive impairment. Pt presenting w/ deficits in memory, attention, writing, problem solving, and word finding.     Patient is 74 y.o. male with a diagnosis of BKA and history of DM.  Additional factors influencing patient status/progress (ie: cognitive factors, co-morbidities, social support, etc): memory, problem solving, and word finding deficits with supportive spouse.       Plan  Recommend Speech Therapy 30-60 minutes per day 5-7 days per week for 2 weeks for the following treatments:  SLP Self Care / ADL Training , SLP Cognitive Skill Development and SLP Group Treatment.    Goals:  Long term and short term goals have been discussed with patient and spouse and they are in agreement.    Speech Therapy Problems     Problem: Memory STGs     Dates: Start: 09/14/19       Description:     Goal: STG-Within one week, patient will remember     Dates: Start: 09/14/19       Description: 1) Individualized goal:  Pt will recall novel information r/t rehabilitation hospital stay using external memory aids with 805 accuracy and MIN A.   2) Interventions:  SLP Self Care / ADL Training , SLP Cognitive Skill Development and SLP Group Treatment                   Problem: Problem Solving STGs     Dates: Start: 09/14/19       Description:     Goal: STG-Within one week, patient will solve basic problems     Dates: Start: 09/14/19       Description: 1) Individualized goal:  Related to health and safety, including medication management with 80% accuracy and MIN A.   2) Interventions:  SLP Self Care / ADL Training , SLP Cognitive Skill Development and SLP Group Treatment             Goal: STG-Within one week, patient will     Dates: Start: 09/14/19       Description: 1) Individualized goal:  Understand and implement word finding strategies with 80% accuracy and MIN A.   2) Interventions:  SLP Self Care / ADL Training , SLP Cognitive Skill Development and SLP Group Treatment                   Problem: Speech/Swallowing LTGs      Dates: Start: 09/14/19       Description:     Goal: LTG-By discharge, patient will solve complex problem     Dates: Start: 09/14/19       Description: 1) Individualized goal:  With 80% accuracy and MOD I for a safe D/C to PLOF.   2) Interventions:  SLP Self Care / ADL Training , SLP Cognitive Skill Development and SLP Group Treatment             Goal: LTG-By discharge, patient will     Dates: Start: 09/14/19       Description: 1) Individualized goal:  Recall pertinent information r/t health and safety with  80% accuracy and MOD I for a safe D/C to PLOF.   2) Interventions:  SLP Self Care / ADL Training , SLP Cognitive Skill Development and SLP Group Treatment

## 2019-09-14 NOTE — CARE PLAN
Problem: Safety  Goal: Will remain free from injury  Intervention: Educate patient and significant other/support system about adaptive mobility strategies and safe transfers  Note:   Using call light as needed for assist. Bed in low position, call light within reach.     Problem: Bowel/Gastric:  Goal: Normal bowel function is maintained or improved  Intervention: Educate patient and significant other/support system about diet, fluid intake, medications and activity to promote bowel function  Note:   Pt refused scheduled bowel meds at hs at first, explained last bm documented in 9/11. Pt agreed to take scheduled bowel meds. No s/s of distress. Awaiting outcome.

## 2019-09-15 LAB
GLUCOSE BLD-MCNC: 114 MG/DL (ref 65–99)
GLUCOSE BLD-MCNC: 143 MG/DL (ref 65–99)
GLUCOSE BLD-MCNC: 174 MG/DL (ref 65–99)
GLUCOSE BLD-MCNC: 197 MG/DL (ref 65–99)

## 2019-09-15 PROCEDURE — A9270 NON-COVERED ITEM OR SERVICE: HCPCS | Performed by: HOSPITALIST

## 2019-09-15 PROCEDURE — A9270 NON-COVERED ITEM OR SERVICE: HCPCS | Performed by: PHYSICAL MEDICINE & REHABILITATION

## 2019-09-15 PROCEDURE — 700111 HCHG RX REV CODE 636 W/ 250 OVERRIDE (IP): Performed by: PHYSICAL MEDICINE & REHABILITATION

## 2019-09-15 PROCEDURE — 82962 GLUCOSE BLOOD TEST: CPT | Mod: 91

## 2019-09-15 PROCEDURE — 700102 HCHG RX REV CODE 250 W/ 637 OVERRIDE(OP): Performed by: PHYSICAL MEDICINE & REHABILITATION

## 2019-09-15 PROCEDURE — 770010 HCHG ROOM/CARE - REHAB SEMI PRIVAT*

## 2019-09-15 PROCEDURE — 700102 HCHG RX REV CODE 250 W/ 637 OVERRIDE(OP): Performed by: HOSPITALIST

## 2019-09-15 PROCEDURE — 99232 SBSQ HOSP IP/OBS MODERATE 35: CPT | Performed by: HOSPITALIST

## 2019-09-15 RX ORDER — INSULIN GLARGINE 100 [IU]/ML
38 INJECTION, SOLUTION SUBCUTANEOUS EVERY EVENING
Status: DISCONTINUED | OUTPATIENT
Start: 2019-09-15 | End: 2019-09-20

## 2019-09-15 RX ORDER — DEXTROSE MONOHYDRATE 25 G/50ML
50 INJECTION, SOLUTION INTRAVENOUS
Status: DISCONTINUED | OUTPATIENT
Start: 2019-09-15 | End: 2019-09-20

## 2019-09-15 RX ADMIN — PROPRANOLOL HYDROCHLORIDE 120 MG: 60 CAPSULE, EXTENDED RELEASE ORAL at 08:04

## 2019-09-15 RX ADMIN — GABAPENTIN 600 MG: 300 CAPSULE ORAL at 17:45

## 2019-09-15 RX ADMIN — CHLORTHALIDONE 25 MG: 25 TABLET ORAL at 08:04

## 2019-09-15 RX ADMIN — LEVOTHYROXINE SODIUM 50 MCG: 50 TABLET ORAL at 08:05

## 2019-09-15 RX ADMIN — GABAPENTIN 600 MG: 300 CAPSULE ORAL at 11:44

## 2019-09-15 RX ADMIN — INSULIN GLARGINE 38 UNITS: 100 INJECTION, SOLUTION SUBCUTANEOUS at 21:27

## 2019-09-15 RX ADMIN — METFORMIN HYDROCHLORIDE 1000 MG: 500 TABLET, FILM COATED ORAL at 08:05

## 2019-09-15 RX ADMIN — CLOPIDOGREL BISULFATE 75 MG: 75 TABLET ORAL at 08:06

## 2019-09-15 RX ADMIN — GLIMEPIRIDE 4 MG: 2 TABLET ORAL at 08:05

## 2019-09-15 RX ADMIN — ATORVASTATIN CALCIUM 80 MG: 40 TABLET, FILM COATED ORAL at 21:22

## 2019-09-15 RX ADMIN — TAMSULOSIN HYDROCHLORIDE 0.8 MG: 0.4 CAPSULE ORAL at 08:06

## 2019-09-15 RX ADMIN — HEPARIN SODIUM 5000 UNITS: 5000 INJECTION, SOLUTION INTRAVENOUS; SUBCUTANEOUS at 14:37

## 2019-09-15 RX ADMIN — PRIMIDONE 50 MG: 50 TABLET ORAL at 21:22

## 2019-09-15 RX ADMIN — LOSARTAN POTASSIUM 50 MG: 25 TABLET ORAL at 08:05

## 2019-09-15 RX ADMIN — GABAPENTIN 1200 MG: 400 CAPSULE ORAL at 21:22

## 2019-09-15 RX ADMIN — METFORMIN HYDROCHLORIDE 1000 MG: 500 TABLET, FILM COATED ORAL at 17:45

## 2019-09-15 RX ADMIN — SENNOSIDES, DOCUSATE SODIUM 2 TABLET: 50; 8.6 TABLET, FILM COATED ORAL at 08:04

## 2019-09-15 RX ADMIN — VITAMIN D, TAB 1000IU (100/BT) 2000 UNITS: 25 TAB at 08:03

## 2019-09-15 RX ADMIN — INSULIN LISPRO 2 UNITS: 100 INJECTION, SOLUTION INTRAVENOUS; SUBCUTANEOUS at 11:45

## 2019-09-15 RX ADMIN — INSULIN LISPRO 2 UNITS: 100 INJECTION, SOLUTION INTRAVENOUS; SUBCUTANEOUS at 21:24

## 2019-09-15 RX ADMIN — HEPARIN SODIUM 5000 UNITS: 5000 INJECTION, SOLUTION INTRAVENOUS; SUBCUTANEOUS at 21:22

## 2019-09-15 RX ADMIN — OMEPRAZOLE 20 MG: 20 CAPSULE, DELAYED RELEASE ORAL at 08:04

## 2019-09-15 RX ADMIN — HEPARIN SODIUM 5000 UNITS: 5000 INJECTION, SOLUTION INTRAVENOUS; SUBCUTANEOUS at 06:08

## 2019-09-15 RX ADMIN — ASPIRIN 81 MG: 81 TABLET, COATED ORAL at 17:45

## 2019-09-15 RX ADMIN — GABAPENTIN 600 MG: 300 CAPSULE ORAL at 06:07

## 2019-09-15 ASSESSMENT — ENCOUNTER SYMPTOMS
BLURRED VISION: 0
COUGH: 0
NERVOUS/ANXIOUS: 0
DIARRHEA: 0
DIZZINESS: 0
FEVER: 0

## 2019-09-15 NOTE — PROGRESS NOTES
Hospital Medicine Daily Progress Note    Date of Service  9/15/2019    Chief Complaint:  Hypertension  Diabetes  Abnormal TFT's    Interval History:  No significant events or changes since last visit    Review of Systems  Review of Systems   Constitutional: Negative for fever.   Eyes: Negative for blurred vision.   Respiratory: Negative for cough.    Cardiovascular: Negative for chest pain.   Gastrointestinal: Negative for diarrhea.   Musculoskeletal: Negative for joint pain.   Neurological: Negative for dizziness.   Psychiatric/Behavioral: The patient is not nervous/anxious.         Physical Exam  Temp:  [36.3 °C (97.4 °F)-36.6 °C (97.9 °F)] 36.5 °C (97.7 °F)  Pulse:  [78-84] 78  Resp:  [18-19] 18  BP: (104-114)/(60-72) 114/60  SpO2:  [95 %] 95 %    Physical Exam   Constitutional: No distress.   HENT:   Mouth/Throat: No oropharyngeal exudate.   Eyes: EOM are normal.   Neck: No JVD present. No tracheal deviation present.   Cardiovascular: Normal rate and regular rhythm.   Pulmonary/Chest: Effort normal. He has no wheezes. He has no rales.   Abdominal: Soft. He exhibits no distension. There is no tenderness.   Musculoskeletal:   Has right BKA   Skin: Skin is warm.   Psychiatric: His behavior is normal.   Nursing note and vitals reviewed.      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  Wound dressing C/D/I  No signs of infection    Hypothyroidism  Assessment & Plan  Has hx  TSH: 1.77 (5/16/2018) --> 1.24 (2/20/19) --> 2.1 (6/4) --> 10.8 (9/11)  FT4: 1.11 (9/11)  Likely subclinical  Note: TSH and FT4 has been good since 2013 and has been on the same Synthroid dose  Note:  Would normally increase Synthroid but since pt has been good for a while on the same med dose,             I will wait and repeat the TFT's in about 1 week to see if it is trending down; if it is not,            I will then will consider increasing the dose  On Synthroid: 50 mcg daily  Note: followed by Dr. Abbott    Hypertension, essential  Assessment & Plan  BP ok  On Cozaar: 50 mg daily  On Propranolol: 120 mg qam  On Chlorthalidone: 25 mg daily  Monitor    Uncontrolled type 2 diabetes mellitus with complication, with long-term current use of insulin (HCC)- dr patterson- (present on admission)  Assessment & Plan  Hba1c: 8.1 (6/4) --> 7.2 (8/8) --> 8.1 (9/11)  BS better recently: 143-197  On Lantus: 19 units --> 30 units (9/12) --> 35 units (9/13) --> will increase to 38 units qhs (9/15)  On Metformin: 1000 mg bid   On Amaryl 4 mg daily (9/13)  Note: home meds include Metformin 1000 mg bid, Jardiance 25 mg daily, Amaryl 4 mg qam, Ozempic .25 mg qweekly, and Insulin 50/50 qhs  Note: followed by Dr. Patterson  Cont to monitor    Mixed hyperlipidemia- dr dwyer- (present on admission)  Assessment & Plan  On Lipitor

## 2019-09-15 NOTE — PROGRESS NOTES
Received patient during shift change, report rec'd from day shift RN. Resting in bed, VS stable on room air. Continent of B&B, min-contact guard assist for transfers. A&O x 4, able to make needs known. Bed in low position, call light within reach.

## 2019-09-15 NOTE — CARE PLAN
Problem: Bowel/Gastric:  Goal: Normal bowel function is maintained or improved  Intervention: Educate patient and significant other/support system about diet, fluid intake, medications and activity to promote bowel function  Note:   Declined scheduled bowel meds, states had 2 loose stools today.      Problem: Pain Management  Goal: Pain level will decrease to patient's comfort goal  Intervention: Follow pain managment plan developed in collaboration with patient and Interdisciplinary Team  Note:   Declined PRN tylenol or pain med for discomfort at bedtime. Assisted to reposition for comfort, will continue to monitor.

## 2019-09-16 LAB
GLUCOSE BLD-MCNC: 126 MG/DL (ref 65–99)
GLUCOSE BLD-MCNC: 134 MG/DL (ref 65–99)
GLUCOSE BLD-MCNC: 150 MG/DL (ref 65–99)
GLUCOSE BLD-MCNC: 173 MG/DL (ref 65–99)

## 2019-09-16 PROCEDURE — 99232 SBSQ HOSP IP/OBS MODERATE 35: CPT | Performed by: HOSPITALIST

## 2019-09-16 PROCEDURE — 97116 GAIT TRAINING THERAPY: CPT

## 2019-09-16 PROCEDURE — A9270 NON-COVERED ITEM OR SERVICE: HCPCS | Performed by: PHYSICAL MEDICINE & REHABILITATION

## 2019-09-16 PROCEDURE — 99232 SBSQ HOSP IP/OBS MODERATE 35: CPT | Performed by: PHYSICAL MEDICINE & REHABILITATION

## 2019-09-16 PROCEDURE — 770010 HCHG ROOM/CARE - REHAB SEMI PRIVAT*

## 2019-09-16 PROCEDURE — A9270 NON-COVERED ITEM OR SERVICE: HCPCS | Performed by: HOSPITALIST

## 2019-09-16 PROCEDURE — 700111 HCHG RX REV CODE 636 W/ 250 OVERRIDE (IP): Performed by: PHYSICAL MEDICINE & REHABILITATION

## 2019-09-16 PROCEDURE — 97110 THERAPEUTIC EXERCISES: CPT

## 2019-09-16 PROCEDURE — 97535 SELF CARE MNGMENT TRAINING: CPT

## 2019-09-16 PROCEDURE — 97530 THERAPEUTIC ACTIVITIES: CPT

## 2019-09-16 PROCEDURE — 700102 HCHG RX REV CODE 250 W/ 637 OVERRIDE(OP): Performed by: HOSPITALIST

## 2019-09-16 PROCEDURE — 82962 GLUCOSE BLOOD TEST: CPT | Mod: 91

## 2019-09-16 PROCEDURE — 700102 HCHG RX REV CODE 250 W/ 637 OVERRIDE(OP): Performed by: PHYSICAL MEDICINE & REHABILITATION

## 2019-09-16 RX ORDER — BISACODYL 10 MG
10 SUPPOSITORY, RECTAL RECTAL
Status: DISCONTINUED | OUTPATIENT
Start: 2019-09-16 | End: 2019-09-20 | Stop reason: HOSPADM

## 2019-09-16 RX ORDER — PENTOXIFYLLINE 400 MG/1
400 TABLET, EXTENDED RELEASE ORAL 3 TIMES DAILY
Status: DISCONTINUED | OUTPATIENT
Start: 2019-09-17 | End: 2019-09-20 | Stop reason: HOSPADM

## 2019-09-16 RX ORDER — POLYETHYLENE GLYCOL 3350 17 G/17G
1 POWDER, FOR SOLUTION ORAL
Status: DISCONTINUED | OUTPATIENT
Start: 2019-09-16 | End: 2019-09-20 | Stop reason: HOSPADM

## 2019-09-16 RX ORDER — PENTOXIFYLLINE 400 MG/1
400 TABLET, EXTENDED RELEASE ORAL 3 TIMES DAILY
Status: DISCONTINUED | OUTPATIENT
Start: 2019-09-16 | End: 2019-09-16

## 2019-09-16 RX ORDER — AMOXICILLIN 250 MG
2 CAPSULE ORAL 2 TIMES DAILY PRN
Status: DISCONTINUED | OUTPATIENT
Start: 2019-09-16 | End: 2019-09-20 | Stop reason: HOSPADM

## 2019-09-16 RX ADMIN — LEVOTHYROXINE SODIUM 50 MCG: 50 TABLET ORAL at 08:15

## 2019-09-16 RX ADMIN — ATORVASTATIN CALCIUM 80 MG: 40 TABLET, FILM COATED ORAL at 21:43

## 2019-09-16 RX ADMIN — LOSARTAN POTASSIUM 50 MG: 25 TABLET ORAL at 08:14

## 2019-09-16 RX ADMIN — INSULIN GLARGINE 38 UNITS: 100 INJECTION, SOLUTION SUBCUTANEOUS at 21:49

## 2019-09-16 RX ADMIN — METFORMIN HYDROCHLORIDE 1000 MG: 500 TABLET, FILM COATED ORAL at 08:15

## 2019-09-16 RX ADMIN — GABAPENTIN 1200 MG: 400 CAPSULE ORAL at 21:43

## 2019-09-16 RX ADMIN — INSULIN LISPRO 2 UNITS: 100 INJECTION, SOLUTION INTRAVENOUS; SUBCUTANEOUS at 11:18

## 2019-09-16 RX ADMIN — HEPARIN SODIUM 5000 UNITS: 5000 INJECTION, SOLUTION INTRAVENOUS; SUBCUTANEOUS at 05:36

## 2019-09-16 RX ADMIN — CLOPIDOGREL BISULFATE 75 MG: 75 TABLET ORAL at 08:14

## 2019-09-16 RX ADMIN — METFORMIN HYDROCHLORIDE 1000 MG: 500 TABLET, FILM COATED ORAL at 17:40

## 2019-09-16 RX ADMIN — HEPARIN SODIUM 5000 UNITS: 5000 INJECTION, SOLUTION INTRAVENOUS; SUBCUTANEOUS at 13:53

## 2019-09-16 RX ADMIN — GABAPENTIN 600 MG: 300 CAPSULE ORAL at 17:40

## 2019-09-16 RX ADMIN — GABAPENTIN 600 MG: 300 CAPSULE ORAL at 05:36

## 2019-09-16 RX ADMIN — GABAPENTIN 600 MG: 300 CAPSULE ORAL at 11:39

## 2019-09-16 RX ADMIN — ASPIRIN 81 MG: 81 TABLET, COATED ORAL at 17:40

## 2019-09-16 RX ADMIN — OMEPRAZOLE 20 MG: 20 CAPSULE, DELAYED RELEASE ORAL at 08:14

## 2019-09-16 RX ADMIN — VITAMIN D, TAB 1000IU (100/BT) 2000 UNITS: 25 TAB at 08:14

## 2019-09-16 RX ADMIN — TAMSULOSIN HYDROCHLORIDE 0.8 MG: 0.4 CAPSULE ORAL at 08:14

## 2019-09-16 RX ADMIN — PRIMIDONE 50 MG: 50 TABLET ORAL at 21:46

## 2019-09-16 RX ADMIN — PROPRANOLOL HYDROCHLORIDE 120 MG: 60 CAPSULE, EXTENDED RELEASE ORAL at 08:14

## 2019-09-16 RX ADMIN — HEPARIN SODIUM 5000 UNITS: 5000 INJECTION, SOLUTION INTRAVENOUS; SUBCUTANEOUS at 21:43

## 2019-09-16 RX ADMIN — GLIMEPIRIDE 4 MG: 2 TABLET ORAL at 08:15

## 2019-09-16 RX ADMIN — CHLORTHALIDONE 25 MG: 25 TABLET ORAL at 08:15

## 2019-09-16 ASSESSMENT — ENCOUNTER SYMPTOMS
FEVER: 0
NERVOUS/ANXIOUS: 0
DIARRHEA: 0
VOMITING: 0
ABDOMINAL PAIN: 0
SHORTNESS OF BREATH: 0
NAUSEA: 0
CHILLS: 0

## 2019-09-16 NOTE — PROGRESS NOTES
"Rehab Progress Note     Encounter Date: 9/16/2019    CC: R BKA, RLE pain, and delayed response time    Interval Events (Subjective)  Patient sitting up in room. He reports he is doing well. Denies NVD. He would like bowel medications to be PRN. Reports his pain is well controlled.      IDT Team Meeting 9/12/2019  DC/Disposition:  9/20/19    Objective:  VITAL SIGNS: /68   Pulse 74   Temp 36.7 °C (98.1 °F) (Oral)   Resp 18   Ht 1.854 m (6' 1\")   Wt 89.5 kg (197 lb 5 oz)   SpO2 92%   BMI 26.03 kg/m²   Gen: NAD  Psych: Mood and affect appropriate  CV: RRR, no edema  Resp: CTAB, no upper airway sounds  Abd: NTND  Neuro: AOX4, 5/5 LLE, 4/5 R HF    Recent Results (from the past 72 hour(s))   ACCU-CHEK GLUCOSE    Collection Time: 09/13/19  5:34 PM   Result Value Ref Range    Glucose - Accu-Ck 151 (H) 65 - 99 mg/dL   ACCU-CHEK GLUCOSE    Collection Time: 09/13/19  8:34 PM   Result Value Ref Range    Glucose - Accu-Ck 155 (H) 65 - 99 mg/dL   ACCU-CHEK GLUCOSE    Collection Time: 09/14/19  7:33 AM   Result Value Ref Range    Glucose - Accu-Ck 158 (H) 65 - 99 mg/dL   ACCU-CHEK GLUCOSE    Collection Time: 09/14/19 11:06 AM   Result Value Ref Range    Glucose - Accu-Ck 195 (H) 65 - 99 mg/dL   ACCU-CHEK GLUCOSE    Collection Time: 09/14/19  5:24 PM   Result Value Ref Range    Glucose - Accu-Ck 197 (H) 65 - 99 mg/dL   ACCU-CHEK GLUCOSE    Collection Time: 09/14/19 10:21 PM   Result Value Ref Range    Glucose - Accu-Ck 171 (H) 65 - 99 mg/dL   ACCU-CHEK GLUCOSE    Collection Time: 09/15/19  7:33 AM   Result Value Ref Range    Glucose - Accu-Ck 143 (H) 65 - 99 mg/dL   ACCU-CHEK GLUCOSE    Collection Time: 09/15/19 11:15 AM   Result Value Ref Range    Glucose - Accu-Ck 197 (H) 65 - 99 mg/dL   ACCU-CHEK GLUCOSE    Collection Time: 09/15/19  5:27 PM   Result Value Ref Range    Glucose - Accu-Ck 114 (H) 65 - 99 mg/dL   ACCU-CHEK GLUCOSE    Collection Time: 09/15/19  9:21 PM   Result Value Ref Range    Glucose - Accu-Ck 174 " (H) 65 - 99 mg/dL   ACCU-CHEK GLUCOSE    Collection Time: 09/16/19  7:25 AM   Result Value Ref Range    Glucose - Accu-Ck 134 (H) 65 - 99 mg/dL   ACCU-CHEK GLUCOSE    Collection Time: 09/16/19 11:17 AM   Result Value Ref Range    Glucose - Accu-Ck 173 (H) 65 - 99 mg/dL       Current Facility-Administered Medications   Medication Frequency   • insulin glargine (LANTUS) injection 38 Units Q EVENING    And   • insulin lispro (HUMALOG) injection 2-12 Units 4X/DAY ACHS    And   • glucose 4 g chewable tablet 16 g Q15 MIN PRN    And   • dextrose 50% (D50W) injection 50 mL Q15 MIN PRN   • glimepiride (AMARYL) tablet 4 mg QAM AC   • tamsulosin (FLOMAX) capsule 0.8 mg DAILY   • omeprazole (PRILOSEC) capsule 20 mg DAILY   • clopidogrel (PLAVIX) tablet 75 mg DAILY   • vitamin D (cholecalciferol) tablet 2,000 Units DAILY   • metFORMIN (GLUCOPHAGE) tablet 1,000 mg BID WITH MEALS   • Respiratory Care per Protocol Continuous RT   • tramadol (ULTRAM) 50 MG tablet 50 mg Q4HRS PRN   • hydrALAZINE (APRESOLINE) tablet 25 mg Q8HRS PRN   • acetaminophen (TYLENOL) tablet 650 mg Q4HRS PRN   • senna-docusate (PERICOLACE or SENOKOT S) 8.6-50 MG per tablet 2 Tab BID    And   • polyethylene glycol/lytes (MIRALAX) PACKET 1 Packet QDAY PRN    And   • magnesium hydroxide (MILK OF MAGNESIA) suspension 30 mL QDAY PRN    And   • bisacodyl (DULCOLAX) suppository 10 mg QDAY PRN   • artificial tears ophthalmic solution 1 Drop PRN   • benzocaine-menthol (CEPACOL) lozenge 1 Lozenge Q2HRS PRN   • mag hydrox-al hydrox-simeth (MAALOX PLUS ES or MYLANTA DS) suspension 20 mL Q2HRS PRN   • ondansetron (ZOFRAN ODT) dispertab 4 mg 4X/DAY PRN    Or   • ondansetron (ZOFRAN) syringe/vial injection 4 mg 4X/DAY PRN   • traZODone (DESYREL) tablet 50 mg QHS PRN   • sodium chloride (OCEAN) 0.65 % nasal spray 2 Spray PRN   • heparin injection 5,000 Units Q8HRS   • aspirin EC (ECOTRIN) tablet 81 mg DAILY   • atorvastatin (LIPITOR) tablet 80 mg Q EVENING   • chlorthalidone  (HYGROTON) tablet 25 mg DAILY   • gabapentin (NEURONTIN) capsule 1,200 mg QHS   • gabapentin (NEURONTIN) capsule 600 mg TID   • HYDROcodone-acetaminophen (NORCO) 5-325 MG per tablet 1-2 Tab Q4HRS PRN   • levothyroxine (SYNTHROID) tablet 50 mcg QAM AC   • losartan (COZAAR) tablet 50 mg DAILY   • primidone (MYSOLINE) tablet 50 mg Q EVENING   • propranolol LA (INDERAL LA) capsule 120 mg QAM       Orders Placed This Encounter   Procedures   • Diet Order Diabetic     Standing Status:   Standing     Number of Occurrences:   1     Order Specific Question:   Diet:     Answer:   Diabetic [3]       Assessment:  Active Hospital Problems    Diagnosis   • *S/P BKA (below knee amputation) unilateral, right ([facility])   • Hypertension, essential   • Hypothyroidism   • PVD (peripheral vascular disease) ([facility])- dr mohsen (cardilogy) at [facility]; arterial angioplasty right leg tbd july2019; rx trental (dr hernandes podiatry)   • Hypothyroidism due to acquired atrophy of thyroid- dr browning   • Uncontrolled type 2 diabetes mellitus with complication, with long-term current use of insulin ([facility])- dr browning   • Mixed hyperlipidemia- dr dwyer   • Essential hypertension- DR DWYER       Medical Decision Making and Plan:  R BKA - Patient with DM and PVD with right BKA on 9/5/19 with Dr. Amezcua.  -PT and OT for mobility and ADLs  -NWB RLE  -Continue ASA  -Previously on Plavix and Pentoxifylline. Check AM CBC and restart. 11.9 on admission.   -Prosthetist exchanged rigid removal dressing as causing discomfort 9/12/19   -Restart Pentoxifylline      DM with hyperglycemia - Patient with uncontrolled DM into 300s. On Lantus 19 and SSI on transfer. Previously on Empagliflozin 25 mg daily, Glimepiride 4 mg daily, Ozempic 1 mg q7days, and Metformin 1000 mg BID  -Continue to monitor. Continue Lantus and SSI. Consult hospitalist.   -Discussed with Hospitalist and increase Lantus to 30U.  A1c - 8.1. Restarted home Metformin 1000 mg BID. Restarted on Home  Glimepiride. Discussed with hospitalist and increased to 35 U Lantus so concern for hypoglycemia if all restarted at once.     Neuropathy from DM - Patient on Gabapentin 600/600/600/1200 on transfer. Will continue to monitor.      HTN - Patient on Chlorthalidone 25 mg and Losartan 50 mg daily.      HLD - Patient on Atorvastatin 80 mg QHS.     Hypothyroidism - Patient on 50 mcg on transfer.      Essential Tremor - On Primidone 50 mg and Propranolol 120 mg daily     BPH - Patient on Tamsulosin 0.4 mg. Check PVRs - ~250, increase to 0.8 mg and monitor   -Improved < 75 on increased flomax      Vitamin D - 19 on admission, start 2000 U daily.     DVT Ppx - Patient on Heparin 5000 q8h on transfer.      Total time:  25 minutes.  I spent greater than 50% of the time for patient care, counseling, and coordination on this date, including unit/floor time, and face-to-face time with the patient as per interval events and assessment and plan above. Topics discussed included pain controlled, bowel medications to PRN and restart Pentoxifylline.     Vincent Rubin M.D.

## 2019-09-16 NOTE — THERAPY
Occupational Therapy  Daily Treatment     Patient Name: Kevin Jackson  Age:  74 y.o., Sex:  male  Medical Record #: [id number]  Today's Date: 9/16/2019     Precautions  Precautions: Non Weight Bearing Right Lower Extremity, Fall Risk  Comments: IPOP RLE         Subjective       Objective       09/16/19 1431   Precautions   Precautions Non Weight Bearing Right Lower Extremity;Fall Risk   Comments IPOP RLE   Vitals   O2 Delivery None (Room Air)   Sitting Upper Body Exercises   Comments Seated at edge of plinth w/ no lateral no back supports - rebounder, 4# ball, 6x50.  SBA/Sup, dropped balls x 6, no LOB.   Interdisciplinary Plan of Care Collaboration   Patient Position at End of Therapy Seated;Self Releasing Lap Belt Applied   OT Total Time Spent   OT Individual Total Time Spent (Mins) 30   OT Charge Group   OT Therapeutic Exercise  2       Assessment    Pt was alert and cooperative w/ tx.  Limiters include NWB RLE, mild cog, limited endurance/strength/balance    Plan    Cont'd OT for strength, endurance, balance, AE/DME for ADL's and transfers.  AM shower for FIM's

## 2019-09-16 NOTE — CARE PLAN
Problem: Communication  Goal: The ability to communicate needs accurately and effectively will improve  Intervention: Educate patient and significant other/support system about the plan of care, procedures, treatments, medications and allow for questions  Note:   Patient this am states his immobilizer for BKA has a crack in it. Shivam from [employer] came in and assessed immobilizer. Shivam took immobilizer for repair/replacement.      Problem: Pain Management  Goal: Pain level will decrease to patient's comfort goal  Intervention: Follow pain managment plan developed in collaboration with patient and Interdisciplinary Team  Note:   Patient able to verbalize pain level and verbalize an acceptable level of pain.

## 2019-09-16 NOTE — PROGRESS NOTES
[facility] Medicine Daily Progress Note    Date of Service  9/16/2019    Chief Complaint:  Hypertension  Diabetes  Abnormal TFT's    Interval History:  No significant events or changes since last visit    Review of Systems  Review of Systems   Constitutional: Negative for chills and fever.   Respiratory: Negative for shortness of breath.    Cardiovascular: Negative for chest pain.   Gastrointestinal: Negative for abdominal pain, diarrhea, nausea and vomiting.   Psychiatric/Behavioral: The patient is not nervous/anxious.         Physical Exam  Temp:  [36.4 °C (97.6 °F)-36.7 °C (98.1 °F)] 36.7 °C (98.1 °F)  Pulse:  [70-85] 82  Resp:  [18] 18  BP: (102-121)/(63-74) 121/74  SpO2:  [90 %-91 %] 90 %    Physical Exam   Constitutional: He appears well-nourished.   HENT:   Head: Atraumatic.   Eyes: Pupils are equal, round, and reactive to light. Conjunctivae are normal.   Neck: Normal range of motion. Neck supple.   Cardiovascular: Normal rate and regular rhythm.   No murmur heard.  Pulmonary/Chest: Effort normal. He has no wheezes. He has no rales.   Abdominal: Soft. He exhibits no distension. There is no tenderness.   Musculoskeletal: He exhibits no edema.   Has right BKA   Skin: Skin is warm. No rash noted.   Psychiatric: His behavior is normal.   Nursing note and vitals reviewed.      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  Wound dressing C/D/I  No signs of infection    Hypothyroidism  Assessment & Plan  Has hx  TSH: 1.77 (5/16/2018) --> 1.24 (2/20/19) --> 2.1 (6/4) --> 10.8 (9/11)  FT4: 1.11 (9/11)  Likely subclinical  Note: TSH and FT4 has been good since 2013 and has  been on the same Synthroid dose  Note: Would normally increase Synthroid but since pt has been good for a while on the same med dose,             I will wait and repeat the TFT's in about 1 week to see if it is trending down; if it is not,            I will then will consider increasing the dose  On Synthroid: 50 mcg daily  Note: followed by Dr. Abbott    Hypertension, essential  Assessment & Plan  BP ok  On Cozaar: 50 mg daily  On Propranolol: 120 mg qam  On Chlorthalidone: 25 mg daily  Note: also on Flomax  Monitor    Uncontrolled type 2 diabetes mellitus with complication, with long-term current use of insulin (HCC)- dr patterson- (present on admission)  Assessment & Plan  Hba1c: 8.1 (6/4) --> 7.2 (8/8) --> 8.1 (9/11)  BS this am after med change: 134  On Lantus: 19 units --> 30 units (9/12) --> 35 units (9/13) --> 38 units qhs (9/15)  On Metformin: 1000 mg bid   On Amaryl: 4 mg daily (9/13)  Note: home meds include Metformin 1000 mg bid, Jardiance 25 mg daily, Amaryl 4 mg qam, Ozempic .25 mg qweekly, and Insulin 50/50 qhs  Note: followed by Dr. Patterson  Cont to monitor    Mixed hyperlipidemia- dr dwyer- (present on admission)  Assessment & Plan  On Lipitor

## 2019-09-16 NOTE — THERAPY
Physical Therapy   Daily Treatment     Patient Name: Kevin Jackson  Age:  74 y.o., Sex:  male  Medical Record #: [id number]  Today's Date: 9/16/2019     Precautions  Precautions: Non Weight Bearing Right Lower Extremity, Fall Risk  Comments: IPOP RLE    Subjective    Patient agreeable to PT.     Objective       09/16/19 0901   Bed Mobility    Sit to Stand Contact Guard Assist  (FWW, gait belt, setup)   PT Total Time Spent   PT Individual Total Time Spent (Mins) 30   PT Charge Group   PT Gait Training 1   PT Therapeutic Activities 1       FIM Walking Score:  2 - Max Assistance  Walking Description:  Walker, Extra time, Safety concerns, Supervision for safety, Requires incidental assist(67 feet with FWW, CGA at gait belt increasing to Min A during last 15 feet, hop-to pattern, rigid orthosis donned throughout session)    FIM Wheelchair Score:  5 - Standby Prompting/Supervision or Set-up  Wheelchair Description:  (1x 550 feet without fatigue noted.  SBA outdoor w/c mobility with ramps, uneven sidewalks, and level terrain x250 feet and SPV with the 300 feet indoors.  Cueing for way finding and min A with setup for lap belt and/or legrest management.)    FIM Stairs Score:  0 - Not tested,unsafe activity  Stairs Description:         Assessment    Patient has improving w/c mobility and ambulation status; good participation; cueing prn for cognition; improving endurance and strength.    Plan    FWW ambulation, UE and LE strength, education, outdoor w/c mobility, and FWW with transfers with decreased A.  D/C on Friday 9/20/19.

## 2019-09-16 NOTE — CARE PLAN
Problem: Safety  Goal: Will remain free from injury  Outcome: PROGRESSING AS EXPECTED  Note:   Pt uses call light  appropriately. Waits for assistance does not attempt self transfer this shift. Able to verbalize needs.      Problem: Bowel/Gastric:  Goal: Normal bowel function is maintained or improved  Outcome: PROGRESSING AS EXPECTED  Intervention: Educate patient and significant other/support system about diet, fluid intake, medications and activity to promote bowel function  Note:   Pt continent of bowel. Refused his scheduled senna tonight and had a large loose stools at bedtime. LBM 9/15/19. Will continue to monitor.

## 2019-09-16 NOTE — CARE PLAN
Problem: GLYCEMIA IMBALANCE  Intervention: MONITOR BLOOD GLUCOSE LEVELS AS ORDERED  Note:   HS [id number]. Humalog 2 units + lantus 38 units given per MAR. HS snack provided and consumed. Will continue to monitor.

## 2019-09-16 NOTE — THERAPY
Occupational Therapy  Daily Treatment     Patient Name: Kevin Jackson  Age:  74 y.o., Sex:  male  Medical Record #: [id number]  Today's Date: 2019     Precautions  Precautions: Non Weight Bearing Right Lower Extremity, Fall Risk  Comments: IPOP RLE          Subjective       Objective       19 1031   Precautions   Precautions Non Weight Bearing Right Lower Extremity;Fall Risk   Comments IPOP RLE    Vitals   O2 Delivery None (Room Air)   Pain   Intervention Declines   Pain 0 - 10 Group   Therapist Pain Assessment 0   Non Verbal Descriptors   Non Verbal Scale  Calm   Cognition    Level of Consciousness Alert   Sitting Upper Body Exercises   Upper Extremity Bike Level 4 Resistance  (FluidoBike, 10 mins Frwrd/10mins Rvrs, 4.316km)   Standing Upper Body Exercises   Other Exercises II bar/pegboard activity:  Pt standing at II bar, 5' into II bars pegboard mounted to mirror to R of II bar at head height, at starting position raised table w/ container of pegs to L of II bars.  Pt instructed to pivot to L (facing table/pegs), retrieve one peg at a time, pivot R, navigate 5' down II bars to mirror w/ pegboard, pivot R to face pegboard and place peg on upper row (eye level), turn/pivot L to face down the II bars and navigate 5' to starting position to continue.  Pt complete 2x10 2/ seated rest break between sets.  1 peg = 5' x 2= 10.  1 set of 10 = 100'.  Pt completed 2 sets of 10 for 200'.  CGA via gait belt, no LOB, seated rest break x 1.   Interdisciplinary Plan of Care Collaboration   IDT Collaboration with  Nursing;Physician   Patient Position at End of Therapy Seated;Self Releasing Lap Belt Applied   Collaboration Comments Pt escorted to dining room for lunch   OT Total Time Spent   OT Individual Total Time Spent (Mins) 60   OT Charge Group   OT Self Care / ADL 1   OT Therapy Activity 2   OT Therapeutic Exercise  1       FIM Upper Body Dressin - Independent  Upper Body Dressing Description:  (Don/doff  pull over shirt)    FIM Lower Body Dressing Score:  4 - Minimal Assistance  Lower Body Dressing Description:  Supervision for safety, Verbal cueing, Set-up of equipment, Initial preparation for task(CGA in stand via grab bar to manage pants/briefs at waist (LOB self correct x 2), Mod Indep todon L sock/shoe/lace management, Mod Indep to don IPOP)      Assessment    Pt was alert and cooperative w/ tx.  Limiters include generalized weakness, impaired endurance, mild cog/memory, balance and safety awareness - noted IPOP cracked - Orthotists took and will replace it this afternoon.    Plan    Cont'd OT for strength, endurance, balance, AE/DME for ADL's and transfers

## 2019-09-16 NOTE — THERAPY
Physical Therapy   Daily Treatment     Patient Name: Kevin Jackson  Age:  74 y.o., Sex:  male  Medical Record #: [id number]  Today's Date: 9/16/2019     Precautions  Precautions: Non Weight Bearing Right Lower Extremity, Fall Risk  Comments: IPOP RLE    Subjective    Patient agreeable to PT.     Objective       09/16/19 1501   Supine Lower Body Exercise   Bridges 2 sets of 15;Two Legged  (bolster)   Hip Abduction Side Lying;2 sets of 10  (and 5 lbs weight on R residual limb)   Straight Leg Raises 2 sets of 10  (bolster)   Knee to Chest 2 sets of 15   Other Exercises supine x5 min with focus on R hip flexor stretch; prone x5 min for same stretch   Bed Mobility    Supine to Sit Modified Independent   Sit to Supine Modified Independent   Sit to Stand Contact Guard Assist  (SBA-CGA, FWW, gait belt, setup or problem solving prn)   Scooting Modified Independent   Rolling Modified Independent   PT Total Time Spent   PT Individual Total Time Spent (Mins) 60   PT Charge Group   PT Gait Training 1   PT Therapeutic Exercise 2   PT Therapeutic Activities 1       FIM Bed/Chair/Wheelchair Transfers Score: 4 - Minimal Assistance  Bed/Chair/Wheelchair Transfers Description:  (CGA, gait belt, FWW, setup prn, increased time.  1 standard bed transfer and 1 mat transfer today.)    FIM Walking Score:  2 - Max Assistance  Walking Description:  (CGA, FWW, Gait belt, and one instane of Min A at end of rep; 1x 65 feet indoors, bradykinetic, hop-to, good pacing and safety, orthosis donned throughout)    FIM Wheelchair Score:  5 - Standby Prompting/Supervision or Set-up  Wheelchair Description:  (Setup/SPV indoors, cues for way finding, 2x 300 feet without fatigue noted.  Adjusted pt's R swing-away residual limb rest.)      Assessment    Patient has good participation, improving strength, and endurance with ambulation quality.  Pt will benefit from intermittent SPV at d/c to increase safety.    Plan    FWW ambulation, UE and LE strength,  education, outdoor w/c mobility, and FWW with transfers with decreased A.  D/C on Friday 9/20/19.

## 2019-09-16 NOTE — PROGRESS NOTES
Patient care assumed. Report received from [facility] LAURIE Nueñz.  Patient is alert and calm, resting in bed. Call light and bedside table within reach. Will continue to monitor.

## 2019-09-17 LAB
GLUCOSE BLD-MCNC: 113 MG/DL (ref 65–99)
GLUCOSE BLD-MCNC: 115 MG/DL (ref 65–99)
GLUCOSE BLD-MCNC: 153 MG/DL (ref 65–99)
GLUCOSE BLD-MCNC: 203 MG/DL (ref 65–99)

## 2019-09-17 PROCEDURE — 97530 THERAPEUTIC ACTIVITIES: CPT

## 2019-09-17 PROCEDURE — 97535 SELF CARE MNGMENT TRAINING: CPT

## 2019-09-17 PROCEDURE — A9270 NON-COVERED ITEM OR SERVICE: HCPCS | Performed by: HOSPITALIST

## 2019-09-17 PROCEDURE — 82962 GLUCOSE BLOOD TEST: CPT | Mod: 91

## 2019-09-17 PROCEDURE — 97116 GAIT TRAINING THERAPY: CPT

## 2019-09-17 PROCEDURE — 97110 THERAPEUTIC EXERCISES: CPT

## 2019-09-17 PROCEDURE — 99232 SBSQ HOSP IP/OBS MODERATE 35: CPT | Performed by: PHYSICAL MEDICINE & REHABILITATION

## 2019-09-17 PROCEDURE — 700102 HCHG RX REV CODE 250 W/ 637 OVERRIDE(OP): Performed by: HOSPITALIST

## 2019-09-17 PROCEDURE — 770010 HCHG ROOM/CARE - REHAB SEMI PRIVAT*

## 2019-09-17 PROCEDURE — 700111 HCHG RX REV CODE 636 W/ 250 OVERRIDE (IP): Performed by: PHYSICAL MEDICINE & REHABILITATION

## 2019-09-17 PROCEDURE — 700102 HCHG RX REV CODE 250 W/ 637 OVERRIDE(OP): Performed by: PHYSICAL MEDICINE & REHABILITATION

## 2019-09-17 PROCEDURE — 99232 SBSQ HOSP IP/OBS MODERATE 35: CPT | Performed by: HOSPITALIST

## 2019-09-17 PROCEDURE — A9270 NON-COVERED ITEM OR SERVICE: HCPCS | Performed by: PHYSICAL MEDICINE & REHABILITATION

## 2019-09-17 RX ADMIN — VITAMIN D, TAB 1000IU (100/BT) 2000 UNITS: 25 TAB at 08:07

## 2019-09-17 RX ADMIN — OMEPRAZOLE 20 MG: 20 CAPSULE, DELAYED RELEASE ORAL at 08:06

## 2019-09-17 RX ADMIN — INSULIN LISPRO 2 UNITS: 100 INJECTION, SOLUTION INTRAVENOUS; SUBCUTANEOUS at 20:48

## 2019-09-17 RX ADMIN — PROPRANOLOL HYDROCHLORIDE 120 MG: 60 CAPSULE, EXTENDED RELEASE ORAL at 08:06

## 2019-09-17 RX ADMIN — TAMSULOSIN HYDROCHLORIDE 0.8 MG: 0.4 CAPSULE ORAL at 08:06

## 2019-09-17 RX ADMIN — PRIMIDONE 50 MG: 50 TABLET ORAL at 20:42

## 2019-09-17 RX ADMIN — INSULIN GLARGINE 38 UNITS: 100 INJECTION, SOLUTION SUBCUTANEOUS at 20:47

## 2019-09-17 RX ADMIN — ATORVASTATIN CALCIUM 80 MG: 40 TABLET, FILM COATED ORAL at 20:42

## 2019-09-17 RX ADMIN — CLOPIDOGREL BISULFATE 75 MG: 75 TABLET ORAL at 08:07

## 2019-09-17 RX ADMIN — ASPIRIN 81 MG: 81 TABLET, COATED ORAL at 18:00

## 2019-09-17 RX ADMIN — CHLORTHALIDONE 25 MG: 25 TABLET ORAL at 08:07

## 2019-09-17 RX ADMIN — LOSARTAN POTASSIUM 50 MG: 25 TABLET ORAL at 08:07

## 2019-09-17 RX ADMIN — GLIMEPIRIDE 4 MG: 2 TABLET ORAL at 08:07

## 2019-09-17 RX ADMIN — PENTOXIFYLLINE 400 MG: 400 TABLET, EXTENDED RELEASE ORAL at 20:47

## 2019-09-17 RX ADMIN — GABAPENTIN 600 MG: 300 CAPSULE ORAL at 05:45

## 2019-09-17 RX ADMIN — LEVOTHYROXINE SODIUM 50 MCG: 50 TABLET ORAL at 08:07

## 2019-09-17 RX ADMIN — TRAZODONE HYDROCHLORIDE 50 MG: 50 TABLET ORAL at 20:42

## 2019-09-17 RX ADMIN — HEPARIN SODIUM 5000 UNITS: 5000 INJECTION, SOLUTION INTRAVENOUS; SUBCUTANEOUS at 05:45

## 2019-09-17 RX ADMIN — GABAPENTIN 600 MG: 300 CAPSULE ORAL at 11:56

## 2019-09-17 RX ADMIN — METFORMIN HYDROCHLORIDE 1000 MG: 500 TABLET, FILM COATED ORAL at 17:25

## 2019-09-17 RX ADMIN — HEPARIN SODIUM 5000 UNITS: 5000 INJECTION, SOLUTION INTRAVENOUS; SUBCUTANEOUS at 14:44

## 2019-09-17 RX ADMIN — PENTOXIFYLLINE 400 MG: 400 TABLET, EXTENDED RELEASE ORAL at 08:43

## 2019-09-17 RX ADMIN — METFORMIN HYDROCHLORIDE 1000 MG: 500 TABLET, FILM COATED ORAL at 08:07

## 2019-09-17 RX ADMIN — PENTOXIFYLLINE 400 MG: 400 TABLET, EXTENDED RELEASE ORAL at 14:44

## 2019-09-17 RX ADMIN — HYDROCODONE BITARTRATE AND ACETAMINOPHEN 1 TABLET: 5; 325 TABLET ORAL at 20:41

## 2019-09-17 RX ADMIN — GABAPENTIN 1200 MG: 400 CAPSULE ORAL at 20:42

## 2019-09-17 RX ADMIN — GABAPENTIN 600 MG: 300 CAPSULE ORAL at 17:24

## 2019-09-17 RX ADMIN — INSULIN LISPRO 4 UNITS: 100 INJECTION, SOLUTION INTRAVENOUS; SUBCUTANEOUS at 10:59

## 2019-09-17 RX ADMIN — HEPARIN SODIUM 5000 UNITS: 5000 INJECTION, SOLUTION INTRAVENOUS; SUBCUTANEOUS at 21:40

## 2019-09-17 ASSESSMENT — PATIENT HEALTH QUESTIONNAIRE - PHQ9
2. FEELING DOWN, DEPRESSED, IRRITABLE, OR HOPELESS: NOT AT ALL
1. LITTLE INTEREST OR PLEASURE IN DOING THINGS: NOT AT ALL
SUM OF ALL RESPONSES TO PHQ9 QUESTIONS 1 AND 2: 0

## 2019-09-17 ASSESSMENT — ENCOUNTER SYMPTOMS
NAUSEA: 0
FEVER: 0
ABDOMINAL PAIN: 0
VOMITING: 0
CHILLS: 0
SHORTNESS OF BREATH: 0

## 2019-09-17 NOTE — THERAPY
Occupational Therapy  Daily Treatment     Patient Name: Kevin Jackson  Age:  74 y.o., Sex:  male  Medical Record #: [id number]  Today's Date: 2019     Precautions  Precautions: Non Weight Bearing Right Lower Extremity, Fall Risk  Comments: IPOP RLE         Subjective       Objective       19 0701   Precautions   Precautions Non Weight Bearing Right Lower Extremity;Fall Risk   Comments IPOP RLE   Vitals   O2 Delivery None (Room Air)   Pain   Intervention Declines   Non Verbal Descriptors   Non Verbal Scale  Calm   Cognition    Level of Consciousness Alert   Interdisciplinary Plan of Care Collaboration   Patient Position at End of Therapy Seated;Self Releasing Lap Belt Applied   Collaboration Comments Pt escorted to dining room for breakfast   OT Total Time Spent   OT Individual Total Time Spent (Mins) 60   OT Charge Group   OT Self Care / ADL 4       FIM Grooming Score:  6 - Modified Independent  Grooming Description:  Increased time(wc level at sinkside)    FIM Bathing Score:  6 - Modified Independent  Bathing Description:       FIM Upper Body Dressin - Independent  Upper Body Dressing Description:  (Don/doff pull over shirt)    FIM Lower Body Dressing Score:  5 - Standby Prompting/Supervsion or Set-up  Lower Body Dressing Description:  Supervision for safety, Increased time(Indep to don L sock and R residual limb sock,  Mod Indep to thread BLE's through pants/briefs, SBA in stand via grab bar to manage pants/briefs at waist, Mod Indep to don L shoe + lace management, Mod Indep to manage IPOP)    FIM Toiletin - Modified Independent  Toileting Description:  Increased time    FIM Toilet Transfer Score:  5 - Standby Prompting/Supervision or Set-up  Toilet Transfer Description:  Grab bar, Increased time, Supervision for safety(SBA for SPT to/from manual wc and commode via grab bar)    FIM Tub/Shower Transfers Score:  5 - Standby Prompting/Supervision or Set-up  Tub/Shower Transfers Description:   Grab bar, Increased time, Supervision for safety(Sup/SBA for SPT to/from manual wc and shower bench via grab bar.)      Assessment    Pt was alert and cooperative w/ tx.  Limiters include NWB RLE, standing balance, limited endurance    Plan    Cont'd OT for strength, endurance, balance, AE/DME for ADL's and transfers.  AM shower for FIM's

## 2019-09-17 NOTE — CARE PLAN
Problem: Safety  Goal: Will remain free from injury  Outcome: PROGRESSING AS EXPECTED  Note:   Call light with in reach. Redirection to use call light for assistance.  Upper siderails up x2 while in bed. No complains of pain. No respiratory distress. Able to make needs known. Assisted as needed. HS snacks given. Will continue to monitor.

## 2019-09-17 NOTE — CARE PLAN
Problem: Safety  Goal: Will remain free from injury  Intervention: Provide assistance with mobility  Note:   Pt uses call light consistently and appropriately. Waits for assistance does not attempt self transfer this shift. Able to verbalize needs.      Problem: Pain Management  Goal: Pain level will decrease to patient's comfort goal  Intervention: Follow pain managment plan developed in collaboration with patient and Interdisciplinary Team  Note:   Patient able to verbalize needs.  Denies pain or discomfort this shift and no s/s same noted.  Will continue to monitor.

## 2019-09-17 NOTE — PROGRESS NOTES
Patient care assumed. Report received from [facility] LAURIE Noble. Patient is alert and calm, resting in bed. Call light and bedside table within reach. Will continue to monitor.

## 2019-09-17 NOTE — THERAPY
"Physical Therapy   Daily Treatment     Patient Name: Kevin Jackson  Age:  74 y.o., Sex:  male  Medical Record #: [id number]  Today's Date: 9/17/2019     Precautions  Precautions: Non Weight Bearing Right Lower Extremity, Fall Risk  Comments: IPOP RLE    Subjective    Pt is ready for PT     Objective       09/17/19 0931   Supine Lower Body Exercise   Supine Lower Body Exercises Yes   Bridges Two Legged;2 sets of 15  (BKA positioning on bolster)   Hip Abduction Side Lying;2 sets of 15;Right    Straight Leg Raises Front;2 sets of 15;Right   Other Exercises prone R hip extension 2 x 15, prone R knee flexion 2 x 15, B glute squeezes 1 x 15 with 5\" hold, R quad sets 1 x 15 with 5\" hold   Sitting Lower Body Exercises   Hip Adduction Other Resistance (See Comments);1 set of 15  (iso ball squeeze, 5\" hold)   Hamstring Curl 2 sets of 15;Right;Medium Resistance Theraband   Bed Mobility    Supine to Sit Modified Independent   Sit to Supine Modified Independent   Sit to Stand Stand by Assist   Scooting Independent   Rolling Independent   Interdisciplinary Plan of Care Collaboration   Patient Position at End of Therapy Seated;Self Releasing Lap Belt Applied   PT Total Time Spent   PT Individual Total Time Spent (Mins) 60   PT Charge Group   PT Gait Training 1   PT Therapeutic Exercise 3   Supervising Physical Therapist Arden Bhagat       FIM Walking Score:  2 - Max Assistance  Walking Description:  Supervision for safety, Walker, Requires incidental assist(70' x 2 FWW CGA, hop-to pattern IPOP in place)    Assessment    Pt with improving strength, he requires min cues for proper from and control on exercises. Pt transfers with FWW wc > mat with CGA, he completed full set up of wc including removal of swing away stump board and foot pedal, locked breaks and positioned wc correctly for transfer without cues. Lateral scoot transfer wc > SPV.    Plan       FWW ambulation, UE and LE strength, education, outdoor w/c mobility, and FWW " with transfers with decreased A.  D/C on Friday 9/20/19.

## 2019-09-17 NOTE — THERAPY
Occupational Therapy  Daily Treatment     Patient Name: Kevin Jackson  Age:  74 y.o., Sex:  male  Medical Record #: [id number]  Today's Date: 9/17/2019     Precautions  Precautions: Non Weight Bearing Right Lower Extremity, Fall Risk  Comments: IPOP RLE         Subjective       Objective       09/17/19 1231   Precautions   Precautions Non Weight Bearing Right Lower Extremity;Fall Risk   Comments IPOP RLE   Vitals   O2 Delivery None (Room Air)   Pain   Intervention Declines   Pain 0 - 10 Group   Therapist Pain Assessment 0   Non Verbal Descriptors   Non Verbal Scale  Calm   Cognition    Level of Consciousness Alert   Standing Upper Body Exercises   Comments II bar/ladder ball activity:  Pt standing at one end of II bars w/ ladder 8' beyond end, 12' at opposite end 7 ladder balls placed.  Pt instructed to maneuver to opposite end (12') retrieve one ladder ball at a time and return (12') to starting position to toss ladder ball at ladder.  1 ball = 12' x 2 = 24'.  1x7= 24'x7 = 168'.  Pt completed x 2 for total of 336' CGA via gait belt.  No LOB, seated rest break x 2, additional time required.   Interdisciplinary Plan of Care Collaboration   Patient Position at End of Therapy Seated;Self Releasing Lap Belt Applied;Call Light within Reach;Tray Table within Reach;Phone within Reach   OT Total Time Spent   OT Individual Total Time Spent (Mins) 30   OT Charge Group   OT Therapy Activity 2       Assessment    Pt was alert and cooperative w/ tx.  Limiters include NWB RLE, impaired standing balance, limited endurance.    Plan    Cont'd OT for strength, endurance, balance, AE/DME for ADL's and transfers.  AM shower for FIM's

## 2019-09-17 NOTE — DISCHARGE PLANNING
Outpatient therapy referral sent to [facility] per choice form.  Awaiting response.

## 2019-09-17 NOTE — THERAPY
"Physical Therapy   Daily Treatment     Patient Name: Kevin Jackson  Age:  74 y.o., Sex:  male  Medical Record #: [id number]  Today's Date: 2019     Precautions  Precautions: Non Weight Bearing Right Lower Extremity, Fall Risk  Comments: IPOP RLE    Subjective    Patient agreeable to PT.     Objective       19 1101   Standing Lower Body Exercises   Other Exercises Initiated curb practice today in // bars with 1 rep anterior and 1 rep posterior of all the followin\" setup, 3\" Airex pad, and 6\" stepstool.  Min A initially with 6\" stepstoool but otherwise CGA throughout today.   Bed Mobility    Sit to Stand Supervised  (// bars)   PT Total Time Spent   PT Individual Total Time Spent (Mins) 30   PT Charge Group   PT Therapeutic Activities 2     Discussed pt's goals, POC, and treatment goals.  Pt demonstrated good donning of RLE rigid orthosis and we discussed use 23 hrs/day (could also use knee immobilizer in bed at [facility] due to pt's report of discomfort with current orthosis); pt verbalizes awareness.    FIM Wheelchair Score:  5 - Standby Prompting/Supervision or Set-up  Wheelchair Description:  (SPV outdoors including uneven surfaces and ramps; rare A with setup; pt uses B gloves.  x750 feet.  Also 1 indoor rep at Setup to Mod I level.)      Assessment    Patient has improving and safer indoor/outdoor w/c mobility at SPV level or setup A levels.  Good initiation of curb practice today.    Plan    FWW ambulation, UE and LE strength, education, outdoor w/c mobility, and FWW with transfers with decreased A.  D/C on 19.      "

## 2019-09-18 LAB
ANION GAP SERPL CALC-SCNC: 9 MMOL/L (ref 0–11.9)
BUN SERPL-MCNC: 23 MG/DL (ref 8–22)
CALCIUM SERPL-MCNC: 9.8 MG/DL (ref 8.5–10.5)
CHLORIDE SERPL-SCNC: 98 MMOL/L (ref 96–112)
CO2 SERPL-SCNC: 29 MMOL/L (ref 20–33)
CREAT SERPL-MCNC: 0.94 MG/DL (ref 0.5–1.4)
ERYTHROCYTE [DISTWIDTH] IN BLOOD BY AUTOMATED COUNT: 45.2 FL (ref 35.9–50)
GLUCOSE BLD-MCNC: 120 MG/DL (ref 65–99)
GLUCOSE BLD-MCNC: 140 MG/DL (ref 65–99)
GLUCOSE BLD-MCNC: 153 MG/DL (ref 65–99)
GLUCOSE BLD-MCNC: 164 MG/DL (ref 65–99)
GLUCOSE BLD-MCNC: 90 MG/DL (ref 65–99)
GLUCOSE SERPL-MCNC: 82 MG/DL (ref 65–99)
HCT VFR BLD AUTO: 33.9 % (ref 42–52)
HGB BLD-MCNC: 10.6 G/DL (ref 14–18)
MCH RBC QN AUTO: 27.7 PG (ref 27–33)
MCHC RBC AUTO-ENTMCNC: 31.3 G/DL (ref 33.7–35.3)
MCV RBC AUTO: 88.7 FL (ref 81.4–97.8)
PLATELET # BLD AUTO: 515 K/UL (ref 164–446)
PMV BLD AUTO: 9.7 FL (ref 9–12.9)
POTASSIUM SERPL-SCNC: 3.5 MMOL/L (ref 3.6–5.5)
RBC # BLD AUTO: 3.82 M/UL (ref 4.7–6.1)
SODIUM SERPL-SCNC: 136 MMOL/L (ref 135–145)
WBC # BLD AUTO: 8 K/UL (ref 4.8–10.8)

## 2019-09-18 PROCEDURE — 99232 SBSQ HOSP IP/OBS MODERATE 35: CPT | Performed by: PHYSICAL MEDICINE & REHABILITATION

## 2019-09-18 PROCEDURE — 97530 THERAPEUTIC ACTIVITIES: CPT

## 2019-09-18 PROCEDURE — 36415 COLL VENOUS BLD VENIPUNCTURE: CPT

## 2019-09-18 PROCEDURE — 97116 GAIT TRAINING THERAPY: CPT

## 2019-09-18 PROCEDURE — 99232 SBSQ HOSP IP/OBS MODERATE 35: CPT | Performed by: HOSPITALIST

## 2019-09-18 PROCEDURE — 700102 HCHG RX REV CODE 250 W/ 637 OVERRIDE(OP): Performed by: HOSPITALIST

## 2019-09-18 PROCEDURE — A9270 NON-COVERED ITEM OR SERVICE: HCPCS | Performed by: HOSPITALIST

## 2019-09-18 PROCEDURE — 97535 SELF CARE MNGMENT TRAINING: CPT

## 2019-09-18 PROCEDURE — A9270 NON-COVERED ITEM OR SERVICE: HCPCS | Performed by: PHYSICAL MEDICINE & REHABILITATION

## 2019-09-18 PROCEDURE — 97110 THERAPEUTIC EXERCISES: CPT

## 2019-09-18 PROCEDURE — 85027 COMPLETE CBC AUTOMATED: CPT

## 2019-09-18 PROCEDURE — 82962 GLUCOSE BLOOD TEST: CPT | Mod: 91

## 2019-09-18 PROCEDURE — 700111 HCHG RX REV CODE 636 W/ 250 OVERRIDE (IP): Performed by: PHYSICAL MEDICINE & REHABILITATION

## 2019-09-18 PROCEDURE — 700102 HCHG RX REV CODE 250 W/ 637 OVERRIDE(OP): Performed by: PHYSICAL MEDICINE & REHABILITATION

## 2019-09-18 PROCEDURE — 80048 BASIC METABOLIC PNL TOTAL CA: CPT

## 2019-09-18 PROCEDURE — 770010 HCHG ROOM/CARE - REHAB SEMI PRIVAT*

## 2019-09-18 RX ORDER — TAMSULOSIN HYDROCHLORIDE 0.4 MG/1
0.8 CAPSULE ORAL
Status: DISCONTINUED | OUTPATIENT
Start: 2019-09-19 | End: 2019-09-20 | Stop reason: HOSPADM

## 2019-09-18 RX ORDER — POTASSIUM CHLORIDE 20 MEQ/1
40 TABLET, EXTENDED RELEASE ORAL ONCE
Status: COMPLETED | OUTPATIENT
Start: 2019-09-18 | End: 2019-09-18

## 2019-09-18 RX ADMIN — VITAMIN D, TAB 1000IU (100/BT) 2000 UNITS: 25 TAB at 08:02

## 2019-09-18 RX ADMIN — PRIMIDONE 50 MG: 50 TABLET ORAL at 22:02

## 2019-09-18 RX ADMIN — GLIMEPIRIDE 4 MG: 2 TABLET ORAL at 08:03

## 2019-09-18 RX ADMIN — PENTOXIFYLLINE 400 MG: 400 TABLET, EXTENDED RELEASE ORAL at 09:00

## 2019-09-18 RX ADMIN — HYDROCODONE BITARTRATE AND ACETAMINOPHEN 1 TABLET: 5; 325 TABLET ORAL at 17:29

## 2019-09-18 RX ADMIN — HYDROCODONE BITARTRATE AND ACETAMINOPHEN 1 TABLET: 5; 325 TABLET ORAL at 01:55

## 2019-09-18 RX ADMIN — INSULIN LISPRO 2 UNITS: 100 INJECTION, SOLUTION INTRAVENOUS; SUBCUTANEOUS at 17:05

## 2019-09-18 RX ADMIN — GABAPENTIN 1200 MG: 400 CAPSULE ORAL at 20:57

## 2019-09-18 RX ADMIN — TAMSULOSIN HYDROCHLORIDE 0.8 MG: 0.4 CAPSULE ORAL at 08:02

## 2019-09-18 RX ADMIN — PENTOXIFYLLINE 400 MG: 400 TABLET, EXTENDED RELEASE ORAL at 14:29

## 2019-09-18 RX ADMIN — ATORVASTATIN CALCIUM 80 MG: 40 TABLET, FILM COATED ORAL at 20:58

## 2019-09-18 RX ADMIN — INSULIN GLARGINE 38 UNITS: 100 INJECTION, SOLUTION SUBCUTANEOUS at 21:51

## 2019-09-18 RX ADMIN — POTASSIUM CHLORIDE 40 MEQ: 20 TABLET, EXTENDED RELEASE ORAL at 11:34

## 2019-09-18 RX ADMIN — GABAPENTIN 600 MG: 300 CAPSULE ORAL at 05:44

## 2019-09-18 RX ADMIN — METFORMIN HYDROCHLORIDE 1000 MG: 500 TABLET, FILM COATED ORAL at 17:29

## 2019-09-18 RX ADMIN — HYDROCODONE BITARTRATE AND ACETAMINOPHEN 1 TABLET: 5; 325 TABLET ORAL at 01:05

## 2019-09-18 RX ADMIN — LEVOTHYROXINE SODIUM 50 MCG: 50 TABLET ORAL at 08:02

## 2019-09-18 RX ADMIN — GABAPENTIN 600 MG: 300 CAPSULE ORAL at 11:14

## 2019-09-18 RX ADMIN — PENTOXIFYLLINE 400 MG: 400 TABLET, EXTENDED RELEASE ORAL at 20:58

## 2019-09-18 RX ADMIN — HYDROCODONE BITARTRATE AND ACETAMINOPHEN 1 TABLET: 5; 325 TABLET ORAL at 22:02

## 2019-09-18 RX ADMIN — HEPARIN SODIUM 5000 UNITS: 5000 INJECTION, SOLUTION INTRAVENOUS; SUBCUTANEOUS at 05:44

## 2019-09-18 RX ADMIN — HEPARIN SODIUM 5000 UNITS: 5000 INJECTION, SOLUTION INTRAVENOUS; SUBCUTANEOUS at 14:29

## 2019-09-18 RX ADMIN — ASPIRIN 81 MG: 81 TABLET, COATED ORAL at 18:00

## 2019-09-18 RX ADMIN — OMEPRAZOLE 20 MG: 20 CAPSULE, DELAYED RELEASE ORAL at 08:02

## 2019-09-18 RX ADMIN — ONDANSETRON 4 MG: 4 TABLET, ORALLY DISINTEGRATING ORAL at 10:05

## 2019-09-18 RX ADMIN — HEPARIN SODIUM 5000 UNITS: 5000 INJECTION, SOLUTION INTRAVENOUS; SUBCUTANEOUS at 21:53

## 2019-09-18 RX ADMIN — METFORMIN HYDROCHLORIDE 1000 MG: 500 TABLET, FILM COATED ORAL at 08:02

## 2019-09-18 RX ADMIN — GABAPENTIN 600 MG: 300 CAPSULE ORAL at 17:29

## 2019-09-18 RX ADMIN — CLOPIDOGREL BISULFATE 75 MG: 75 TABLET ORAL at 08:01

## 2019-09-18 NOTE — PROGRESS NOTES
Patient states he just doesn't feel well after breakfast. FSBS 140. Patient v/s checked and documented. Dr Olivares notified by this RN, will continue to monitor.

## 2019-09-18 NOTE — CARE PLAN
Problem: Food and nutrition related knowledge deficit  Goal: Patient to verbalize or demonstrate understanding of diet  Outcome: MET

## 2019-09-18 NOTE — THERAPY
"Physical Therapy   Daily Treatment     Patient Name: Kevin Jackson  Age:  74 y.o., Sex:  male  Medical Record #: [id number]  Today's Date: 9/18/2019     Precautions  Precautions: (P) Non Weight Bearing Right Lower Extremity, Fall Risk  Comments: (P) IPOP RLE    Subjective    Patient reported soreness from previous sessions, and requested exercise.     Objective       09/18/19 1501   Precautions   Precautions Non Weight Bearing Right Lower Extremity;Fall Risk   Comments IPOP RLE   Standing Lower Body Exercises   Standing Lower Body Exercises   (// bars CGA for trunk/pelvic stability. HEP handout provided)   Hamstring Curl 2 sets of 10;Right    Hip Flexion 2 sets of 10;Right    Hip Extension 2 sets of 10;Right    Hip Abduction 2 sets of 10;Right    Heel Rise 2 sets of 10;Right   Mini Squat 2 sets of 10  (LLE)   Bed Mobility    Sit to Stand Stand by Assist  (in // bars)   PT Total Time Spent   PT Individual Total Time Spent (Mins) 30   PT Charge Group   PT Therapeutic Exercise 2     Wc press ups 10x 2 sets for functional strengthening    FIM Wheelchair Score:  6 - Modified Independent  Wheelchair Description:         Assessment    Patient demonstrated eagerness to participate, and safety with standing HEP. IPOP was removed during right ham curl and \"march\" exercise.     Plan    D/C anticipated for 9/20.  D/C data collection and recommendations. Fall recovery/ home safety.     "

## 2019-09-18 NOTE — WOUND TEAM
"[facility]  Inpatient Services  Wound and Skin Care Progress Note    Admission Date: 9/10/2019       HPI, PMH, SH: Reviewed    Unit where seen by Wound Team: [id number]     WOUND CONSULT RELATED TO: follow up evaluation of right anterior knee pressure injury      SUBJECTIVE:  \"So you think it looks better?\"      Self Report / Pain Level:  Phantom pain PRN       OBJECTIVE:   sock intact and dressing; BKA incision appears well approximated per last photo and blister resolved; this dressing not removed    WOUND TYPE, LOCATION, CHARACTERISTICS (Pressure Injuries: location, stage, POA or date identified)    Pressure Injury 09/10/19 Knee DTI anterior right knee; 9/17/19 unstageable (Active)   Wound Image   9/17/2019  4:05 PM   Pressure Injury Stage U 9/17/2019  4:05 PM   State of Healing Epithelialized 9/17/2019  4:05 PM   Site Assessment Clean;Dry;Intact 9/17/2019  4:05 PM   Aby-wound Assessment Clean;Dry;Intact 9/17/2019  4:05 PM   Margins Attached edges 9/17/2019  4:05 PM   Wound Length (cm) 1 cm 9/17/2019  4:05 PM   Wound Width (cm) 1 cm 9/17/2019  4:05 PM   Wound Surface Area (cm^2) 1 cm^2 9/17/2019  4:05 PM   Tunneling 0 cm 9/17/2019  4:05 PM   Undermining 0 cm 9/17/2019  4:05 PM   Closure Secondary intention 9/17/2019  4:05 PM   Drainage Amount None 9/17/2019  4:05 PM   Treatments Site care 9/17/2019  4:05 PM   Cleansing Not Applicable 9/17/2019  4:05 PM   Periwound Protectant Not Applicable 9/17/2019  4:05 PM   Dressing Options Mepilex 9/17/2019  4:05 PM   Dressing Cleansing/Solutions Not Applicable 9/17/2019  4:05 PM   Dressing Changed Reinforced 9/17/2019  4:05 PM   Dressing Status Intact 9/17/2019  4:05 PM   Dressing Change Frequency Every 48 hrs 9/17/2019  4:05 PM   NEXT Dressing Change  09/18/19 9/17/2019  4:05 PM   NEXT Weekly Photo (Inpatient Only) 09/24/19 9/17/2019  4:05 PM   WOUND NURSE ONLY - Odor None 9/17/2019  4:05 PM   WOUND NURSE ONLY - Exposed Structures None 9/17/2019  4:05 " PM   WOUND NURSE ONLY - Tissue Type and Percentage red 100% 9/17/2019  4:05 PM   WOUND NURSE ONLY - Time Spent with Patient (mins) 45 9/17/2019  4:05 PM          Vascular:    BKA    Lab Values:    Lab Results   Component Value Date/Time    WBC 7.7 09/11/2019 05:25 AM    RBC 4.18 (L) 09/11/2019 05:25 AM    HEMOGLOBIN 11.9 (L) 09/11/2019 05:25 AM    HEMATOCRIT 37.3 (L) 09/11/2019 05:25 AM        Lab Results   Component Value Date/Time    HBA1C 8.1 (H) 09/11/2019 05:25 AM           Culture:  NA      INTERVENTIONS BY WOUND TEAM:  Met with patient and he removed sock and I removed dressing. Wound is evolving and appears very shallow and not a DTI and will stage them as unstageable as base isn't really visible.  Measurements and photo taken and replaced mepilex dressing.  He replaced his sock.     Dressing Selection:  mepilex        Interdisciplinary consultation: Patient, Bedside RN    EVALUATION: evolving pressure injury that should slough off in another week revealing intact skin; continue with mepilex for off loading area     Factors affecting wound healing: DM, pressure   Goals: Steady decrease in wound area and depth weekly.    NURSING PLAN OF CARE ORDERS (X):    Dressing changes: See Dressing Care orders: X   Skin care: See Skin Care orders:   Rectal tube care: See Rectal Tube Care orders:   Other orders:    WOUND TEAM PLAN OF CARE (X):   NPWT change 3 x week:        Dressing changes by wound team:       Follow up as needed:   X next week    Other (explain):     Anticipated discharge plans (X):   SNF:           Home Care:           Outpatient Wound Center:            Self Care:            Other:    TBD with probable no wound care indicated

## 2019-09-18 NOTE — CARE PLAN
Problem: Communication  Goal: The ability to communicate needs accurately and effectively will improve  Intervention: Educate patient and significant other/support system about the plan of care, procedures, treatments, medications and allow for questions  Note:   Patient feeling much better this afternoon. Has been able to participate in therapies. Will continue to monitor.     Problem: Safety  Goal: Will remain free from injury  Intervention: Educate patient and significant other/support system about adaptive mobility strategies and safe transfers  Note:   Patient demonstrates good safety technique this shift.  Asks for assistance when needed and does not attempt self transfer.  Able to verbalize needs.  Will continue to monitor.

## 2019-09-18 NOTE — THERAPY
"Physical Therapy   Daily Treatment     Patient Name: Kevin Jackson  Age:  74 y.o., Sex:  male  Medical Record #: [id number]  Today's Date: 9/18/2019     Precautions  Precautions: Non Weight Bearing Right Lower Extremity, Fall Risk  Comments: IPOP RLE    Subjective    Patient agreeable to PT; reports not feeling well near end of session (see below).     Objective       09/18/19 0901   Vitals   O2 (LPM) 0   O2 Delivery None (Room Air)   Bed Mobility    Sit to Stand Contact Guard Assist  (at stairs; SPV with FWW; gait belt when fatigued)   Interdisciplinary Plan of Care Collaboration   IDT Collaboration with  Nursing;Physician;Certified Nursing Assistant   Patient Position at End of Therapy Seated;Self Releasing Lap Belt Applied;Call Light within Reach;Tray Table within Reach;Phone within Reach   Collaboration Comments Pt reports fatigue \"maybe have the nurse check my sugars\" after performing stairs; RN, CNA, and hospitalist present; BP becoming low, pt may be dehydrated per hospitalist; pt drank 10 oz of water; then pt had nausea followed by vomitting at end of session; RN notified.   PT Total Time Spent   PT Individual Total Time Spent (Mins) 60   PT Charge Group   PT Gait Training 2   PT Therapeutic Activities 2     Worked on 2\" curbs with Min A progressing to Max A with FWW and gait belt; quick to fatigue in BUE.  Also worked on descending a 4\" curb with FWW, gait belt, and Mod A.  Discussed to not attempt curbs at home; 2 reps of w/c up/down standard curb with Total A with pt in w/c; focus on sequencing and having pt teach-back sequencing and safe performance.    Also performed one car transfer with FWW, gait belt, and CGA; increased time; cueing for pre-positioning.    Discussed pt's POC, demonstrated use of standard stump board.    FIM Wheelchair Score:  5 - Standby Prompting/Supervision or Set-up  Wheelchair Description:  (Mod I indoors/outdoors today but SPV when patient began to report not feeling well " (nausea & dehydration); RN, CNA, and hospitalist aware.  1 rep of 400 feet outdoor/indoor and 1 rep indoors for 350 feet; minimal fatigue noted.)    FIM Stairs Score:  2 - Max Assistance  Stairs Description:  (Min A to/from stairs, cueing, B railings, setup, R rigid residual limb orthosis, gait belt.  1 set of 4 standard stairs using seated/bumping approach.  Increased time.  Patient became fatigued and nauseated after stairs.)      Assessment    Patient has good motivation and participation; limited by onset of fatigue and nausea/vomitting at end of session; good understanding of education with repetition provided.    Plan    FIMs, IRF-[place], floor recovery, supine HEP.  D/C on Friday 9/20/19.

## 2019-09-18 NOTE — PROGRESS NOTES
"Rehab Progress Note     Encounter Date: 9/17/2019    CC: R BKA, RLE pain, and delayed response time    Interval Events (Subjective)  Patient sitting up in room. He reports no pain at this time. Denies NVD. Denies SOB. Discussed case with wound care and most likely wound on anterior knee from IPOP or ACE bandage after surgery. Reviewed wound and healing well.  He is looking forward to discharge on Friday.      IDT Team Meeting 9/12/2019  DC/Disposition:  9/20/19    Objective:  VITAL SIGNS: /64   Pulse 80   Temp 36.4 °C (97.6 °F) (Oral)   Resp 18   Ht 1.854 m (6' 1\")   Wt 89.5 kg (197 lb 5 oz)   SpO2 94%   BMI 26.03 kg/m²   Gen: NAD  Psych: Mood and affect appropriate  CV: RRR, no edema  Resp: CTAB, no upper airway sounds  Abd: NTND  Neuro: AOx4, 5/5 R HF, KE    Recent Results (from the past 72 hour(s))   ACCU-CHEK GLUCOSE    Collection Time: 09/14/19  5:24 PM   Result Value Ref Range    Glucose - Accu-Ck 197 (H) 65 - 99 mg/dL   ACCU-CHEK GLUCOSE    Collection Time: 09/14/19 10:21 PM   Result Value Ref Range    Glucose - Accu-Ck 171 (H) 65 - 99 mg/dL   ACCU-CHEK GLUCOSE    Collection Time: 09/15/19  7:33 AM   Result Value Ref Range    Glucose - Accu-Ck 143 (H) 65 - 99 mg/dL   ACCU-CHEK GLUCOSE    Collection Time: 09/15/19 11:15 AM   Result Value Ref Range    Glucose - Accu-Ck 197 (H) 65 - 99 mg/dL   ACCU-CHEK GLUCOSE    Collection Time: 09/15/19  5:27 PM   Result Value Ref Range    Glucose - Accu-Ck 114 (H) 65 - 99 mg/dL   ACCU-CHEK GLUCOSE    Collection Time: 09/15/19  9:21 PM   Result Value Ref Range    Glucose - Accu-Ck 174 (H) 65 - 99 mg/dL   ACCU-CHEK GLUCOSE    Collection Time: 09/16/19  7:25 AM   Result Value Ref Range    Glucose - Accu-Ck 134 (H) 65 - 99 mg/dL   ACCU-CHEK GLUCOSE    Collection Time: 09/16/19 11:17 AM   Result Value Ref Range    Glucose - Accu-Ck 173 (H) 65 - 99 mg/dL   ACCU-CHEK GLUCOSE    Collection Time: 09/16/19  4:53 PM   Result Value Ref Range    Glucose - Accu-Ck 150 (H) 65 " - 99 mg/dL   ACCU-CHEK GLUCOSE    Collection Time: 09/16/19  9:19 PM   Result Value Ref Range    Glucose - Accu-Ck 126 (H) 65 - 99 mg/dL   ACCU-CHEK GLUCOSE    Collection Time: 09/17/19  7:03 AM   Result Value Ref Range    Glucose - Accu-Ck 115 (H) 65 - 99 mg/dL   ACCU-CHEK GLUCOSE    Collection Time: 09/17/19 10:58 AM   Result Value Ref Range    Glucose - Accu-Ck 203 (H) 65 - 99 mg/dL       Current Facility-Administered Medications   Medication Frequency   • senna-docusate (PERICOLACE or SENOKOT S) 8.6-50 MG per tablet 2 Tab BID PRN    And   • polyethylene glycol/lytes (MIRALAX) PACKET 1 Packet QDAY PRN    And   • magnesium hydroxide (MILK OF MAGNESIA) suspension 30 mL QDAY PRN    And   • bisacodyl (DULCOLAX) suppository 10 mg QDAY PRN   • pentoxifylline CR (TRENTAL) tablet 400 mg TID   • insulin glargine (LANTUS) injection 38 Units Q EVENING    And   • insulin lispro (HUMALOG) injection 2-12 Units 4X/DAY ACHS    And   • glucose 4 g chewable tablet 16 g Q15 MIN PRN    And   • dextrose 50% (D50W) injection 50 mL Q15 MIN PRN   • glimepiride (AMARYL) tablet 4 mg QAM AC   • tamsulosin (FLOMAX) capsule 0.8 mg DAILY   • omeprazole (PRILOSEC) capsule 20 mg DAILY   • clopidogrel (PLAVIX) tablet 75 mg DAILY   • vitamin D (cholecalciferol) tablet 2,000 Units DAILY   • metFORMIN (GLUCOPHAGE) tablet 1,000 mg BID WITH MEALS   • Respiratory Care per Protocol Continuous RT   • tramadol (ULTRAM) 50 MG tablet 50 mg Q4HRS PRN   • hydrALAZINE (APRESOLINE) tablet 25 mg Q8HRS PRN   • acetaminophen (TYLENOL) tablet 650 mg Q4HRS PRN   • artificial tears ophthalmic solution 1 Drop PRN   • benzocaine-menthol (CEPACOL) lozenge 1 Lozenge Q2HRS PRN   • mag hydrox-al hydrox-simeth (MAALOX PLUS ES or MYLANTA DS) suspension 20 mL Q2HRS PRN   • ondansetron (ZOFRAN ODT) dispertab 4 mg 4X/DAY PRN    Or   • ondansetron (ZOFRAN) syringe/vial injection 4 mg 4X/DAY PRN   • traZODone (DESYREL) tablet 50 mg QHS PRN   • sodium chloride (OCEAN) 0.65 %  nasal spray 2 Spray PRN   • heparin injection 5,000 Units Q8HRS   • aspirin EC (ECOTRIN) tablet 81 mg DAILY   • atorvastatin (LIPITOR) tablet 80 mg Q EVENING   • chlorthalidone (HYGROTON) tablet 25 mg DAILY   • gabapentin (NEURONTIN) capsule 1,200 mg QHS   • gabapentin (NEURONTIN) capsule 600 mg TID   • HYDROcodone-acetaminophen (NORCO) 5-325 MG per tablet 1-2 Tab Q4HRS PRN   • levothyroxine (SYNTHROID) tablet 50 mcg QAM AC   • losartan (COZAAR) tablet 50 mg DAILY   • primidone (MYSOLINE) tablet 50 mg Q EVENING   • propranolol LA (INDERAL LA) capsule 120 mg QAM       Orders Placed This Encounter   Procedures   • Diet Order Diabetic     Standing Status:   Standing     Number of Occurrences:   1     Order Specific Question:   Diet:     Answer:   Diabetic [3]       Assessment:  Active Hospital Problems    Diagnosis   • *S/P BKA (below knee amputation) unilateral, right (HCC)   • Hypertension, essential   • Hypothyroidism   • PVD (peripheral vascular disease) ([facility])- dr mohsen (cardilogy) at [facility]; arterial angioplasty right leg tbd july2019; rx trental (dr hernandes podiatry)   • Hypothyroidism due to acquired atrophy of thyroid- dr browning   • Uncontrolled type 2 diabetes mellitus with complication, with long-term current use of insulin ([facility])- dr browning   • Mixed hyperlipidemia- dr dwyer   • Essential hypertension- DR DWYER       Medical Decision Making and Plan:  R BKA - Patient with DM and PVD with right BKA on 9/5/19 with Dr. Amezcua.  -PT and OT for mobility and ADLs  -NWB RLE  -Continue ASA  -Previously on Plavix and Pentoxifylline. Check AM CBC and restart. 11.9 on admission.   -Prosthetist exchanged rigid removal dressing as causing discomfort 9/12/19   -Restart Pentoxifylline      DM with hyperglycemia - Patient with uncontrolled DM into 300s. On Lantus 19 and SSI on transfer. Previously on Empagliflozin 25 mg daily, Glimepiride 4 mg daily, Ozempic 1 mg q7days, and Metformin 1000 mg BID  -Continue to monitor.  Continue Lantus and SSI. Consult hospitalist.   -Discussed with Hospitalist and increase Lantus to 30U.  A1c - 8.1. Restarted home Metformin 1000 mg BID. Restarted on Home Glimepiride. Discussed with hospitalist and increased to 38 U Lantus so concern for hypoglycemia if all restarted at once.     Neuropathy from DM - Patient on Gabapentin 600/600/600/1200 on transfer. Will continue to monitor.      HTN - Patient on Chlorthalidone 25 mg and Losartan 50 mg daily.      HLD - Patient on Atorvastatin 80 mg QHS.     Hypothyroidism - Patient on 50 mcg on transfer.      Essential Tremor - On Primidone 50 mg and Propranolol 120 mg daily     BPH - Patient on Tamsulosin 0.4 mg. Check PVRs - ~250, increase to 0.8 mg and monitor   -Improved < 75 on increased flomax      Vitamin D - 19 on admission, start 2000 U daily.     DVT Ppx - Patient on Heparin 5000 q8h on transfer.      Total time:  25 minutes.  I spent greater than 50% of the time for patient care, counseling, and coordination on this date, including unit/floor time, and face-to-face time with the patient as per interval events and assessment and plan above. Topics discussed included wound care, ongoing hyperglycemia and discussed care with hospitalist. Patient to restart home medication on discharge as he has good control with his outpatient regimen.     Vincent Rubin M.D.

## 2019-09-18 NOTE — REHAB-DIETARY IDT TEAM NOTE
Dietary   Nutrition  Dietary Problems     Problem: Food and nutrition related knowledge deficit     Description:     Goal: Patient to verbalize or demonstrate understanding of diet (Resolved)     Description:                   Nutrition Services: Diet education and handout for carb counting and label reading provided. Pt said that he and his significant other are planning on making some dietary changes after his discharge to help improve his glucose levels. He expressed understanding and accepted handout. RD also encouraged outpatient diabetes education. Pt's Accuchecks on 9/17-9/18 have been between  mg/dL. Most are < 200. He is receiving glimepiride, Lantus, SSI and metformin for glucose control. RD will continue to monitor weekly.    Section completed by:  Zaina Nolan R.D.

## 2019-09-18 NOTE — THERAPY
Occupational Therapy  Daily Treatment     Patient Name: Kevin Jackson  Age:  74 y.o., Sex:  male  Medical Record #: [id number]  Today's Date: 9/18/2019     Precautions  Precautions: Non Weight Bearing Right Lower Extremity, Fall Risk  Comments: IPOP RLE         Subjective    Pt pleasant and cooperative with therapy session     Objective       09/18/19 0831   Sitting Upper Body Exercises   Lat Press 3 sets of 15;Bilateral;Weight (See Comments for lbs)  (rickshaw 50#)   Bilateral Row 3 sets of 15;Bilateral;Weight (See Comments for lbs)  (equalizer 45#)   Tricep Press 3 sets of 15;Bilateral;Weight (See Comments for lbs)  (rickshaw 30#)   Upper Extremity Bike Level 5 Resistance  (motomed BUE 6 min, 0 RB, .50 mile)   Interdisciplinary Plan of Care Collaboration   Patient Position at End of Therapy Seated;Call Light within Reach;Tray Table within Reach   OT Total Time Spent   OT Individual Total Time Spent (Mins) 30   OT Charge Group   OT Therapeutic Exercise  2       Assessment    Pt completed UB therex to the best of their abilities with no complaints of pain    Plan    UB strength for ADL's and mobility

## 2019-09-18 NOTE — CARE PLAN
"  Problem: Communication  Goal: The ability to communicate needs accurately and effectively will improve  Outcome: PROGRESSING AS EXPECTED  Note:   Patient able to verbalize needs.  Will continue to monitor.      Problem: Safety  Goal: Will remain free from injury  Outcome: PROGRESSING AS EXPECTED  Note:   Pt uses call light consistently and appropriately. Waits for assistance does not attempt self transfer this shift. Able to verbalize needs.      Problem: Bowel/Gastric:  Goal: Normal bowel function is maintained or improved  Outcome: PROGRESSING AS EXPECTED  Note:   Patient having regular bowel movements; last BM 9/16.Denies s/s constipation; bowel meds available if needed.  Will continue to monitor.      Problem: Pain Management  Goal: Pain level will decrease to patient's comfort goal  Outcome: PROGRESSING SLOWER THAN EXPECTED  Note:   Pt expressed 8-10 \"phantom limb\" pain in RLE after receiving PRN pain medication.      "

## 2019-09-18 NOTE — THERAPY
Occupational Therapy  Daily Treatment     Patient Name: Kevin Jackson  Age:  74 y.o., Sex:  male  Medical Record #: [id number]  Today's Date: 2019     Precautions  Precautions: Non Weight Bearing Right Lower Extremity, Fall Risk  Comments: IPOP RLE         Subjective       Objective       19 1331   Precautions   Precautions Non Weight Bearing Right Lower Extremity;Fall Risk   Comments IPOP RLE   Vitals   O2 Delivery None (Room Air)   Pain   Intervention Declines   Pain 0 - 10 Group   Therapist Pain Assessment 0   Non Verbal Descriptors   Non Verbal Scale  Calm   Cognition    Level of Consciousness Alert   Sitting Upper Body Exercises   Chest Press 3 sets of 10  (25#'s)   Lat Pull 3 sets of 10  (45#'s)   Bilateral Row 3 sets of 10  (45#'s)   Comments Seated on physico roll/peanut w/ gait belt, therapist stabilizing physio roll w/ knees - no lateral nor posterior support on mobilie surface w/ stabilization via LLE and core - 2x100 w/ 4 # medicine ball, rest break x 1, dropped ball x 4, no LOB, mod assist by therapist to stabilize physio roll.   Interdisciplinary Plan of Care Collaboration   Patient Position at End of Therapy In Bed;Call Light within Reach;Tray Table within Reach;Phone within Reach   OT Total Time Spent   OT Individual Total Time Spent (Mins) 60   OT Charge Group   OT Self Care / ADL 1   OT Therapeutic Exercise  3       FIM Grooming Score:  7 - Independent  Grooming Description:  (wc level)    FIM Upper Body Dressin - Independent  Upper Body Dressing Description:       FIM Lower Body Dressing Score:  5 - Standby Prompting/Supervsion or Set-up  Lower Body Dressing Description:  Increased time, Supervision for safety(SBA in stand via grab bar to manage pants/briefs at  waist + belt/fasteners, Mod Indep L socks/shoe/lace management, Mod Indep IPOP management.)    FIM Toiletin - Modified Independent  Toileting Description:  Increased time, Grab bar    FIM Toilet Transfer Score:  5 -  Standby Prompting/Supervision or Set-up  Toilet Transfer Description:  Grab bar, Supervision for safety(Sup/SBA for SPT to/from manual wc and commode via grab bar.)      Assessment    Pt was alert and cooperative w/ tx.  Limiters include impaired standing balance, limited endurance and safety awareness.    Plan    Pt to DC Friday - Thursday DC FIM's

## 2019-09-18 NOTE — PROGRESS NOTES
"Rehab Progress Note     Encounter Date: 9/18/2019    CC: R BKA, RLE pain, and delayed response time    Interval Events (Subjective)  Patient sitting up in room. Reports new emesis right after breakfast. He reports he still feels nauseated so is keeping the bin near him. He denies fever and reports his emesis made him immediately feel better.  Denies diarrhea. Denies SOB. Reports right LE swelling is improving.     IDT Team Meeting 9/12/2019  DC/Disposition:  9/20/19    Objective:  VITAL SIGNS: /66   Pulse 70   Temp 36.3 °C (97.4 °F) (Tympanic)   Resp 20   Ht 1.854 m (6' 1\")   Wt 89.5 kg (197 lb 5 oz)   SpO2 96%   BMI 26.03 kg/m²   Gen: NAD, pale from recent emesis   Psych: Mood and affect appropriate  CV: RRR, no edema in LLE  Resp: CTAB, no upper airway sounds  Abd: NTND  Neuro: AOx4, following simple commands    Recent Results (from the past 72 hour(s))   ACCU-CHEK GLUCOSE    Collection Time: 09/15/19  5:27 PM   Result Value Ref Range    Glucose - Accu-Ck 114 (H) 65 - 99 mg/dL   ACCU-CHEK GLUCOSE    Collection Time: 09/15/19  9:21 PM   Result Value Ref Range    Glucose - Accu-Ck 174 (H) 65 - 99 mg/dL   ACCU-CHEK GLUCOSE    Collection Time: 09/16/19  7:25 AM   Result Value Ref Range    Glucose - Accu-Ck 134 (H) 65 - 99 mg/dL   ACCU-CHEK GLUCOSE    Collection Time: 09/16/19 11:17 AM   Result Value Ref Range    Glucose - Accu-Ck 173 (H) 65 - 99 mg/dL   ACCU-CHEK GLUCOSE    Collection Time: 09/16/19  4:53 PM   Result Value Ref Range    Glucose - Accu-Ck 150 (H) 65 - 99 mg/dL   ACCU-CHEK GLUCOSE    Collection Time: 09/16/19  9:19 PM   Result Value Ref Range    Glucose - Accu-Ck 126 (H) 65 - 99 mg/dL   ACCU-CHEK GLUCOSE    Collection Time: 09/17/19  7:03 AM   Result Value Ref Range    Glucose - Accu-Ck 115 (H) 65 - 99 mg/dL   ACCU-CHEK GLUCOSE    Collection Time: 09/17/19 10:58 AM   Result Value Ref Range    Glucose - Accu-Ck 203 (H) 65 - 99 mg/dL   ACCU-CHEK GLUCOSE    Collection Time: 09/17/19  5:23 PM "   Result Value Ref Range    Glucose - Accu-Ck 113 (H) 65 - 99 mg/dL   ACCU-CHEK GLUCOSE    Collection Time: 09/17/19  8:46 PM   Result Value Ref Range    Glucose - Accu-Ck 153 (H) 65 - 99 mg/dL   CBC WITHOUT DIFFERENTIAL    Collection Time: 09/18/19  5:52 AM   Result Value Ref Range    WBC 8.0 4.8 - 10.8 K/uL    RBC 3.82 (L) 4.70 - 6.10 M/uL    Hemoglobin 10.6 (L) 14.0 - 18.0 g/dL    Hematocrit 33.9 (L) 42.0 - 52.0 %    MCV 88.7 81.4 - 97.8 fL    MCH 27.7 27.0 - 33.0 pg    MCHC 31.3 (L) 33.7 - 35.3 g/dL    RDW 45.2 35.9 - 50.0 fL    Platelet Count 515 (H) 164 - 446 K/uL    MPV 9.7 9.0 - 12.9 fL   Basic Metabolic Panel    Collection Time: 09/18/19  5:52 AM   Result Value Ref Range    Sodium 136 135 - 145 mmol/L    Potassium 3.5 (L) 3.6 - 5.5 mmol/L    Chloride 98 96 - 112 mmol/L    Co2 29 20 - 33 mmol/L    Glucose 82 65 - 99 mg/dL    Bun 23 (H) 8 - 22 mg/dL    Creatinine 0.94 0.50 - 1.40 mg/dL    Calcium 9.8 8.5 - 10.5 mg/dL    Anion Gap 9.0 0.0 - 11.9   ESTIMATED GFR    Collection Time: 09/18/19  5:52 AM   Result Value Ref Range    GFR If African American >60 >60 mL/min/1.73 m 2    GFR If Non African American >60 >60 mL/min/1.73 m 2   ACCU-CHEK GLUCOSE    Collection Time: 09/18/19  7:21 AM   Result Value Ref Range    Glucose - Accu-Ck 90 65 - 99 mg/dL   ACCU-CHEK GLUCOSE    Collection Time: 09/18/19  9:49 AM   Result Value Ref Range    Glucose - Accu-Ck 140 (H) 65 - 99 mg/dL   ACCU-CHEK GLUCOSE    Collection Time: 09/18/19 10:59 AM   Result Value Ref Range    Glucose - Accu-Ck 164 (H) 65 - 99 mg/dL       Current Facility-Administered Medications   Medication Frequency   • [START ON 9/19/2019] tamsulosin (FLOMAX) capsule 0.8 mg QHS   • senna-docusate (PERICOLACE or SENOKOT S) 8.6-50 MG per tablet 2 Tab BID PRN    And   • polyethylene glycol/lytes (MIRALAX) PACKET 1 Packet QDAY PRN    And   • magnesium hydroxide (MILK OF MAGNESIA) suspension 30 mL QDAY PRN    And   • bisacodyl (DULCOLAX) suppository 10 mg QDAY PRN   •  pentoxifylline CR (TRENTAL) tablet 400 mg TID   • insulin glargine (LANTUS) injection 38 Units Q EVENING    And   • insulin lispro (HUMALOG) injection 2-12 Units 4X/DAY ACHS    And   • glucose 4 g chewable tablet 16 g Q15 MIN PRN    And   • dextrose 50% (D50W) injection 50 mL Q15 MIN PRN   • glimepiride (AMARYL) tablet 4 mg QAM AC   • omeprazole (PRILOSEC) capsule 20 mg DAILY   • clopidogrel (PLAVIX) tablet 75 mg DAILY   • vitamin D (cholecalciferol) tablet 2,000 Units DAILY   • metFORMIN (GLUCOPHAGE) tablet 1,000 mg BID WITH MEALS   • Respiratory Care per Protocol Continuous RT   • tramadol (ULTRAM) 50 MG tablet 50 mg Q4HRS PRN   • hydrALAZINE (APRESOLINE) tablet 25 mg Q8HRS PRN   • acetaminophen (TYLENOL) tablet 650 mg Q4HRS PRN   • artificial tears ophthalmic solution 1 Drop PRN   • benzocaine-menthol (CEPACOL) lozenge 1 Lozenge Q2HRS PRN   • mag hydrox-al hydrox-simeth (MAALOX PLUS ES or MYLANTA DS) suspension 20 mL Q2HRS PRN   • ondansetron (ZOFRAN ODT) dispertab 4 mg 4X/DAY PRN    Or   • ondansetron (ZOFRAN) syringe/vial injection 4 mg 4X/DAY PRN   • traZODone (DESYREL) tablet 50 mg QHS PRN   • sodium chloride (OCEAN) 0.65 % nasal spray 2 Spray PRN   • heparin injection 5,000 Units Q8HRS   • aspirin EC (ECOTRIN) tablet 81 mg DAILY   • atorvastatin (LIPITOR) tablet 80 mg Q EVENING   • gabapentin (NEURONTIN) capsule 1,200 mg QHS   • gabapentin (NEURONTIN) capsule 600 mg TID   • HYDROcodone-acetaminophen (NORCO) 5-325 MG per tablet 1-2 Tab Q4HRS PRN   • levothyroxine (SYNTHROID) tablet 50 mcg QAM AC   • losartan (COZAAR) tablet 50 mg DAILY   • primidone (MYSOLINE) tablet 50 mg Q EVENING   • propranolol LA (INDERAL LA) capsule 120 mg QAM       Orders Placed This Encounter   Procedures   • Diet Order Diabetic     Standing Status:   Standing     Number of Occurrences:   1     Order Specific Question:   Diet:     Answer:   Diabetic [3]       Assessment:  Active Hospital Problems    Diagnosis   • *S/P BKA (below knee  amputation) unilateral, right (HCC)   • Hypertension, essential   • Hypothyroidism   • PVD (peripheral vascular disease) (HCC)- dr mohsen (cardilogy) at [facility]; arterial angioplasty right leg tbd july2019; rx trental (dr hernandes podiatry)   • Hypothyroidism due to acquired atrophy of thyroid- dr browning   • Uncontrolled type 2 diabetes mellitus with complication, with long-term current use of insulin (HCC)- dr browning   • Mixed hyperlipidemia- dr dwyer   • Essential hypertension- DR DWYER       Medical Decision Making and Plan:  R BKA - Patient with DM and PVD with right BKA on 9/5/19 with Dr. Amezcua.  -PT and OT for mobility and ADLs  -NWB RLE  -Continue ASA  -Previously on Plavix and Pentoxifylline. Check AM CBC and restart. 11.9 on admission.   -Prosthetist exchanged rigid removal dressing as causing discomfort 9/12/19   -Restart Pentoxifylline   Dx R BKA: Patient has a mobility limitation that prevents them from completing all MRADLs.  They cannot sufficiently complete MRADLs with a walker, cane or crutches.  A wheelchair will improve their ability to complete MRADLs.  The patient is willing and able to self-propel the wheelchair.  Patient also has a caregiver who is willing and able to assist with wheelchair. Needs extension board to avoid contractures in his right knee which would further limit his future mobility.      New Nausea - Patient with new nausea after breakfast. Labs this AM normal except mild hypokalemia  -PRN Zofran, continue to monitor electrolytes if ongoing emesis.     DM with hyperglycemia - Patient with uncontrolled DM into 300s. On Lantus 19 and SSI on transfer. Previously on Empagliflozin 25 mg daily, Glimepiride 4 mg daily, Ozempic 1 mg q7days, and Metformin 1000 mg BID  -Continue to monitor. Continue Lantus and SSI. Consult hospitalist.   -Discussed with Hospitalist and increase Lantus to 30U.  A1c - 8.1. Restarted home Metformin 1000 mg BID. Restarted on Home Glimepiride. Discussed with  hospitalist and increased to 38 U Lantus so concern for hypoglycemia if all restarted at once.     Neuropathy from DM - Patient on Gabapentin 600/600/600/1200 on transfer. Will continue to monitor.      HTN - Patient on Chlorthalidone 25 mg and Losartan 50 mg daily.      HLD - Patient on Atorvastatin 80 mg QHS.     Hypothyroidism - Patient on 50 mcg on transfer.      Essential Tremor - On Primidone 50 mg and Propranolol 120 mg daily     BPH - Patient on Tamsulosin 0.4 mg. Check PVRs - ~250, increase to 0.8 mg and monitor   -Improved < 75 on increased flomax      Vitamin D - 19 on admission, start 2000 U daily.     DVT Ppx - Patient on Heparin 5000 q8h on transfer.      Total time:  27 minutes.  I spent greater than 50% of the time for patient care, counseling, and coordination on this date, including unit/floor time, and face-to-face time with the patient as per interval events and assessment and plan above. Topics discussed included face to face wheelchair evaluation, needs extension board and new nausea with PRN zofran. Monitor for BP and electrolyte abnormality.     Vincent Rubin M.D.

## 2019-09-18 NOTE — PROGRESS NOTES
Patient care assumed. Report received from [facility] LAURIE Bosch. Patient is alert and calm, resting in bed. Call light and bedside table within reach. Will continue to monitor.

## 2019-09-18 NOTE — REHAB-PHARMACY IDT TEAM NOTE
Pharmacy   Pharmacy  Antibiotics/Antifungals/Antivirals:  Medication:      Active Orders (From admission, onward)    None        Route:         None  Stop Date:  N/A  Reason:   Antihypertensives/Cardiac:  Medication:    Orders (72h ago, onward)     Start     Ordered    09/11/19 0900  chlorthalidone (HYGROTON) tablet 25 mg  DAILY,   Status:  Discontinued      09/10/19 1506    09/11/19 0900  losartan (COZAAR) tablet 50 mg  DAILY      09/10/19 1506    09/11/19 0900  propranolol LA (INDERAL LA) capsule 120 mg  EVERY MORNING      09/10/19 1506    09/10/19 2100  atorvastatin (LIPITOR) tablet 80 mg  EVERY EVENING      09/10/19 1506    09/10/19 1507  hydrALAZINE (APRESOLINE) tablet 25 mg  EVERY 8 HOURS PRN      09/10/19 1507              Patient Vitals for the past 24 hrs:   BP Pulse   09/18/19 1400 103/66 77   09/18/19 0954 104/66 70   09/18/19 0700 110/72 69   09/17/19 1840 (!) 99/65 78     Anticoagulation:  Medication:  heparin  INR:      Other key medications:          A review of the medication list reveals no issues at this time. Patient is currently on antihypertensive(s). Recommend home blood pressure monitoring/recording if antihypertensive(s) regimen(s) continue. Patient is currently on diabetic medication(s) and/or Insulin(s). Recommend home blood glucose monitoring/recording if these regimen(s) continue.    Section completed by:  Wliiam Walton [facility]

## 2019-09-18 NOTE — PROGRESS NOTES
Hospital Medicine Daily Progress Note      Chief Complaint:  HTN, DM    Interval History:  Pt reports good pain control.    Review of Systems  Review of Systems   Constitutional: Negative for chills and fever.   HENT: Negative.    Eyes: Negative.    Respiratory: Negative for cough and shortness of breath.    Cardiovascular: Negative for chest pain and palpitations.   Gastrointestinal: Negative for abdominal pain, nausea and vomiting.   Genitourinary: Negative.    Musculoskeletal:        Wound pain   Skin: Negative for itching and rash.        Physical Exam  Temp:  [36.5 °C (97.7 °F)-36.7 °C (98 °F)] 36.5 °C (97.7 °F)  Pulse:  [77-81] 80  Resp:  [18] 18  BP: (103-128)/(66-71) 128/70  SpO2:  [90 %] 90 %    Physical Exam   Constitutional: He is oriented to person, place, and time. No distress.   HENT:   Head: Normocephalic and atraumatic.   Right Ear: External ear normal.   Left Ear: External ear normal.   Eyes: Conjunctivae and EOM are normal. Right eye exhibits no discharge. Left eye exhibits no discharge.   Neck: Normal range of motion. Neck supple. No tracheal deviation present.   Cardiovascular: Normal rate and regular rhythm.   Pulmonary/Chest: No stridor. No respiratory distress. He has no wheezes.   Decreased BS   Abdominal: Soft. Bowel sounds are normal. He exhibits no distension. There is no tenderness.   Musculoskeletal:   Right BKA w/ brace   Neurological: He is alert and oriented to person, place, and time.   Skin: Skin is warm and dry. He is not diaphoretic.   Psychiatric: His behavior is normal.   Vitals reviewed.      Fluids    Intake/Output Summary (Last 24 hours) at 9/19/2019 1211  Last data filed at 9/19/2019 1157  Gross per 24 hour   Intake 1430 ml   Output 600 ml   Net 830 ml       Laboratory  Recent Labs     09/18/19  0552   WBC 8.0   RBC 3.82*   HEMOGLOBIN 10.6*   HEMATOCRIT 33.9*   MCV 88.7   MCH 27.7   MCHC 31.3*   RDW 45.2   PLATELETCT 515*   MPV 9.7     Recent Labs     09/18/19  0552   SODIUM  136   POTASSIUM 3.5*   CHLORIDE 98   CO2 29   GLUCOSE 82   BUN 23*   CREATININE 0.94   CALCIUM 9.8                   Assessment/Plan  * S/P BKA (below knee amputation) unilateral, right ([facility])  Assessment & Plan  S/P right BKA  Wound care  Check F/U routine labs    Hypothyroidism  Assessment & Plan  TSH 10.8 and FT4 1.11  TFT's may be affected by acute illness, therefore will not increase Synthroid at this time  F/U w/ outpt Endocrinologist (Dr. Browning)    Hypertension, essential  Assessment & Plan  On Losartan, Propranolol, and Chlorthalidone  Monitor for orthostatic hypotension on Flomax    PVD (peripheral vascular disease) ([facility])- dr mohsen (cardilogy) at [facility]; arterial angioplasty right leg tbd july2019; rx trental (dr hernandes podiatry)- (present on admission)  Assessment & Plan  On ASA, Plavix, Trenta, and Lipitor    Uncontrolled type 2 diabetes mellitus with complication, with long-term current use of insulin ([facility])- dr browning- (present on admission)  Assessment & Plan  HbA1c 8.1  On Lantus and SSI  Also on Metformin and Glimepiride  Outpt meds include Metformin 1000 mg bid, Jardiance 25 mg daily, Amaryl 4 mg qam, Ozempic 0.25 mg SQ qweekly, and Insulin 50/50 qhs  Followed by outpt Endocrinology (Dr. Browning)    Hypovitaminosis D- (present on admission)  Assessment & Plan  Vit D level 19  On supplementation    Full Code

## 2019-09-19 LAB
GLUCOSE BLD-MCNC: 132 MG/DL (ref 65–99)
GLUCOSE BLD-MCNC: 90 MG/DL (ref 65–99)
GLUCOSE BLD-MCNC: 91 MG/DL (ref 65–99)
GLUCOSE BLD-MCNC: 96 MG/DL (ref 65–99)

## 2019-09-19 PROCEDURE — A9270 NON-COVERED ITEM OR SERVICE: HCPCS | Performed by: PHYSICAL MEDICINE & REHABILITATION

## 2019-09-19 PROCEDURE — 700102 HCHG RX REV CODE 250 W/ 637 OVERRIDE(OP): Performed by: PHYSICAL MEDICINE & REHABILITATION

## 2019-09-19 PROCEDURE — 700102 HCHG RX REV CODE 250 W/ 637 OVERRIDE(OP): Performed by: HOSPITALIST

## 2019-09-19 PROCEDURE — 82962 GLUCOSE BLOOD TEST: CPT | Mod: 91

## 2019-09-19 PROCEDURE — 99232 SBSQ HOSP IP/OBS MODERATE 35: CPT | Performed by: HOSPITALIST

## 2019-09-19 PROCEDURE — 770010 HCHG ROOM/CARE - REHAB SEMI PRIVAT*

## 2019-09-19 PROCEDURE — 97535 SELF CARE MNGMENT TRAINING: CPT

## 2019-09-19 PROCEDURE — G0515 COGNITIVE SKILLS DEVELOPMENT: HCPCS

## 2019-09-19 PROCEDURE — 97530 THERAPEUTIC ACTIVITIES: CPT

## 2019-09-19 PROCEDURE — 97116 GAIT TRAINING THERAPY: CPT

## 2019-09-19 PROCEDURE — A9270 NON-COVERED ITEM OR SERVICE: HCPCS | Performed by: HOSPITALIST

## 2019-09-19 PROCEDURE — 99233 SBSQ HOSP IP/OBS HIGH 50: CPT | Performed by: PHYSICAL MEDICINE & REHABILITATION

## 2019-09-19 PROCEDURE — 700111 HCHG RX REV CODE 636 W/ 250 OVERRIDE (IP): Performed by: PHYSICAL MEDICINE & REHABILITATION

## 2019-09-19 PROCEDURE — 97110 THERAPEUTIC EXERCISES: CPT

## 2019-09-19 RX ORDER — ASPIRIN 81 MG/1
81 TABLET ORAL DAILY
Qty: 100 TAB | Refills: 2 | Status: SHIPPED | OUTPATIENT
Start: 2019-09-19

## 2019-09-19 RX ORDER — TAMSULOSIN HYDROCHLORIDE 0.4 MG/1
0.8 CAPSULE ORAL DAILY
Qty: 60 CAP | Refills: 2 | Status: SHIPPED | OUTPATIENT
Start: 2019-09-19 | End: 2021-12-14

## 2019-09-19 RX ORDER — CLOPIDOGREL BISULFATE 75 MG/1
75 TABLET ORAL DAILY
Qty: 90 TAB | Refills: 2 | Status: SHIPPED | OUTPATIENT
Start: 2019-09-19 | End: 2019-09-26

## 2019-09-19 RX ORDER — HYDROCODONE BITARTRATE AND ACETAMINOPHEN 10; 325 MG/1; MG/1
1 TABLET ORAL EVERY 6 HOURS PRN
Qty: 42 TAB | Refills: 0 | Status: SHIPPED | OUTPATIENT
Start: 2019-09-19 | End: 2019-09-26

## 2019-09-19 RX ORDER — LOSARTAN POTASSIUM 50 MG/1
50 TABLET ORAL DAILY
Qty: 90 TAB | Refills: 2 | Status: SHIPPED | OUTPATIENT
Start: 2019-09-19

## 2019-09-19 RX ADMIN — TAMSULOSIN HYDROCHLORIDE 0.8 MG: 0.4 CAPSULE ORAL at 20:11

## 2019-09-19 RX ADMIN — LOSARTAN POTASSIUM 50 MG: 25 TABLET ORAL at 08:04

## 2019-09-19 RX ADMIN — ATORVASTATIN CALCIUM 80 MG: 40 TABLET, FILM COATED ORAL at 20:11

## 2019-09-19 RX ADMIN — PRIMIDONE 50 MG: 50 TABLET ORAL at 22:20

## 2019-09-19 RX ADMIN — CLOPIDOGREL BISULFATE 75 MG: 75 TABLET ORAL at 08:04

## 2019-09-19 RX ADMIN — HEPARIN SODIUM 5000 UNITS: 5000 INJECTION, SOLUTION INTRAVENOUS; SUBCUTANEOUS at 14:30

## 2019-09-19 RX ADMIN — HYDROCODONE BITARTRATE AND ACETAMINOPHEN 1 TABLET: 5; 325 TABLET ORAL at 02:18

## 2019-09-19 RX ADMIN — PENTOXIFYLLINE 400 MG: 400 TABLET, EXTENDED RELEASE ORAL at 20:12

## 2019-09-19 RX ADMIN — PENTOXIFYLLINE 400 MG: 400 TABLET, EXTENDED RELEASE ORAL at 08:04

## 2019-09-19 RX ADMIN — GABAPENTIN 600 MG: 300 CAPSULE ORAL at 17:34

## 2019-09-19 RX ADMIN — GLIMEPIRIDE 4 MG: 2 TABLET ORAL at 08:04

## 2019-09-19 RX ADMIN — METFORMIN HYDROCHLORIDE 1000 MG: 500 TABLET, FILM COATED ORAL at 17:34

## 2019-09-19 RX ADMIN — INSULIN GLARGINE 38 UNITS: 100 INJECTION, SOLUTION SUBCUTANEOUS at 20:17

## 2019-09-19 RX ADMIN — OMEPRAZOLE 20 MG: 20 CAPSULE, DELAYED RELEASE ORAL at 08:04

## 2019-09-19 RX ADMIN — VITAMIN D, TAB 1000IU (100/BT) 2000 UNITS: 25 TAB at 08:04

## 2019-09-19 RX ADMIN — GABAPENTIN 1200 MG: 400 CAPSULE ORAL at 20:12

## 2019-09-19 RX ADMIN — ASPIRIN 81 MG: 81 TABLET, COATED ORAL at 18:00

## 2019-09-19 RX ADMIN — HEPARIN SODIUM 5000 UNITS: 5000 INJECTION, SOLUTION INTRAVENOUS; SUBCUTANEOUS at 22:12

## 2019-09-19 RX ADMIN — PENTOXIFYLLINE 400 MG: 400 TABLET, EXTENDED RELEASE ORAL at 14:30

## 2019-09-19 RX ADMIN — GABAPENTIN 600 MG: 300 CAPSULE ORAL at 05:37

## 2019-09-19 RX ADMIN — METFORMIN HYDROCHLORIDE 1000 MG: 500 TABLET, FILM COATED ORAL at 08:04

## 2019-09-19 RX ADMIN — PROPRANOLOL HYDROCHLORIDE 120 MG: 60 CAPSULE, EXTENDED RELEASE ORAL at 08:06

## 2019-09-19 RX ADMIN — LEVOTHYROXINE SODIUM 50 MCG: 50 TABLET ORAL at 08:04

## 2019-09-19 RX ADMIN — HEPARIN SODIUM 5000 UNITS: 5000 INJECTION, SOLUTION INTRAVENOUS; SUBCUTANEOUS at 05:35

## 2019-09-19 RX ADMIN — GABAPENTIN 600 MG: 300 CAPSULE ORAL at 11:15

## 2019-09-19 RX ADMIN — HYDROCODONE BITARTRATE AND ACETAMINOPHEN 1 TABLET: 5; 325 TABLET ORAL at 12:53

## 2019-09-19 ASSESSMENT — ENCOUNTER SYMPTOMS
EYES NEGATIVE: 1
VOMITING: 0
FEVER: 0
DIZZINESS: 1
CHILLS: 0
SHORTNESS OF BREATH: 0
PALPITATIONS: 0
COUGH: 0
NAUSEA: 0
ABDOMINAL PAIN: 0

## 2019-09-19 ASSESSMENT — PATIENT HEALTH QUESTIONNAIRE - PHQ9
2. FEELING DOWN, DEPRESSED, IRRITABLE, OR HOPELESS: NOT AT ALL
SUM OF ALL RESPONSES TO PHQ9 QUESTIONS 1 AND 2: 0
1. LITTLE INTEREST OR PLEASURE IN DOING THINGS: NOT AT ALL

## 2019-09-19 NOTE — REHAB-NURSING IDT TEAM NOTE
Nursing   Nursing  Diet/Nutrition:  Diabetic  Medication Administration:  Whole with Liquid Wash  % consumed at meals in last 24 hours:  Consumed % of meals per documentation.  Meal/Snack Consumption for the past 24 hrs:   Oral Nutrition   09/18/19 1900 Dinner;Between % Consumed   09/18/19 0850 Breakfast;Between % Consumed     Snack schedule:  Mid-morning and HS  Supplement:  Other: n/a  Appetite:  Excellent  Fluid Intake/Output in past 24 hours: In: 1300 [P.O.:1300]  Out: 1100   Admit Weight:  Weight: 90.9 kg (200 lb 8 oz)  Weight Last 7 Days   [89.5 kg (197 lb 5 oz)-89.7 kg (197 lb 12 oz)] 89.5 kg (197 lb 5 oz) (09/16 0600)    Pain Issues:    Location:  Leg (09/19 0218)  Right;Lower (09/19 0218)         Severity:  Mild   Scheduled pain medications:  oxycodone (ROXICODONE)      PRN pain medications used in last 24 hours:  hydrocodone/acetaminophen (NORCO)    Non Pharmacologic Interventions:  heat, repositioned and rest  Effectiveness of pain management plan:  good=patient states acceptable comfort after interventions    Bowel:    Bowel Assist Initial Score:  5 - Standby Prompting/Supervision or Set-up  Bowel Assist Current Score:  5 - Standby Prompting/Supervision or Set-up  Bowl Accidents in last 7 days:     Last bowel movement: 09/16/19  Stool Description: Medium     Usual bowel pattern:  every other day  Scheduled bowel medications:  senna-docusate (PERICOLACE or SENOKOT S)   PRN bowel medications used in last 24 hours:  None  Nursing Interventions:  Increased time, Supervision, Positioning on commode/toilet  Effectiveness of bowel program:   good=regular, predictable, controlled emptying of bowel  Bladder:    Bladder Assist Initial Score:  5 - Standby Prompting/Supervision or Set-up  Bladder Assist Current Score:  4 - Minimal Assistance  Bladder Accidents in last 7 days:     PVR range for past 24-48 hours:  []  ()  Intermittent Catheterization:  na  Medications affecting bladder:  Flomax     Time void schedule/voiding pattern:  Voiding every 2-4 hours  Interventions:  Medication, Increased time, Time void staff initiated, Urinal  Effectiveness of bladder training:  Good=regular, predictable, emptying of bladder, patient initiates time voiding    Diabetes:  Blood Sugar Frequency:  Before meals and at bedtime    Range of BS for last 48 hours:     Recent Labs     09/17/19  1058 09/17/19  1723 09/17/19  2046 09/18/19  0721 09/18/19  0949 09/18/19  1059 09/18/19  1704 09/18/19  2056   POCGLUCOSE 203* 113* 153* 90 140* 164* 153* 120*     Scheduled diabetic medications:  metformin (GLUCOPHAGE) , insulin glargine (LANTUS) injection  and insulin lispro (HUMALOG) injection   Sliding scale usage in past 24 hours:  Yes  Total Short acting insulin in the past 24 hours:  Humalog 2-12  Total Long acting insulin in the past 24 hours:  Lantus 38 units    Wound:         Patient Lines/Drains/Airways Status    Active Current Wounds     Right BKA Incision (sutures)                   Interventions:  Dressing change every 48 hours   Effectiveness of intervention:  wound is improving     Wound Vac Location:  Not applicable  Dressing last changed:  Not applicable  Pump Mode Pressure Setting       Description of drainage:  Not applicable    Sleep/wake cycle:   Average hours slept:  Sleeps greater than 6 hours without waking  Sleep medication usage:  Other Trazadone    Patient/Family Training/Education:  Diabetes Management, Fall Prevention and Safety  Discharge Supply Recommendations:  Glucometer and Strips and Wound Care Supplies  Strengths: Alert and oriented, Pleasant and cooperative, Effective communication skills and Motivated for self care and independence   Barriers:   Fatigue and Generalized weakness       Nursing Problems     Problem: Bowel/Gastric:     Description:     Goal: Normal bowel function is maintained or improved     Description:           Goal: Will not experience complications related to bowel motility      Description:                 Problem: Communication     Description:     Goal: The ability to communicate needs accurately and effectively will improve     Description:                 Problem: Discharge Barriers/Planning     Description:     Goal: Patient's continuum of care needs will be met     Description:                 Problem: Infection     Description:     Goal: Will remain free from infection     Description:                 Problem: Knowledge Deficit     Description:     Goal: Knowledge of disease process/condition, treatment plan, diagnostic tests, and medications will improve     Description:           Goal: Knowledge of the prescribed therapeutic regimen will improve     Description:                 Problem: Pain Management     Description:     Goal: Pain level will decrease to patient's comfort goal     Description:                 Problem: Respiratory:     Description:     Goal: Respiratory status will improve     Description:                 Problem: Safety     Description:     Goal: Will remain free from injury     Description:           Goal: Will remain free from falls     Description:                 Problem: Skin Integrity     Description:     Goal: Risk for impaired skin integrity will decrease     Description:                 Problem: Venous Thromboembolism (VTW)/Deep Vein Thrombosis (DVT) Prevention:     Description:     Goal: Patient will participate in Venous Thrombosis (VTE)/Deep Vein Thrombosis (DVT)Prevention Measures     Description:                        Long Term Goals:   At discharge patient will be able to function safely at home and in the community with support.    Section completed by:  Lyla Craft R.N.      Reviewed by Marisol Ward R.N.

## 2019-09-19 NOTE — REHAB-COLLABORATIVE ONGOING IDT TEAM NOTE
Weekly Interdisciplinary Team Conference Note    Weekly Interdisciplinary Team Conference # 2  Date:  9/19/2019    Clinicians present and reporting at team conference include the following:   MD: JUSTINA Rubin MD    RN:  Marisol Pruett RN    PT:   Arden Bhagat PT, DPT  OT:  Sukumar Renner MS OTR/L    ST:  Travis Hobbs MS, CCC-SLP  CM:  Vilma Watson RN [facility]  REC:  Not Applicable  RT:  Not Applicable  RPh:  Praveen Walton, [facility]  All reporting clinicians have a working knowledge of this patient's plan of care.    Targeted DC Date:  9.20.19     Medical    Patient Active Problem List    Diagnosis Date Noted   • Hypertension, essential 09/11/2019   • Hypothyroidism 09/11/2019   • S/P BKA (below knee amputation) unilateral, right ([facility]) 09/10/2019   • Ischemic foot pain at rest ([facility]) 07/08/2019   • Diabetic ulcer of toe of right foot associated with type 2 diabetes mellitus, with fat layer exposed ([facility])- amputation distal 2nd toe (dr hernandes); dr. guillermo  [facility]  wound care  06/20/2019   • Nonsustained ventricular tachycardia ([facility])- ziopatch may 2019; PAC's and PVC's, NSR; NO A. FIB 06/20/2019   • PVD (peripheral vascular disease) ([facility])- dr mohsen (cardilogy) at [facility]; arterial angioplasty right leg tbd july2019; rx trental (dr hernandes podiatry) 04/02/2019   • Diabetic mononeuropathy associated with diabetes mellitus due to underlying condition ([facility])-m rx gabapentin 04/02/2019   • Encounter for screening- [facility] screening 2019- neg  abd US for AAA, MARELY, carotid US, EKG (no A.Fib to my review) 03/13/2019   • Benign essential tremor- DR OLIVAS,  NEUROLOGY 03/12/2019   • Gastroesophageal reflux disease with esophagitis- PPI PRN 08/15/2017   • Hypothyroidism due to acquired atrophy of thyroid- dr browning 08/15/2017   • Benign non-nodular prostatic hyperplasia with lower urinary tract symptoms- [facility] 11/05/2014   • Uncontrolled type 2 diabetes mellitus with complication, with long-term current use of insulin ([facility])-   abbott 10/02/2013   • Obesity (BMI 30.0-34.9) 10/02/2013   • Mixed hyperlipidemia- dr dwyer 10/03/2011   • Essential hypertension- DR DWYER 10/03/2011   • Hypovitaminosis D 10/03/2011     Results     Procedure Component Value Ref Range Date/Time    ACCU-CHEK GLUCOSE [[id number]] Collected:  09/19/19 1115    Order Status:  Completed Lab Status:  Final result Updated:  09/19/19 1139     Glucose - Accu-Ck 96 65 - 99 mg/dL     ACCU-CHEK GLUCOSE [[id number]]  (Abnormal) Collected:  09/19/19 0746    Order Status:  Completed Lab Status:  Final result Updated:  09/19/19 0801     Glucose - Accu-Ck 132 65 - 99 mg/dL     ACCU-CHEK GLUCOSE [[id number]]  (Abnormal) Collected:  09/18/19 2056    Order Status:  Completed Lab Status:  Final result Updated:  09/18/19 2251     Glucose - Accu-Ck 120 65 - 99 mg/dL     ACCU-CHEK GLUCOSE [[id number]]  (Abnormal) Collected:  09/18/19 1704    Order Status:  Completed Lab Status:  Final result Updated:  09/18/19 1733     Glucose - Accu-Ck 153 65 - 99 mg/dL         Nursing  Diet/Nutrition:  Diabetic  Medication Administration:  Whole with Liquid Wash  % consumed at meals in last 24 hours:  Consumed % of meals per documentation.  Meal/Snack Consumption for the past 24 hrs:   Oral Nutrition   09/18/19 1900 Dinner;Between % Consumed   09/18/19 0850 Breakfast;Between % Consumed     Snack schedule:  Mid-morning and HS  Supplement:  Other: n/a  Appetite:  Excellent  Fluid Intake/Output in past 24 hours: In: 1300 [P.O.:1300]  Out: 1100   Admit Weight:  Weight: 90.9 kg (200 lb 8 oz)  Weight Last 7 Days   [89.5 kg (197 lb 5 oz)-89.7 kg (197 lb 12 oz)] 89.5 kg (197 lb 5 oz) (09/16 0600)    Pain Issues:    Location:  Leg (09/19 0218)  Right;Lower (09/19 0218)         Severity:  Mild   Scheduled pain medications:  oxycodone (ROXICODONE)      PRN pain medications used in last 24 hours:  hydrocodone/acetaminophen (NORCO)    Non Pharmacologic Interventions:  heat, repositioned and  rest  Effectiveness of pain management plan:  good=patient states acceptable comfort after interventions    Bowel:    Bowel Assist Initial Score:  5 - Standby Prompting/Supervision or Set-up  Bowel Assist Current Score:  5 - Standby Prompting/Supervision or Set-up  Bowl Accidents in last 7 days:     Last bowel movement: 09/16/19  Stool Description: Medium     Usual bowel pattern:  every other day  Scheduled bowel medications:  senna-docusate (PERICOLACE or SENOKOT S)   PRN bowel medications used in last 24 hours:  None  Nursing Interventions:  Increased time, Supervision, Positioning on commode/toilet  Effectiveness of bowel program:   good=regular, predictable, controlled emptying of bowel  Bladder:    Bladder Assist Initial Score:  5 - Standby Prompting/Supervision or Set-up  Bladder Assist Current Score:  4 - Minimal Assistance  Bladder Accidents in last 7 days:     PVR range for past 24-48 hours:  []  ()  Intermittent Catheterization:  na  Medications affecting bladder:  Flomax    Time void schedule/voiding pattern:  Voiding every 2-4 hours  Interventions:  Medication, Increased time, Time void staff initiated, Urinal  Effectiveness of bladder training:  Good=regular, predictable, emptying of bladder, patient initiates time voiding    [place]:    Type:     Patient Lines/Drains/Airways Status    Active Catheter     None              Date placed:          Justification:    Diabetes:  Blood Sugar Frequency:  Before meals and at bedtime    Range of BS for last 48 hours:     Recent Labs     09/17/19  1058 09/17/19  1723 09/17/19  2046 09/18/19  0721 09/18/19  0949 09/18/19  1059 09/18/19  1704 09/18/19  2056   POCGLUCOSE 203* 113* 153* 90 140* 164* 153* 120*     Scheduled diabetic medications:  metformin (GLUCOPHAGE) , insulin glargine (LANTUS) injection  and insulin lispro (HUMALOG) injection   Sliding scale usage in past 24 hours:  Yes  Total Short acting insulin in the past 24 hours:  Humalog 2-12  Total Long  acting insulin in the past 24 hours:  Lantus 38 units    Wound:         Patient Lines/Drains/Airways Status    Active Current Wounds     None                   Interventions:  Dressing change every 48 hours   Effectiveness of intervention:  wound is improving     Wound Vac Location:  Not applicable  Dressing last changed:  Not applicable  Pump Mode Pressure Setting       Description of drainage:  Not applicable    Sleep/wake cycle:   Average hours slept:  Sleeps greater than 6 hours without waking  Sleep medication usage:  Other Trazadone    Patient/Family Training/Education:  Diabetes Management, Fall Prevention and Safety  Discharge Supply Recommendations:  Glucometer and Strips and Wound Care Supplies  Strengths: Alert and oriented, Pleasant and cooperative, Effective communication skills and Motivated for self care and independence   Barriers:   Fatigue and Generalized weakness       Nursing Problems     Problem: Bowel/Gastric:     Description:     Goal: Normal bowel function is maintained or improved     Description:           Goal: Will not experience complications related to bowel motility     Description:                 Problem: Communication     Description:     Goal: The ability to communicate needs accurately and effectively will improve     Description:                 Problem: Discharge Barriers/Planning     Description:     Goal: Patient's continuum of care needs will be met     Description:                 Problem: Infection     Description:     Goal: Will remain free from infection     Description:                 Problem: Knowledge Deficit     Description:     Goal: Knowledge of disease process/condition, treatment plan, diagnostic tests, and medications will improve     Description:           Goal: Knowledge of the prescribed therapeutic regimen will improve     Description:                 Problem: Pain Management     Description:     Goal: Pain level will decrease to patient's comfort goal      Description:                 Problem: Respiratory:     Description:     Goal: Respiratory status will improve     Description:                 Problem: Safety     Description:     Goal: Will remain free from injury     Description:           Goal: Will remain free from falls     Description:                 Problem: Skin Integrity     Description:     Goal: Risk for impaired skin integrity will decrease     Description:                 Problem: Venous Thromboembolism (VTW)/Deep Vein Thrombosis (DVT) Prevention:     Description:     Goal: Patient will participate in Venous Thrombosis (VTE)/Deep Vein Thrombosis (DVT)Prevention Measures     Description:                        Long Term Goals:   At discharge patient will be able to function safely at home and in the community with support.    Section completed by:  Lyla Craft R.N.           Mobility  Bed mobility:   SPV-SBA  Bed /Chair/Wheelchair Transfer Initial:  4 - Minimal Assistance  Bed /Chair/Wheelchair Transfer Current:  4 - Minimal Assistance   Bed/Chair/Wheelchair Transfer Description:  (Min A reach-pivot to w/c, CGA w/c to bed, max cueing for sequencing and setup, bed rail, increased time.  Pt perferred use of R knee immobilizer over R rigid post-op orthosis due to complexity of orthosis; will continue education.)  Walk Initial:  1 - Total Assistance  Walk Current:  1 - Total Assistance   Walk Description:  (1x 20 feet in // bars with CGA-Min A, setup, RLE IPOP, cueing for safety, gait belt.  1x 45 feet with FWW, gait belt, RLE IPOP, setup, Min A, cueing, and significantly increased time due to fatigue.)  Wheelchair Initial:  3 - Moderate Assistance  Wheelchair Current:  3 - Moderate Assistance   Wheelchair Description:  Extra time, Safety concerns, Supervision for safety, Verbal cueing(Mod-Max A with curb cutouts, Min A to CGA with ramps, SBA outdoor level with 3 instances of rolling off path, SPV indoors, cueing, setup.  2 reps up to 550  feet outdoors/indoors today.)  Stairs Initial:  0 - Not tested,unsafe activity  Stairs Current: 0 - Not tested,unsafe activity   Stairs Description:    Patient/Family Training/Education:  Initiated patient education; impaired carryover with 2-step directions, consider SLP eval for cognition  DME/DC Recommendations:  10 days; intermittent SPV likely; standard manual w/c with standard L legrest, R stump board, seat cushion, lap belt, B anti-tippers; Home health PT  Strengths:  Alert and oriented, Effective communication skills, Independent PLOF, Manages pain appropriately, Motivated for self care and independence, Pleasant and cooperative and Willingly participates in therapeutic activities  Barriers:   Decreased endurance, Home accessibility, Impulsive, Poor carryover of learning, Poor insight/denial of deficits and Other: variable adherence with new R rigid orthosis (pt prefers R knee immobilizer)  # of short term goals set= 7 set yesterday after PT eval  # of short term goals met= PT eval completed yesterday; will monitor pt's progress towards goals  Physical Therapy Problems     Problem: Mobility     Dates: Start: 09/11/19       Description:     Goal: STG-Within one week, patient will propel wheelchair community     Dates: Start: 09/11/19       Description: 1) Individualized goal:  SPV/setup indoors and outdoors for 500 feet, R stump board, RLE rigid post-op orthosis, cueing prn  2) Interventions:  PT Group Therapy, PT Prosthetic Training, PT Gait Training, PT Self Care/Home Eval, PT Therapeutic Exercises, PT Neuro Re-Ed/Balance, PT Therapeutic Activity and PT Manual Therapy       Note:     Goal Note filed on 09/12/19 0858 by Trung Bhagat, PT    PT eval completed yesterday; will monitor pt's progress towards goals.                  Goal: STG-Within one week, patient will ambulate household distance     Dates: Start: 09/11/19       Description: 1) Individualized goal:  SPV and setup, 50 feet indoors, FWW,  hop-to pattern, RLE rigid post-op orthosis, cueing prn  2) Interventions:  PT Group Therapy, PT Prosthetic Training, PT Gait Training, PT Self Care/Home Eval, PT Therapeutic Exercises, PT Neuro Re-Ed/Balance, PT Therapeutic Activity and PT Manual Therapy     Note:     Goal Note filed on 09/12/19 0858 by Trung Bhagat, PT    PT eval completed yesterday; will monitor pt's progress towards goals.                  Goal: STG-Within one week, patient will ascend and descend four to six stairs     Dates: Start: 09/11/19       Description: 1) Individualized goal:  2 standard stairs to enter home, SBA, seated bumping approach, RLE rigid post-op orthosis, cueing prn  2) Interventions:  PT Group Therapy, PT Prosthetic Training, PT Gait Training, PT Self Care/Home Eval, PT Therapeutic Exercises, PT Neuro Re-Ed/Balance, PT Therapeutic Activity and PT Manual Therapy     Note:     Goal Note filed on 09/12/19 0858 by Trung Bhagat, PT    PT eval completed yesterday; will monitor pt's progress towards goals.                  Goal: STG-Within one week, patient will     Dates: Start: 09/11/19       Description: 1) Individualized goal:  Perform HEP for RLE strengthening and AROM  2) Interventions:  PT Group Therapy, PT Prosthetic Training, PT Gait Training, PT Self Care/Home Eval, PT Therapeutic Exercises, PT Neuro Re-Ed/Balance, PT Therapeutic Activity and PT Manual Therapy     Note:     Goal Note filed on 09/12/19 0858 by Trung Bhagat, PT    PT eval completed yesterday; will monitor pt's progress towards goals.                        Problem: Mobility Transfers     Dates: Start: 09/11/19       Description:     Goal: STG-Within one week, patient will sit to stand     Dates: Start: 09/11/19       Description: 1) Individualized goal: SPV and setup, FWW, RLE rigid post-op orthosis, cueing prn  2) Interventions: PT Group Therapy, PT Prosthetic Training, PT Gait Training, PT Self Care/Home Eval, PT Therapeutic  Exercises, PT Neuro Re-Ed/Balance, PT Therapeutic Activity and PT Manual Therapy       Note:     Goal Note filed on 09/12/19 0858 by Trung Bhagat, PT    PT eval completed yesterday; will monitor pt's progress towards goals.                  Goal: STG-Within one week, patient will transfer bed to chair     Dates: Start: 09/11/19       Description: 1) Individualized goal: SPV and setup, FWW, RLE rigid post-op orthosis, cueing prn  2) Interventions: PT Group Therapy, PT Prosthetic Training, PT Gait Training, PT Self Care/Home Eval, PT Therapeutic Exercises, PT Neuro Re-Ed/Balance, PT Therapeutic Activity and PT Manual Therapy       Note:     Goal Note filed on 09/12/19 0858 by Trung Bhagat, PT    PT eval completed yesterday; will monitor pt's progress towards goals.                  Goal: STG-Within one week, patient will transfer in/out of car     Dates: Start: 09/11/19       Description: 1) Individualized goal: SPV and setup, FWW, RLE rigid post-op orthosis, cueing prn  2) Interventions: PT Group Therapy, PT Prosthetic Training, PT Gait Training, PT Self Care/Home Eval, PT Therapeutic Exercises, PT Neuro Re-Ed/Balance, PT Therapeutic Activity and PT Manual Therapy       Note:     Goal Note filed on 09/12/19 0858 by Trung Bhagat, PT    PT eval completed yesterday; will monitor pt's progress towards goals.                        Problem: PT-Long Term Goals     Dates: Start: 09/11/19       Description:     Goal: LTG-By discharge, patient will propel wheelchair     Dates: Start: 09/11/19       Description: 1) Individualized goal:  SPV/setup indoors and outdoors for 500 feet, R stump board, RLE rigid post-op orthosis, cueing prn  2) Interventions:  PT Group Therapy, PT Prosthetic Training, PT Gait Training, PT Self Care/Home Eval, PT Therapeutic Exercises, PT Neuro Re-Ed/Balance, PT Therapeutic Activity and PT Manual Therapy           Goal: LTG-By discharge, patient will ambulate     Dates:  Start: 09/11/19       Description: 1) Individualized goal:  SPV and setup, 50 feet indoors, FWW, hop-to pattern, RLE rigid post-op orthosis, cueing prn  2) Interventions:  PT Group Therapy, PT Prosthetic Training, PT Gait Training, PT Self Care/Home Eval, PT Therapeutic Exercises, PT Neuro Re-Ed/Balance, PT Therapeutic Activity and PT Manual Therapy             Goal: LTG-By discharge, patient will transfer one surface to another     Dates: Start: 09/11/19       Description: 1) Individualized goal: SPV and setup, FWW, RLE rigid post-op orthosis, cueing prn  2) Interventions: PT Group Therapy, PT Prosthetic Training, PT Gait Training, PT Self Care/Home Eval, PT Therapeutic Exercises, PT Neuro Re-Ed/Balance, PT Therapeutic Activity and PT Manual Therapy           Goal: LTG-By discharge, patient will perform home exercise program     Dates: Start: 09/11/19       Description: 1) Individualized goal:  Perform HEP for RLE strengthening and AROM  2) Interventions:  PT Group Therapy, PT Prosthetic Training, PT Gait Training, PT Self Care/Home Eval, PT Therapeutic Exercises, PT Neuro Re-Ed/Balance, PT Therapeutic Activity and PT Manual Therapy           Goal: LTG-By discharge, patient will ambulate up/down 4-6 stairs     Dates: Start: 09/11/19       Description: 1) Individualized goal:  2 standard stairs to enter home, SBA, seated bumping approach, RLE rigid post-op orthosis, cueing prn  2) Interventions:  PT Group Therapy, PT Prosthetic Training, PT Gait Training, PT Self Care/Home Eval, PT Therapeutic Exercises, PT Neuro Re-Ed/Balance, PT Therapeutic Activity and PT Manual Therapy             Goal: LTG-By discharge, patient will transfer in/out of a car     Dates: Start: 09/11/19       Description: 1) Individualized goal: SPV and setup, FWW, RLE rigid post-op orthosis, cueing prn  2) Interventions: PT Group Therapy, PT Prosthetic Training, PT Gait Training, PT Self Care/Home Eval, PT Therapeutic Exercises, PT Neuro  Re-Ed/Balance, PT Therapeutic Activity and PT Manual Therapy                           Section completed by:  Trung Bhagat, PT    Activities of Daily Living  Eating Initial:  6 - Modified Independent  Eating Current:  7 - Independent   Eating Description:  Increased time  Grooming Initial:  6 - Modified Independent  Grooming Current:  7 - Independent   Grooming Description:  (wc level)  Bathing Initial:  4 - Minimal Assistance  Bathing Current:  6 - Modified Independent   Bathing Description:  Grab bar, Tub bench, Long handled bath tool, Hand held shower, Increased time  Upper Body Dressing Initial:  5 - Standby Prompting/Supervision or Set-up  Upper Body Dressing Current:  7 - Independent   Upper Body Dressing Description:  (Don/doff pull over shirt)  Lower Body Dressing Initial:  4 - Minimal Assistance  Lower Body Dressing Current:  6 - Modified Independent   Lower Body Dressing Description:  6 - Modified Independent  Toileting Initial:  3 - Moderate Assistance  Toileting Current:  6 - Modified Independent   Toileting Description:  Grab bar, Increased time  Toilet Transfer Initial:  4 - Minimal Assistance  Toilet Transfer Current:  6 - Modified Independent   Toilet Transfer Description:  6 - Modified Independent  Tub / Shower Transfer Initial:  4 - Minimal Assistance  Tub / Shower Transfer Current:  5 - Standby Prompting/Supervision or Set-up   Tub / Shower Transfer Description:  Grab bar, Increased time, Supervision for safety(Sup/SBA to transfer to/from manual wc and shower bench via grab bar)  IADL:  Mod Indep wc level laundry/simple meal prep   Family Training/Education:  TBD   DME/DC Recommendations:  Home,      Strengths:  Alert and oriented, Independent PLOF, Motivated for self care and independence, Pleasant and cooperative and Willingly participates in therapeutic activities  Barriers:  Decreased endurance, Limited mobility and Other: Impaired standing balance     # of short term goals set= 4       # of short term goals met= 4     Occupational Therapy Goals     Problem: OT Long Term Goals     Dates: Start: 09/11/19       Description:     Goal: LTG-By discharge, patient will perform bathroom transfers     Dates: Start: 09/11/19       Description: 1) Individualized Goal:  Mod Indep for commode and shower transfer via DME PRN  2) Interventions:   OT Self Care/ADL, OT Therapeutic Activity, OT Evaluation and OT Therapeutic Exercise       Note:     Goal Note filed on 09/19/19 0820 by Sukumar Renner MS,OTR/L    Pt at Sup/SBA to transfer to/from manual wc and shower bench via grab bar + Mod Indep for commode transfer to/from manual wc via grab bar                              Section completed by:  Sukumar Renner MS,OTR/L    Cognitive Linquistic Functions  Comprehension Initial:  6 - Modified Independent  Comprehension Current:  5 - Stand-by Prompting/Supervision or Set-up   Comprehension Description:  Glasses, Verbal cues  Expression Initial:  6 - Modified Independent  Expression Current:  5 - Stand-by Prompting/Supervision or Set-up   Expression Description:  Verbal cueing  Social Interaction Initial:  7 - Independent  Social Interaction Current:  6 - Modified Independent   Social Interaction Description:  Increased time  Problem Solving Initial:  6 - Modified Independent  Problem Solving Current:  6 - Modified Independent   Problem Solving Description:  Therapy schedule  Memory Initial:  6 - Modified Independent  Memory Current:  4 - Minimal Assistance   Memory Description:  Verbal cueing, Therapy schedule  Executive Functioning / Organization Initial:   NA  Executive Functioning / Organization Current:   NA   Executive Functioning Desciption:  NA   Swallowing  Swallowing Status:  Not following for dysphagia management   Orders Placed This Encounter   Procedures   • Diet Order Diabetic     Standing Status:   Standing     Number of Occurrences:   1     Order Specific Question:   Diet:     Answer:   Diabetic [3]      Behavior Modification Plan  Redirect to task/topic and Analyze tasks (break down into smaller steps)  Assistive Technology  Other: NA  Family Training/Education:  Not completed   DC Recommendations:   Outpatient ST   Strengths:  Able to follow instructions  Barriers:  Poor carryover of learning  # of short term goals set=3  # of short term goals met=0  Speech Therapy Problems     Problem: Memory STGs     Dates: Start: 09/14/19       Description:     Goal: STG-Within one week, patient will remember     Dates: Start: 09/14/19       Description: 1) Individualized goal:  Pt will recall novel information r/t rehabilitation hospital stay using external memory aids with 805 accuracy and MIN A.   2) Interventions:  SLP Self Care / ADL Training , SLP Cognitive Skill Development and SLP Group Treatment       Note:     Goal Note filed on 09/19/19 0740 by Travis Hobbs MS,CCC-SLP    Continue to target                         Problem: Problem Solving STGs     Dates: Start: 09/14/19       Description:     Goal: STG-Within one week, patient will solve basic problems     Dates: Start: 09/14/19       Description: 1) Individualized goal:  Related to health and safety, including medication management with 80% accuracy and MIN A.   2) Interventions:  SLP Self Care / ADL Training , SLP Cognitive Skill Development and SLP Group Treatment       Note:     Goal Note filed on 09/19/19 0740 by Travis Hobbs MS,CCC-SLP    Continue to target                   Goal: STG-Within one week, patient will     Dates: Start: 09/14/19       Description: 1) Individualized goal:  Understand and implement word finding strategies with 80% accuracy and MIN A.   2) Interventions:  SLP Self Care / ADL Training , SLP Cognitive Skill Development and SLP Group Treatment       Note:     Goal Note filed on 09/19/19 0740 by Travis Hobbs MS,CCC-SLP    Continue to target                         Problem: Speech/Swallowing LTGs     Dates: Start: 09/14/19        Description:     Goal: LTG-By discharge, patient will solve complex problem     Dates: Start: 09/14/19       Description: 1) Individualized goal:  With 80% accuracy and MOD I for a safe D/C to PLOF.   2) Interventions:  SLP Self Care / ADL Training , SLP Cognitive Skill Development and SLP Group Treatment             Goal: LTG-By discharge, patient will     Dates: Start: 09/14/19       Description: 1) Individualized goal:  Recall pertinent information r/t health and safety with  80% accuracy and MOD I for a safe D/C to PLOF.   2) Interventions:  SLP Self Care / ADL Training , SLP Cognitive Skill Development and SLP Group Treatment                          Section completed by:  Travis Hobbs MS,[facility]-SLP       Nutrition  Dietary Problems     Problem: Food and nutrition related knowledge deficit     Description:     Goal: Patient to verbalize or demonstrate understanding of diet (Resolved)     Description:                   Nutrition Services: Diet education and handout for carb counting and label reading provided. Pt said that he and his significant other are planning on making some dietary changes after his discharge to help improve his glucose levels. He expressed understanding and accepted handout. RD also encouraged outpatient diabetes education. Pt's Accuchecks on 9/17-9/18 have been between  mg/dL. Most are < 200. He is receiving glimepiride, Lantus, SSI and metformin for glucose control. RD will continue to monitor weekly.    Section completed by:  Zaina Nolan R.D.    REHAB-Pharmacy IDT Team Note by Wiliam Walton RPH at 9/18/2019  4:17 PM  Version 1 of 1    Author:  Wiliam Walton RPH Service:  -- Author Type:  Pharmacist    Filed:  9/18/2019  4:17 PM Date of Service:  9/18/2019  4:17 PM Status:  Signed    :  Wiliam Walton RPH (Pharmacist)         Pharmacy   Pharmacy  Antibiotics/Antifungals/Antivirals:  Medication:      Active Orders (From admission, onward)    None         Route:         None  Stop Date:  N/A  Reason:   Antihypertensives/Cardiac:  Medication:    Orders (72h ago, onward)     Start     Ordered    09/11/19 0900  chlorthalidone (HYGROTON) tablet 25 mg  DAILY,   Status:  Discontinued      09/10/19 1506    09/11/19 0900  losartan (COZAAR) tablet 50 mg  DAILY      09/10/19 1506    09/11/19 0900  propranolol LA (INDERAL LA) capsule 120 mg  EVERY MORNING      09/10/19 1506    09/10/19 2100  atorvastatin (LIPITOR) tablet 80 mg  EVERY EVENING      09/10/19 1506    09/10/19 1507  hydrALAZINE (APRESOLINE) tablet 25 mg  EVERY 8 HOURS PRN      09/10/19 1507              Patient Vitals for the past 24 hrs:   BP Pulse   09/18/19 1400 103/66 77   09/18/19 0954 104/66 70   09/18/19 0700 110/72 69   09/17/19 1840 (!) 99/65 78     Anticoagulation:  Medication:  heparin  INR:      Other key medications:          A review of the medication list reveals no issues at this time. Patient is currently on antihypertensive(s). Recommend home blood pressure monitoring/recording if antihypertensive(s) regimen(s) continue. Patient is currently on diabetic medication(s) and/or Insulin(s). Recommend home blood glucose monitoring/recording if these regimen(s) continue.    Section completed by:  Wiliam Walton [facility][[email or web address]]        Attribution Spence     [email or web address] - Wiliam Walton [facility] on 9/18/2019  4:17 PM                    DC Planning    DC destination/dispostion:  Home w/ supportive SO, [facility] w/ 2 GABO.    Referrals: OP therapy: [facility]. DME: wheelchair.    DC Needs: DME for patient safety & mobility. OP therapy referral. MD f/u appts. Prosthetic f/u.    Barriers to discharge: Functional mobility deficits.       Strengths: Supportive SO. PLOF independent. Participation & motivation.    Section completed by:  Vilma Watson R.N.    Summary: LOB x2, refusing ramp, SPV shower transfers, assist w/ med mgmt, ongoing DM education & mgmt.    Physician Summary  JUSTINA Rubin MD  participated and led  team conference discussion.

## 2019-09-19 NOTE — REHAB-OT IDT TEAM NOTE
Occupational Therapy   Activities of Daily Living  Eating Initial:  6 - Modified Independent  Eating Current:  7 - Independent   Eating Description:  Increased time  Grooming Initial:  6 - Modified Independent  Grooming Current:  7 - Independent   Grooming Description:  (wc level)  Bathing Initial:  4 - Minimal Assistance  Bathing Current:  6 - Modified Independent   Bathing Description:  Grab bar, Tub bench, Long handled bath tool, Hand held shower, Increased time  Upper Body Dressing Initial:  5 - Standby Prompting/Supervision or Set-up  Upper Body Dressing Current:  7 - Independent   Upper Body Dressing Description:  (Don/doff pull over shirt)  Lower Body Dressing Initial:  4 - Minimal Assistance  Lower Body Dressing Current:  6 - Modified Independent   Lower Body Dressing Description:  6 - Modified Independent  Toileting Initial:  3 - Moderate Assistance  Toileting Current:  6 - Modified Independent   Toileting Description:  Grab bar, Increased time  Toilet Transfer Initial:  4 - Minimal Assistance  Toilet Transfer Current:  6 - Modified Independent   Toilet Transfer Description:  6 - Modified Independent  Tub / Shower Transfer Initial:  4 - Minimal Assistance  Tub / Shower Transfer Current:  5 - Standby Prompting/Supervision or Set-up   Tub / Shower Transfer Description:  Grab bar, Increased time, Supervision for safety(Sup/SBA to transfer to/from manual wc and shower bench via grab bar)  IADL:  Mod Indep wc level laundry/simple meal prep   Family Training/Education:  TBD   DME/DC Recommendations:  Home,      Strengths:  Alert and oriented, Independent PLOF, Motivated for self care and independence, Pleasant and cooperative and Willingly participates in therapeutic activities  Barriers:  Decreased endurance, Limited mobility and Other: Impaired standing balance     # of short term goals set= 4    # of short term goals met= 4     Occupational Therapy Goals     Problem: OT Long Term Goals     Dates: Start:  09/11/19       Description:     Goal: LTG-By discharge, patient will perform bathroom transfers     Dates: Start: 09/11/19       Description: 1) Individualized Goal:  Mod Indep for commode and shower transfer via DME PRN  2) Interventions:   OT Self Care/ADL, OT Therapeutic Activity, OT Evaluation and OT Therapeutic Exercise       Note:     Goal Note filed on 09/19/19 0820 by Sukumar Renner MS,OTR/L    Pt at Sup/SBA to transfer to/from manual wc and shower bench via grab bar + Mod Indep for commode transfer to/from manual wc via grab bar                              Section completed by:  Sukumar Renner MS,OTR/L

## 2019-09-19 NOTE — CARE PLAN
Problem: Problem Solving STGs  Goal: STG-Within one week, patient will solve basic problems  Description  1) Individualized goal:  Related to health and safety, including medication management with 80% accuracy and MIN A.   2) Interventions:  SLP Self Care / ADL Training , SLP Cognitive Skill Development and SLP Group Treatment     Outcome: NOT MET  Note:   Continue to target   Goal: STG-Within one week, patient will  Description  1) Individualized goal:  Understand and implement word finding strategies with 80% accuracy and MIN A.   2) Interventions:  SLP Self Care / ADL Training , SLP Cognitive Skill Development and SLP Group Treatment     Outcome: NOT MET  Note:   Continue to target      Problem: Memory STGs  Goal: STG-Within one week, patient will remember  Description  1) Individualized goal:  Pt will recall novel information r/t rehabilitation hospital stay using external memory aids with 805 accuracy and MIN A.   2) Interventions:  SLP Self Care / ADL Training , SLP Cognitive Skill Development and SLP Group Treatment     Outcome: NOT MET  Note:   Continue to target

## 2019-09-19 NOTE — REHAB-SLP IDT TEAM NOTE
Speech Therapy   Cognitive Linquistic Functions  Comprehension Initial:  6 - Modified Independent  Comprehension Current:  5 - Stand-by Prompting/Supervision or Set-up   Comprehension Description:  Glasses, Verbal cues  Expression Initial:  6 - Modified Independent  Expression Current:  5 - Stand-by Prompting/Supervision or Set-up   Expression Description:  Verbal cueing  Social Interaction Initial:  7 - Independent  Social Interaction Current:  6 - Modified Independent   Social Interaction Description:  Increased time  Problem Solving Initial:  6 - Modified Independent  Problem Solving Current:  6 - Modified Independent   Problem Solving Description:  Therapy schedule  Memory Initial:  6 - Modified Independent  Memory Current:  4 - Minimal Assistance   Memory Description:  Verbal cueing, Therapy schedule  Executive Functioning / Organization Initial:   NA  Executive Functioning / Organization Current:   NA   Executive Functioning Desciption:  NA   Swallowing  Swallowing Status:  Not following for dysphagia management   Orders Placed This Encounter   Procedures   • Diet Order Diabetic     Standing Status:   Standing     Number of Occurrences:   1     Order Specific Question:   Diet:     Answer:   Diabetic [3]     Behavior Modification Plan  Redirect to task/topic and Analyze tasks (break down into smaller steps)  Assistive Technology  Other: NA  Family Training/Education:  Not completed   DC Recommendations:   Outpatient ST   Strengths:  Able to follow instructions  Barriers:  Poor carryover of learning  # of short term goals set=3  # of short term goals met=0  Speech Therapy Problems     Problem: Memory STGs     Dates: Start: 09/14/19       Description:     Goal: STG-Within one week, patient will remember     Dates: Start: 09/14/19       Description: 1) Individualized goal:  Pt will recall novel information r/t rehabilitation hospital stay using external memory aids with 805 accuracy and MIN A.   2) Interventions:   SLP Self Care / ADL Training , SLP Cognitive Skill Development and SLP Group Treatment       Note:     Goal Note filed on 09/19/19 0740 by Travis Hobbs MS,CCC-SLP    Continue to target                         Problem: Problem Solving STGs     Dates: Start: 09/14/19       Description:     Goal: STG-Within one week, patient will solve basic problems     Dates: Start: 09/14/19       Description: 1) Individualized goal:  Related to health and safety, including medication management with 80% accuracy and MIN A.   2) Interventions:  SLP Self Care / ADL Training , SLP Cognitive Skill Development and SLP Group Treatment       Note:     Goal Note filed on 09/19/19 0740 by Travis Hobbs MS,CCC-SLP    Continue to target                   Goal: STG-Within one week, patient will     Dates: Start: 09/14/19       Description: 1) Individualized goal:  Understand and implement word finding strategies with 80% accuracy and MIN A.   2) Interventions:  SLP Self Care / ADL Training , SLP Cognitive Skill Development and SLP Group Treatment       Note:     Goal Note filed on 09/19/19 0740 by Travis Hobbs MS,CCC-SLP    Continue to target                         Problem: Speech/Swallowing LTGs     Dates: Start: 09/14/19       Description:     Goal: LTG-By discharge, patient will solve complex problem     Dates: Start: 09/14/19       Description: 1) Individualized goal:  With 80% accuracy and MOD I for a safe D/C to PLOF.   2) Interventions:  SLP Self Care / ADL Training , SLP Cognitive Skill Development and SLP Group Treatment             Goal: LTG-By discharge, patient will     Dates: Start: 09/14/19       Description: 1) Individualized goal:  Recall pertinent information r/t health and safety with  80% accuracy and MOD I for a safe D/C to PLOF.   2) Interventions:  SLP Self Care / ADL Training , SLP Cognitive Skill Development and SLP Group Treatment                          Section completed by:  Travis Hobbs MS,CCC-SLP

## 2019-09-19 NOTE — PROGRESS NOTES
Hospital Medicine Daily Progress Note      Chief Complaint:  HTN, DM    Interval History:  Pt seen and examined in dining room.  No more dizziness w/ increased fluid intake yesterday.    Review of Systems  Review of Systems   Constitutional: Negative for chills and fever.   HENT: Negative.    Eyes: Negative.    Respiratory: Negative for cough and shortness of breath.    Cardiovascular: Negative for chest pain and palpitations.   Gastrointestinal: Negative for abdominal pain, nausea and vomiting.   Genitourinary: Negative.    Musculoskeletal:        Wound pain   Skin: Negative for itching and rash.        Physical Exam  Temp:  [36.5 °C (97.7 °F)-36.7 °C (98 °F)] 36.5 °C (97.7 °F)  Pulse:  [77-81] 80  Resp:  [18] 18  BP: (103-128)/(66-71) 128/70  SpO2:  [90 %] 90 %    Physical Exam   Constitutional: He is oriented to person, place, and time. No distress.   HENT:   Head: Normocephalic and atraumatic.   Right Ear: External ear normal.   Left Ear: External ear normal.   Eyes: Conjunctivae and EOM are normal. Right eye exhibits no discharge. Left eye exhibits no discharge.   Neck: Normal range of motion. Neck supple. No tracheal deviation present.   Cardiovascular: Normal rate and regular rhythm.   Pulmonary/Chest: No stridor. No respiratory distress. He has no wheezes.   Decreased BS   Abdominal: Soft. Bowel sounds are normal. He exhibits no distension. There is no tenderness.   Musculoskeletal:   Right BKA w/ brace   Neurological: He is alert and oriented to person, place, and time.   Skin: Skin is warm and dry. He is not diaphoretic.   Vitals reviewed.      Fluids    Intake/Output Summary (Last 24 hours) at 9/19/2019 1219  Last data filed at 9/19/2019 1157  Gross per 24 hour   Intake 1430 ml   Output 600 ml   Net 830 ml       Laboratory  Recent Labs     09/18/19  0552   WBC 8.0   RBC 3.82*   HEMOGLOBIN 10.6*   HEMATOCRIT 33.9*   MCV 88.7   MCH 27.7   MCHC 31.3*   RDW 45.2   PLATELETCT 515*   MPV 9.7     Recent Labs      09/18/19  0552   SODIUM 136   POTASSIUM 3.5*   CHLORIDE 98   CO2 29   GLUCOSE 82   BUN 23*   CREATININE 0.94   CALCIUM 9.8                   Assessment/Plan  * S/P BKA (below knee amputation) unilateral, right ([facility])  Assessment & Plan  S/P right BKA  Wound care    Hypothyroidism  Assessment & Plan  TSH 10.8 and FT4 1.11  TFT's may be related to acute illness, therefore will not increase Synthroid at this time  F/U w/ outpt Endocrinologist (Dr. Browning)    Hypertension, essential  Assessment & Plan  Low blood pressures improved off Chlorthalidone  Continue Losartan and Propranolol  Flomax changed to qhs dosing to avoid diurnal orthostatic events    PVD (peripheral vascular disease) ([facility])- dr mohsen (cardilogy) at [facility]; arterial angioplasty right leg tbd july2019; rx trental (dr hernandes podiatry)- (present on admission)  Assessment & Plan  On ASA, Plavix, Trenta, and Lipitor    Uncontrolled type 2 diabetes mellitus with complication, with long-term current use of insulin ([facility])- dr browning- (present on admission)  Assessment & Plan  HbA1c 8.1  Continue Lantus, Metformin, and Glimepiride  Will not need SSI on discharge  Outpt meds include Metformin 1000 mg bid, Jardiance 25 mg daily, Amaryl 4 mg qam, Ozempic 0.25 mg SQ qweekly, and Insulin 50/50 qhs  Followed by outpt Endocrinology (Dr. Browning)    Hypovitaminosis D- (present on admission)  Assessment & Plan  Vit D level 19  On supplementation    Full Code

## 2019-09-19 NOTE — DISCHARGE PLANNING
"Met w/ patient & SO, Jan, to review DC planning in process. Updated DME referral to [facility] for w/chair & accessories, OP therapy to [facility] w/ acceptance. Per Jan, initial appt set up for Tuesday, 9.24.19, w/ PT. Reviewed f/u MD appts in place, including Dr. Amezcua's & informed [facility]. SO has ordered & rec'd FWW at home. Patient \"excited about getting out of here. Can we make it tomorrow?\" Questions answered. Emotional support provided.  "

## 2019-09-19 NOTE — THERAPY
Speech Language Pathology  Daily Treatment     Patient Name: Kevin Jackson  Age:  74 y.o., Sex:  male  Medical Record #: [id number]  Today's Date: 9/19/2019     Subjective    Pt was pleasant and cooperative during this ST session      Objective       09/19/19 1001   SLP Total Time Spent   SLP Individual Total Time Spent (Mins) 60   Charge Group   SLP Cognitive Skill Development 4     FIM Comprehension Score:  6 - Modified Independent  Comprehension Description:  Glasses    FIM Expression Score:  7 - Independent  Expression Description:       FIM Social Interaction Score:  7 - Independent  Social Interaction Description:       FIM Problem Solving Score:  6 - Modified Independent  Problem Solving Description:  Verbal cueing, Therapy schedule, Increased time    FIM Memory Score:  5 - Standby Prompting/Supervision or Set-up  Memory Description:  Verbal cueing, Therapy schedule      Assessment    Reviewed pt's medications.  Pt was able to recall the purposes of approximately 75% of his medications when given the names.  Recommended pt use a weekly pill sorter to help with organization.  Pt admitted that he was not very strict with his medications or diabetes management before his recent amputation.      Plan    Pt discharging home tomorrow

## 2019-09-19 NOTE — PROGRESS NOTES
"Rehab Progress Note     Encounter Date: 9/19/2019    CC: R BKA, RLE pain, and delayed response time    Interval Events (Subjective)  Patient sitting up in hallway. He reports he feels much better. He reports his cognition is feeling better. Discussed ongoing discussion of DME. He reports he will make it work with whatever he has.  Discussed again about a ramp as it would be good long term for him to have access to his home in case something happens with his prosthetic in the future. Therapy and CM working on DME. Discussed would have final IDT later today.  He has questions about discharge medications and would like them sent to [facility]. Performed opiate counseling.     IDT Team Meeting 9/19/2019    IVincent M.D., was present and led the interdisciplinary team conference on 9/19/2019.  I led the IDT conference and agree with the IDT conference documentation and plan of care as noted below.     RN:  Diet    % Meal     Pain R BKA; sutures present   Sleep    Bowel Continent   Bladder Continent   In's & Out's    Has follow-up     PT:  Bed Mobility    Transfers Arianne but due to soft ground and partial amputation on other foot   Mobility Arianne   Stairs    Very motivated    OT:  Eating    Grooming    Bathing [facility]   UB Dressing    LB Dressing Eloisa   Toileting Eloisa   Shower & Transfer SBA   Manages IPOP ind    SLP:  Was not managing medications well  Ready for discharge     CM:  Continues to work on disposition and DME needs.      DC/Disposition:  9/20/19    Objective:  VITAL SIGNS: /70   Pulse 80   Temp 36.5 °C (97.7 °F) (Oral)   Resp 18   Ht 1.854 m (6' 1\")   Wt 89.5 kg (197 lb 5 oz)   SpO2 90%   BMI 26.03 kg/m²   Gen: NAD  Psych: Mood and affect appropriate  CV: RRR, no edema  Resp: CTAB, no upper airway sounds  Abd: NTND  Neuro: AOx4, 5/5 BUE  Unchanged from 9/18/19    Recent Results (from the past 72 hour(s))   ACCU-CHEK GLUCOSE    Collection Time: 09/16/19  4:53 PM   Result Value Ref Range "    Glucose - Accu-Ck 150 (H) 65 - 99 mg/dL   ACCU-CHEK GLUCOSE    Collection Time: 09/16/19  9:19 PM   Result Value Ref Range    Glucose - Accu-Ck 126 (H) 65 - 99 mg/dL   ACCU-CHEK GLUCOSE    Collection Time: 09/17/19  7:03 AM   Result Value Ref Range    Glucose - Accu-Ck 115 (H) 65 - 99 mg/dL   ACCU-CHEK GLUCOSE    Collection Time: 09/17/19 10:58 AM   Result Value Ref Range    Glucose - Accu-Ck 203 (H) 65 - 99 mg/dL   ACCU-CHEK GLUCOSE    Collection Time: 09/17/19  5:23 PM   Result Value Ref Range    Glucose - Accu-Ck 113 (H) 65 - 99 mg/dL   ACCU-CHEK GLUCOSE    Collection Time: 09/17/19  8:46 PM   Result Value Ref Range    Glucose - Accu-Ck 153 (H) 65 - 99 mg/dL   CBC WITHOUT DIFFERENTIAL    Collection Time: 09/18/19  5:52 AM   Result Value Ref Range    WBC 8.0 4.8 - 10.8 K/uL    RBC 3.82 (L) 4.70 - 6.10 M/uL    Hemoglobin 10.6 (L) 14.0 - 18.0 g/dL    Hematocrit 33.9 (L) 42.0 - 52.0 %    MCV 88.7 81.4 - 97.8 fL    MCH 27.7 27.0 - 33.0 pg    MCHC 31.3 (L) 33.7 - 35.3 g/dL    RDW 45.2 35.9 - 50.0 fL    Platelet Count 515 (H) 164 - 446 K/uL    MPV 9.7 9.0 - 12.9 fL   Basic Metabolic Panel    Collection Time: 09/18/19  5:52 AM   Result Value Ref Range    Sodium 136 135 - 145 mmol/L    Potassium 3.5 (L) 3.6 - 5.5 mmol/L    Chloride 98 96 - 112 mmol/L    Co2 29 20 - 33 mmol/L    Glucose 82 65 - 99 mg/dL    Bun 23 (H) 8 - 22 mg/dL    Creatinine 0.94 0.50 - 1.40 mg/dL    Calcium 9.8 8.5 - 10.5 mg/dL    Anion Gap 9.0 0.0 - 11.9   ESTIMATED GFR    Collection Time: 09/18/19  5:52 AM   Result Value Ref Range    GFR If African American >60 >60 mL/min/1.73 m 2    GFR If Non African American >60 >60 mL/min/1.73 m 2   ACCU-CHEK GLUCOSE    Collection Time: 09/18/19  7:21 AM   Result Value Ref Range    Glucose - Accu-Ck 90 65 - 99 mg/dL   ACCU-CHEK GLUCOSE    Collection Time: 09/18/19  9:49 AM   Result Value Ref Range    Glucose - Accu-Ck 140 (H) 65 - 99 mg/dL   ACCU-CHEK GLUCOSE    Collection Time: 09/18/19 10:59 AM   Result Value  Ref Range    Glucose - Accu-Ck 164 (H) 65 - 99 mg/dL   ACCU-CHEK GLUCOSE    Collection Time: 09/18/19  5:04 PM   Result Value Ref Range    Glucose - Accu-Ck 153 (H) 65 - 99 mg/dL   ACCU-CHEK GLUCOSE    Collection Time: 09/18/19  8:56 PM   Result Value Ref Range    Glucose - Accu-Ck 120 (H) 65 - 99 mg/dL   ACCU-CHEK GLUCOSE    Collection Time: 09/19/19  7:46 AM   Result Value Ref Range    Glucose - Accu-Ck 132 (H) 65 - 99 mg/dL   ACCU-CHEK GLUCOSE    Collection Time: 09/19/19 11:15 AM   Result Value Ref Range    Glucose - Accu-Ck 96 65 - 99 mg/dL       Current Facility-Administered Medications   Medication Frequency   • tamsulosin (FLOMAX) capsule 0.8 mg QHS   • senna-docusate (PERICOLACE or SENOKOT S) 8.6-50 MG per tablet 2 Tab BID PRN    And   • polyethylene glycol/lytes (MIRALAX) PACKET 1 Packet QDAY PRN    And   • magnesium hydroxide (MILK OF MAGNESIA) suspension 30 mL QDAY PRN    And   • bisacodyl (DULCOLAX) suppository 10 mg QDAY PRN   • pentoxifylline CR (TRENTAL) tablet 400 mg TID   • insulin glargine (LANTUS) injection 38 Units Q EVENING    And   • insulin lispro (HUMALOG) injection 2-12 Units 4X/DAY ACHS    And   • glucose 4 g chewable tablet 16 g Q15 MIN PRN    And   • dextrose 50% (D50W) injection 50 mL Q15 MIN PRN   • glimepiride (AMARYL) tablet 4 mg QAM AC   • omeprazole (PRILOSEC) capsule 20 mg DAILY   • clopidogrel (PLAVIX) tablet 75 mg DAILY   • vitamin D (cholecalciferol) tablet 2,000 Units DAILY   • metFORMIN (GLUCOPHAGE) tablet 1,000 mg BID WITH MEALS   • Respiratory Care per Protocol Continuous RT   • tramadol (ULTRAM) 50 MG tablet 50 mg Q4HRS PRN   • hydrALAZINE (APRESOLINE) tablet 25 mg Q8HRS PRN   • acetaminophen (TYLENOL) tablet 650 mg Q4HRS PRN   • artificial tears ophthalmic solution 1 Drop PRN   • benzocaine-menthol (CEPACOL) lozenge 1 Lozenge Q2HRS PRN   • mag hydrox-al hydrox-simeth (MAALOX PLUS ES or MYLANTA DS) suspension 20 mL Q2HRS PRN   • ondansetron (ZOFRAN ODT) dispertab 4 mg  4X/DAY PRN    Or   • ondansetron (ZOFRAN) syringe/vial injection 4 mg 4X/DAY PRN   • traZODone (DESYREL) tablet 50 mg QHS PRN   • sodium chloride (OCEAN) 0.65 % nasal spray 2 Spray PRN   • heparin injection 5,000 Units Q8HRS   • aspirin EC (ECOTRIN) tablet 81 mg DAILY   • atorvastatin (LIPITOR) tablet 80 mg Q EVENING   • gabapentin (NEURONTIN) capsule 1,200 mg QHS   • gabapentin (NEURONTIN) capsule 600 mg TID   • HYDROcodone-acetaminophen (NORCO) 5-325 MG per tablet 1-2 Tab Q4HRS PRN   • levothyroxine (SYNTHROID) tablet 50 mcg QAM AC   • losartan (COZAAR) tablet 50 mg DAILY   • primidone (MYSOLINE) tablet 50 mg Q EVENING   • propranolol LA (INDERAL LA) capsule 120 mg QAM       Orders Placed This Encounter   Procedures   • Diet Order Diabetic     Standing Status:   Standing     Number of Occurrences:   1     Order Specific Question:   Diet:     Answer:   Diabetic [3]       Assessment:  Active Hospital Problems    Diagnosis   • *S/P BKA (below knee amputation) unilateral, right (HCC)   • Hypertension, essential   • Hypothyroidism   • PVD (peripheral vascular disease) ([facility])- dr mohsen (cardilogy) at [facility]; arterial angioplasty right leg tbd july2019; rx trental (dr hernandes podiatry)   • Hypothyroidism due to acquired atrophy of thyroid- dr browning   • Uncontrolled type 2 diabetes mellitus with complication, with long-term current use of insulin ([facility])- dr browning   • Mixed hyperlipidemia- dr dwyer   • Essential hypertension- DR DWYER       Medical Decision Making and Plan:  R BKA - Patient with DM and PVD with right BKA on 9/5/19 with Dr. Amezcua.  -PT and OT for mobility and ADLs  -NWB RLE  -Continue ASA  -Previously on Plavix and Pentoxifylline. Check AM CBC and restart. 11.9 on admission.   -Prosthetist exchanged rigid removal dressing as causing discomfort 9/12/19   -Restart Pentoxifylline   Dx R BKA: Patient has a mobility limitation that prevents them from completing all MRADLs.  They cannot sufficiently complete  MRADLs with a walker, cane or crutches.  A wheelchair will improve their ability to complete MRADLs.  The patient is willing and able to self-propel the wheelchair.  Patient also has a caregiver who is willing and able to assist with wheelchair. Needs extension board to avoid contractures in his right knee which would further limit his future mobility.     New Nausea - Patient with new nausea after breakfast. Labs this AM normal except mild hypokalemia. PRN Zofran, continue to monitor electrolytes if ongoing emesis. Resolved.     DM with hyperglycemia - Patient with uncontrolled DM into 300s. On Lantus 19 and SSI on transfer. Previously on Empagliflozin 25 mg daily, Glimepiride 4 mg daily, Ozempic 1 mg q7days, and Metformin 1000 mg BID  -Continue to monitor. Continue Lantus and SSI. Consult hospitalist.   -Discussed with Hospitalist and increase Lantus to 30U.  A1c - 8.1. Restarted home Metformin 1000 mg BID. Restarted on Home Glimepiride. Discussed with hospitalist and increased to 38 U Lantus so concern for hypoglycemia if all restarted at once.     Pain/Neuropathy from DM - Patient on Gabapentin 600/600/600/1200 on transfer. Will continue to monitor.  -On PRN Norco. Discussed opiate counseling on 9/19/19. Provided two week prescription.  I discussed the risks and benefits of using opiate medications for pain control.  I discussed the risk of addiction, potential for overdose, and respiratory depression (and the potential need for opiate antagonist therapy if this occurs).  I encouraged the patient to take this medication sparingly with the expressed goal of weaning off the medication as soon as is clinically appropriate.  I informed the patient that we are only able to provide a 14 day supply of these medications at discharge and that they will be responsible for requesting any refills needed from their primary care provider or their surgeon.  We discussed the need to safely secure these medications to prevent  theft, inadvertent ingestion, or misuse.  Any unused medication should be immediately disposed of through a sanctioned medication disposal program.  We discussed adjunctive pain medications and conservative therapies at length.  I answered the patient's questions regarding this treatment, and the patient indicated understanding and willingness to proceed.    HTN - Patient on Chlorthalidone 25 mg and Losartan 50 mg daily.      HLD - Patient on Atorvastatin 80 mg QHS.     Hypothyroidism - Patient on 50 mcg on transfer.      Essential Tremor - On Primidone 50 mg and Propranolol 120 mg daily     BPH - Patient on Tamsulosin 0.4 mg. Check PVRs - ~250, increase to 0.8 mg and monitor   -Improved < 75 on increased flomax      Vitamin D - 19 on admission, start 2000 U daily.     DVT Ppx - Patient on Heparin 5000 q8h on transfer.      Dispo - Discharge home on 9/20/19, ongoing discussion of DME.     Total time:  40 minutes.  I spent greater than 50% of the time for patient care, counseling, and coordination on this date, including unit/floor time, and face-to-face time with the patient as per interval events and assessment and plan above. Topics discussed included DME, discharge planning and opiate counseling. Patient was discussed separately in IDT today; please see details above.    Vincent Rubin M.D.

## 2019-09-19 NOTE — THERAPY
Physical Therapy   Daily Treatment     Patient Name: Kevin Jackson  Age:  74 y.o., Sex:  male  Medical Record #: [id number]  Today's Date: 9/19/2019     Precautions  Precautions: Non Weight Bearing Right Lower Extremity, Fall Risk  Comments: IPOP RLE    Subjective    Patient agreeable to PT.     Objective       09/19/19 0901   Bed Mobility    Supine to Sit Modified Independent   Sit to Supine Modified Independent   Sit to Stand Supervised   Scooting Modified Independent   Rolling Modified Independent   Interdisciplinary Plan of Care Collaboration   Collaboration Comments Reviewed POC and d/c recommendations; pt read and discussed HEP, post-amputation education, and falls/home safety education.   PT Total Time Spent   PT Individual Total Time Spent (Mins) 60   PT Charge Group   PT Gait Training 1   PT Therapeutic Activities 3     Worked on d/c FIMs and IRF-Milvia; continued education per above and per education flowsheet.    FIM Bed/Chair/Wheelchair Transfers Score: 5 - Standby Prompting/Supervision or Set-up  Bed/Chair/Wheelchair Transfers Description:  (SBA, FWW, increased time)    FIM Walking Score:  5 - Standby Prompting/Supervision or Set-up  Walking Description:  (SBA, FWW, 150 feet indoors, increased time, hop-to pattern)    FIM Wheelchair Score:  6 - Modified Independent  Wheelchair Description:  (Mod I indoors/outdoors today, including setup)    FIM Stairs Score:  2 - Max Assistance  Stairs Description:  (SBA, increased time, cueing, B railings; 1 set of 4 standard stairs today.  pt declines ramp for home entry/exit at this time)      Assessment    Patient is nearly ready for d/c; will benefit from family training tomorrow.    Plan    Family training with PT on Friday at 10 a.m.; pt to d/c on Friday 9/20/19

## 2019-09-19 NOTE — CARE PLAN
Problem: Communication  Goal: The ability to communicate needs accurately and effectively will improve  9/19/2019 0233 by Lyla Craft R.N.  Outcome: PROGRESSING AS EXPECTED  Note:   Patient able to verbalize needs.  Will continue to monitor.   9/19/2019 0221 by Lyla Craft R.N.  Outcome: PROGRESSING AS EXPECTED  Note:   Patient able to verbalize needs.  Will continue to monitor.      Problem: Safety  Goal: Will remain free from injury  9/19/2019 0233 by Lyla Craft R.NLevar  Outcome: PROGRESSING AS EXPECTED  Note:   Pt uses call light consistently and appropriately. Waits for assistance does not attempt self transfer this shift. Able to verbalize needs.   9/19/2019 0221 by Lyla Craft RLevarNLevar  Outcome: PROGRESSING AS EXPECTED     Problem: Pain Management  Goal: Pain level will decrease to patient's comfort goal  Outcome: PROGRESSING AS EXPECTED  Note:   Patient able to verbalize pain level and verbalize an acceptable level of pain.

## 2019-09-19 NOTE — REHAB-PT IDT TEAM NOTE
Physical Therapy   Mobility  Bed mobility:   Mod I  Bed /Chair/Wheelchair Transfer Initial:  4 - Minimal Assistance  Bed /Chair/Wheelchair Transfer Current:  4 - Minimal Assistance   Bed/Chair/Wheelchair Transfer Description:  (CGA, gait belt, FWW, setup prn, increased time.  1 standard bed transfer and 1 mat transfer today.)  Walk Initial:  1 - Total Assistance  Walk Current:  2 - Max Assistance   Walk Description:  Supervision for safety, Walker, Requires incidental assist(70' x 2 FWW CGA, hop-to pattern IPOP in place)  Wheelchair Initial:  3 - Moderate Assistance  Wheelchair Current:  6 - Modified Independent   Wheelchair Description:  (Mod I indoors/outdoors today but SPV when patient began to report not feeling well (nausea & dehydration); RN, CNA, and hospitalist aware.  1 rep of 400 feet outdoor/indoor and 1 rep indoors for 350 feet; minimal fatigue noted.)  Stairs Initial:  0 - Not tested,unsafe activity  Stairs Current: 2 - Max Assistance   Stairs Description: (Min A to/from stairs, cueing, B railings, setup, R rigid residual limb orthosis, gait belt.  1 set of 4 standard stairs using seated/bumping approach.  Increased time.  Patient became fatigued and nauseated after stairs.)  Patient/Family Training/Education:  Patient education ongoing  DME/DC Recommendations:  Assist for balance when standing/transfers; Intermittent SPV; Ramp to enter/exit home; standard manual w/c with standard L legrest, R stump board, seat cushion, lap belt, B anti-tippers; Home health PT; pt already has R residual limb rigid orthosis from Shiv[facility] from [place].  Strengths:  Adequate strength, Independent PLOF, Making steady progress towards goals, Manages pain appropriately, Motivated for self care and independence, Pleasant and cooperative and Willingly participates in therapeutic activities  Barriers:   Decreased endurance, Home accessibility and Poor balance  # of short term goals set= 7  # of short term goals met= 6  Physical  Therapy Problems     Problem: Mobility Transfers     Dates: Start: 09/11/19       Description:     Goal: STG-Within one week, patient will transfer in/out of car     Dates: Start: 09/11/19       Description: 1) Individualized goal: SPV and setup, FWW, RLE rigid post-op orthosis, cueing prn  2) Interventions: PT Group Therapy, PT Prosthetic Training, PT Gait Training, PT Self Care/Home Eval, PT Therapeutic Exercises, PT Neuro Re-Ed/Balance, PT Therapeutic Activity and PT Manual Therapy       Note:     Goal Note filed on 09/19/19 1241 by Trung Bhagat, PT    Patient used Min A briefly at end of car transfer for balance when standing at FWW; otherwise, SBA.                        Problem: PT-Long Term Goals     Dates: Start: 09/11/19       Description:     Goal: LTG-By discharge, patient will propel wheelchair     Dates: Start: 09/11/19       Description: 1) Individualized goal:  SPV/setup indoors and outdoors for 500 feet, R stump board, RLE rigid post-op orthosis, cueing prn  2) Interventions:  PT Group Therapy, PT Prosthetic Training, PT Gait Training, PT Self Care/Home Eval, PT Therapeutic Exercises, PT Neuro Re-Ed/Balance, PT Therapeutic Activity and PT Manual Therapy           Goal: LTG-By discharge, patient will ambulate     Dates: Start: 09/11/19       Description: 1) Individualized goal:  SPV and setup, 50 feet indoors, FWW, hop-to pattern, RLE rigid post-op orthosis, cueing prn  2) Interventions:  PT Group Therapy, PT Prosthetic Training, PT Gait Training, PT Self Care/Home Eval, PT Therapeutic Exercises, PT Neuro Re-Ed/Balance, PT Therapeutic Activity and PT Manual Therapy             Goal: LTG-By discharge, patient will transfer one surface to another     Dates: Start: 09/11/19       Description: 1) Individualized goal: SPV and setup, FWW, RLE rigid post-op orthosis, cueing prn  2) Interventions: PT Group Therapy, PT Prosthetic Training, PT Gait Training, PT Self Care/Home Eval, PT Therapeutic  Exercises, PT Neuro Re-Ed/Balance, PT Therapeutic Activity and PT Manual Therapy           Goal: LTG-By discharge, patient will perform home exercise program     Dates: Start: 09/11/19       Description: 1) Individualized goal:  Perform HEP for RLE strengthening and AROM  2) Interventions:  PT Group Therapy, PT Prosthetic Training, PT Gait Training, PT Self Care/Home Eval, PT Therapeutic Exercises, PT Neuro Re-Ed/Balance, PT Therapeutic Activity and PT Manual Therapy           Goal: LTG-By discharge, patient will ambulate up/down 4-6 stairs     Dates: Start: 09/11/19       Description: 1) Individualized goal:  2 standard stairs to enter home, SBA, seated bumping approach, RLE rigid post-op orthosis, cueing prn  2) Interventions:  PT Group Therapy, PT Prosthetic Training, PT Gait Training, PT Self Care/Home Eval, PT Therapeutic Exercises, PT Neuro Re-Ed/Balance, PT Therapeutic Activity and PT Manual Therapy             Goal: LTG-By discharge, patient will transfer in/out of a car     Dates: Start: 09/11/19       Description: 1) Individualized goal: SPV and setup, FWW, RLE rigid post-op orthosis, cueing prn  2) Interventions: PT Group Therapy, PT Prosthetic Training, PT Gait Training, PT Self Care/Home Eval, PT Therapeutic Exercises, PT Neuro Re-Ed/Balance, PT Therapeutic Activity and PT Manual Therapy                           Section completed by:  Trung Bhagat, PT

## 2019-09-19 NOTE — CARE PLAN
Problem: Speech/Swallowing LTGs  Goal: LTG-By discharge, patient will solve complex problem  Description  1) Individualized goal:  With 80% accuracy and MOD I for a safe D/C to PLOF.   2) Interventions:  SLP Self Care / ADL Training , SLP Cognitive Skill Development and SLP Group Treatment     Outcome: DISCHARGED-GOAL NOT MET  Goal: LTG-By discharge, patient will  Description  1) Individualized goal:  Recall pertinent information r/t health and safety with  80% accuracy and MOD I for a safe D/C to PLOF.   2) Interventions:  SLP Self Care / ADL Training , SLP Cognitive Skill Development and SLP Group Treatment     Outcome: DISCHARGED-GOAL NOT MET     Problem: Problem Solving STGs  Goal: STG-Within one week, patient will solve basic problems  Description  1) Individualized goal:  Related to health and safety, including medication management with 80% accuracy and MIN A.   2) Interventions:  SLP Self Care / ADL Training , SLP Cognitive Skill Development and SLP Group Treatment     9/19/2019 1613 by Travis Hobbs MSCCC-SLP  Outcome: DISCHARGED-GOAL NOT MET  9/19/2019 0740 by Travis Hobbs MSCCC-SLP  Outcome: NOT MET  Note:   Continue to target   Goal: STG-Within one week, patient will  Description  1) Individualized goal:  Understand and implement word finding strategies with 80% accuracy and MIN A.   2) Interventions:  SLP Self Care / ADL Training , SLP Cognitive Skill Development and SLP Group Treatment     9/19/2019 1613 by Travis Hobbs MSCCC-SLP  Outcome: DISCHARGED-GOAL NOT MET  9/19/2019 0740 by Travis Hobbs MSCCC-SLP  Outcome: NOT MET  Note:   Continue to target      Problem: Memory STGs  Goal: STG-Within one week, patient will remember  Description  1) Individualized goal:  Pt will recall novel information r/t rehabilitation hospital stay using external memory aids with 805 accuracy and MIN A.   2) Interventions:  SLP Self Care / ADL Training , SLP Cognitive Skill Development and SLP Group Treatment      9/19/2019 1613 by Travis Hobbs MS,CCC-SLP  Outcome: DISCHARGED-GOAL NOT MET  9/19/2019 0740 by Travis Hobbs MS,CCC-SLP  Outcome: NOT MET  Note:   Continue to target

## 2019-09-19 NOTE — REHAB-CM IDT TEAM NOTE
Case Management      DC Planning    DC destination/dispostion:  Home w/ supportive SO, SS w/ 2 GABO.    Referrals: OP therapy: [facility]. DME: wheelchair.    DC Needs: DME for patient safety & mobility. OP therapy referral. MD f/u appts. Prosthetic f/u.    Barriers to discharge: Functional mobility deficits.       Strengths: Supportive SO. PLOF independent. Participation & motivation.    Section completed by:  Vilma Watson R.N.

## 2019-09-19 NOTE — PROGRESS NOTES
[facility] Medicine Daily Progress Note      Chief Complaint:  HTN, DM    Interval History:  Pt c/o dizziness.  No chest pain, shortness of breath, or palpitations.  BP trending low.  Labs reviewed.    Review of Systems  Review of Systems   Constitutional: Negative for chills and fever.   HENT: Negative.    Eyes: Negative.    Respiratory: Negative for cough and shortness of breath.    Cardiovascular: Negative for chest pain and palpitations.   Gastrointestinal: Negative for abdominal pain, nausea and vomiting.   Genitourinary: Negative.    Musculoskeletal:        Wound pain   Skin: Negative for itching and rash.   Neurological: Positive for dizziness.        Physical Exam  Temp:  [36.5 °C (97.7 °F)-36.7 °C (98 °F)] 36.5 °C (97.7 °F)  Pulse:  [77-81] 80  Resp:  [18] 18  BP: (103-128)/(66-71) 128/70  SpO2:  [90 %] 90 %    Physical Exam   Constitutional: He is oriented to person, place, and time. No distress.   HENT:   Head: Normocephalic and atraumatic.   Right Ear: External ear normal.   Left Ear: External ear normal.   Eyes: Conjunctivae and EOM are normal. Right eye exhibits no discharge. Left eye exhibits no discharge.   Neck: Normal range of motion. Neck supple. No tracheal deviation present.   Cardiovascular: Normal rate and regular rhythm.   Pulmonary/Chest: No stridor. No respiratory distress. He has no wheezes.   Decreased BS   Abdominal: Soft. Bowel sounds are normal. He exhibits no distension. There is no tenderness.   Musculoskeletal:   Right BKA w/ brace   Neurological: He is alert and oriented to person, place, and time.   Skin: Skin is warm and dry. He is not diaphoretic.   Vitals reviewed.      Fluids    Intake/Output Summary (Last 24 hours) at 9/19/2019 1214  Last data filed at 9/19/2019 1157  Gross per 24 hour   Intake 1430 ml   Output 600 ml   Net 830 ml       Laboratory  Recent Labs     09/18/19  0552   WBC 8.0   RBC 3.82*   HEMOGLOBIN 10.6*   HEMATOCRIT 33.9*   MCV 88.7   MCH 27.7   MCHC 31.3*   RDW  45.2   PLATELETCT 515*   MPV 9.7     Recent Labs     09/18/19  0552   SODIUM 136   POTASSIUM 3.5*   CHLORIDE 98   CO2 29   GLUCOSE 82   BUN 23*   CREATININE 0.94   CALCIUM 9.8                   Assessment/Plan  * S/P BKA (below knee amputation) unilateral, right ([facility])  Assessment & Plan  S/P right BKA  Wound care    Hypothyroidism  Assessment & Plan  TSH 10.8 and FT4 1.11  TFT's may be affected by acute illness, therefore will not increase Synthroid at this time  F/U w/ outpt Endocrinologist (Dr. Browning)    Hypertension, essential  Assessment & Plan  On Losartan, Propranolol, and Chlorthalidone  Will discontinue diuretic for hypotensive trends  Change Flomax to qhs dosing to avoid diurnal orthostatic events    PVD (peripheral vascular disease) ([facility])- dr mohsen (cardilogy) at [facility]; arterial angioplasty right leg tbd july2019; rx trental (dr hernandes podiatry)- (present on admission)  Assessment & Plan  On ASA, Plavix, Trenta, and Lipitor    Uncontrolled type 2 diabetes mellitus with complication, with long-term current use of insulin ([facility])- dr browning- (present on admission)  Assessment & Plan  HbA1c 8.1  On Lantus and SSI  Also on Metformin and Glimepiride  Outpt meds include Metformin 1000 mg bid, Jardiance 25 mg daily, Amaryl 4 mg qam, Ozempic 0.25 mg SQ qweekly, and Insulin 50/50 qhs  Followed by outpt Endocrinology (Dr. Browning)    Hypovitaminosis D- (present on admission)  Assessment & Plan  Vit D level 19  On supplementation    Full Code    Reviewed w/ pt and RN

## 2019-09-19 NOTE — CARE PLAN
Problem: Mobility  Goal: STG-Within one week, patient will propel wheelchair community  Description  1) Individualized goal:  SPV/setup indoors and outdoors for 500 feet, R stump board, RLE rigid post-op orthosis, cueing prn  2) Interventions:  PT Group Therapy, PT Prosthetic Training, PT Gait Training, PT Self Care/Home Eval, PT Therapeutic Exercises, PT Neuro Re-Ed/Balance, PT Therapeutic Activity and PT Manual Therapy     Outcome: MET  Goal: STG-Within one week, patient will ambulate household distance  Description  1) Individualized goal:  SPV and setup, 50 feet indoors, FWW, hop-to pattern, RLE rigid post-op orthosis, cueing prn  2) Interventions:  PT Group Therapy, PT Prosthetic Training, PT Gait Training, PT Self Care/Home Eval, PT Therapeutic Exercises, PT Neuro Re-Ed/Balance, PT Therapeutic Activity and PT Manual Therapy   Outcome: MET  Goal: STG-Within one week, patient will ascend and descend four to six stairs  Description  1) Individualized goal:  2 standard stairs to enter home, SBA, seated bumping approach, RLE rigid post-op orthosis, cueing prn  2) Interventions:  PT Group Therapy, PT Prosthetic Training, PT Gait Training, PT Self Care/Home Eval, PT Therapeutic Exercises, PT Neuro Re-Ed/Balance, PT Therapeutic Activity and PT Manual Therapy   Outcome: MET  Goal: STG-Within one week, patient will  Description  1) Individualized goal:  Perform HEP for RLE strengthening and AROM  2) Interventions:  PT Group Therapy, PT Prosthetic Training, PT Gait Training, PT Self Care/Home Eval, PT Therapeutic Exercises, PT Neuro Re-Ed/Balance, PT Therapeutic Activity and PT Manual Therapy   Outcome: MET     Problem: Mobility Transfers  Goal: STG-Within one week, patient will sit to stand  Description  1) Individualized goal: SPV and setup, FWW, RLE rigid post-op orthosis, cueing prn  2) Interventions: PT Group Therapy, PT Prosthetic Training, PT Gait Training, PT Self Care/Home Eval, PT Therapeutic Exercises, PT  Neuro Re-Ed/Balance, PT Therapeutic Activity and PT Manual Therapy     Outcome: MET  Goal: STG-Within one week, patient will transfer bed to chair  Description  1) Individualized goal: SPV and setup, FWW, RLE rigid post-op orthosis, cueing prn  2) Interventions: PT Group Therapy, PT Prosthetic Training, PT Gait Training, PT Self Care/Home Eval, PT Therapeutic Exercises, PT Neuro Re-Ed/Balance, PT Therapeutic Activity and PT Manual Therapy     Outcome: MET     Problem: Mobility Transfers  Goal: STG-Within one week, patient will transfer in/out of car  Description  1) Individualized goal: SPV and setup, FWW, RLE rigid post-op orthosis, cueing prn  2) Interventions: PT Group Therapy, PT Prosthetic Training, PT Gait Training, PT Self Care/Home Eval, PT Therapeutic Exercises, PT Neuro Re-Ed/Balance, PT Therapeutic Activity and PT Manual Therapy     Outcome: NOT MET  Note:   Patient used Min A briefly at end of car transfer for balance when standing at FWW; otherwise, SBA.

## 2019-09-19 NOTE — THERAPY
"Occupational Therapy  Daily Treatment     Patient Name: Kevin Jackson  Age:  74 y.o., Sex:  male  Medical Record #: [id number]  Today's Date: 2019     Precautions  Precautions: Non Weight Bearing Right Lower Extremity, Fall Risk  Comments: IPOP RLE         Subjective  \"Good, I'm looking forward to a shower this morning\"     Objective       19 0701   Precautions   Precautions Non Weight Bearing Right Lower Extremity;Fall Risk   Comments IPOP RLE   Vitals   O2 Delivery None (Room Air)   Pain   Intervention Declines   Pain 0 - 10 Group   Therapist Pain Assessment 0   Non Verbal Descriptors   Non Verbal Scale  Calm   Cognition    Level of Consciousness Alert   Sitting Upper Body Exercises   Upper Extremity Bike Level 5 Resistance  (FluidoBike, Level 5, 10 mins, 2.453km)   Bed Mobility    Supine to Sit Modified Independent   Scooting Modified Independent   Rolling Independent   Interdisciplinary Plan of Care Collaboration   IDT Collaboration with  Nursing;Certified Nursing Assistant   Patient Position at End of Therapy Seated;Self Releasing Lap Belt Applied   Collaboration Comments Pt escorted to dining room for breakfast, transition of care to nursing   OT Total Time Spent   OT Individual Total Time Spent (Mins) 60   OT Charge Group   OT Self Care / ADL 3   OT Therapeutic Exercise  1       FIM Eating Score:  7 - Independent  Eating Description:       FIM Grooming Score:  7 - Independent  Grooming Description:  (wc level)    FIM Bathing Score:  6 - Modified Independent  Bathing Description:       FIM Upper Body Dressin - Independent  Upper Body Dressing Description:       FIM Lower Body Dressing Score:  6 - Modified Independent  Lower Body Dressing Description:  Increased time(Mod Indep to thread BLE's through pants/briefs, Mod Indep to don L sock/shoe/lace management, Mod Indep to manage IPOP)    FIM Toiletin - Modified Independent  Toileting Description:  Grab bar, Increased time    FIM Toilet " Transfer Score:  6 - Modified Independent  Toilet Transfer Description:  Grab bar, Increased time(Mod Indep to transfer to/from manual wc and commode via grab bar)    FIM Tub/Shower Transfers Score:  5 - Standby Prompting/Supervision or Set-up  Tub/Shower Transfers Description:  Grab bar, Increased time, Supervision for safety(Sup/SBA to transfer to/from manual wc and shower bench via grab bar)      Assessment    Pt was alert and cooperative w/ tx.  Limiters are impaired endurance and standing balance.  Pt made functional gains in shower and toileting activities as reflected above.    Plan    Pt to DC tomorrow morning

## 2019-09-19 NOTE — PROGRESS NOTES
Patient care assumed. Report received from day RN. Patient is alert and calm, resting in bede. Call light and bedside table within reach.

## 2019-09-19 NOTE — DISCHARGE PLANNING
Per [facility], not able to complete this order.  Referral canceled with [facility].  DME referral sent to [facility].  Per [facility], referral accepted.

## 2019-09-19 NOTE — DISCHARGE PLANNING
Case Management Discharge Instructions        Discharge Location: Home with Outpatient Services   Agency Name / Address / Phone: [facility]         [address] [facility]         [place] [address]         [phone]   Outpatient Services: Occupational Therapy, Speech Therapy, Physical Therapy  Comments: Outpatient therapy will call to set up appointment     Medical Equipment Provider / Phone: [facility]         [address]         [place] [address]         [phone]   Medical Equipment Ordered: Wheelchair, cushion, anti-tippers, bilateral leg rests    Recommend: [employer] for FWW (front wheel walker), lap belt & slide board (to use as extension).      Follow-up Information:    Ky Hernandez M.D. Primary Care Provider    [address] [place] [address] [place] [id number] [phone]    Thursday 9.26.2019, check in at 2:45pm for 3:00pm appointment      Shivam Amezcua M.D. Vascular Surgeon    [address] [place] [address] [place] [address]    [phone]    Tuesday 10.1.2019, check in at 8:15am for 8:30am appointment w/ Ashly Weber PA-C

## 2019-09-19 NOTE — THERAPY
Physical Therapy   Daily Treatment     Patient Name: Kevin Jackson  Age:  74 y.o., Sex:  male  Medical Record #: [id number]  Today's Date: 9/19/2019     Precautions  Precautions: Non Weight Bearing Right Lower Extremity, Fall Risk  Comments: IPOP RLE    Subjective    Patient received in bed; guest present.     Objective       09/19/19 1401   IDT Conference   IDT Conference I have reviewed and discussed the POC and barriers to discharge for this patient.  I am knowledgeable of the patient's needs and have attended the IDT Conference.   Interdisciplinary Plan of Care Collaboration   IDT Collaboration with  [facility]  (IDT team and director of rehab)   Collaboration Comments Roomboard updated; discussed family training scheduled for tomorrow at Friday 9/20/19; pt reports family training is not necessary; PT will at least look over DME equipment and work on safety with functional transfers; voicemail left with pt's SO Jan (tried home 2x, VM left on cell).  IDT conference: difficulty with DME company today, working with 2nd company, importance of anti-tippers and lap belt along with limb management; will require some A/guarding with transfers from SO; outpatient therapies are being used instead of home health, and will include all 3 therapies; pt continues to decline ramp.   PT Total Time Spent   PT Individual Total Time Spent (Mins) 15   PT Charge Group   PT Therapeutic Activities 1     Discussed POC for 15 min and tomorrow's plan.      Assessment    Patient will benefit from one more session with his own equipment tomorrow, even if he is declining family training tomorrow morning.    Plan    Family training with PT on Friday at 10 a.m.; pt to d/c on Friday 9/20/19

## 2019-09-20 VITALS
RESPIRATION RATE: 17 BRPM | HEIGHT: 73 IN | WEIGHT: 197.31 LBS | HEART RATE: 70 BPM | SYSTOLIC BLOOD PRESSURE: 112 MMHG | OXYGEN SATURATION: 93 % | DIASTOLIC BLOOD PRESSURE: 51 MMHG | BODY MASS INDEX: 26.15 KG/M2 | TEMPERATURE: 97.8 F

## 2019-09-20 LAB
GLUCOSE BLD-MCNC: 148 MG/DL (ref 65–99)
GLUCOSE BLD-MCNC: 78 MG/DL (ref 65–99)

## 2019-09-20 PROCEDURE — 97530 THERAPEUTIC ACTIVITIES: CPT

## 2019-09-20 PROCEDURE — 82962 GLUCOSE BLOOD TEST: CPT | Mod: 91

## 2019-09-20 PROCEDURE — 99232 SBSQ HOSP IP/OBS MODERATE 35: CPT | Performed by: HOSPITALIST

## 2019-09-20 PROCEDURE — A9270 NON-COVERED ITEM OR SERVICE: HCPCS | Performed by: HOSPITALIST

## 2019-09-20 PROCEDURE — A9270 NON-COVERED ITEM OR SERVICE: HCPCS | Performed by: PHYSICAL MEDICINE & REHABILITATION

## 2019-09-20 PROCEDURE — 700102 HCHG RX REV CODE 250 W/ 637 OVERRIDE(OP): Performed by: PHYSICAL MEDICINE & REHABILITATION

## 2019-09-20 PROCEDURE — 700111 HCHG RX REV CODE 636 W/ 250 OVERRIDE (IP): Performed by: PHYSICAL MEDICINE & REHABILITATION

## 2019-09-20 PROCEDURE — 700102 HCHG RX REV CODE 250 W/ 637 OVERRIDE(OP): Performed by: HOSPITALIST

## 2019-09-20 PROCEDURE — 99239 HOSP IP/OBS DSCHRG MGMT >30: CPT | Performed by: PHYSICAL MEDICINE & REHABILITATION

## 2019-09-20 RX ORDER — DEXTROSE MONOHYDRATE 25 G/50ML
50 INJECTION, SOLUTION INTRAVENOUS
Status: DISCONTINUED | OUTPATIENT
Start: 2019-09-20 | End: 2019-09-20 | Stop reason: HOSPADM

## 2019-09-20 RX ORDER — INSULIN GLARGINE 100 [IU]/ML
30 INJECTION, SOLUTION SUBCUTANEOUS EVERY EVENING
Status: DISCONTINUED | OUTPATIENT
Start: 2019-09-20 | End: 2019-09-20 | Stop reason: HOSPADM

## 2019-09-20 RX ADMIN — GABAPENTIN 600 MG: 300 CAPSULE ORAL at 05:47

## 2019-09-20 RX ADMIN — GLIMEPIRIDE 4 MG: 2 TABLET ORAL at 08:35

## 2019-09-20 RX ADMIN — LOSARTAN POTASSIUM 50 MG: 25 TABLET ORAL at 08:35

## 2019-09-20 RX ADMIN — VITAMIN D, TAB 1000IU (100/BT) 2000 UNITS: 25 TAB at 08:35

## 2019-09-20 RX ADMIN — METFORMIN HYDROCHLORIDE 1000 MG: 500 TABLET, FILM COATED ORAL at 08:35

## 2019-09-20 RX ADMIN — GABAPENTIN 600 MG: 300 CAPSULE ORAL at 11:36

## 2019-09-20 RX ADMIN — CLOPIDOGREL BISULFATE 75 MG: 75 TABLET ORAL at 08:35

## 2019-09-20 RX ADMIN — LEVOTHYROXINE SODIUM 50 MCG: 50 TABLET ORAL at 08:35

## 2019-09-20 RX ADMIN — OMEPRAZOLE 20 MG: 20 CAPSULE, DELAYED RELEASE ORAL at 08:35

## 2019-09-20 RX ADMIN — PENTOXIFYLLINE 400 MG: 400 TABLET, EXTENDED RELEASE ORAL at 08:35

## 2019-09-20 RX ADMIN — HEPARIN SODIUM 5000 UNITS: 5000 INJECTION, SOLUTION INTRAVENOUS; SUBCUTANEOUS at 05:47

## 2019-09-20 RX ADMIN — PROPRANOLOL HYDROCHLORIDE 120 MG: 60 CAPSULE, EXTENDED RELEASE ORAL at 08:34

## 2019-09-20 ASSESSMENT — ENCOUNTER SYMPTOMS
EYES NEGATIVE: 1
CHILLS: 0
NAUSEA: 0
COUGH: 0
SHORTNESS OF BREATH: 0
PALPITATIONS: 0
FEVER: 0
ABDOMINAL PAIN: 0
VOMITING: 0

## 2019-09-20 ASSESSMENT — ACTIVITIES OF DAILY LIVING (ADL)
TOILET_TRANSFER_LEVEL_OF_ASSIST: ABLE TO COMPLETE TOILET TRANSFER WITHOUT ASSIST
SHOWER_TRANSFER_LEVEL_OF_ASSIST: REQUIRES SUPERVISION WITH SHOWER TRANSFER
TOILETING_LEVEL_OF_ASSIST: ABLE TO COMPLETE TOILETING WITHOUT ASSIST

## 2019-09-20 NOTE — CARE PLAN
Problem: PT-Long Term Goals  Goal: LTG-By discharge, patient will propel wheelchair  Description  1) Individualized goal:  SPV/setup indoors and outdoors for 500 feet, R stump board, RLE rigid post-op orthosis, cueing prn  2) Interventions:  PT Group Therapy, PT Prosthetic Training, PT Gait Training, PT Self Care/Home Eval, PT Therapeutic Exercises, PT Neuro Re-Ed/Balance, PT Therapeutic Activity and PT Manual Therapy   Outcome: MET  Goal: LTG-By discharge, patient will perform home exercise program  Description  1) Individualized goal:  Perform HEP for RLE strengthening and AROM  2) Interventions:  PT Group Therapy, PT Prosthetic Training, PT Gait Training, PT Self Care/Home Eval, PT Therapeutic Exercises, PT Neuro Re-Ed/Balance, PT Therapeutic Activity and PT Manual Therapy   Outcome: MET  Goal: LTG-By discharge, patient will ambulate up/down 4-6 stairs  Description  1) Individualized goal:  2 standard stairs to enter home, SBA, seated bumping approach, RLE rigid post-op orthosis, cueing prn  2) Interventions:  PT Group Therapy, PT Prosthetic Training, PT Gait Training, PT Self Care/Home Eval, PT Therapeutic Exercises, PT Neuro Re-Ed/Balance, PT Therapeutic Activity and PT Manual Therapy     Outcome: MET     Problem: Mobility Transfers  Goal: STG-Within one week, patient will transfer in/out of car  Description  1) Individualized goal: SPV and setup, FWW, RLE rigid post-op orthosis, cueing prn  2) Interventions: PT Group Therapy, PT Prosthetic Training, PT Gait Training, PT Self Care/Home Eval, PT Therapeutic Exercises, PT Neuro Re-Ed/Balance, PT Therapeutic Activity and PT Manual Therapy     Outcome: DISCHARGED-GOAL NOT MET     Problem: PT-Long Term Goals  Goal: LTG-By discharge, patient will ambulate  Description  1) Individualized goal:  SPV and setup, 50 feet indoors, FWW, hop-to pattern, RLE rigid post-op orthosis, cueing prn  2) Interventions:  PT Group Therapy, PT Prosthetic Training, PT Gait Training, PT  Self Care/Home Eval, PT Therapeutic Exercises, PT Neuro Re-Ed/Balance, PT Therapeutic Activity and PT Manual Therapy     Outcome: DISCHARGED-GOAL NOT MET  Goal: LTG-By discharge, patient will transfer one surface to another  Description  1) Individualized goal: SPV and setup, FWW, RLE rigid post-op orthosis, cueing prn  2) Interventions: PT Group Therapy, PT Prosthetic Training, PT Gait Training, PT Self Care/Home Eval, PT Therapeutic Exercises, PT Neuro Re-Ed/Balance, PT Therapeutic Activity and PT Manual Therapy   Outcome: DISCHARGED-GOAL NOT MET  Goal: LTG-By discharge, patient will transfer in/out of a car  Description  1) Individualized goal: SPV and setup, FWW, RLE rigid post-op orthosis, cueing prn  2) Interventions: PT Group Therapy, PT Prosthetic Training, PT Gait Training, PT Self Care/Home Eval, PT Therapeutic Exercises, PT Neuro Re-Ed/Balance, PT Therapeutic Activity and PT Manual Therapy     Outcome: DISCHARGED-GOAL NOT MET

## 2019-09-20 NOTE — CARE PLAN
Problem: Communication  Goal: The ability to communicate needs accurately and effectively will improve  Outcome: PROGRESSING AS EXPECTED  Note:   Patient able to verbalize needs.  Will continue to monitor.      Problem: Safety  Goal: Will remain free from injury  Outcome: PROGRESSING AS EXPECTED  Note:   Pt uses call light consistently and appropriately. Waits for assistance does not attempt self transfer this shift. Able to verbalize needs.      Problem: Bowel/Gastric:  Goal: Normal bowel function is maintained or improved  Outcome: PROGRESSING AS EXPECTED  Note:   Patient having regular bowel movements; last BM 9/16.  Denies s/s constipation; bowel meds available if needed.  Will continue to monitor.

## 2019-09-20 NOTE — DISCHARGE SUMMARY
Rehab Discharge Summary    Admission Date: 9/10/2019    Discharge Date: 9/20/2019    Attending Provider: Vincent Rubin MD/PhD    Admission Diagnosis:   Active Hospital Problems    Diagnosis   • *S/P BKA (below knee amputation) unilateral, right (HCC)   • Hypertension, essential   • Hypothyroidism   • PVD (peripheral vascular disease) ([facility])- dr mohsen (cardilogy) at [facility]; arterial angioplasty right leg tbd july2019; rx trental (dr hernandes podiatry)   • Hypothyroidism due to acquired atrophy of thyroid- dr browning   • Uncontrolled type 2 diabetes mellitus with complication, with long-term current use of insulin ([facility])- dr browning   • Mixed hyperlipidemia- dr dwyer   • Essential hypertension- DR DWYER   • Hypovitaminosis D       Discharge Diagnosis:  Active Hospital Problems    Diagnosis   • *S/P BKA (below knee amputation) unilateral, right (HCC)   • Hypertension, essential   • Hypothyroidism   • PVD (peripheral vascular disease) ([facility])- dr mohsen (cardilogy) at [facility]; arterial angioplasty right leg tbd july2019; rx trental (dr hernandes podiatry)   • Hypothyroidism due to acquired atrophy of thyroid- dr browning   • Uncontrolled type 2 diabetes mellitus with complication, with long-term current use of insulin ([facility])- dr browning   • Mixed hyperlipidemia- dr dwyer   • Essential hypertension- DR DWYER   • Hypovitaminosis D       HPI per H&P:  Per Dr. Bashir’s PM&R note from 9/10/19:  The patient is a 74 y.o. male with a past medical history of diabetes mellitus, peripheral artery disease, hypertension, presented on 9/5/2019 10:58 AM for a planned right BKA. Patient had developed a second toe ulcer which rapidly progressed to gangrene of his entire toe and compromise of the third toe. He also had chronic severe pain. Preoperative angiography found occlusion and blood flow below the ankle contributing to this. Patient elected to have below-knee amputation, which was performed by Dr. Shivam Amezcua MD on  9/5/2019.    Patient was admitted to [facility] on 9/10/2019.     Hospital Course by Problem List:  R BKA - Patient with DM and PVD with right BKA on 9/5/19 with Dr. Amezcua. Patient underwent acute inpatient rehabilitation from 9/10/19 to 9/20/19 with good improvement in mobility and ADLs. Continue ASA, Plavix, and Pentoxifylline.   -NWB RLE  -Prosthetist exchanged rigid removal dressing as causing discomfort 9/12/19   Dx R BKA: Patient has a mobility limitation that prevents them from completing all MRADLs.  They cannot sufficiently complete MRADLs with a walker, cane or crutches.  A wheelchair will improve their ability to complete MRADLs.  The patient is willing and able to self-propel the wheelchair.  Patient also has a caregiver who is willing and able to assist with wheelchair. Needs extension board to avoid contractures in his right knee which would further limit his future mobility.       DM with hyperglycemia - Patient with uncontrolled DM into 300s. On Lantus 19 and SSI on transfer. Previously on Empagliflozin 25 mg daily, Glimepiride 4 mg daily, Ozempic 1 mg q7days, and Metformin 1000 mg BID  -Continue to monitor. Continue Lantus and SSI. Consult hospitalist.   -Discussed with Hospitalist and patient should restart home diabetes regimen.       Pain/Neuropathy from DM - Patient on Gabapentin 600/600/600/1200 on transfer. Will continue to monitor.  -On PRN Norco. Discussed opiate counseling on 9/19/19. Provided two week prescription.  I discussed the risks and benefits of using opiate medications for pain control.  I discussed the risk of addiction, potential for overdose, and respiratory depression (and the potential need for opiate antagonist therapy if this occurs).  I encouraged the patient to take this medication sparingly with the expressed goal of weaning off the medication as soon as is clinically appropriate.  I informed the patient that we are only able to provide a 14 day  supply of these medications at discharge and that they will be responsible for requesting any refills needed from their primary care provider or their surgeon.  We discussed the need to safely secure these medications to prevent theft, inadvertent ingestion, or misuse.  Any unused medication should be immediately disposed of through a sanctioned medication disposal program.  We discussed adjunctive pain medications and conservative therapies at length.  I answered the patient's questions regarding this treatment, and the patient indicated understanding and willingness to proceed.     HTN - Patient on Chlorthalidone 25 mg and Losartan 50 mg daily.      HLD - Patient on Atorvastatin 80 mg QHS.     Hypothyroidism - Patient on 50 mcg on transfer.      Essential Tremor - On Primidone 50 mg and Propranolol 120 mg daily     BPH - Patient on Tamsulosin 0.4 mg. Check PVRs - ~250, increase to 0.8 mg and monitor   -Improved < 75 on increased flomax      Vitamin D - 19 on admission, start 2000 U daily.      Functional Status at Discharge  Eatin - Independent  Eating Description:  Increased time  Groomin - Independent  Grooming Description:  (wc level)  Bathin - Modified Independent  Bathing Description:  Grab bar, Tub bench, Long handled bath tool, Hand held shower, Increased time  Upper Body Dressin - Independent  Upper Body Dressing Description:  (Don/doff pull over shirt)  Lower Body Dressin - Modified Independent  Lower Body Dressing Description:  6 - Modified Independent  Discharge Location : Home  Patient Discharging with Assist of: Spouse / Significant Other  Level of Supervision Required: Intermittent Supervision  Recommended Equipment for Discharge: Shower Chair;Hand Held Shower Head;Reacher;Grab Bars by Toilet;Grab Bars in Tub / Shower  Long Term Goals Met: 1  Long Term Goals Not Met: 1  Reason(s) for Goals Not Met: Sup/SBA for shower transfer w/ grab bars and manual wc into/out of shower  stall  Criteria for Termination of Services: Maximum Function Achieved for Inpatient Rehabilitation  Walk:  5 - Standby Prompting/Supervision or Set-up  Distance Walked:  Walks a minimum of 150 feet  Walk Description:  (SBA, FWW, 150 feet indoors, increased time, hop-to pattern)  Wheelchair:  6 - Modified Independent  Distance Propelled:  Propels a minimum of 150 feet   Wheelchair Description:  (Mod I indoors/outdoors today, including setup)  Stairs 2 - Max Assistance  Stairs Description(SBA, increased time, cueing, B railings; 1 set of 4 standard stairs today.  pt declines ramp for home entry/exit at this time)  Discharge Location: Home  Patient Discharging with Assist of: Spouse / Significant Other  Level of Supervision Required Upon Discharge: Intermittent Supervision  Recommended Equipment for Discharge: Ramp;Manual Wheelchair;Front-Wheeled Walker  Recommeded Services Upon Discharge: Home Health Physical Therapy;Outpatient Physical Therapy  Long Term Goals Met: 3  Long Term Goals Not Met: 3  Reason(s) for Goals Not Met: Patient uses SBA with bed transfer and ambulation instead of just SPV; uses additional A with car transfer once due to balance (Min A).  Criteria for Termination of Services: Maximum Function Achieved for Inpatient Rehabilitation  Comprehension Mode:  Both  Comprehension:  6 - Modified Independent  Comprehension Description:  Glasses  Expression Mode:  Both  Expression:  7 - Independent  Expression Description:  Verbal cueing  Social Interaction:  7 - Independent  Social Interaction Description:  Increased time  Problem Solvin - Modified Independent  Problem Solving Description:  Verbal cueing, Therapy schedule, Increased time  Memory:  5 - Standby Prompting/Supervision or Set-up  Memory Description:  Verbal cueing, Therapy schedule       I, Vincent Rubin M.D., personally performed a complete drug regimen review and no potential clinically significant medication issues were  identified.   Discharge Medication:     Medication List      CHANGE how you take these medications      Instructions   HYDROcodone/acetaminophen  MG Tabs  What changed:  when to take this  Commonly known as:  NORCO  Notes to patient:  Moderate to Severe Pain   Take 1 Tab by mouth every 6 hours as needed for up to 14 days.  Dose:  1 Tab     tamsulosin 0.4 MG capsule  What changed:  how much to take  Commonly known as:  FLOMAX  Notes to patient:  BPH bladder retention   Doctor's comments:  [facility]  Take 2 Caps by mouth every day.  Dose:  0.8 mg        CONTINUE taking these medications      Instructions   aspirin 81 MG EC tablet  Notes to patient:  Stroke prevention   Take 1 Tab by mouth every day.  Dose:  81 mg     atorvastatin 80 MG tablet  Commonly known as:  LIPITOR   Doctor's comments:  DR VIZCARRA  Take 1 Tab by mouth every evening.  Dose:  80 mg     clopidogrel 75 MG Tabs  Commonly known as:  PLAVIX  Notes to patient:  Blood thinner   Take 1 Tab by mouth every day.  Dose:  75 mg     Empagliflozin 25 MG Tabs  Notes to patient:  Diabetes   Doctor's comments:  DR OSHEA  Take 25 mg by mouth every day.  Dose:  25 mg     gabapentin 300 MG Caps  Commonly known as:  NEURONTIN  Notes to patient:  Neuropathy pain   Take 600-1,200 mg by mouth 3 times a day. 1200 mg at hs  Dose:  600-1,200 mg     glimepiride 4 MG Tabs  Commonly known as:  AMARYL   Doctor's comments:  DR OSHEA  Take 1 Tab by mouth every morning.  Dose:  4 mg     HUMALOG MIX 50/50 (50-50) 100 UNIT/ML Susp  Generic drug:  INSULIN LISP PROT & LISP (HUM)  Notes to patient:  Diabetes   Inject 35 Units as instructed every evening. 20 units  Dose:  35 Units     levothyroxine 50 MCG Tabs  Commonly known as:  SYNTHROID  Notes to patient:  hypothyroidism    Doctor's comments:  DR OSHEA  Take 1 Tab by mouth every morning before breakfast.  Dose:  50 mcg     losartan 50 MG Tabs  Commonly known as:  COZAAR  Notes to patient:  High Blood Pressure   Take 1 Tab by  mouth every day.  Dose:  50 mg     metformin 1000 MG tablet  Commonly known as:  GLUCOPHAGE   Doctor's comments:  DR OSHEA  Take 1 Tab by mouth 2 times a day, with meals.  Dose:  1,000 mg     OZEMPIC 1 MG/DOSE Sopn  Generic drug:  Semaglutide   Inject 1 mg as instructed every 7 days.  Dose:  1 mg     pentoxifylline  MG CR tablet  Commonly known as:  TRENTAL  Notes to patient:  Anti inflammatory for Poor circulation    Doctor's comments:  Dr hernandes  Take 1 Tab by mouth 3 times a day, with meals.  Dose:  400 mg     primidone 50 MG Tabs  Commonly known as:  MYSOLINE  Notes to patient:  Anti seizure    Doctor's comments:  DR OLIVAS, [facility]  Take 1 Tab by mouth every evening.  Dose:  50 mg     propranolol  MG Cp24  Commonly known as:  INDERAL LA  Notes to patient:  Tremor   Take 1 Cap by mouth every morning.  Dose:  120 mg        STOP taking these medications    chlorthalidone 25 MG Tabs  Commonly known as:  HYGROTON     Diclofenac Sodium 1 % Gel            Discharge Diet:  Regular, diabetic    Discharge Activity:  As tolerated, NWB RLE    Disposition:  Patient to discharge home with family support and community resources.     Equipment:  WC, gait belt, extension board    Follow-up & Discharge Instructions:  Follow up with your primary care provider (PCP) within 7-10 days of discharge to review your medications and take over your care.     If you develop chest pain, fever, chills, change in neurologic function (weakness, sensation changes, vision changes), or other concerning sxs, seek immediate medical attention or call 911.      Condition on Discharge:  Good    More than 38 minutes was spent on discharging this patient, including face-to-face time, prescription management, and the dictation of this note.    Vincent Rubni M.D.    Date of Service: 9/20/2019

## 2019-09-20 NOTE — PROGRESS NOTES
Received shift report and assumed care of patient.  Patient awake, calm and stable, up into wheelchair. Denies pain or discomfort at this time.  Will continue to monitor.

## 2019-09-20 NOTE — CARE PLAN
Problem: Safety  Goal: Will remain free from injury  Note:   Patient uses call light appropriately. Bed locked and in the lowest position. Non skid socks in place. Hourly rounding in place.       Problem: Pain Management  Goal: Pain level will decrease to patient's comfort goal  9/19/2019 1827 by Marisol Pruett R.N.  Note:   Pt. c/o pain at the incision site (right BKA). Pt. was medicated with PRN Norco.   Sutures on incision will be removed 3-4 weeks with f/u appointment (per surgeon notes).

## 2019-09-20 NOTE — DISCHARGE PLANNING
Case management Summary:   Met with patient yesterday prior to discharge.   Reviewed all follow up appointments: PCP & Ortho.   Referral made to [facility] OP therapy (PT/OT/SLP) and they are have accepted referral and are ready to follow.    [employer] has delivered wheelchair, cushion, right elevated leg rest & anti-tippers to patient.    SO has ordered & rec'd FWW from [employer]. Provided information for ordering lap belt from [employer].   Discussed recommendation for installation of ramp & grab bars in shower. Patient requesting names of individuals who can assist w/ ramp build & grab bar installation. List of names provided to patient to contact.    During hospitalization, I have provided support and education and have been available for questions and information during hours of operation, communicated with therapy team and MD along with providing links/resources  to outside services.    Patient verbalizes agreement with all plans and has an understanding of the next steps within the post acute services.     Individualized Goals:   1. To ride my bike again  2. To walk w/o device  3. To take care of myself    Outcome:   1. Goal not met: patient has not been cleared for driving or riding bike. To be followed by PCP in OP mgmt.  2. Goal not met: f/u with [facility] for prosthetic. OP therapies in place for [facility] OP therapy.  3. Goal partially met: patient mod-independent for most ADLs, except SBA for shower transfers. Family training completed. Patient has agreed to have grab bars installed in shower & entry ramp installed.

## 2019-09-20 NOTE — THERAPY
Physical Therapy   Daily Treatment     Patient Name: Kevin Jackson  Age:  74 y.o., Sex:  male  Medical Record #: [id number]  Today's Date: 9/20/2019     Precautions  Precautions: Non Weight Bearing Right Lower Extremity, Fall Risk  Comments: IPOP RLE    Subjective    Patient agreeable to final PT session; no SO present this session.     Objective       09/20/19 1001   Bed Mobility    Sit to Stand Supervised   Interdisciplinary Plan of Care Collaboration   IDT Collaboration with  Other (See Comments);;Occupational Therapist   Collaboration Comments no FT today (SO not present) but performed car transfer at [facility] level with FWW, w/c mobility x2 reps, and adjusted w/c to fit patient; elevated B legrests works fair; pt reports having ordered lap belt last night; pt reports that will consider ramp installation and CM gave pt list of contractors; pt reports B anti-tippers did not fit this chair (CM reports that DME company came back and replaced chair so anti-tippers would work).   PT Total Time Spent   PT Individual Total Time Spent (Mins) 30   PT Charge Group   PT Therapeutic Activities 2     Updated applicable IRF-[place], and reviewed safety, POC, and DME.    FIM Wheelchair Score:  5 - Standby Prompting/Supervision or Set-up  Wheelchair Description:  (Setup for elevating legrests prn; otherwise Mod I indoors/outdoors for 2x 400 feet without fatigue; pt uses w/c gloves)      Assessment    Patient is ready for d/c.    Plan    D/c this afternoon

## 2019-09-20 NOTE — PROGRESS NOTES
Hospital Medicine Daily Progress Note      Chief Complaint:  HTN, DM    Interval History:  FSBS 90 this morning; pt asymptomatic.    Review of Systems  Review of Systems   Constitutional: Negative for chills and fever.   HENT: Negative.    Eyes: Negative.    Respiratory: Negative for cough and shortness of breath.    Cardiovascular: Negative for chest pain and palpitations.   Gastrointestinal: Negative for abdominal pain, nausea and vomiting.   Genitourinary: Negative.    Musculoskeletal:        Wound pain   Skin: Negative for itching and rash.        Physical Exam  Temp:  [36.4 °C (97.5 °F)-36.6 °C (97.8 °F)] 36.6 °C (97.8 °F)  Pulse:  [70-80] 70  Resp:  [17-18] 17  BP: (108-117)/(51-70) 112/51  SpO2:  [93 %] 93 %    Physical Exam   Constitutional: He is oriented to person, place, and time. No distress.   HENT:   Head: Normocephalic and atraumatic.   Right Ear: External ear normal.   Left Ear: External ear normal.   Eyes: Conjunctivae and EOM are normal. Right eye exhibits no discharge. Left eye exhibits no discharge.   Neck: Normal range of motion. Neck supple. No tracheal deviation present.   Cardiovascular: Normal rate and regular rhythm.   Pulmonary/Chest: No stridor. No respiratory distress. He has no wheezes.   Decreased BS   Abdominal: Soft. Bowel sounds are normal. He exhibits no distension. There is no tenderness.   Musculoskeletal:   Right BKA w/ brace   Neurological: He is alert and oriented to person, place, and time.   Skin: Skin is warm and dry. He is not diaphoretic.   Vitals reviewed.      Fluids    Intake/Output Summary (Last 24 hours) at 9/20/2019 0859  Last data filed at 9/20/2019 0829  Gross per 24 hour   Intake 1170 ml   Output 2300 ml   Net -1130 ml       Laboratory  Recent Labs     09/18/19  0552   WBC 8.0   RBC 3.82*   HEMOGLOBIN 10.6*   HEMATOCRIT 33.9*   MCV 88.7   MCH 27.7   MCHC 31.3*   RDW 45.2   PLATELETCT 515*   MPV 9.7     Recent Labs     09/18/19  0552   SODIUM 136   POTASSIUM 3.5*    CHLORIDE 98   CO2 29   GLUCOSE 82   BUN 23*   CREATININE 0.94   CALCIUM 9.8                   Assessment/Plan  * S/P BKA (below knee amputation) unilateral, right ([facility])  Assessment & Plan  S/P right BKA  Wound care    Hypothyroidism  Assessment & Plan  TSH 10.8 and FT4 1.11  TFT's may be related to acute illness, therefore will not increase Synthroid at this time  F/U w/ outpt Endocrinologist (Dr. Browning)    Hypertension, essential  Assessment & Plan  Low blood pressures improved off Chlorthalidone  Continue Losartan and Propranolol  Flomax changed to qhs dosing to avoid diurnal orthostatic events    PVD (peripheral vascular disease) ([facility])- dr mohsen (cardilogy) at [facility]; arterial angioplasty right leg tbd july2019; rx trental (dr hernandes podiatry)- (present on admission)  Assessment & Plan  On ASA, Plavix, Trental, and Lipitor    Uncontrolled type 2 diabetes mellitus with complication, with long-term current use of insulin ([facility])- dr browning- (present on admission)  Assessment & Plan  HbA1c 8.1  Decrease Lantus for hypoglycemic trends  Continue Metformin and Glimepiride  Will not need SSI on discharge  Outpt meds include Metformin 1000 mg bid, Jardiance 25 mg daily, Amaryl 4 mg qam, Ozempic 0.25 mg SQ qweekly, and Insulin 50/50 qhs  May use either Lantus or Insulin 50/50 on discharge but not both  Followed by outpt Endocrinology (Dr. Browning)    Hypovitaminosis D- (present on admission)  Assessment & Plan  Vit D level 19  On supplementation    Full Code    Reviewed w/ pt and RN

## 2019-09-20 NOTE — DISCHARGE PLANNING
"Met w/ patient after IDT to review team recommendations & finalize DC planning information. Team continuing to recommend ramp for entry into home for safety. Patient requesting names of contractors who can install ramp & grab bars in bathroom. Will provide tomorrow morning prior to DC. Updated SLP recommending OP f/u for higher executive function, med mgmt & DM mgmt.  Verbalizes agreement & understanding of adding SLP to OP therapy referral. Provided printout pictures from [employer] for lap belt & slide board, therapy recommending both for DC. Wheelchair has been rec'd from DME agency. Asking questions about set up. Patient excited about discharge tomorrow, looking forward to \"getting home & starting back to some kind of normal.\" Questions answered. Emotional support provided.  "

## 2019-09-20 NOTE — DISCHARGE INSTRUCTIONS
Occupational Therapy Discharge Instructions for Kevin Jackson    9/20/2019    Level of Assist Required for Eating: Able to Complete Eating without Assist  Level of Assist Required for Grooming: Able to Complete Grooming without Assist  Level of Assist Required for Dressing: Able to Complete Dressing without Assist  Level of Assist Required for Toileting: Able to Complete Toileting without Assist  Level of Assist Required for Toilet Transfer: Able to Complete Toilet Transfer without Assist  Equipment for Toilet Transfer: Grab Bars by Toilet  Level of Assist Required for Bathing: Able to Complete Bathing without Assist  Equipment for Bathing: Long Handled Sponge, Shower Chair, Grab Bars in Tub / Shower, Hand Held Shower Head  Level of Assist Required for Shower Transfer: Requires Supervision with Shower Transfer  Equipment for Shower Transfer: Grab Bars in Tub / Shower, Shower Chair  Level of Assist Required for Home Mgmt: Requires Supervision with Home Management  Level of Assist Required for Meal Prep: Requires Supervision with Meal Preparation  Driving: Please Contact Physician Prior to Driving  Home Exercise Program: Refer to Home Exercise Program Handout for Details    Kevin,  It's been a pleasure working with you throughout your physical rehabilitation here at [facility].  Please be alert/aware of your environment, lock your brakes on your wheel chair and ask for help when necessary.  I wish you continued success in your rehabilitation.     Sincerely,    Sukumar Renner OTR/MEE         [facility] NURSING DISCHARGE INSTRUCTIONS    Blood Pressure : 112/51  Weight: 89.5 kg (197 lb 5 oz)  Nursing recommendations for Kevin Jackson at time of discharge are as follows:  Client verbalized understanding of all discharge instructions and prescriptions.     Review all your home medications and newly ordered medications with your doctor and/or pharmacist. Follow medication instructions as directed by your  doctor and/or pharmacist.    Pain Management:   Discharge Pain Medication Instructions:  Comfort Goal: Comfort at Rest, Comfort with Movement, Perform Activity  Notify your primary care provider if pain is unrelieved with these measures, if the pain is new, or increased in intensity.    Discharge Skin Characteristics:    Discharge Skin Exam:       Skin / Wound Care Instructions: Please contact your primary care physician for any change in skin integrity. R-leg incision.     If You Have Surgical Incisions / Wounds:  Monitor surgical site(s) for signs of increased swelling, redness or symptoms of drainage from the site or fever as this could indicate signs and symptoms of infection. If these symptoms are noted, notifiy your primary care provider.      Discharge Safety Instructions: Should Not Be Left Alone In The House     Discharge Safety Concerns: Weakness  The interdisciplinary team has made recommendation that you should not be left alone  in the house due to weakness  Anti-embolic stockings are required during the day and off at night to increase circulation to the lower extremities.    Discharge Diet:       Discharge Liquids:    Discharge Bowel Function: Continent  Please contact your primary care physician for any changes in bowel habits.  Discharge Bowel Program:    Discharge Bladder Function: Continent  Discharge Urinary Devices:        Nursing Discharge Plan:   Influenza Vaccine Indication: Not indicated: Previously immunized this influenza season and > 8 years of age    Case Management Discharge Instructions:   Discharge Location: Home with Outpatient Services  Agency Name/Address/Phone: [facility]  [address] [place] [address] [phone]  Home Health:    Outpatient Services: Occupational Therapy, Speech Therapy, Physical Therapy  DME Provider/Phone: [facility]  [address] [place] [address] [phone]  Medical Equipment Ordered: Wheelchair  Prescription Faxed to:         Discharge Medication Instructions:  Below are the medications your physician expects you to take upon discharge:    Diabetes Mellitus and Food  It is important for you to manage your blood sugar (glucose) level. Your blood glucose level can be greatly affected by what you eat. Eating healthier foods in the appropriate amounts throughout the day at about the same time each day will help you control your blood glucose level. It can also help slow or prevent worsening of your diabetes mellitus. Healthy eating may even help you improve the level of your blood pressure and reach or maintain a healthy weight.  General recommendations for healthful eating and cooking habits include:  · Eating meals and snacks regularly. Avoid going long periods of time without eating to lose weight.  · Eating a diet that consists mainly of plant-based foods, such as fruits, vegetables, nuts, legumes, and whole grains.  · Using low-heat cooking methods, such as baking, instead of high-heat cooking methods, such as deep frying.  Work with your dietitian to make sure you understand how to use the Nutrition Facts information on food labels.  How can food affect me?  Carbohydrates   Carbohydrates affect your blood glucose level more than any other type of food. Your dietitian will help you determine how many carbohydrates to eat at each meal and teach you how to count carbohydrates. Counting carbohydrates is important to keep your blood glucose at a healthy level, especially if you are using insulin or taking certain medicines for diabetes mellitus.  Alcohol   Alcohol can cause sudden decreases in blood glucose (hypoglycemia), especially if you use insulin or take certain medicines for diabetes mellitus. Hypoglycemia can be a life-threatening condition. Symptoms of hypoglycemia (sleepiness, dizziness, and disorientation) are similar to symptoms of having too much alcohol.  If your health care provider has given you approval to drink  alcohol, do so in moderation and use the following guidelines:  · Women should not have more than one drink per day, and men should not have more than two drinks per day. One drink is equal to:  ¨ 12 oz of beer.  ¨ 5 oz of wine.  ¨ 1½ oz of hard liquor.  · Do not drink on an empty stomach.  · Keep yourself hydrated. Have water, diet soda, or unsweetened iced tea.  · Regular soda, juice, and other mixers might contain a lot of carbohydrates and should be counted.  What foods are not recommended?  As you make food choices, it is important to remember that all foods are not the same. Some foods have fewer nutrients per serving than other foods, even though they might have the same number of calories or carbohydrates. It is difficult to get your body what it needs when you eat foods with fewer nutrients. Examples of foods that you should avoid that are high in calories and carbohydrates but low in nutrients include:  · Trans fats (most processed foods list trans fats on the Nutrition Facts label).  · Regular soda.  · Juice.  · Candy.  · Sweets, such as cake, pie, doughnuts, and cookies.  · Fried foods.  What foods can I eat?  Eat nutrient-rich foods, which will nourish your body and keep you healthy. The food you should eat also will depend on several factors, including:  · The calories you need.  · The medicines you take.  · Your weight.  · Your blood glucose level.  · Your blood pressure level.  · Your cholesterol level.  You should eat a variety of foods, including:  · Protein.  ¨ Lean cuts of meat.  ¨ Proteins low in saturated fats, such as fish, egg whites, and beans. Avoid processed meats.  · Fruits and vegetables.  ¨ Fruits and vegetables that may help control blood glucose levels, such as apples, mangoes, and yams.  · Dairy products.  ¨ Choose fat-free or low-fat dairy products, such as milk, yogurt, and cheese.  · Grains, bread, pasta, and rice.  ¨ Choose whole grain products, such as multigrain bread, whole  oats, and brown rice. These foods may help control blood pressure.  · Fats.  ¨ Foods containing healthful fats, such as nuts, avocado, olive oil, canola oil, and fish.  Does everyone with diabetes mellitus have the same meal plan?  Because every person with diabetes mellitus is different, there is not one meal plan that works for everyone. It is very important that you meet with a dietitian who will help you create a meal plan that is just right for you.  This information is not intended to replace advice given to you by your health care provider. Make sure you discuss any questions you have with your health care provider.  Document Released: 09/14/2006 Document Revised: 05/25/2017 Document Reviewed: 11/14/2014  [employer] Interactive Patient Education © 2017 [employer]      Peripheral Vascular Disease  Peripheral vascular disease (PVD) is a disease of the blood vessels that are not part of your heart and brain. A simple term for PVD is poor circulation. In most cases, PVD narrows the blood vessels that carry blood from your heart to the rest of your body. This can result in a decreased supply of blood to your arms, legs, and internal organs, like your stomach or kidneys. However, it most often affects a person’s lower legs and feet.  There are two types of PVD.  · Organic PVD. This is the more common type. It is caused by damage to the structure of blood vessels.  · Functional PVD. This is caused by conditions that make blood vessels contract and tighten (spasm).  Without treatment, PVD tends to get worse over time.  PVD can also lead to acute ischemic limb. This is when an arm or limb suddenly has trouble getting enough blood. This is a medical emergency.  Follow these instructions at home:  · Take medicines only as told by your doctor.  · Do not use any tobacco products, including cigarettes, chewing tobacco, or electronic cigarettes. If you need help quitting, ask your doctor.  · Lose weight if you are  overweight, and maintain a healthy weight as told by your doctor.  · Eat a diet that is low in fat and cholesterol. If you need help, ask your doctor.  · Exercise regularly. Ask your doctor for some good activities for you.  · Take good care of your feet.  ¨ Wear comfortable shoes that fit well.  ¨ Check your feet often for any cuts or sores.  Contact a doctor if:  · You have cramps in your legs while walking.  · You have leg pain when you are at rest.  · You have coldness in a leg or foot.  · Your skin changes.  · You are unable to get or have an erection (erectile dysfunction).  · You have cuts or sores on your feet that are not healing.  Get help right away if:  · Your arm or leg turns cold and blue.  · Your arms or legs become red, warm, swollen, painful, or numb.  · You have chest pain or trouble breathing.  · You suddenly have weakness in your face, arm, or leg.  · You become very confused or you cannot speak.  · You suddenly have a very bad headache.  · You suddenly cannot see.  This information is not intended to replace advice given to you by your health care provider. Make sure you discuss any questions you have with your health care provider.  Document Released: 03/14/2011 Document Revised: 05/25/2017 Document Reviewed: 05/28/2015  [employer] Interactive Patient Education © 2017 [employer]    Stump and Prosthesis Care  When an arm or leg is removed, it is important to care for the artificial body part that replaces it (prosthesis) and for the remaining end of the arm or leg (stump). Caring for the stump and prosthesis will help you to be comfortable, active, and healthy.  How to care for your stump  Cleaning Your Skin  · Wash your stump with a mild antibacterial soap at least once per day.  · Wash your stump after getting dirty or sweaty.  · After washing your stump, pat it dry and let it air-dry for 5-10 minutes.  · Do not soak your stump in a warm or hot bath for longer than 20 minutes at a  time.  · Avoid shaving hair on the stump. Hair that grows out after being shaved is more easily irritated by the prosthesis.  Using Skin Care Products  · Apply ointment to your surgical scar if your health care provider instructed you to do so. This can keep the scar soft and help it heal.  · Do not put creams and lotions on your stump unless your health care provider says it is okay. If your health care provider says it is okay to put creams and lotions on your stump, do not use lotions that contain petroleum jelly.  · Do not use skin care products with an alcohol base. These products can be harmful to your skin. They can also damage the lining of the prosthesis.  · Consider using an antiperspirant spray on the skin of the stump if you are prone to sweating.  Other Instructions  · Every day, look closely at the skin on your stump. Use a mirror with a long handle to check areas you cannot see, or ask a friend or family member to check those areas. Look for areas that appear reddish, swollen, or irritated. Pay extra attention to places where the stump and prosthesis rub together.  How to care for your prosthesis  Cleaning Your Prosthesis  · Use hot water and antibacterial soap to wash your prosthesis.  Attaching Your Prosthesis  · Make sure your prosthesis is clean before you attach it to your stump. All the parts that touch your skin should be clean and dry.  · Be sure you understand how to attach the prosthesis. A prosthetic specialist (prosthetist) can show you how to do this. It is a good idea to practice several times while he or she watches.  · If you were given wraps or socks to wear under the prosthesis, make sure to wear them.  Other Instructions  · Exercise and move your prosthesis as recommended by your physical therapist.  · Follow your health care provider's instructions about the length of time you should wear your prosthesis. You will likely need to limit the amount of time you wear your prosthesis at  "first. You may be instructed to increase the time you wear your prosthesis a little bit each day.  Contact a health care provider if:  · The prosthesis does not seem to fit correctly.  · You have an itchy rash or a sore on your stump.  · Sweating between the stump and the prosthesis is heavy and efforts to control the sweating do not work.  Get help right away if:  · Your stump is red, swollen, painful to the touch, or hot.  · A bad smell develops around the stump.  · There is a sore on your stump that is not healing.  · Your stump is colder than the upper part of the limb.  · Skin on your stump turns gray or black.  · There is any drainage coming from your stump.  This information is not intended to replace advice given to you by your health care provider. Make sure you discuss any questions you have with your health care provider.  Document Released: 03/14/2011 Document Revised: 08/13/2017 Document Reviewed: 12/14/2015  [employer] Interactive Patient Education © 2017 [employer]  Prevent Falls in Your Home    \"Falling once doubles your chance of falling again\"        -Center for Disease Control and Prevention    Falls in the home can lead to serious injury (fractures, brain injuries), hospitalizations, increased medical costs, and could even be fatal.  The good news is, there are many precautions you can take to avoid falls in your home and help keep you safe:     · If prescribed an assistive device (walker, crutches), use as instructed by the healthcare provider\"   · Remove any tripping hazards from your home, including loose cords, throw rugs and clutter  · Keep a nightlight on in dark (hallways, bathrooms, etc)   · Get up slowly, to make sure you feel okay before getting up  · Be aware of any side effects of your medications: some medications may make you dizzy  · Place a non-skid rubber mat in your shower or tub-consider a shower bench or chair if unsteady on your feet  · Wear supportive shoes or non-skid " socks when moving around  · Start an exercise program once approved by your provider.  If you are feeling weak following a hospital stay, talk to your doctor about home health or outpatient therapy programs designed to help rebuild your strength and endurance      Depression / Suicide Risk    As you are discharged from this [facility] facility, it is important to learn how to keep safe from harming yourself.    Recognize the warning signs:  · Abrupt changes in personality, positive or negative- including increase in energy   · Giving away possessions  · Change in eating patterns- significant weight changes-  positive or negative  · Change in sleeping patterns- unable to sleep or sleeping all the time   · Unwillingness or inability to communicate  · Depression  · Unusual sadness, discouragement and loneliness  · Talk of wanting to die  · Neglect of personal appearance   · Rebelliousness- reckless behavior  · Withdrawal from people/activities they love  · Confusion- inability to concentrate     If you or a loved one observes any of these behaviors or has concerns about self-harm, here's what you can do:  · Talk about it- your feelings and reasons for harming yourself  · Remove any means that you might use to hurt yourself (examples: pills, rope, extension cords, firearm)  · Get professional help from the community (Mental Health, Substance Abuse, psychological counseling)  · Do not be alone:Call your Safe Contact- someone whom you trust who will be there for you.  · Call your local CRISIS HOTLINE 569-9632 or 747-588-5715  · Call your local Children's Mobile Crisis Response Team [place] (094) 736-0280 or www.[employer]  · Call the toll free National Suicide Prevention Hotlines   · National Suicide Prevention Lifeline 736-247-MHXQ (8696)  · National Hope Line Network 800-SUICIDE (332-1418)      Physical Therapy Discharge Instructions for Kevin Gottiese    9/20/2019    Weight Bearing Status - Patient  Should: Bear No Weight Right Leg  Level of Assist Required for Ambulation: Assist for Balance on Flat Surfaces, Assist for Balance on Stairs, Should Not Attempt Curbs at This Time, Requires Wheelchair for Mobility at This Time  Distance Patient May Ambulate: limited household distances  Device Recommended for Ambulation: Front-Wheeled Walker  Level of Assist Required to Propel Wheelchair: Requires No Assist  Level of Assist Required for Transfers: Supervision(Assist for Balance with car transfers)  Device Recommended for Transfers: Front-Wheeled Walker  Home Exercise Program: Refer to Home Exercise Program Handout for Details  Prosthesis / Orthosis Recommendation / Location: Right Leg      Speech Therapy Discharge Instructions for Kevin Jackson    9/20/2019     Kevin would benefit from assistance managing their medications.  It is recommended that you purchase a pill organizer to sort medications in on a weekly basis.  Implementing a system to help remind you to take your medications will also be helpful (Ex: setting alarms, having a friend/family member call as a reminder).      What is memory?   Memory is the ability to learn, store, and retrieve information. New or increasing problems with any or all of these 3 stages of memory often occur after a traumatic brain injury, stroke, brain tumor, multiple sclerosis, or other kind of injury or illness that affects your nervous system.   Some kinds of memory problems may also occur as part of normal aging, when many people have more trouble retrieving new information.     Types of Memory:    •Long-term (remote): memory for old, well-learned information that has been “rehearsed” (used) over time, such as the name of a childhood pet, memories of past vacations, or where you went to high school. Long-term memory tends to be retained after injury or illness.   •Short-term (recent): memory for new experiences that took place a few minutes, hours, or days ago, such as what  you had for breakfast or what you did yesterday. Short-term memory tends to be the most severely affected after injury. People who have had brain injuries may have problems with their attention span, how much memory they can store, how quickly they can think, and how efficiently they learn. These memory problems will make it hard to understand and save short-term memories so that they can be correctly rehearsed and stored in long-term memory.   •Immediate (working): memory for information that is current, that you usually keep track of mentally, such as a phone number you look up, directions someone just gave you, or keeping track of numbers in your head when you add or subtract.   •Prospective: the ability to remember to do something in the future, such as remembering to take a medicine, go to an appointment, or follow through on an assignment or project.       Strategies to Help Improve Your Memory   Your speech therapist can work with you to develop strategies to help you remember new information. There are 2 main types of strategies to help your memory: internal reminders and external reminders.     Internal Reminders     •Rehearsal: retelling yourself information you just learned, or restating it out loud in your own words.   •Repetition: saying the same information over and over, either silently or out loud.   •Clarification: asking someone else to repeat or rephrase information.   •Chunking: grouping items together to reduce the number of items to remember, such as grouping 7-digit phone numbers into 2 chunks, one with 3 numbers and the other with 4 numbers.   •Rhyming: making a rhyme out of important information.   •Acronyms or alphabet cueing: creating a letter for each word you want to remember, or vice versa. One example is using the sentence “Every Good Boy Does Fine” to remember that the lines of a treble staff in music are the notes E, G, B, D, and F.   •Imagery (also called visualization): creating  pictures of the information in your mind.   •Association: linking old information or habits with the new, such as remembering to take your medicine every night at the same time that you brush your teeth.   •Personal meaning: making the new information meaningful or emotionally important to you in some way.     External Reminders     •Using a paper or electronic calendar or day planner.   •Setting timers or alarms to remind you to do something.   •Writing down reminders such as to-do lists, shopping lists, and project outlines.   •Recording new information with a voice recorder.   •Using a medicine organizing tool.   •Creating specific, permanent places for important items. One example is always putting your keys, wallet, and cell phone in the same place every time you get home.

## 2019-09-20 NOTE — PROGRESS NOTES
Patient discharged to home per order.  Discharge instructions reviewed with patient and his wife; they verbalize understanding and signed copies placed in chart.  Patient has all belongings; signed copy of form in chart.  Patient left facility at about 12:45pm via wheelchair. Have enjoyed working with this pleasant patient.

## 2019-09-21 NOTE — DISCHARGE PLANNING
"Rec'd call from patient. States \"I fell in the garage, landing on my left hip. It feels bruised. I don't think I broke anything.\" Encouraged patient to go to ER for evaluation: pain, difficulty bearing weight, inability to ambulate, injury.   Reviewed HH referral again for home therapies & RN for wound care. Patient verbalizes agreement for HH referral. Reviewed choice. Requests [facility]. Informed [facility] will call to set up initial appointment.  States \"had trouble getting into the bathroom.\" Recommended SO go to [facility] today.   Reinforced patient getting to ER (call 911 if unable to ambulate or transfer) for evaluation.  HH referral sent to [facility] via Epic referral, hard copy MD order faxed.  Updated Dr. Rubin.  [facility] to cancel referral.  "

## 2019-09-23 ENCOUNTER — HOME HEALTH ADMISSION (OUTPATIENT)
Dept: HOME HEALTH SERVICES | Facility: HOME HEALTHCARE | Age: 74
End: 2019-09-23
Payer: MEDICARE

## 2019-09-23 NOTE — DISCHARGE PLANNING
ATTN: Case Management  RE: Referral for Home Health    As of 9/23/2019, we have accepted the Home Health referral for the patient listed above.    A [facility] clinician will be out to see the patient within 48 hours. If you have any questions or concerns regarding the patient’s transition to Home Health, please do not hesitate to contact us at [phone].      We look forward to collaborating with you,  [facility] Team

## 2019-09-24 ENCOUNTER — PHYSICAL THERAPY (OUTPATIENT)
Dept: PHYSICAL THERAPY | Facility: REHABILITATION | Age: 74
End: 2019-09-24
Attending: PHYSICAL MEDICINE & REHABILITATION
Payer: MEDICARE

## 2019-09-24 DIAGNOSIS — R53.81 OTHER MALAISE: ICD-10-CM

## 2019-09-24 DIAGNOSIS — R26.9 UNSPECIFIED ABNORMALITIES OF GAIT AND MOBILITY: ICD-10-CM

## 2019-09-24 PROCEDURE — 97161 PT EVAL LOW COMPLEX 20 MIN: CPT

## 2019-09-24 PROCEDURE — 97110 THERAPEUTIC EXERCISES: CPT

## 2019-09-24 ASSESSMENT — ENCOUNTER SYMPTOMS
PAIN SCALE: 1
PAIN SCALE AT LOWEST: 0
PAIN SCALE AT HIGHEST: 7

## 2019-09-24 ASSESSMENT — ACTIVITIES OF DAILY LIVING (ADL): POOR_BALANCE: 1

## 2019-09-24 NOTE — OP THERAPY EVALUATION
"  Outpatient Physical Therapy  INITIAL EVALUATION    [facility] Physical Therapy [place] [address]  [address]  [place] [address]  Phone:  [phone]  Fax:  [phone]    Date of Evaluation: 2019    Patient: Kevin Jackson  YOB: 1945  MRN: [id number]     Referring Provider: Vincent Rubin M.D.  [facility]  [place] [address]   Referring Diagnosis Other malaise [R53.81];Unspecified abnormalities of gait and mobility [R26.9]     Time Calculation  Start time: 1000  Stop time: 1054 Time Calculation (min): 54 minutes       Physical Therapy Occurrence Codes    Date of onset of impairment:  19   Date physical therapy care plan established or reviewed:  19   Date physical therapy treatment started:  19          Chief Complaint: Difficulty Walking    Visit Diagnoses     ICD-10-CM   1. Other malaise R53.81   2. Unspecified abnormalities of gait and mobility R26.9         Subjective:   History of Present Illness:     Mechanism of injury:  Pt presents to PT 2.5 weeks s/p R BKA performed on 19.  States he developed a wound on the 2nd toe in January of this year.  Failed to heal conservatively and ultimately developed gangrene and required amputation to below knee. PMH: DMII (dx in ), Amputations of all 5 toes on the L (2013).    Did complete a rehab stay x 2 weeks: indep with use of manual wheelchair (primary mode of transport).  Uses hop to pattern with FWW to get into the bathroom and into the shower.  Has a shower chair.  Will be working Shivam from [facility] for the prosthetic, currently in a #6  and will transition to #5 in the next couple days.  Staples will be removed Oct 1st.   Prior level of function:  Retired.  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   Pain:     Current pain ratin    At best pain ratin    At worst pain ratin (\"Phantom pains\" lasting less than 10 seconds.  " "Improved by rubbing the end of the residual limb)    Progression:  Improving  Social Support:     Lives in:  One-story house (2 steps to enter from any direction, putting a ramp on the front tomorrow. )    Lives with:  Significant other (Partner of 30 yrs)  Treatments:     None    Patient Goals:     Patient goals for therapy:  Decreased pain, increased motion, improved balance, increased strength, return to sport/leisure activities and independence with ADLs/IADLs      Past Medical History:   Diagnosis Date   • Arrhythmia 01/2018    possible afib per pt, ekg x 2 following were \"OK\"   • Bowel habit changes     constipation diarrhea   • Diabetes    • High cholesterol    • Hyperlipidemia    • Hypertension    • Muscle disorder    • Pain     right foot   • Tremors of nervous system      Past Surgical History:   Procedure Laterality Date   • KNEE AMPUTATION BELOW Right 9/5/2019    Procedure: AMPUTATION, BELOW KNEE;  Surgeon: Shivam Amezcua M.D.;  Location: SURGERY [facility];  Service: Vascular   • AMPUTATION, TOE  2/2013    all 5 toes left foot   • CARPAL TUNNEL RELEASE     • OTHER ORTHOPEDIC SURGERY      right foot      Social History     Tobacco Use   • Smoking status: Never Smoker   • Smokeless tobacco: Never Used   Substance Use Topics   • Alcohol use: Not Currently     Alcohol/week: 0.6 oz     Types: 1 Cans of beer per week     Family and Occupational History     Socioeconomic History   • Marital status: Single     Spouse name: Not on file   • Number of children: Not on file   • Years of education: Not on file   • Highest education level: Not on file   Occupational History   • Not on file       Objective     Observations     Right Knee   Positive for edema (G1) and incision.     Additional Observation Details  No signs of infection.  Wound observed to be healing well.     Neurological Testing     Sensation     Knee   Left Knee   Diminished: light touch     Right Knee   Intact: light touch     Comments   Left " "light touch: distally.     Reflexes   Left   Patellar (L4): normal (2+)    Right   Patellar (L4): normal (2+)    Active Range of Motion   Left Hip   Extension: 20 degrees     Right Hip   Extension: 10 degrees   Left Knee   Normal active range of motion    Right Knee   Flexion: 70 degrees with pain  Prone flexion: 60 degrees with pain    Passive Range of Motion   Left Knee   Flexion: 130 degrees   Extension: 0 degrees     Right Knee   Flexion: 90 degrees with pain  Extension: 2 (lacking) degrees     Strength:      Right Hip   Planes of Motion   Flexion: 4  Extension: 4-  Abduction: 3+    Right Knee   Flexion: 3+  Extension: 3+  Quadriceps contraction: fair    Additional Strength Details  L LE is 5/5 throughout  Ambulation     Comments   Indep STS to FWW.  MI stand pivot transfer with FWW and indep squat transfer with removal of the wheelchair arm rest  MI hop to pattern with FWW x 30 feet        Therapeutic Exercises (CPT 17401):     1. LEONIDAS, x 5 min    2. Prone hip ext , x 20    3. Prone hip ext with knee flexion, x 20    4. Supine SL bridge with bolster under R LE, x 10    5. Knee ext str, x 1 min seated.     6. Quad sets, x 10 with 10\" hold      Time-based treatments/modalities:  Therapeutic exercise minutes (CPT 84108): 20 minutes       Assessment, Response and Plan:   Impairments: abnormal gait, abnormal muscle firing, abnormal or restricted ROM, activity intolerance, difficulty performing job, impaired functional mobility, impaired balance, impaired physical strength, lacks appropriate home exercise program, limited ADL's and limited mobility    Assessment details:  Mr. Jackson is a 74 y.o female who presents to PT 2.5 weeks s/p R BKA.  He is progressing well for this stage of recovery.  Wound is healing well with no signs of active infection, pain level and swelling is well controlled, he is shows good indep with use of wheelchair and FWW for short distances.  Pt continues to be limited in R knee/hip ROM, " strength, endurance and balance. Pt would benefit from skilled PT services to assist in resolving these impairments for preparation of use of prosthetic, as well as progression to gait re-education once prosthetic is fit.    Barriers to therapy:  None  Prognosis: good    Goals:   Short Term Goals:   - Improve R hip ext to 20 degrees in prone  - Improve R knee AROM 0-120 deg  - Improve R hip ext strength to 4+/5  Short term goal time span:  1-2 weeks      Long Term Goals:    - Able to walk 50 feet with MI FWW and well fitting prosthetic  - Able to STS to FWW with minimal use of UEs, MI  - Able to ascend a 4 inch step leading with the R LE and B UEs on rails.   Long term goal time span:  6-8 weeks    Plan:   Therapy options:  Physical therapy treatment to continue  Planned therapy interventions:  E Stim Unattended (CPT 63631), Functional Training, Self Care (CPT 92119), Hot or Cold Pack Therapy (CPT 77666), Manual Therapy (CPT 01983), Neuromuscular Re-education (CPT 62256), Therapeutic Exercise (CPT 62782) and Therapeutic Activities (CPT 28524)  Frequency:  2x week  Duration in weeks:  8  Discussed with:  Patient      Functional Assessment Used  Lower Extremity Functional Scale Total: 23.75     Referring provider co-signature:  I have reviewed this plan of care and my co-signature certifies the need for services.  Certification Dates:   From 09/24/19   To 11/19/19    Physician Signature: ________________________________ Date: ______________

## 2019-09-25 ENCOUNTER — TELEPHONE (OUTPATIENT)
Dept: PHYSICAL MEDICINE AND REHAB | Facility: REHABILITATION | Age: 74
End: 2019-09-25

## 2019-09-25 NOTE — PROGRESS NOTES
DATE OF SERVICE:  09/18/2019    The patient is doing fairly well at followup.  Session was devoted talking   about the patient's overall adjustment and his plans for returning home in   terms of short- and long-term goals.  Questions he needs to raise with the   staff in preparation for his discharge were also talked about.       ____________________________________     INES CAPPS, PHD    KATIE / MENDY    DD:  09/24/2019 16:02:06  DT:  09/25/2019 14:24:39    D#:  [id number]  Job#:  [id number]

## 2019-09-25 NOTE — CONSULTS
DATE OF SERVICE:  09/17/2019    BEHAVIORAL MEDICINE EVALUATION    BRIEF HISTORY OF PRESENTING COMPLAINTS:  Patient is a 74-year-old white    male who is referred for behavioral medicine evaluation by Dr. Rubin.  Patient was transferred to rehab from acute where he presented to   [facility] on 09/05/2019 for a planned right BKA.  Patient had developed a second toe   ulcer, which rapidly progressed to gangrene of his entire toe and compromises   the third toe.  Patient was stabilized acutely postsurgically.  He was then   sent to rehab to address his general debility.    PAST MEDICAL HISTORY:  Significant for arrhythmia-possible AFib, bowel habit   changes, diabetes, hypercholesterolemia, hyperlipidemia, hypertension, right   foot pain, muscle disorder, tremors of his nervous system.    PSYCHOLOGICAL STATUS:  MENTAL STATUS EXAMINATION:  Patient is a well-nourished overweight male of   tall stature who appeared older than his stated age of 74.  At presentation,   the patient was alert.  He was sitting upright in his bed when approached.    Patient oriented well to my presence.  The patient was kempt in appearance.    He was dressed casually in street attire that was appropriate to his age and   setting.  The patient's manner of presentation was cooperative and friendly.    The patient was well oriented to time, place and person.  His language was   logical and goal oriented, and his speech was normal for rate and rhythm.    Patient's concentration and memory functioning appeared generally intact.    The patient's affect was slightly constricted, stable and mildly intense.  He   related well.  His mood appeared somewhat anxious, but appropriate to the   context.    There was no evidence of delusional or perceptual disturbance.  Also, the   patient showed no unusual pain or motor behavior during the interview.    SPECIFIC BEHAVIORAL COMPLAINTS:  Patient denied any clinically significant   symptoms of a negative  mood.  He reported no strong feelings of depression,   anxiety or anger.  He did admit to some feelings of restlessness and tension,   however.    The patient also reported intermittent sleep disturbance.  He said his   appetite has returned.  He also said his energy level is getting stronger.    Patient denied any problems managing his day-to-day stressors prior to his   hospitalization.  He also reported no interpersonal discord or discomfort or   any problems in his marriage or family.    The patient denied any ETOH or other drug abuse or any use of tobacco.    PSYCHIATRIC HISTORY:  The patient denied any history significant for   psychiatric disturbance or treatment including in or outpatient care.    PSYCHOMETRIC TESTING:  The patient was administered 2 psychometric tests and 2   screening instruments.  PS/PC-R revealed no clinically significant symptoms   of a negative mood.  His CDR survey showed no problems with level of   consciousness, attention or significant problems in thinking except his   thinking seemed slightly limited in scope.  No perceptual problems were noted.    The patient's speech and memory were within normal limits.  He did admit to   deficits in behavioral activation.  The patient also reported loss of vigor,   intermittent sleep disturbance and diminished appetite.    The patient was screened for any elder abuse or risk of suicide.  There is no   strong evidence for either problem.    SOCIAL HISTORY:  Patient is a retired [employer] .  He lives with his   partner, but he said essentially he is .  He and his wife live in [place], [place].    IMPRESSION:  Minor adjustment disorder with anxious mood.    RECOMMENDATIONS:  Patient will be followed for status and supportive care.       ____________________________________     INES CAPPS, PHD    KATIE / MENDY    DD:  09/24/2019 15:29:52  DT:  09/24/2019 20:08:10    D#:  [id number]  Job#:  [id number]

## 2019-09-26 ENCOUNTER — OFFICE VISIT (OUTPATIENT)
Dept: MEDICAL GROUP | Age: 74
End: 2019-09-26
Payer: MEDICARE

## 2019-09-26 ENCOUNTER — OCCUPATIONAL THERAPY (OUTPATIENT)
Dept: OCCUPATIONAL THERAPY | Facility: REHABILITATION | Age: 74
End: 2019-09-26
Attending: PHYSICAL MEDICINE & REHABILITATION
Payer: MEDICARE

## 2019-09-26 VITALS
WEIGHT: 196.2 LBS | HEIGHT: 73 IN | HEART RATE: 72 BPM | BODY MASS INDEX: 26 KG/M2 | OXYGEN SATURATION: 98 % | SYSTOLIC BLOOD PRESSURE: 116 MMHG | DIASTOLIC BLOOD PRESSURE: 60 MMHG | TEMPERATURE: 97 F

## 2019-09-26 DIAGNOSIS — I10 HYPERTENSION, ESSENTIAL: ICD-10-CM

## 2019-09-26 DIAGNOSIS — K21.00 GASTROESOPHAGEAL REFLUX DISEASE WITH ESOPHAGITIS: ICD-10-CM

## 2019-09-26 DIAGNOSIS — I10 ESSENTIAL HYPERTENSION: ICD-10-CM

## 2019-09-26 DIAGNOSIS — I47.29 NONSUSTAINED VENTRICULAR TACHYCARDIA (HCC): ICD-10-CM

## 2019-09-26 DIAGNOSIS — Z89.511 S/P BKA (BELOW KNEE AMPUTATION) UNILATERAL, RIGHT (HCC): ICD-10-CM

## 2019-09-26 DIAGNOSIS — E78.2 MIXED HYPERLIPIDEMIA: ICD-10-CM

## 2019-09-26 DIAGNOSIS — E03.4 HYPOTHYROIDISM DUE TO ACQUIRED ATROPHY OF THYROID: ICD-10-CM

## 2019-09-26 DIAGNOSIS — I73.9 PVD (PERIPHERAL VASCULAR DISEASE) (HCC): ICD-10-CM

## 2019-09-26 DIAGNOSIS — E66.9 OBESITY (BMI 30.0-34.9): ICD-10-CM

## 2019-09-26 DIAGNOSIS — R53.81 OTHER MALAISE: ICD-10-CM

## 2019-09-26 PROBLEM — E03.9 HYPOTHYROIDISM: Status: RESOLVED | Noted: 2019-09-11 | Resolved: 2019-09-26

## 2019-09-26 PROBLEM — I99.8 ISCHEMIC FOOT PAIN AT REST: Status: RESOLVED | Noted: 2019-07-08 | Resolved: 2019-09-26

## 2019-09-26 PROBLEM — M79.673 ISCHEMIC FOOT PAIN AT REST: Status: RESOLVED | Noted: 2019-07-08 | Resolved: 2019-09-26

## 2019-09-26 PROCEDURE — 99215 OFFICE O/P EST HI 40 MIN: CPT | Performed by: INTERNAL MEDICINE

## 2019-09-26 PROCEDURE — 97166 OT EVAL MOD COMPLEX 45 MIN: CPT

## 2019-09-26 SDOH — ECONOMIC STABILITY: GENERAL: QUALITY OF LIFE: GOOD

## 2019-09-26 ASSESSMENT — ACTIVITIES OF DAILY LIVING (ADL)
SHAVING_CURRENT_FUNCTION: INDEPENDENT
FEEDING: INDEPENDENT
TOILETING: INDEPENDENT
BRUSHING_TEETH_DENTURES_CURRENT_FUNCTION: INDEPENDENT
DRESSING_CURRENT_FUNCTION: INDEPENDENT

## 2019-09-26 ASSESSMENT — ENCOUNTER SYMPTOMS
PAIN SCALE: 0
CARDIOVASCULAR NEGATIVE: 1
RESPIRATORY NEGATIVE: 1
EYES NEGATIVE: 1
CONSTITUTIONAL NEGATIVE: 1
PSYCHIATRIC NEGATIVE: 1
NEUROLOGICAL NEGATIVE: 1
GASTROINTESTINAL NEGATIVE: 1

## 2019-09-26 ASSESSMENT — PAIN SCALES - GENERAL: PAINLEVEL: NO PAIN

## 2019-09-26 NOTE — PROGRESS NOTES
Subjective:      Kevin Jackson is a 74 y.o. male who presents with Wound Check (HF 9/10/2019, BKA) and Diabetes Mellitus (HF 9/10/2019)        HPI    The patient is here for followup of chronic medical problems listed below. The patient is compliant with medications and having no side effects from them. Denies chest pain, abdominal pain, dyspnea, myalgias, or cough.    Uncontrolled type 2 diabetes mellitus with complication, with long-term current use of insulin ([facility])- dr browning  The patient is taking empagliflozin 25 mg, Humalog mix 50-50 Ozempic 1 mg every seven days, and glimepiride 4 mg. Most recent hemoglobin A1c was 8.1. He has close follow up with Dr. Browning, Endocrinology.    S/P BKA (below knee amputation) unilateral, right ([facility])  PVD (peripheral vascular disease) ([facility])- s/p right BKA  Patient underwent a planned right BKA performed by Dr. Amezcua, Vascular Surgery, on 9/5/2019. Patient had developed a second toe ulcer which rapidly progressed to gangrene of his entire toe and compromise of the third toe. He also had chronic severe pain. Preoperative angiography found occlusion and blood flow below the ankle indicating below-knee amputation. He was discharged on 9/20/2019. Patient arrived in a wheelchair today and has a brace on his stump. He has not yet been fitted for prosthesis. He will have his stitches removed on 10/1. He is unsure if he needs to continue taking his Plavix. He denies any burning pain in his stump or left foot.    Mixed hyperlipidemia- dr dwyer  Patient is taking atorvastatin 80 mg. He denies any side effects. Last lipid panel from June was within normal limits.    Hypothyroidism due to acquired atrophy of thyroid- dr browning  Patient is taking levothyroxine 50 mcg. He denies any side effects. No up to date TSH. Patient is followed by Dr. Browning, Endocrinology, for this.    Hypertension, essential  Patient is taking losartan 50 mg, which he is tolerating well without side effects.  Pressures today are 116/60.      Patient Active Problem List   Diagnosis   • Mixed hyperlipidemia- dr dwyer   • Essential hypertension- DR DWYER   • Hypovitaminosis D   • Uncontrolled type 2 diabetes mellitus with complication, with long-term current use of insulin ([facility])- dr browning   • Obesity (BMI 30.0-34.9)   • Benign non-nodular prostatic hyperplasia with lower urinary tract symptoms- [place] [facility]   • Gastroesophageal reflux disease with esophagitis- PPI PRN   • Hypothyroidism due to acquired atrophy of thyroid- dr browning   • Benign essential tremor- DR OLIVAS,  NEUROLOGY   • Encounter for screening- [facility] screening 2019- neg  abd US for AAA, MARELY, carotid US, EKG (no A.Fib to my review)   • PVD (peripheral vascular disease) ([facility])- dr mohsen (cardilogy) at [facility]; arterial angioplasty right leg tbd july2019; rx trental (dr hernandes podiatry)   • Diabetic mononeuropathy associated with diabetes mellitus due to underlying condition ([facility])-m rx gabapentin   • Diabetic ulcer of toe of right foot associated with type 2 diabetes mellitus, with fat layer exposed ([facility])- amputation distal 2nd toe (dr hernandes); dr. guillermo  [facility]  wound care    • Nonsustained ventricular tachycardia ([facility])- ziopatch may 2019; PAC's and PVC's, NSR; NO A. FIB   • Ischemic foot pain at rest ([facility])   • S/P BKA (below knee amputation) unilateral, right ([facility])   • Hypertension, essential   • Hypothyroidism       Outpatient Medications Prior to Visit   Medication Sig Dispense Refill   • aspirin EC 81 MG EC tablet Take 1 Tab by mouth every day. 100 Tab 2   • clopidogrel (PLAVIX) 75 MG Tab Take 1 Tab by mouth every day. 90 Tab 2   • HYDROcodone/acetaminophen (NORCO)  MG Tab Take 1 Tab by mouth every 6 hours as needed for up to 14 days. 42 Tab 0   • losartan (COZAAR) 50 MG Tab Take 1 Tab by mouth every day. 90 Tab 2   • tamsulosin (FLOMAX) 0.4 MG capsule Take 2 Caps by mouth every day. 60 Cap 2   • Semaglutide (OZEMPIC) 1 MG/DOSE Solution  "Pen-injector Inject 1 mg as instructed every 7 days.     • INSULIN LISP PROT & LISP, HUM, (HUMALOG MIX 50/50) (50-50) 100 UNIT/ML Suspension Inject 35 Units as instructed every evening. 20 units     • gabapentin (NEURONTIN) 300 MG Cap Take 600-1,200 mg by mouth 3 times a day. 1200 mg at hs     • pentoxifylline CR (TRENTAL) 400 MG CR tablet Take 1 Tab by mouth 3 times a day, with meals. 90 Tab    • glimepiride (AMARYL) 4 MG Tab Take 1 Tab by mouth every morning. 30 Tab 11   • Empagliflozin (JARDIANCE) 25 MG Tab Take 25 mg by mouth every day. 90 Tab 4   • metformin (GLUCOPHAGE) 1000 MG tablet Take 1 Tab by mouth 2 times a day, with meals. 60 Tab 11   • propranolol CR (INDERAL LA) 120 MG CAPSULE SR 24 HR Take 1 Cap by mouth every morning. 90 Cap 4   • primidone (MYSOLINE) 50 MG Tab Take 1 Tab by mouth every evening. 90 Tab 3   • atorvastatin (LIPITOR) 80 MG tablet Take 1 Tab by mouth every evening. 90 Tab 4   • levothyroxine (SYNTHROID) 50 MCG Tab Take 1 Tab by mouth every morning before breakfast. 30 Tab 11     No facility-administered medications prior to visit.         Allergies   Allergen Reactions   • Zocor [Simvastatin - High Dose]      Pt tells me this medication gave him \"side effect\" of feeling nauseated; denies allergies to medications       Review of Systems   Constitutional: Negative.    HENT: Negative.    Eyes: Negative.    Respiratory: Negative.    Cardiovascular: Negative.    Gastrointestinal: Negative.    Genitourinary: Negative.    Musculoskeletal:        Positive for BKA.   Skin: Negative.    Neurological: Negative.    Endo/Heme/Allergies: Negative.    Psychiatric/Behavioral: Negative.    All other systems reviewed and are negative.       Objective:     /60   Pulse 72   Temp 36.1 °C (97 °F) (Temporal)   Ht 1.854 m (6' 1\")   Wt 89 kg (196 lb 3.2 oz)   SpO2 98%   BMI 25.89 kg/m²     Physical Exam   Constitutional: Oriented to person, place, and time. Appears well-developed and " well-nourished. No distress.   Head: Normocephalic and atraumatic.   Right Ear: External ear normal.   Left Ear: External ear normal.   Nose: Nose normal.   Mouth/Throat: Oropharynx is clear and moist. No oropharyngeal exudate.   Eyes: Pupils are equal, round, and reactive to light. Conjunctivae and EOM are normal. Right eye exhibits no discharge. Left eye exhibits no discharge. No scleral icterus.   Neck: Normal range of motion. Neck supple. No JVD present. No tracheal deviation present. No thyromegaly present.   Cardiovascular: Normal rate, regular rhythm, normal heart sounds and intact distal pulses.  Exam reveals no gallop and no friction rub.    No murmur heard.  Pulmonary/Chest: Effort normal. No stridor. No respiratory distress. No wheezing or rales. No tenderness.   Abdominal: Soft. Bowel sounds are normal. No distension and no mass. There is no tenderness. There is no rebound and no guarding. No hernia.   Musculoskeletal: Normal range of motion No edema or tenderness.   Lymphadenopathy: No cervical adenopathy.   Neurological: Alert and oriented to person, place, and time. Normal reflexes. Normal reflexes. No cranial nerve deficit. Normal muscle tone. Coordination normal.   Skin: Skin is warm and dry. No rash noted. Not diaphoretic. No erythema. No pallor.   Psychiatric: Normal mood and affect. Behavior is normal. Judgment and thought content normal.   Nursing note and vitals reviewed.      Lab Results   Component Value Date/Time    HBA1C 8.1 (H) 09/11/2019 05:25 AM    HBA1C 7.2 (A) 08/08/2019     Lab Results   Component Value Date/Time    SODIUM 136 09/18/2019 05:52 AM    POTASSIUM 3.5 (L) 09/18/2019 05:52 AM    CHLORIDE 98 09/18/2019 05:52 AM    CO2 29 09/18/2019 05:52 AM    GLUCOSE 82 09/18/2019 05:52 AM    BUN 23 (H) 09/18/2019 05:52 AM    CREATININE 0.94 09/18/2019 05:52 AM    BUNCREATRAT 24 11/21/2017 07:21 AM    ALKPHOSPHAT 48 09/11/2019 05:25 AM    ASTSGOT 18 09/11/2019 05:25 AM    ALTSGPT 15  09/11/2019 05:25 AM    TBILIRUBIN 0.6 09/11/2019 05:25 AM     Lab Results   Component Value Date/Time    INR 0.99 01/11/2017 09:05 PM    INR 0.99 01/14/2013 05:50 PM     Lab Results   Component Value Date/Time    CHOLSTRLTOT 116 06/04/2019 06:36 AM    LDL 54 06/04/2019 06:36 AM    HDL 45 06/04/2019 06:36 AM    TRIGLYCERIDE 87 06/04/2019 06:36 AM       No results found for: TESTOSTERONE  Lab Results   Component Value Date/Time    TSH 2.020 11/21/2017 07:21 AM     Lab Results   Component Value Date/Time    FREET4 1.11 09/11/2019 05:25 AM    FREET4 1.32 02/20/2019 06:32 AM     No results found for: URICACID  No components found for: VITB12  Lab Results   Component Value Date/Time    25HYDROXY 19 (L) 09/11/2019 05:25 AM    25HYDROXY 26 (L) 02/20/2019 06:32 AM          Assessment/Plan:     1. Uncontrolled type 2 diabetes mellitus with complication, with long-term current use of insulin ([facility])- dr browning  Under good control. Continue same regimen.  - Comp Metabolic Panel; Future  - Lipid Profile; Future  - CBC WITH DIFFERENTIAL; Future  - HEMOGLOBIN A1C; Future  - MICROALBUMIN CREAT RATIO URINE; Future    2. S/P BKA (below knee amputation) unilateral, right ([facility])  Patient states he is doing well. BKA wound is well healing and he will have staples removed on 10/1. Patient will continue follow up with Vascular Surgery.    3. PVD (peripheral vascular disease) ([facility])- s/p right BKA  Same as #2.    4. Obesity (BMI 30.0-34.9)   diet; patient counseled    5. Nonsustained ventricular tachycardia ([facility])- [place]iana may 2019; PAC's and PVC's, NSR; NO A. FIB;  dr mohsen (cardilogy) at [facility]  Stable. Continue follow up with Cardiology.    6. Mixed hyperlipidemia- dr dwyer  Under good control. Continue same regimen.  - Comp Metabolic Panel; Future  - Lipid Profile; Future  - CBC WITH DIFFERENTIAL; Future    7. Hypothyroidism due to acquired atrophy of thyroid- dr browning  Under good control. Continue same regimen.  - TSH; Future    8.  Hypertension, essential  Under good control. Continue same regimen.  - Comp Metabolic Panel; Future  - Lipid Profile; Future    9. Gastroesophageal reflux disease with esophagitis- PPI PRN  Under good control. Continue same regimen.    10. Essential hypertension- DR VIZCARRA  Under good control. Continue same regimen.      40 minute face-to-face encounter took place today.  More than half of this time was spent in the coordination of care of the above problems, as well as counseling.     I, Fredo Gustafson (Scribe), am scribing for, and in the presence of, Ky Hernandez M.D..    Electronically signed by: Fredo Gustafson (Scribe), 9/26/2019    I, Ky Hernandez M.D., personally performed the services described in this documentation, as scribed by Fredo Gustafson in my presence, and it is both accurate and complete.

## 2019-09-26 NOTE — OP THERAPY EVALUATION
Outpatient Occupational Therapy  INITIAL EVALUATION    [facility] Occupational Therapy [place] [address]  [address]  [place] [address]  Phone:  [phone]  Fax:  [phone]    Date of Evaluation: 09/26/2019    Patient: Kevin Jackson  YOB: 1945  MRN: [id number]     Referring Provider: Vincent Rubin M.D.  [facility]  [address]  [place] [address]   Referring Diagnosis Other malaise [R53.81];Unspecified abnormalities of gait and mobility [R26.9]     Time Calculation  Start time: 1200  Stop time: 1245 Time Calculation (min): 45 minutes       Occupational Therapy Occurrence Codes    Date of onset of impairment:  1/26/19   Date of occupational therapy care plan established or reviewed:  9/26/19   Date of occupational therapy treatment started:  9/26/19          Chief Complaint: No chief complaint on file.    Visit Diagnoses     ICD-10-CM   1. Other malaise R53.81   2. S/P BKA (below knee amputation) unilateral, right (HCC) Z89.511       Subjective:   History of Present Illness:     Date of onset:  1/26/2019    Date of surgery:  9/5/2019    Mechanism of injury:  Pt presents to PT 2.5 weeks s/p R BKA performed on 9/5/19.  States he developed a wound on the 2nd toe in January of this year.  Failed to heal conservatively and ultimately developed gangrene and required amputation to below knee. PMH: DMII (dx in 1998), Amputations of all 5 toes on the L (2/2013).    Did complete a rehab stay x 2 weeks: indep with use of manual wheelchair (primary mode of transport).  Uses hop to pattern with FWW to get into the bathroom and into the shower.  Has a shower chair.  Will be working Shiv[employer] from [facility] for the prosthetic, currently in a #6  and will transition to #5 in the next couple days.  Staples will be removed Oct 1st.      Quality of life:  Good  Prior level of function:  Retired.  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   Pain:      "Current pain ratin    Pain Comments::  Pt states he has some phantom limb pain, but states it is only for a few seconds.  Social Support:     Patient lives at: One-story house (2 steps to enter from any direction, putting a ramp on the front tomorrow. )    Lives with:  Significant other (partner of 30 years)  Hand dominance:  Right  Treatments:     Previous treatment:  Physical therapy, occupational therapy and speech therapy    Current treatment:  Physical therapy and massage (Working with [employer] as well.)    Discharged from (in last 30 days): rehabilitation facility    Patient Goals:     Other patient goals:  \"I'd like to get back to yard work and being outside.\"      Past Medical History:   Diagnosis Date   • Arrhythmia 2018    possible afib per pt, ekg x 2 following were \"OK\"   • Bowel habit changes     constipation diarrhea   • Diabetes    • High cholesterol    • Hyperlipidemia    • Hypertension    • Muscle disorder    • Pain     right foot   • Tremors of nervous system      Past Surgical History:   Procedure Laterality Date   • KNEE AMPUTATION BELOW Right 2019    Procedure: AMPUTATION, BELOW KNEE;  Surgeon: Shivam Amezcua M.D.;  Location: SURGERY [facility];  Service: Vascular   • AMPUTATION, TOE  2013    all 5 toes left foot   • CARPAL TUNNEL RELEASE     • OTHER ORTHOPEDIC SURGERY      right foot      Social History     Tobacco Use   • Smoking status: Never Smoker   • Smokeless tobacco: Never Used   Substance Use Topics   • Alcohol use: Not Currently     Alcohol/week: 0.6 oz     Types: 1 Cans of beer per week     Family and Occupational History     Socioeconomic History   • Marital status: Single     Spouse name: Not on file   • Number of children: Not on file   • Years of education: Not on file   • Highest education level: Not on file   Occupational History   • Not on file       Objective     Active Range of Motion   Left Shoulder   Normal active range of motion    Right " "Shoulder   Normal active range of motion    Left Elbow   Normal active range of motion    Right Elbow   Normal active range of motion    Strength:      Left Shoulder   Normal muscle strength    Right Shoulder   Normal muscle strength    Left Elbow   Normal strength    Right Elbow   Normal strength    Left Wrist/Hand   Normal wrist strength   (2nd hand position)     Trial 1: 72    Trial 2: 65    Trial 3: 68    Average: 68.33    Thumb Strength  Key/Lateral Pinch     [place] 1: 17  Palmar/Three-Point Pinch     Trial 1: 12    Right Wrist/Hand   Normal wrist strength   (2nd hand position)     Trial 1: 60    Trial 2: 59    Trial 3: 58    Average: 59    Thumb Strength   Key/Lateral Pinch     Trial 1: 17    Additional Strength Details  R hand 3 pt pinch unable to test due to tremors. Pt has had tremors since he was 16 years old. He denies any impact on occupational performance.     General Comments     Shoulder Comments   Rotator Cuff Repair on L Shoulder years ago. Pt reports no pain or limitation remains.    Activities of Daily Living:   Transfers/Mobility:     Bed/chair transfers: independent    Wheelchair transfers: independent    Sit to stand: independent    Sit to supine: independent    Toilet transfers: contact guard assist       Toilet transfer equipment used: Pt is still working on getting grab bars.     Tub/shower transfers: contact guard assist       Tub/shower transfer equipment used: Pt is still working on getting grab bars.     Bed mobility: independent    Transfer mobility comments:  Observed pt do I transfer from w/c to mat table. No difficulties.    Toileting:     Toileting: independent    Dressing:     Dressing: independent    Grooming:     Brushing teeth or denture care: independent    Shaving: independent    Feeding:     Feeding: independent    Household Management:     Household Management Comments:  \"I don't have any difficulty at home. I would like to do some yard work, but I want to wait on " "that until I get my prosthetics. My current situation is temporary.\"    ADL Comments:  Pt does not seem committed to getting grab bars stating \"My current situation is temporary.\" OTR encouraged him to put the grab bars in as he may need them while learning his prosthetic too.      Exercises/Treatments    Pt's static and dynamic sitting balance is WFL.           Assessment, Response and Plan:   Assessment details:  Pt had bright affect this date. Pt is independent with ADLs. Pt requires CGA for toilet and shower transfer due to not having grab bars installed yet. Pt is unsure whether he will install them stating \"My current situation is temporary.\" Pt understands OTR's recommendation to install the grab bars as people tend to need them with normal aging as well. Pt's UB strength is WFL. Pt's priority is strengthening his R LE. Pt is already working on LE strengthening with Physical Therapy. OTR encouraged pt to ask his PCP for another referral if his occupational performance were to decline.  Goals:   Short Term Goals:   No STG due to no further treatment needed.    Long Term Goals:   No LTG due to no further treatment needed.    Plan:   Therapy options:  No further treatment needed  Plan details:  Pt Is not in need of occupational therapy at this time. OTR encouraged pt to ask his PCP for another referral if his occupational performance were to decline.          Referring provider co-signature:  I have reviewed this plan of care and my co-signature certifies the need for services.  Certification Dates:   From 09/26/19     To Other 09/26/19    Physician Signature: ________________________________ Date: ______________        "

## 2019-10-02 ENCOUNTER — APPOINTMENT (OUTPATIENT)
Dept: OCCUPATIONAL THERAPY | Facility: REHABILITATION | Age: 74
End: 2019-10-02
Attending: PHYSICAL MEDICINE & REHABILITATION
Payer: MEDICARE

## 2019-10-02 ENCOUNTER — PHYSICAL THERAPY (OUTPATIENT)
Dept: PHYSICAL THERAPY | Facility: REHABILITATION | Age: 74
End: 2019-10-02
Attending: PHYSICAL MEDICINE & REHABILITATION
Payer: MEDICARE

## 2019-10-02 ENCOUNTER — TELEPHONE (OUTPATIENT)
Dept: PHYSICAL THERAPY | Facility: REHABILITATION | Age: 74
End: 2019-10-02

## 2019-10-02 DIAGNOSIS — R26.9 UNSPECIFIED ABNORMALITIES OF GAIT AND MOBILITY: ICD-10-CM

## 2019-10-02 PROCEDURE — 97110 THERAPEUTIC EXERCISES: CPT

## 2019-10-02 PROCEDURE — 97116 GAIT TRAINING THERAPY: CPT

## 2019-10-02 NOTE — OP THERAPY DAILY TREATMENT
"  Outpatient Physical Therapy  DAILY TREATMENT     [facility] Physical Therapy [address]  [address]  [place] [address]  Phone:  [phone]  Fax:  [phone]    Date: 10/02/2019    Patient: Kevin Jackson  YOB: 1945  MRN: [id number]     Time Calculation  Start time: 1030  Stop time: 1125 Time Calculation (min): 55 minutes       Chief Complaint: Difficulty Walking    Visit #: 2    SUBJECTIVE:  I had only half the stitches removed yesterday. They wanted a little more healing.  Patient has an appt on 10/8/19 to remove the rest of the stitches.     OBJECTIVE:  Current objective measures:   Patient is using [employer] from [employer] for prosthesis.  Patient used no AD prior to this amputation.        Therapeutic Exercises (CPT 49981):     1. LEONIDAS, x 5 min    2. Prone hip ext , x 20    3. Prone hip ext with knee flexion, x 20    4. Supine SL bridge with bolster under R LE, x 10    5. Knee ext str, x 1 min seated.     6. Quad sets, x 10 with 10\" hold    7. Prone knee flexion     8. Bridge with left LE , 2 x 10     9. Standing hip abduction , added to HEP     10. Sidelying hip abduction , added to HEP     11. Standing hip flexion , added to HEP    12. Standing knee flexion , added to HEP     Therapeutic Treatments and Modalities:     1. Gait Training (CPT 36758), ambulation 50 feet with FWW with CGA     Time-based treatments/modalities:  Gait training minutes (CPT 96259): 10 minutes  Therapeutic exercise minutes (CPT 62745): 45 minutes       ASSESSMENT:   Response to treatment: Discussed RTC with patient. Patient is stating he is having a hard time emotionally with this amputation. Discussed patient exploring seeing psychologist, but patient reports he does not want to explore it at this time.  Spoke with patient's SO, who emailed regarding concerns about depression.   PLAN/RECOMMENDATIONS:   Plan for treatment: therapy treatment to continue next visit.  Planned interventions for next " visit: continue with current treatment.

## 2019-10-02 NOTE — OP THERAPY DISCHARGE SUMMARY
Dr. Hernandez,   I am seeing Kevin in PT after his amputation. He is reporting significant depression and difficulty dealing with this emotionally. His Significant Other, Jerome, contacted me regarding concerns about his depression and being resentful of her. I suggested a referral to psych services, but he did not seem open to it.      Would you mind following up with him regarding this?    Thank you very much.     Racquel Lara, PT,DPT

## 2019-10-04 ENCOUNTER — APPOINTMENT (OUTPATIENT)
Dept: PHYSICAL THERAPY | Facility: REHABILITATION | Age: 74
End: 2019-10-04
Attending: PHYSICAL MEDICINE & REHABILITATION
Payer: MEDICARE

## 2019-10-08 ENCOUNTER — PHYSICAL THERAPY (OUTPATIENT)
Dept: PHYSICAL THERAPY | Facility: REHABILITATION | Age: 74
End: 2019-10-08
Attending: PHYSICAL MEDICINE & REHABILITATION
Payer: MEDICARE

## 2019-10-08 DIAGNOSIS — R26.9 UNSPECIFIED ABNORMALITIES OF GAIT AND MOBILITY: ICD-10-CM

## 2019-10-08 PROCEDURE — 97116 GAIT TRAINING THERAPY: CPT

## 2019-10-08 PROCEDURE — 97112 NEUROMUSCULAR REEDUCATION: CPT

## 2019-10-08 NOTE — OP THERAPY DAILY TREATMENT
Outpatient Physical Therapy  DAILY TREATMENT     [facility] Physical [address]  [address]  [place] [address]  Phone:  [phone]  Fax:  [phone]    Date: 10/08/2019    Patient: Kevin Jackson  YOB: 1945  MRN: [id number]     Time Calculation  Start time: 0935  Stop time: 1000 Time Calculation (min): 25 minutes       Chief Complaint: Difficulty Walking    Visit #: 3    SUBJECTIVE:  Patient reports no new complaints. Has appointment to remove stitches today.     OBJECTIVE:  Current objective measures:           Therapeutic Exercises (CPT 84389):     1. NuStep, L3 x 5 minutes with BUEs and LLE, Used to improve cardiovascular endurance     Therapeutic Treatments and Modalities:     1. Neuromuscular Re-education (CPT 83627), Assessment of stump and healing , Good healing of surgical incision with no open areas or drainage. Stump re-wrapped. , Weightbearing through R stump in standing through padded chair, Performed lateral weight shifting and hold with pressure through R stump x 5 seconds. 2 x 10    3. Gait Training (CPT 86742), Gait with FWW and SBA-CGA 1 x 10 feet, 1 x 50 feet, Patient very fatigued with gait x 50 feet resulting in decreased stride length and increased instability.    Time-based treatments/modalities:  Gait training minutes (CPT 67351): 15 minutes  Therapeutic exercise minutes (CPT 95854): 5 minutes  Neuromusc re-ed, balance, coor, post minutes (CPT 22000): 10 minutes       ASSESSMENT:   Response to treatment: Patient demonstrated fair tolerance of short duration weight bearing through R stump and understood need to improve tolerance to allow for transition to prosthetic. Would benefit from continued endurance training.     PLAN/RECOMMENDATIONS:   Plan for treatment: Follow up on MD appointment. Continue weight bearing activities and gait. .  Planned interventions for next visit: continue with current treatment.

## 2019-10-09 ENCOUNTER — APPOINTMENT (OUTPATIENT)
Dept: OCCUPATIONAL THERAPY | Facility: REHABILITATION | Age: 74
End: 2019-10-09
Attending: PHYSICAL MEDICINE & REHABILITATION
Payer: MEDICARE

## 2019-10-11 ENCOUNTER — APPOINTMENT (OUTPATIENT)
Dept: OCCUPATIONAL THERAPY | Facility: REHABILITATION | Age: 74
End: 2019-10-11
Attending: PHYSICAL MEDICINE & REHABILITATION
Payer: MEDICARE

## 2019-10-11 ENCOUNTER — TELEPHONE (OUTPATIENT)
Dept: PHYSICAL THERAPY | Facility: REHABILITATION | Age: 74
End: 2019-10-11

## 2019-10-11 ENCOUNTER — PHYSICAL THERAPY (OUTPATIENT)
Dept: PHYSICAL THERAPY | Facility: REHABILITATION | Age: 74
End: 2019-10-11
Attending: PHYSICAL MEDICINE & REHABILITATION
Payer: MEDICARE

## 2019-10-11 DIAGNOSIS — Z89.511 S/P BKA (BELOW KNEE AMPUTATION) UNILATERAL, RIGHT (HCC): ICD-10-CM

## 2019-10-11 DIAGNOSIS — F33.9 EPISODE OF RECURRENT MAJOR DEPRESSIVE DISORDER, UNSPECIFIED DEPRESSION EPISODE SEVERITY (HCC): ICD-10-CM

## 2019-10-11 DIAGNOSIS — R53.81 OTHER MALAISE: ICD-10-CM

## 2019-10-11 DIAGNOSIS — R45.4 ANGER REACTION: ICD-10-CM

## 2019-10-11 DIAGNOSIS — R26.9 UNSPECIFIED ABNORMALITIES OF GAIT AND MOBILITY: ICD-10-CM

## 2019-10-11 PROCEDURE — 97110 THERAPEUTIC EXERCISES: CPT

## 2019-10-11 PROCEDURE — 97116 GAIT TRAINING THERAPY: CPT

## 2019-10-11 NOTE — OP THERAPY DISCHARGE SUMMARY
Dr. Hernandez,   I am continuing to work with Kevin in PT and he is having a lot of anger regarding the loss of his leg. We talked at length today and he agreed he would like a referral to psych services.  Would you mind entering a referral since he does not see you until November?    Thank you,   Racquel Lara, PT, DPT

## 2019-10-11 NOTE — OP THERAPY DAILY TREATMENT
Outpatient Physical Therapy  DAILY TREATMENT     [facility] Physical [address]  [address]  [place] [address]  Phone:  [phone]  Fax:  [phone]    Date: 10/11/2019    Patient: Kevin Jackson  YOB: 1945  MRN: [id number]     Time Calculation  Start time: 1400  Stop time: 1456 Time Calculation (min): 56 minutes       Chief Complaint: Difficulty Walking    Visit #: 4    SUBJECTIVE:  I saw the prosthesist and they don't want me to start weightbearing on it yet.  He said they are looking at 4-6 weeks.      OBJECTIVE:    Patient reports he has an appt with the orthotist on 10/26/19.        Therapeutic Exercises (CPT 97595):     1. NuStep, L3 x 10minutes with BUEs and LLE, Used to improve cardiovascular endurance     Therapeutic Treatments and Modalities:     3. Gait Training (CPT 87924), Gait with FWW and SBA-CGA 1 x 10 feet, 3 x 50 feet, Patient very fatigued with gait x 50 feet resulting in decreased stride length and increased instability.    Time-based treatments/modalities:  Gait training minutes (CPT 09763): 25 minutes  Therapeutic exercise minutes (CPT 42746): 20 minutes     Education regarding performing exercises at home and not spending too much time in his wheelchair.     ASSESSMENT:   Response to treatment: Patient is reporting continued feelings of anger and limited interest in activities outside of the home. Discussed a referral to psych and patient feels like he would be willing to try it.  Will email MD regarding referral.    PLAN/RECOMMENDATIONS:   Plan for treatment: therapy treatment to continue next visit. Follow up in about 2 weeks, as patient says the prosthetist says 4-6 weeks for making device.   Planned interventions for next visit: continue with current treatment.

## 2019-10-16 ENCOUNTER — APPOINTMENT (OUTPATIENT)
Dept: OCCUPATIONAL THERAPY | Facility: REHABILITATION | Age: 74
End: 2019-10-16
Attending: PHYSICAL MEDICINE & REHABILITATION
Payer: MEDICARE

## 2019-10-16 ENCOUNTER — APPOINTMENT (OUTPATIENT)
Dept: PHYSICAL THERAPY | Facility: REHABILITATION | Age: 74
End: 2019-10-16
Attending: PHYSICAL MEDICINE & REHABILITATION
Payer: MEDICARE

## 2019-10-16 ENCOUNTER — APPOINTMENT (RX ONLY)
Dept: URBAN - METROPOLITAN AREA CLINIC 20 | Facility: CLINIC | Age: 74
Setting detail: DERMATOLOGY
End: 2019-10-16

## 2019-10-16 DIAGNOSIS — L21.8 OTHER SEBORRHEIC DERMATITIS: ICD-10-CM

## 2019-10-16 DIAGNOSIS — L82.1 OTHER SEBORRHEIC KERATOSIS: ICD-10-CM

## 2019-10-16 DIAGNOSIS — D22 MELANOCYTIC NEVI: ICD-10-CM

## 2019-10-16 DIAGNOSIS — Z85.828 PERSONAL HISTORY OF OTHER MALIGNANT NEOPLASM OF SKIN: ICD-10-CM

## 2019-10-16 DIAGNOSIS — L57.0 ACTINIC KERATOSIS: ICD-10-CM

## 2019-10-16 PROBLEM — D48.5 NEOPLASM OF UNCERTAIN BEHAVIOR OF SKIN: Status: ACTIVE | Noted: 2019-10-16

## 2019-10-16 PROBLEM — D22.5 MELANOCYTIC NEVI OF TRUNK: Status: ACTIVE | Noted: 2019-10-16

## 2019-10-16 PROCEDURE — ? PRESCRIPTION

## 2019-10-16 PROCEDURE — 99214 OFFICE O/P EST MOD 30 MIN: CPT | Mod: 25

## 2019-10-16 PROCEDURE — 17000 DESTRUCT PREMALG LESION: CPT | Mod: 59

## 2019-10-16 PROCEDURE — ? LIQUID NITROGEN

## 2019-10-16 PROCEDURE — 17003 DESTRUCT PREMALG LES 2-14: CPT

## 2019-10-16 PROCEDURE — ? BIOPSY BY SHAVE METHOD

## 2019-10-16 PROCEDURE — 11102 TANGNTL BX SKIN SINGLE LES: CPT

## 2019-10-16 PROCEDURE — ? COUNSELING

## 2019-10-16 RX ORDER — HYDROCORTISONE 25 MG/G
CREAM TOPICAL
Qty: 1 | Refills: 3 | Status: ERX | COMMUNITY
Start: 2019-10-16

## 2019-10-16 RX ADMIN — HYDROCORTISONE 1: 25 CREAM TOPICAL at 00:00

## 2019-10-16 ASSESSMENT — LOCATION SIMPLE DESCRIPTION DERM
LOCATION SIMPLE: LEFT FOREHEAD
LOCATION SIMPLE: UPPER BACK
LOCATION SIMPLE: RIGHT UPPER BACK
LOCATION SIMPLE: LEFT TEMPLE
LOCATION SIMPLE: UPPER BACK
LOCATION SIMPLE: POSTERIOR NECK
LOCATION SIMPLE: CHEST
LOCATION SIMPLE: RIGHT FOREHEAD

## 2019-10-16 ASSESSMENT — LOCATION DETAILED DESCRIPTION DERM
LOCATION DETAILED: LEFT MEDIAL TEMPLE
LOCATION DETAILED: LEFT MEDIAL SUPERIOR CHEST
LOCATION DETAILED: RIGHT INFERIOR MEDIAL FOREHEAD
LOCATION DETAILED: LEFT INFERIOR FOREHEAD
LOCATION DETAILED: RIGHT POSTERIOR NECK
LOCATION DETAILED: RIGHT MEDIAL UPPER BACK
LOCATION DETAILED: SUPERIOR THORACIC SPINE
LOCATION DETAILED: SUPERIOR THORACIC SPINE

## 2019-10-16 ASSESSMENT — LOCATION ZONE DERM
LOCATION ZONE: FACE
LOCATION ZONE: NECK
LOCATION ZONE: TRUNK
LOCATION ZONE: TRUNK

## 2019-10-16 NOTE — PROCEDURE: BIOPSY BY SHAVE METHOD
Cryotherapy Text: The wound bed was treated with cryotherapy after the biopsy was performed.
Destruction After The Procedure: No
Anesthesia Type: 1% lidocaine with 1:100,000 epinephrine and a 1:10 solution of 8.4% sodium bicarbonate
Wound Care: Petrolatum
Lab Facility: 
Additional Anesthesia Volume In Cc (Will Not Render If 0): 0
Notification Instructions: Patient will be notified of biopsy results. However, patient instructed to call the office if not contacted within 2 weeks.
Size Of Lesion In Cm: 0.5
Depth Of Biopsy: dermis
Electrodesiccation Text: The wound bed was treated with electrodesiccation after the biopsy was performed.
Biopsy Type: H and E
Consent: Written consent was obtained and risks were reviewed including but not limited to scarring, infection, bleeding, scabbing, incomplete removal, nerve damage and allergy to anesthesia.
Electrodesiccation And Curettage Text: The wound bed was treated with electrodesiccation and curettage after the biopsy was performed.
Anesthesia Volume In Cc: 2
Type Of Destruction Used: Curettage
Render Post-Care Instructions In Note?: yes
Dressing: pressure dressing with telfa
Silver Nitrate Text: The wound bed was treated with silver nitrate after the biopsy was performed.
Hemostasis: Drysol
Biopsy Method: Personna blade
Detail Level: Detailed
Billing Type: Third-Party Bill
Curettage Text: The wound bed was treated with curettage after the biopsy was performed.
Lab: [id number]
Post-Care Instructions: I reviewed with the patient in detail post-care instructions. Patient is to keep the biopsy site dry overnight. Gentle cleansing daily.  Apply petroleum ointment daily until healed. Patient may apply hydrogen peroxide soaks to remove any crusting.

## 2019-10-18 ENCOUNTER — APPOINTMENT (OUTPATIENT)
Dept: PHYSICAL THERAPY | Facility: REHABILITATION | Age: 74
End: 2019-10-18
Attending: PHYSICAL MEDICINE & REHABILITATION
Payer: MEDICARE

## 2019-10-18 ENCOUNTER — APPOINTMENT (OUTPATIENT)
Dept: OCCUPATIONAL THERAPY | Facility: REHABILITATION | Age: 74
End: 2019-10-18
Attending: PHYSICAL MEDICINE & REHABILITATION
Payer: MEDICARE

## 2019-10-21 ENCOUNTER — HOSPITAL ENCOUNTER (OUTPATIENT)
Dept: LAB | Facility: MEDICAL CENTER | Age: 74
End: 2019-10-21
Attending: PHYSICIAN ASSISTANT
Payer: MEDICARE

## 2019-10-21 LAB
EST. AVERAGE GLUCOSE BLD GHB EST-MCNC: 143 MG/DL
HBA1C MFR BLD: 6.6 % (ref 0–5.6)

## 2019-10-21 PROCEDURE — 36415 COLL VENOUS BLD VENIPUNCTURE: CPT | Mod: GA

## 2019-10-21 PROCEDURE — 84403 ASSAY OF TOTAL TESTOSTERONE: CPT

## 2019-10-21 PROCEDURE — 84443 ASSAY THYROID STIM HORMONE: CPT

## 2019-10-21 PROCEDURE — 83036 HEMOGLOBIN GLYCOSYLATED A1C: CPT | Mod: GA

## 2019-10-21 PROCEDURE — 84270 ASSAY OF SEX HORMONE GLOBUL: CPT

## 2019-10-21 PROCEDURE — 84439 ASSAY OF FREE THYROXINE: CPT

## 2019-10-22 LAB
T4 FREE SERPL-MCNC: 1.03 NG/DL (ref 0.53–1.43)
TSH SERPL DL<=0.005 MIU/L-ACNC: 1.03 UIU/ML (ref 0.38–5.33)

## 2019-10-23 ENCOUNTER — APPOINTMENT (OUTPATIENT)
Dept: PHYSICAL THERAPY | Facility: REHABILITATION | Age: 74
End: 2019-10-23
Attending: PHYSICAL MEDICINE & REHABILITATION
Payer: MEDICARE

## 2019-10-23 ENCOUNTER — APPOINTMENT (OUTPATIENT)
Dept: OCCUPATIONAL THERAPY | Facility: REHABILITATION | Age: 74
End: 2019-10-23
Attending: PHYSICAL MEDICINE & REHABILITATION
Payer: MEDICARE

## 2019-10-23 LAB
SHBG SERPL-SCNC: 31 NMOL/L (ref 11–80)
TESTOST FREE MFR SERPL: 1.9 % (ref 1.6–2.9)
TESTOST FREE SERPL-MCNC: 79 PG/ML (ref 47–244)
TESTOST SERPL-MCNC: 411 NG/DL (ref 300–720)

## 2019-10-25 ENCOUNTER — PHYSICAL THERAPY (OUTPATIENT)
Dept: PHYSICAL THERAPY | Facility: REHABILITATION | Age: 74
End: 2019-10-25
Attending: PHYSICAL MEDICINE & REHABILITATION
Payer: MEDICARE

## 2019-10-25 ENCOUNTER — APPOINTMENT (OUTPATIENT)
Dept: OCCUPATIONAL THERAPY | Facility: REHABILITATION | Age: 74
End: 2019-10-25
Attending: PHYSICAL MEDICINE & REHABILITATION
Payer: MEDICARE

## 2019-10-25 DIAGNOSIS — Z89.511 S/P BKA (BELOW KNEE AMPUTATION) UNILATERAL, RIGHT (HCC): ICD-10-CM

## 2019-10-25 DIAGNOSIS — R26.9 UNSPECIFIED ABNORMALITIES OF GAIT AND MOBILITY: ICD-10-CM

## 2019-10-25 PROCEDURE — 97110 THERAPEUTIC EXERCISES: CPT

## 2019-10-25 NOTE — OP THERAPY DAILY TREATMENT
Outpatient Physical Therapy  DAILY TREATMENT     [facility] Physical Therapy [address]  [address]  [place] [address]  Phone:  [phone]  Fax:  [phone]    Date: 10/25/2019    Patient: Kevin Jackson  YOB: 1945  MRN: [id number]     Time Calculation  Start time: 1400  Stop time: 1430 Time Calculation (min): 30 minutes       Chief Complaint: No chief complaint on file.    Visit #: 5    SUBJECTIVE:  I was okay until yesterday. I fell yesterday out of my truck. I went to Dr. Brewtser immediately and he told me to get out of the .  I follow up with him in 2 weeks.     OBJECTIVE:    Therapeutic Exercises (CPT 75906):     1. NuStep, L3 x 10minutes with BUEs and LLE, Used to improve cardiovascular endurance       Therapeutic Exercise Summary: Education regarding management of limb and making sure that wound is healing.  Patient given information regarding calling [facility] to     Therapeutic Treatments and Modalities:     3. Gait Training (CPT 44811), Gait with FWW and SBA-CGA 1 x 10 feet, 3 x 50 feet, Patient very fatigued with gait x 50 feet resulting in decreased stride length and increased instability.    Time-based treatments/modalities:  Therapeutic exercise minutes (CPT 18717): 30 minutes         ASSESSMENT:   Response to treatment: Patient is not ready for prosthesis at this time due to recent cut on limb and continued management of size of limb. Patient would benefit from PT when he has his prosthesis and is ready to gait train.     PLAN/RECOMMENDATIONS:   Plan for treatment:  Patient to be placed on hold until he is new wound heals and until he is fitted for his prosthesis. Patient verbalizes good understanding

## 2019-10-30 ENCOUNTER — APPOINTMENT (OUTPATIENT)
Dept: OCCUPATIONAL THERAPY | Facility: REHABILITATION | Age: 74
End: 2019-10-30
Attending: PHYSICAL MEDICINE & REHABILITATION
Payer: MEDICARE

## 2019-10-30 ENCOUNTER — APPOINTMENT (OUTPATIENT)
Dept: PHYSICAL THERAPY | Facility: REHABILITATION | Age: 74
End: 2019-10-30
Attending: PHYSICAL MEDICINE & REHABILITATION
Payer: MEDICARE

## 2019-11-01 ENCOUNTER — APPOINTMENT (OUTPATIENT)
Dept: PHYSICAL THERAPY | Facility: REHABILITATION | Age: 74
End: 2019-11-01
Attending: PHYSICAL MEDICINE & REHABILITATION
Payer: MEDICARE

## 2019-11-01 ENCOUNTER — APPOINTMENT (OUTPATIENT)
Dept: OCCUPATIONAL THERAPY | Facility: REHABILITATION | Age: 74
End: 2019-11-01
Attending: PHYSICAL MEDICINE & REHABILITATION
Payer: MEDICARE

## 2019-11-05 ENCOUNTER — OFFICE VISIT (OUTPATIENT)
Dept: PHYSICAL MEDICINE AND REHAB | Facility: REHABILITATION | Age: 74
End: 2019-11-05
Payer: MEDICARE

## 2019-11-05 VITALS
SYSTOLIC BLOOD PRESSURE: 118 MMHG | DIASTOLIC BLOOD PRESSURE: 78 MMHG | HEART RATE: 72 BPM | HEIGHT: 73 IN | BODY MASS INDEX: 25.98 KG/M2 | OXYGEN SATURATION: 96 % | WEIGHT: 196 LBS | TEMPERATURE: 98.7 F

## 2019-11-05 DIAGNOSIS — M79.2 NEUROPATHIC PAIN: ICD-10-CM

## 2019-11-05 DIAGNOSIS — R45.89 DEPRESSED MOOD: ICD-10-CM

## 2019-11-05 DIAGNOSIS — G54.6 PHANTOM LIMB PAIN (HCC): ICD-10-CM

## 2019-11-05 DIAGNOSIS — Z78.9 IMPAIRED INSTRUMENTAL ACTIVITIES OF DAILY LIVING (IADL): ICD-10-CM

## 2019-11-05 DIAGNOSIS — F43.21 ADJUSTMENT DISORDER WITH DEPRESSED MOOD: ICD-10-CM

## 2019-11-05 DIAGNOSIS — Z89.511 S/P BKA (BELOW KNEE AMPUTATION) UNILATERAL, RIGHT (HCC): Primary | ICD-10-CM

## 2019-11-05 PROCEDURE — 99215 OFFICE O/P EST HI 40 MIN: CPT | Performed by: PHYSICAL MEDICINE & REHABILITATION

## 2019-11-05 RX ORDER — GABAPENTIN 300 MG/1
300 CAPSULE ORAL 3 TIMES DAILY
COMMUNITY
Start: 2019-10-16 | End: 2021-09-30

## 2019-11-05 RX ORDER — CLOPIDOGREL BISULFATE 75 MG/1
TABLET ORAL
COMMUNITY
Start: 2019-10-12 | End: 2019-12-06

## 2019-11-05 RX ORDER — CEPHALEXIN 500 MG/1
CAPSULE ORAL
COMMUNITY
Start: 2019-10-08 | End: 2019-12-06

## 2019-11-05 RX ORDER — LOSARTAN POTASSIUM AND HYDROCHLOROTHIAZIDE 12.5; 5 MG/1; MG/1
TABLET ORAL
COMMUNITY
Start: 2019-10-22 | End: 2019-12-06

## 2019-11-05 RX ORDER — INSULIN LISPRO 100 [IU]/ML
INJECTION, SUSPENSION SUBCUTANEOUS
COMMUNITY
Start: 2019-10-08 | End: 2019-12-11

## 2019-11-05 ASSESSMENT — ENCOUNTER SYMPTOMS
DEPRESSION: 1
CONSTIPATION: 0
MUSCULOSKELETAL NEGATIVE: 1
CHILLS: 0
DIZZINESS: 0
DIARRHEA: 0
FEVER: 0

## 2019-11-05 ASSESSMENT — PATIENT HEALTH QUESTIONNAIRE - PHQ9
5. POOR APPETITE OR OVEREATING: 0 - NOT AT ALL
SUM OF ALL RESPONSES TO PHQ QUESTIONS 1-9: 7
CLINICAL INTERPRETATION OF PHQ2 SCORE: 4

## 2019-11-05 NOTE — PATIENT INSTRUCTIONS
1. Start to decrease the Gabapentin:   - Take 1 pill in the morning, 2 pills at noon, 2 pills at night for 5 days   - Then take 1 pill in the morning, 1 pill at noon, 2 pills at night for 5 days   - Then take 1 pill in the morning, 1 pill at noon, 1 pill at night for 5 days   - Then take 1 pill in the morning and 1 pill at night for 5 days   - Then take 1 pill just at night for 5 days   - Then STOP Gabapentin completely.   If you notice your pain is returning - you can start taking the Gabapentin at the original dose of 2 pills (600mg) three times daily.

## 2019-11-08 ENCOUNTER — HOSPITAL ENCOUNTER (OUTPATIENT)
Dept: LAB | Facility: MEDICAL CENTER | Age: 74
End: 2019-11-08
Attending: INTERNAL MEDICINE
Payer: MEDICARE

## 2019-11-08 ENCOUNTER — HOSPITAL ENCOUNTER (OUTPATIENT)
Dept: LAB | Facility: MEDICAL CENTER | Age: 74
End: 2019-11-08
Attending: PHYSICIAN ASSISTANT
Payer: MEDICARE

## 2019-11-08 DIAGNOSIS — I10 HYPERTENSION, ESSENTIAL: ICD-10-CM

## 2019-11-08 DIAGNOSIS — E78.2 MIXED HYPERLIPIDEMIA: ICD-10-CM

## 2019-11-08 DIAGNOSIS — E03.4 HYPOTHYROIDISM DUE TO ACQUIRED ATROPHY OF THYROID: ICD-10-CM

## 2019-11-08 LAB
ALBUMIN SERPL BCP-MCNC: 3.8 G/DL (ref 3.2–4.9)
ALBUMIN/GLOB SERPL: 1.5 G/DL
ALP SERPL-CCNC: 41 U/L (ref 30–99)
ALT SERPL-CCNC: 16 U/L (ref 2–50)
ANION GAP SERPL CALC-SCNC: 10 MMOL/L (ref 0–11.9)
AST SERPL-CCNC: 16 U/L (ref 12–45)
BASOPHILS # BLD AUTO: 0.8 % (ref 0–1.8)
BASOPHILS # BLD: 0.06 K/UL (ref 0–0.12)
BILIRUB SERPL-MCNC: 0.5 MG/DL (ref 0.1–1.5)
BUN SERPL-MCNC: 21 MG/DL (ref 8–22)
CALCIUM SERPL-MCNC: 9.8 MG/DL (ref 8.5–10.5)
CHLORIDE SERPL-SCNC: 105 MMOL/L (ref 96–112)
CHOLEST SERPL-MCNC: 105 MG/DL (ref 100–199)
CO2 SERPL-SCNC: 24 MMOL/L (ref 20–33)
CREAT SERPL-MCNC: 0.79 MG/DL (ref 0.5–1.4)
CREAT UR-MCNC: 76.6 MG/DL
EOSINOPHIL # BLD AUTO: 0.25 K/UL (ref 0–0.51)
EOSINOPHIL NFR BLD: 3.4 % (ref 0–6.9)
ERYTHROCYTE [DISTWIDTH] IN BLOOD BY AUTOMATED COUNT: 46 FL (ref 35.9–50)
FASTING STATUS PATIENT QL REPORTED: NORMAL
GLOBULIN SER CALC-MCNC: 2.5 G/DL (ref 1.9–3.5)
GLUCOSE SERPL-MCNC: 114 MG/DL (ref 65–99)
HCT VFR BLD AUTO: 40.9 % (ref 42–52)
HDLC SERPL-MCNC: 42 MG/DL
HGB BLD-MCNC: 13.1 G/DL (ref 14–18)
IMM GRANULOCYTES # BLD AUTO: 0.07 K/UL (ref 0–0.11)
IMM GRANULOCYTES NFR BLD AUTO: 1 % (ref 0–0.9)
LDLC SERPL CALC-MCNC: 52 MG/DL
LYMPHOCYTES # BLD AUTO: 1.99 K/UL (ref 1–4.8)
LYMPHOCYTES NFR BLD: 27.1 % (ref 22–41)
MCH RBC QN AUTO: 28 PG (ref 27–33)
MCHC RBC AUTO-ENTMCNC: 32 G/DL (ref 33.7–35.3)
MCV RBC AUTO: 87.4 FL (ref 81.4–97.8)
MICROALBUMIN UR-MCNC: <0.7 MG/DL
MICROALBUMIN/CREAT UR: NORMAL MG/G (ref 0–30)
MONOCYTES # BLD AUTO: 0.76 K/UL (ref 0–0.85)
MONOCYTES NFR BLD AUTO: 10.4 % (ref 0–13.4)
NEUTROPHILS # BLD AUTO: 4.21 K/UL (ref 1.82–7.42)
NEUTROPHILS NFR BLD: 57.3 % (ref 44–72)
NRBC # BLD AUTO: 0 K/UL
NRBC BLD-RTO: 0 /100 WBC
PLATELET # BLD AUTO: 335 K/UL (ref 164–446)
PMV BLD AUTO: 10.6 FL (ref 9–12.9)
POTASSIUM SERPL-SCNC: 4.1 MMOL/L (ref 3.6–5.5)
PROT SERPL-MCNC: 6.3 G/DL (ref 6–8.2)
RBC # BLD AUTO: 4.68 M/UL (ref 4.7–6.1)
SODIUM SERPL-SCNC: 139 MMOL/L (ref 135–145)
TRIGL SERPL-MCNC: 56 MG/DL (ref 0–149)
TSH SERPL DL<=0.005 MIU/L-ACNC: 0.74 UIU/ML (ref 0.38–5.33)
WBC # BLD AUTO: 7.3 K/UL (ref 4.8–10.8)

## 2019-11-08 PROCEDURE — 80061 LIPID PANEL: CPT

## 2019-11-08 PROCEDURE — 82043 UR ALBUMIN QUANTITATIVE: CPT

## 2019-11-08 PROCEDURE — 36415 COLL VENOUS BLD VENIPUNCTURE: CPT

## 2019-11-08 PROCEDURE — 84403 ASSAY OF TOTAL TESTOSTERONE: CPT

## 2019-11-08 PROCEDURE — 84270 ASSAY OF SEX HORMONE GLOBUL: CPT

## 2019-11-08 PROCEDURE — 84443 ASSAY THYROID STIM HORMONE: CPT

## 2019-11-08 PROCEDURE — 82985 ASSAY OF GLYCATED PROTEIN: CPT

## 2019-11-08 PROCEDURE — 82570 ASSAY OF URINE CREATININE: CPT

## 2019-11-08 PROCEDURE — 85025 COMPLETE CBC W/AUTO DIFF WBC: CPT

## 2019-11-08 PROCEDURE — 80053 COMPREHEN METABOLIC PANEL: CPT

## 2019-11-10 LAB
FRUCTOSAMINE SERPL-SCNC: 248 UMOL/L (ref 170–285)
SHBG SERPL-SCNC: 31 NMOL/L (ref 11–80)
TESTOST FREE MFR SERPL: 1.9 % (ref 1.6–2.9)
TESTOST FREE SERPL-MCNC: 84 PG/ML (ref 47–244)
TESTOST SERPL-MCNC: 436 NG/DL (ref 300–720)

## 2019-11-13 PROBLEM — I10 HYPERTENSION, ESSENTIAL: Status: RESOLVED | Noted: 2019-09-11 | Resolved: 2019-11-13

## 2019-11-13 PROBLEM — L97.512 DIABETIC ULCER OF TOE OF RIGHT FOOT ASSOCIATED WITH TYPE 2 DIABETES MELLITUS, WITH FAT LAYER EXPOSED (HCC): Status: RESOLVED | Noted: 2019-06-20 | Resolved: 2019-11-13

## 2019-11-13 PROBLEM — E11.621 DIABETIC ULCER OF TOE OF RIGHT FOOT ASSOCIATED WITH TYPE 2 DIABETES MELLITUS, WITH FAT LAYER EXPOSED (HCC): Status: RESOLVED | Noted: 2019-06-20 | Resolved: 2019-11-13

## 2019-11-14 ENCOUNTER — OFFICE VISIT (OUTPATIENT)
Dept: MEDICAL GROUP | Age: 74
End: 2019-11-14
Payer: MEDICARE

## 2019-11-14 VITALS
WEIGHT: 201.6 LBS | SYSTOLIC BLOOD PRESSURE: 120 MMHG | HEIGHT: 73 IN | OXYGEN SATURATION: 98 % | HEART RATE: 75 BPM | DIASTOLIC BLOOD PRESSURE: 82 MMHG | BODY MASS INDEX: 26.72 KG/M2 | TEMPERATURE: 97.4 F

## 2019-11-14 DIAGNOSIS — E03.4 HYPOTHYROIDISM DUE TO ACQUIRED ATROPHY OF THYROID: ICD-10-CM

## 2019-11-14 DIAGNOSIS — K21.00 GASTROESOPHAGEAL REFLUX DISEASE WITH ESOPHAGITIS: ICD-10-CM

## 2019-11-14 DIAGNOSIS — E55.9 HYPOVITAMINOSIS D: ICD-10-CM

## 2019-11-14 DIAGNOSIS — I73.9 PVD (PERIPHERAL VASCULAR DISEASE) (HCC): ICD-10-CM

## 2019-11-14 DIAGNOSIS — Z89.511 S/P BKA (BELOW KNEE AMPUTATION) UNILATERAL, RIGHT (HCC): ICD-10-CM

## 2019-11-14 DIAGNOSIS — E08.41 DIABETIC MONONEUROPATHY ASSOCIATED WITH DIABETES MELLITUS DUE TO UNDERLYING CONDITION (HCC): ICD-10-CM

## 2019-11-14 DIAGNOSIS — E78.2 MIXED HYPERLIPIDEMIA: ICD-10-CM

## 2019-11-14 DIAGNOSIS — Z12.12 SCREENING FOR COLORECTAL CANCER: ICD-10-CM

## 2019-11-14 DIAGNOSIS — G25.0 BENIGN ESSENTIAL TREMOR: ICD-10-CM

## 2019-11-14 DIAGNOSIS — N40.1 BENIGN NON-NODULAR PROSTATIC HYPERPLASIA WITH LOWER URINARY TRACT SYMPTOMS: ICD-10-CM

## 2019-11-14 DIAGNOSIS — Z12.11 SCREENING FOR COLORECTAL CANCER: ICD-10-CM

## 2019-11-14 DIAGNOSIS — I10 ESSENTIAL HYPERTENSION: ICD-10-CM

## 2019-11-14 DIAGNOSIS — I10 HYPERTENSION, ESSENTIAL: ICD-10-CM

## 2019-11-14 DIAGNOSIS — I47.29 NONSUSTAINED VENTRICULAR TACHYCARDIA (HCC): ICD-10-CM

## 2019-11-14 PROCEDURE — 99214 OFFICE O/P EST MOD 30 MIN: CPT | Performed by: INTERNAL MEDICINE

## 2019-11-14 ASSESSMENT — ENCOUNTER SYMPTOMS
GASTROINTESTINAL NEGATIVE: 1
CARDIOVASCULAR NEGATIVE: 1
RESPIRATORY NEGATIVE: 1
PSYCHIATRIC NEGATIVE: 1
MUSCULOSKELETAL NEGATIVE: 1
CONSTITUTIONAL NEGATIVE: 1
EYES NEGATIVE: 1
NEUROLOGICAL NEGATIVE: 1

## 2019-11-14 NOTE — PROGRESS NOTES
Subjective:      Kevin Jackson is a 74 y.o. male who presents with Hyperlipidemia (lab review)        HPI    The patient is here for followup of chronic medical problems listed below. The patient is compliant with medications and having no side effects from them. Denies chest pain, abdominal pain, dyspnea, myalgias, or cough.    1. Uncontrolled type 2 diabetes mellitus with complication, with long-term current use of insulin ([facility])- dr browning  2. Diabetic mononeuropathy associated with diabetes mellitus due to underlying condition ([facility])-m rx gabapentin  3. S/P BKA (below knee amputation) unilateral, right ([facility])    Patient has a chronic history of type 2 diabetes mellitus. Currently on Humalog mix 50/50 100 unit/mL suspension, Metformin 1,000 mg tab, gabapentin 300 mg tab. He reports compliance with medications and denies any medication side effects. He denies any vision changes, polyuria, polydipsia, polyphagia, numbness or tingling to their feet, lesions or opens wounds to their feet. He had a recent right leg BKA in September 2019, secondary unhealing sores. His most recent ALC was 6.6 on 10/21/19.     4. Mixed hyperlipidemia- dr dwyer    The patient has a chronic history of mixed hyperlipidemia. Currently taking atorvastatin 80 mg and Aspirin 81 mg daily. No medication side effects were reported including myalgias or abdominal pain. No acute complaints of dizziness, claudication or chest pain.     I reviewed recent blood work with the patient in clinic today.     Results for KEVIN JACKSON (MRN [id number]) as of 11/14/2019 08:56   Ref. Range 11/8/2019 10:36   Cholesterol,Tot Latest Ref Range: 100 - 199 mg/dL 105   Triglycerides Latest Ref Range: 0 - 149 mg/dL 56   HDL Latest Ref Range: >=40 mg/dL 42   LDL Latest Ref Range: <100 mg/dL 52       5. Essential hypertension- DR DWYER    Patient has a history of chronic hypertension and is taking Losartan 50 mg tab and Hyzaar 50-12.5 mg tab. No medication side  effects were reported. He reportedly monitors blood pressure regularly at home. Blood pressure is today at 120/82. He denies any related complaints including chronic cough, chest pain, headaches, leg swelling, light-headedness or dizziness.            Patient Active Problem List   Diagnosis   • Mixed hyperlipidemia- dr dwyer   • Essential hypertension- DR DWYER   • Hypovitaminosis D   • Uncontrolled type 2 diabetes mellitus with complication, with long-term current use of insulin ([facility])- dr browning   • Obesity (BMI 30.0-34.9)   • Benign non-nodular prostatic hyperplasia with lower urinary tract symptoms- [facility]   • Gastroesophageal reflux disease with esophagitis- PPI PRN   • Hypothyroidism due to acquired atrophy of thyroid- dr browning   • Benign essential tremor- DR OLIVAS, [facility]   • Encounter for screening- [facility] screening 2019- neg  abd US for AAA, MARELY, carotid US, EKG (no A.Fib to my review)   • PVD (peripheral vascular disease) ([facility])- s/p right BKA   • Diabetic mononeuropathy associated with diabetes mellitus due to underlying condition ([facility])-m rx gabapentin   • Nonsustained ventricular tachycardia ([facility])- mariana may 2019; PAC's and PVC's, NSR; NO A. FIB; dr dwyer, [facility];  dr mohsen (cardilogy) [facility]   • S/P BKA (below knee amputation) unilateral, right ([facility])       Outpatient Medications Prior to Visit   Medication Sig Dispense Refill   • HUMALOG MIX 50/50 KWIKPEN (50-50) 100 UNIT/ML Suspension Pen-injector      • losartan-hydrochlorothiazide (HYZAAR) 50-12.5 MG per tablet      • hydrocortisone 2.5 % Cream topical cream      • gabapentin (NEURONTIN) 300 MG Cap      • clopidogrel (PLAVIX) 75 MG Tab      • cephALEXin (KEFLEX) 500 MG Cap      • aspirin EC 81 MG EC tablet Take 1 Tab by mouth every day. 100 Tab 2   • losartan (COZAAR) 50 MG Tab Take 1 Tab by mouth every day. 90 Tab 2   • tamsulosin (FLOMAX) 0.4 MG capsule Take 2 Caps by mouth every day. 60 Cap 2   • Semaglutide (OZEMPIC) 1 MG/DOSE  "Solution Pen-injector Inject 1 mg as instructed every 7 days.     • INSULIN LISP PROT & LISP, HUM, (HUMALOG MIX 50/50) (50-50) 100 UNIT/ML Suspension Inject 35 Units as instructed every evening. 20 units     • glimepiride (AMARYL) 4 MG Tab Take 1 Tab by mouth every morning. 30 Tab 11   • Empagliflozin (JARDIANCE) 25 MG Tab Take 25 mg by mouth every day. 90 Tab 4   • metformin (GLUCOPHAGE) 1000 MG tablet Take 1 Tab by mouth 2 times a day, with meals. 60 Tab 11   • propranolol CR (INDERAL LA) 120 MG CAPSULE SR 24 HR Take 1 Cap by mouth every morning. 90 Cap 4   • primidone (MYSOLINE) 50 MG Tab Take 1 Tab by mouth every evening. 90 Tab 3   • atorvastatin (LIPITOR) 80 MG tablet Take 1 Tab by mouth every evening. 90 Tab 4   • levothyroxine (SYNTHROID) 50 MCG Tab Take 1 Tab by mouth every morning before breakfast. 30 Tab 11     No facility-administered medications prior to visit.         Allergies   Allergen Reactions   • Zocor [Simvastatin - High Dose]      Pt tells me this medication gave him \"side effect\" of feeling nauseated; denies allergies to medications       Review of Systems   Constitutional: Negative.    HENT: Negative.    Eyes: Negative.    Respiratory: Negative.    Cardiovascular: Negative.    Gastrointestinal: Negative.    Genitourinary: Negative.    Musculoskeletal: Negative.    Skin: Negative.    Neurological: Negative.    Endo/Heme/Allergies: Negative.    Psychiatric/Behavioral: Negative.    All other systems reviewed and are negative.           Objective:     /82 (BP Location: Left arm, Patient Position: Sitting, BP Cuff Size: Adult)   Pulse 75   Temp 36.3 °C (97.4 °F) (Temporal)   Ht 1.854 m (6' 1\")   Wt 91.4 kg (201 lb 9.6 oz)   SpO2 98%   BMI 26.60 kg/m²     Physical Exam   Constitutional: Oriented to person, place, and time. Appears well-developed and well-nourished. No distress.   Head: Normocephalic and atraumatic.   Right Ear: External ear normal.   Left Ear: External ear normal. "   Nose: Nose normal.   Mouth/Throat: Oropharynx is clear and moist. No oropharyngeal exudate.   Eyes: Pupils are equal, round, and reactive to light. Conjunctivae and EOM are normal. Right eye exhibits no discharge. Left eye exhibits no discharge. No scleral icterus.   Neck: Normal range of motion. Neck supple. No JVD present. No tracheal deviation present. No thyromegaly present.   Cardiovascular: Normal rate, regular rhythm, normal heart sounds and intact distal pulses.  Exam reveals no gallop and no friction rub.    No murmur heard.  Pulmonary/Chest: Effort normal. No stridor. No respiratory distress. No wheezing or rales. No tenderness.   Abdominal: Soft. Bowel sounds are normal. No distension and no mass. There is no tenderness. There is no rebound and no guarding. No hernia.   Musculoskeletal: Normal range of motion No edema or tenderness.   Lymphadenopathy: No cervical adenopathy.   Neurological: Alert and oriented to person, place, and time. Normal reflexes. Normal reflexes. No cranial nerve deficit. Normal muscle tone. Coordination normal.   Skin: Skin is warm and dry. No rash noted. Not diaphoretic. No erythema. No pallor.   Psychiatric: Normal mood and affect. Behavior is normal. Judgment and thought content normal.   Nursing note and vitals reviewed.      Lab Results   Component Value Date/Time    HBA1C 6.6 (H) 10/21/2019 09:36 AM    HBA1C 8.1 (H) 09/11/2019 05:25 AM     Lab Results   Component Value Date/Time    SODIUM 139 11/08/2019 10:36 AM    POTASSIUM 4.1 11/08/2019 10:36 AM    CHLORIDE 105 11/08/2019 10:36 AM    CO2 24 11/08/2019 10:36 AM    GLUCOSE 114 (H) 11/08/2019 10:36 AM    BUN 21 11/08/2019 10:36 AM    CREATININE 0.79 11/08/2019 10:36 AM    BUNCREATRAT 24 11/21/2017 07:21 AM    ALKPHOSPHAT 41 11/08/2019 10:36 AM    ASTSGOT 16 11/08/2019 10:36 AM    ALTSGPT 16 11/08/2019 10:36 AM    TBILIRUBIN 0.5 11/08/2019 10:36 AM     Lab Results   Component Value Date/Time    INR 0.99 01/11/2017 09:05 PM     INR 0.99 01/14/2013 05:50 PM     Lab Results   Component Value Date/Time    CHOLSTRLTOT 105 11/08/2019 10:36 AM    LDL 52 11/08/2019 10:36 AM    HDL 42 11/08/2019 10:36 AM    TRIGLYCERIDE 56 11/08/2019 10:36 AM       Lab Results   Component Value Date/Time    TESTOSTERONE 436 11/08/2019 10:50 AM     Lab Results   Component Value Date/Time    TSH 2.020 11/21/2017 07:21 AM     Lab Results   Component Value Date/Time    FREET4 1.03 10/21/2019 09:36 AM    FREET4 1.11 09/11/2019 05:25 AM     No results found for: URICACID  No components found for: VITB12  Lab Results   Component Value Date/Time    25HYDROXY 19 (L) 09/11/2019 05:25 AM    25HYDROXY 26 (L) 02/20/2019 06:32 AM          Assessment/Plan:     1. Uncontrolled type 2 diabetes mellitus with complication, with long-term current use of insulin ([facility])- dr browning    Under good control. Continue same regimen Humalog mix 50/50 100 unit/mL suspension, Metformin 1,000 mg tab.     2. Mixed hyperlipidemia- dr sprague    Under good control. Continue same regimen atorvastatin 80 mg and Aspirin 81 mg daily.     3. Hypothyroidism due to acquired atrophy of thyroid- dr browning    Under good control. Continue same regimen.     4. Hypertension, essential    Under good control. Continue same regimen.     5. Essential hypertension- DR SPRAGUE    Under good control. Continue same regimen Losartan 50 mg tab and Hyzaar 50-12.5 mg tab. He will follow up with Dr. Sprague.    6. Gastroesophageal reflux disease with esophagitis- PPI PRN    Under good control. Continue same regimen.     7. Benign essential tremor- DR PÉREZ,  NEUROLOGY    Under good control. Continue same regimen. He will follow up with Dr. Pérez.    8. Benign non-nodular prostatic hyperplasia with lower urinary tract symptoms- [facility]    Under good control. Continue same regimen.     9. Diabetic mononeuropathy associated with diabetes mellitus due to underlying condition ([facility])-m rx gabapentin    Under good control. Continue  same regimen.     10. Hypovitaminosis D    Under good control. Continue same regimen.     11. Nonsustained ventricular tachycardia ([facility])- [facility] may 2019; PAC's and PVC's, NSR; NO A. FIB; dr dwyer, [facility];  dr mohsen (cardilogy) at [facility]    Under good control. Continue same regimen.     12. PVD (peripheral vascular disease) ([facility])- s/p right BKA    Under good control. Continue same regimen.     13. S/P BKA (below knee amputation) unilateral, right (H[facility])     His right leg BKA is healing well.       Luis ASTORGA ([employer]), am scribing for, and in the presence of, Ky Hernandez M.D..    Electronically signed by: Luis Santos (Thaddeus), 11/14/2019    IKy M.D., personally performed the services described in this documentation, as scribed by Luis Santos in my presence, and it is both accurate and complete.

## 2019-12-06 ENCOUNTER — OFFICE VISIT (OUTPATIENT)
Dept: WOUND CARE | Facility: MEDICAL CENTER | Age: 74
End: 2019-12-06
Attending: SURGERY
Payer: MEDICARE

## 2019-12-06 VITALS
DIASTOLIC BLOOD PRESSURE: 80 MMHG | SYSTOLIC BLOOD PRESSURE: 142 MMHG | HEART RATE: 74 BPM | RESPIRATION RATE: 18 BRPM | OXYGEN SATURATION: 95 % | TEMPERATURE: 97.7 F

## 2019-12-06 DIAGNOSIS — R60.0 LEG EDEMA, RIGHT: ICD-10-CM

## 2019-12-06 DIAGNOSIS — E08.41 DIABETIC MONONEUROPATHY ASSOCIATED WITH DIABETES MELLITUS DUE TO UNDERLYING CONDITION (HCC): ICD-10-CM

## 2019-12-06 DIAGNOSIS — L97.912 ULCER OF RIGHT LOWER EXTREMITY WITH FAT LAYER EXPOSED (HCC): ICD-10-CM

## 2019-12-06 DIAGNOSIS — E11.622 CONTROLLED TYPE 2 DIABETES MELLITUS WITH OTHER SKIN ULCER, WITH LONG-TERM CURRENT USE OF INSULIN (HCC): ICD-10-CM

## 2019-12-06 DIAGNOSIS — Z89.511 S/P BKA (BELOW KNEE AMPUTATION) UNILATERAL, RIGHT (HCC): ICD-10-CM

## 2019-12-06 DIAGNOSIS — Z79.4 CONTROLLED TYPE 2 DIABETES MELLITUS WITH OTHER SKIN ULCER, WITH LONG-TERM CURRENT USE OF INSULIN (HCC): ICD-10-CM

## 2019-12-06 PROCEDURE — 11042 DBRDMT SUBQ TIS 1ST 20SQCM/<: CPT

## 2019-12-06 PROCEDURE — 11042 DBRDMT SUBQ TIS 1ST 20SQCM/<: CPT | Performed by: NURSE PRACTITIONER

## 2019-12-06 PROCEDURE — 99213 OFFICE O/P EST LOW 20 MIN: CPT | Mod: 25 | Performed by: NURSE PRACTITIONER

## 2019-12-06 RX ORDER — ATORVASTATIN CALCIUM 20 MG/1
80 TABLET, FILM COATED ORAL NIGHTLY
COMMUNITY
End: 2022-05-18

## 2019-12-06 ASSESSMENT — ENCOUNTER SYMPTOMS
WHEEZING: 0
CLAUDICATION: 0
NERVOUS/ANXIOUS: 0
EYES NEGATIVE: 1
FEVER: 0
VOMITING: 0
BACK PAIN: 0
DIAPHORESIS: 0
FALLS: 0
DEPRESSION: 0
NAUSEA: 0
COUGH: 0
SHORTNESS OF BREATH: 0
SENSORY CHANGE: 1
WEAKNESS: 0
PALPITATIONS: 0
CHILLS: 0

## 2019-12-06 NOTE — PROGRESS NOTES
"Provider Encounter- Full Thickness wound    HISTORY OF PRESENT ILLNESS  Wound History:    START OF CARE IN CLINIC: 12/6/2019    REFERRING PROVIDER: Shivam Amezcua MD     WOUND- Full Thickness Wound x3   LOCATION: Right BKA-medial, middle, lateral   HISTORY: Developed right second toe ulcer that became gangrenous.  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  Underwent right BKA by Dr. Amezcua on 9/5/2019.  He followed up for suture removal early October 2019.  Soon after patient slipped and fell on 10/15/2019 and incision dehisced creating ulcer to medial, mid, lateral aspects of BKA incision site.  He followed up with Dr. Amezcua and was instructed to apply Aquacel Ag to the wound bed which his wife performed dressing changes every other day.    DIABETES HX: Diagnosed with type 2 diabetes in 1998 and is currently managing with insulin and oral diabetic agents.  Checks blood sugars 3-4 times per day and reports that these typically average 135-150.  Has had previous diabetes education.  Does have numbness in foot.  Usually wears diabetic shoes.  Received shoes from 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         Pertinent Medical History: Diabetes, PVD, hypertension    TOBACCO USE: None    Patient's problem list, allergies, and current medications reviewed and updated in Epic  Past Medical History:  Past Medical History:   Diagnosis Date   • Arrhythmia 01/2018    possible afib per pt, ekg x 2 following were \"OK\"   • Bowel habit changes     constipation diarrhea   • Diabetes    • High cholesterol    • Hyperlipidemia    • Hypertension    • Muscle disorder    • Pain     right foot   • Tremors of nervous system        Past Surgical History:   Past Surgical History:   Procedure Laterality Date   • KNEE AMPUTATION BELOW Right 9/5/2019    Procedure: AMPUTATION, BELOW KNEE;  Surgeon: Shivam Amezcua M.D.;  Location: SURGERY [facility];  Service: Vascular   • AMPUTATION, " TOE  2/2013    all 5 toes left foot   • CARPAL TUNNEL RELEASE     • OTHER ORTHOPEDIC SURGERY      right foot        Allergies: No Active Allergies    Current Medications:  Current Outpatient Medications:   •  atorvastatin (LIPITOR) 20 MG Tab, Take 80 mg by mouth every evening., Disp: , Rfl:   •  HUMALOG MIX 50/50 KWIKPEN (50-50) 100 UNIT/ML Suspension Pen-injector, , Disp: , Rfl:   •  hydrocortisone 2.5 % Cream topical cream, , Disp: , Rfl:   •  gabapentin (NEURONTIN) 300 MG Cap, , Disp: , Rfl:   •  aspirin EC 81 MG EC tablet, Take 1 Tab by mouth every day., Disp: 100 Tab, Rfl: 2  •  losartan (COZAAR) 50 MG Tab, Take 1 Tab by mouth every day., Disp: 90 Tab, Rfl: 2  •  tamsulosin (FLOMAX) 0.4 MG capsule, Take 2 Caps by mouth every day., Disp: 60 Cap, Rfl: 2  •  Semaglutide (OZEMPIC) 1 MG/DOSE Solution Pen-injector, Inject 1 mg as instructed every 7 days., Disp: , Rfl:   •  INSULIN LISP PROT & LISP, HUM, (HUMALOG MIX 50/50) (50-50) 100 UNIT/ML Suspension, Inject 35 Units as instructed every evening. 20 units, Disp: , Rfl:   •  glimepiride (AMARYL) 4 MG Tab, Take 1 Tab by mouth every morning., Disp: 30 Tab, Rfl: 11  •  Empagliflozin (JARDIANCE) 25 MG Tab, Take 25 mg by mouth every day., Disp: 90 Tab, Rfl: 4  •  metformin (GLUCOPHAGE) 1000 MG tablet, Take 1 Tab by mouth 2 times a day, with meals., Disp: 60 Tab, Rfl: 11  •  propranolol CR (INDERAL LA) 120 MG CAPSULE SR 24 HR, Take 1 Cap by mouth every morning., Disp: 90 Cap, Rfl: 4  •  primidone (MYSOLINE) 50 MG Tab, Take 1 Tab by mouth every evening., Disp: 90 Tab, Rfl: 3  •  levothyroxine (SYNTHROID) 50 MCG Tab, Take 1 Tab by mouth every morning before breakfast., Disp: 30 Tab, Rfl: 11    Social History:   Social History     Socioeconomic History   • Marital status: Single     Spouse name: Not on file   • Number of children: Not on file   • Years of education: Not on file   • Highest education level: Not on file   Occupational History   • Not on file   Social Needs   •  Financial resource strain: Not on file   • Food insecurity:     Worry: Not on file     Inability: Not on file   • Transportation needs:     Medical: Not on file     Non-medical: Not on file   Tobacco Use   • Smoking status: Never Smoker   • Smokeless tobacco: Never Used   Substance and Sexual Activity   • Alcohol use: Not Currently     Alcohol/week: 0.6 oz     Types: 1 Cans of beer per week   • Drug use: No   • Sexual activity: Yes     Partners: Female   Lifestyle   • Physical activity:     Days per week: Not on file     Minutes per session: Not on file   • Stress: Not on file   Relationships   • Social connections:     Talks on phone: Not on file     Gets together: Not on file     Attends Islam service: Not on file     Active member of club or organization: Not on file     Attends meetings of clubs or organizations: Not on file     Relationship status: Not on file   • Intimate partner violence:     Fear of current or ex partner: Not on file     Emotionally abused: Not on file     Physically abused: Not on file     Forced sexual activity: Not on file   Other Topics Concern   •  Service No   • Blood Transfusions No   • Caffeine Concern No   • Occupational Exposure Yes   • Hobby Hazards Yes   • Sleep Concern No   • Stress Concern No   • Weight Concern No   • Special Diet No   • Back Care No   • Exercise No   • Bike Helmet No   • Seat Belt Yes   • Self-Exams Yes   Social History Narrative   • Not on file       Family History:   Family History   Problem Relation Age of Onset   • Arthritis Mother    • Cancer Mother    • Hypertension Mother    • Heart Disease Mother    • Cancer Father         colon   • Arthritis Father    • Genetic Disorder Father    • Diabetes Father    • Hyperlipidemia Father    • Stroke Father    • Heart Disease Father        Interval History:  12/6/2019: New evaluation.  Initial provider visit.Clinic visit with Angelica DANIELSON  Accompanied by shay Zepeda with [facility].   Patient denies seeing any erythema, edema, odor to the wound during dressing changes.  He is unsure if the wounds have decreased in size.  Currently using a wheelchair for mobilization; no use of  sock or stump protector at this time.  Patient has no further follow-ups with vascular surgery until February 2020 for LLE.      REVIEW OF SYSTEMS:   Review of Systems   Constitutional: Negative for chills, diaphoresis and fever.   HENT: Negative.    Eyes: Negative.    Respiratory: Negative for cough, shortness of breath and wheezing.    Cardiovascular: Negative for chest pain, palpitations and claudication.   Gastrointestinal: Negative for nausea and vomiting.   Musculoskeletal: Negative for back pain, falls and joint pain.   Skin: Negative for itching and rash.   Neurological: Positive for sensory change. Negative for weakness.        Numb left foot   Psychiatric/Behavioral: Negative for depression. The patient is not nervous/anxious.        PHYSICAL EXAMINATION:   /80   Pulse 74   Temp 36.5 °C (97.7 °F)   Resp 18   SpO2 95%     Physical Exam   Constitutional: He is oriented to person, place, and time and well-developed, well-nourished, and in no distress.   HENT:   Head: Normocephalic.   Right Ear: External ear normal.   Left Ear: External ear normal.   Eyes: Pupils are equal, round, and reactive to light.   Neck: Normal range of motion.   Cardiovascular: Normal rate.   +2 right popliteal    Unable to palpate PT, DP to left foot   Pulmonary/Chest: Effort normal.   Abdominal: Soft.   Musculoskeletal:         General: Edema present.      Comments: +1-2 to right BKA stump   Neurological: He is alert and oriented to person, place, and time.   Insensate to left foot   Skin: Skin is warm and dry. Rash noted.     Right BKA ulcer to incision line at lateral, middle, medial aspect  Rash from dressing to medial aspect    Healed left transmetatarsal amputation  See wound flowsheet for further detail    Psychiatric: Mood, memory, affect and judgment normal.   Nursing note and vitals reviewed.    WOUND ASSESSMENT        Wound 12/06/19 Full Thickness Wound Right BKA incision medial (Active)   Wound Image    12/6/2019  1:00 PM   Site Assessment Yellow;Red 12/6/2019  1:00 PM   Aby-wound Assessment Intact;Edema;Warm;Blanchable erythema 12/6/2019  1:00 PM   Margins Attached edges;Unattached edges 12/6/2019  1:00 PM   Wound Length (cm) 1.7 cm 12/6/2019  1:00 PM   Wound Width (cm) 3.8 cm 12/6/2019  1:00 PM   Wound Depth (cm) 0.5 cm 12/6/2019  1:00 PM   Wound Surface Area (cm^2) 6.46 cm^2 12/6/2019  1:00 PM   Post Wound Length (cm) 1.9 cm 12/6/2019  1:00 PM    Post Wound Width (cm) 3.9 cm 12/6/2019  1:00 PM   Post Wound Depth (cm) 0.5 cm 12/6/2019  1:00 PM   Post Wound Surface Area (cm^2) 7.41 cm^2 12/6/2019  1:00 PM   Tunneling 0 cm 12/6/2019  1:00 PM   Undermining 0.1 cm 12/6/2019  1:00 PM   Closure Secondary intention 12/6/2019  1:00 PM   Drainage Amount Moderate 12/6/2019  1:00 PM   Drainage Description Serosanguineous 12/6/2019  1:00 PM   Non-staged Wound Description Full thickness 12/6/2019  1:00 PM   Treatments Cleansed;Pharmaceutical agent;Other (Comment) 12/6/2019  1:00 PM   Cleansing Normal Saline Irrigation 12/6/2019  1:00 PM   Periwound Protectant Skin Protectant wipes to Periwound;Moisture Barrier 12/6/2019  1:00 PM   Dressing Options Collagen Dressing;Hydrofiber Silver;Nonadhesive Foam;Hypafix Tape;Other (Comments) 12/6/2019  1:00 PM   Dressing Cleansing/Solutions Normal Saline 12/6/2019  1:00 PM   Dressing Changed New 12/6/2019  1:00 PM   Dressing Status Clean;Dry;Intact 12/6/2019  1:00 PM   Dressing Change Frequency Every 72 hrs 12/6/2019  1:00 PM   WOUND NURSE ONLY - Odor None 12/6/2019  1:00 PM   WOUND NURSE ONLY - Exposed Structures Other (Comments) 12/6/2019  1:00 PM   WOUND NURSE ONLY - Tissue Type and Percentage pre: 40% yellow, 60% red 12/6/2019  1:00 PM       Wound 12/06/19 Full Thickness Wound  Right BKA incision mid (Active)   Wound Image    12/6/2019  1:00 PM   Site Assessment Red;Yellow 12/6/2019  1:00 PM   Aby-wound Assessment Intact;Edema;Warm;Blanchable erythema 12/6/2019  1:00 PM   Margins Attached edges;Unattached edges 12/6/2019  1:00 PM   Wound Length (cm) 1 cm 12/6/2019  1:00 PM   Wound Width (cm) 2 cm 12/6/2019  1:00 PM   Wound Surface Area (cm^2) 2 cm^2 12/6/2019  1:00 PM   Post Wound Length (cm) 1 cm 12/6/2019  1:00 PM    Post Wound Width (cm) 2 cm 12/6/2019  1:00 PM   Post Wound Depth (cm) 0.3 cm 12/6/2019  1:00 PM   Post Wound Surface Area (cm^2) 2 cm^2 12/6/2019  1:00 PM   Tunneling 0.4 cm 12/6/2019  1:00 PM   Undermining 0 cm 12/6/2019  1:00 PM   Closure Secondary intention 12/6/2019  1:00 PM   Drainage Amount Moderate 12/6/2019  1:00 PM   Drainage Description Serosanguineous 12/6/2019  1:00 PM   Non-staged Wound Description Full thickness 12/6/2019  1:00 PM   Treatments Cleansed;Pharmaceutical agent;Other (Comment) 12/6/2019  1:00 PM   Cleansing Normal Saline Irrigation 12/6/2019  1:00 PM   Periwound Protectant Skin Protectant wipes to Periwound;Moisture Barrier 12/6/2019  1:00 PM   Dressing Options Collagen Dressing;Hydrofiber Silver;Nonadhesive Foam;Hypafix Tape;Other (Comments) 12/6/2019  1:00 PM   Dressing Cleansing/Solutions Normal Saline 12/6/2019  1:00 PM   Dressing Changed New 12/6/2019  1:00 PM   Dressing Status Clean;Dry;Intact 12/6/2019  1:00 PM   Dressing Change Frequency Every 72 hrs 12/6/2019  1:00 PM   WOUND NURSE ONLY - Odor None 12/6/2019  1:00 PM   WOUND NURSE ONLY - Exposed Structures Other (Comments) 12/6/2019  1:00 PM   WOUND NURSE ONLY - Tissue Type and Percentage pre: 40% yellow, 60% red 12/6/2019  1:00 PM       Wound 12/06/19 Full Thickness Wound Right BKA incision lateral (Active)   Wound Image    12/6/2019  1:00 PM   Site Assessment Red;Yellow 12/6/2019  1:00 PM   Aby-wound Assessment Intact;Edema;Warm;Blanchable erythema 12/6/2019  1:00 PM   Margins Attached  edges 12/6/2019  1:00 PM   Wound Length (cm) 0.8 cm 12/6/2019  1:00 PM   Wound Width (cm) 0.8 cm 12/6/2019  1:00 PM   Wound Surface Area (cm^2) 0.64 cm^2 12/6/2019  1:00 PM   Post Wound Length (cm) 0.8 cm 12/6/2019  1:00 PM    Post Wound Width (cm) 0.9 cm 12/6/2019  1:00 PM   Post Wound Depth (cm) 0.1 cm 12/6/2019  1:00 PM   Post Wound Surface Area (cm^2) 0.72 cm^2 12/6/2019  1:00 PM   Tunneling 0 cm 12/6/2019  1:00 PM   Undermining 0 cm 12/6/2019  1:00 PM   Closure Secondary intention 12/6/2019  1:00 PM   Drainage Amount Moderate 12/6/2019  1:00 PM   Drainage Description Serosanguineous 12/6/2019  1:00 PM   Non-staged Wound Description Full thickness 12/6/2019  1:00 PM   Treatments Cleansed;Pharmaceutical agent;Other (Comment) 12/6/2019  1:00 PM   Cleansing Normal Saline Irrigation 12/6/2019  1:00 PM   Periwound Protectant Skin Protectant wipes to Periwound;Moisture Barrier 12/6/2019  1:00 PM   Dressing Options Collagen Dressing;Hydrofiber Silver;Nonadhesive Foam;Hypafix Tape;Other (Comments) 12/6/2019  1:00 PM   Dressing Cleansing/Solutions Normal Saline 12/6/2019  1:00 PM   Dressing Changed New 12/6/2019  1:00 PM   Dressing Status Clean;Dry;Intact 12/6/2019  1:00 PM   Dressing Change Frequency Every 72 hrs 12/6/2019  1:00 PM   WOUND NURSE ONLY - Odor None 12/6/2019  1:00 PM   WOUND NURSE ONLY - Exposed Structures Other (Comments) 12/6/2019  1:00 PM   WOUND NURSE ONLY - Tissue Type and Percentage pre: 40% yellow, 60% red 12/6/2019  1:00 PM            PROCEDURE: all three wounds to R  BKA stump  -2% viscous lidocaine applied topically to wound bed for approximately 5 minutes prior to debridement  -Curette used to debride wound bed.  Excisional debridement was performed to remove devitalized tissue until healthy, bleeding tissue was visualized.   Entire surface of all 3 wounds, 9 cm2 debrided.  Tissue debrided into the subcutaneous layer.    -Bleeding controlled with manual pressure.    -Wound care completed by  wound RN, refer to flowsheet  -Patient tolerated the procedure well, without c/o pain or discomfort.       Pertinent Labs and Diagnostics:    Labs:     A1c:   Lab Results   Component Value Date/Time    HBA1C 6.6 (H) 10/21/2019 09:36 AM                 ASSESSMENT AND PLAN:     1. Ulcer of right lower extremity with fat layer exposed (HCC)  S/P fall mid October 2019 suffered from full-thickness ulcers to right BKA incision line    12/6/19: Initial provider visit.  Reviewed records from Dr. Amezcua.  Ulcers have shown no signs of improvement for the last month  -No signs and symptoms of infection noted today  -Excisional debridement performed today.  Medically necessary to promote wound healing.  -Patient to follow-up at wound clinic weekly.  Dressing to be changed once in between visits.  And PRN drainage.  -Advised to go to ER for any increased redness, swelling, drainage, or odor, or if patient develops fever, chills, nausea or vomiting.    -Consider wound VAC or SNAP if ulcers do not show improvement in the next 2 to 3 weeks.    Wound care: Collagen to reduce MMPs, accelerate granulation, Hydrofiber silver to manage exudate, foam cover dressing, Hypafix tape and  sock to control edema      2. S/P BKA (below knee amputation) unilateral, right ([facility])  Right BKA 9/5/2019 by Dr. Amezcua    12/6/2019: Discussed importance of keeping knee straight for best fit of prosthesis once ulcers have healed.    -Discussed protecting stump with stump protector and using  sock.  Patient to continue to be nonweightbearing to RLE  - Now established with [facility]  - Continue physical therapy    3. Controlled type 2 diabetes mellitus with other skin ulcer, with long-term current use of insulin ([facility])  A1c 6.6% on 10/21/2019    12/6: Blood sugar 135 today.  Patient to keep blood sugars less than 150 for improved wound healing  - Importance of adequate nutrition for wound healing      4. Diabetic mononeuropathy associated with  diabetes mellitus due to underlying condition (HCC)-m rx gabapentin    12/6/2019: Implications of loss of protective sensation (LOPS) discussed with patient- including increased risk for amputation.  Advised to check foot at least daily, moisturize foot, and to always wear protective foot wear.       5. Leg edema, right  S/P right BKA 9/5/2019 12/6/2019: [facility] to give patient  sock to control edema and assist in wound healing.  Patient understands to remove  sock if it becomes too tight for prevention of pressure injury.             15 min spent face to face with patient, >50% of time spent counseling, coordinating care, reviewing records, discussing POC, educating patient regarding wound healing and progression.  This time was spent in excess to procedure time.       Please note that this note may have been created using voice recognition software. I have worked with technical experts from [employer] to optimize the interface.  I have made every reasonable attempt to correct obvious errors, but there may be errors of grammar and possibly content that I did not discover before finalizing the note.    N

## 2019-12-06 NOTE — PATIENT INSTRUCTIONS
"-Keep your wound dressing clean, dry, and intact.    -Change your dressing if it becomes soiled, soaked, or falls off.    -Should you experience any significant changes in your wound(s), such as infection (redness, swelling, localized heat, increased pain, fever > 101 F, chills) or have any questions regarding your home care instructions, please contact the wound center at [phone]. If after hours, contact your primary care physician or go to the hospital emergency room.     Direction for changing wound dressin. Remove old dressing  2. Cleanse gently with saline and gauze or just plain soap and water.    3. Apply \"skin prep\" to skin around the wound and anywhere tape will touch.  (It protects the skin from the tape.)  4. Apply zinc cream like desitin to the immediate area around the wound (approx the 1st 1/2 inch around the wound).  (This protects the skin around the wound from wound drainage.)  5. Apply epiona/collagen (labeled #1) to wound and tuck into undermined areas using qtip.  6. Apply aquacel ag (labeled #2) over epiona/collagen.  7. Cut nonadhesive foam in half and cover wound.  8. Secure with tape.  9. Change once between wound clinic appointments or when wet/soiled/falling off.    "

## 2019-12-11 ENCOUNTER — OFFICE VISIT (OUTPATIENT)
Dept: WOUND CARE | Facility: MEDICAL CENTER | Age: 74
End: 2019-12-11
Attending: SURGERY
Payer: MEDICARE

## 2019-12-11 VITALS
TEMPERATURE: 96.9 F | HEART RATE: 77 BPM | DIASTOLIC BLOOD PRESSURE: 70 MMHG | RESPIRATION RATE: 18 BRPM | OXYGEN SATURATION: 91 % | SYSTOLIC BLOOD PRESSURE: 118 MMHG

## 2019-12-11 DIAGNOSIS — Z89.511 S/P BKA (BELOW KNEE AMPUTATION) UNILATERAL, RIGHT (HCC): ICD-10-CM

## 2019-12-11 DIAGNOSIS — E08.41 DIABETIC MONONEUROPATHY ASSOCIATED WITH DIABETES MELLITUS DUE TO UNDERLYING CONDITION (HCC): ICD-10-CM

## 2019-12-11 DIAGNOSIS — E11.622 CONTROLLED TYPE 2 DIABETES MELLITUS WITH OTHER SKIN ULCER, WITH LONG-TERM CURRENT USE OF INSULIN (HCC): ICD-10-CM

## 2019-12-11 DIAGNOSIS — Z79.4 CONTROLLED TYPE 2 DIABETES MELLITUS WITH OTHER SKIN ULCER, WITH LONG-TERM CURRENT USE OF INSULIN (HCC): ICD-10-CM

## 2019-12-11 DIAGNOSIS — L97.912 ULCER OF RIGHT LOWER EXTREMITY WITH FAT LAYER EXPOSED (HCC): ICD-10-CM

## 2019-12-11 PROCEDURE — 11042 DBRDMT SUBQ TIS 1ST 20SQCM/<: CPT | Performed by: NURSE PRACTITIONER

## 2019-12-11 PROCEDURE — 11042 DBRDMT SUBQ TIS 1ST 20SQCM/<: CPT

## 2019-12-11 ASSESSMENT — ENCOUNTER SYMPTOMS
SHORTNESS OF BREATH: 0
BACK PAIN: 0
DIAPHORESIS: 0
DEPRESSION: 0
WEAKNESS: 0
EYES NEGATIVE: 1
VOMITING: 0
PALPITATIONS: 0
CLAUDICATION: 0
WHEEZING: 0
NAUSEA: 0
SENSORY CHANGE: 1
FEVER: 0
CHILLS: 0
FALLS: 0
NERVOUS/ANXIOUS: 0
COUGH: 0

## 2019-12-11 NOTE — PROGRESS NOTES
Provider Encounter- Full Thickness wound    HISTORY OF PRESENT ILLNESS  Wound History:    START OF CARE IN CLINIC: 12/6/2019    REFERRING PROVIDER: Shivam Amezcua MD     WOUND- Full Thickness Wound x3   LOCATION: Right BKA-medial, middle, lateral   HISTORY: Developed right second toe ulcer that became gangrenous.  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  Underwent right BKA by Dr. Amezcua on 9/5/2019.  He followed up for suture removal early October 2019.  Soon after patient slipped and fell on 10/15/2019 and incision dehisced creating ulcer to medial, mid, lateral aspects of BKA incision site.  He followed up with Dr. Amezcua and was instructed to apply Aquacel Ag to the wound bed which his wife performed dressing changes every other day.    DIABETES HX: Diagnosed with type 2 diabetes in 1998 and is currently managing with insulin and oral diabetic agents.  Checks blood sugars 3-4 times per day and reports that these typically average 135-150.  Has had previous diabetes education.  Does have numbness in foot.  Usually wears diabetic shoes.  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         Pertinent Medical History: Diabetes, PVD, hypertension    TOBACCO USE: None    Patient's problem list, allergies, and current medications reviewed and updated in Epic      Interval History:  12/6/2019: New evaluation.  Initial provider visit.Clinic visit with Angelica MONTOYA.  Accompanied by shay Zepeda with [facility].  Patient denies seeing any erythema, edema, odor to the wound during dressing changes.  He is unsure if the wounds have decreased in size.  Currently using a wheelchair for mobilization; no use of  sock or stump protector at this time.  Patient has no further follow-ups with vascular surgery until February 2020 for LLE.    12/11/2019 : Clinic visit with JAN Alvarez.  Kevin is feeling well today.  He is accompanied  "today by Shivam, from [facility].  Patient is anxious to get his wound healed so that he can get this prosthesis.  We did discuss possible putting a VAC on these wounds to accelerate healing.  Patient stated, \"anything to get these healed up\".  He is requesting home health, as it has been difficult for his wife to change his dressings.  He states he rarely leaves his home, only for doctor's visits.      REVIEW OF SYSTEMS:   Review of Systems   Constitutional: Negative for chills, diaphoresis and fever.   HENT: Negative.    Eyes: Negative.    Respiratory: Negative for cough, shortness of breath and wheezing.    Cardiovascular: Negative for chest pain, palpitations and claudication.   Gastrointestinal: Negative for nausea and vomiting.   Musculoskeletal: Negative for back pain, falls and joint pain.   Skin: Negative for itching and rash.   Neurological: Positive for sensory change. Negative for weakness.        Numb left foot   Psychiatric/Behavioral: Negative for depression. The patient is not nervous/anxious.        PHYSICAL EXAMINATION:   /70   Pulse 77   Temp 36.1 °C (96.9 °F) (Temporal)   Resp 18   SpO2 91%     Physical Exam   Constitutional: He is oriented to person, place, and time and well-developed, well-nourished, and in no distress.   HENT:   Head: Normocephalic.   Right Ear: External ear normal.   Left Ear: External ear normal.   Eyes: Pupils are equal, round, and reactive to light.   Neck: Normal range of motion.   Cardiovascular: Normal rate.   +2 right popliteal    Unable to palpate PT, DP to left foot   Pulmonary/Chest: Effort normal.   Abdominal: Soft.   Musculoskeletal:         General: Edema present.      Comments: +1-2 to right BKA stump   Neurological: He is alert and oriented to person, place, and time.   Insensate to left foot   Skin: Skin is warm and dry. Rash noted.     Right BKA ulcer to incision line at lateral, middle, medial aspect  Rash from dressing to medial " aspect    Healed left transmetatarsal amputation  See wound flowsheet for further detail   Psychiatric: Mood, memory, affect and judgment normal.   Nursing note and vitals reviewed.    WOUND ASSESSMENT   Wound 12/06/19 Full Thickness Wound Right BKA incision medial (Active)   Wound Image    12/11/2019 11:15 AM   Site Assessment Red;Yellow 12/11/2019 11:15 AM   Aby-wound Assessment Intact 12/11/2019 11:15 AM   Margins Unattached edges 12/11/2019 11:15 AM   Wound Length (cm) 1.5 cm 12/11/2019 11:15 AM   Wound Width (cm) 3.6 cm 12/11/2019 11:15 AM   Wound Depth (cm) 0.5 cm 12/11/2019 11:15 AM   Wound Surface Area (cm^2) 5.4 cm^2 12/11/2019 11:15 AM   Post Wound Length (cm) 1.5 cm 12/11/2019 11:15 AM    Post Wound Width (cm) 3.6 cm 12/11/2019 11:15 AM   Post Wound Depth (cm) 0.6 cm 12/11/2019 11:15 AM   Post Wound Surface Area (cm^2) 5.4 cm^2 12/11/2019 11:15 AM   Tunneling 0 cm 12/6/2019  1:00 PM   Undermining 0.3 cm 12/11/2019 11:15 AM   Closure Secondary intention 12/6/2019  1:00 PM   Drainage Amount Moderate 12/11/2019 11:15 AM   Drainage Description Serosanguineous 12/11/2019 11:15 AM   Non-staged Wound Description Full thickness 12/11/2019 11:15 AM   Treatments Cleansed;Pharmaceutical agent;Other (Comment) 12/11/2019 11:15 AM   Cleansing Normal Saline Irrigation 12/11/2019 11:15 AM   Periwound Protectant Barrier Paste 12/11/2019 11:15 AM   Dressing Options Hydrofiber Silver;Super Absorbent Pad;Roll Gauze;Self Adherent Elastic Wrap 12/11/2019 11:15 AM   Dressing Cleansing/Solutions Normal Saline 12/6/2019  1:00 PM   Dressing Changed Changed 12/11/2019 11:15 AM   Dressing Status Clean;Dry;Intact 12/6/2019  1:00 PM   Dressing Change Frequency Every 72 hrs 12/6/2019  1:00 PM   WOUND NURSE ONLY - Odor None 12/11/2019 11:15 AM   WOUND NURSE ONLY - Exposed Structures None 12/11/2019 11:15 AM   WOUND NURSE ONLY - Tissue Type and Percentage Pre-debridement: 50% red, 50% yellow. 12/11/2019 11:15 AM       Wound 12/06/19  Full Thickness Wound Right BKA incision mid (Active)   Wound Image    12/11/2019 11:15 AM   Site Assessment Yellow 12/11/2019 11:15 AM   Aby-wound Assessment Intact 12/11/2019 11:15 AM   Margins Attached edges 12/11/2019 11:15 AM   Wound Length (cm) 0.9 cm 12/11/2019 11:15 AM   Wound Width (cm) 1.9 cm 12/11/2019 11:15 AM   Wound Depth (cm) 0.3 cm 12/11/2019 11:15 AM   Wound Surface Area (cm^2) 1.71 cm^2 12/11/2019 11:15 AM   Post Wound Length (cm) 1 cm 12/11/2019 11:15 AM    Post Wound Width (cm) 1.9 cm 12/11/2019 11:15 AM   Post Wound Depth (cm) 0.5 cm 12/11/2019 11:15 AM   Post Wound Surface Area (cm^2) 1.9 cm^2 12/11/2019 11:15 AM   Tunneling 0.4 cm 12/6/2019  1:00 PM   Undermining 0 cm 12/6/2019  1:00 PM   Closure Secondary intention 12/6/2019  1:00 PM   Drainage Amount Moderate 12/11/2019 11:15 AM   Drainage Description Serosanguineous 12/11/2019 11:15 AM   Non-staged Wound Description Full thickness 12/11/2019 11:15 AM   Treatments Cleansed;Pharmaceutical agent;Other (Comment) 12/11/2019 11:15 AM   Cleansing Normal Saline Irrigation 12/11/2019 11:15 AM   Periwound Protectant Barrier Paste 12/11/2019 11:15 AM   Dressing Options Hydrofiber Silver;Super Absorbent Pad;Roll Gauze;Self Adherent Elastic Wrap 12/11/2019 11:15 AM   Dressing Cleansing/Solutions Normal Saline 12/6/2019  1:00 PM   Dressing Changed Changed 12/11/2019 11:15 AM   Dressing Status Clean;Dry;Intact 12/6/2019  1:00 PM   Dressing Change Frequency Every 72 hrs 12/6/2019  1:00 PM   WOUND NURSE ONLY - Odor None 12/11/2019 11:15 AM   WOUND NURSE ONLY - Exposed Structures None 12/11/2019 11:15 AM   WOUND NURSE ONLY - Tissue Type and Percentage Pre-debridement: 100% yellow. 12/11/2019 11:15 AM       Wound 12/06/19 Full Thickness Wound Right BKA incision lateral (Active)   Wound Image    12/11/2019 11:15 AM   Site Assessment Red;Yellow 12/11/2019 11:15 AM   Aby-wound Assessment Intact 12/11/2019 11:15 AM   Margins Attached edges 12/11/2019 11:15 AM    Wound Length (cm) 0.3 cm 12/11/2019 11:15 AM   Wound Width (cm) 0.9 cm 12/11/2019 11:15 AM   Wound Depth (cm) 0.2 cm 12/11/2019 11:15 AM   Wound Surface Area (cm^2) 0.27 cm^2 12/11/2019 11:15 AM   Post Wound Length (cm) 0.4 cm 12/11/2019 11:15 AM    Post Wound Width (cm) 0.9 cm 12/11/2019 11:15 AM   Post Wound Depth (cm) 0.2 cm 12/11/2019 11:15 AM   Post Wound Surface Area (cm^2) 0.36 cm^2 12/11/2019 11:15 AM   Tunneling 0 cm 12/6/2019  1:00 PM   Undermining 0 cm 12/6/2019  1:00 PM   Closure Secondary intention 12/6/2019  1:00 PM   Drainage Amount Moderate 12/11/2019 11:15 AM   Drainage Description Serosanguineous 12/11/2019 11:15 AM   Non-staged Wound Description Full thickness 12/11/2019 11:15 AM   Treatments Cleansed;Pharmaceutical agent;Other (Comment) 12/11/2019 11:15 AM   Cleansing Normal Saline Irrigation 12/11/2019 11:15 AM   Periwound Protectant Barrier Paste 12/11/2019 11:15 AM   Dressing Options Hydrofiber Silver;Super Absorbent Pad;Roll Gauze;Self Adherent Elastic Wrap 12/11/2019 11:15 AM   Dressing Cleansing/Solutions Normal Saline 12/6/2019  1:00 PM   Dressing Changed Changed 12/11/2019 11:15 AM   Dressing Status Clean;Dry;Intact 12/6/2019  1:00 PM   Dressing Change Frequency Every 72 hrs 12/6/2019  1:00 PM   WOUND NURSE ONLY - Odor None 12/11/2019 11:15 AM   WOUND NURSE ONLY - Exposed Structures None 12/11/2019 11:15 AM   WOUND NURSE ONLY - Tissue Type and Percentage Pre-debridement: 10% red, 90% yellow. 12/11/2019 11:15 AM     PRE-DEBRIDEMENT PHOTO                  PROCEDURE: Excisional debridement of wounds x3 to right BKA stump  -2% viscous lidocaine applied topically to wound bed for approximately 5 minutes prior to debridement  -Curette used to debride wound bed.  Excisional debridement was performed to remove devitalized tissue until healthy, bleeding tissue was visualized.   Entire surface of all 3 wounds, 7.66 cm2 debrided.  Tissue debrided into the subcutaneous layer.    -Bleeding  controlled with manual pressure.    -Wound care completed by wound RN, refer to flowsheet  -Patient tolerated the procedure well, without c/o pain or discomfort.     POST DEBRIDEMENT PHOTOS                      Pertinent Labs and Diagnostics:    Labs:     A1c:   Lab Results   Component Value Date/Time    HBA1C 6.6 (H) 10/21/2019 09:36 AM                 ASSESSMENT AND PLAN:     1. Ulcer of right lower extremity with fat layer exposed ([facility])  Comments: S/P fall mid October 2019 suffered from full-thickness ulcers to right BKA incision line    12/11/19: Slough has again built up over wound beds.  His skin appears to be reacting to tape, rash noted.    -Excisional debridement performed today.  Medically necessary to promote wound healing.  -Patient to follow-up at wound clinic weekly.  Dressing to be changed once in between visits.  And PRN drainage.  -Advised to go to ER for any increased redness, swelling, drainage, or odor, or if patient develops fever, chills, nausea or vomiting.  -Home health order to assist with wound care.  Patient reports it has very been very difficult for his wife to change his dressings.  -Plan to start VAC at the end of December if wound does not progress adequately    Wound care:  Hydrofiber silver to manage exudate and bioburden, foam cover dressing, relax and Coban to secure      2. S/P BKA (below knee amputation) unilateral, right ([facility])  Comments: Right BKA 9/5/2019 by Dr. Amezcua    12/11/2019: Patient is wearing nutmegger device to prevent contracture and to protect the stump.  -Discussed protecting stump with stump protector and using  sock.  Patient to continue to be nonweightbearing to RLE  - Established with [facility]  - Continue physical therapy    3. Controlled type 2 diabetes mellitus with other skin ulcer, with long-term current use of insulin ([facility])  A1c 6.6% on 10/21/2019    12/11/2019: Patient reports his blood sugars are consistently between 120 and 150      4. Diabetic  mononeuropathy associated with diabetes mellitus due to underlying condition (HCC)-m rx gabapentin    12/11/2019: Importance of protecting his left lower extremity discussed.    -Reminded patient to check his foot at least daily, always wear protective footwear, seek medical attention immediately for any wounds      Please note that this note may have been created using voice recognition software. I have worked with technical experts from [facility] to optimize the interface.  I have made every reasonable attempt to correct obvious errors, but there may be errors of grammar and possibly content that I did not discover before finalizing the note.    N

## 2019-12-11 NOTE — PATIENT INSTRUCTIONS
-Keep dressings clean, dry and covered while bathing. Only change dressings if they become over saturated, soiled or fall off.     -Should you experience any significant changes in your wound(s), such as infection (redness, swelling, localized heat, increased pain, fever > 101 F, chills) or have any questions regarding your home care instructions, please contact the wound center at [phone]. If after hours, contact your primary care physician or go to the hospital emergency room.

## 2019-12-16 ENCOUNTER — OFFICE VISIT (OUTPATIENT)
Dept: WOUND CARE | Facility: MEDICAL CENTER | Age: 74
End: 2019-12-16
Attending: SURGERY
Payer: MEDICARE

## 2019-12-16 VITALS
DIASTOLIC BLOOD PRESSURE: 83 MMHG | TEMPERATURE: 97 F | OXYGEN SATURATION: 94 % | RESPIRATION RATE: 18 BRPM | SYSTOLIC BLOOD PRESSURE: 133 MMHG | HEART RATE: 73 BPM

## 2019-12-16 DIAGNOSIS — E11.622 CONTROLLED TYPE 2 DIABETES MELLITUS WITH OTHER SKIN ULCER, WITH LONG-TERM CURRENT USE OF INSULIN (HCC): ICD-10-CM

## 2019-12-16 DIAGNOSIS — L97.912 ULCER OF RIGHT LOWER EXTREMITY WITH FAT LAYER EXPOSED (HCC): Primary | ICD-10-CM

## 2019-12-16 DIAGNOSIS — Z79.4 CONTROLLED TYPE 2 DIABETES MELLITUS WITH OTHER SKIN ULCER, WITH LONG-TERM CURRENT USE OF INSULIN (HCC): ICD-10-CM

## 2019-12-16 DIAGNOSIS — Z89.511 S/P BKA (BELOW KNEE AMPUTATION) UNILATERAL, RIGHT (HCC): ICD-10-CM

## 2019-12-16 PROCEDURE — 11042 DBRDMT SUBQ TIS 1ST 20SQCM/<: CPT | Performed by: NURSE PRACTITIONER

## 2019-12-16 PROCEDURE — 11042 DBRDMT SUBQ TIS 1ST 20SQCM/<: CPT

## 2019-12-16 ASSESSMENT — ENCOUNTER SYMPTOMS
VOMITING: 0
SHORTNESS OF BREATH: 0
DIAPHORESIS: 0
COUGH: 0
PALPITATIONS: 0
DEPRESSION: 0
NAUSEA: 0
CLAUDICATION: 0
WEAKNESS: 0
CHILLS: 0
NERVOUS/ANXIOUS: 0
DIZZINESS: 0
FEVER: 0
WHEEZING: 0
HEADACHES: 0
SENSORY CHANGE: 1

## 2019-12-16 ASSESSMENT — PAIN SCALES - GENERAL: PAINLEVEL: 5=MODERATE PAIN

## 2019-12-16 NOTE — LETTER
2019    To: [facility]  Re: Wound care orders for Kevin Jackson;  1945    Remove old dressing and cleanse with saline or wound cleanser. Apply skin prep to immediate periwound. Apply collagen dressing to medial and middle wound beds and cover with silver hydrofiber dressing. Cover wounds with super absorbant foam and secure with roll gauze. Wrap coban loosely over the roll gauze to secure. Change 1-2 times per week.     If you have any questions, please contact us.     Thank you,    JAN Becerril

## 2019-12-17 NOTE — PROGRESS NOTES
Provider Encounter- Full Thickness wound    HISTORY OF PRESENT ILLNESS  Wound History:    START OF CARE IN CLINIC: 12/6/2019    REFERRING PROVIDER: Shivam Amezcua MD     WOUND- Full Thickness Wound x3   LOCATION: Right BKA-medial, middle, lateral   HISTORY: Developed right second toe ulcer that became gangrenous.  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  Underwent right BKA by Dr. Amezcua on 9/5/2019.  He followed up for suture removal early October 2019.  Soon after patient slipped and fell on 10/15/2019 and incision dehisced creating ulcer to medial, mid, lateral aspects of BKA incision site.  He followed up with Dr. Amezcua and was instructed to apply Aquacel Ag to the wound bed which his wife performed dressing changes every other day.    DIABETES HX: Diagnosed with type 2 diabetes in 1998 and is currently managing with insulin and oral diabetic agents.  Checks blood sugars 3-4 times per day and reports that these typically average 135-150.  Has had previous diabetes education.  Does have numbness in foot.  Usually wears diabetic shoes.  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         Pertinent Medical History: Diabetes, PVD, hypertension    TOBACCO USE: None    Patient's problem list, allergies, and current medications reviewed and updated in Epic      Interval History:  12/6/2019: New evaluation.  Initial provider visit.Clinic visit with Angelica MONTOYA.  Accompanied by shay Zepeda with [facility].  Patient denies seeing any erythema, edema, odor to the wound during dressing changes.  He is unsure if the wounds have decreased in size.  Currently using a wheelchair for mobilization; no use of  sock or stump protector at this time.  Patient has no further follow-ups with vascular surgery until February 2020 for LLE.    12/11/2019 : Clinic visit with JAN Alvarez.  Kevin is feeling well today.  He is accompanied  "today by Shivam, from [employer].  Patient is anxious to get his wound healed so that he can get this prosthesis.  We did discuss possible putting a VAC on these wounds to accelerate healing.  Patient stated, \"anything to get these healed up\".  He is requesting home health, as it has been difficult for his wife to change his dressings.  He states he rarely leaves his home, only for doctor's visits.    12/16/2019: Clinic visit with JAN Becerril. Patient states that they are feeling well today.  Patient denies fever, chills, nausea, vomiting, lightheadedness, dizziness, shortness of breath and chest pain.  Patient is happy with the progression of wounds both wounds have decreased in size with granulation tissue developing.  Patient would like to hold off on VAC placement at this time I am in agreement with the patient his wound seems to be progressing.  If wound stalls or ceases to progress we will revisit placement of wound VAC.      REVIEW OF SYSTEMS:   Review of Systems   Constitutional: Negative for chills, diaphoresis and fever.   Respiratory: Negative for cough, shortness of breath and wheezing.    Cardiovascular: Negative for chest pain, palpitations and claudication.   Gastrointestinal: Negative for nausea and vomiting.   Skin: Negative for itching and rash.   Neurological: Positive for sensory change. Negative for dizziness, weakness and headaches.        Numb left foot   Psychiatric/Behavioral: Negative for depression. The patient is not nervous/anxious.        PHYSICAL EXAMINATION:   /83   Pulse 73   Temp 36.1 °C (97 °F)   Resp 18   SpO2 94%     Physical Exam   Constitutional: He is oriented to person, place, and time and well-developed, well-nourished, and in no distress.   HENT:   Head: Normocephalic.   Right Ear: External ear normal.   Left Ear: External ear normal.   Eyes: Pupils are equal, round, and reactive to light. Conjunctivae are normal.   Neck: Normal range of motion. "   Cardiovascular: Normal rate.   +2 right popliteal    Unable to palpate PT, DP to left foot   Pulmonary/Chest: Effort normal.   Abdominal: Soft.   Musculoskeletal:         General: Edema present.      Comments: Dependent edema right stump   Neurological: He is alert and oriented to person, place, and time.   Insensate to left foot   Skin: Skin is warm and dry.     Right BKA ulcer to incision line at lateral, middle, medial aspect  See wound flowsheet for further detail   Psychiatric: Mood, memory, affect and judgment normal.   Nursing note and vitals reviewed.    WOUND ASSESSMENT      Wound 12/06/19 Full Thickness Wound Right BKA incision medial (Active)   Wound Image    12/16/2019  4:00 PM   Site Assessment Red;Yellow 12/16/2019  4:00 PM   Aby-wound Assessment Blanchable erythema 12/16/2019  4:00 PM   Margins Unattached edges;Attached edges 12/16/2019  4:00 PM   Wound Length (cm) 0.7 cm 12/16/2019  4:00 PM   Wound Width (cm) 2.4 cm 12/16/2019  4:00 PM   Wound Depth (cm) 0.5 cm 12/16/2019  4:00 PM   Wound Surface Area (cm^2) 1.68 cm^2 12/16/2019  4:00 PM   Post Wound Length (cm) 1 cm 12/16/2019  4:00 PM    Post Wound Width (cm) 2.5 cm 12/16/2019  4:00 PM   Post Wound Depth (cm) 0.5 cm 12/16/2019  4:00 PM   Post Wound Surface Area (cm^2) 2.5 cm^2 12/16/2019  4:00 PM   Tunneling 0 cm 12/16/2019  4:00 PM   Undermining 0.2 cm 12/16/2019  4:00 PM   Closure Secondary intention 12/6/2019  1:00 PM   Drainage Amount Moderate 12/16/2019  4:00 PM   Drainage Description Serosanguineous 12/16/2019  4:00 PM   Non-staged Wound Description Full thickness 12/16/2019  4:00 PM   Treatments Cleansed;Pharmaceutical agent 12/16/2019  4:00 PM   Cleansing Normal Saline Irrigation 12/16/2019  4:00 PM   Periwound Protectant Barrier Paste 12/11/2019 11:15 AM   Dressing Options Collagen Dressing;Hydrofiber Silver;Super Absorbent Pad;Roll Gauze;Self Adherent Elastic Wrap 12/16/2019  4:00 PM   Dressing Cleansing/Solutions Normal Saline  12/16/2019  4:00 PM   Dressing Changed Changed 12/16/2019  4:00 PM   Dressing Status Clean;Dry;Intact 12/16/2019  4:00 PM   Dressing Change Frequency Every 72 hrs 12/16/2019  4:00 PM   WOUND NURSE ONLY - Odor None 12/16/2019  4:00 PM   WOUND NURSE ONLY - Exposed Structures None 12/16/2019  4:00 PM   WOUND NURSE ONLY - Tissue Type and Percentage 15% yellow; 85% red 12/16/2019  4:00 PM       Wound 12/06/19 Full Thickness Wound Right BKA incision mid (Active)   Wound Image    12/16/2019  4:00 PM   Site Assessment Yellow;Red 12/16/2019  4:00 PM   Aby-wound Assessment Intact 12/16/2019  4:00 PM   Margins Attached edges 12/16/2019  4:00 PM   Wound Length (cm) 0.3 cm 12/16/2019  4:00 PM   Wound Width (cm) 0.4 cm 12/16/2019  4:00 PM   Wound Depth (cm) 0.3 cm 12/16/2019  4:00 PM   Wound Surface Area (cm^2) 0.12 cm^2 12/16/2019  4:00 PM   Post Wound Length (cm) 0.3 cm 12/16/2019  4:00 PM    Post Wound Width (cm) 0.7 cm 12/16/2019  4:00 PM   Post Wound Depth (cm) 0.4 cm 12/16/2019  4:00 PM   Post Wound Surface Area (cm^2) 0.21 cm^2 12/16/2019  4:00 PM   Tunneling 0 cm 12/16/2019  4:00 PM   Undermining 0 cm 12/16/2019  4:00 PM   Closure Secondary intention 12/6/2019  1:00 PM   Drainage Amount Moderate 12/16/2019  4:00 PM   Drainage Description Serosanguineous 12/16/2019  4:00 PM   Non-staged Wound Description Full thickness 12/16/2019  4:00 PM   Treatments Cleansed;Pharmaceutical agent 12/16/2019  4:00 PM   Cleansing Normal Saline Irrigation 12/16/2019  4:00 PM   Periwound Protectant Barrier Paste 12/11/2019 11:15 AM   Dressing Options Collagen Dressing;Hydrofiber Silver;Super Absorbent Pad;Roll Gauze;Self Adherent Elastic Wrap 12/16/2019  4:00 PM   Dressing Cleansing/Solutions Normal Saline 12/16/2019  4:00 PM   Dressing Changed Changed 12/16/2019  4:00 PM   Dressing Status Clean;Dry;Intact 12/16/2019  4:00 PM   Dressing Change Frequency Every 72 hrs 12/16/2019  4:00 PM   WOUND NURSE ONLY - Odor None 12/16/2019  4:00 PM    WOUND NURSE ONLY - Exposed Structures None 12/16/2019  4:00 PM   WOUND NURSE ONLY - Tissue Type and Percentage 20% yellow; 80% red pre debridement 12/16/2019  4:00 PM       Wound 12/06/19 Full Thickness Wound Right BKA incision lateral (Active)   Wound Image   12/16/2019  4:00 PM   Aby-wound Assessment Intact 12/16/2019  4:00 PM   Margins Attached edges 12/11/2019 11:15 AM   Wound Width (cm) 0.9 cm 12/11/2019 11:15 AM   Wound Depth (cm) 0.2 cm 12/11/2019 11:15 AM   Wound Surface Area (cm^2) 0.27 cm^2 12/11/2019 11:15 AM    Post Wound Width (cm) 0.9 cm 12/11/2019 11:15 AM   Post Wound Depth (cm) 0.2 cm 12/11/2019 11:15 AM   Post Wound Surface Area (cm^2) 0.36 cm^2 12/11/2019 11:15 AM   Tunneling 0 cm 12/6/2019  1:00 PM   Undermining 0 cm 12/6/2019  1:00 PM   Closure Secondary intention 12/6/2019  1:00 PM   Drainage Amount None 12/16/2019  4:00 PM   Drainage Description Serosanguineous 12/11/2019 11:15 AM   Non-staged Wound Description Not applicable 12/16/2019  4:00 PM   Treatments Cleansed 12/16/2019  4:00 PM   Cleansing Normal Saline Irrigation 12/16/2019  4:00 PM   Periwound Protectant Barrier Paste 12/11/2019 11:15 AM   Dressing Options Super Absorbent Pad;Roll Gauze;Self Adherent Elastic Wrap 12/16/2019  4:00 PM   Dressing Cleansing/Solutions Normal Saline 12/6/2019  1:00 PM   Dressing Changed Changed 12/11/2019 11:15 AM   Dressing Status Clean;Dry;Intact 12/6/2019  1:00 PM   Dressing Change Frequency Every 72 hrs 12/6/2019  1:00 PM   WOUND NURSE ONLY - Odor None 12/16/2019  4:00 PM   WOUND NURSE ONLY - Exposed Structures None 12/16/2019  4:00 PM   WOUND NURSE ONLY - Tissue Type and Percentage 100% epithelialized  12/16/2019  4:00 PM          PROCEDURE: Excisional debridement of wounds x3 to right BKA stump  -2% viscous lidocaine applied topically to wound bed for approximately 5 minutes prior to debridement  -Curette used to debride wound bed.  Excisional debridement was performed to remove devitalized  tissue until healthy, bleeding tissue was visualized.   Entire surface of all 3 wounds,  3.07 cm2 debrided.  Tissue debrided into the subcutaneous layer.    -Bleeding controlled with manual pressure.    -Wound care completed by wound RN, refer to flowsheet  -Patient tolerated the procedure well, without c/o pain or discomfort.         Pertinent Labs and Diagnostics:    Labs:     A1c:   Lab Results   Component Value Date/Time    HBA1C 6.6 (H) 10/21/2019 09:36 AM                 ASSESSMENT AND PLAN:     1. Ulcer of right lower extremity with fat layer exposed ([facility])  Comments: S/P fall mid October 2019 suffered from full-thickness ulcers to right BKA incision line    12/16/19: Slough to all wound beds.    -Excisional debridement performed today.  Medically necessary to promote wound healing.  -Patient to follow-up at wound clinic weekly.  Dressing to be changed once in between visits.  And PRN drainage.  -Advised to go to ER for any increased redness, swelling, drainage, or odor, or if patient develops fever, chills, nausea or vomiting.  -Home health providing wound care 1 time a week.    -Wound is progressing at this time will hold wound VAC.    Wound care: Collagen to promote epithelialization, Hydrofiber silver to manage exudate and bioburden, foam cover dressing, roll gauze wrap, Coban wrap for securement      2. S/P BKA (below knee amputation) unilateral, right ([facility])  Comments: Right BKA 9/5/2019 by Dr. Amezcua    12/16/2019: Patient is not currently wearing nutmegger device.  -Discussed protecting stump with stump protector and using  sock.  Patient to continue to be nonweightbearing to RLE  - Established with [facility]  - Continue physical therapy    3. Controlled type 2 diabetes mellitus with other skin ulcer, with long-term current use of insulin ([facility])  A1c 6.6% on 10/21/2019    12/16/2019: Patient reports his blood sugars are consistently in the 120s      4. Diabetic mononeuropathy associated with  diabetes mellitus due to underlying condition ([facility]CC)-m rx gabapentin    12/16/2019: Importance of protecting his left lower extremity discussed.    -Reminded patient to check his foot at least daily, always wear protective footwear, seek medical attention immediately for any wounds      Please note that this note may have been created using voice recognition software. I have worked with technical experts from [facility] to optimize the interface.  I have made every reasonable attempt to correct obvious errors, but there may be errors of grammar and possibly content that I did not discover before finalizing the note.    N

## 2019-12-17 NOTE — PATIENT INSTRUCTIONS
Should you experience any significant changes in your wound(s) such as infection (redness, swelling, localized heat, increased pain, fever >101 F, chills) or have any questions regarding your home care instructions, please contact the wound center [phone]. If after hours, contact your primary care physician or go the hospital emergency room.  Keep dressing clean and dry and cover while bathing. Only change dressing if over saturated, soiled or its falling off.

## 2019-12-26 ENCOUNTER — OFFICE VISIT (OUTPATIENT)
Dept: WOUND CARE | Facility: MEDICAL CENTER | Age: 74
End: 2019-12-26
Attending: SURGERY
Payer: MEDICARE

## 2019-12-26 VITALS
DIASTOLIC BLOOD PRESSURE: 74 MMHG | TEMPERATURE: 97.1 F | HEART RATE: 88 BPM | RESPIRATION RATE: 16 BRPM | SYSTOLIC BLOOD PRESSURE: 120 MMHG | OXYGEN SATURATION: 93 %

## 2019-12-26 DIAGNOSIS — E08.41 DIABETIC MONONEUROPATHY ASSOCIATED WITH DIABETES MELLITUS DUE TO UNDERLYING CONDITION (HCC): ICD-10-CM

## 2019-12-26 DIAGNOSIS — E11.622 CONTROLLED TYPE 2 DIABETES MELLITUS WITH OTHER SKIN ULCER, WITH LONG-TERM CURRENT USE OF INSULIN (HCC): ICD-10-CM

## 2019-12-26 DIAGNOSIS — Z79.4 CONTROLLED TYPE 2 DIABETES MELLITUS WITH OTHER SKIN ULCER, WITH LONG-TERM CURRENT USE OF INSULIN (HCC): ICD-10-CM

## 2019-12-26 DIAGNOSIS — L97.912 ULCER OF RIGHT LOWER EXTREMITY WITH FAT LAYER EXPOSED (HCC): Primary | ICD-10-CM

## 2019-12-26 DIAGNOSIS — Z89.511 S/P BKA (BELOW KNEE AMPUTATION) UNILATERAL, RIGHT (HCC): ICD-10-CM

## 2019-12-26 PROCEDURE — 11042 DBRDMT SUBQ TIS 1ST 20SQCM/<: CPT | Performed by: NURSE PRACTITIONER

## 2019-12-26 PROCEDURE — 11042 DBRDMT SUBQ TIS 1ST 20SQCM/<: CPT

## 2019-12-26 RX ORDER — LOSARTAN POTASSIUM AND HYDROCHLOROTHIAZIDE 12.5; 5 MG/1; MG/1
TABLET ORAL
COMMUNITY
End: 2019-12-26

## 2019-12-26 RX ORDER — LEVOTHYROXINE SODIUM 50 UG/1
CAPSULE ORAL
COMMUNITY
End: 2019-12-26

## 2019-12-26 ASSESSMENT — ENCOUNTER SYMPTOMS
COUGH: 0
CHILLS: 0
WHEEZING: 0
NAUSEA: 0
DEPRESSION: 0
SENSORY CHANGE: 1
DIAPHORESIS: 0
WEAKNESS: 0
VOMITING: 0
FEVER: 0
SHORTNESS OF BREATH: 0
DIZZINESS: 0
NERVOUS/ANXIOUS: 0
HEADACHES: 0
PALPITATIONS: 0

## 2019-12-26 NOTE — PROGRESS NOTES
Provider Encounter- Full Thickness wound    HISTORY OF PRESENT ILLNESS  Wound History:    START OF CARE IN CLINIC: 12/6/2019    REFERRING PROVIDER: Shivam Amezcua MD     WOUND- Full Thickness Wound x3   LOCATION: Right BKA-medial, middle, lateral   HISTORY: Developed right second toe ulcer that became gangrenous.  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  Underwent right BKA by Dr. Amezcua on 9/5/2019.  He followed up for suture removal early October 2019.  Soon after patient slipped and fell on 10/15/2019 and incision dehisced creating ulcer to medial, mid, lateral aspects of BKA incision site.  He followed up with Dr. Amezcua and was instructed to apply Aquacel Ag to the wound bed which his wife performed dressing changes every other day.    DIABETES HX: Diagnosed with type 2 diabetes in 1998 and is currently managing with insulin and oral diabetic agents.  Checks blood sugars 3-4 times per day and reports that these typically average 135-150.  Has had previous diabetes education.  Does have numbness in foot.  Usually wears diabetic shoes.  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         Pertinent Medical History: Diabetes, PVD, hypertension    TOBACCO USE: None    Patient's problem list, allergies, and current medications reviewed and updated in Epic      Interval History:  12/6/2019: New evaluation.  Initial provider visit.Clinic visit with Angelica MONTOYA.  Accompanied by shay Zepeda with [employer].  Patient denies seeing any erythema, edema, odor to the wound during dressing changes.  He is unsure if the wounds have decreased in size.  Currently using a wheelchair for mobilization; no use of  sock or stump protector at this time.  Patient has no further follow-ups with vascular surgery until February 2020 for LLE.    12/11/2019 : Clinic visit with JAN Alvarez.  Kevin is feeling well today.  He is accompanied  "today by Shivam, from [employer].  Patient is anxious to get his wound healed so that he can get this prosthesis.  We did discuss possible putting a VAC on these wounds to accelerate healing.  Patient stated, \"anything to get these healed up\".  He is requesting home health, as it has been difficult for his wife to change his dressings.  He states he rarely leaves his home, only for doctor's visits.    12/16/2019: Clinic visit with JAN Becerril. Patient states that they are feeling well today.  Patient denies fever, chills, nausea, vomiting, lightheadedness, dizziness, shortness of breath and chest pain.  Patient is happy with the progression of wounds both wounds have decreased in size with granulation tissue developing.  Patient would like to hold off on VAC placement at this time I am in agreement with the patient his wound seems to be progressing.  If wound stalls or ceases to progress we will revisit placement of wound VAC.    12/26/2019: Clinic visit with JAN Becerril. Patient states that they are feeling well today.  Patient denies fever, chills, nausea, vomiting, lightheadedness, dizziness, shortness of breath and chest pain. Wound with good granulation tissue.  Patient has vac and asked to bring it to next clinical visit, if wound is not progressing we will place vac.        REVIEW OF SYSTEMS:   Review of Systems   Constitutional: Negative for chills, diaphoresis and fever.   Respiratory: Negative for cough, shortness of breath and wheezing.    Cardiovascular: Negative for chest pain, palpitations and leg swelling.   Gastrointestinal: Negative for nausea and vomiting.   Skin: Negative for itching and rash.   Neurological: Positive for sensory change. Negative for dizziness, weakness and headaches.        Numb left foot   Psychiatric/Behavioral: Negative for depression. The patient is not nervous/anxious.        PHYSICAL EXAMINATION:   There were no vitals taken for this " visit.    Physical Exam   Constitutional: He is oriented to person, place, and time and well-developed, well-nourished, and in no distress.   HENT:   Head: Normocephalic.   Right Ear: External ear normal.   Left Ear: External ear normal.   Eyes: Pupils are equal, round, and reactive to light. Conjunctivae are normal.   Neck: Normal range of motion.   Cardiovascular: Normal rate.   +2 right popliteal    Unable to palpate PT, DP to left foot   Pulmonary/Chest: Effort normal.   Abdominal: Soft.   Musculoskeletal:         General: No edema.   Neurological: He is alert and oriented to person, place, and time.   Insensate to left foot   Skin: Skin is warm and dry.     Right BKA ulcer to incision line at lateral, middle, medial aspect  See wound flowsheet for further detail   Psychiatric: Mood, memory, affect and judgment normal.   Nursing note and vitals reviewed.    WOUND ASSESSMENT      Wound 12/06/19 Full Thickness Wound Right BKA incision medial (Active)   Wound Image    12/26/2019  1:23 PM   Site Assessment Red;Yellow 12/26/2019  1:23 PM   Aby-wound Assessment Blanchable erythema 12/26/2019  1:23 PM   Margins Attached edges 12/26/2019  1:23 PM   Wound Length (cm) 1 cm 12/26/2019  1:23 PM   Wound Width (cm) 2.6 cm 12/26/2019  1:23 PM   Wound Depth (cm) 0.4 cm 12/26/2019  1:23 PM   Wound Surface Area (cm^2) 2.6 cm^2 12/26/2019  1:23 PM   Post Wound Length (cm) 1 cm 12/26/2019  1:23 PM    Post Wound Width (cm) 2.7 cm 12/26/2019  1:23 PM   Post Wound Depth (cm) 0.4 cm 12/26/2019  1:23 PM   Post Wound Surface Area (cm^2) 2.7 cm^2 12/26/2019  1:23 PM   Tunneling 0 cm 12/16/2019  4:00 PM   Undermining 0.2 cm 12/16/2019  4:00 PM   Closure Secondary intention 12/6/2019  1:00 PM   Drainage Amount Small 12/26/2019  1:23 PM   Drainage Description Serosanguineous 12/26/2019  1:23 PM   Non-staged Wound Description Full thickness 12/26/2019  1:23 PM   Treatments Cleansed;Pharmaceutical agent;Other (Comment) 12/26/2019  1:23 PM    Cleansing Normal Saline Irrigation 12/26/2019  1:23 PM   Periwound Protectant Skin Protectant wipes to Periwound 12/26/2019  1:23 PM   Dressing Options Collagen Dressing;Hydrofiber Silver;Nonadhesive Foam;Roll Gauze;Hypafix Tape;Other (Comments) 12/26/2019  1:23 PM   Dressing Cleansing/Solutions Normal Saline 12/26/2019  1:23 PM   Dressing Changed Changed 12/16/2019  4:00 PM   Dressing Status Clean;Dry;Intact 12/16/2019  4:00 PM   Dressing Change Frequency Every 72 hrs 12/16/2019  4:00 PM   WOUND NURSE ONLY - Odor None 12/26/2019  1:23 PM   WOUND NURSE ONLY - Exposed Structures None 12/26/2019  1:23 PM   WOUND NURSE ONLY - Tissue Type and Percentage Pre debridement: 25% yellow, 75% red 12/26/2019  1:23 PM       Wound 12/06/19 Full Thickness Wound Right BKA incision mid (Active)   Wound Image    12/26/2019  1:23 PM   Site Assessment Red;Yellow 12/26/2019  1:23 PM   Aby-wound Assessment Scar tissue 12/26/2019  1:23 PM   Margins Attached edges 12/26/2019  1:23 PM   Wound Length (cm) 0.3 cm 12/26/2019  1:23 PM   Wound Width (cm) 0.5 cm 12/26/2019  1:23 PM   Wound Depth (cm) 0.2 cm 12/26/2019  1:23 PM   Wound Surface Area (cm^2) 0.15 cm^2 12/26/2019  1:23 PM   Post Wound Length (cm) 0.4 cm 12/26/2019  1:23 PM    Post Wound Width (cm) 0.6 cm 12/26/2019  1:23 PM   Post Wound Depth (cm) 0.2 cm 12/26/2019  1:23 PM   Post Wound Surface Area (cm^2) 0.24 cm^2 12/26/2019  1:23 PM   Tunneling 0 cm 12/16/2019  4:00 PM   Undermining 0 cm 12/16/2019  4:00 PM   Closure Secondary intention 12/6/2019  1:00 PM   Drainage Amount Small 12/26/2019  1:23 PM   Drainage Description Serosanguineous 12/26/2019  1:23 PM   Non-staged Wound Description Full thickness 12/26/2019  1:23 PM   Treatments Cleansed;Pharmaceutical agent;Other (Comment) 12/26/2019  1:23 PM   Cleansing Normal Saline Irrigation 12/26/2019  1:23 PM   Periwound Protectant Skin Protectant wipes to Periwound 12/26/2019  1:23 PM   Dressing Options Collagen Dressing;Hydrofiber  Silver;Nonadhesive Foam;Roll Gauze;Hypafix Tape;Other (Comments) 12/26/2019  1:23 PM   Dressing Cleansing/Solutions Normal Saline 12/26/2019  1:23 PM   Dressing Changed Changed 12/16/2019  4:00 PM   Dressing Status Clean;Dry;Intact 12/16/2019  4:00 PM   Dressing Change Frequency Every 72 hrs 12/16/2019  4:00 PM   WOUND NURSE ONLY - Odor None 12/26/2019  1:23 PM   WOUND NURSE ONLY - Exposed Structures None 12/26/2019  1:23 PM   WOUND NURSE ONLY - Tissue Type and Percentage Pre debridement: 90% red, 10% yellow 12/26/2019  1:23 PM        PROCEDURE: Excisional debridement of wounds to right BKA stump  -2% viscous lidocaine applied topically to wound bed for approximately 5 minutes prior to debridement  -Curette used to debride wound bed.  Excisional debridement was performed to remove devitalized tissue until healthy, bleeding tissue was visualized.   Entire surface of all  wounds,  2.94 cm2 debrided.  Tissue debrided into the subcutaneous layer.    -Bleeding controlled with manual pressure.    -Wound care completed by wound RN, refer to flowsheet  -Patient tolerated the procedure well, without c/o pain or discomfort.         Pertinent Labs and Diagnostics:    Labs:     A1c:   Lab Results   Component Value Date/Time    HBA1C 6.6 (H) 10/21/2019 09:36 AM                 ASSESSMENT AND PLAN:     1. Ulcer of right lower extremity with fat layer exposed (HCC)  Comments: S/P fall mid October 2019 suffered from full-thickness ulcers to right BKA incision line    12/26/19: Slough to all wound beds.    -Excisional debridement performed today.  Medically necessary to promote wound healing.  -Patient to follow-up at wound clinic weekly.  Dressing to be changed once in between visits.  And PRN drainage.  -Advised to go to ER for any increased redness, swelling, drainage, or odor, or if patient develops fever, chills, nausea or vomiting.  -Home health providing wound care 1 time a week.    -Wound is progressing at this time will  hold wound VAC.    Wound care: Collagen to promote epithelialization, Hydrofiber silver to manage exudate and bioburden, foam cover dressing, roll gauze wrap, patients own stump protector.        2. S/P BKA (below knee amputation) unilateral, right ([facility])  Comments: Right BKA 9/5/2019 by Dr. Amezcua    12/26/2019: Patient is not currently wearing nutmegger device.  -Discussed protecting stump with stump protector and using  sock.  Patient to continue to be nonweightbearing to RLE  - Established with [facility]  - Continue physical therapy    3. Controlled type 2 diabetes mellitus with other skin ulcer, with long-term current use of insulin ([facility])  A1c 6.6% on 10/21/2019    12/26/2019: Patient reports his blood sugars are consistently in the 120s      4. Diabetic mononeuropathy associated with diabetes mellitus due to underlying condition ([facility])-m rx gabapentin    12/26/2019: Importance of protecting his left lower extremity discussed.    -Reminded patient to check his foot at least daily, always wear protective footwear, seek medical attention immediately for any wounds      Please note that this note may have been created using voice recognition software. I have worked with technical experts from [employer] to optimize the interface.  I have made every reasonable attempt to correct obvious errors, but there may be errors of grammar and possibly content that I did not discover before finalizing the note.    N

## 2019-12-26 NOTE — PATIENT INSTRUCTIONS
-Keep dressings clean, dry and covered while bathing. Only change dressings if they become over saturated, soiled or fall off.     -Avoid prolonged standing or sitting without elevating your legs.    -Remove your compression garments if you have severe pain, severe swelling, numbness, color change, or temperature change in your toes.     -Should you experience any significant changes in your wound(s), such as infection (redness, swelling, localized heat, increased pain, fever > 101 F, chills) or have any questions regarding your home care instructions, please contact the wound center at [phone]. If after hours, contact your primary care physician or go to the hospital emergency room.

## 2019-12-30 ENCOUNTER — OFFICE VISIT (OUTPATIENT)
Dept: WOUND CARE | Facility: MEDICAL CENTER | Age: 74
End: 2019-12-30
Attending: SURGERY
Payer: MEDICARE

## 2019-12-30 VITALS
RESPIRATION RATE: 18 BRPM | DIASTOLIC BLOOD PRESSURE: 58 MMHG | HEART RATE: 77 BPM | SYSTOLIC BLOOD PRESSURE: 100 MMHG | OXYGEN SATURATION: 96 % | TEMPERATURE: 97.7 F

## 2019-12-30 DIAGNOSIS — Z89.511 S/P BKA (BELOW KNEE AMPUTATION) UNILATERAL, RIGHT (HCC): ICD-10-CM

## 2019-12-30 DIAGNOSIS — E11.622 CONTROLLED TYPE 2 DIABETES MELLITUS WITH OTHER SKIN ULCER, WITH LONG-TERM CURRENT USE OF INSULIN (HCC): ICD-10-CM

## 2019-12-30 DIAGNOSIS — Z79.4 CONTROLLED TYPE 2 DIABETES MELLITUS WITH OTHER SKIN ULCER, WITH LONG-TERM CURRENT USE OF INSULIN (HCC): ICD-10-CM

## 2019-12-30 DIAGNOSIS — L97.912 ULCER OF RIGHT LOWER EXTREMITY WITH FAT LAYER EXPOSED (HCC): ICD-10-CM

## 2019-12-30 PROCEDURE — 11042 DBRDMT SUBQ TIS 1ST 20SQCM/<: CPT

## 2019-12-30 ASSESSMENT — ENCOUNTER SYMPTOMS
COUGH: 0
CHILLS: 0
DEPRESSION: 0
WEAKNESS: 0
DIZZINESS: 0
WHEEZING: 0
SENSORY CHANGE: 1
NERVOUS/ANXIOUS: 0
DIAPHORESIS: 0
PALPITATIONS: 0
NAUSEA: 0
SHORTNESS OF BREATH: 0
HEADACHES: 0
FEVER: 0
VOMITING: 0

## 2019-12-30 NOTE — PROGRESS NOTES
Provider Encounter- Full Thickness wound    HISTORY OF PRESENT ILLNESS  Wound History:    START OF CARE IN CLINIC: 12/6/2019    REFERRING PROVIDER: Shivam Amezcua MD     WOUND- Full Thickness Wound x3   LOCATION: Right BKA-medial, middle, lateral   HISTORY: Developed right second toe ulcer that became gangrenous.  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  Underwent right BKA by Dr. Amezcua on 9/5/2019.  He followed up for suture removal early October 2019.  Soon after patient slipped and fell on 10/15/2019 and incision dehisced creating ulcer to medial, mid, lateral aspects of BKA incision site.  He followed up with Dr. Amezcua and was instructed to apply Aquacel Ag to the wound bed which his wife performed dressing changes every other day.    DIABETES HX: Diagnosed with type 2 diabetes in 1998 and is currently managing with insulin and oral diabetic agents.  Checks blood sugars 3-4 times per day and reports that these typically average 135-150.  Has had previous diabetes education.  Does have numbness in foot.  Usually wears diabetic shoes.  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         Pertinent Medical History: Diabetes, PVD, hypertension    TOBACCO USE: None    Patient's problem list, allergies, and current medications reviewed and updated in Epic      Interval History:  12/6/2019: New evaluation.  Initial provider visit.Clinic visit with Angelica MONTOYA.  Accompanied by shay Zepeda with [employer].  Patient denies seeing any erythema, edema, odor to the wound during dressing changes.  He is unsure if the wounds have decreased in size.  Currently using a wheelchair for mobilization; no use of  sock or stump protector at this time.  Patient has no further follow-ups with vascular surgery until February 2020 for LLE.    12/11/2019 : Clinic visit with JAN Alvarez.  Kevin is feeling well today.  He is accompanied  "today by Shivam, from [employer].  Patient is anxious to get his wound healed so that he can get this prosthesis.  We did discuss possible putting a VAC on these wounds to accelerate healing.  Patient stated, \"anything to get these healed up\".  He is requesting home health, as it has been difficult for his wife to change his dressings.  He states he rarely leaves his home, only for doctor's visits.    12/16/2019: Clinic visit with JAN Becerril. Patient states that they are feeling well today.  Patient denies fever, chills, nausea, vomiting, lightheadedness, dizziness, shortness of breath and chest pain.  Patient is happy with the progression of wounds both wounds have decreased in size with granulation tissue developing.  Patient would like to hold off on VAC placement at this time I am in agreement with the patient his wound seems to be progressing.  If wound stalls or ceases to progress we will revisit placement of wound VAC.    12/26/2019: Clinic visit with JAN Becerril. Patient states that they are feeling well today.  Patient denies fever, chills, nausea, vomiting, lightheadedness, dizziness, shortness of breath and chest pain. Wound with good granulation tissue.  Patient has vac and asked to bring it to next clinical visit, if wound is not progressing we will place vac.      12/30/2019:  Clinic visit with Dr. Sherman.  Doing well.  Patient and his wife brought in the VAC today.  He REALLY doesn't want to have to use it.  Denies fever, chills, nausea, vomiting or significant pain.  Complains of itching over the surrounding skin.   Just found out today that [facility] coming 3x weekly!!      REVIEW OF SYSTEMS:   Review of Systems   Constitutional: Negative for chills, diaphoresis and fever.   Respiratory: Negative for cough, shortness of breath and wheezing.    Cardiovascular: Negative for chest pain, palpitations and leg swelling.   Gastrointestinal: Negative for nausea and " vomiting.   Skin: Negative for rash.   Neurological: Positive for sensory change. Negative for dizziness, weakness and headaches.        Numb left foot   Psychiatric/Behavioral: Negative for depression. The patient is not nervous/anxious.        PHYSICAL EXAMINATION:   /58   Pulse 77   Temp 36.5 °C (97.7 °F)   Resp 18   SpO2 96%     Physical Exam   Constitutional: He is oriented to person, place, and time and well-developed, well-nourished, and in no distress.   HENT:   Head: Normocephalic.   Right Ear: External ear normal.   Left Ear: External ear normal.   Eyes: Pupils are equal, round, and reactive to light. Conjunctivae are normal.   Neck: Normal range of motion.   Cardiovascular: Normal rate.   +2 right popliteal;  Unable to palpate left tibial pulses   Pulmonary/Chest: Effort normal.   Abdominal: Soft.   Musculoskeletal:         General: No edema.   Neurological: He is alert and oriented to person, place, and time.   Insensate to left foot   Skin: Skin is warm and dry.   Ulcer right BKA along incision at lateral and middle  See wound flowsheet for further detail   Psychiatric: Mood, memory, affect and judgment normal.   Nursing note and vitals reviewed.    WOUND ASSESSMENT      Wound 12/06/19 Full Thickness Wound Right BKA incision medial (Active)   Wound Image    12/26/2019  1:23 PM   Site Assessment Red;Yellow 12/30/2019  2:30 PM   Aby-wound Assessment Scar tissue;Other (Comment) 12/30/2019  2:30 PM   Margins Attached edges 12/30/2019  2:30 PM   Wound Length (cm) 0.6 cm 12/30/2019  2:30 PM   Wound Width (cm) 3 cm 12/30/2019  2:30 PM   Wound Depth (cm) 0.4 cm 12/30/2019  2:30 PM   Wound Surface Area (cm^2) 1.8 cm^2 12/30/2019  2:30 PM   Post Wound Length (cm) 1 cm 12/26/2019  1:23 PM    Post Wound Width (cm) 2.7 cm 12/26/2019  1:23 PM   Post Wound Depth (cm) 0.4 cm 12/26/2019  1:23 PM   Post Wound Surface Area (cm^2) 2.7 cm^2 12/26/2019  1:23 PM   Tunneling 0 cm 12/16/2019  4:00 PM   Undermining  0.2 cm 12/16/2019  4:00 PM   Closure Secondary intention 12/6/2019  1:00 PM   Drainage Amount Moderate 12/30/2019  2:30 PM   Drainage Description Serosanguineous 12/30/2019  2:30 PM   Non-staged Wound Description Full thickness 12/30/2019  2:30 PM   Treatments Cleansed;Pharmaceutical agent;Other (Comment) 12/30/2019  2:30 PM   Cleansing Normal Saline Irrigation 12/30/2019  2:30 PM   Periwound Protectant Skin Protectant wipes to Periwound 12/26/2019  1:23 PM   Dressing Options Collagen Dressing;Hydrofiber Silver;Nonadhesive Foam;Roll Gauze;Hypafix Tape;Other (Comments) 12/26/2019  1:23 PM   Dressing Cleansing/Solutions Normal Saline 12/26/2019  1:23 PM   Dressing Changed Changed 12/16/2019  4:00 PM   Dressing Status Clean;Dry;Intact 12/16/2019  4:00 PM   Dressing Change Frequency Every 72 hrs 12/16/2019  4:00 PM   WOUND NURSE ONLY - Odor None 12/30/2019  2:30 PM   WOUND NURSE ONLY - Exposed Structures None 12/30/2019  2:30 PM   WOUND NURSE ONLY - Tissue Type and Percentage Pre: 80% red, 20% yellow 12/30/2019  2:30 PM       Wound 12/06/19 Full Thickness Wound Right BKA incision mid (Active)   Wound Image    12/26/2019  1:23 PM   Site Assessment Red;Yellow 12/30/2019  2:30 PM   Aby-wound Assessment Scar tissue;Other (Comment) 12/30/2019  2:30 PM   Margins Attached edges 12/30/2019  2:30 PM   Wound Length (cm) 0.2 cm 12/30/2019  2:30 PM   Wound Width (cm) 0.5 cm 12/30/2019  2:30 PM   Wound Depth (cm) 0.2 cm 12/30/2019  2:30 PM   Wound Surface Area (cm^2) 0.1 cm^2 12/30/2019  2:30 PM   Post Wound Length (cm) 0.4 cm 12/26/2019  1:23 PM    Post Wound Width (cm) 0.6 cm 12/26/2019  1:23 PM   Post Wound Depth (cm) 0.2 cm 12/26/2019  1:23 PM   Post Wound Surface Area (cm^2) 0.24 cm^2 12/26/2019  1:23 PM   Tunneling 0 cm 12/16/2019  4:00 PM   Undermining 0 cm 12/16/2019  4:00 PM   Closure Secondary intention 12/6/2019  1:00 PM   Drainage Amount Moderate 12/30/2019  2:30 PM   Drainage Description Serosanguineous 12/30/2019   2:30 PM   Non-staged Wound Description Full thickness 12/30/2019  2:30 PM   Treatments Cleansed;Pharmaceutical agent;Other (Comment) 12/30/2019  2:30 PM   Cleansing Normal Saline Irrigation 12/30/2019  2:30 PM   Periwound Protectant Skin Protectant wipes to Periwound 12/26/2019  1:23 PM   Dressing Options Collagen Dressing;Hydrofiber Silver;Nonadhesive Foam;Roll Gauze;Hypafix Tape;Other (Comments) 12/26/2019  1:23 PM   Dressing Cleansing/Solutions Normal Saline 12/26/2019  1:23 PM   Dressing Changed Changed 12/16/2019  4:00 PM   Dressing Status Clean;Dry;Intact 12/16/2019  4:00 PM   Dressing Change Frequency Every 72 hrs 12/16/2019  4:00 PM   WOUND NURSE ONLY - Odor None 12/30/2019  2:30 PM   WOUND NURSE ONLY - Exposed Structures None 12/30/2019  2:30 PM   WOUND NURSE ONLY - Tissue Type and Percentage Pre: 80% yellow, 20% red 12/30/2019  2:30 PM           PROCEDURE: Excisional debridement of wounds to right BKA stump  -2% viscous lidocaine applied topically to wound bed for approximately 5 minutes prior to debridement  -Curette used to debride wound bed.  Excisional debridement was performed to remove devitalized tissue until healthy, bleeding tissue was visualized.   Entire surface of all  wounds,  1.9 cm2 debrided.  Tissue debrided into the subcutaneous layer.    -Bleeding controlled with manual pressure.    -Wound care completed by [employer]ES, refer to flowsheet  -Patient tolerated the procedure well, without c/o pain or discomfort.         Pertinent Labs and Diagnostics:    Labs:     A1c:   Lab Results   Component Value Date/Time    HBA1C 6.6 (H) 10/21/2019 09:36 AM                 ASSESSMENT AND PLAN:     1. Ulcer of right lower extremity with fat layer exposed (HCC)  Comments: S/P fall mid October 2019 suffered from full-thickness ulcers to right BKA incision line    12/30/19: Irritation to skin surrounding wound.  Wound surface area too small to warrant NPWT dressing. I think the surrounding tissue would not  react well with occlusive, suction chainge.   -Excisional debridement performed today.  Medically necessary to promote wound healing.  Dressings changed to just Aquacel and explicit instruction for home health to change only once weekly, then return here for further assessment inbetween.  -Patient to follow-up at wound clinic weekly.  Dressing to be changed once in between visits.  And PRN drainage.  -Advised to go to ER for any increased redness, swelling, drainage, or odor, or if patient develops fever, chills, nausea or vomiting.  -Home health providing wound care 1 time a week.    -Wound is progressing at this time will hold wound VAC.  I told them to send it back.    Wound care: Hydrofiber silver to manage exudate and bioburden, foam cover dressing, roll gauze wrap, patients own stump protector.        2. S/P BKA (below knee amputation) unilateral, right ([facility])  Comments: Right BKA 9/5/2019 by Dr. Amezcua    12/30/2019: Patient is not currently wearing nutmegger device.  -Discussed protecting stump with stump protector and using  sock.  Patient to continue to be nonweightbearing to RLE  - Established with [facility]  - Continue physical therapy    3. Controlled type 2 diabetes mellitus with other skin ulcer, with long-term current use of insulin ([facility])  A1c 6.6% on 10/21/2019    12/30/2019: Patient reports his blood sugars are consistently in the 120s      4. Diabetic mononeuropathy associated with diabetes mellitus due to underlying condition ([facility])-m rx gabapentin    12/30/2019: Importance of protecting his left lower extremity discussed.    -Reminded patient to check his foot at least daily, always wear protective footwear, seek medical attention immediately for any wounds      Please note that this note may have been created using voice recognition software. I have worked with technical experts from [employer] to optimize the interface.  I have made every reasonable attempt to correct obvious errors, but  there may be errors of grammar and possibly content that I did not discover before finalizing the note.

## 2019-12-30 NOTE — PATIENT INSTRUCTIONS
Keep dressing clean and dry and cover while bathing. Only change dressing if over saturated, soiled or its falling off.     Should you experience any significant changes in your wound(s) such as infection (redness, swelling, localized heat, increased pain, fever >101 F, chills) or have any questions regarding your home care instructions, please contact the wound center [phone]. If after hours, contact your primary care physician or go the hospital emergency room.

## 2019-12-30 NOTE — LETTER
2019      To: [facility]    Re: Kevin Jackson,  1945      Wound Care Order:    -Remove old dressing, clean wound bed with normal saline or wound cleanser, then pat dry with clean and dry gauze.  -Apply skin prep to periwound.  -Cover wounds with silver hydrofiber and nonadhesive foam.  -Secure with roll gauze then tape the top and the bottom edges of the roll gauze to secure it on to the leg.  -Please change 1 time per week on  or . (Pt to continue weekly visit at [facility] and will get total of 2 dressing changes per week.)    If you have any questions, please contact us.         Thank you,            Lorri Sherman M.D.

## 2020-01-07 ENCOUNTER — APPOINTMENT (OUTPATIENT)
Dept: WOUND CARE | Facility: MEDICAL CENTER | Age: 75
End: 2020-01-07
Attending: SURGERY
Payer: MEDICARE

## 2020-01-10 ENCOUNTER — OFFICE VISIT (OUTPATIENT)
Dept: WOUND CARE | Facility: MEDICAL CENTER | Age: 75
End: 2020-01-10
Attending: SURGERY
Payer: MEDICARE

## 2020-01-10 VITALS
TEMPERATURE: 97.6 F | OXYGEN SATURATION: 96 % | RESPIRATION RATE: 18 BRPM | SYSTOLIC BLOOD PRESSURE: 115 MMHG | HEART RATE: 73 BPM | DIASTOLIC BLOOD PRESSURE: 73 MMHG

## 2020-01-10 DIAGNOSIS — Z89.511 S/P BKA (BELOW KNEE AMPUTATION) UNILATERAL, RIGHT (HCC): ICD-10-CM

## 2020-01-10 DIAGNOSIS — L97.912 ULCER OF RIGHT LOWER EXTREMITY WITH FAT LAYER EXPOSED (HCC): ICD-10-CM

## 2020-01-10 DIAGNOSIS — E11.622 CONTROLLED TYPE 2 DIABETES MELLITUS WITH OTHER SKIN ULCER, WITH LONG-TERM CURRENT USE OF INSULIN (HCC): ICD-10-CM

## 2020-01-10 DIAGNOSIS — E08.41 DIABETIC MONONEUROPATHY ASSOCIATED WITH DIABETES MELLITUS DUE TO UNDERLYING CONDITION (HCC): ICD-10-CM

## 2020-01-10 DIAGNOSIS — Z79.4 CONTROLLED TYPE 2 DIABETES MELLITUS WITH OTHER SKIN ULCER, WITH LONG-TERM CURRENT USE OF INSULIN (HCC): ICD-10-CM

## 2020-01-10 PROBLEM — E83.52 HYPERCALCEMIA: Status: ACTIVE | Noted: 2020-01-10

## 2020-01-10 PROBLEM — I70.219 ATHEROSCLEROSIS OF NATIVE ARTERY OF EXTREMITY WITH INTERMITTENT CLAUDICATION (HCC): Status: ACTIVE | Noted: 2020-01-10

## 2020-01-10 PROCEDURE — 11042 DBRDMT SUBQ TIS 1ST 20SQCM/<: CPT | Performed by: NURSE PRACTITIONER

## 2020-01-10 PROCEDURE — 11042 DBRDMT SUBQ TIS 1ST 20SQCM/<: CPT

## 2020-01-10 ASSESSMENT — ENCOUNTER SYMPTOMS
SENSORY CHANGE: 1
NAUSEA: 0
CONSTIPATION: 0
FEVER: 0
CHILLS: 0
DIARRHEA: 0
SHORTNESS OF BREATH: 0
VOMITING: 0
COUGH: 0

## 2020-01-10 NOTE — LETTER
January 10, 2020        Kevin Jackson : 1945    Wound Care Instructions:    · Remove old dressings.  · Cleanse wounds with normal saline and gauze. Pat dry.  · Apply collagen dressings to wounds.  · Cover collagen dressings with Hydrofiber Silver.  · Secure Hydrofiber Silver with roll gauze.  · Secure roll gauze to itself with hypafix tape.              ________________________________  Aletha MONTOYA

## 2020-01-10 NOTE — PATIENT INSTRUCTIONS
-Keep dressings clean, dry and covered while bathing. Only change dressings if they become over saturated, soiled or fall off. Otherwise, your home health nurse will change your dressings between wound clinic visits.    -Should you experience any significant changes in your wound(s), such as infection (redness, swelling, localized heat, increased pain, fever > 101 F, chills) or have any questions regarding your home care instructions, please contact the wound center at [phone]. If after hours, contact your primary care physician or go to the hospital emergency room.

## 2020-01-11 NOTE — PROGRESS NOTES
Provider Encounter- Full Thickness wound    HISTORY OF PRESENT ILLNESS  Wound History:    START OF CARE IN CLINIC: 12/6/2019    REFERRING PROVIDER: Shivam Amezcua MD     WOUND- Full Thickness Wound x3   LOCATION: Right BKA-medial, middle, lateral   HISTORY: Developed right second toe ulcer that became gangrenous.  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  Underwent right BKA by Dr. Amezcua on 9/5/2019.  He followed up for suture removal early October 2019.  Soon after patient slipped and fell on 10/15/2019 and incision dehisced creating ulcer to medial, mid, lateral aspects of BKA incision site.  He followed up with Dr. Amezcua and was instructed to apply Aquacel Ag to the wound bed which his wife performed dressing changes every other day.    DIABETES HX: Diagnosed with type 2 diabetes in 1998 and is currently managing with insulin and oral diabetic agents.  Checks blood sugars 3-4 times per day and reports that these typically average 135-150.  Has had previous diabetes education.  Does have numbness in foot.  Usually wears diabetic shoes.  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         Pertinent Medical History: Diabetes, PVD, hypertension    TOBACCO USE: None    Patient's problem list, allergies, and current medications reviewed and updated in Epic      Interval History:  12/6/2019: New evaluation.  Initial provider visit.Clinic visit with Angelica MONTOYA.  Accompanied by shay Zepeda with [facility].  Patient denies seeing any erythema, edema, odor to the wound during dressing changes.  He is unsure if the wounds have decreased in size.  Currently using a wheelchair for mobilization; no use of  sock or stump protector at this time.  Patient has no further follow-ups with vascular surgery until February 2020 for LLE.    12/11/2019 : Clinic visit with JAN Alvarez.  Kevin is feeling well today.  He is accompanied  "today by Shivam, from [employer].  Patient is anxious to get his wound healed so that he can get this prosthesis.  We did discuss possible putting a VAC on these wounds to accelerate healing.  Patient stated, \"anything to get these healed up\".  He is requesting home health, as it has been difficult for his wife to change his dressings.  He states he rarely leaves his home, only for doctor's visits.    12/16/2019: Clinic visit with JAN Becerril. Patient states that they are feeling well today.  Patient denies fever, chills, nausea, vomiting, lightheadedness, dizziness, shortness of breath and chest pain.  Patient is happy with the progression of wounds both wounds have decreased in size with granulation tissue developing.  Patient would like to hold off on VAC placement at this time I am in agreement with the patient his wound seems to be progressing.  If wound stalls or ceases to progress we will revisit placement of wound VAC.    12/26/2019: Clinic visit with JAN Becerril. Patient states that they are feeling well today.  Patient denies fever, chills, nausea, vomiting, lightheadedness, dizziness, shortness of breath and chest pain. Wound with good granulation tissue.  Patient has vac and asked to bring it to next clinical visit, if wound is not progressing we will place vac.      12/30/2019:  Clinic visit with Dr. Sherman.  Doing well.  Patient and his wife brought in the VAC today.  He REALLY doesn't want to have to use it.  Denies fever, chills, nausea, vomiting or significant pain.  Complains of itching over the surrounding skin.   Just found out today that [facility] coming 3x weekly!!    1/10/2020 : Clinic visit with JAN Alvarez.  Kevin presents the clinic today coming by his wife.  He states he is feeling well, much happier without the VAC.  He states his blood sugar today was 151.  [facility] is seeing him 1 time per week in between clinic visits.      REVIEW OF " SYSTEMS:   Review of Systems   Constitutional: Negative for chills and fever.   Respiratory: Negative for cough and shortness of breath.    Cardiovascular: Negative for chest pain and leg swelling.   Gastrointestinal: Negative for constipation, diarrhea, nausea and vomiting.   Neurological: Positive for sensory change.        Numb left foot       PHYSICAL EXAMINATION:   /73   Pulse 73   Temp 36.4 °C (97.6 °F) (Temporal)   Resp 18   SpO2 96%     Physical Exam   Constitutional: He is oriented to person, place, and time and well-developed, well-nourished, and in no distress.   HENT:   Head: Normocephalic.   Left Ear: External ear normal.   Eyes: Pupils are equal, round, and reactive to light. Conjunctivae are normal.   Cardiovascular: Intact distal pulses.   +2 right popliteal;  Left DP and PT pulses palpable, 2+   Pulmonary/Chest: Effort normal.   Musculoskeletal:         General: No edema.      Comments: Right BKA  Healed left TMA   Neurological: He is alert and oriented to person, place, and time.   1/10/2020: Monofilament testing of left foot in clinic  Left foot: 0/8 sites sensed   Skin: Skin is warm and dry.   Ulcer right BKA along incision at lateral and middle  Refer to wound flowsheet and photos   Psychiatric: Mood, memory, affect and judgment normal.   Vitals reviewed.    1/10/2020:  Monofilament testing with a 10 gram force: sensation intact: decreased on left  Visual Inspection: Feet with maceration, ulcers, fissures.  Pedal pulses: intact bilaterally    WOUND ASSESSMENT      Wound 12/06/19 Full Thickness Wound Right BKA incision medial (Active)   Wound Image    1/10/2020  2:15 PM   Site Assessment Red;Yellow 1/10/2020  2:15 PM   Aby-wound Assessment Scar tissue 1/10/2020  2:15 PM   Margins Attached edges 1/10/2020  2:15 PM   Wound Length (cm) 0.5 cm 1/10/2020  2:15 PM   Wound Width (cm) 1.7 cm 1/10/2020  2:15 PM   Wound Depth (cm) 0.5 cm 1/10/2020  2:15 PM   Wound Surface Area (cm^2) 0.85 cm^2  1/10/2020  2:15 PM   Post Wound Length (cm) 0.5 cm 1/10/2020  2:15 PM    Post Wound Width (cm) 1.8 cm 1/10/2020  2:15 PM   Post Wound Depth (cm) 0.5 cm 1/10/2020  2:15 PM   Post Wound Surface Area (cm^2) 0.9 cm^2 1/10/2020  2:15 PM   Tunneling 0 cm 12/16/2019  4:00 PM   Undermining 0.2 cm 12/16/2019  4:00 PM   Closure Secondary intention 12/6/2019  1:00 PM   Drainage Amount Moderate 1/10/2020  2:15 PM   Drainage Description Serosanguineous 1/10/2020  2:15 PM   Non-staged Wound Description Full thickness 1/10/2020  2:15 PM   Treatments Cleansed;Pharmaceutical agent;Other (Comment) 1/10/2020  2:15 PM   Cleansing Normal Saline Irrigation 1/10/2020  2:15 PM   Periwound Protectant Skin Moisturizer 1/10/2020  2:15 PM   Dressing Options Collagen Dressing;Hydrofiber Silver;Roll Gauze;Hypafix Tape;Other (Comments) 1/10/2020  2:15 PM   Dressing Cleansing/Solutions Normal Saline 12/26/2019  1:23 PM   Dressing Changed Changed 1/10/2020  2:15 PM   Dressing Status Clean;Dry;Intact 12/16/2019  4:00 PM   Dressing Change Frequency Every 72 hrs 12/16/2019  4:00 PM   WOUND NURSE ONLY - Odor None 1/10/2020  2:15 PM   WOUND NURSE ONLY - Exposed Structures None 1/10/2020  2:15 PM   WOUND NURSE ONLY - Tissue Type and Percentage Pre-debridement: 20% red, 80% yellow. 1/10/2020  2:15 PM       Wound 12/06/19 Full Thickness Wound Right BKA incision mid (Active)   Wound Image    1/10/2020  2:15 PM   Site Assessment Red;Yellow 1/10/2020  2:15 PM   Aby-wound Assessment Scar tissue 1/10/2020  2:15 PM   Margins Attached edges 1/10/2020  2:15 PM   Wound Length (cm) 0.2 cm 1/10/2020  2:15 PM   Wound Width (cm) 0.3 cm 1/10/2020  2:15 PM   Wound Depth (cm) 0.2 cm 1/10/2020  2:15 PM   Wound Surface Area (cm^2) 0.06 cm^2 1/10/2020  2:15 PM   Post Wound Length (cm) 0.2 cm 1/10/2020  2:15 PM    Post Wound Width (cm) 0.3 cm 1/10/2020  2:15 PM   Post Wound Depth (cm) 0.3 cm 1/10/2020  2:15 PM   Post Wound Surface Area (cm^2) 0.06 cm^2 1/10/2020  2:15 PM      Tunneling 0 cm 12/16/2019  4:00 PM   Undermining 0 cm 12/16/2019  4:00 PM   Closure Secondary intention 12/6/2019  1:00 PM   Drainage Amount Small 1/10/2020  2:15 PM   Drainage Description Serosanguineous 1/10/2020  2:15 PM   Non-staged Wound Description Full thickness 1/10/2020  2:15 PM   Treatments Cleansed;Pharmaceutical agent;Other (Comment) 1/10/2020  2:15 PM   Cleansing Normal Saline Irrigation 1/10/2020  2:15 PM   Periwound Protectant Skin Moisturizer 1/10/2020  2:15 PM   Dressing Options Collagen Dressing;Hydrofiber Silver;Roll Gauze;Hypafix Tape;Other (Comments) 1/10/2020  2:15 PM   Dressing Cleansing/Solutions Normal Saline 12/26/2019  1:23 PM   Dressing Changed Changed 1/10/2020  2:15 PM   Dressing Status Clean;Dry;Intact 12/16/2019  4:00 PM   Dressing Change Frequency Every 72 hrs 12/16/2019  4:00 PM   WOUND NURSE ONLY - Odor None 1/10/2020  2:15 PM   WOUND NURSE ONLY - Exposed Structures None 1/10/2020  2:15 PM   WOUND NURSE ONLY - Tissue Type and Percentage Pre-debridement: 50% red, 50% yellow. 1/10/2020  2:15 PM                 PROCEDURE: Excisional debridement of wounds to right BKA stump wounds  -2% viscous lidocaine applied topically to wound beds for approximately 5 minutes prior to debridement  -Curette used to debride wound bed.  Excisional debridement was performed to remove devitalized tissue until healthy, bleeding tissue was visualized.   Entire surface of both wounds,  0.96 cm2 debrided.  Tissue debrided into the subcutaneous layer.    -Bleeding controlled with manual pressure.    -Wound care completed by wound RN, refer to flowsheet  -Patient tolerated the procedure well, without c/o pain or discomfort.         Pertinent Labs and Diagnostics:    Labs:     A1c:   Lab Results   Component Value Date/Time    HBA1C 6.6 (H) 10/21/2019 09:36 AM                 ASSESSMENT AND PLAN:     1. Ulcer of right lower extremity with fat layer exposed (HCC)  Comments: S/P fall mid October 2019 suffered  from full-thickness ulcers to right BKA incision line    1/10/2020: Periwound skin irritation improved since discontinuation of VAC.  Area of both wounds has decreased in size assessment.  -Excisional debridement of wound in clinic today, medically necessary to promote wound healing.  -Patient to return to clinic weekly for assessment and debridement  -Home health to change dressing 1 time per week in between clinic visits    Wound care: Hydrofiber silver to manage exudate and bioburden, foam cover dressing, roll gauze wrap, patients own stump protector.        2. S/P BKA (below knee amputation) unilateral, right ([facility])  Comments: Right BKA 9/5/2019 by Dr. Amezcua    1/10/2020: Patient is still not wearing that bigger device  -[facility], will begin process for lower extremity stasis once wound is healed    3. Controlled type 2 diabetes mellitus with other skin ulcer, with long-term current use of insulin ([facility])  A1c 6.6% on 10/21/2019    1/10/2020: Patient reports his blood sugar today was 151, higher than usual  -Encouraged to keep his blood sugars low 120 in order to optimize wound healing      4. Diabetic mononeuropathy associated with diabetes mellitus due to underlying condition ([facility])-m rx gabapentin    1/10/2020: Importance of protecting his left lower extremity discussed.    -Monofilament testing of left foot in clinic today, left foot completely insensate  -Reminded patient to check his foot at least daily, always wear protective footwear, seek medical attention immediately for any wounds.  He has a new shoe with orthotic insole      Please note that this note may have been created using voice recognition software. I have worked with technical experts from [employer] to optimize the interface.  I have made every reasonable attempt to correct obvious errors, but there may be errors of grammar and possibly content that I did not discover before finalizing the note.

## 2020-01-15 ENCOUNTER — OFFICE VISIT (OUTPATIENT)
Dept: WOUND CARE | Facility: MEDICAL CENTER | Age: 75
End: 2020-01-15
Attending: SURGERY
Payer: MEDICARE

## 2020-01-15 DIAGNOSIS — Z79.4 CONTROLLED TYPE 2 DIABETES MELLITUS WITH OTHER SKIN ULCER, WITH LONG-TERM CURRENT USE OF INSULIN (HCC): ICD-10-CM

## 2020-01-15 DIAGNOSIS — E11.622 CONTROLLED TYPE 2 DIABETES MELLITUS WITH OTHER SKIN ULCER, WITH LONG-TERM CURRENT USE OF INSULIN (HCC): ICD-10-CM

## 2020-01-15 DIAGNOSIS — Z89.511 S/P BKA (BELOW KNEE AMPUTATION) UNILATERAL, RIGHT (HCC): ICD-10-CM

## 2020-01-15 DIAGNOSIS — E08.41 DIABETIC MONONEUROPATHY ASSOCIATED WITH DIABETES MELLITUS DUE TO UNDERLYING CONDITION (HCC): ICD-10-CM

## 2020-01-15 DIAGNOSIS — L97.912 ULCER OF RIGHT LOWER EXTREMITY WITH FAT LAYER EXPOSED (HCC): ICD-10-CM

## 2020-01-15 PROCEDURE — 11042 DBRDMT SUBQ TIS 1ST 20SQCM/<: CPT

## 2020-01-15 PROCEDURE — 11042 DBRDMT SUBQ TIS 1ST 20SQCM/<: CPT | Performed by: NURSE PRACTITIONER

## 2020-01-15 ASSESSMENT — ENCOUNTER SYMPTOMS
VOMITING: 0
CONSTIPATION: 0
SHORTNESS OF BREATH: 0
FEVER: 0
DIARRHEA: 0
SENSORY CHANGE: 1
CHILLS: 0
NAUSEA: 0
COUGH: 0

## 2020-01-16 NOTE — PATIENT INSTRUCTIONS
Should you experience any significant changes in your wound(s) such as infection (redness, swelling, localized heat, increased pain, fever >101 F, chills) or have any questions regarding your home care instructions, please contact the wound center [phone]. If after hours, contact your primary care physician or go the hospital emergency room.  Keep dressing clean and dry and cover while bathing. Only change dressing if over saturated, soiled or its falling off.

## 2020-01-16 NOTE — PROGRESS NOTES
Provider Encounter- Full Thickness wound    HISTORY OF PRESENT ILLNESS  Wound History:    START OF CARE IN CLINIC: 12/6/2019    REFERRING PROVIDER: Shivam Amezcua MD     WOUND- Full Thickness Wound x3   LOCATION: Right BKA-medial, middle, lateral   HISTORY: Developed right second toe ulcer that became gangrenous.  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  Underwent right BKA by Dr. Amezcua on 9/5/2019.  He followed up for suture removal early October 2019.  Soon after patient slipped and fell on 10/15/2019 and incision dehisced creating ulcer to medial, mid, lateral aspects of BKA incision site.  He followed up with Dr. Amezcua and was instructed to apply Aquacel Ag to the wound bed which his wife performed dressing changes every other day.    DIABETES HX: Diagnosed with type 2 diabetes in 1998 and is currently managing with insulin and oral diabetic agents.  Checks blood sugars 3-4 times per day and reports that these typically average 135-150.  Has had previous diabetes education.  Does have numbness in foot.  Usually wears diabetic shoes.  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         Pertinent Medical History: Diabetes, PVD, hypertension    TOBACCO USE: None    Patient's problem list, allergies, and current medications reviewed and updated in Epic      Interval History:  12/6/2019: New evaluation.  Initial provider visit.Clinic visit with Angelica MONTOYA.  Accompanied by shay Zepeda with [facility].  Patient denies seeing any erythema, edema, odor to the wound during dressing changes.  He is unsure if the wounds have decreased in size.  Currently using a wheelchair for mobilization; no use of  sock or stump protector at this time.  Patient has no further follow-ups with vascular surgery until February 2020 for LLE.    12/11/2019 : Clinic visit with JAN Alvarez.  Kevin is feeling well today.  He is accompanied  "today by Shivam, from [employer].  Patient is anxious to get his wound healed so that he can get this prosthesis.  We did discuss possible putting a VAC on these wounds to accelerate healing.  Patient stated, \"anything to get these healed up\".  He is requesting home health, as it has been difficult for his wife to change his dressings.  He states he rarely leaves his home, only for doctor's visits.    12/16/2019: Clinic visit with JAN Becerril. Patient states that they are feeling well today.  Patient denies fever, chills, nausea, vomiting, lightheadedness, dizziness, shortness of breath and chest pain.  Patient is happy with the progression of wounds both wounds have decreased in size with granulation tissue developing.  Patient would like to hold off on VAC placement at this time I am in agreement with the patient his wound seems to be progressing.  If wound stalls or ceases to progress we will revisit placement of wound VAC.    12/26/2019: Clinic visit with JAN Becerril. Patient states that they are feeling well today.  Patient denies fever, chills, nausea, vomiting, lightheadedness, dizziness, shortness of breath and chest pain. Wound with good granulation tissue.  Patient has vac and asked to bring it to next clinical visit, if wound is not progressing we will place vac.      12/30/2019:  Clinic visit with Dr. Sherman.  Doing well.  Patient and his wife brought in the VAC today.  He REALLY doesn't want to have to use it.  Denies fever, chills, nausea, vomiting or significant pain.  Complains of itching over the surrounding skin.   Just found out today that SAVO coming 3x weekly!!    1/10/2020 : Clinic visit with JAN Alvarez.  Kevin presents the clinic today accompanied by his wife.  He states he is feeling well, much happier without the VAC.  He states his blood sugar today was 151.  [employer] is seeing him 1 time per week in between clinic visits.    1/15/2020 : " Clinic visit with JAN Alvarez.  Kevin is feeling well today, satisfied with the progression of his wound.  Reports that his blood sugar was in the mid 140s.      REVIEW OF SYSTEMS:   Review of Systems   Constitutional: Negative for chills and fever.   Respiratory: Negative for cough and shortness of breath.    Cardiovascular: Negative for chest pain and leg swelling.   Gastrointestinal: Negative for constipation, diarrhea, nausea and vomiting.   Neurological: Positive for sensory change.        Numb left foot       PHYSICAL EXAMINATION:   There were no vitals taken for this visit.    Physical Exam   Constitutional: He is oriented to person, place, and time and well-developed, well-nourished, and in no distress.   HENT:   Head: Normocephalic.   Left Ear: External ear normal.   Eyes: Pupils are equal, round, and reactive to light. Conjunctivae are normal.   Cardiovascular: Intact distal pulses.   Right popliteal pulse 2+   Pulmonary/Chest: Effort normal.   Musculoskeletal:         General: No edema.      Comments: Right BKA  Healed left TMA   Neurological: He is alert and oriented to person, place, and time.   1/10/2020: Monofilament testing of left foot in clinic  Left foot: 0/8 sites sensed   Skin: Skin is warm and dry.   Ulcer right BKA along incision at lateral and middle  Refer to wound flowsheet and photos   Psychiatric: Mood, memory, affect and judgment normal.   Vitals reviewed.    1/10/2020:  Monofilament testing with a 10 gram force: sensation intact: decreased on left  Visual Inspection: Feet with maceration, ulcers, fissures.  Pedal pulses: intact bilaterally    WOUND ASSESSMENT      Wound 12/06/19 Full Thickness Wound Right BKA incision medial (Active)   Wound Image    1/15/2020  4:00 PM   Site Assessment Red;Yellow 1/15/2020  4:00 PM   Aby-wound Assessment Scar tissue 1/15/2020  4:00 PM   Margins Attached edges 1/15/2020  4:00 PM   Wound Length (cm) 0.5 cm 1/15/2020  4:00 PM   Wound Width (cm) 1.9  cm 1/15/2020  4:00 PM   Wound Depth (cm) 0.4 cm 1/15/2020  4:00 PM   Wound Surface Area (cm^2) 0.95 cm^2 1/15/2020  4:00 PM   Post Wound Length (cm) 0.5 cm 1/15/2020  4:00 PM    Post Wound Width (cm) 2 cm 1/15/2020  4:00 PM   Post Wound Depth (cm) 0.5 cm 1/15/2020  4:00 PM   Post Wound Surface Area (cm^2) 1 cm^2 1/15/2020  4:00 PM   Tunneling 0 cm 1/15/2020  4:00 PM   Undermining 0 cm 1/15/2020  4:00 PM   Closure Secondary intention 1/15/2020  4:00 PM   Drainage Amount Moderate 1/15/2020  4:00 PM   Drainage Description Serosanguineous 1/15/2020  4:00 PM   Non-staged Wound Description Full thickness 1/15/2020  4:00 PM   Treatments Cleansed 1/15/2020  4:00 PM   Cleansing Normal Saline Irrigation 1/15/2020  4:00 PM   Periwound Protectant Skin Moisturizer 1/15/2020  4:00 PM   Dressing Options Collagen Dressing;Hydrofiber Silver;Roll Gauze;Hypafix Tape;Other (Comments) 1/15/2020  4:00 PM   Dressing Cleansing/Solutions Normal Saline 1/15/2020  4:00 PM   Dressing Changed Changed 1/15/2020  4:00 PM   Dressing Status Clean;Dry;Intact 1/15/2020  4:00 PM   Dressing Change Frequency Every 72 hrs 12/16/2019  4:00 PM   WOUND NURSE ONLY - Odor None 1/15/2020  4:00 PM   WOUND NURSE ONLY - Exposed Structures None 1/15/2020  4:00 PM   WOUND NURSE ONLY - Tissue Type and Percentage pre debridement - 80% red viable, 20% yellow; post debridement 100% red viable 1/15/2020  4:00 PM       Wound 12/06/19 Full Thickness Wound Right BKA incision mid (Active)   Wound Image   1/15/2020  4:00 PM   Site Assessment Dry;Brown 1/15/2020  4:00 PM   Aby-wound Assessment Dry;Intact 1/15/2020  4:00 PM   Margins Attached edges 1/15/2020  4:00 PM   Wound Length (cm) 0.2 cm 1/10/2020  2:15 PM   Wound Width (cm) 0.3 cm 1/10/2020  2:15 PM   Wound Depth (cm) 0.2 cm 1/10/2020  2:15 PM   Wound Surface Area (cm^2) 0.06 cm^2 1/10/2020  2:15 PM   Post Wound Length (cm) 0.2 cm 1/10/2020  2:15 PM    Post Wound Width (cm) 0.3 cm 1/10/2020  2:15 PM   Post Wound Depth  (cm) 0.3 cm 1/10/2020  2:15 PM   Post Wound Surface Area (cm^2) 0.06 cm^2 1/10/2020  2:15 PM   Tunneling 0 cm 12/16/2019  4:00 PM   Undermining 0 cm 12/16/2019  4:00 PM   Closure Secondary intention 12/6/2019  1:00 PM   Drainage Amount None 1/15/2020  4:00 PM   Treatments Cleansed;Pharmaceutical agent;Other (Comment) 1/10/2020  2:15 PM   Dressing Cleansing/Solutions Normal Saline 12/26/2019  1:23 PM   Dressing Changed Changed 1/10/2020  2:15 PM   Dressing Status Clean;Dry;Intact 12/16/2019  4:00 PM   Dressing Change Frequency Every 72 hrs 12/16/2019  4:00 PM   WOUND NURSE ONLY - Odor None 1/10/2020  2:15 PM   WOUND NURSE ONLY - Exposed Structures None 1/10/2020  2:15 PM   WOUND NURSE ONLY - Tissue Type and Percentage Pre-debridement: 50% red, 50% yellow. 1/10/2020  2:15 PM                      PROCEDURE: Excisional debridement of medial right BKA stump wound  -2% viscous lidocaine applied topically to wound bed for approximately 5 minutes prior to debridement  -Curette used to debride wound bed.  Excisional debridement was performed to remove devitalized tissue until healthy, bleeding tissue was visualized.   Entire surface of  wound,  1.0 cm2 debrided.  Tissue debrided into the subcutaneous layer.    -Bleeding controlled with manual pressure.    -Wound care completed by wound RN, refer to flowsheet  -Patient tolerated the procedure well, without c/o pain or discomfort.         Pertinent Labs and Diagnostics:    Labs:     A1c:   Lab Results   Component Value Date/Time    HBA1C 6.6 (H) 10/21/2019 09:36 AM                 ASSESSMENT AND PLAN:     1. Ulcer of right lower extremity with fat layer exposed (HCC)  Comments: S/P fall mid October 2019 suffered from full-thickness ulcers to right BKA incision line    1/15/2020: Lateral wound is essentially resolved today.  Area of medial wound essentially unchanged.  -Excisional debridement of wound in clinic today, medically necessary to promote wound healing.  -Patient to  return to clinic weekly for assessment and debridement  -Home health to change dressing 1 time per week in between clinic visits    Wound care: Collagen to promote granulation, Hydrofiber silver to manage exudate and bioburden, foam cover dressing, roll gauze wrap, patients own shrink sock.        2. S/P BKA (below knee amputation) unilateral, right ([facility])  Comments: Right BKA 9/5/2019 by Dr. Amezcua due to gangrenous foot wound and untreatable PVD    1/15/2020:  -He is already established with [facility], will begin process for lower extremity stasis once wound is healed    3. Controlled type 2 diabetes mellitus with other skin ulcer, with long-term current use of insulin ([facility])  A1c 6.6% on 10/21/2019    1/15/2020: Patient reports his blood sugar today was around 140  -Encouraged to keep his blood sugars low 120 in order to optimize wound healing      4. Diabetic mononeuropathy associated with diabetes mellitus due to underlying condition ([facility])-m rx gabapentin  Comments: Left foot insensate    1/15/2020: Importance of protecting his left lower extremity discussed.    -Monofilament testing of left foot in clinic today, left foot completely insensate  -Reminded patient to check his foot at least daily, always wear protective footwear, seek medical attention immediately for any wounds.  He has a new shoe with orthotic insole      Please note that this note may have been created using voice recognition software. I have worked with technical experts from [employer] to optimize the interface.  I have made every reasonable attempt to correct obvious errors, but there may be errors of grammar and possibly content that I did not discover before finalizing the note.

## 2020-01-17 ENCOUNTER — TELEPHONE (OUTPATIENT)
Dept: MEDICAL GROUP | Age: 75
End: 2020-01-17

## 2020-01-17 NOTE — TELEPHONE ENCOUNTER
VOICEMAIL  1. Caller Name: Kevin Jackson   Call Back Number: [phone] (home)       2. Message: Patient LVM stating he fell from wheelchair during a transfer to his bed and he is feeling sore all over his body he has been doing warm/cold compresses. Patient wanted to walk in today, I advised pt that he needs to be seen in the ED, patient stated he would walk in to Urgent care instead. I scheduled pt for follow up 1/21/2020. I advised pt that if he has worsening symptoms he needs to go to ED immediately. Pt understood and will follow up accordingly.           3. Patient approves office to leave a detailed voicemail/[employer] message: N\A        LAURA

## 2020-01-18 ENCOUNTER — OFFICE VISIT (OUTPATIENT)
Dept: URGENT CARE | Facility: CLINIC | Age: 75
End: 2020-01-18
Payer: MEDICARE

## 2020-01-18 VITALS
OXYGEN SATURATION: 96 % | HEART RATE: 74 BPM | DIASTOLIC BLOOD PRESSURE: 68 MMHG | BODY MASS INDEX: 25.86 KG/M2 | TEMPERATURE: 97 F | SYSTOLIC BLOOD PRESSURE: 116 MMHG | WEIGHT: 196 LBS | RESPIRATION RATE: 14 BRPM

## 2020-01-18 DIAGNOSIS — M25.562 ACUTE PAIN OF LEFT KNEE: ICD-10-CM

## 2020-01-18 PROCEDURE — 99214 OFFICE O/P EST MOD 30 MIN: CPT | Mod: 25 | Performed by: PHYSICIAN ASSISTANT

## 2020-01-18 RX ORDER — KETOROLAC TROMETHAMINE 30 MG/ML
15 INJECTION, SOLUTION INTRAMUSCULAR; INTRAVENOUS ONCE
Status: COMPLETED | OUTPATIENT
Start: 2020-01-18 | End: 2020-01-18

## 2020-01-18 RX ADMIN — KETOROLAC TROMETHAMINE 15 MG: 30 INJECTION, SOLUTION INTRAMUSCULAR; INTRAVENOUS at 10:27

## 2020-01-18 NOTE — PROGRESS NOTES
"Subjective:      Kevin Jackson is a 74 y.o. male who presents with Knee Injury (fell out of wheelchair 3 days ago, (L) knee pain and swelling )            HPI  74-year-old male presents to urgent care with new problem of left knee pain and mild swelling secondary to mechanical ground-level fall that occurred a few days ago.  Patient reports he was transferring from wheelchair to bed.  History of right BKA, patient denies most of his ability using left lower extremity.  Patient has been taking Norco with good pain relief.   Denies other associated aggravating or alleviating factors.     Review of Systems   Constitutional: Negative for fever.   Musculoskeletal: Positive for falls. Negative for back pain and neck pain.        Left knee pain with swelling   Skin: Negative for rash.   Neurological: Negative for tingling, sensory change, weakness and headaches.   Endo/Heme/Allergies: Does not bruise/bleed easily.   All other systems reviewed and are negative.      Past Medical History:   Diagnosis Date   • Arrhythmia 01/2018    possible afib per pt, ekg x 2 following were \"OK\"   • Bowel habit changes     constipation diarrhea   • Diabetes    • High cholesterol    • Hyperlipidemia    • Hypertension    • Muscle disorder    • Pain     right foot   • Tremors of nervous system      Current Outpatient Medications on File Prior to Visit   Medication Sig Dispense Refill   • Insulin Pen Needle 32G X 6 MM Misc 1-2     • glucose blood (ONE TOUCH ULTRA TEST) strip 3-4     • INSULIN LISP PROT & LISP, HUM, (HUMALOG MIX 50/50) (50-50) 100 UNIT/ML Suspension 30 u     • vitamin D (CHOLECALCIFEROL) 1000 UNIT Tab 1000 IU 2 tab     • insulin lispro (HUMALOG) 100 UNIT/ML Inject 35 Units as instructed every evening.     • atorvastatin (LIPITOR) 20 MG Tab Take 80 mg by mouth every evening.     • aspirin EC 81 MG EC tablet Take 1 Tab by mouth every day. 100 Tab 2   • losartan (COZAAR) 50 MG Tab Take 1 Tab by mouth every day. 90 Tab 2   • " tamsulosin (FLOMAX) 0.4 MG capsule Take 2 Caps by mouth every day. 60 Cap 2   • Semaglutide (OZEMPIC) 1 MG/DOSE Solution Pen-injector Inject 1 mg as instructed every 7 days.     • glimepiride (AMARYL) 4 MG Tab Take 1 Tab by mouth every morning. 30 Tab 11   • Empagliflozin (JARDIANCE) 25 MG Tab Take 25 mg by mouth every day. 90 Tab 4   • metformin (GLUCOPHAGE) 1000 MG tablet Take 1 Tab by mouth 2 times a day, with meals. 60 Tab 11   • propranolol CR (INDERAL LA) 120 MG CAPSULE SR 24 HR Take 1 Cap by mouth every morning. 90 Cap 4   • primidone (MYSOLINE) 50 MG Tab Take 1 Tab by mouth every evening. 90 Tab 3   • levothyroxine (SYNTHROID) 50 MCG Tab Take 1 Tab by mouth every morning before breakfast. 30 Tab 11   • gabapentin (NEURONTIN) 300 MG Cap Take 300 mg by mouth 3 times a day.       No current facility-administered medications on file prior to visit.      Allergies   Allergen Reactions   • Canagliflozin Unspecified   • Gabapentin Unspecified   • Lipoic Acid Unspecified   • Metanx  [L-Methylfolate-Algae-B12-B6] Unspecified   • Niacin Unspecified     Social History     Tobacco Use   • Smoking status: Never Smoker   • Smokeless tobacco: Never Used   Substance Use Topics   • Alcohol use: Not Currently     Alcohol/week: 0.6 oz     Types: 1 Cans of beer per week      Objective:     /68 (BP Location: Left arm, Patient Position: Sitting, BP Cuff Size: Adult)   Pulse 74   Temp 36.1 °C (97 °F) (Temporal)   Resp 14   Wt 88.9 kg (196 lb)   SpO2 96%   BMI 25.86 kg/m²      Physical Exam  Vitals signs reviewed.   Constitutional:       General: He is not in acute distress.     Appearance: Normal appearance. He is well-developed. He is not ill-appearing.   HENT:      Head: Normocephalic and atraumatic.   Eyes:      Conjunctiva/sclera: Conjunctivae normal.   Neck:      Musculoskeletal: Normal range of motion and neck supple.   Cardiovascular:      Rate and Rhythm: Normal rate and regular rhythm.   Pulmonary:       Effort: Pulmonary effort is normal. No respiratory distress.   Abdominal:      Palpations: Abdomen is soft.   Musculoskeletal:        Legs:       Comments: Right BKA   Skin:     General: Skin is warm and dry.      Findings: No erythema.   Neurological:      General: No focal deficit present.      Mental Status: He is alert and oriented to person, place, and time.   Psychiatric:         Mood and Affect: Mood normal.         Behavior: Behavior normal.         Thought Content: Thought content normal.         Judgment: Judgment normal.                 Assessment/Plan:     1. Acute pain of left knee  ketorolac (TORADOL) injection 15 mg    REFERRAL TO SPORTS MEDICINE   Patient given 15 mg IM dose of Toradol in clinic today with good symptomatic relief.  Advised patient to avoid other NSAIDs after being given this medication.  Apply ice to affected area and elevate. Patient will continue to take previously prescribed Norco as directed for pain.  Recommend follow-up with sports medicine.  Referral given.  Patient given knee brace prior to discharge.

## 2020-01-22 ENCOUNTER — OFFICE VISIT (OUTPATIENT)
Dept: MEDICAL GROUP | Facility: CLINIC | Age: 75
End: 2020-01-22
Payer: MEDICARE

## 2020-01-22 ENCOUNTER — OFFICE VISIT (OUTPATIENT)
Dept: WOUND CARE | Facility: MEDICAL CENTER | Age: 75
End: 2020-01-22
Attending: SURGERY
Payer: MEDICARE

## 2020-01-22 VITALS
WEIGHT: 196 LBS | DIASTOLIC BLOOD PRESSURE: 74 MMHG | SYSTOLIC BLOOD PRESSURE: 114 MMHG | HEART RATE: 74 BPM | RESPIRATION RATE: 14 BRPM | TEMPERATURE: 98.2 F | OXYGEN SATURATION: 96 % | BODY MASS INDEX: 25.86 KG/M2

## 2020-01-22 VITALS
DIASTOLIC BLOOD PRESSURE: 67 MMHG | RESPIRATION RATE: 20 BRPM | OXYGEN SATURATION: 94 % | SYSTOLIC BLOOD PRESSURE: 125 MMHG | HEART RATE: 82 BPM | TEMPERATURE: 97.5 F

## 2020-01-22 DIAGNOSIS — Z89.511 S/P BKA (BELOW KNEE AMPUTATION) UNILATERAL, RIGHT (HCC): ICD-10-CM

## 2020-01-22 DIAGNOSIS — E08.41 DIABETIC MONONEUROPATHY ASSOCIATED WITH DIABETES MELLITUS DUE TO UNDERLYING CONDITION (HCC): ICD-10-CM

## 2020-01-22 DIAGNOSIS — Z79.4 CONTROLLED TYPE 2 DIABETES MELLITUS WITH OTHER SKIN ULCER, WITH LONG-TERM CURRENT USE OF INSULIN (HCC): ICD-10-CM

## 2020-01-22 DIAGNOSIS — L97.912 ULCER OF RIGHT LOWER EXTREMITY WITH FAT LAYER EXPOSED (HCC): Primary | ICD-10-CM

## 2020-01-22 DIAGNOSIS — E11.622 CONTROLLED TYPE 2 DIABETES MELLITUS WITH OTHER SKIN ULCER, WITH LONG-TERM CURRENT USE OF INSULIN (HCC): ICD-10-CM

## 2020-01-22 DIAGNOSIS — S83.412A SPRAIN OF MEDIAL COLLATERAL LIGAMENT OF LEFT KNEE, INITIAL ENCOUNTER: ICD-10-CM

## 2020-01-22 PROCEDURE — 11042 DBRDMT SUBQ TIS 1ST 20SQCM/<: CPT

## 2020-01-22 PROCEDURE — 99203 OFFICE O/P NEW LOW 30 MIN: CPT | Performed by: FAMILY MEDICINE

## 2020-01-22 PROCEDURE — 11042 DBRDMT SUBQ TIS 1ST 20SQCM/<: CPT | Performed by: NURSE PRACTITIONER

## 2020-01-22 ASSESSMENT — ENCOUNTER SYMPTOMS
NAUSEA: 0
FEVER: 0
HEADACHES: 0
CONSTIPATION: 0
CHILLS: 0
SENSORY CHANGE: 1
PALPITATIONS: 0
FOCAL WEAKNESS: 0
SHORTNESS OF BREATH: 0
COUGH: 0
VOMITING: 0
FEVER: 0
SHORTNESS OF BREATH: 0
DIZZINESS: 0
DIARRHEA: 0
VOMITING: 0

## 2020-01-22 NOTE — LETTER
2020        Kevin Jackson : 1945    Wound Care Instructions:  -Mid site resolved.  Cover this site with foam and tape to protect.  -For medial site:    · Remove old dressing.  · Cleanse wound with normal saline, wound cleanser, or gentle soap and water.  · Apply skin prep to periwound.  · Apply saline moistened collagen (such as epiona or kwaku) to woundbed.  · Cover with nonadhesive foam and secure with hypafix or medipore tape.  · Pt to apply own  sock.                Perez Suazo, APRN

## 2020-01-22 NOTE — PATIENT INSTRUCTIONS
-Keep your wound dressing clean, dry, and intact.    -Change your dressing if it becomes soiled, soaked, or falls off.    -Should you experience any significant changes in your wound(s), such as infection (redness, swelling, localized heat, increased pain, fever > 101 F, chills) or have any questions regarding your home care instructions, please contact the wound center at [phone]. If after hours, contact your primary care physician or go to the hospital emergency room.

## 2020-01-22 NOTE — PROGRESS NOTES
Subjective:     Kevin Jackson is a 74 y.o. male who presents for Knee Injury (Onset 1/15/2020, fell out of wheel chair. Seen in UC 1/18/2020, given a brace and no xrays.)    HPI  Pt presents for evaluation of a new problem   Pt with medial knee pain after a few falls out of his wheelchair   Pain is constant, stays localized, and worse when crossing his leg  Patient was evaluated in urgent care and given a knee brace which helps him greatly  Has nearly no pain when using the knee brace  No new numbness or tingling associated with injury  No pain down in foot/ankle from the injury    Patient has a right BKA which is new about 5 months ago and does not have a prosthesis on the side yet.  Left lower extremity has to do all weightbearing when transferring from wheelchair to bed/chair/toilet    Review of Systems   Constitutional: Negative for fever.   Respiratory: Negative for shortness of breath.    Cardiovascular: Negative for chest pain.   Gastrointestinal: Negative for vomiting.   Skin: Negative for rash.   Neurological: Negative for focal weakness.     PMH:  has a past medical history of Arrhythmia (01/2018), Bowel habit changes, Diabetes, High cholesterol, Hyperlipidemia, Hypertension, Muscle disorder, Pain, and Tremors of nervous system. He also has no past medical history of CAD (coronary artery disease).  MEDS:   Current Outpatient Medications:   •  Insulin Pen Needle 32G X 6 MM Misc, 1-2, Disp: , Rfl:   •  glucose blood (ONE TOUCH ULTRA TEST) strip, 3-4, Disp: , Rfl:   •  INSULIN LISP PROT & LISP, HUM, (HUMALOG MIX 50/50) (50-50) 100 UNIT/ML Suspension, 30 u, Disp: , Rfl:   •  vitamin D (CHOLECALCIFEROL) 1000 UNIT Tab, 1000 IU 2 tab, Disp: , Rfl:   •  insulin lispro (HUMALOG) 100 UNIT/ML, Inject 35 Units as instructed every evening., Disp: , Rfl:   •  atorvastatin (LIPITOR) 20 MG Tab, Take 80 mg by mouth every evening., Disp: , Rfl:   •  gabapentin (NEURONTIN) 300 MG Cap, Take 300 mg by mouth 3 times a  day., Disp: , Rfl:   •  aspirin EC 81 MG EC tablet, Take 1 Tab by mouth every day., Disp: 100 Tab, Rfl: 2  •  losartan (COZAAR) 50 MG Tab, Take 1 Tab by mouth every day., Disp: 90 Tab, Rfl: 2  •  tamsulosin (FLOMAX) 0.4 MG capsule, Take 2 Caps by mouth every day., Disp: 60 Cap, Rfl: 2  •  Semaglutide (OZEMPIC) 1 MG/DOSE Solution Pen-injector, Inject 1 mg as instructed every 7 days., Disp: , Rfl:   •  glimepiride (AMARYL) 4 MG Tab, Take 1 Tab by mouth every morning., Disp: 30 Tab, Rfl: 11  •  Empagliflozin (JARDIANCE) 25 MG Tab, Take 25 mg by mouth every day., Disp: 90 Tab, Rfl: 4  •  metformin (GLUCOPHAGE) 1000 MG tablet, Take 1 Tab by mouth 2 times a day, with meals., Disp: 60 Tab, Rfl: 11  •  propranolol CR (INDERAL LA) 120 MG CAPSULE SR 24 HR, Take 1 Cap by mouth every morning., Disp: 90 Cap, Rfl: 4  •  primidone (MYSOLINE) 50 MG Tab, Take 1 Tab by mouth every evening., Disp: 90 Tab, Rfl: 3  •  levothyroxine (SYNTHROID) 50 MCG Tab, Take 1 Tab by mouth every morning before breakfast., Disp: 30 Tab, Rfl: 11  ALLERGIES:   Allergies   Allergen Reactions   • Canagliflozin Unspecified   • Gabapentin Unspecified   • Lipoic Acid Unspecified   • Metanx  [L-Methylfolate-Algae-B12-B6] Unspecified   • Niacin Unspecified     SURGHX:   Past Surgical History:   Procedure Laterality Date   • KNEE AMPUTATION BELOW Right 9/5/2019    Procedure: AMPUTATION, BELOW KNEE;  Surgeon: Shivam Amezcua M.D.;  Location: SURGERY [facility];  Service: Vascular   • AMPUTATION, TOE  2/2013    all 5 toes left foot   • CARPAL TUNNEL RELEASE     • OTHER ORTHOPEDIC SURGERY      right foot      SOCHX:  reports that he has never smoked. He has never used smokeless tobacco. He reports previous alcohol use of about 0.6 oz of alcohol per week. He reports that he does not use drugs.  FH: Family history was reviewed, not contributing to acute injury      Objective:   /74 (BP Location: Left arm, Patient Position: Sitting, BP Cuff Size: Adult)    Pulse 74   Temp 36.8 °C (98.2 °F) (Temporal)   Resp 14   Wt 88.9 kg (196 lb) Comment: Patient is in a wheel chair, unable to obtain.  SpO2 96%   BMI 25.86 kg/m²     Physical Exam  Constitutional:       General: He is not in acute distress.     Appearance: He is well-developed. He is not diaphoretic.   HENT:      Head: Normocephalic and atraumatic.   Musculoskeletal:      Comments: Left knee  Appearance - No bruising, erythema, or deformity appreciated  Palpation - +TTP along medial knee and maximally along the MCL.  +Mild TTP along the joint lines, patellar tendon, and lateral quad   ROM - FROM  Strength - 5/5 throughout  Neuro - Sensation equal and intact bilaterally  Special testing - No laxity with varus/valgus stress, +medial pain with valgus stress, neg Lachman's, neg patellar apprehension test   Skin:     General: Skin is warm and dry.      Findings: No rash.   Neurological:      Mental Status: He is alert and oriented to person, place, and time.   Psychiatric:         Mood and Affect: Mood normal.         Behavior: Behavior normal.         Thought Content: Thought content normal.         Judgment: Judgment normal.         Assessment/Plan:   Assessment    1. Sprain of medial collateral ligament of left knee, initial encounter    Patient is a 74-year-old male with left MCL sprain.  He also appears to have a few muscle strains including medial and lateral quad.  No laxity or sign of significant tear.  Patient will continue using hinged knee brace for support, and was advised to start doing some stretches/exercises.  Handout was given.  Patient also instructed to use heat once a day and will have close follow-up in a few weeks if not making significant improvements.

## 2020-01-22 NOTE — PROGRESS NOTES
Provider Encounter- Full Thickness wound    HISTORY OF PRESENT ILLNESS  Wound History:    START OF CARE IN CLINIC: 12/6/2019    REFERRING PROVIDER: Shivam Amezcua MD     WOUND- Full Thickness Wound x3   LOCATION: Right BKA-medial, middle, lateral   HISTORY: Developed right second toe ulcer that became gangrenous.  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  Underwent right BKA by Dr. Amezcua on 9/5/2019.  He followed up for suture removal early October 2019.  Soon after patient slipped and fell on 10/15/2019 and incision dehisced creating ulcer to medial, mid, lateral aspects of BKA incision site.  He followed up with Dr. Amezcua and was instructed to apply Aquacel Ag to the wound bed which his wife performed dressing changes every other day.    DIABETES HX: Diagnosed with type 2 diabetes in 1998 and is currently managing with insulin and oral diabetic agents.  Checks blood sugars 3-4 times per day and reports that these typically average 135-150.  Has had previous diabetes education.  Does have numbness in foot.  Usually wears diabetic shoes.  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         Pertinent Medical History: Diabetes, PVD, hypertension    TOBACCO USE: None    Patient's problem list, allergies, and current medications reviewed and updated in Epic      Interval History:  12/6/2019: New evaluation.  Initial provider visit.Clinic visit with Angelica MONTOYA.  Accompanied by shay Zepeda with [facility].  Patient denies seeing any erythema, edema, odor to the wound during dressing changes.  He is unsure if the wounds have decreased in size.  Currently using a wheelchair for mobilization; no use of  sock or stump protector at this time.  Patient has no further follow-ups with vascular surgery until February 2020 for LLE.    12/11/2019 : Clinic visit with JAN Alvarez.  Kevin is feeling well today.  He is accompanied  "today by Shivam, from [employer].  Patient is anxious to get his wound healed so that he can get this prosthesis.  We did discuss possible putting a VAC on these wounds to accelerate healing.  Patient stated, \"anything to get these healed up\".  He is requesting home health, as it has been difficult for his wife to change his dressings.  He states he rarely leaves his home, only for doctor's visits.    12/16/2019: Clinic visit with JAN Becerril. Patient states that they are feeling well today.  Patient denies fever, chills, nausea, vomiting, lightheadedness, dizziness, shortness of breath and chest pain.  Patient is happy with the progression of wounds both wounds have decreased in size with granulation tissue developing.  Patient would like to hold off on VAC placement at this time I am in agreement with the patient his wound seems to be progressing.  If wound stalls or ceases to progress we will revisit placement of wound VAC.    12/26/2019: Clinic visit with JAN Becerril. Patient states that they are feeling well today.  Patient denies fever, chills, nausea, vomiting, lightheadedness, dizziness, shortness of breath and chest pain. Wound with good granulation tissue.  Patient has vac and asked to bring it to next clinical visit, if wound is not progressing we will place vac.      12/30/2019:  Clinic visit with Dr. Sherman.  Doing well.  Patient and his wife brought in the VAC today.  He REALLY doesn't want to have to use it.  Denies fever, chills, nausea, vomiting or significant pain.  Complains of itching over the surrounding skin.   Just found out today that [employer] coming 3x weekly!!    1/10/2020 : Clinic visit with JAN Alvarez.  Kevin presents the clinic today accompanied by his wife.  He states he is feeling well, much happier without the VAC.  He states his blood sugar today was 151.  [facility] is seeing him 1 time per week in between clinic visits.    1/15/2020 : " Clinic visit with JAN Alvarez.  Kevin is feeling well today, satisfied with the progression of his wound.  Reports that his blood sugar was in the mid 140s.    1/22/2020: Clinic visit with JAN Becerril. Patient states that they are feeling well today.  Patient denies fever, chills, nausea, vomiting, lightheadedness, dizziness, shortness of breath and chest pain.  Wound is progressing nicely, near 100% epithelialized.  I hope that will be resolved at next clinic visit.        REVIEW OF SYSTEMS:   Review of Systems   Constitutional: Negative for chills, fever and malaise/fatigue.   Respiratory: Negative for cough and shortness of breath.    Cardiovascular: Negative for chest pain, palpitations and leg swelling.   Gastrointestinal: Negative for constipation, diarrhea, nausea and vomiting.   Skin: Negative for rash.   Neurological: Positive for sensory change. Negative for dizziness and headaches.        Numb left foot       PHYSICAL EXAMINATION:   There were no vitals taken for this visit.    Physical Exam   Constitutional: He is oriented to person, place, and time and well-developed, well-nourished, and in no distress.   HENT:   Head: Normocephalic.   Left Ear: External ear normal.   Eyes: Pupils are equal, round, and reactive to light. Conjunctivae are normal.   Cardiovascular: Intact distal pulses.   Right popliteal pulse 2+   Pulmonary/Chest: Effort normal. No respiratory distress. He has no wheezes.   Musculoskeletal:         General: No edema.      Comments: Right BKA  Healed left TMA   Neurological: He is alert and oriented to person, place, and time.   1/10/2020: Monofilament testing of left foot in clinic  Left foot: 0/8 sites sensed   Skin: Skin is warm and dry.   Medial BKA wound is almost 100% epithelialized lateral wound has healed  Refer to wound flowsheet and photos   Psychiatric: Mood, memory, affect and judgment normal.   Vitals reviewed.    1/10/2020:  Monofilament testing with a 10  gram force: sensation intact: decreased on left  Visual Inspection: Feet with maceration, ulcers, fissures.  Pedal pulses: intact bilaterally    WOUND ASSESSMENT         Wound 12/06/19 Full Thickness Wound Right BKA incision medial (Active)   Wound Image     1/22/2020  1:00 PM   Site Assessment Pink;Yellow;White 1/22/2020  1:00 PM   Aby-wound Assessment Intact;Edema;Scar tissue 1/22/2020  1:00 PM   Margins Attached edges 1/22/2020  1:00 PM   Wound Length (cm) 0.4 cm 1/22/2020  1:00 PM   Wound Width (cm) 1.3 cm 1/22/2020  1:00 PM   Wound Depth (cm) 0.1 cm 1/22/2020  1:00 PM   Wound Surface Area (cm^2) 0.52 cm^2 1/22/2020  1:00 PM   Post Wound Length (cm) 0.4 cm 1/22/2020  1:00 PM    Post Wound Width (cm) 1.6 cm 1/22/2020  1:00 PM   Post Wound Depth (cm) 0.1 cm 1/22/2020  1:00 PM   Post Wound Surface Area (cm^2) 0.64 cm^2 1/22/2020  1:00 PM   Tunneling 0 cm 1/22/2020  1:00 PM   Undermining 0 cm 1/22/2020  1:00 PM   Closure Secondary intention 1/22/2020  1:00 PM   Drainage Amount Scant 1/22/2020  1:00 PM   Drainage Description Serosanguineous 1/22/2020  1:00 PM   Non-staged Wound Description Full thickness 1/22/2020  1:00 PM   Treatments Cleansed;Pharmaceutical agent;Other (Comment) 1/22/2020  1:00 PM   Cleansing Normal Saline Irrigation 1/22/2020  1:00 PM   Periwound Protectant Skin Protectant wipes to Periwound 1/22/2020  1:00 PM   Dressing Options Collagen Dressing;Nonadhesive Foam;Hypafix Tape;Other (Comments) 1/22/2020  1:00 PM   Dressing Cleansing/Solutions Other (Comments) 1/22/2020  1:00 PM   Dressing Changed Changed 1/22/2020  1:00 PM   Dressing Status Clean;Dry;Intact 1/15/2020  4:00 PM   Dressing Change Frequency Every 72 hrs 1/22/2020  1:00 PM   WOUND NURSE ONLY - Odor None 1/22/2020  1:00 PM   WOUND NURSE ONLY - Exposed Structures None 1/22/2020  1:00 PM   WOUND NURSE ONLY - Tissue Type and Percentage pre: 50% pink, 50% yellow/white 1/22/2020  1:00 PM       Wound 12/06/19 Full Thickness Wound Right BKA  incision mid (Active)   Wound Image   1/22/2020  1:00 PM   Site Assessment Epithelialization 1/22/2020  1:00 PM   Aby-wound Assessment Edema 1/22/2020  1:00 PM   Margins Attached edges 1/15/2020  4:00 PM   Wound Length (cm) 0 cm 1/22/2020  1:00 PM   Wound Width (cm) 0 cm 1/22/2020  1:00 PM   Wound Depth (cm) 0 cm 1/22/2020  1:00 PM   Wound Surface Area (cm^2) 0 cm^2 1/22/2020  1:00 PM   Post Wound Length (cm) 0.2 cm 1/10/2020  2:15 PM    Post Wound Width (cm) 0.3 cm 1/10/2020  2:15 PM   Post Wound Depth (cm) 0.3 cm 1/10/2020  2:15 PM   Post Wound Surface Area (cm^2) 0.06 cm^2 1/10/2020  2:15 PM   Tunneling 0 cm 12/16/2019  4:00 PM   Undermining 0 cm 12/16/2019  4:00 PM   Closure Secondary intention 12/6/2019  1:00 PM   Drainage Amount None 1/15/2020  4:00 PM   Treatments Cleansed 1/22/2020  1:00 PM   Cleansing Normal Saline Irrigation 1/22/2020  1:00 PM   Periwound Protectant Skin Protectant wipes to Periwound 1/22/2020  1:00 PM   Dressing Options Nonadhesive Foam;Hypafix Tape;Other (Comments) 1/22/2020  1:00 PM   Dressing Cleansing/Solutions Normal Saline 12/26/2019  1:23 PM   Dressing Changed Changed 1/10/2020  2:15 PM   Dressing Status Clean;Dry;Intact 12/16/2019  4:00 PM   Dressing Change Frequency Every 72 hrs 12/16/2019  4:00 PM   WOUND NURSE ONLY - Odor None 1/22/2020  1:00 PM   WOUND NURSE ONLY - Exposed Structures None 1/22/2020  1:00 PM   WOUND NURSE ONLY - Tissue Type and Percentage resolved 1/22/2020  1:00 PM                           PROCEDURE: Excisional debridement of medial right BKA stump wound  -2% viscous lidocaine applied topically to wound bed for approximately 5 minutes prior to debridement  -Curette used to debride wound bed.  Excisional debridement was performed to remove devitalized tissue until healthy, bleeding tissue was visualized.   Entire surface of  wound,  0.64 cm2 debrided.  Tissue debrided into the subcutaneous layer.    -Bleeding controlled with manual pressure.    -Wound care  completed by wound RN, refer to flowsheet  -Patient tolerated the procedure well, without c/o pain or discomfort.         Pertinent Labs and Diagnostics:    Labs:     A1c:   Lab Results   Component Value Date/Time    HBA1C 6.6 (H) 10/21/2019 09:36 AM                 ASSESSMENT AND PLAN:     1. Ulcer of right lower extremity with fat layer exposed ([facility])  Comments: S/P fall mid October 2019 suffered from full-thickness ulcers to right BKA incision line    1/22/2020: Lateral wound is essentially resolved today.  Area of medial wound essentially unchanged.  -Excisional debridement of wound in clinic today, medically necessary to promote wound healing.  -Patient to return to clinic weekly for assessment and debridement  -Home health to change dressing 1 time per week in between clinic visits    Wound care: Collagen to promote granulation, Hydrofiber silver to manage exudate and bioburden, foam cover dressing, patients own shrink sock.        2. S/P BKA (below knee amputation) unilateral, right ([facility])  Comments: Right BKA 9/5/2019 by Dr. Amezcua due to gangrenous foot wound and untreatable PVD    1/22/2020:  -He is already established with [facility], will begin process for lower extremity stasis once wound is healed    3. Controlled type 2 diabetes mellitus with other skin ulcer, with long-term current use of insulin ([facility])  A1c 6.6% on 10/21/2019    1/22/2020:  -Encouraged to keep his blood sugars below 140 in order to optimize wound healing      4. Diabetic mononeuropathy associated with diabetes mellitus due to underlying condition ([facility])-m rx gabapentin  Comments: Left foot insensate    1/22/2020: Importance of protecting his left lower extremity discussed.    -Reminded patient to check his foot at least daily, always wear protective footwear, seek medical attention immediately for any wounds.  He has a new shoe with orthotic insole      Please note that this note may have been created using voice recognition software. I  have worked with technical experts from [facility] to optimize the interface.  I have made every reasonable attempt to correct obvious errors, but there may be errors of grammar and possibly content that I did not discover before finalizing the note.

## 2020-01-27 ASSESSMENT — ENCOUNTER SYMPTOMS
BACK PAIN: 0
HEADACHES: 0
NECK PAIN: 0
WEAKNESS: 0
TINGLING: 0
FALLS: 1
BRUISES/BLEEDS EASILY: 0
SENSORY CHANGE: 0
FEVER: 0

## 2020-01-30 ENCOUNTER — OFFICE VISIT (OUTPATIENT)
Dept: WOUND CARE | Facility: MEDICAL CENTER | Age: 75
End: 2020-01-30
Attending: SURGERY
Payer: MEDICARE

## 2020-01-30 ENCOUNTER — HOSPITAL ENCOUNTER (OUTPATIENT)
Facility: MEDICAL CENTER | Age: 75
End: 2020-01-30
Attending: NURSE PRACTITIONER
Payer: MEDICARE

## 2020-01-30 VITALS
SYSTOLIC BLOOD PRESSURE: 107 MMHG | OXYGEN SATURATION: 90 % | TEMPERATURE: 97.5 F | HEART RATE: 86 BPM | DIASTOLIC BLOOD PRESSURE: 71 MMHG | RESPIRATION RATE: 16 BRPM

## 2020-01-30 DIAGNOSIS — Z89.511 S/P BKA (BELOW KNEE AMPUTATION) UNILATERAL, RIGHT (HCC): ICD-10-CM

## 2020-01-30 DIAGNOSIS — L08.9 INFECTED WOUND: ICD-10-CM

## 2020-01-30 DIAGNOSIS — E11.622 CONTROLLED TYPE 2 DIABETES MELLITUS WITH OTHER SKIN ULCER, WITH LONG-TERM CURRENT USE OF INSULIN (HCC): ICD-10-CM

## 2020-01-30 DIAGNOSIS — Z79.4 CONTROLLED TYPE 2 DIABETES MELLITUS WITH OTHER SKIN ULCER, WITH LONG-TERM CURRENT USE OF INSULIN (HCC): ICD-10-CM

## 2020-01-30 DIAGNOSIS — E08.41 DIABETIC MONONEUROPATHY ASSOCIATED WITH DIABETES MELLITUS DUE TO UNDERLYING CONDITION (HCC): ICD-10-CM

## 2020-01-30 DIAGNOSIS — L97.912 ULCER OF RIGHT LOWER EXTREMITY WITH FAT LAYER EXPOSED (HCC): Primary | ICD-10-CM

## 2020-01-30 DIAGNOSIS — T14.8XXA INFECTED WOUND: ICD-10-CM

## 2020-01-30 LAB
GRAM STN SPEC: NORMAL
SIGNIFICANT IND 70042: NORMAL
SITE SITE: NORMAL
SOURCE SOURCE: NORMAL

## 2020-01-30 PROCEDURE — 11042 DBRDMT SUBQ TIS 1ST 20SQCM/<: CPT

## 2020-01-30 PROCEDURE — 11042 DBRDMT SUBQ TIS 1ST 20SQCM/<: CPT | Performed by: NURSE PRACTITIONER

## 2020-01-30 PROCEDURE — 87077 CULTURE AEROBIC IDENTIFY: CPT

## 2020-01-30 PROCEDURE — 87070 CULTURE OTHR SPECIMN AEROBIC: CPT

## 2020-01-30 PROCEDURE — 87205 SMEAR GRAM STAIN: CPT

## 2020-01-30 PROCEDURE — 87186 SC STD MICRODIL/AGAR DIL: CPT

## 2020-01-30 RX ORDER — AMOXICILLIN AND CLAVULANATE POTASSIUM 875; 125 MG/1; MG/1
1 TABLET, FILM COATED ORAL 2 TIMES DAILY
Qty: 20 TAB | Refills: 0 | Status: SHIPPED | OUTPATIENT
Start: 2020-01-30 | End: 2020-02-09

## 2020-01-30 ASSESSMENT — ENCOUNTER SYMPTOMS
WHEEZING: 0
SENSORY CHANGE: 1
DIARRHEA: 0
HEADACHES: 0
NAUSEA: 0
COUGH: 0
SHORTNESS OF BREATH: 0
CONSTIPATION: 0
SPUTUM PRODUCTION: 0
VOMITING: 0
CHILLS: 0
FEVER: 0
DIZZINESS: 0
PALPITATIONS: 0

## 2020-01-30 NOTE — PATIENT INSTRUCTIONS
-Keep dressings clean, dry and covered while bathing. Change dressings if they become over saturated, soiled or fall off; or once in between clinic visits per Home Health.     -Avoid prolonged standing or sitting without elevating your legs. Keep pressure off of Right BKA.    -Never walk around the house barefoot. Always wear a rubber soled slipper when walking around the house.    -Should you experience any significant changes in your wound(s), such as infection (redness, swelling, localized heat, increased pain, fever > 101 F, chills) or have any questions regarding your home care instructions, please contact the wound center at [phone]. If after hours, contact your primary care physician or go to the hospital emergency room.

## 2020-01-30 NOTE — LETTER
2020    [facility]    Kevin Jackson  : 1945    Wound Care Instructions:    For Right BKA medial site:    · Remove old dressing.  · Cleanse wound with normal saline, wound cleanser, or gentle soap and water.  · Apply skin prep to periwound.  · Apply zinc cream to immediate periwound.   · Apply Hydrofiber silver dressing over wound bed.  · Cover with nonadhesive foam and secure with hypafix or medipore tape.  · Pt to apply own  sock.    To be treated by Home Health once a week in between [facility] visits.       Thank you,           Perez MONTOYA

## 2020-01-31 NOTE — PROGRESS NOTES
Provider Encounter- Full Thickness wound    HISTORY OF PRESENT ILLNESS  Wound History:    START OF CARE IN CLINIC: 12/6/2019    REFERRING PROVIDER: Shivam Amezcua MD     WOUND- Full Thickness Wound x3   LOCATION: Right BKA-medial, middle, lateral   HISTORY: Developed right second toe ulcer that became gangrenous.  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  Underwent right BKA by Dr. Amezcua on 9/5/2019.  He followed up for suture removal early October 2019.  Soon after patient slipped and fell on 10/15/2019 and incision dehisced creating ulcer to medial, mid, lateral aspects of BKA incision site.  He followed up with Dr. Amezcua and was instructed to apply Aquacel Ag to the wound bed which his wife performed dressing changes every other day.    DIABETES HX: Diagnosed with type 2 diabetes in 1998 and is currently managing with insulin and oral diabetic agents.  Checks blood sugars 3-4 times per day and reports that these typically average 135-150.  Has had previous diabetes education.  Does have numbness in foot.  Usually wears diabetic shoes.  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         Pertinent Medical History: Diabetes, PVD, hypertension    TOBACCO USE: None    Patient's problem list, allergies, and current medications reviewed and updated in Epic      Interval History:  12/6/2019: New evaluation.  Initial provider visit.Clinic visit with Angelica MONTOYA.  Accompanied by shay Zepeda with [facility].  Patient denies seeing any erythema, edema, odor to the wound during dressing changes.  He is unsure if the wounds have decreased in size.  Currently using a wheelchair for mobilization; no use of  sock or stump protector at this time.  Patient has no further follow-ups with vascular surgery until February 2020 for LLE.    12/11/2019 : Clinic visit with JAN Alvarez.  Kevin is feeling well today.  He is accompanied  "today by Shivam, from [employer].  Patient is anxious to get his wound healed so that he can get this prosthesis.  We did discuss possible putting a VAC on these wounds to accelerate healing.  Patient stated, \"anything to get these healed up\".  He is requesting home health, as it has been difficult for his wife to change his dressings.  He states he rarely leaves his home, only for doctor's visits.    12/16/2019: Clinic visit with JAN Becerril. Patient states that they are feeling well today.  Patient denies fever, chills, nausea, vomiting, lightheadedness, dizziness, shortness of breath and chest pain.  Patient is happy with the progression of wounds both wounds have decreased in size with granulation tissue developing.  Patient would like to hold off on VAC placement at this time I am in agreement with the patient his wound seems to be progressing.  If wound stalls or ceases to progress we will revisit placement of wound VAC.    12/26/2019: Clinic visit with JAN Becerril. Patient states that they are feeling well today.  Patient denies fever, chills, nausea, vomiting, lightheadedness, dizziness, shortness of breath and chest pain. Wound with good granulation tissue.  Patient has vac and asked to bring it to next clinical visit, if wound is not progressing we will place vac.      12/30/2019:  Clinic visit with Dr. Sherman.  Doing well.  Patient and his wife brought in the VAC today.  He REALLY doesn't want to have to use it.  Denies fever, chills, nausea, vomiting or significant pain.  Complains of itching over the surrounding skin.   Just found out today that [employer] coming 3x weekly!!    1/10/2020 : Clinic visit with JAN Alvarez.  Kevin presents the clinic today accompanied by his wife.  He states he is feeling well, much happier without the VAC.  He states his blood sugar today was 151.  [employer] is seeing him 1 time per week in between clinic visits.    1/15/2020 : " Clinic visit with JAN Alvarez.  Kevin is feeling well today, satisfied with the progression of his wound.  Reports that his blood sugar was in the mid 140s.    1/22/2020: Clinic visit with JAN Becerril. Patient states that they are feeling well today.  Patient denies fever, chills, nausea, vomiting, lightheadedness, dizziness, shortness of breath and chest pain.  Wound is progressing nicely, near 100% epithelialized.  I hope that will be resolved at next clinic visit.    1/30/2020: Clinic visit with JAN Becerril. Patient states that he is very unhappy with the progression of the wounds it appears that the 2 medial wounds have become slightly larger, there is increased erythema to the periwound area with tan/beige exudate. For these reasons I have placed the patient on Augmentin and cultured the wound.  Patient denies fever, chills, nausea, vomiting, lightheadedness, dizziness, shortness of breath and chest pain.         REVIEW OF SYSTEMS:   Review of Systems   Constitutional: Negative for chills, fever and malaise/fatigue.   Respiratory: Negative for cough, sputum production, shortness of breath and wheezing.    Cardiovascular: Negative for chest pain, palpitations and leg swelling.   Gastrointestinal: Negative for constipation, diarrhea, nausea and vomiting.   Skin: Negative for rash.   Neurological: Positive for sensory change. Negative for dizziness and headaches.        Numb left foot   Psychiatric/Behavioral: Negative for suicidal ideas.        Mild depression due to the circumstances of his wounds that he feels are not healing       PHYSICAL EXAMINATION:   /71   Pulse 86   Temp 36.4 °C (97.5 °F) (Temporal)   Resp 16   SpO2 90%     Physical Exam   Constitutional: He is oriented to person, place, and time and well-developed, well-nourished, and in no distress.   HENT:   Head: Normocephalic and atraumatic.   Right Ear: External ear normal.   Left Ear: External ear normal.      Eyes: Pupils are equal, round, and reactive to light. Conjunctivae are normal.   Cardiovascular: Intact distal pulses.   Right popliteal pulse 2+   Pulmonary/Chest: Effort normal. No respiratory distress. He has no wheezes.   Musculoskeletal:         General: No edema.      Comments: Right BKA  Healed left TMA   Neurological: He is alert and oriented to person, place, and time.   1/10/2020: Monofilament testing of left foot in clinic  Left foot: 0/8 sites sensed   Skin: Skin is warm and dry.   To medial BKA wounds have become larger  Refer to wound flowsheet and photos   Psychiatric: Mood, memory, affect and judgment normal.   Vitals reviewed.    1/10/2020:  Monofilament testing with a 10 gram force: sensation intact: decreased on left  Visual Inspection: Feet with maceration, ulcers, fissures.  Pedal pulses: intact bilaterally    WOUND ASSESSMENT        Wound 12/06/19 Full Thickness Wound Right BKA incision medial (Active)   Wound Image    1/30/2020  3:00 PM   Site Assessment Yellow;Red 1/30/2020  3:00 PM   Aby-wound Assessment Intact;Edema;Blanchable erythema 1/30/2020  3:00 PM   Margins Attached edges 1/30/2020  3:00 PM   Wound Length (cm) 0.5 cm 1/30/2020  3:00 PM   Wound Width (cm) 1 cm 1/30/2020  3:00 PM   Wound Depth (cm) 0.1 cm 1/30/2020  3:00 PM   Wound Surface Area (cm^2) 0.5 cm^2 1/30/2020  3:00 PM   Post Wound Length (cm) 0.7 cm 1/30/2020  3:00 PM    Post Wound Width (cm) 1.3 cm 1/30/2020  3:00 PM   Post Wound Depth (cm) 0.3 cm 1/30/2020  3:00 PM   Post Wound Surface Area (cm^2) 0.91 cm^2 1/30/2020  3:00 PM   Tunneling 0 cm 1/30/2020  3:00 PM   Undermining 0 cm 1/30/2020  3:00 PM   Closure Secondary intention 1/22/2020  1:00 PM   Drainage Amount Small 1/30/2020  3:00 PM   Drainage Description Serosanguineous 1/30/2020  3:00 PM   Non-staged Wound Description Full thickness 1/30/2020  3:00 PM   Treatments Cleansed;Pharmaceutical agent;Other (Comment) 1/30/2020  3:00 PM   Cleansing Normal Saline  Irrigation 1/30/2020  3:00 PM   Periwound Protectant Skin Protectant wipes to Periwound;Barrier Paste 1/30/2020  3:00 PM   Dressing Options Hydrofiber Silver;Nonadhesive Foam;Hypafix Tape 1/30/2020  3:00 PM   Dressing Cleansing/Solutions Normal Saline 1/30/2020  3:00 PM   Dressing Changed New 1/30/2020  3:00 PM   Dressing Status Clean;Dry;Intact 1/30/2020  3:00 PM   Dressing Change Frequency Every 72 hrs 1/30/2020  3:00 PM   WOUND NURSE ONLY - Odor None 1/30/2020  3:00 PM   WOUND NURSE ONLY - Exposed Structures None 1/30/2020  3:00 PM   WOUND NURSE ONLY - Tissue Type and Percentage pre: 50% pink, 50% yellow 1/30/2020  3:00 PM       Wound 12/06/19 Full Thickness Wound Right BKA incision mid (Active)   Wound Image   1/22/2020  1:00 PM   Site Assessment Epithelialization 1/22/2020  1:00 PM   Aby-wound Assessment Edema 1/22/2020  1:00 PM   Margins Attached edges 1/15/2020  4:00 PM   Wound Length (cm) 0 cm 1/22/2020  1:00 PM   Wound Width (cm) 0 cm 1/22/2020  1:00 PM   Wound Depth (cm) 0 cm 1/22/2020  1:00 PM   Wound Surface Area (cm^2) 0 cm^2 1/22/2020  1:00 PM   Post Wound Length (cm) 0.2 cm 1/10/2020  2:15 PM    Post Wound Width (cm) 0.3 cm 1/10/2020  2:15 PM   Post Wound Depth (cm) 0.3 cm 1/10/2020  2:15 PM   Post Wound Surface Area (cm^2) 0.06 cm^2 1/10/2020  2:15 PM   Tunneling 0 cm 12/16/2019  4:00 PM   Undermining 0 cm 12/16/2019  4:00 PM   Closure Secondary intention 12/6/2019  1:00 PM   Drainage Amount None 1/15/2020  4:00 PM   Treatments Cleansed 1/22/2020  1:00 PM   Cleansing Normal Saline Irrigation 1/22/2020  1:00 PM   Periwound Protectant Skin Protectant wipes to Periwound 1/22/2020  1:00 PM   Dressing Options Nonadhesive Foam;Hypafix Tape;Other (Comments) 1/22/2020  1:00 PM   Dressing Cleansing/Solutions Normal Saline 12/26/2019  1:23 PM   Dressing Changed Changed 1/10/2020  2:15 PM   Dressing Status Clean;Dry;Intact 12/16/2019  4:00 PM   Dressing Change Frequency Every 72 hrs 12/16/2019  4:00 PM   WOUND  NURSE ONLY - Odor None 1/22/2020  1:00 PM   WOUND NURSE ONLY - Exposed Structures None 1/22/2020  1:00 PM   WOUND NURSE ONLY - Tissue Type and Percentage resolved 1/22/2020  1:00 PM        PROCEDURE: Excisional debridement of medial right BKA stump wound  -2% viscous lidocaine applied topically to wound bed for approximately 5 minutes prior to debridement  -Curette used to debride wound bed.  Excisional debridement was performed to remove devitalized tissue until healthy, bleeding tissue was visualized.   Entire surface of  wound,  0.91 cm2 debrided.  Tissue debrided into the subcutaneous layer.    -Bleeding controlled with manual pressure.    -Wound care completed by wound RN, refer to flowsheet  -Patient tolerated the procedure well, without c/o pain or discomfort.         Pertinent Labs and Diagnostics:    Labs:     A1c:   Lab Results   Component Value Date/Time    HBA1C 6.6 (H) 10/21/2019 09:36 AM                 ASSESSMENT AND PLAN:     1. Ulcer of right lower extremity with fat layer exposed ([facility])  Comments: S/P fall mid October 2019 suffered from full-thickness ulcers to right BKA incision line    1/30/2020: 2 medial BKA wounds are large in size this clinic visit  -Excisional debridement of wound in clinic today, medically necessary to promote wound healing.  -Patient to return to clinic weekly for assessment and debridement  -Home health to change dressing 1 time per week in between clinic visits    Wound care: Hydro-fiber silver to reduce bioburden, nonadhesive foam to absorb exudate, Hypafix tape for securement    2. S/P BKA (below knee amputation) unilateral, right ([facility])  Comments: Right BKA 9/5/2019 by Dr. Amezcua due to gangrenous foot wound and untreatable PVD    1/30/2020:  -He is already established with [facility], will begin process for lower extremity stasis once wound is healed    3. Controlled type 2 diabetes mellitus with other skin ulcer, with long-term current use of insulin (HCC)  A1c 6.6% on  10/21/2019    1/30/2020:  -Encouraged to keep his blood sugars below 140 in order to optimize wound healing      4. Diabetic mononeuropathy associated with diabetes mellitus due to underlying condition (HCC)-m rx gabapentin  Comments: Left foot insensate    1/30/2020:  Implications of loss of protective sensation (LOPS) discussed with patient, including increased risk for amputation.  Advised to check feet at least daily, moisturize feet, and to always wear protective foot wear, arrange meticulous regular foot care by podiatrist or [facility]. Pt with good understanding.     Please note that this note may have been created using voice recognition software. I have worked with technical experts from [employer] to optimize the interface.  I have made every reasonable attempt to correct obvious errors, but there may be errors of grammar and possibly content that I did not discover before finalizing the note.

## 2020-02-01 LAB
BACTERIA WND AEROBE CULT: ABNORMAL
BACTERIA WND AEROBE CULT: ABNORMAL
GRAM STN SPEC: ABNORMAL
SIGNIFICANT IND 70042: ABNORMAL
SITE SITE: ABNORMAL
SOURCE SOURCE: ABNORMAL

## 2020-02-05 ENCOUNTER — OFFICE VISIT (OUTPATIENT)
Dept: WOUND CARE | Facility: MEDICAL CENTER | Age: 75
End: 2020-02-05
Attending: SURGERY
Payer: MEDICARE

## 2020-02-05 VITALS
SYSTOLIC BLOOD PRESSURE: 111 MMHG | OXYGEN SATURATION: 97 % | DIASTOLIC BLOOD PRESSURE: 75 MMHG | HEART RATE: 82 BPM | TEMPERATURE: 98.4 F | RESPIRATION RATE: 20 BRPM

## 2020-02-05 DIAGNOSIS — L97.912 ULCER OF RIGHT LOWER EXTREMITY WITH FAT LAYER EXPOSED (HCC): ICD-10-CM

## 2020-02-05 DIAGNOSIS — E11.622 CONTROLLED TYPE 2 DIABETES MELLITUS WITH OTHER SKIN ULCER, WITH LONG-TERM CURRENT USE OF INSULIN (HCC): ICD-10-CM

## 2020-02-05 DIAGNOSIS — T14.8XXA INFECTED WOUND: ICD-10-CM

## 2020-02-05 DIAGNOSIS — Z89.511 S/P BKA (BELOW KNEE AMPUTATION) UNILATERAL, RIGHT (HCC): ICD-10-CM

## 2020-02-05 DIAGNOSIS — L08.9 INFECTED WOUND: ICD-10-CM

## 2020-02-05 DIAGNOSIS — Z79.4 CONTROLLED TYPE 2 DIABETES MELLITUS WITH OTHER SKIN ULCER, WITH LONG-TERM CURRENT USE OF INSULIN (HCC): ICD-10-CM

## 2020-02-05 PROCEDURE — 11042 DBRDMT SUBQ TIS 1ST 20SQCM/<: CPT | Performed by: NURSE PRACTITIONER

## 2020-02-05 PROCEDURE — 11042 DBRDMT SUBQ TIS 1ST 20SQCM/<: CPT

## 2020-02-05 ASSESSMENT — ENCOUNTER SYMPTOMS
HEADACHES: 0
DIARRHEA: 0
NAUSEA: 0
COUGH: 0
SPUTUM PRODUCTION: 0
CHILLS: 0
PALPITATIONS: 0
DIZZINESS: 0
VOMITING: 0
WHEEZING: 0
FEVER: 0
SHORTNESS OF BREATH: 0
CONSTIPATION: 0
SENSORY CHANGE: 1

## 2020-02-05 ASSESSMENT — PAIN SCALES - GENERAL: PAINLEVEL: NO PAIN

## 2020-02-05 NOTE — PATIENT INSTRUCTIONS
Should you experience any significant changes in your wound(s) such as infection (redness, swelling, localized heat, increased pain, fever >101 F, chills) or have any questions regarding your home care instructions, please contact the wound center [phone]. If after hours, contact your primary care physician or go the hospital emergency room.  Keep dressing clean and dry and cover while bathing. Only change dressing if over saturated, soiled or its falling off.

## 2020-02-05 NOTE — LETTER
2020        Kevin Jackson   1945        [facility] orders following appointment with JAN at [facility] on 2020 -   Continue POC                              Aletha MONTOYA

## 2020-02-06 NOTE — PROGRESS NOTES
Provider Encounter- Full Thickness wound    HISTORY OF PRESENT ILLNESS  Wound History:    START OF CARE IN CLINIC: 12/6/2019    REFERRING PROVIDER: Shivam Amezcua MD     WOUND- Full Thickness Wound x3   LOCATION: Right BKA-medial, middle, lateral   HISTORY: Developed right second toe ulcer that became gangrenous.  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  Underwent right BKA by Dr. Amezcua on 9/5/2019.  He followed up for suture removal early October 2019.  Soon after patient slipped and fell on 10/15/2019 and incision dehisced creating ulcer to medial, mid, lateral aspects of BKA incision site.  He followed up with Dr. Amezcua and was instructed to apply Aquacel Ag to the wound bed which his wife performed dressing changes every other day.    DIABETES HX: Diagnosed with type 2 diabetes in 1998 and is currently managing with insulin and oral diabetic agents.  Checks blood sugars 3-4 times per day and reports that these typically average 135-150.  Has had previous diabetes education.  Does have numbness in foot.  Usually wears diabetic shoes.  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         Pertinent Medical History: Diabetes, PVD, hypertension    TOBACCO USE: None    Patient's problem list, allergies, and current medications reviewed and updated in Epic      Interval History:  12/6/2019: New evaluation.  Initial provider visit.Clinic visit with Angelica MONTOYA.  Accompanied by shay Zepeda with [facility].  Patient denies seeing any erythema, edema, odor to the wound during dressing changes.  He is unsure if the wounds have decreased in size.  Currently using a wheelchair for mobilization; no use of  sock or stump protector at this time.  Patient has no further follow-ups with vascular surgery until February 2020 for LLE.    12/11/2019 : Clinic visit with JAN Alvarez.  Kevin is feeling well today.  He is accompanied  "today by Shivam, from [employer].  Patient is anxious to get his wound healed so that he can get this prosthesis.  We did discuss possible putting a VAC on these wounds to accelerate healing.  Patient stated, \"anything to get these healed up\".  He is requesting home health, as it has been difficult for his wife to change his dressings.  He states he rarely leaves his home, only for doctor's visits.    12/16/2019: Clinic visit with JAN Becerril. Patient states that they are feeling well today.  Patient denies fever, chills, nausea, vomiting, lightheadedness, dizziness, shortness of breath and chest pain.  Patient is happy with the progression of wounds both wounds have decreased in size with granulation tissue developing.  Patient would like to hold off on VAC placement at this time I am in agreement with the patient his wound seems to be progressing.  If wound stalls or ceases to progress we will revisit placement of wound VAC.    12/26/2019: Clinic visit with JAN Becerril. Patient states that they are feeling well today.  Patient denies fever, chills, nausea, vomiting, lightheadedness, dizziness, shortness of breath and chest pain. Wound with good granulation tissue.  Patient has vac and asked to bring it to next clinical visit, if wound is not progressing we will place vac.      12/30/2019:  Clinic visit with Dr. Sherman.  Doing well.  Patient and his wife brought in the VAC today.  He REALLY doesn't want to have to use it.  Denies fever, chills, nausea, vomiting or significant pain.  Complains of itching over the surrounding skin.   Just found out today that [employer] coming 3x weekly!!    1/10/2020 : Clinic visit with JAN Alvarez.  Kevin presents the clinic today accompanied by his wife.  He states he is feeling well, much happier without the VAC.  He states his blood sugar today was 151.  Elpas is seeing him 1 time per week in between clinic visits.    1/15/2020 : " Clinic visit with JAN Alvarez.  Kevin is feeling well today, satisfied with the progression of his wound.  Reports that his blood sugar was in the mid 140s.    1/22/2020: Clinic visit with JAN Becerril. Patient states that they are feeling well today.  Patient denies fever, chills, nausea, vomiting, lightheadedness, dizziness, shortness of breath and chest pain.  Wound is progressing nicely, near 100% epithelialized.  I hope that will be resolved at next clinic visit.    1/30/2020: Clinic visit with JAN Becerril. Patient states that he is very unhappy with the progression of the wounds it appears that the 2 medial wounds have become slightly larger, there is increased erythema to the periwound area with tan/beige exudate. For these reasons I have placed the patient on Augmentin and cultured the wound.  Patient denies fever, chills, nausea, vomiting, lightheadedness, dizziness, shortness of breath and chest pain.     2/5/2020 : Clinic visit with JAN Alvarez.  Kevin is feeling well today, denies F/C/N/V.  He is still quite frustrated that his wound is not healing as quickly as he would like.  Willing to consider multiple options, such as SNAP, or ACell to accelerate healing of his wounds.        REVIEW OF SYSTEMS:   Review of Systems   Constitutional: Negative for chills, fever and malaise/fatigue.   Respiratory: Negative for cough, sputum production, shortness of breath and wheezing.    Cardiovascular: Negative for chest pain, palpitations and leg swelling.   Gastrointestinal: Negative for constipation, diarrhea, nausea and vomiting.   Skin: Negative for rash.   Neurological: Positive for sensory change. Negative for dizziness and headaches.        Numb left foot   Psychiatric/Behavioral: Negative for suicidal ideas.        Mild depression due to the circumstances of his wounds that he feels are not healing       PHYSICAL EXAMINATION:   /75   Pulse 82   Temp 36.9 °C (98.4 °F)    Resp 20   SpO2 97%     Physical Exam   Constitutional: He is oriented to person, place, and time and well-developed, well-nourished, and in no distress.   HENT:   Head: Normocephalic and atraumatic.   Right Ear: External ear normal.   Left Ear: External ear normal.   Eyes: Pupils are equal, round, and reactive to light. Conjunctivae are normal.   Cardiovascular: Intact distal pulses.   Right popliteal pulse 2+   Pulmonary/Chest: Effort normal. No respiratory distress. He has no wheezes.   Musculoskeletal:         General: No edema.      Comments: Right BKA  Healed left TMA   Neurological: He is alert and oriented to person, place, and time.   1/10/2020: Monofilament testing of left foot in clinic  Left foot: 0/8 sites sensed   Skin: Skin is warm and dry.   To medial BKA wounds have become larger  Refer to wound flowsheet and photos   Psychiatric: Mood, memory, affect and judgment normal.   Vitals reviewed.    1/10/2020:  Monofilament testing with a 10 gram force: sensation intact: decreased on left  Visual Inspection: Feet with maceration, ulcers, fissures.  Pedal pulses: intact bilaterally    WOUND ASSESSMENT    Wound 12/06/19 Full Thickness Wound Right BKA incision medial (Active)   Wound Image    2/5/2020  2:00 PM   Site Assessment Yellow;Red 2/5/2020  2:00 PM   Aby-wound Assessment Intact;Edema;Blanchable erythema 2/5/2020  2:00 PM   Margins Attached edges 2/5/2020  2:00 PM   Wound Length (cm) 0.9 cm 2/5/2020  2:00 PM   Wound Width (cm) 1.4 cm 2/5/2020  2:00 PM   Wound Depth (cm) 0.6 cm 2/5/2020  2:00 PM   Wound Surface Area (cm^2) 1.26 cm^2 2/5/2020  2:00 PM   Post Wound Length (cm) 0.9 cm 2/5/2020  2:00 PM    Post Wound Width (cm) 1.6 cm 2/5/2020  2:00 PM   Post Wound Depth (cm) 0.7 cm 2/5/2020  2:00 PM   Post Wound Surface Area (cm^2) 1.44 cm^2 2/5/2020  2:00 PM   Tunneling 0.6 cm 2/5/2020  2:00 PM   Undermining 0 cm 2/5/2020  2:00 PM   Closure Secondary intention 2/5/2020  2:00 PM   Drainage Amount Small  2/5/2020  2:00 PM   Drainage Description Serosanguineous 2/5/2020  2:00 PM   Non-staged Wound Description Full thickness 2/5/2020  2:00 PM   Treatments Cleansed 2/5/2020  2:00 PM   Cleansing Normal Saline Irrigation 2/5/2020  2:00 PM   Periwound Protectant Skin Protectant wipes to Periwound;Barrier Paste 2/5/2020  2:00 PM   Dressing Options Hydrofiber Silver;Nonadhesive Foam;Hypafix Tape;Collagen Dressing 2/5/2020  2:00 PM   Dressing Cleansing/Solutions Normal Saline 2/5/2020  2:00 PM   Dressing Changed Changed 2/5/2020  2:00 PM   Dressing Status Clean;Dry;Intact 2/5/2020  2:00 PM   Dressing Change Frequency Every 72 hrs 2/5/2020  2:00 PM   WOUND NURSE ONLY - Odor None 2/5/2020  2:00 PM   WOUND NURSE ONLY - Exposed Structures None 2/5/2020  2:00 PM   WOUND NURSE ONLY - Tissue Type and Percentage pre 70% yellow non viable, 30% red viable; post debridement -  2/5/2020  2:00 PM       Wound 12/06/19 Full Thickness Wound Right BKA incision mid (Active)   Wound Image   1/22/2020  1:00 PM   Site Assessment Epithelialization 1/22/2020  1:00 PM   Aby-wound Assessment Edema 1/22/2020  1:00 PM   Margins Attached edges 1/15/2020  4:00 PM   Wound Length (cm) 0 cm 1/22/2020  1:00 PM   Wound Width (cm) 0 cm 1/22/2020  1:00 PM   Wound Depth (cm) 0 cm 1/22/2020  1:00 PM   Wound Surface Area (cm^2) 0 cm^2 1/22/2020  1:00 PM   Post Wound Length (cm) 0.2 cm 1/10/2020  2:15 PM    Post Wound Width (cm) 0.3 cm 1/10/2020  2:15 PM   Post Wound Depth (cm) 0.3 cm 1/10/2020  2:15 PM   Post Wound Surface Area (cm^2) 0.06 cm^2 1/10/2020  2:15 PM   Tunneling 0 cm 12/16/2019  4:00 PM   Undermining 0 cm 12/16/2019  4:00 PM   Closure Secondary intention 12/6/2019  1:00 PM   Drainage Amount None 1/15/2020  4:00 PM   Treatments Cleansed 1/22/2020  1:00 PM   Cleansing Normal Saline Irrigation 1/22/2020  1:00 PM   Periwound Protectant Skin Protectant wipes to Periwound 1/22/2020  1:00 PM   Dressing Options Nonadhesive Foam;Hypafix Tape;Other (Comments)  1/22/2020  1:00 PM   Dressing Cleansing/Solutions Normal Saline 12/26/2019  1:23 PM   Dressing Changed Changed 1/10/2020  2:15 PM   Dressing Status Clean;Dry;Intact 12/16/2019  4:00 PM   Dressing Change Frequency Every 72 hrs 12/16/2019  4:00 PM   WOUND NURSE ONLY - Odor None 1/22/2020  1:00 PM   WOUND NURSE ONLY - Exposed Structures None 1/22/2020  1:00 PM   WOUND NURSE ONLY - Tissue Type and Percentage resolved 1/22/2020  1:00 PM           PROCEDURE: Excisional debridement of medial right BKA stump wound  -2% viscous lidocaine applied topically to wound bed for approximately 5 minutes prior to debridement  -Curette used to debride wound bed.  Excisional debridement was performed to remove devitalized tissue until healthy, bleeding tissue was visualized.   Entire surface of  wound,  1.44 cm2 debrided.  Tissue debrided into the subcutaneous layer.    -Bleeding controlled with manual pressure.    -Wound care completed by wound RN, refer to flowsheet  -Patient tolerated the procedure well, without c/o pain or discomfort.         Pertinent Labs and Diagnostics:    Labs:     A1c:   Lab Results   Component Value Date/Time    HBA1C 6.6 (H) 10/21/2019 09:36 AM                 ASSESSMENT AND PLAN:     1. Ulcer of right lower extremity with fat layer exposed (HCC)  Comments: S/P fall mid October 2019 suffered from full-thickness ulcers to right BKA incision line    2/5/2020: Area of medial wound larger since last assessment.  The smaller wound has resolved.  -Excisional debridement of wound in clinic today, medically necessary to promote wound healing.  -Patient to return to clinic weekly for assessment and debridement  -Home health to change dressing 1 time per week in between clinic visits  -SNAP or ACell discussed as possible options to accelerate healing of this wound.  Patient willing to consider.  We will request authorization.    Wound care: Collagen to accelerate granulation, Hydro-fiber silver to reduce bioburden,  nonadhesive foam to absorb exudate, Hypafix tape for securement    2. S/P BKA (below knee amputation) unilateral, right ([facility])  Comments: Right BKA 9/5/2019 by Dr. Amezcua due to gangrenous foot wound and untreatable PVD      -He is already established with [facility], will begin process for lower extremity stasis once wound is healed    3. Controlled type 2 diabetes mellitus with other skin ulcer, with long-term current use of insulin ([facility])  A1c 6.6% on 10/21/2019    2/5/2020: Patient reports his blood sugar today was 131  -Encouraged to keep his blood sugars below 140 in order to optimize wound healing      4. Diabetic mononeuropathy associated with diabetes mellitus due to underlying condition ([facility])-m rx gabapentin  Comments: Left foot insensate    -Previously discussed implications of loss of protective sensation (LOPS) discussed with patient, including increased risk for amputation.  Advised to check feet at least daily, moisturize feet, and to always wear protective foot wear, arrange meticulous regular foot care by podiatrist or [facility]. Pt with good understanding.     Please note that this note may have been created using voice recognition software. I have worked with technical experts from [employer] to optimize the interface.  I have made every reasonable attempt to correct obvious errors, but there may be errors of grammar and possibly content that I did not discover before finalizing the note.

## 2020-02-13 ENCOUNTER — OFFICE VISIT (OUTPATIENT)
Dept: WOUND CARE | Facility: MEDICAL CENTER | Age: 75
End: 2020-02-13
Attending: SURGERY
Payer: MEDICARE

## 2020-02-13 VITALS
RESPIRATION RATE: 18 BRPM | DIASTOLIC BLOOD PRESSURE: 79 MMHG | HEART RATE: 73 BPM | OXYGEN SATURATION: 95 % | SYSTOLIC BLOOD PRESSURE: 114 MMHG | TEMPERATURE: 97.7 F

## 2020-02-13 DIAGNOSIS — L97.912 ULCER OF RIGHT LOWER EXTREMITY WITH FAT LAYER EXPOSED (HCC): Primary | ICD-10-CM

## 2020-02-13 DIAGNOSIS — Z89.511 S/P BKA (BELOW KNEE AMPUTATION) UNILATERAL, RIGHT (HCC): ICD-10-CM

## 2020-02-13 DIAGNOSIS — E08.41 DIABETIC MONONEUROPATHY ASSOCIATED WITH DIABETES MELLITUS DUE TO UNDERLYING CONDITION (HCC): ICD-10-CM

## 2020-02-13 DIAGNOSIS — Z79.4 CONTROLLED TYPE 2 DIABETES MELLITUS WITH OTHER SKIN ULCER, WITH LONG-TERM CURRENT USE OF INSULIN (HCC): ICD-10-CM

## 2020-02-13 DIAGNOSIS — E11.622 CONTROLLED TYPE 2 DIABETES MELLITUS WITH OTHER SKIN ULCER, WITH LONG-TERM CURRENT USE OF INSULIN (HCC): ICD-10-CM

## 2020-02-13 PROCEDURE — 11042 DBRDMT SUBQ TIS 1ST 20SQCM/<: CPT

## 2020-02-13 PROCEDURE — 11042 DBRDMT SUBQ TIS 1ST 20SQCM/<: CPT | Performed by: NURSE PRACTITIONER

## 2020-02-13 ASSESSMENT — ENCOUNTER SYMPTOMS
SENSORY CHANGE: 1
CONSTIPATION: 0
VOMITING: 0
CHILLS: 0
DIZZINESS: 0
SPUTUM PRODUCTION: 0
DIARRHEA: 0
COUGH: 0
WHEEZING: 0
PALPITATIONS: 0
NAUSEA: 0
SHORTNESS OF BREATH: 0
HEADACHES: 0
FEVER: 0

## 2020-02-13 NOTE — PATIENT INSTRUCTIONS
-Keep dressings clean, dry and covered while bathing. Only change dressings if they become over saturated, soiled or fall off.     -Should you experience any significant changes in your wound(s), such as infection (redness, swelling, localized heat, increased pain, fever > 101 F, chills) or have any questions regarding your home care instructions, please contact the wound center at [phone]. If after hours, contact your primary care physician or go to the hospital emergency room.

## 2020-02-13 NOTE — PROGRESS NOTES
Provider Encounter- Full Thickness wound    HISTORY OF PRESENT ILLNESS  Wound History:    START OF CARE IN CLINIC: 12/6/2019    REFERRING PROVIDER: Shivam Amezcua MD     WOUND- Full Thickness Wound x3   LOCATION: Right BKA-medial, middle, lateral   HISTORY: Developed right second toe ulcer that became gangrenous.  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  Underwent right BKA by Dr. Amezcua on 9/5/2019.  He followed up for suture removal early October 2019.  Soon after patient slipped and fell on 10/15/2019 and incision dehisced creating ulcer to medial, mid, lateral aspects of BKA incision site.  He followed up with Dr. Amezcua and was instructed to apply Aquacel Ag to the wound bed which his wife performed dressing changes every other day.    DIABETES HX: Diagnosed with type 2 diabetes in 1998 and is currently managing with insulin and oral diabetic agents.  Checks blood sugars 3-4 times per day and reports that these typically average 135-150.  Has had previous diabetes education.  Does have numbness in foot.  Usually wears diabetic shoes.  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         Pertinent Medical History: Diabetes, PVD, hypertension    TOBACCO USE: None    Patient's problem list, allergies, and current medications reviewed and updated in Epic      Interval History:  12/6/2019: New evaluation.  Initial provider visit.Clinic visit with Angelica MONTOYA.  Accompanied by shay Zepeda with [facility].  Patient denies seeing any erythema, edema, odor to the wound during dressing changes.  He is unsure if the wounds have decreased in size.  Currently using a wheelchair for mobilization; no use of  sock or stump protector at this time.  Patient has no further follow-ups with vascular surgery until February 2020 for LLE.    12/11/2019 : Clinic visit with JAN Alvarez.  Kevin is feeling well today.  He is accompanied  "today by Shivam, from [employer].  Patient is anxious to get his wound healed so that he can get this prosthesis.  We did discuss possible putting a VAC on these wounds to accelerate healing.  Patient stated, \"anything to get these healed up\".  He is requesting home health, as it has been difficult for his wife to change his dressings.  He states he rarely leaves his home, only for doctor's visits.    12/16/2019: Clinic visit with JAN Becerril. Patient states that they are feeling well today.  Patient denies fever, chills, nausea, vomiting, lightheadedness, dizziness, shortness of breath and chest pain.  Patient is happy with the progression of wounds both wounds have decreased in size with granulation tissue developing.  Patient would like to hold off on VAC placement at this time I am in agreement with the patient his wound seems to be progressing.  If wound stalls or ceases to progress we will revisit placement of wound VAC.    12/26/2019: Clinic visit with JAN Becerril. Patient states that they are feeling well today.  Patient denies fever, chills, nausea, vomiting, lightheadedness, dizziness, shortness of breath and chest pain. Wound with good granulation tissue.  Patient has vac and asked to bring it to next clinical visit, if wound is not progressing we will place vac.      12/30/2019:  Clinic visit with Dr. Sherman.  Doing well.  Patient and his wife brought in the VAC today.  He REALLY doesn't want to have to use it.  Denies fever, chills, nausea, vomiting or significant pain.  Complains of itching over the surrounding skin.   Just found out today that [employer] coming 3x weekly!!    1/10/2020 : Clinic visit with JAN Alvarez.  Kevin presents the clinic today accompanied by his wife.  He states he is feeling well, much happier without the VAC.  He states his blood sugar today was 151.  [employer] is seeing him 1 time per week in between clinic visits.    1/15/2020 : " Clinic visit with JAN Alvarez.  Kevin is feeling well today, satisfied with the progression of his wound.  Reports that his blood sugar was in the mid 140s.    1/22/2020: Clinic visit with AJN Becerril. Patient states that they are feeling well today.  Patient denies fever, chills, nausea, vomiting, lightheadedness, dizziness, shortness of breath and chest pain.  Wound is progressing nicely, near 100% epithelialized.  I hope that will be resolved at next clinic visit.    1/30/2020: Clinic visit with JAN Becerril. Patient states that he is very unhappy with the progression of the wounds it appears that the 2 medial wounds have become slightly larger, there is increased erythema to the periwound area with tan/beige exudate. For these reasons I have placed the patient on Augmentin and cultured the wound.  Patient denies fever, chills, nausea, vomiting, lightheadedness, dizziness, shortness of breath and chest pain.     2/5/2020 : Clinic visit with JAN Alvarez.  Kevin is feeling well today, denies F/C/N/V.  He is still quite frustrated that his wound is not healing as quickly as he would like.  Willing to consider multiple options, such as SNAP, or ACell to accelerate healing of his wounds.    2/13/2020: Clinic visit with JAN Becerril. Patient states that they are feeling well today.  Patient denies fever, chills, nausea, vomiting, lightheadedness, dizziness, shortness of breath and chest pain.  Patient's wound is slightly smaller in depth.  Awaiting ACell and snap VAC approval I believe these 2 modalities will improve granulation tissue formation and help the wound heal.        REVIEW OF SYSTEMS:   Review of Systems   Constitutional: Negative for chills, fever and malaise/fatigue.   Respiratory: Negative for cough, sputum production, shortness of breath and wheezing.    Cardiovascular: Negative for chest pain, palpitations and leg swelling.   Gastrointestinal: Negative for  constipation, diarrhea, nausea and vomiting.   Skin: Negative for rash.   Neurological: Positive for sensory change. Negative for dizziness and headaches.        Numb left foot   Psychiatric/Behavioral: Negative for suicidal ideas.        Mild depression due to the circumstances of his wounds that he feels are not healing       PHYSICAL EXAMINATION:   /79   Pulse 73   Temp 36.5 °C (97.7 °F) (Temporal)   Resp 18   SpO2 95%     Physical Exam   Constitutional: He is oriented to person, place, and time and well-developed, well-nourished, and in no distress.   HENT:   Head: Normocephalic and atraumatic.   Right Ear: External ear normal.   Left Ear: External ear normal.   Eyes: Pupils are equal, round, and reactive to light. Conjunctivae are normal.   Cardiovascular: Intact distal pulses.   Right popliteal pulse 2+   Pulmonary/Chest: Effort normal. No respiratory distress. He has no wheezes.   Musculoskeletal:         General: No edema.      Comments: Right BKA  Healed left TMA   Neurological: He is alert and oriented to person, place, and time.   1/10/2020: Monofilament testing of left foot in clinic  Left foot: 0/8 sites sensed   Skin: Skin is warm and dry.   Lateral wound remains slightly smaller in depth  Refer to wound flowsheet and photos   Psychiatric: Mood, memory, affect and judgment normal.   Vitals reviewed.    1/10/2020:  Monofilament testing with a 10 gram force: sensation intact: decreased on left  Visual Inspection: Feet with maceration, ulcers, fissures.  Pedal pulses: intact bilaterally    WOUND ASSESSMENT       Wound 12/06/19 Full Thickness Wound Right BKA incision medial (Active)   Wound Image    2/13/2020 10:00 AM   Site Assessment Red;Yellow 2/13/2020 10:00 AM   Periwound Assessment Edema 2/13/2020 10:00 AM   Margins Attached edges 2/13/2020 10:00 AM   Wound Length (cm) 0.8 cm 2/13/2020 10:00 AM   Wound Width (cm) 1.5 cm 2/13/2020 10:00 AM   Wound Depth (cm) 0.4 cm 2/13/2020 10:00 AM   Wound  Surface Area (cm^2) 1.2 cm^2 2/13/2020 10:00 AM   Wound Volume (cm^3) 0.48 cm^3 2/13/2020 10:00 AM   Post-Procedure Length (cm) 0.8 cm 2/13/2020 10:00 AM   Post-Procedure Width (cm) 1.6 cm 2/13/2020 10:00 AM   Post-Procedure Depth (cm) 0.4 cm 2/13/2020 10:00 AM   Post-Procedure Surface Area (cm^2) 1.28 cm^2 2/13/2020 10:00 AM   Post-Procedure Volume (cm^3) 0.51 cm^3 2/13/2020 10:00 AM   Post Wound Length (cm) 0.9 cm 2/5/2020  2:00 PM    Post Wound Width (cm) 1.6 cm 2/5/2020  2:00 PM   Post Wound Depth (cm) 0.7 cm 2/5/2020  2:00 PM   Post Wound Surface Area (cm^2) 1.44 cm^2 2/5/2020  2:00 PM   Tunneling (cm) 0.6 cm 2/5/2020  2:00 PM   Undermining (cm) 0.4 cm 2/13/2020 10:00 AM   Closure Secondary intention 2/5/2020  2:00 PM   Drainage Amount Moderate 2/13/2020 10:00 AM   Drainage Description Serosanguineous 2/13/2020 10:00 AM   Non-staged Wound Description Full thickness 2/13/2020 10:00 AM   Treatments Cleansed;Pharmaceutical agent;Other (Comment) 2/13/2020 10:00 AM   Wound Cleansing Normal Saline Irrigation 2/13/2020 10:00 AM   Periwound Protectant Skin Protectant Wipes to Periwound 2/13/2020 10:00 AM   Dressing Options Hydrofiber Silver;Silicone Adhesive Foam 2/13/2020 10:00 AM   Dressing Cleansing/Solutions Normal Saline 2/5/2020  2:00 PM   Dressing Changed Changed 2/13/2020 10:00 AM   Dressing Status Clean;Dry;Intact 2/5/2020  2:00 PM   Dressing Change/Treatment Frequency Every 72 hrs 2/5/2020  2:00 PM   Wound Odor None 2/13/2020 10:00 AM   Exposed Structures None 2/13/2020 10:00 AM   WOUND NURSE ONLY - Tissue Type and Percentage Pre-debridement: 5% red, 95% yellow. 2/13/2020 10:00 AM          PROCEDURE: Excisional debridement of medial right BKA stump wound  -2% viscous lidocaine applied topically to wound bed for approximately 5 minutes prior to debridement  -Curette used to debride wound bed.  Excisional debridement was performed to remove devitalized tissue until healthy, bleeding tissue was visualized.    Entire surface of  wound,  1.44 cm2 debrided.  Tissue debrided into the subcutaneous layer.    -Bleeding controlled with manual pressure.    -Wound care completed by wound RN, refer to flowsheet  -Patient tolerated the procedure well, without c/o pain or discomfort.         Pertinent Labs and Diagnostics:    Labs:     A1c:   Lab Results   Component Value Date/Time    HBA1C 6.6 (H) 10/21/2019 09:36 AM                 ASSESSMENT AND PLAN:     1. Ulcer of right lower extremity with fat layer exposed ([facility])  Comments: S/P fall mid October 2019 suffered from full-thickness ulcers to right BKA incision line    2/12/2020: Medial wound is resolved.  Lateral wound slightly decreased in depth.  T-Excisional debridement of wound in clinic today, medically necessary to promote wound healing.  -Patient to return to clinic weekly for assessment and debridement  -Home health to change dressing 1 time per week in between clinic visits  -SNAP or ACell discussed as possible options to accelerate healing of this wound.  Patient willing to consider.  Still waiting for authorization of snap VAC and ACell.    Wound care: Hydrofiber silver strip and silicone adhesive foam      2. S/P BKA (below knee amputation) unilateral, right ([facility])  Comments: Right BKA 9/5/2019 by Dr. Amezcua due to gangrenous foot wound and untreatable PVD      -He is already established with [facility], will begin process for lower extremity stasis once wound is healed    3. Controlled type 2 diabetes mellitus with other skin ulcer, with long-term current use of insulin ([facility])  A1c 6.6% on 10/21/2019    2/12/2020: Patient reports his blood sugar today was 151  -Encouraged to keep his blood sugars below 140 in order to optimize wound healing      4. Diabetic mononeuropathy associated with diabetes mellitus due to underlying condition ([facility])-m rx gabapentin  Comments: Left foot insensate    -Previously discussed implications of loss of protective sensation (LOPS) discussed  with patient, including increased risk for amputation.  Advised to check feet at least daily, moisturize feet, and to always wear protective foot wear, arrange meticulous regular foot care by podiatrist or [facility]. Pt with good understanding.     Please note that this note may have been created using voice recognition software. I have worked with technical experts from [facility] to optimize the interface.  I have made every reasonable attempt to correct obvious errors, but there may be errors of grammar and possibly content that I did not discover before finalizing the note.

## 2020-02-13 NOTE — LETTER
2020        Kevin Jackson : 1945    Wound Care Instructions:    · Remove old dressings.  · Cleanse right BKA wound with normal saline and gauze. Pat dry.  · Apply skin prep to chace-wound.  · Cover wound with Aquacel Ag.  · Cover Aquacel Ag with silicone adhesive foam.              ___________________________  Perez MONTOYA

## 2020-02-19 ENCOUNTER — OFFICE VISIT (OUTPATIENT)
Dept: WOUND CARE | Facility: MEDICAL CENTER | Age: 75
End: 2020-02-19
Attending: SURGERY
Payer: MEDICARE

## 2020-02-19 VITALS
HEART RATE: 79 BPM | DIASTOLIC BLOOD PRESSURE: 73 MMHG | SYSTOLIC BLOOD PRESSURE: 154 MMHG | RESPIRATION RATE: 18 BRPM | OXYGEN SATURATION: 94 % | TEMPERATURE: 97.4 F

## 2020-02-19 DIAGNOSIS — L97.912 ULCER OF RIGHT LOWER EXTREMITY WITH FAT LAYER EXPOSED (HCC): Primary | ICD-10-CM

## 2020-02-19 DIAGNOSIS — Z79.4 CONTROLLED TYPE 2 DIABETES MELLITUS WITH OTHER SKIN ULCER, WITH LONG-TERM CURRENT USE OF INSULIN (HCC): ICD-10-CM

## 2020-02-19 DIAGNOSIS — Z89.511 S/P BKA (BELOW KNEE AMPUTATION) UNILATERAL, RIGHT (HCC): ICD-10-CM

## 2020-02-19 DIAGNOSIS — E08.41 DIABETIC MONONEUROPATHY ASSOCIATED WITH DIABETES MELLITUS DUE TO UNDERLYING CONDITION (HCC): ICD-10-CM

## 2020-02-19 DIAGNOSIS — E11.622 CONTROLLED TYPE 2 DIABETES MELLITUS WITH OTHER SKIN ULCER, WITH LONG-TERM CURRENT USE OF INSULIN (HCC): ICD-10-CM

## 2020-02-19 PROCEDURE — 11042 DBRDMT SUBQ TIS 1ST 20SQCM/<: CPT | Performed by: NURSE PRACTITIONER

## 2020-02-19 PROCEDURE — 11042 DBRDMT SUBQ TIS 1ST 20SQCM/<: CPT

## 2020-02-19 ASSESSMENT — ENCOUNTER SYMPTOMS
SENSORY CHANGE: 1
COUGH: 0
DIARRHEA: 0
VOMITING: 0
CONSTIPATION: 0
WHEEZING: 0
CHILLS: 0
SHORTNESS OF BREATH: 0
NAUSEA: 0
FEVER: 0
SPUTUM PRODUCTION: 0
DIZZINESS: 0
HEADACHES: 0
PALPITATIONS: 0

## 2020-02-19 ASSESSMENT — PAIN SCALES - GENERAL: PAIN_LEVEL: 2

## 2020-02-19 NOTE — PROGRESS NOTES
Provider Encounter- Full Thickness wound    HISTORY OF PRESENT ILLNESS  Wound History:    START OF CARE IN CLINIC: 12/6/2019    REFERRING PROVIDER: Shivam Amezcua MD     WOUND- Full Thickness Wound x3   LOCATION: Right BKA-medial, middle, lateral   HISTORY: Developed right second toe ulcer that became gangrenous.  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  Underwent right BKA by Dr. Amezcua on 9/5/2019.  He followed up for suture removal early October 2019.  Soon after patient slipped and fell on 10/15/2019 and incision dehisced creating ulcer to medial, mid, lateral aspects of BKA incision site.  He followed up with Dr. Amezcua and was instructed to apply Aquacel Ag to the wound bed which his wife performed dressing changes every other day.    DIABETES HX: Diagnosed with type 2 diabetes in 1998 and is currently managing with insulin and oral diabetic agents.  Checks blood sugars 3-4 times per day and reports that these typically average 135-150.  Has had previous diabetes education.  Does have numbness in foot.  Usually wears diabetic shoes.  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         Pertinent Medical History: Diabetes, PVD, hypertension    TOBACCO USE: None    Patient's problem list, allergies, and current medications reviewed and updated in Epic      Interval History:  12/6/2019: New evaluation.  Initial provider visit.Clinic visit with Angelica MONTOYA.  Accompanied by shay Zepeda with [facility].  Patient denies seeing any erythema, edema, odor to the wound during dressing changes.  He is unsure if the wounds have decreased in size.  Currently using a wheelchair for mobilization; no use of  sock or stump protector at this time.  Patient has no further follow-ups with vascular surgery until February 2020 for LLE.    12/11/2019 : Clinic visit with JAN Alvarez.  Kevin is feeling well today.  He is accompanied  "today by Shivam, from [employer].  Patient is anxious to get his wound healed so that he can get this prosthesis.  We did discuss possible putting a VAC on these wounds to accelerate healing.  Patient stated, \"anything to get these healed up\".  He is requesting home health, as it has been difficult for his wife to change his dressings.  He states he rarely leaves his home, only for doctor's visits.    12/16/2019: Clinic visit with JAN Becerril. Patient states that they are feeling well today.  Patient denies fever, chills, nausea, vomiting, lightheadedness, dizziness, shortness of breath and chest pain.  Patient is happy with the progression of wounds both wounds have decreased in size with granulation tissue developing.  Patient would like to hold off on VAC placement at this time I am in agreement with the patient his wound seems to be progressing.  If wound stalls or ceases to progress we will revisit placement of wound VAC.    12/26/2019: Clinic visit with JAN Becerril. Patient states that they are feeling well today.  Patient denies fever, chills, nausea, vomiting, lightheadedness, dizziness, shortness of breath and chest pain. Wound with good granulation tissue.  Patient has vac and asked to bring it to next clinical visit, if wound is not progressing we will place vac.      12/30/2019:  Clinic visit with Dr. Sherman.  Doing well.  Patient and his wife brought in the VAC today.  He REALLY doesn't want to have to use it.  Denies fever, chills, nausea, vomiting or significant pain.  Complains of itching over the surrounding skin.   Just found out today that [employer] coming 3x weekly!!    1/10/2020 : Clinic visit with JAN Alvarez.  Kevin presents the clinic today accompanied by his wife.  He states he is feeling well, much happier without the VAC.  He states his blood sugar today was 151.  Surveypal is seeing him 1 time per week in between clinic visits.    1/15/2020 : " Clinic visit with JAN Alvarez.  Kevin is feeling well today, satisfied with the progression of his wound.  Reports that his blood sugar was in the mid 140s.    1/22/2020: Clinic visit with JAN Becerril. Patient states that they are feeling well today.  Patient denies fever, chills, nausea, vomiting, lightheadedness, dizziness, shortness of breath and chest pain.  Wound is progressing nicely, near 100% epithelialized.  I hope that will be resolved at next clinic visit.    1/30/2020: Clinic visit with JAN Becerril. Patient states that he is very unhappy with the progression of the wounds it appears that the 2 medial wounds have become slightly larger, there is increased erythema to the periwound area with tan/beige exudate. For these reasons I have placed the patient on Augmentin and cultured the wound.  Patient denies fever, chills, nausea, vomiting, lightheadedness, dizziness, shortness of breath and chest pain.     2/5/2020 : Clinic visit with JAN Alvarez.  Kevin is feeling well today, denies F/C/N/V.  He is still quite frustrated that his wound is not healing as quickly as he would like.  Willing to consider multiple options, such as SNAP, or ACell to accelerate healing of his wounds.    2/13/2020: Clinic visit with JAN Becerril. Patient states that they are feeling well today.  Patient denies fever, chills, nausea, vomiting, lightheadedness, dizziness, shortness of breath and chest pain.  Patient's wound is slightly smaller in depth.  Awaiting ACell and snap VAC approval I believe these 2 modalities will improve granulation tissue formation and help the wound heal.    2/19/2020: Clinic visit with JAN Becerril. Patient states that they are feeling well today.  Patient denies fever, chills, nausea, vomiting, lightheadedness, dizziness, shortness of breath and chest pain.  Patient's wound is smaller this week in clinic.  And I believe it is too small to apply VAC to  at this time.  We will continue with advanced wound care dressings.  First application of ACell applied in clinic today.        REVIEW OF SYSTEMS:   Review of Systems   Constitutional: Negative for chills, fever and malaise/fatigue.   Respiratory: Negative for cough, sputum production, shortness of breath and wheezing.    Cardiovascular: Negative for chest pain, palpitations and leg swelling.   Gastrointestinal: Negative for constipation, diarrhea, nausea and vomiting.   Skin: Negative for rash.   Neurological: Positive for sensory change. Negative for dizziness and headaches.        Numb left foot   Psychiatric/Behavioral: Negative for suicidal ideas.        Mild depression due to the circumstances of his wounds that he feels are not healing       PHYSICAL EXAMINATION:   /73   Pulse 79   Temp 36.3 °C (97.4 °F) (Temporal)   Resp 18   SpO2 94%       Physical Exam   Constitutional: He is oriented to person, place, and time and well-developed, well-nourished, and in no distress.   HENT:   Head: Normocephalic and atraumatic.   Right Ear: External ear normal.   Left Ear: External ear normal.   Eyes: Pupils are equal, round, and reactive to light. Conjunctivae are normal.   Cardiovascular: Intact distal pulses.   Right popliteal pulse 2+   Pulmonary/Chest: Effort normal. No respiratory distress. He has no wheezes.   Musculoskeletal:         General: No edema.      Comments: Right BKA  Healed left TMA   Neurological: He is alert and oriented to person, place, and time.   1/10/2020: Monofilament testing of left foot in clinic  Left foot: 0/8 sites sensed   Skin: Skin is warm and dry.   Lateral wound remains slightly smaller in depth  Refer to wound flowsheet and photos   Psychiatric: Mood, memory, affect and judgment normal.   Vitals reviewed.    1/10/2020:  Monofilament testing with a 10 gram force: sensation intact: decreased on left  Visual Inspection: Feet with maceration, ulcers, fissures.  Pedal pulses:  intact bilaterally    WOUND ASSESSMENT        Wound 12/06/19 Full Thickness Wound Right BKA incision medial (Active)   Wound Image    2/19/2020  1:15 PM   Site Assessment Red;Yellow 2/19/2020  1:15 PM   Periwound Assessment Edema 2/19/2020  1:15 PM   Margins Attached edges 2/19/2020  1:15 PM   Wound Length (cm) 0.4 cm 2/19/2020  1:15 PM   Wound Width (cm) 1.1 cm 2/19/2020  1:15 PM   Wound Depth (cm) 0.2 cm 2/19/2020  1:15 PM   Wound Surface Area (cm^2) 0.44 cm^2 2/19/2020  1:15 PM   Wound Volume (cm^3) 0.09 cm^3 2/19/2020  1:15 PM   Post-Procedure Length (cm) 0.4 cm 2/19/2020  1:15 PM   Post-Procedure Width (cm) 1.2 cm 2/19/2020  1:15 PM   Post-Procedure Depth (cm) 0.3 cm 2/19/2020  1:15 PM   Post-Procedure Surface Area (cm^2) 0.48 cm^2 2/19/2020  1:15 PM   Post-Procedure Volume (cm^3) 0.14 cm^3 2/19/2020  1:15 PM   Post Wound Length (cm) 0.9 cm 2/5/2020  2:00 PM    Post Wound Width (cm) 1.6 cm 2/5/2020  2:00 PM   Post Wound Depth (cm) 0.7 cm 2/5/2020  2:00 PM   Post Wound Surface Area (cm^2) 1.44 cm^2 2/5/2020  2:00 PM   Tunneling (cm) 0 cm 2/19/2020  1:15 PM   Undermining (cm) 0 cm 2/19/2020  1:15 PM   Closure Secondary intention 2/5/2020  2:00 PM   Drainage Amount Moderate 2/19/2020  1:15 PM   Drainage Description Serosanguineous 2/19/2020  1:15 PM   Non-staged Wound Description Full thickness 2/19/2020  1:15 PM   Treatments Cleansed;Topical Lidocaine;Provider debridement 2/19/2020  1:15 PM   Wound Cleansing Normal Saline Irrigation 2/19/2020  1:15 PM   Periwound Protectant Skin Protectant Wipes to Periwound 2/19/2020  1:15 PM   Dressing Options Biologic (Skin Substitute);Petroleum Gauze (clear);Steri-Strip;Silicone Adhesive Foam 2/19/2020  1:15 PM   Dressing Cleansing/Solutions Normal Saline 2/5/2020  2:00 PM   Dressing Changed Changed 2/19/2020  1:15 PM   Dressing Status Clean;Dry;Intact 2/5/2020  2:00 PM   Dressing Change/Treatment Frequency Every 72 hrs 2/5/2020  2:00 PM   Wound Odor None 2/19/2020  1:15 PM      Exposed Structures None 2/19/2020  1:15 PM   WOUND NURSE ONLY - Tissue Type and Percentage Pre-debridement: 30% red, 70% yellow. 2/19/2020  1:15 PM         Debridement     Consent obtained? verbal  Consent given by: patient  Risks discussed? procedural risks discussed  Performed by: NP  Pain control: lidocaine 2%  Post-debridement measurements  Length (cm): 0.9  Width (cm): 1.6  Depth (cm): 0.7  Percent debrided: 100%  Surface Area (cm^2): 1.44  Area debrided (cm^2): 1.44  Volume (cm^3): 1.01  Tissue and other material debrided: subcutaneous tissue  Devitalized tissue debrided: biofilm  Instrument(s) utilized: curette  Bleeding: small  Hemostasis obtained with: pressure  Procedural pain (0-10): 0  Post-procedural pain: 2   Response to treatment: procedure was tolerated well                 After thoroughly washing hands and placing sterile gloves on ACell powder was applied to left medial wound bed to accelerate granulation tissue formation.  ACell was placed following debridement of wound see above.    Product: Acell Micromatrix powder 30 mg,  no wastage:   LOT [id number]; REF MM [id number];  SER [id number].    Pertinent Labs and Diagnostics:    Labs:     A1c:   Lab Results   Component Value Date/Time    HBA1C 6.6 (H) 10/21/2019 09:36 AM                 ASSESSMENT AND PLAN:     1. Ulcer of right lower extremity with fat layer exposed (HCC)  Comments: S/P fall mid October 2019 suffered from full-thickness ulcers to right BKA incision line    2/19/2020: Lateral wound has decreased in size ACell placed to accelerate granulation tissue formation.  -Excisional debridement of wound in clinic today, medically necessary to promote wound healing.  -Patient to return to clinic weekly for assessment and debridement  -Home health to change dressing 1 time per week in between clinic visits  -First application of ACell placed today 30 mg powder  -Wound is too small to apply VAC at this time.    Wound care: Adaptic and Steri-Strips to  secure ACell powder in place, adhesive foam bandage for padding and securement      2. S/P BKA (below knee amputation) unilateral, right ([facility])  Comments: Right BKA 9/5/2019 by Dr. Amezcua due to gangrenous foot wound and untreatable PVD      -He is already established with [facility], will begin process for lower extremity stasis once wound is healed    3. Controlled type 2 diabetes mellitus with other skin ulcer, with long-term current use of insulin ([facility])  A1c 6.6% on 10/21/2019    2/19/2020: Patient reports his blood sugar today was 134  -Encouraged to keep his blood sugars below 140 in order to optimize wound healing      4. Diabetic mononeuropathy associated with diabetes mellitus due to underlying condition ([facility])-m rx gabapentin  Comments: Left foot insensate    -Previously discussed implications of loss of protective sensation (LOPS) discussed with patient, including increased risk for amputation.  Advised to check feet at least daily, moisturize feet, and to always wear protective foot wear, arrange meticulous regular foot care by podiatrist or [facility]. Pt with good understanding.     Please note that this note may have been created using voice recognition software. I have worked with technical experts from [email or web address] to optimize the interface.  I have made every reasonable attempt to correct obvious errors, but there may be errors of grammar and possibly content that I did not discover before finalizing the note.

## 2020-02-19 NOTE — PATIENT INSTRUCTIONS
Your healthcare provider applied Acell skin substitute today. Do not change the primary dressing for one week. Keep the outer dressings clean, dry and covered while bathing.You may change the outer dressing if soiled, saturated, or dislodged.     Should you experience any significant changes in your wound(s), such as infection (redness, swelling, localized heat, increased pain, fever > 101 F, chills) or have any questions regarding your home care instructions, please contact the wound center at [phone]. If after hours, contact your primary care physician or go to the hospital emergency room.

## 2020-02-26 ENCOUNTER — APPOINTMENT (RX ONLY)
Dept: URBAN - METROPOLITAN AREA CLINIC 20 | Facility: CLINIC | Age: 75
Setting detail: DERMATOLOGY
End: 2020-02-26

## 2020-02-26 ENCOUNTER — OFFICE VISIT (OUTPATIENT)
Dept: WOUND CARE | Facility: MEDICAL CENTER | Age: 75
End: 2020-02-26
Attending: SURGERY
Payer: MEDICARE

## 2020-02-26 VITALS
TEMPERATURE: 97.4 F | RESPIRATION RATE: 16 BRPM | SYSTOLIC BLOOD PRESSURE: 118 MMHG | DIASTOLIC BLOOD PRESSURE: 73 MMHG | OXYGEN SATURATION: 94 % | HEART RATE: 80 BPM

## 2020-02-26 DIAGNOSIS — L30.9 DERMATITIS, UNSPECIFIED: ICD-10-CM

## 2020-02-26 DIAGNOSIS — L97.912 ULCER OF RIGHT LOWER EXTREMITY WITH FAT LAYER EXPOSED (HCC): ICD-10-CM

## 2020-02-26 DIAGNOSIS — Z89.511 S/P BKA (BELOW KNEE AMPUTATION) UNILATERAL, RIGHT (HCC): ICD-10-CM

## 2020-02-26 DIAGNOSIS — E08.42 DIABETIC POLYNEUROPATHY ASSOCIATED WITH DIABETES MELLITUS DUE TO UNDERLYING CONDITION (HCC): ICD-10-CM

## 2020-02-26 DIAGNOSIS — L57.0 ACTINIC KERATOSIS: ICD-10-CM | Status: RESOLVED

## 2020-02-26 DIAGNOSIS — Z79.4 CONTROLLED TYPE 2 DIABETES MELLITUS WITH OTHER SKIN ULCER, WITH LONG-TERM CURRENT USE OF INSULIN (HCC): ICD-10-CM

## 2020-02-26 DIAGNOSIS — E11.622 CONTROLLED TYPE 2 DIABETES MELLITUS WITH OTHER SKIN ULCER, WITH LONG-TERM CURRENT USE OF INSULIN (HCC): ICD-10-CM

## 2020-02-26 PROCEDURE — ? COUNSELING

## 2020-02-26 PROCEDURE — ? LIQUID NITROGEN

## 2020-02-26 PROCEDURE — 99213 OFFICE O/P EST LOW 20 MIN: CPT | Mod: 25

## 2020-02-26 PROCEDURE — 17003 DESTRUCT PREMALG LES 2-14: CPT

## 2020-02-26 PROCEDURE — C5271 LOW COST SKIN SUBSTITUTE APP: HCPCS

## 2020-02-26 PROCEDURE — ? PRESCRIPTION

## 2020-02-26 PROCEDURE — 17000 DESTRUCT PREMALG LESION: CPT

## 2020-02-26 PROCEDURE — 15271 SKIN SUB GRAFT TRNK/ARM/LEG: CPT | Performed by: NURSE PRACTITIONER

## 2020-02-26 RX ORDER — HYDROCORTISONE 25 MG/G
OINTMENT TOPICAL
Qty: 1 | Refills: 0 | Status: ERX | COMMUNITY
Start: 2020-02-26

## 2020-02-26 RX ADMIN — HYDROCORTISONE: 25 OINTMENT TOPICAL at 00:00

## 2020-02-26 ASSESSMENT — ENCOUNTER SYMPTOMS
VOMITING: 0
PALPITATIONS: 0
CHILLS: 0
WHEEZING: 0
DIZZINESS: 0
HEADACHES: 0
SENSORY CHANGE: 1
SPUTUM PRODUCTION: 0
SHORTNESS OF BREATH: 0
DIARRHEA: 0
FEVER: 0
NAUSEA: 0
COUGH: 0
CONSTIPATION: 0

## 2020-02-26 ASSESSMENT — LOCATION DETAILED DESCRIPTION DERM
LOCATION DETAILED: NASAL DORSUM
LOCATION DETAILED: NASAL ROOT
LOCATION DETAILED: LEFT SUPERIOR CENTRAL MALAR CHEEK
LOCATION DETAILED: RIGHT CENTRAL BUCCAL CHEEK

## 2020-02-26 ASSESSMENT — LOCATION SIMPLE DESCRIPTION DERM
LOCATION SIMPLE: NOSE
LOCATION SIMPLE: LEFT CHEEK
LOCATION SIMPLE: RIGHT CHEEK

## 2020-02-26 ASSESSMENT — LOCATION ZONE DERM
LOCATION ZONE: NOSE
LOCATION ZONE: FACE

## 2020-02-26 NOTE — LETTER
2020          Kevin Jackson : 1945    Wound Care Orders:    · Remove outer hydrofera blue dressing. Leave adaptic and steri-strips in place (patient has Acell on his right BKA wound).  · Cleanse chace-wound with normal saline and gauze. Pat dry.  · Apply skin prep to chace-wound.  · Cover Adaptic and steri-strips with Hydrofera blue dressing and hypafix tape with minimal tape.           Thank you,          Aletha MONTOYA

## 2020-02-26 NOTE — PATIENT INSTRUCTIONS
-Keep dressings clean, dry and covered while bathing. Change dressings if they become over saturated, soiled or fall off; or once in between clinic visits.     -Avoid prolonged standing or sitting without elevating your legs.    -Never walk around the house barefoot. Always wear a rubber soled slipper when walking around the house.    -Should you experience any significant changes in your wound(s), such as infection (redness, swelling, localized heat, increased pain, fever > 101 F, chills) or have any questions regarding your home care instructions, please contact the wound center at [phone]. If after hours, contact your primary care physician or go to the hospital emergency room.

## 2020-02-26 NOTE — PROGRESS NOTES
Provider Encounter- Full Thickness wound    HISTORY OF PRESENT ILLNESS  Wound History:    START OF CARE IN CLINIC: 12/6/2019    REFERRING PROVIDER: Shivam Amezcua MD     WOUND- Full Thickness Wound x3   LOCATION: Right BKA-medial, middle, lateral   HISTORY: Developed right second toe ulcer that became gangrenous.  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  Underwent right BKA by Dr. Aemzcua on 9/5/2019.  He followed up for suture removal early October 2019.  Soon after patient slipped and fell on 10/15/2019 and incision dehisced creating ulcer to medial, mid, lateral aspects of BKA incision site.  He followed up with Dr. Amezcua and was instructed to apply Aquacel Ag to the wound bed which his wife performed dressing changes every other day.    DIABETES HX: Diagnosed with type 2 diabetes in 1998 and is currently managing with insulin and oral diabetic agents.  Checks blood sugars 3-4 times per day and reports that these typically average 135-150.  Has had previous diabetes education.  Does have numbness in foot.  Usually wears diabetic shoes.  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         Pertinent Medical History: Diabetes, PVD, hypertension    TOBACCO USE: None    Patient's problem list, allergies, and current medications reviewed and updated in Epic      Interval History:  12/6/2019: New evaluation.  Initial provider visit.Clinic visit with Angelica MONTOYA.  Accompanied by shay Zepeda with [facility].  Patient denies seeing any erythema, edema, odor to the wound during dressing changes.  He is unsure if the wounds have decreased in size.  Currently using a wheelchair for mobilization; no use of  sock or stump protector at this time.  Patient has no further follow-ups with vascular surgery until February 2020 for LLE.    12/11/2019 : Clinic visit with JAN Alvarez.  Kevin is feeling well today.  He is accompanied  "today by Shivam, from [employer].  Patient is anxious to get his wound healed so that he can get this prosthesis.  We did discuss possible putting a VAC on these wounds to accelerate healing.  Patient stated, \"anything to get these healed up\".  He is requesting home health, as it has been difficult for his wife to change his dressings.  He states he rarely leaves his home, only for doctor's visits.    12/16/2019: Clinic visit with JAN Becerril. Patient states that they are feeling well today.  Patient denies fever, chills, nausea, vomiting, lightheadedness, dizziness, shortness of breath and chest pain.  Patient is happy with the progression of wounds both wounds have decreased in size with granulation tissue developing.  Patient would like to hold off on VAC placement at this time I am in agreement with the patient his wound seems to be progressing.  If wound stalls or ceases to progress we will revisit placement of wound VAC.    12/26/2019: Clinic visit with JAN Becerril. Patient states that they are feeling well today.  Patient denies fever, chills, nausea, vomiting, lightheadedness, dizziness, shortness of breath and chest pain. Wound with good granulation tissue.  Patient has vac and asked to bring it to next clinical visit, if wound is not progressing we will place vac.      12/30/2019:  Clinic visit with Dr. Sherman.  Doing well.  Patient and his wife brought in the VAC today.  He REALLY doesn't want to have to use it.  Denies fever, chills, nausea, vomiting or significant pain.  Complains of itching over the surrounding skin.   Just found out today that [employer] coming 3x weekly!!    1/10/2020 : Clinic visit with JAN Alvarez.  Kevin presents the clinic today accompanied by his wife.  He states he is feeling well, much happier without the VAC.  He states his blood sugar today was 151.  "Hey, Neighbor!" is seeing him 1 time per week in between clinic visits.    1/15/2020 : " Clinic visit with JAN Alvarez.  Kevin is feeling well today, satisfied with the progression of his wound.  Reports that his blood sugar was in the mid 140s.    1/22/2020: Clinic visit with JAN Becerril. Patient states that they are feeling well today.  Patient denies fever, chills, nausea, vomiting, lightheadedness, dizziness, shortness of breath and chest pain.  Wound is progressing nicely, near 100% epithelialized.  I hope that will be resolved at next clinic visit.    1/30/2020: Clinic visit with JAN Becerril. Patient states that he is very unhappy with the progression of the wounds it appears that the 2 medial wounds have become slightly larger, there is increased erythema to the periwound area with tan/beige exudate. For these reasons I have placed the patient on Augmentin and cultured the wound.  Patient denies fever, chills, nausea, vomiting, lightheadedness, dizziness, shortness of breath and chest pain.     2/5/2020 : Clinic visit with JAN Alvarez.  Kevin is feeling well today, denies F/C/N/V.  He is still quite frustrated that his wound is not healing as quickly as he would like.  Willing to consider multiple options, such as SNAP, or ACell to accelerate healing of his wounds.    2/13/2020: Clinic visit with JAN Becerril. Patient states that they are feeling well today.  Patient denies fever, chills, nausea, vomiting, lightheadedness, dizziness, shortness of breath and chest pain.  Patient's wound is slightly smaller in depth.  Awaiting ACell and snap VAC approval I believe these 2 modalities will improve granulation tissue formation and help the wound heal.    2/19/2020: Clinic visit with JAN Becerril. Patient states that they are feeling well today.  Patient denies fever, chills, nausea, vomiting, lightheadedness, dizziness, shortness of breath and chest pain.  Patient's wound is smaller this week in clinic.  And I believe it is too small to apply VAC to  "at this time.  We will continue with advanced wound care dressings.  First application of ACell applied in clinic today.    2/26/2020 : Clinic visit with JAN Alvarez.  Kevin is feeling well today, reports his blood sugar is around 130.  He is very frustrated with lack of progress in his wound, worries that he will, \"miss out on the whole summer\".  Second application of ACell applied in clinic today.        REVIEW OF SYSTEMS:   Review of Systems   Constitutional: Negative for chills, fever and malaise/fatigue.   Respiratory: Negative for cough, sputum production, shortness of breath and wheezing.    Cardiovascular: Negative for chest pain, palpitations and leg swelling.   Gastrointestinal: Negative for constipation, diarrhea, nausea and vomiting.   Skin: Negative for rash.   Neurological: Positive for sensory change. Negative for dizziness and headaches.        Numb left foot   Psychiatric/Behavioral: Negative for suicidal ideas.        Mild depression due to the circumstances of his wounds that he feels are not healing       PHYSICAL EXAMINATION:   /73   Pulse 80   Temp 36.3 °C (97.4 °F) (Temporal)   Resp 16   SpO2 94%       Physical Exam   Constitutional: He is oriented to person, place, and time and well-developed, well-nourished, and in no distress.   HENT:   Head: Normocephalic and atraumatic.   Right Ear: External ear normal.   Left Ear: External ear normal.   Eyes: Pupils are equal, round, and reactive to light. Conjunctivae are normal.   Cardiovascular: Intact distal pulses.   Right popliteal pulse 2+   Pulmonary/Chest: Effort normal. No respiratory distress. He has no wheezes.   Musculoskeletal:         General: No edema.      Comments: Right BKA  Healed left TMA   Neurological: He is alert and oriented to person, place, and time.   1/10/2020: Monofilament testing of left foot in clinic  Left foot: 0/8 sites sensed   Skin: Skin is warm and dry.   Lateral wound remains slightly smaller in " depth  Refer to wound flowsheet and photos   Psychiatric: Mood, memory, affect and judgment normal.   Vitals reviewed.    1/10/2020:  Monofilament testing with a 10 gram force: sensation intact: decreased on left  Visual Inspection: Feet with maceration, ulcers, fissures.  Pedal pulses: intact bilaterally    WOUND ASSESSMENT       Wound 12/06/19 Full Thickness Wound Right BKA incision medial (Active)   Site Assessment Red;Yellow 2/26/2020 10:45 AM   Periwound Assessment Edema 2/26/2020 10:45 AM   Margins Attached edges 2/26/2020 10:45 AM   Wound Length (cm) 0.3 cm 2/26/2020 10:45 AM   Wound Width (cm) 1 cm 2/26/2020 10:45 AM   Wound Surface Area (cm^2) 0.3 cm^2 2/26/2020 10:45 AM   Wound Volume (cm^3) 0.09 cm^3 2/19/2020  1:15 PM   Post-Procedure Length (cm) 0.5 cm 2/26/2020 10:45 AM   Post-Procedure Width (cm) 1 cm 2/26/2020 10:45 AM   Post-Procedure Depth (cm) 0.2 cm 2/26/2020 10:45 AM   Post-Procedure Surface Area (cm^2) 0.5 cm^2 2/26/2020 10:45 AM   Post-Procedure Volume (cm^3) 0.1 cm^3 2/26/2020 10:45 AM   Post Wound Length (cm) 0.9 cm 2/5/2020  2:00 PM    Post Wound Width (cm) 1.6 cm 2/5/2020  2:00 PM   Post Wound Depth (cm) 0.7 cm 2/5/2020  2:00 PM   Post Wound Surface Area (cm^2) 1.44 cm^2 2/5/2020  2:00 PM   Tunneling (cm) 0 cm 2/26/2020 10:45 AM   Undermining (cm) 0 cm 2/26/2020 10:45 AM   Closure Secondary intention 2/5/2020  2:00 PM   Drainage Amount Moderate 2/26/2020 10:45 AM   Drainage Description Serosanguineous 2/26/2020 10:45 AM   Non-staged Wound Description Full thickness 2/26/2020 10:45 AM   Treatments Cleansed;Provider debridement 2/26/2020 10:45 AM   Wound Cleansing Normal Saline Irrigation 2/26/2020 10:45 AM   Periwound Protectant Skin Protectant Wipes to Periwound 2/26/2020 10:45 AM   Dressing Options Biologic (Skin Substitute);Petroleum Gauze (clear);Steri-Strip;Hydrofera Blue Ready;Hypafix Tape 2/26/2020 10:45 AM   Dressing Cleansing/Solutions Normal Saline 2/26/2020 10:45 AM   Dressing  Changed New 2/26/2020 10:45 AM   Dressing Status Clean;Dry;Intact 2/26/2020 10:45 AM   Dressing Change/Treatment Frequency Every 72 hrs, and As Needed 2/26/2020 10:45 AM   Wound Odor None 2/26/2020 10:45 AM   Exposed Structures None 2/26/2020 10:45 AM   WOUND NURSE ONLY - Tissue Type and Percentage PRe 80% yellow, 20 % red 2/26/2020 10:45 AM        Wound 02/19/20 Full Thickness Wound Leg Lateral Right -Right Lateral BKA (Active)   Wound Image    2/26/2020 10:45 AM   Site Assessment Red 2/26/2020 10:45 AM   Periwound Assessment Edema 2/26/2020 10:45 AM   Margins Attached edges 2/26/2020 10:45 AM   Drainage Amount Small 2/26/2020 10:45 AM   Drainage Description Serosanguineous 2/26/2020 10:45 AM   Treatments Cleansed;Provider debridement 2/26/2020 10:45 AM   Wound Cleansing Normal Saline Irrigation 2/26/2020 10:45 AM   Periwound Protectant Skin Protectant Wipes to Periwound 2/26/2020 10:45 AM   Dressing Cleansing/Solutions Normal Saline 2/26/2020 10:45 AM   Dressing Options Biologic (Skin Substitute);Petroleum Gauze (clear);Steri-Strip;Hydrofera Blue Ready;Hypafix Tape 2/26/2020 10:45 AM   Dressing Changed New 2/26/2020 10:45 AM   Dressing Status Clean;Dry;Intact 2/26/2020 10:45 AM   Dressing Change/Treatment Frequency Every 72 hrs, and As Needed 2/26/2020 10:45 AM   Non-staged Wound Description Full thickness 2/26/2020 10:45 AM   Post-Procedure Length (cm) 0.4 cm 2/26/2020 10:45 AM   Post-Procedure Width (cm) 1 cm 2/26/2020 10:45 AM   Post-Procedure Depth (cm) 0.1 cm 2/26/2020 10:45 AM   Post-Procedure Surface Area (cm^2) 0.4 cm^2 2/26/2020 10:45 AM   Post-Procedure Volume (cm^3) 0.04 cm^3 2/26/2020 10:45 AM   Wound Bed Granulation (%) 100 % 2/26/2020 10:45 AM   Wound Bed Granulation (%) - Post-Procedure 100 % 2/19/2020  1:15 PM   Wound Odor None 2/26/2020 10:45 AM   Exposed Structures None 2/26/2020 10:45 AM         PROCEDURE: Excisional debridement of right BKA stump wounds, ACell application  -2% viscous lidocaine  applied topically to wound beds for approximately 5 minutes prior to debridement  -Curette used to debride wound beds.  Excisional debridement was performed to remove devitalized tissue until healthy, bleeding tissue was visualized.   Entire surface of wounds, 1.84 cm2 debrided.  Tissue debrided into the subcutaneous layer.    -Bleeding controlled with manual pressure.    -Wound was cleansed thoroughly with normal saline  -Using sterile technique, a 3 x 3.5 cm sheet of ACell Cytal was cut into his multiple small pieces, hydrated with normal saline, layered onto the wound beds.   Product: Acell Cytal wound  Matrix, 1-layer 3 x 3.5 cm, no wastage:   LOT [id number]; REF [id number];  SER [id number].  -Wound care completed by wound RN, refer to flowsheet  -Patient tolerated the procedure well, without c/o pain or discomfort.       ACELL LOG  2/19/2020- first application  2/26/2020-second application    Pertinent Labs and Diagnostics:    Labs:     A1c:   Lab Results   Component Value Date/Time    HBA1C 6.6 (H) 10/21/2019 09:36 AM                 ASSESSMENT AND PLAN:     1. Ulcer of right lower extremity with fat layer exposed ([facility])  Comments: S/P fall mid October 2019 suffered from full-thickness ulcers to right BKA incision line    2/26/2020: Lateral wound has decreased in size ACell placed to accelerate granulation tissue formation.  -Excisional debridement of wound in clinic today, medically necessary to promote wound healing.  -Patient to return to clinic weekly for assessment and debridement  -Home health to change dressing 1 time per week in between clinic visits  -Second application of ACell placed today 30 mg powder      Wound care: Adaptic and Steri-Strips to secure ACell sheet applied in an effort to accelerate closure of wound, adhesive foam bandage for padding and securement      2. S/P BKA (below knee amputation) unilateral, right ([facility])  Comments: Right BKA 9/5/2019 by Dr. Amezcua due to gangrenous foot wound and  untreatable PVD      -He is already established with [facility], will begin process for lower extremity stasis once wound is healed    3. Controlled type 2 diabetes mellitus with other skin ulcer, with long-term current use of insulin ([facility])  A1c 6.6% on 10/21/2019    2/26/2020: Patient reports his blood sugar today was around 130  -Encouraged to keep his blood sugars below 140 in order to optimize wound healing      4. Diabetic polyneuropathy associated with diabetes mellitus due to underlying condition ([facility])-m rx gabapentin  Comments: Left foot insensate    -Previously discussed implications of loss of protective sensation (LOPS) discussed with patient, including increased risk for amputation.  Advised to check feet at least daily, moisturize feet, and to always wear protective foot wear, arrange meticulous regular foot care by podiatrist or [facility]. Pt with good understanding.     Please note that this note may have been created using voice recognition software. I have worked with technical experts from [employer] to optimize the interface.  I have made every reasonable attempt to correct obvious errors, but there may be errors of grammar and possibly content that I did not discover before finalizing the note.

## 2020-03-05 ENCOUNTER — APPOINTMENT (OUTPATIENT)
Dept: WOUND CARE | Facility: MEDICAL CENTER | Age: 75
End: 2020-03-05
Attending: SURGERY
Payer: MEDICARE

## 2020-03-09 ENCOUNTER — HOSPITAL ENCOUNTER (OUTPATIENT)
Dept: LAB | Facility: MEDICAL CENTER | Age: 75
End: 2020-03-09
Attending: INTERNAL MEDICINE
Payer: MEDICARE

## 2020-03-09 LAB
ALBUMIN SERPL BCP-MCNC: 3.9 G/DL (ref 3.2–4.9)
ALBUMIN/GLOB SERPL: 1.4 G/DL
ALP SERPL-CCNC: 39 U/L (ref 30–99)
ALT SERPL-CCNC: 16 U/L (ref 2–50)
ANION GAP SERPL CALC-SCNC: 10 MMOL/L (ref 0–11.9)
AST SERPL-CCNC: 14 U/L (ref 12–45)
BILIRUB SERPL-MCNC: 0.8 MG/DL (ref 0.1–1.5)
BUN SERPL-MCNC: 22 MG/DL (ref 8–22)
CALCIUM SERPL-MCNC: 10.1 MG/DL (ref 8.5–10.5)
CHLORIDE SERPL-SCNC: 104 MMOL/L (ref 96–112)
CHOLEST SERPL-MCNC: 102 MG/DL (ref 100–199)
CO2 SERPL-SCNC: 26 MMOL/L (ref 20–33)
CREAT SERPL-MCNC: 1.04 MG/DL (ref 0.5–1.4)
CREAT UR-MCNC: 59.8 MG/DL
EST. AVERAGE GLUCOSE BLD GHB EST-MCNC: 171 MG/DL
FASTING STATUS PATIENT QL REPORTED: NORMAL
GLOBULIN SER CALC-MCNC: 2.7 G/DL (ref 1.9–3.5)
GLUCOSE SERPL-MCNC: 145 MG/DL (ref 65–99)
HBA1C MFR BLD: 7.6 % (ref 0–5.6)
HDLC SERPL-MCNC: 48 MG/DL
LDLC SERPL CALC-MCNC: 38 MG/DL
MICROALBUMIN UR-MCNC: <0.7 MG/DL
MICROALBUMIN/CREAT UR: NORMAL MG/G (ref 0–30)
POTASSIUM SERPL-SCNC: 4.2 MMOL/L (ref 3.6–5.5)
PROT SERPL-MCNC: 6.6 G/DL (ref 6–8.2)
SODIUM SERPL-SCNC: 140 MMOL/L (ref 135–145)
TRIGL SERPL-MCNC: 80 MG/DL (ref 0–149)
TSH SERPL DL<=0.005 MIU/L-ACNC: 1.36 UIU/ML (ref 0.38–5.33)

## 2020-03-09 PROCEDURE — 36415 COLL VENOUS BLD VENIPUNCTURE: CPT | Mod: GA

## 2020-03-09 PROCEDURE — 82043 UR ALBUMIN QUANTITATIVE: CPT

## 2020-03-09 PROCEDURE — 83036 HEMOGLOBIN GLYCOSYLATED A1C: CPT | Mod: GA

## 2020-03-09 PROCEDURE — 80053 COMPREHEN METABOLIC PANEL: CPT

## 2020-03-09 PROCEDURE — 80061 LIPID PANEL: CPT

## 2020-03-09 PROCEDURE — 82570 ASSAY OF URINE CREATININE: CPT

## 2020-03-09 PROCEDURE — 84443 ASSAY THYROID STIM HORMONE: CPT

## 2020-03-13 ENCOUNTER — OFFICE VISIT (OUTPATIENT)
Dept: URGENT CARE | Facility: PHYSICIAN GROUP | Age: 75
End: 2020-03-13
Payer: MEDICARE

## 2020-03-13 VITALS
HEART RATE: 78 BPM | WEIGHT: 191 LBS | BODY MASS INDEX: 25.31 KG/M2 | SYSTOLIC BLOOD PRESSURE: 112 MMHG | OXYGEN SATURATION: 92 % | TEMPERATURE: 98.2 F | RESPIRATION RATE: 16 BRPM | DIASTOLIC BLOOD PRESSURE: 60 MMHG | HEIGHT: 73 IN

## 2020-03-13 DIAGNOSIS — S39.012A STRAIN OF LUMBAR REGION, INITIAL ENCOUNTER: ICD-10-CM

## 2020-03-13 PROCEDURE — 99213 OFFICE O/P EST LOW 20 MIN: CPT | Performed by: NURSE PRACTITIONER

## 2020-03-13 ASSESSMENT — PAIN SCALES - GENERAL: PAINLEVEL: 8=MODERATE-SEVERE PAIN

## 2020-03-13 ASSESSMENT — FIBROSIS 4 INDEX: FIB4 SCORE: 0.76

## 2020-03-17 ENCOUNTER — APPOINTMENT (OUTPATIENT)
Dept: PHYSICAL MEDICINE AND REHAB | Facility: REHABILITATION | Age: 75
End: 2020-03-17
Payer: MEDICARE

## 2020-03-18 ASSESSMENT — ENCOUNTER SYMPTOMS
CHILLS: 0
FEVER: 0
BACK PAIN: 1

## 2020-03-18 NOTE — PROGRESS NOTES
"Subjective:      Kevin Jackson is a 74 y.o. male who presents with Low Back Pain (Lower L side back pain, hurts bending or moving,  x5 days)    Past Medical History:   Diagnosis Date   • Arrhythmia 01/2018    possible afib per pt, ekg x 2 following were \"OK\"   • Bowel habit changes     constipation diarrhea   • Diabetes    • High cholesterol    • Hyperlipidemia    • Hypertension    • Muscle disorder    • Pain     right foot   • Tremors of nervous system      Social History     Socioeconomic History   • Marital status: Single     Spouse name: Not on file   • Number of children: Not on file   • Years of education: Not on file   • Highest education level: Not on file   Occupational History   • Not on file   Social Needs   • Financial resource strain: Not on file   • Food insecurity     Worry: Not on file     Inability: Not on file   • Transportation needs     Medical: Not on file     Non-medical: Not on file   Tobacco Use   • Smoking status: Never Smoker   • Smokeless tobacco: Never Used   Substance and Sexual Activity   • Alcohol use: Not Currently     Alcohol/week: 0.6 oz     Types: 1 Cans of beer per week   • Drug use: No   • Sexual activity: Yes     Partners: Female   Lifestyle   • Physical activity     Days per week: Not on file     Minutes per session: Not on file   • Stress: Not on file   Relationships   • Social connections     Talks on phone: Not on file     Gets together: Not on file     Attends Rastafarian service: Not on file     Active member of club or organization: Not on file     Attends meetings of clubs or organizations: Not on file     Relationship status: Not on file   • Intimate partner violence     Fear of current or ex partner: Not on file     Emotionally abused: Not on file     Physically abused: Not on file     Forced sexual activity: Not on file   Other Topics Concern   •  Service No   • Blood Transfusions No   • Caffeine Concern No   • Occupational Exposure Yes   • Hobby Hazards " "Yes   • Sleep Concern No   • Stress Concern No   • Weight Concern No   • Special Diet No   • Back Care No   • Exercise No   • Bike Helmet No   • Seat Belt Yes   • Self-Exams Yes   Social History Narrative   • Not on file     Family History   Problem Relation Age of Onset   • Arthritis Mother    • Cancer Mother    • Hypertension Mother    • Heart Disease Mother    • Cancer Father         colon   • Arthritis Father    • Genetic Disorder Father    • Diabetes Father    • Hyperlipidemia Father    • Stroke Father    • Heart Disease Father        Allergies: Canagliflozin; Gabapentin; Lipoic acid; Metanx  [l-[id number]]; and Niacin    Patient is a 74-year-old male who presents today with complaint of pain to the left lower back over the last week.  Does not recall fall or trauma.  No known injuries.  States he has pain to this area with movement and with trying to find a comfortable position for sleeping.  No radicular pain.  No weakness.          Other   This is a new problem. The problem occurs constantly. The problem has been unchanged. Pertinent negatives include no chills or fever. Nothing aggravates the symptoms. He has tried nothing for the symptoms. The treatment provided no relief.       Review of Systems   Constitutional: Negative for chills and fever.   Musculoskeletal: Positive for back pain.   All other systems reviewed and are negative.         Objective:     /60   Pulse 78   Temp 36.8 °C (98.2 °F) (Temporal)   Resp 16   Ht 1.854 m (6' 1\")   Wt 86.6 kg (191 lb)   SpO2 92%   BMI 25.20 kg/m²      Physical Exam  Musculoskeletal:        Back:       Comments: Mild point tenderness over the left lower back. Pain is reproduced with bending and rotation.  Unable to perform SLR                 Assessment/Plan:     Lumbar strain    Alternate ice/heat as needed  Rest  Tylenol PRN  Follow up in  for persistent or worsening of sypmtoms.    There are no diagnoses linked to this encounter.    "

## 2020-03-21 DIAGNOSIS — G25.0 BENIGN ESSENTIAL TREMOR: ICD-10-CM

## 2020-03-23 RX ORDER — PROPRANOLOL HYDROCHLORIDE 120 MG/1
CAPSULE, EXTENDED RELEASE ORAL
Qty: 90 CAP | Refills: 4 | Status: SHIPPED | OUTPATIENT
Start: 2020-03-23 | End: 2021-04-19

## 2020-03-26 ENCOUNTER — OFFICE VISIT (OUTPATIENT)
Dept: MEDICAL GROUP | Age: 75
End: 2020-03-26
Payer: MEDICARE

## 2020-03-26 VITALS
HEART RATE: 76 BPM | BODY MASS INDEX: 27.3 KG/M2 | SYSTOLIC BLOOD PRESSURE: 120 MMHG | WEIGHT: 206 LBS | TEMPERATURE: 98 F | DIASTOLIC BLOOD PRESSURE: 60 MMHG | HEIGHT: 73 IN | OXYGEN SATURATION: 92 %

## 2020-03-26 DIAGNOSIS — E08.41 DIABETIC MONONEUROPATHY ASSOCIATED WITH DIABETES MELLITUS DUE TO UNDERLYING CONDITION (HCC): ICD-10-CM

## 2020-03-26 DIAGNOSIS — Z12.11 SCREENING FOR COLORECTAL CANCER: ICD-10-CM

## 2020-03-26 DIAGNOSIS — K21.00 GASTROESOPHAGEAL REFLUX DISEASE WITH ESOPHAGITIS: ICD-10-CM

## 2020-03-26 DIAGNOSIS — E03.4 HYPOTHYROIDISM DUE TO ACQUIRED ATROPHY OF THYROID: ICD-10-CM

## 2020-03-26 DIAGNOSIS — E78.2 MIXED HYPERLIPIDEMIA: ICD-10-CM

## 2020-03-26 DIAGNOSIS — Z89.511 S/P BKA (BELOW KNEE AMPUTATION) UNILATERAL, RIGHT (HCC): ICD-10-CM

## 2020-03-26 DIAGNOSIS — M79.672 FOOT PAIN, LEFT: ICD-10-CM

## 2020-03-26 DIAGNOSIS — E66.9 OBESITY (BMI 30.0-34.9): ICD-10-CM

## 2020-03-26 DIAGNOSIS — E53.8 VITAMIN B 12 DEFICIENCY: ICD-10-CM

## 2020-03-26 DIAGNOSIS — E55.9 HYPOVITAMINOSIS D: ICD-10-CM

## 2020-03-26 DIAGNOSIS — Z23 NEED FOR VACCINATION: ICD-10-CM

## 2020-03-26 DIAGNOSIS — N40.1 BENIGN NON-NODULAR PROSTATIC HYPERPLASIA WITH LOWER URINARY TRACT SYMPTOMS: ICD-10-CM

## 2020-03-26 DIAGNOSIS — I10 ESSENTIAL HYPERTENSION: ICD-10-CM

## 2020-03-26 DIAGNOSIS — G25.0 BENIGN ESSENTIAL TREMOR: ICD-10-CM

## 2020-03-26 DIAGNOSIS — I73.9 PVD (PERIPHERAL VASCULAR DISEASE) (HCC): ICD-10-CM

## 2020-03-26 DIAGNOSIS — Z12.12 SCREENING FOR COLORECTAL CANCER: ICD-10-CM

## 2020-03-26 DIAGNOSIS — Z12.5 SCREENING FOR PROSTATE CANCER: ICD-10-CM

## 2020-03-26 DIAGNOSIS — E83.52 HYPERCALCEMIA: ICD-10-CM

## 2020-03-26 PROCEDURE — G0010 ADMIN HEPATITIS B VACCINE: HCPCS | Performed by: INTERNAL MEDICINE

## 2020-03-26 PROCEDURE — 90746 HEPB VACCINE 3 DOSE ADULT IM: CPT | Performed by: INTERNAL MEDICINE

## 2020-03-26 PROCEDURE — 99214 OFFICE O/P EST MOD 30 MIN: CPT | Mod: 25 | Performed by: INTERNAL MEDICINE

## 2020-03-26 ASSESSMENT — ENCOUNTER SYMPTOMS
CONSTITUTIONAL NEGATIVE: 1
MUSCULOSKELETAL NEGATIVE: 1
GASTROINTESTINAL NEGATIVE: 1
EYES NEGATIVE: 1
CARDIOVASCULAR NEGATIVE: 1
PSYCHIATRIC NEGATIVE: 1
RESPIRATORY NEGATIVE: 1
NEUROLOGICAL NEGATIVE: 1

## 2020-03-26 ASSESSMENT — FIBROSIS 4 INDEX: FIB4 SCORE: 0.76

## 2020-03-26 ASSESSMENT — PATIENT HEALTH QUESTIONNAIRE - PHQ9: CLINICAL INTERPRETATION OF PHQ2 SCORE: 0

## 2020-03-26 NOTE — PROGRESS NOTES
Subjective:      Kevin Jackson is a 74 y.o. male who presents with Follow-Up and Lab Results        HPI    The patient is here for followup of chronic medical problems listed below. The patient is compliant with medications and having no side effects from them. Denies chest pain, abdominal pain, dyspnea, myalgias, or cough.    Uncontrolled type 2 diabetes mellitus with complication, with long-term current use of insulin ([facility])- dr browning  S/P BKA (below knee amputation) unilateral, right ([facility])  Obesity (BMI 30.0-34.9)  Patient has a chronic history of type 2 diabetes mellitus. Currently on Humalog mix 50/50 100 unit/mL suspension, Metformin 1,000 mg tab, gabapentin 300 mg tab. He denies any medication side effects. He denies any vision changes, polyuria, polydipsia, polyphagia, numbness or tingling to their feet, lesions or opens wounds to their feet. Patient underwent a right BKA in September 2019 secondary to chronic non-healing ulcers. His most recent A1C was 7.6 on 3/9/2020, which is increased from 6.6 in October of last year.  He continues to follow up with Dr. Browning, Endocrinology.    PVD (peripheral vascular disease) ([facility])- s/p right BKA  His most recent NIVs with Dr. Amezcua, Vascular Surgery, showed noncompressible ABIs of the left lower extremity, but good flow to the left foot. He was scheduled to be measured for prosthesis last week, but his visit was cancelled out of concern for COVID-19. He is wheelchair bound and must transfer with his left lower extremity only.    Mixed hyperlipidemia- dr sprague  For his chronic history of mixed hyperlipidemia, he is crrently taking atorvastatin 80 mg and Aspirin 81 mg daily. He denies any medication side effects. No acute complaints of dizziness, claudication or chest pain. His most recent lipid panel was within normal limits. He continues to follow up with Dr. Sprague, Cardiology.    Hypovitaminosis D  For his history of vitamin D deficiency, he is taking 1000 IU  vitamin D supplement BID. He denies any medication side effects. He is due for an up to date vitamin D level.    Hypothyroidism due to acquired atrophy of thyroid- dr patterson  Patient is followed by Dr. Patterson for his history of acquired hypothyroidism. He is taking levothyroxine 50 mcg. He denies any medication side effects. He is adequately replaced on most recent lab work.    Gastroesophageal reflux disease with esophagitis- PPI PRN  Patient is managing his reflux with a PRN PPI. He denies any medication side effects and states his reflux symptoms are under control.    Essential hypertension- DR VIZCARRA  Regarding his history of chronic hypertension, he is taking Losartan 50 mg tab and Hyzaar 50-12.5 mg tab. No medication side effects were reported. He reportedly monitors blood pressure regularly at home. Blood pressure is within normal limits today at 120/60. He denies any related complaints including chronic cough, chest pain, headaches, leg swelling, light-headedness or dizziness.    Diabetic mononeuropathy associated with diabetes mellitus due to underlying condition ([facility])-m rx gabapentin  For his history of diabetic neuropathy, he is taking gabapentin 300 mg TID. He denies any medication side effects. Patient complains of some itching sensations on his left great toe, but denies any obvious skin breakdown.    Benign non-nodular prostatic hyperplasia with lower urinary tract symptoms- [facility]  We are following him for BPH. He is taking tamsulosin 0.4 mg and denies any medication side effects, although, he complains that he urinates frequently if he drinks a lot of water. Per patient, his most recent Urology work up was unremarkable. We will need an updated PSA.    Foot pain, left  He is recommending referral to podiatry. He denies any new ulcerations, but he is interested in regular monitoring for calluses and ulcerations.    Screening for colorectal cancer  Screening for prostate cancer  Need for  vaccination  Health maintenance reviewed. Patient is due for his colorectal and prostate cancer screenings. He is also due for his shingrex and hepatitis B vaccines.      Patient Active Problem List   Diagnosis   • Mixed hyperlipidemia- dr dwyer   • Essential hypertension- DR DWYER   • Hypovitaminosis D   • Uncontrolled type 2 diabetes mellitus with complication, with long-term current use of insulin ([facility])- dr browning   • Obesity (BMI 30.0-34.9)   • Benign non-nodular prostatic hyperplasia with lower urinary tract symptoms- [facility]   • Gastroesophageal reflux disease with esophagitis- PPI PRN   • Hypothyroidism due to acquired atrophy of thyroid- dr browning   • Benign essential tremor- DR OLIVAS, [facility]   • Encounter for screening- [facility] screening 2019- neg  abd US for AAA, MARELY, carotid US, EKG (no A.Fib to my review)   • PVD (peripheral vascular disease) ([facility])- s/p right BKA   • Diabetic mononeuropathy associated with diabetes mellitus due to underlying condition ([facility])-m rx gabapentin   • Nonsustained ventricular tachycardia ([facility])- noyopamargareth may 2019; PAC's and PVC's, NSR; NO A. FIB; dr dwyer, [facility];  dr mohsen (cardilogy) at [facility]   • S/P BKA (below knee amputation) unilateral, right ([facility])   • Atherosclerosis of native artery of extremity with intermittent claudication ([facility])   • Hypercalcemia       Outpatient Medications Prior to Visit   Medication Sig Dispense Refill   • propranolol CR (INDERAL LA) 120 MG CAPSULE SR 24 HR TAKE ONE CAPSULE BY MOUTH EVERY DAY 90 Cap 4   • hydrocortisone 2.5 % Cream topical cream Apply 1 Application to affected area(s) 1 time daily as needed.     • Insulin Pen Needle 32G X 6 MM Misc 1-2     • glucose blood (ONE TOUCH ULTRA TEST) strip 3-4     • INSULIN LISP PROT & LISP, HUM, (HUMALOG MIX 50/50) (50-50) 100 UNIT/ML Suspension 30 u     • vitamin D (CHOLECALCIFEROL) 1000 UNIT Tab 1000 IU 2 tab     • insulin lispro (HUMALOG) 100 UNIT/ML Inject 35 Units as instructed every evening.   "   • atorvastatin (LIPITOR) 20 MG Tab Take 80 mg by mouth every evening.     • gabapentin (NEURONTIN) 300 MG Cap Take 300 mg by mouth 3 times a day.     • aspirin EC 81 MG EC tablet Take 1 Tab by mouth every day. 100 Tab 2   • losartan (COZAAR) 50 MG Tab Take 1 Tab by mouth every day. 90 Tab 2   • tamsulosin (FLOMAX) 0.4 MG capsule Take 2 Caps by mouth every day. 60 Cap 2   • Semaglutide (OZEMPIC) 1 MG/DOSE Solution Pen-injector Inject 1 mg as instructed every 7 days.     • glimepiride (AMARYL) 4 MG Tab Take 1 Tab by mouth every morning. 30 Tab 11   • Empagliflozin (JARDIANCE) 25 MG Tab Take 25 mg by mouth every day. 90 Tab 4   • metformin (GLUCOPHAGE) 1000 MG tablet Take 1 Tab by mouth 2 times a day, with meals. 60 Tab 11   • primidone (MYSOLINE) 50 MG Tab Take 1 Tab by mouth every evening. 90 Tab 3   • levothyroxine (SYNTHROID) 50 MCG Tab Take 1 Tab by mouth every morning before breakfast. 30 Tab 11     No facility-administered medications prior to visit.         Allergies   Allergen Reactions   • Canagliflozin Unspecified   • Gabapentin Unspecified   • Lipoic Acid Unspecified   • Metanx  [L-Methylfolate-Algae-B12-B6] Unspecified   • Niacin Unspecified       Review of Systems   Constitutional: Negative.    HENT: Negative.    Eyes: Negative.    Respiratory: Negative.    Cardiovascular: Negative.    Gastrointestinal: Negative.    Genitourinary: Negative.    Musculoskeletal: Negative.    Skin: Negative.    Neurological: Negative.    Endo/Heme/Allergies: Negative.    Psychiatric/Behavioral: Negative.    All other systems reviewed and are negative.       Objective:     /60 (BP Location: Left arm, Patient Position: Sitting, BP Cuff Size: Adult)   Pulse 76   Temp 36.7 °C (98 °F) (Temporal)   Ht 1.854 m (6' 1\")   Wt 93.4 kg (206 lb)   SpO2 92%   BMI 27.18 kg/m²     Physical Exam   Constitutional: Oriented to person, place, and time. Appears well-developed and well-nourished. No distress. Wheel-chair " bound.  Head: Normocephalic and atraumatic.   Right Ear: External ear normal.   Left Ear: External ear normal.   Nose: Nose normal.   Mouth/Throat: Oropharynx is clear and moist. No oropharyngeal exudate.   Eyes: Pupils are equal, round, and reactive to light. Conjunctivae and EOM are normal. Right eye exhibits no discharge. Left eye exhibits no discharge. No scleral icterus.   Neck: Normal range of motion. Neck supple. No JVD present. No tracheal deviation present. No thyromegaly present.   Cardiovascular: Normal rate, regular rhythm, normal heart sounds and intact distal pulses.  Exam reveals no gallop and no friction rub.    No murmur heard.  Pulmonary/Chest: Effort normal. No stridor. No respiratory distress. No wheezing or rales. No tenderness.   Abdominal: Soft. Bowel sounds are normal. No distension and no mass. There is no tenderness. There is no rebound and no guarding. No hernia.   Musculoskeletal: Normal range of motion No edema or tenderness. Right BKA, no active ulcerations.  Lymphadenopathy: No cervical adenopathy.   Neurological: Alert and oriented to person, place, and time. Normal reflexes. Normal reflexes. No cranial nerve deficit. Normal muscle tone. Coordination normal.   Skin: Skin is warm and dry. No rash or active ulcerations noted. Not diaphoretic. No erythema. No pallor.   Psychiatric: Normal mood and affect. Behavior is normal. Judgment and thought content normal.   Nursing note and vitals reviewed.      Lab Results   Component Value Date/Time    HBA1C 7.6 (H) 03/09/2020 08:15 AM    HBA1C 6.6 (H) 10/21/2019 09:36 AM     Lab Results   Component Value Date/Time    SODIUM 140 03/09/2020 08:15 AM    POTASSIUM 4.2 03/09/2020 08:15 AM    CHLORIDE 104 03/09/2020 08:15 AM    CO2 26 03/09/2020 08:15 AM    GLUCOSE 145 (H) 03/09/2020 08:15 AM    BUN 22 03/09/2020 08:15 AM    CREATININE 1.04 03/09/2020 08:15 AM    BUNCREATRAT 24 11/21/2017 07:21 AM    ALKPHOSPHAT 39 03/09/2020 08:15 AM    ASTSGOT 14  03/09/2020 08:15 AM    ALTSGPT 16 03/09/2020 08:15 AM    TBILIRUBIN 0.8 03/09/2020 08:15 AM     Lab Results   Component Value Date/Time    INR 0.99 01/11/2017 09:05 PM    INR 0.99 01/14/2013 05:50 PM     Lab Results   Component Value Date/Time    CHOLSTRLTOT 102 03/09/2020 08:15 AM    LDL 38 03/09/2020 08:15 AM    HDL 48 03/09/2020 08:15 AM    TRIGLYCERIDE 80 03/09/2020 08:15 AM       Lab Results   Component Value Date/Time    TESTOSTERONE 436 11/08/2019 10:50 AM     Lab Results   Component Value Date/Time    TSH 2.020 11/21/2017 07:21 AM     Lab Results   Component Value Date/Time    FREET4 1.03 10/21/2019 09:36 AM    FREET4 1.11 09/11/2019 05:25 AM     No results found for: URICACID  No components found for: VITB12  Lab Results   Component Value Date/Time    25HYDROXY 19 (L) 09/11/2019 05:25 AM    25HYDROXY 26 (L) 02/20/2019 06:32 AM          Assessment/Plan:     1. Uncontrolled type 2 diabetes mellitus with complication, with long-term current use of insulin ([facility])- dr patterson  Most recent A1C was 7.6 on 3/9/2020, which is increased from 6.6 in October of last year. Continue same regimen and follow up with Dr. Patterson, Endocrinology. Patient is advised to check his skin for ulcerations on a regular basis. He is provided with a referral to podiatry. We will obtain new labs to update clinical profile then we will adjust therapy as needed.  - Lipid Profile; Future  - CBC WITH DIFFERENTIAL; Future  - HEMOGLOBIN A1C; Future  - MICROALBUMIN CREAT RATIO URINE; Future  - REFERRAL TO PODIATRY    2. S/P BKA (below knee amputation) unilateral, right ([facility])  No active ulcerations. Most recent NIVs show good left lower extremity perfusion. Continue follow up with vascular surgery.    3. Obesity (BMI 30.0-34.9)   diet/exercise/lose 15 lbs.; patient counseled    4. PVD (peripheral vascular disease) ([facility])- s/p right BKA  Same as #2    5. Mixed hyperlipidemia- dr dwyer  Under good control. Continue statin therapy. We will obtain new  labs to update clinical profile then we will adjust therapy as needed.  - TSH; Future  - Lipid Profile; Future  - CBC WITH DIFFERENTIAL; Future    6. Hypovitaminosis D  Under good control. Continue same regimen.  We will obtain new labs to update clinical profile then we will adjust therapy as needed.  - VITAMIN B12; Future    7. Hypothyroidism due to acquired atrophy of thyroid- dr browning  Under good control. Continue same regimen and endocrinology follow up.  We will obtain new labs to update clinical profile then we will adjust therapy as needed.  - TSH; Future    8. Hypercalcemia  Under good control. Continue same regimen.    9. Gastroesophageal reflux disease with esophagitis- PPI PRN  Under good control. Continue same regimen.    10. Essential hypertension- DR VIZCARRA  Under good control. Blood pressure is within normal limits today at 120/60. Continue same regimen.    11. Benign essential tremor- DR OLIVAS,  NEUROLOGY  Under good control. Continue Neurology follow up.    12. Diabetic mononeuropathy associated with diabetes mellitus due to underlying condition ([facility])-m rx gabapentin  Patient is requesting a referral to podiatry, which I provided. Neuropathic pain is under control. Continue to take gabapentin and monitor feet for new lesions.  - REFERRAL TO PODIATRY    13. Benign non-nodular prostatic hyperplasia with lower urinary tract symptoms- [facility]  Under good control. Continue Urology follow up. Continue taking Flomax as directed.    14. Vitamin B 12 deficiency  Under good control. Continue same regimen. We will obtain new labs to update clinical profile then we will adjust therapy as needed.  - VITAMIN D,25 HYDROXY; Future    15. Screening for colorectal cancer  Patient is do for his colorectal cancer screening. Ordered colonoscopy.  - REFERRAL TO GI FOR COLONOSCOPY    16. Screening for prostate cancer  Patient is do for his prostate cancer screening. Ordered PSA.  - PROSTATE SPECIFIC AG SCREENING;  Future    17. Foot pain, left  Patient is requesting a referral to podiatry.  - REFERRAL TO PODIATRY    18. Need for vaccination  Vaccines were administered today without adverse event.  - Hepatitis B Vaccine Adult 20+  - Zoster Vac Recomb Adjuvanted (SHINGRIX) 50 MCG/0.5ML Recon Susp; 0.5 mL by Intramuscular route Once for 1 dose.  Dispense: 0.5 mL; Refill: 0          IFredo (Scribe), am scribing for, and in the presence of, Ky Hernandez M.D..    Electronically signed by: Fredo Gustafson (Scribe), 3/26/2020    I, Ky Hernandez M.D., personally performed the services described in this documentation, as scribed by Fredo Gustafson in my presence, and it is both accurate and complete.

## 2020-06-01 ENCOUNTER — PATIENT OUTREACH (OUTPATIENT)
Dept: SCHEDULING | Facility: IMAGING CENTER | Age: 75
End: 2020-06-01

## 2020-06-01 NOTE — PROGRESS NOTES
Outcome: Left Message  Please transfer to Patient Outreach Team at [phone] when patient returns call.  Attempt # 1 AA    Attempt #2 Pt hung up when his wife told him it was [facility] calling.  CONCEPCION

## 2020-06-08 ENCOUNTER — TELEPHONE (OUTPATIENT)
Dept: MEDICAL GROUP | Age: 75
End: 2020-06-08

## 2020-06-08 DIAGNOSIS — Z23 NEED FOR VACCINATION: ICD-10-CM

## 2020-06-08 DIAGNOSIS — Z23 NEED FOR HEPATITIS B VACCINATION: ICD-10-CM

## 2020-06-08 NOTE — TELEPHONE ENCOUNTER
Patient is on the MA Schedule 06/26/20 for Hep B vaccine/injection.    SPECIFIC Action To Be Taken: Orders pending, please sign.

## 2020-06-29 ENCOUNTER — HOSPITAL ENCOUNTER (OUTPATIENT)
Dept: LAB | Facility: MEDICAL CENTER | Age: 75
End: 2020-06-29
Attending: INTERNAL MEDICINE
Payer: MEDICARE

## 2020-06-29 DIAGNOSIS — Z12.5 SCREENING FOR PROSTATE CANCER: ICD-10-CM

## 2020-06-29 DIAGNOSIS — E03.4 HYPOTHYROIDISM DUE TO ACQUIRED ATROPHY OF THYROID: ICD-10-CM

## 2020-06-29 DIAGNOSIS — E53.8 VITAMIN B 12 DEFICIENCY: ICD-10-CM

## 2020-06-29 DIAGNOSIS — E55.9 HYPOVITAMINOSIS D: ICD-10-CM

## 2020-06-29 DIAGNOSIS — E78.2 MIXED HYPERLIPIDEMIA: ICD-10-CM

## 2020-06-29 LAB
25(OH)D3 SERPL-MCNC: 34 NG/ML (ref 30–100)
BASOPHILS # BLD AUTO: 1 % (ref 0–1.8)
BASOPHILS # BLD: 0.06 K/UL (ref 0–0.12)
CHOLEST SERPL-MCNC: 110 MG/DL (ref 100–199)
CREAT UR-MCNC: 66.03 MG/DL
EOSINOPHIL # BLD AUTO: 0.18 K/UL (ref 0–0.51)
EOSINOPHIL NFR BLD: 3.1 % (ref 0–6.9)
ERYTHROCYTE [DISTWIDTH] IN BLOOD BY AUTOMATED COUNT: 51 FL (ref 35.9–50)
FASTING STATUS PATIENT QL REPORTED: NORMAL
HCT VFR BLD AUTO: 44.7 % (ref 42–52)
HDLC SERPL-MCNC: 45 MG/DL
HGB BLD-MCNC: 14.5 G/DL (ref 14–18)
IMM GRANULOCYTES # BLD AUTO: 0.01 K/UL (ref 0–0.11)
IMM GRANULOCYTES NFR BLD AUTO: 0.2 % (ref 0–0.9)
LDLC SERPL CALC-MCNC: 49 MG/DL
LYMPHOCYTES # BLD AUTO: 2.13 K/UL (ref 1–4.8)
LYMPHOCYTES NFR BLD: 36.3 % (ref 22–41)
MCH RBC QN AUTO: 28.2 PG (ref 27–33)
MCHC RBC AUTO-ENTMCNC: 32.4 G/DL (ref 33.7–35.3)
MCV RBC AUTO: 87 FL (ref 81.4–97.8)
MICROALBUMIN UR-MCNC: <1.2 MG/DL
MICROALBUMIN/CREAT UR: NORMAL MG/G (ref 0–30)
MONOCYTES # BLD AUTO: 0.74 K/UL (ref 0–0.85)
MONOCYTES NFR BLD AUTO: 12.6 % (ref 0–13.4)
NEUTROPHILS # BLD AUTO: 2.75 K/UL (ref 1.82–7.42)
NEUTROPHILS NFR BLD: 46.8 % (ref 44–72)
NRBC # BLD AUTO: 0 K/UL
NRBC BLD-RTO: 0 /100 WBC
PLATELET # BLD AUTO: 327 K/UL (ref 164–446)
PMV BLD AUTO: 10.4 FL (ref 9–12.9)
PSA SERPL-MCNC: 1.8 NG/ML (ref 0–4)
RBC # BLD AUTO: 5.14 M/UL (ref 4.7–6.1)
TRIGL SERPL-MCNC: 79 MG/DL (ref 0–149)
TSH SERPL DL<=0.005 MIU/L-ACNC: 1.3 UIU/ML (ref 0.38–5.33)
VIT B12 SERPL-MCNC: 214 PG/ML (ref 211–911)
WBC # BLD AUTO: 5.9 K/UL (ref 4.8–10.8)

## 2020-06-29 PROCEDURE — 84443 ASSAY THYROID STIM HORMONE: CPT

## 2020-06-29 PROCEDURE — 84153 ASSAY OF PSA TOTAL: CPT | Mod: GA

## 2020-06-29 PROCEDURE — 85025 COMPLETE CBC W/AUTO DIFF WBC: CPT

## 2020-06-29 PROCEDURE — 82043 UR ALBUMIN QUANTITATIVE: CPT

## 2020-06-29 PROCEDURE — 36415 COLL VENOUS BLD VENIPUNCTURE: CPT

## 2020-06-29 PROCEDURE — 82607 VITAMIN B-12: CPT

## 2020-06-29 PROCEDURE — 82570 ASSAY OF URINE CREATININE: CPT

## 2020-06-29 PROCEDURE — 82306 VITAMIN D 25 HYDROXY: CPT

## 2020-06-29 PROCEDURE — 80061 LIPID PANEL: CPT

## 2020-07-01 NOTE — PROGRESS NOTES
Outcome: Left Message  Please transfer to Patient Outreach Team at [phone] when patient returns call.  Attempt # 3 AA

## 2020-07-06 ENCOUNTER — NON-PROVIDER VISIT (OUTPATIENT)
Dept: MEDICAL GROUP | Age: 75
End: 2020-07-06
Payer: MEDICARE

## 2020-07-06 DIAGNOSIS — Z23 NEED FOR VACCINATION: ICD-10-CM

## 2020-07-06 PROCEDURE — 90746 HEPB VACCINE 3 DOSE ADULT IM: CPT | Performed by: INTERNAL MEDICINE

## 2020-07-06 PROCEDURE — G0010 ADMIN HEPATITIS B VACCINE: HCPCS | Performed by: INTERNAL MEDICINE

## 2020-07-06 NOTE — PROGRESS NOTES
"Kevin Jackson is a 75 y.o. male here for a non-provider visit for:   HEPATITIS B 2 of 2    Reason for immunization: continue or complete series started at the office  Immunization records indicate need for vaccine: Yes, confirmed with Epic  Minimum interval has been met for this vaccine: Yes  ABN completed: Not Indicated    Order and dose verified by: ALYCE  VIS Dated  8/15/2019 was given to patient: Yes  All IAC Questionnaire questions were answered \"No.\"    Patient tolerated injection and no adverse effects were observed or reported: Yes    Pt scheduled for next dose in series: Not Indicated    "

## 2020-08-11 ENCOUNTER — APPOINTMENT (RX ONLY)
Dept: URBAN - METROPOLITAN AREA CLINIC 22 | Facility: CLINIC | Age: 75
Setting detail: DERMATOLOGY
End: 2020-08-11

## 2020-08-11 DIAGNOSIS — Z85.828 PERSONAL HISTORY OF OTHER MALIGNANT NEOPLASM OF SKIN: ICD-10-CM

## 2020-08-11 DIAGNOSIS — L81.4 OTHER MELANIN HYPERPIGMENTATION: ICD-10-CM

## 2020-08-11 DIAGNOSIS — D22 MELANOCYTIC NEVI: ICD-10-CM

## 2020-08-11 DIAGNOSIS — L57.0 ACTINIC KERATOSIS: ICD-10-CM

## 2020-08-11 DIAGNOSIS — L82.1 OTHER SEBORRHEIC KERATOSIS: ICD-10-CM

## 2020-08-11 DIAGNOSIS — L21.8 OTHER SEBORRHEIC DERMATITIS: ICD-10-CM

## 2020-08-11 PROBLEM — D22.5 MELANOCYTIC NEVI OF TRUNK: Status: ACTIVE | Noted: 2020-08-11

## 2020-08-11 PROCEDURE — ? LIQUID NITROGEN

## 2020-08-11 PROCEDURE — 99214 OFFICE O/P EST MOD 30 MIN: CPT | Mod: 25

## 2020-08-11 PROCEDURE — 17000 DESTRUCT PREMALG LESION: CPT

## 2020-08-11 PROCEDURE — ? TREATMENT REGIMEN

## 2020-08-11 PROCEDURE — 17003 DESTRUCT PREMALG LES 2-14: CPT

## 2020-08-11 PROCEDURE — ? COUNSELING

## 2020-08-11 ASSESSMENT — LOCATION SIMPLE DESCRIPTION DERM
LOCATION SIMPLE: UPPER BACK
LOCATION SIMPLE: RIGHT OCCIPITAL SCALP
LOCATION SIMPLE: LEFT CHEEK
LOCATION SIMPLE: POSTERIOR SCALP
LOCATION SIMPLE: LEFT FOREARM
LOCATION SIMPLE: CHEST
LOCATION SIMPLE: RIGHT FOREHEAD
LOCATION SIMPLE: SCALP
LOCATION SIMPLE: INFERIOR FOREHEAD
LOCATION SIMPLE: RIGHT CHEEK
LOCATION SIMPLE: POSTERIOR NECK
LOCATION SIMPLE: RIGHT FOREARM

## 2020-08-11 ASSESSMENT — LOCATION ZONE DERM
LOCATION ZONE: TRUNK
LOCATION ZONE: NECK
LOCATION ZONE: ARM
LOCATION ZONE: SCALP
LOCATION ZONE: FACE
LOCATION ZONE: TRUNK

## 2020-08-11 ASSESSMENT — LOCATION DETAILED DESCRIPTION DERM
LOCATION DETAILED: LEFT VENTRAL PROXIMAL FOREARM
LOCATION DETAILED: LEFT MEDIAL SUPERIOR CHEST
LOCATION DETAILED: RIGHT INFERIOR MEDIAL FOREHEAD
LOCATION DETAILED: RIGHT SUPERIOR OCCIPITAL SCALP
LOCATION DETAILED: RIGHT POSTERIOR NECK
LOCATION DETAILED: RIGHT INFERIOR CENTRAL MALAR CHEEK
LOCATION DETAILED: RIGHT VENTRAL PROXIMAL FOREARM
LOCATION DETAILED: MID-OCCIPITAL SCALP
LOCATION DETAILED: INFERIOR THORACIC SPINE
LOCATION DETAILED: SUPERIOR THORACIC SPINE
LOCATION DETAILED: LEFT CENTRAL MALAR CHEEK
LOCATION DETAILED: INFERIOR MID FOREHEAD
LOCATION DETAILED: LEFT CENTRAL PARIETAL SCALP

## 2020-08-11 NOTE — PROCEDURE: TREATMENT REGIMEN
Detail Level: Simple
Continue Regimen: Hydrocortisone cream twice a day for 2 weeks as needed with flares

## 2020-10-05 ENCOUNTER — NON-PROVIDER VISIT (OUTPATIENT)
Dept: MEDICAL GROUP | Age: 75
End: 2020-10-05
Payer: MEDICARE

## 2020-10-05 DIAGNOSIS — Z23 NEED FOR VACCINATION: ICD-10-CM

## 2020-10-05 PROCEDURE — 90662 IIV NO PRSV INCREASED AG IM: CPT | Performed by: INTERNAL MEDICINE

## 2020-10-05 PROCEDURE — G0008 ADMIN INFLUENZA VIRUS VAC: HCPCS | Performed by: INTERNAL MEDICINE

## 2020-10-05 NOTE — PROGRESS NOTES
"Kevin Jackson is a 75 y.o. male here for a non-provider visit for:   FLU    Reason for immunization: Annual Flu Vaccine  Immunization records indicate need for vaccine: Yes, confirmed with Epic  Minimum interval has been met for this vaccine: Yes  ABN completed: Yes    Order and dose verified by: MARIANNA ORTEGA Dated  8/15/19  was given to patient: Yes  All IAC Questionnaire questions were answered \"No.\"    Patient tolerated injection and no adverse effects were observed or reported: Yes    Pt scheduled for next dose in series: No    "

## 2020-10-06 ENCOUNTER — OFFICE VISIT (OUTPATIENT)
Dept: MEDICAL GROUP | Age: 75
End: 2020-10-06
Payer: MEDICARE

## 2020-10-06 VITALS
OXYGEN SATURATION: 91 % | HEART RATE: 76 BPM | BODY MASS INDEX: 28.41 KG/M2 | DIASTOLIC BLOOD PRESSURE: 80 MMHG | WEIGHT: 214.4 LBS | HEIGHT: 73 IN | SYSTOLIC BLOOD PRESSURE: 126 MMHG | TEMPERATURE: 96.7 F

## 2020-10-06 DIAGNOSIS — N40.1 BENIGN NON-NODULAR PROSTATIC HYPERPLASIA WITH LOWER URINARY TRACT SYMPTOMS: ICD-10-CM

## 2020-10-06 DIAGNOSIS — I73.9 PVD (PERIPHERAL VASCULAR DISEASE) (HCC): ICD-10-CM

## 2020-10-06 DIAGNOSIS — E55.9 HYPOVITAMINOSIS D: ICD-10-CM

## 2020-10-06 DIAGNOSIS — E03.4 HYPOTHYROIDISM DUE TO ACQUIRED ATROPHY OF THYROID: ICD-10-CM

## 2020-10-06 DIAGNOSIS — Z12.11 SCREEN FOR COLON CANCER: ICD-10-CM

## 2020-10-06 DIAGNOSIS — K21.00 GASTROESOPHAGEAL REFLUX DISEASE WITH ESOPHAGITIS, UNSPECIFIED WHETHER HEMORRHAGE: ICD-10-CM

## 2020-10-06 DIAGNOSIS — G25.0 BENIGN ESSENTIAL TREMOR: ICD-10-CM

## 2020-10-06 DIAGNOSIS — Z23 NEED FOR HEPATITIS VACCINATION: ICD-10-CM

## 2020-10-06 DIAGNOSIS — I10 ESSENTIAL HYPERTENSION: ICD-10-CM

## 2020-10-06 DIAGNOSIS — E78.2 MIXED HYPERLIPIDEMIA: ICD-10-CM

## 2020-10-06 PROBLEM — E83.52 HYPERCALCEMIA: Status: RESOLVED | Noted: 2020-01-10 | Resolved: 2020-10-06

## 2020-10-06 PROCEDURE — G0010 ADMIN HEPATITIS B VACCINE: HCPCS | Performed by: INTERNAL MEDICINE

## 2020-10-06 PROCEDURE — 99214 OFFICE O/P EST MOD 30 MIN: CPT | Mod: 25 | Performed by: INTERNAL MEDICINE

## 2020-10-06 PROCEDURE — 90746 HEPB VACCINE 3 DOSE ADULT IM: CPT | Performed by: INTERNAL MEDICINE

## 2020-10-06 RX ORDER — FINASTERIDE 5 MG/1
TABLET, FILM COATED ORAL
COMMUNITY
End: 2021-01-04

## 2020-10-06 ASSESSMENT — ENCOUNTER SYMPTOMS
GASTROINTESTINAL NEGATIVE: 1
MUSCULOSKELETAL NEGATIVE: 1
PSYCHIATRIC NEGATIVE: 1
RESPIRATORY NEGATIVE: 1
NEUROLOGICAL NEGATIVE: 1
EYES NEGATIVE: 1
CARDIOVASCULAR NEGATIVE: 1
CONSTITUTIONAL NEGATIVE: 1

## 2020-10-06 ASSESSMENT — FIBROSIS 4 INDEX: FIB4 SCORE: 0.79

## 2020-10-06 NOTE — PROGRESS NOTES
Subjective:      Kevin Jackson is a 75 y.o. male who presents with Follow-Up and Diabetes  The patient is here for followup of chronic medical problems listed below. The patient is compliant with medications and having no side effects from them. Denies chest pain, abdominal pain, dyspnea, myalgias, or cough.   Patient Active Problem List    Diagnosis Date Noted   • Atherosclerosis of native artery of extremity with intermittent claudication ([facility]) 01/10/2020   • S/P BKA (below knee amputation) unilateral, right ([facility]) 09/10/2019   • Nonsustained ventricular tachycardia ([facility])- ziopatch may 2019; PAC's and PVC's, NSR; NO A. FIB; dr dwyer, [facility];  dr mohsen (cardilogy) at [facility] 06/20/2019   • PVD (peripheral vascular disease) ([facility])- s/p right BKA 04/02/2019   • Diabetic mononeuropathy associated with diabetes mellitus due to underlying condition ([facility])-m rx gabapentin 04/02/2019   • Encounter for screening- [facility] screening 2019- neg  abd US for AAA, MARELY, carotid US, EKG (no A.Fib to my review) 03/13/2019   • Benign essential tremor- DR OLIVAS, [facility]- Rx mysoline 03/12/2019   • Gastroesophageal reflux disease with esophagitis- PPI PRN 08/15/2017   • Hypothyroidism due to acquired atrophy of thyroid- dr browning 08/15/2017   • Benign non-nodular prostatic hyperplasia with lower urinary tract symptoms- [facility] 11/05/2014   • Uncontrolled type 2 diabetes mellitus with complication, with long-term current use of insulin ([facility])- dr browning 10/02/2013   • Mixed hyperlipidemia- dr dwyer 10/03/2011   • Essential hypertension- DR DWYER 10/03/2011   • Hypovitaminosis D 10/03/2011     Allergies   Allergen Reactions   • Canagliflozin Unspecified     invokana   • Lipoic Acid Unspecified   • Metanx  [L-Methylfolate-Algae-B12-B6] Unspecified   • Niacin Unspecified     Outpatient Medications Prior to Visit   Medication Sig Dispense Refill   • finasteride (PROSCAR) 5 MG Tab finasteride 5 mg tablet   Take 1 tablet every day by  oral route.     • propranolol CR (INDERAL LA) 120 MG CAPSULE SR 24 HR TAKE ONE CAPSULE BY MOUTH EVERY DAY 90 Cap 4   • hydrocortisone 2.5 % Cream topical cream Apply 1 Application to affected area(s) 1 time daily as needed.     • Insulin Pen Needle 32G X 6 MM Misc 1-2     • glucose blood (ONE TOUCH ULTRA TEST) strip 3-4     • INSULIN LISP PROT & LISP, HUM, (HUMALOG MIX 50/50) (50-50) 100 UNIT/ML Suspension 30 u     • vitamin D (CHOLECALCIFEROL) 1000 UNIT Tab 1000 IU 2 tab     • insulin lispro (HUMALOG) 100 UNIT/ML Inject 35 Units as instructed every evening.     • atorvastatin (LIPITOR) 20 MG Tab Take 80 mg by mouth every evening.     • gabapentin (NEURONTIN) 300 MG Cap Take 300 mg by mouth 3 times a day.     • aspirin EC 81 MG EC tablet Take 1 Tab by mouth every day. 100 Tab 2   • losartan (COZAAR) 50 MG Tab Take 1 Tab by mouth every day. 90 Tab 2   • Semaglutide (OZEMPIC) 1 MG/DOSE Solution Pen-injector Inject 1 mg as instructed every 7 days.     • glimepiride (AMARYL) 4 MG Tab Take 1 Tab by mouth every morning. 30 Tab 11   • Empagliflozin (JARDIANCE) 25 MG Tab Take 25 mg by mouth every day. 90 Tab 4   • metformin (GLUCOPHAGE) 1000 MG tablet Take 1 Tab by mouth 2 times a day, with meals. 60 Tab 11   • primidone (MYSOLINE) 50 MG Tab Take 1 Tab by mouth every evening. 90 Tab 3   • levothyroxine (SYNTHROID) 50 MCG Tab Take 1 Tab by mouth every morning before breakfast. 30 Tab 11   • tamsulosin (FLOMAX) 0.4 MG capsule Take 2 Caps by mouth every day. (Patient not taking: Reported on 10/6/2020) 60 Cap 2     No facility-administered medications prior to visit.      No visits with results within 1 Month(s) from this visit.   Latest known visit with results is:   Hospital Outpatient Visit on 06/29/2020   Component Date Value   • Cholesterol,Tot 06/29/2020 110    • Triglycerides 06/29/2020 79    • HDL 06/29/2020 45    • LDL 06/29/2020 49    • TSH 06/29/2020 1.300    • WBC 06/29/2020 5.9    • RBC 06/29/2020 5.14    •  Hemoglobin 06/29/2020 14.5    • Hematocrit 06/29/2020 44.7    • MCV 06/29/2020 87.0    • MCH 06/29/2020 28.2    • MCHC 06/29/2020 32.4*   • RDW 06/29/2020 51.0*   • Platelet Count 06/29/2020 327    • MPV 06/29/2020 10.4    • Neutrophils-Polys 06/29/2020 46.80    • Lymphocytes 06/29/2020 36.30    • Monocytes 06/29/2020 12.60    • Eosinophils 06/29/2020 3.10    • Basophils 06/29/2020 1.00    • Immature Granulocytes 06/29/2020 0.20    • Nucleated RBC 06/29/2020 0.00    • Neutrophils (Absolute) 06/29/2020 2.75    • Lymphs (Absolute) 06/29/2020 2.13    • Monos (Absolute) 06/29/2020 0.74    • Eos (Absolute) 06/29/2020 0.18    • Baso (Absolute) 06/29/2020 0.06    • Immature Granulocytes (a* 06/29/2020 0.01    • NRBC (Absolute) 06/29/2020 0.00    • Creatinine, Urine 06/29/2020 66.03    • Microalbumin, Urine Rand* 06/29/2020 <1.2    • Micro Alb Creat Ratio 06/29/2020 see below    • 25-Hydroxy   Vitamin D 25 06/29/2020 34    • Prostatic Specific Antig* 06/29/2020 1.80    • Vitamin B12 -True Cobala* 06/29/2020 214    • Fasting Status 06/29/2020 Non-Fasting       Lab Results   Component Value Date/Time    HBA1C 7.6 (H) 03/09/2020 08:15 AM    HBA1C 6.6 (H) 10/21/2019 09:36 AM     Lab Results   Component Value Date/Time    SODIUM 140 03/09/2020 08:15 AM    POTASSIUM 4.2 03/09/2020 08:15 AM    CHLORIDE 104 03/09/2020 08:15 AM    CO2 26 03/09/2020 08:15 AM    GLUCOSE 145 (H) 03/09/2020 08:15 AM    BUN 22 03/09/2020 08:15 AM    CREATININE 1.04 03/09/2020 08:15 AM    BUNCREATRAT 24 11/21/2017 07:21 AM    ALKPHOSPHAT 39 03/09/2020 08:15 AM    ASTSGOT 14 03/09/2020 08:15 AM    ALTSGPT 16 03/09/2020 08:15 AM    TBILIRUBIN 0.8 03/09/2020 08:15 AM     Lab Results   Component Value Date/Time    INR 0.99 01/11/2017 09:05 PM    INR 0.99 01/14/2013 05:50 PM     Lab Results   Component Value Date/Time    CHOLSTRLTOT 110 06/29/2020 10:01 AM    LDL 49 06/29/2020 10:01 AM    HDL 45 06/29/2020 10:01 AM    TRIGLYCERIDE 79 06/29/2020 10:01 AM      "  Lab Results   Component Value Date/Time    TESTOSTERONE 436 11/08/2019 10:50 AM     Lab Results   Component Value Date/Time    TSH 2.020 11/21/2017 07:21 AM     Lab Results   Component Value Date/Time    FREET4 1.03 10/21/2019 09:36 AM    FREET4 1.11 09/11/2019 05:25 AM     No results found for: URICACID  No components found for: VITB12  Lab Results   Component Value Date/Time    25HYDROXY 34 06/29/2020 10:01 AM    25HYDROXY 19 (L) 09/11/2019 05:25 AM               HPI    Review of Systems   Constitutional: Negative.    HENT: Negative.    Eyes: Negative.    Respiratory: Negative.    Cardiovascular: Negative.    Gastrointestinal: Negative.    Genitourinary: Negative.    Musculoskeletal: Negative.    Skin: Negative.    Neurological: Negative.    Endo/Heme/Allergies: Negative.    Psychiatric/Behavioral: Negative.           Objective:     /80 (BP Location: Left arm, Patient Position: Sitting, BP Cuff Size: Adult)   Pulse 76   Temp 35.9 °C (96.7 °F) (Temporal)   Ht 1.854 m (6' 1\")   Wt 97.3 kg (214 lb 6.4 oz)   SpO2 91%   BMI 28.29 kg/m²      Physical Exam  Constitutional:       General: He is not in acute distress.     Appearance: He is well-developed. He is not diaphoretic.   HENT:      Head: Normocephalic and atraumatic.      Right Ear: External ear normal.      Left Ear: External ear normal.      Nose: Nose normal.      Mouth/Throat:      Pharynx: No oropharyngeal exudate.   Eyes:      General: No scleral icterus.        Right eye: No discharge.         Left eye: No discharge.      Conjunctiva/sclera: Conjunctivae normal.      Pupils: Pupils are equal, round, and reactive to light.   Neck:      Musculoskeletal: Normal range of motion and neck supple.      Thyroid: No thyromegaly.      Vascular: No JVD.      Trachea: No tracheal deviation.   Cardiovascular:      Rate and Rhythm: Normal rate and regular rhythm.      Heart sounds: Normal heart sounds. No murmur. No friction rub. No gallop.    Pulmonary:    "   Effort: Pulmonary effort is normal. No respiratory distress.      Breath sounds: Normal breath sounds. No stridor. No wheezing or rales.   Chest:      Chest wall: No tenderness.   Abdominal:      General: Bowel sounds are normal. There is no distension.      Palpations: Abdomen is soft. There is no mass.      Tenderness: There is no abdominal tenderness. There is no guarding or rebound.   Musculoskeletal: Normal range of motion.         General: No tenderness.   Lymphadenopathy:      Cervical: No cervical adenopathy.   Skin:     General: Skin is warm and dry.      Coloration: Skin is not pale.      Findings: No erythema or rash.   Neurological:      Mental Status: He is alert and oriented to person, place, and time.      Motor: No abnormal muscle tone.      Coordination: Coordination normal.      Deep Tendon Reflexes: Reflexes are normal and symmetric. Reflexes normal.   Psychiatric:         Behavior: Behavior normal.         Thought Content: Thought content normal.         Judgment: Judgment normal.                          1. Essential hypertension- DR VIZCARRA    Under good control. Continue same regimen.    - Comp Metabolic Panel; Future  - Lipid Profile; Future  - CBC WITH DIFFERENTIAL; Future    2. Benign non-nodular prostatic hyperplasia with lower urinary tract symptoms- [facility] Under good control. Continue same regimen.       3. Gastroesophageal reflux disease with esophagitis, unspecified whether hemorrhage    Under good control. Continue same regimen.    4. Hypothyroidism due to acquired atrophy of thyroid- dr browning    Under good control. Continue same regimen.  - TSH; Future    5. Benign essential tremor- DR OLIVAS,  NEUROLOGY    Under good control. Continue same regimen.    6. PVD (peripheral vascular disease) ([facility])- s/p right BKA    Under good control. Continue same regimen.    7. Uncontrolled type 2 diabetes mellitus with complication, with long-term current use of insulin ([facility])- dr abbott     Under good control. Continue same regimen.  - Comp Metabolic Panel; Future  - Lipid Profile; Future  - CBC WITH DIFFERENTIAL; Future  - HEMOGLOBIN A1C; Future  - MICROALBUMIN CREAT RATIO URINE; Future    8. Hypovitaminosis D    Under good control. Continue same regimen.  - VITAMIN D,25 HYDROXY; Future    9. Mixed hyperlipidemia- dr dwyer     Under good control. Continue same regimen.  - TSH; Future  - Comp Metabolic Panel; Future  - Lipid Profile; Future  - CBC WITH DIFFERENTIAL; Future  - HEMOGLOBIN A1C; Future    10. Need for hepatitis vaccination     - Hepatitis B Vaccine Adult IM    11. Screen for colon cancer        - REFERRAL TO GASTROENTEROLOGY

## 2020-12-31 ENCOUNTER — TELEPHONE (OUTPATIENT)
Dept: MEDICAL GROUP | Age: 75
End: 2020-12-31

## 2021-01-01 NOTE — TELEPHONE ENCOUNTER
"ESTABLISHED PATIENT PRE-VISIT PLANNING     Patient was NOT contacted to complete PVP.     Note: Patient will not be contacted if there is no indication to call.     1.  Reviewed notes from the last few office visits within the medical group: Yes    2.  If any orders were placed at last visit or intended to be done for this visit (i.e. 6 mos follow-up), do we have Results/Consult Notes?         •  Labs - Labs ordered, but not to be completed until FUTURE. Per Dr. Hernandez's Office Visit note on 10/06/2020, \"Comp Metabolic Panel; Future  - Lipid Profile; Future  - CBC WITH DIFFERENTIAL; Future  And \"- TSH; Future\"  And \"- Comp Metabolic Panel; Future  - Lipid Profile; Future  - CBC WITH DIFFERENTIAL; Future  - HEMOGLOBIN A1C; Future  - MICROALBUMIN CREAT RATIO URINE; Future\"  And \"- VITAMIN D,25 HYDROXY; Future\"  And \"- TSH; Future  - Comp Metabolic Panel; Future  - Lipid Profile; Future  - CBC WITH DIFFERENTIAL; Future  - HEMOGLOBIN A1C; Future\"    No mention of when labs due other than Future.    Note: If patient appointment is for lab review and patient did not complete labs, check with provider if OK to reschedule patient until labs completed.       •  Imaging - Imaging was not ordered at last office visit.       •  Referrals - Referral ordered, patient has NOT been seen.    3. Is this appointment scheduled as a Hospital Follow-Up? No    4.  Immunizations were updated in Epic using Reconcile Outside Information activity? Yes    5.  Patient is due for the following Health Maintenance Topics:   Health Maintenance Due   Topic Date Due   • IMM ZOSTER VACCINES (2 of 3) 02/10/2015   • Annual Wellness Visit  05/10/2018   • COLONOSCOPY  12/11/2019   • A1C SCREENING  09/09/2020   • RETINAL SCREENING  01/14/2021       6.  AHA (Pulse[employer]) form printed for Provider? N/A    "

## 2021-01-01 NOTE — PROGRESS NOTES
[facility]  [facility]  [address] [place] [address]  Ph: [phone]    NEW PATIENT EVALUATION - AMPUTEE CLINIC      Patient Name: Kevin Jackson   Patient : 1945  Patient Age: 74 y.o.   PCP: Ky Hernandez M.D.    Referring Physician: Dr. Jonn Rubin  Reason for Referral: ARU Discharge Follow Up  Examining Physician: Dr. Gertrudis Perez DO  Date of Service: 2019    SUBJECTIVE:   Patient Identification: Kevin Jackson is a 74 y.o. male with PMH significant for peripheral arterial disease bilaterally, uncontrolled diabetes type 2, hypertension, hyperlipidemia and rehabilitation history significant for right BKA 2019 secondary to peripheral arterial disease done by Dr. Shivam Amezcua and is presenting to PM&R clinic for a NEW OUTPATIENT evaluation after ARU discharge with new amputation with the following chief complaint/s:    Chief Complaint: Right BKA    Background Information:  Original Date of Amputation: 2019 RIGHT BKA; also has left TMA  Surgeon: Dr. Shivam Amezcua ([facility])  Etiology: Peripheral arterial disease, diabetes mellitus type 2  Acute Rehab: Y - 9/10/2019 to 2019  Prosthetist: [facility]    Accompanied by Today: Self  History of Present Illness: Patient reports that he has been doing okay recently.  He currently is mostly using wheelchair for mode of transportation.  Recently saw Dr. Amezcua who is going to see him in another month to reevaluate.  Wound is not quite ready for temporary prosthetic fitting.  Physical therapy on hold until surgical clearance and temporary prosthesis fitting.  States he has an appointment upcoming .  Patient had been in a  until approximately 10/24/19.  Currently using Ace bandage.  He is being seen by wound care for a cut he sustained after falling out of truck shortly prior to being taken out of the .  He is ambulating with FWW for short distances.    Patient does  "endorse significant anger and frustration at the fact that he had to have an amputation.  He did not think that this would ever happened to him.  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  He states he used to be part of a group of guys that met at [facility] 2 or 3 times a week and he has not heard from any of them except one.  He does have one follow PD retiree who has been very gracious with his help and has been driving the patient wherever he needs.  Patient's wife is 15 years his jose and works 3 jobs.  She is happy to help when she is around, but patient does not like feeling dependent.    Patient denies any significant neuropathic pain or phantom limb pain.  He states that he is only taking 600 mg 3 times daily at this time and does not note any discomfort whatsoever.  Patient voices preference to wean if able.    Medication History (pertinent to amputation):   ARU Discharge: Gabapentin 600/600/600/1200mg   Current: Gabapentin 600mg TID    Review of Systems:  Review of Systems   Constitutional: Negative for chills and fever.   Cardiovascular: Negative for chest pain and leg swelling.   Gastrointestinal: Negative for constipation and diarrhea.   Genitourinary: Negative for dysuria, frequency and urgency.   Musculoskeletal: Negative.         Right BKA   Skin:        Residual limb healing well per patient report and edema decreasing.   Neurological: Negative for dizziness.        Denies phantom limb pain.  Denies neuropathic pain.   Psychiatric/Behavioral: Positive for depression.      All other pertinent positive review of systems are noted above in HPI.   All other systems reviewed and are negative.    Past Medical History:  Past Medical History:   Diagnosis Date   • Arrhythmia 01/2018    possible afib per pt, ekg x 2 following were \"OK\"   • Bowel habit changes     constipation diarrhea   • Diabetes    • High cholesterol    • Hyperlipidemia    • Hypertension   "   • Muscle disorder    • Pain     right foot   • Tremors of nervous system       Past Surgical History:   Procedure Laterality Date   • KNEE AMPUTATION BELOW Right 9/5/2019    Procedure: AMPUTATION, BELOW KNEE;  Surgeon: Shivam Amezcua M.D.;  Location: SURGERY [facility];  Service: Vascular   • AMPUTATION, TOE  2/2013    all 5 toes left foot   • CARPAL TUNNEL RELEASE     • OTHER ORTHOPEDIC SURGERY      right foot         Past Social History:  Social History     Socioeconomic History   • Marital status: Single     Spouse name: Not on file   • Number of children: Not on file   • Years of education: Not on file   • Highest education level: Not on file   Occupational History   • Not on file   Social Needs   • Financial resource strain: Not on file   • Food insecurity:     Worry: Not on file     Inability: Not on file   • Transportation needs:     Medical: Not on file     Non-medical: Not on file   Tobacco Use   • Smoking status: Never Smoker   • Smokeless tobacco: Never Used   Substance and Sexual Activity   • Alcohol use: Not Currently     Alcohol/week: 0.6 oz     Types: 1 Cans of beer per week   • Drug use: No   • Sexual activity: Yes     Partners: Female   Lifestyle   • Physical activity:     Days per week: Not on file     Minutes per session: Not on file   • Stress: Not on file   Relationships   • Social connections:     Talks on phone: Not on file     Gets together: Not on file     Attends Latter-day service: Not on file     Active member of club or organization: Not on file     Attends meetings of clubs or organizations: Not on file     Relationship status: Not on file   • Intimate partner violence:     Fear of current or ex partner: Not on file     Emotionally abused: Not on file     Physically abused: Not on file     Forced sexual activity: Not on file   Other Topics Concern   •  Service No   • Blood Transfusions No   • Caffeine Concern No   • Occupational Exposure Yes   • Hobby Hazards Yes   • Sleep  Concern No   • Stress Concern No   • Weight Concern No   • Special Diet No   • Back Care No   • Exercise No   • Bike Helmet No   • Seat Belt Yes   • Self-Exams Yes   Social History Narrative   • Not on file        Family History:  Family History   Problem Relation Age of Onset   • Arthritis Mother    • Cancer Mother    • Hypertension Mother    • Heart Disease Mother    • Cancer Father         colon   • Arthritis Father    • Genetic Disorder Father    • Diabetes Father    • Hyperlipidemia Father    • Stroke Father    • Heart Disease Father        Depression and Opioid Screening  PHQ-9:  Depression Screen (PHQ-2/PHQ-9) 9/19/2019 9/20/2019 11/5/2019   PHQ-2 Total Score 0 0 -   PHQ-2 Total Score - - -   PHQ-2 Total Score - - 4   PHQ-9 Total Score - - 7     Interpretation of PHQ-9 Total Score   Score Severity   1-4 No Depression   5-9 Mild Depression   10-14 Moderate Depression   15-19 Moderately Severe Depression   20-27 Severe Depression     Opioid Risk Score: 3  Interpretation of Opioid Risk Score   Score 0-3 = Low risk of abuse. Do UDS at least once per year.  Score 4-7 = Moderate risk of abuse. Do UDS 1-4 times per year.  Score 8+ = High risk of abuse. Refer to specialist.      OBJECTIVE:   Vital Signs:  Vitals:    11/05/19 0939   BP: 118/78   Pulse: 72   Temp: 37.1 °C (98.7 °F)   SpO2: 96%      Physical Exam:   Physical Exam   Constitutional: He is oriented to person, place, and time and well-developed, well-nourished, and in no distress.   HENT:   Head: Normocephalic and atraumatic.   Eyes: Conjunctivae are normal. No scleral icterus. Right eye exhibits no nystagmus. Left eye exhibits no nystagmus.   Neck:   Range of motion appears normal in all planes   Cardiovascular:   Lower extremities without peripheral edema.  Extremities warm and well-perfused.   Pulmonary/Chest: No accessory muscle usage. No respiratory distress.   Abdominal: Soft. Normal appearance. He exhibits no distension.   Musculoskeletal: Normal  range of motion.         General: No edema.      Comments: Right BKA.  Covered with Ace wrap.  Not visualized today due.  No significant edema appreciated.  Appears to be forming into nice conical shape.   Neurological: He is alert and oriented to person, place, and time. No cranial nerve deficit. He exhibits normal muscle tone. Coordination normal.   Skin: Skin is warm, dry and intact.   Psychiatric: Affect normal.   Depressed mood   Nursing note and vitals reviewed.     Imaging:          ASSESSMENT/PLAN: Kevin Jackson  is a very pleasant 74 y.o. male, retired from [employer], with rehabilitation history significant for RIGHT BKA 9/5/19 by Dr. Shivam Amezcua secondary to peripheral arterial disease and poorly controlled diabetes mellitus type 2, here 11/5/2019 for new amputee evaluation. The following plan was discussed with the patient who is in agreement.     Visit Diagnoses     ICD-10-CM   1. S/P BKA (below knee amputation) unilateral, right ([facility]) Z89.511   2. Neuropathic pain M79.2   3. Phantom limb pain ([facility]) G54.6   4. Depressed mood F32.9   5. Impaired instrumental activities of daily living (IADL) R68.89   6. Adjustment disorder with depressed mood F43.21      Rehab/Ortho/Vasc:   1. Right BKA secondary to peripheral arterial disease; Dr. Shivam Camacho 9/5/2019. Reviewed all pertinent previous medical records leading up to today's clinic visit.  Patient currently using Ace bandage as a .  States edema has reduced such that his residual limb diameter is down to 12 from 14.  Is seeing wound care.  -Based on historical level of activity patient could potentially be K3; he is retired from [employer] and [place].  -Likely K3 Community Ambulator: Patient has the ability or potential for ambulation with variable juanis, to traverse most environmental barriers, and may have vocational, therapeutic, or exercise activity that demands prosthetic utilization beyond simple  "locomotion.  -Physical therapy on hold until temporary prosthesis fitted.  Prosthesis fitting on hold until surgical clearance.  -Awaiting surgical clearance to move forward with prosthetic fabrication.  Patient to resume physical therapy for gait training once completed.  -Counseled extensively on avoiding knee and hip contracture.   -We we will continue to follow with patient and address any issues he may have with prosthesis in the future.    Neuropathic Pain/phantom limb pain:   1. Secondary to #1 in \"rehab/neuro\" section: Patient states he did have more significant phantom limb pain which has slowly been reduced.  He has decreased the amount of gabapentin that he has been taking.  Amenable to taper.  -Discussed extensively need to taper gabapentin slowly.  -Start reducing gabapentin by 1 pill (300 mg) every 5 days.  Okay to take initial dose 600 mg 3 times daily should significant neuropathic pain/phantom limb pain return.  -Patient counseled to call clinic should he have any questions or concerns regarding gabapentin taper.    Mood/Sleep:   1. Mild Depression, Adjustment Disorder secondary to #1 in \"rehab/ortho/vasc\" section: PHQ-9 score 7 (mild depression).  Discussed extensively with patient his mood.  He is very bothered that his group of [employer] friends have not reached out with the exception of one.  Discussed how this has been an adjustment for his friends as well and it very well may be the case that they do not know what to say to him, not that they lack caring.  Patient agrees.  He is very frustrated at having had an amputation and to some degree appears to be in denial.  He does not like being dependent.  -Encouraged patient to follow-up with Dr. Walsh, whom he was referred to by his primary care.  -Discussed with patient extensively that his current functional status will change and improve with temporary prosthesis fitting and ultimately customized prosthesis fabrication.  -Low threshold to " "consider SSRI or better, SNRI such as duloxetine if he has return of neuropathic pain/phantom limb pain as well as continued depressed mood.    Skin:   1. Healing wound secondary to #1 in \"rehab/ortho/vasc\" section: Skin not visualized today given patient has wrapped with Ace bandage and recently placed new bandage underneath.  -Followed by wound clinic    Follow up: 4 months or sooner if he has questions/concerns    Total time spent face to face with patient was 47 minutes. Greater then 50% of my visit was spent on counseling and coordination of care regarding the primary medical diagnosis and secondary medical complications as aforementioned in the assessment and plan. Extensive discussion involved the patient.    Please note that this dictation was created using voice recognition software. I have made every reasonable attempt to correct obvious errors but there may be errors of grammar and content that I may have overlooked prior to finalization of this note.    Dr. Gertrudis Perez DO, MS  Department of Physical Medicine & Rehabilitation  Neuro Rehabilitation Clinic  [facility]  11/5/2019 12:17 PM  " Statement Selected

## 2021-01-04 ENCOUNTER — TELEMEDICINE (OUTPATIENT)
Dept: MEDICAL GROUP | Age: 76
End: 2021-01-04
Payer: MEDICARE

## 2021-01-04 VITALS
DIASTOLIC BLOOD PRESSURE: 68 MMHG | BODY MASS INDEX: 27.96 KG/M2 | SYSTOLIC BLOOD PRESSURE: 115 MMHG | HEIGHT: 73 IN | HEART RATE: 81 BPM | WEIGHT: 211 LBS | TEMPERATURE: 98.4 F

## 2021-01-04 DIAGNOSIS — E87.8 ELECTROLYTE DISTURBANCE: ICD-10-CM

## 2021-01-04 DIAGNOSIS — Z23 NEED FOR VACCINATION: ICD-10-CM

## 2021-01-04 DIAGNOSIS — R53.1 GENERALIZED WEAKNESS: Primary | ICD-10-CM

## 2021-01-04 DIAGNOSIS — L03.115 CELLULITIS OF RIGHT LOWER EXTREMITY: ICD-10-CM

## 2021-01-04 DIAGNOSIS — Z89.511 S/P BKA (BELOW KNEE AMPUTATION) UNILATERAL, RIGHT (HCC): ICD-10-CM

## 2021-01-04 PROCEDURE — 99214 OFFICE O/P EST MOD 30 MIN: CPT | Mod: 95,25 | Performed by: FAMILY MEDICINE

## 2021-01-04 PROCEDURE — 90471 IMMUNIZATION ADMIN: CPT | Performed by: FAMILY MEDICINE

## 2021-01-04 PROCEDURE — 90750 HZV VACC RECOMBINANT IM: CPT | Performed by: FAMILY MEDICINE

## 2021-01-04 RX ORDER — FINASTERIDE 5 MG/1
5 TABLET, FILM COATED ORAL DAILY
COMMUNITY
End: 2023-07-26 | Stop reason: SDUPTHER

## 2021-01-04 RX ORDER — CLINDAMYCIN HYDROCHLORIDE 300 MG/1
300 CAPSULE ORAL 3 TIMES DAILY
Qty: 30 CAP | Refills: 0 | Status: SHIPPED | OUTPATIENT
Start: 2021-01-04 | End: 2021-01-14

## 2021-01-04 ASSESSMENT — PATIENT HEALTH QUESTIONNAIRE - PHQ9: CLINICAL INTERPRETATION OF PHQ2 SCORE: 0

## 2021-01-04 ASSESSMENT — FIBROSIS 4 INDEX: FIB4 SCORE: 0.79

## 2021-01-04 NOTE — LETTER
[facility]  Ky Hernandez M.D.  [address]  [place] [address]  Fax: [phone]   Authorization for Release/Disclosure of   Protected Health Information   Name: YINA JACKSON : 1945 SSN: [id number]   Address: [address] [place] [address] [place] [address] Phone:    [phone] (home)    I authorize the entity listed below to release/disclose the PHI below to:   [facility]/Ky Hernandez M.D. and Leona Alvarado M.D.   Provider or Entity Name:  [facility]   Address   City, State, [facility]   Phone:      Fax:  [phone]   Reason for request: continuity of care   Information to be released:    [  ] LAST COLONOSCOPY,  including any PATH REPORT and follow-up  [  ] LAST FIT/COLOGUARD RESULT [  ] LAST DEXA  [  ] LAST MAMMOGRAM  [  ] LAST PAP  [  ] LAST LABS [  ] RETINA EXAM REPORT  [  ] IMMUNIZATION RECORDS  [ x ] Release all info      [  ] Check here and initial the line next to each item to release ALL health information INCLUDING  _____ Care and treatment for drug and / or alcohol abuse  _____ HIV testing, infection status, or AIDS  _____ Genetic Testing    DATES OF SERVICE OR TIME PERIOD TO BE DISCLOSED: _____________  I understand and acknowledge that:  * This Authorization may be revoked at any time by you in writing, except if your health information has already been used or disclosed.  * Your health information that will be used or disclosed as a result of you signing this authorization could be re-disclosed by the recipient. If this occurs, your re-disclosed health information may no longer be protected by State or Federal laws.  * You may refuse to sign this Authorization. Your refusal will not affect your ability to obtain treatment.  * This Authorization becomes effective upon signing and will  on (date) __________.      If no date is indicated, this Authorization will  one (1) year from the signature date.    Name: Yina Jackson    Signature: continuity of care   Date:     2021            PLEASE FAX REQUESTED RECORDS BACK TO: [phone]

## 2021-01-04 NOTE — PROGRESS NOTES
Virtual Visit: Established Patient   This visit was conducted via Zoom using secure and encrypted videoconferencing technology. The patient was in a private location in the state of [place].    The patient's identity was confirmed and verbal consent was obtained for this virtual visit.    Subjective:   CC: hospital discharge follow up     Kevin Jackson is a 75 y.o. male with hx of PVD,T2DM, HLD, HTN, Hypothyroidism, s/p BKA on the right. Pt's PCP is Dr. Hernandez. Pt presents today for hospital discharge follow up. At the time of the visit, D/C summary was not available for review.     Records were sent to the clinic on 1/6/2020. Below information was obtained from DC summary:     Patient presented to ER in mid December 2020 for epigastric abdominal pain and weakness.  Full work-up including CTA was negative.  Covid test was negative.  Patient was discharged home with pain and antinausea medication.  The cause of his symptoms was unclear.  Patient was seen by ER again on 12/23/2020 for progressive weakness.  Abdominal pain resolved prior to 2nd ER visit. Complete neurological exam and physical exam were unremarkable. Laboratory studies positive for mild hypokalemia and hypophosphatemia. Blood sugar was 273 w/o sign of DKA. Pt received IV fluid. He was discharged home in stable condition. Today, he states that his symptoms have resolved. He feels back to baseline.     He has chronic type 2 DM, currently Jardiance 25 mg qd, Glimepiride 4 mg qd, Humalog 50/50 30 units daily, and Ozempic 1 mg weekly. This condition is currently being managed by endo. His last A1C was 8.5. He has appointment to follow up with endo in a few days.     Pt is s/p right-sided BKA in 9/2019. He is wearing prosthetic. He c/o minor skin abrasion at the stump w/o bleeding, discharge, fever, chills. Pt came to the clinic on the same day for skin exam and to receive shingle vaccine.     ROS   Denies any recent fevers or chills. No nausea or  vomiting. No chest pains or shortness of breath.     No Active Allergies    Current medicines (including changes today)  Current Outpatient Medications   Medication Sig Dispense Refill   • clindamycin (CLEOCIN) 300 MG Cap Take 1 Cap by mouth 3 times a day for 10 days. 30 Cap 0   • finasteride (PROSCAR) 5 MG Tab Take 5 mg by mouth every day.     • propranolol CR (INDERAL LA) 120 MG CAPSULE SR 24 HR TAKE ONE CAPSULE BY MOUTH EVERY DAY 90 Cap 4   • hydrocortisone 2.5 % Cream topical cream Apply 1 Application to affected area(s) 1 time daily as needed.     • Insulin Pen Needle 32G X 6 MM Misc 1-2     • glucose blood (ONE TOUCH ULTRA TEST) strip 3-4     • INSULIN LISP PROT & LISP, HUM, (HUMALOG MIX 50/50) (50-50) 100 UNIT/ML Suspension 30 u     • vitamin D (CHOLECALCIFEROL) 1000 UNIT Tab 1000 IU 2 tab     • atorvastatin (LIPITOR) 20 MG Tab Take 80 mg by mouth every evening.     • gabapentin (NEURONTIN) 300 MG Cap Take 300 mg by mouth 3 times a day.     • aspirin EC 81 MG EC tablet Take 1 Tab by mouth every day. 100 Tab 2   • losartan (COZAAR) 50 MG Tab Take 1 Tab by mouth every day. 90 Tab 2   • Semaglutide (OZEMPIC) 1 MG/DOSE Solution Pen-injector Inject 1 mg as instructed every 7 days.     • glimepiride (AMARYL) 4 MG Tab Take 1 Tab by mouth every morning. 30 Tab 11   • Empagliflozin (JARDIANCE) 25 MG Tab Take 25 mg by mouth every day. 90 Tab 4   • metformin (GLUCOPHAGE) 1000 MG tablet Take 1 Tab by mouth 2 times a day, with meals. 60 Tab 11   • primidone (MYSOLINE) 50 MG Tab Take 1 Tab by mouth every evening. 90 Tab 3   • levothyroxine (SYNTHROID) 50 MCG Tab Take 1 Tab by mouth every morning before breakfast. 30 Tab 11   • tamsulosin (FLOMAX) 0.4 MG capsule Take 2 Caps by mouth every day. (Patient not taking: Reported on 1/4/2021) 60 Cap 2     No current facility-administered medications for this visit.        Patient Active Problem List    Diagnosis Date Noted   • Atherosclerosis of native artery of extremity with  intermittent claudication ([facility]) 01/10/2020   • S/P BKA (below knee amputation) unilateral, right ([facility]) 09/10/2019   • Nonsustained ventricular tachycardia ([facility])- mariana may 2019; PAC's and PVC's, NSR; NO A. FIB; dr dwyer, [facility];  dr mohsen (cardilogy) at [facility] 06/20/2019   • PVD (peripheral vascular disease) ([facility])- s/p right BKA 04/02/2019   • Diabetic mononeuropathy associated with diabetes mellitus due to underlying condition ([facility])-m rx gabapentin 04/02/2019   • Encounter for screening- [facility] screening 2019- neg  abd US for AAA, MARELY, carotid US, EKG (no A.Fib to my review) 03/13/2019   • Benign essential tremor- DR OLIVAS,  NEUROLOGY- Rx mysoline 03/12/2019   • Gastroesophageal reflux disease with esophagitis- PPI PRN 08/15/2017   • Hypothyroidism due to acquired atrophy of thyroid- dr browning 08/15/2017   • Benign non-nodular prostatic hyperplasia with lower urinary tract symptoms- [facility] 11/05/2014   • Uncontrolled type 2 diabetes mellitus with complication, with long-term current use of insulin ([facility])- dr browning 10/02/2013   • Mixed hyperlipidemia- dr dwyer 10/03/2011   • Essential hypertension- DR DWYER 10/03/2011   • Hypovitaminosis D 10/03/2011       Family History   Problem Relation Age of Onset   • Arthritis Mother    • Cancer Mother    • Hypertension Mother    • Heart Disease Mother    • Cancer Father         colon   • Arthritis Father    • Genetic Disorder Father    • Diabetes Father    • Hyperlipidemia Father    • Stroke Father    • Heart Disease Father        He  has a past medical history of Arrhythmia (01/2018), Bowel habit changes, Diabetes, High cholesterol, Hyperlipidemia, Hypertension, Muscle disorder, Pain, and Tremors of nervous system. He also has no past medical history of CAD (coronary artery disease).  He  has a past surgical history that includes other orthopedic surgery; carpal tunnel release; amputation, toe (2/2013); and knee amputation below (Right, 9/5/2019).       Objective:  "  /68 (BP Location: Left arm, Patient Position: Sitting, BP Cuff Size: Adult long) Comment: pt stated  Pulse 81 Comment: pt stated  Temp 36.9 °C (98.4 °F) (Temporal) Comment: pt stated  Ht 1.854 m (6' 1\")   Wt 95.7 kg (211 lb) Comment: pt stated  BMI 27.84 kg/m²     Physical Exam:  Constitutional: Alert, no distress, well-groomed.  Skin: No rashes in visible areas.  - mildly erythematous and tender skin abrasion at the stump of the R leg, neg bleeding/discharge.   Eye: Round. Conjunctiva clear, lids normal. No icterus.   ENMT: Lips pink without lesions, good dentition, moist mucous membranes. Phonation normal.  Neck: No masses, no thyromegaly. Moves freely without pain.  Respiratory: Unlabored respiratory effort, no cough or audible wheeze  Psych: Alert and oriented x3, normal affect and mood.       Assessment and Plan:   The following treatment plan was discussed:     1. Generalized weakness  2. Electrolyte disturbance  Kevin Jackson is a 75 y.o. male with hx of PVD,T2DM, HLD, HTN, Hypothyroidism, s/p BKA on the right. He was seen at [facility] twice in 12/2020. First time was for abdominal pain. Workups including CTA were unremarkable. Covid test was negative. He was discharged home with anti-nausea and pain medication.  His symptoms subsequently resolved after few days.  Patient was seen again by [facility] on December 23, 2020 for generalized weakness.  Thorough work-up and physical exam were unremarkable.  Laboratory study was notable for mild hypokalemia, hypophosphatemia, and hyperglycemia.  Patient received IV fluid and was discharged in stable condition.  Today, he states that his symptom has completely resolved.  Patient is back to baseline.  - CMP  - f/u with PCP    3. Uncontrolled type 2 diabetes mellitus with complication, with long-term current use of insulin ([facility])- dr browning  - continue all medications and f/u with endo and PCP as directed.     4. S/P BKA (below knee " amputation) unilateral, right ([facility])  5. Cellulitis of right lower extremity  - clindamycin (CLEOCIN) 300 MG Cap; Take 1 Cap by mouth 3 times a day for 10 days.  Dispense: 30 Cap; Refill: 0    6. Need for vaccination  - Shingles Vaccine (SHINGRIX)   - pt came to the clinic on the same day to receive the vaccine.       Follow-up: Return for follow up with PCP.

## 2021-01-15 DIAGNOSIS — Z23 NEED FOR VACCINATION: ICD-10-CM

## 2021-01-30 ENCOUNTER — OFFICE VISIT (OUTPATIENT)
Dept: URGENT CARE | Facility: PHYSICIAN GROUP | Age: 76
End: 2021-01-30
Payer: MEDICARE

## 2021-01-30 ENCOUNTER — HOSPITAL ENCOUNTER (OUTPATIENT)
Facility: MEDICAL CENTER | Age: 76
End: 2021-01-30
Attending: STUDENT IN AN ORGANIZED HEALTH CARE EDUCATION/TRAINING PROGRAM
Payer: MEDICARE

## 2021-01-30 VITALS
TEMPERATURE: 96.9 F | BODY MASS INDEX: 27.96 KG/M2 | OXYGEN SATURATION: 98 % | HEIGHT: 73 IN | RESPIRATION RATE: 18 BRPM | HEART RATE: 72 BPM | SYSTOLIC BLOOD PRESSURE: 110 MMHG | DIASTOLIC BLOOD PRESSURE: 66 MMHG | WEIGHT: 211 LBS

## 2021-01-30 DIAGNOSIS — R82.998 DARK URINE: ICD-10-CM

## 2021-01-30 DIAGNOSIS — N40.1 BENIGN NON-NODULAR PROSTATIC HYPERPLASIA WITH LOWER URINARY TRACT SYMPTOMS: ICD-10-CM

## 2021-01-30 LAB
APPEARANCE UR: CLEAR
BILIRUB UR STRIP-MCNC: NORMAL MG/DL
COLOR UR AUTO: NORMAL
GLUCOSE UR STRIP.AUTO-MCNC: 500 MG/DL
KETONES UR STRIP.AUTO-MCNC: 40 MG/DL
LEUKOCYTE ESTERASE UR QL STRIP.AUTO: NORMAL
NITRITE UR QL STRIP.AUTO: NORMAL
PH UR STRIP.AUTO: 5 [PH] (ref 5–8)
PROT UR QL STRIP: NORMAL MG/DL
RBC UR QL AUTO: NORMAL
SP GR UR STRIP.AUTO: 1.01
UROBILINOGEN UR STRIP-MCNC: 2 MG/DL

## 2021-01-30 PROCEDURE — 99213 OFFICE O/P EST LOW 20 MIN: CPT | Performed by: STUDENT IN AN ORGANIZED HEALTH CARE EDUCATION/TRAINING PROGRAM

## 2021-01-30 PROCEDURE — 87086 URINE CULTURE/COLONY COUNT: CPT

## 2021-01-30 PROCEDURE — 81002 URINALYSIS NONAUTO W/O SCOPE: CPT | Performed by: STUDENT IN AN ORGANIZED HEALTH CARE EDUCATION/TRAINING PROGRAM

## 2021-01-30 ASSESSMENT — FIBROSIS 4 INDEX: FIB4 SCORE: 0.79

## 2021-01-30 NOTE — PROGRESS NOTES
Subjective:   CHIEF COMPLAINT  Chief Complaint   Patient presents with   • UTI     x1 week, dark urine        HPI  Kevin Jackson is a 75 y.o. male who presents with a chief complaint of having dark urine for approximately 1 week.  He is experiencing any dysuria.  No trouble with urination.  He is not experiencing any bladder pain or any discomfort.    REVIEW OF SYSTEMS  General: no fever or chills  GI: no nausea or vomiting  See HPI for further details.    PAST MEDICAL HISTORY  Patient Active Problem List    Diagnosis Date Noted   • Atherosclerosis of native artery of extremity with intermittent claudication ([facility]) 01/10/2020   • S/P BKA (below knee amputation) unilateral, right ([facility]) 09/10/2019   • Nonsustained ventricular tachycardia ([facility])- ziopatch may 2019; PAC's and PVC's, NSR; NO A. FIB; dr dwyer, [facility];  dr mohsen (cardilogy) at [facility] 06/20/2019   • PVD (peripheral vascular disease) ([facility])- s/p right BKA 04/02/2019   • Diabetic mononeuropathy associated with diabetes mellitus due to underlying condition ([facility])-m rx gabapentin 04/02/2019   • Encounter for screening- [facility] screening 2019- neg  abd US for AAA, MARELY, carotid US, EKG (no A.Fib to my review) 03/13/2019   • Benign essential tremor- DR OLIVAS, [facility]- Rx mysoline 03/12/2019   • Gastroesophageal reflux disease with esophagitis- PPI PRN 08/15/2017   • Hypothyroidism due to acquired atrophy of thyroid- dr browning 08/15/2017   • Benign non-nodular prostatic hyperplasia with lower urinary tract symptoms- [facility] 11/05/2014   • Uncontrolled type 2 diabetes mellitus with complication, with long-term current use of insulin ([facility])- dr browning 10/02/2013   • Mixed hyperlipidemia- dr dwyer 10/03/2011   • Essential hypertension- DR DWYER 10/03/2011   • Hypovitaminosis D 10/03/2011       SURGICAL HISTORY   has a past surgical history that includes other orthopedic surgery; carpal tunnel release; amputation, toe (2/2013); and knee amputation below (Right,  9/5/2019).    ALLERGIES  No Active Allergies    CURRENT MEDICATIONS  Home Medications     Reviewed by Vasyl Kingston Ass't (Medical Assistant) on 01/30/21 at 0923  Med List Status: <None>   Medication Last Dose Status   aspirin EC 81 MG EC tablet Taking Active   atorvastatin (LIPITOR) 20 MG Tab Taking Active   Empagliflozin (JARDIANCE) 25 MG Tab Taking Active   finasteride (PROSCAR) 5 MG Tab Taking Active   gabapentin (NEURONTIN) 300 MG Cap Taking Active   glimepiride (AMARYL) 4 MG Tab Taking Active   glucose blood (ONE TOUCH ULTRA TEST) strip Taking Active   hydrocortisone 2.5 % Cream topical cream PRN Active   INSULIN LISP PROT & LISP, HUM, (HUMALOG MIX 50/50) (50-50) 100 UNIT/ML Suspension Taking Active   Insulin Pen Needle 32G X 6 MM Misc Taking Active   levothyroxine (SYNTHROID) 50 MCG Tab Taking Active   losartan (COZAAR) 50 MG Tab Taking Active   metformin (GLUCOPHAGE) 1000 MG tablet Taking Active   primidone (MYSOLINE) 50 MG Tab Taking Active   propranolol CR (INDERAL LA) 120 MG CAPSULE SR 24 HR Taking Active   Semaglutide (OZEMPIC) 1 MG/DOSE Solution Pen-injector Taking Active   tamsulosin (FLOMAX) 0.4 MG capsule Not Taking Active   vitamin D (CHOLECALCIFEROL) 1000 UNIT Tab Taking Active                SOCIAL HISTORY  Social History     Tobacco Use   • Smoking status: Never Smoker   • Smokeless tobacco: Never Used   Substance and Sexual Activity   • Alcohol use: Not Currently     Alcohol/week: 0.6 oz     Types: 1 Cans of beer per week   • Drug use: No   • Sexual activity: Yes     Partners: Female       FAMILY HISTORY  Family History   Problem Relation Age of Onset   • Arthritis Mother    • Cancer Mother    • Hypertension Mother    • Heart Disease Mother    • Cancer Father         colon   • Arthritis Father    • Genetic Disorder Father    • Diabetes Father    • Hyperlipidemia Father    • Stroke Father    • Heart Disease Father           Objective:   PHYSICAL EXAM  VITAL SIGNS: /66   Pulse 72    "Temp 36.1 °C (96.9 °F) (Temporal)   Resp 18   Ht 1.854 m (6' 1\")   Wt 95.7 kg (211 lb)   SpO2 98%   BMI 27.84 kg/m²     Gen: no acute distress, normal voice  Skin: dry, intact, moist mucosal membranes  Lungs: CTAB w/ symmetric expansion  CV: RRR w/o murmurs or clicks  Psych: normal affect, normal judgement, alert, awake    UA: 500 glucose, small bili, 40 ketones, trace protein 2.0 uro. no blood nitrates or leukocytes.    Assessment/Plan:     1. Dark urine     2. Uncontrolled type 2 diabetes mellitus with complication, with long-term current use of insulin ([facility])- dr browning     3. Benign non-nodular prostatic hyperplasia with lower urinary tract symptoms- [facility]     Urinalysis did not demonstrate evidence of underlying infection however the patient is spilling glucose in his urine.  He is uncontrolled on insulin and oral hypoglycemics; I encouraged tighter glycemic control and instructed him to follow-up with his PCP to discuss possibly increasing his insulin.  I also instructed the patient to follow-up with his urologist to discuss the discoloration of his urine and inquire about any additional work-up.  Lastly I will send his urine out for culture to ensure no underlying bacterial infection - only contact the patient if he needs to start antibiotics.  Both the patient and his wife verbalized their understanding.  All questions were answered.    Differential diagnosis, natural history, supportive care, and indications for immediate follow-up discussed. All questions answered. Patient agrees with the plan of care.    Follow-up as needed if symptoms worsen or fail to improve to PCP, Urgent care or Emergency Room.    Please note that this dictation was created using voice recognition software. I have made a reasonable attempt to correct obvious errors, but I expect that there are errors of grammar and possibly content that I did not discover before finalizing the note.         "

## 2021-02-02 LAB
BACTERIA UR CULT: NORMAL
SIGNIFICANT IND 70042: NORMAL
SITE SITE: NORMAL
SOURCE SOURCE: NORMAL

## 2021-03-29 ENCOUNTER — HOSPITAL ENCOUNTER (OUTPATIENT)
Dept: LAB | Facility: MEDICAL CENTER | Age: 76
End: 2021-03-29
Attending: INTERNAL MEDICINE
Payer: MEDICARE

## 2021-03-29 DIAGNOSIS — E78.2 MIXED HYPERLIPIDEMIA: ICD-10-CM

## 2021-03-29 DIAGNOSIS — E03.4 HYPOTHYROIDISM DUE TO ACQUIRED ATROPHY OF THYROID: ICD-10-CM

## 2021-03-29 DIAGNOSIS — E55.9 HYPOVITAMINOSIS D: ICD-10-CM

## 2021-03-29 DIAGNOSIS — I10 ESSENTIAL HYPERTENSION: ICD-10-CM

## 2021-03-29 LAB
25(OH)D3 SERPL-MCNC: 33 NG/ML (ref 30–100)
ALBUMIN SERPL BCP-MCNC: 3.9 G/DL (ref 3.2–4.9)
ALBUMIN/GLOB SERPL: 1.4 G/DL
ALP SERPL-CCNC: 44 U/L (ref 30–99)
ALT SERPL-CCNC: 19 U/L (ref 2–50)
ANION GAP SERPL CALC-SCNC: 10 MMOL/L (ref 7–16)
AST SERPL-CCNC: 14 U/L (ref 12–45)
BASOPHILS # BLD AUTO: 0.7 % (ref 0–1.8)
BASOPHILS # BLD: 0.05 K/UL (ref 0–0.12)
BILIRUB SERPL-MCNC: 0.6 MG/DL (ref 0.1–1.5)
BUN SERPL-MCNC: 18 MG/DL (ref 8–22)
CALCIUM SERPL-MCNC: 9.7 MG/DL (ref 8.5–10.5)
CHLORIDE SERPL-SCNC: 102 MMOL/L (ref 96–112)
CHOLEST SERPL-MCNC: 105 MG/DL (ref 100–199)
CO2 SERPL-SCNC: 29 MMOL/L (ref 20–33)
CREAT SERPL-MCNC: 0.83 MG/DL (ref 0.5–1.4)
EOSINOPHIL # BLD AUTO: 0.2 K/UL (ref 0–0.51)
EOSINOPHIL NFR BLD: 2.7 % (ref 0–6.9)
ERYTHROCYTE [DISTWIDTH] IN BLOOD BY AUTOMATED COUNT: 50.8 FL (ref 35.9–50)
EST. AVERAGE GLUCOSE BLD GHB EST-MCNC: 203 MG/DL
GLOBULIN SER CALC-MCNC: 2.8 G/DL (ref 1.9–3.5)
GLUCOSE SERPL-MCNC: 175 MG/DL (ref 65–99)
HBA1C MFR BLD: 8.7 % (ref 4–5.6)
HCT VFR BLD AUTO: 46 % (ref 42–52)
HDLC SERPL-MCNC: 42 MG/DL
HGB BLD-MCNC: 14.8 G/DL (ref 14–18)
IMM GRANULOCYTES # BLD AUTO: 0.04 K/UL (ref 0–0.11)
IMM GRANULOCYTES NFR BLD AUTO: 0.5 % (ref 0–0.9)
LDLC SERPL CALC-MCNC: 50 MG/DL
LYMPHOCYTES # BLD AUTO: 2.29 K/UL (ref 1–4.8)
LYMPHOCYTES NFR BLD: 31.4 % (ref 22–41)
MCH RBC QN AUTO: 29.5 PG (ref 27–33)
MCHC RBC AUTO-ENTMCNC: 32.2 G/DL (ref 33.7–35.3)
MCV RBC AUTO: 91.6 FL (ref 81.4–97.8)
MONOCYTES # BLD AUTO: 0.65 K/UL (ref 0–0.85)
MONOCYTES NFR BLD AUTO: 8.9 % (ref 0–13.4)
NEUTROPHILS # BLD AUTO: 4.06 K/UL (ref 1.82–7.42)
NEUTROPHILS NFR BLD: 55.8 % (ref 44–72)
NRBC # BLD AUTO: 0 K/UL
NRBC BLD-RTO: 0 /100 WBC
PLATELET # BLD AUTO: 301 K/UL (ref 164–446)
PMV BLD AUTO: 11.6 FL (ref 9–12.9)
POTASSIUM SERPL-SCNC: 3.9 MMOL/L (ref 3.6–5.5)
PROT SERPL-MCNC: 6.7 G/DL (ref 6–8.2)
RBC # BLD AUTO: 5.02 M/UL (ref 4.7–6.1)
SODIUM SERPL-SCNC: 141 MMOL/L (ref 135–145)
TRIGL SERPL-MCNC: 65 MG/DL (ref 0–149)
TSH SERPL DL<=0.005 MIU/L-ACNC: 1.33 UIU/ML (ref 0.38–5.33)
WBC # BLD AUTO: 7.3 K/UL (ref 4.8–10.8)

## 2021-03-29 PROCEDURE — 80053 COMPREHEN METABOLIC PANEL: CPT

## 2021-03-29 PROCEDURE — 84443 ASSAY THYROID STIM HORMONE: CPT

## 2021-03-29 PROCEDURE — 85025 COMPLETE CBC W/AUTO DIFF WBC: CPT

## 2021-03-29 PROCEDURE — 36415 COLL VENOUS BLD VENIPUNCTURE: CPT

## 2021-03-29 PROCEDURE — 83036 HEMOGLOBIN GLYCOSYLATED A1C: CPT | Mod: GA

## 2021-03-29 PROCEDURE — 80061 LIPID PANEL: CPT

## 2021-03-29 PROCEDURE — 82043 UR ALBUMIN QUANTITATIVE: CPT

## 2021-03-29 PROCEDURE — 82306 VITAMIN D 25 HYDROXY: CPT

## 2021-03-29 PROCEDURE — 82570 ASSAY OF URINE CREATININE: CPT

## 2021-03-30 LAB
CREAT UR-MCNC: 74.67 MG/DL
MICROALBUMIN UR-MCNC: <1.2 MG/DL
MICROALBUMIN/CREAT UR: NORMAL MG/G (ref 0–30)

## 2021-04-06 ENCOUNTER — OFFICE VISIT (OUTPATIENT)
Dept: MEDICAL GROUP | Age: 76
End: 2021-04-06
Payer: MEDICARE

## 2021-04-06 VITALS
OXYGEN SATURATION: 92 % | BODY MASS INDEX: 27.51 KG/M2 | HEART RATE: 71 BPM | HEIGHT: 73 IN | WEIGHT: 207.6 LBS | TEMPERATURE: 97.3 F | SYSTOLIC BLOOD PRESSURE: 122 MMHG | DIASTOLIC BLOOD PRESSURE: 80 MMHG

## 2021-04-06 DIAGNOSIS — Z12.12 SCREENING FOR COLORECTAL CANCER: ICD-10-CM

## 2021-04-06 DIAGNOSIS — N40.1 BENIGN NON-NODULAR PROSTATIC HYPERPLASIA WITH LOWER URINARY TRACT SYMPTOMS: ICD-10-CM

## 2021-04-06 DIAGNOSIS — G25.0 BENIGN ESSENTIAL TREMOR: ICD-10-CM

## 2021-04-06 DIAGNOSIS — Z12.11 SCREENING FOR COLORECTAL CANCER: ICD-10-CM

## 2021-04-06 DIAGNOSIS — E78.2 MIXED HYPERLIPIDEMIA: ICD-10-CM

## 2021-04-06 DIAGNOSIS — E55.9 HYPOVITAMINOSIS D: ICD-10-CM

## 2021-04-06 DIAGNOSIS — E08.41 DIABETIC MONONEUROPATHY ASSOCIATED WITH DIABETES MELLITUS DUE TO UNDERLYING CONDITION (HCC): ICD-10-CM

## 2021-04-06 DIAGNOSIS — K21.00 GASTROESOPHAGEAL REFLUX DISEASE WITH ESOPHAGITIS, UNSPECIFIED WHETHER HEMORRHAGE: ICD-10-CM

## 2021-04-06 DIAGNOSIS — Z23 NEED FOR VACCINATION: ICD-10-CM

## 2021-04-06 DIAGNOSIS — E55.9 VITAMIN D DEFICIENCY: ICD-10-CM

## 2021-04-06 DIAGNOSIS — E03.4 HYPOTHYROIDISM DUE TO ACQUIRED ATROPHY OF THYROID: ICD-10-CM

## 2021-04-06 DIAGNOSIS — I10 ESSENTIAL HYPERTENSION: ICD-10-CM

## 2021-04-06 PROCEDURE — 90471 IMMUNIZATION ADMIN: CPT | Performed by: INTERNAL MEDICINE

## 2021-04-06 PROCEDURE — 90750 HZV VACC RECOMBINANT IM: CPT | Performed by: INTERNAL MEDICINE

## 2021-04-06 PROCEDURE — 99214 OFFICE O/P EST MOD 30 MIN: CPT | Mod: 25 | Performed by: INTERNAL MEDICINE

## 2021-04-06 ASSESSMENT — ENCOUNTER SYMPTOMS
RESPIRATORY NEGATIVE: 1
CARDIOVASCULAR NEGATIVE: 1
PSYCHIATRIC NEGATIVE: 1
EYES NEGATIVE: 1
GASTROINTESTINAL NEGATIVE: 1
NEUROLOGICAL NEGATIVE: 1
CONSTITUTIONAL NEGATIVE: 1
MUSCULOSKELETAL NEGATIVE: 1

## 2021-04-06 ASSESSMENT — FIBROSIS 4 INDEX: FIB4 SCORE: 0.79

## 2021-04-06 NOTE — PROGRESS NOTES
Subjective:      Kevin Jackson is a 75 y.o. male who presents with Lab Results  The patient is here for followup of chronic medical problems listed below. The patient is compliant with medications and having no side effects from them. Denies chest pain, abdominal pain, dyspnea, myalgias, or cough.   Patient Active Problem List    Diagnosis Date Noted   • Atherosclerosis of native artery of extremity with intermittent claudication ([facility]) 01/10/2020   • S/P BKA (below knee amputation) unilateral, right ([facility]) 09/10/2019   • Nonsustained ventricular tachycardia ([facility])- ziopatch may 2019; PAC's and PVC's, NSR; NO A. FIB; dr dwyer, [facility];  dr mohsen (cardilogy) at [facility] 06/20/2019   • PVD (peripheral vascular disease) ([facility])- s/p right BKA 04/02/2019   • Diabetic mononeuropathy associated with diabetes mellitus due to underlying condition ([facility])-m rx gabapentin 04/02/2019   • Encounter for screening- [employer] screening 2019- neg  abd US for AAA, MARELY, carotid US, EKG (no A.Fib to my review) 03/13/2019   • Benign essential tremor- DR OLIVAS, [facility]- Rx mysoline 03/12/2019   • Gastroesophageal reflux disease with esophagitis- PPI PRN 08/15/2017   • Hypothyroidism due to acquired atrophy of thyroid- dr browning 08/15/2017   • Benign non-nodular prostatic hyperplasia with lower urinary tract symptoms- [facility] 11/05/2014   • Uncontrolled type 2 diabetes mellitus with complication, with long-term current use of insulin ([facility])- dr browning 10/02/2013   • Mixed hyperlipidemia- dr dwyer 10/03/2011   • Essential hypertension- DR DWYER 10/03/2011   • Hypovitaminosis D 10/03/2011     No Known Allergies  Outpatient Medications Prior to Visit   Medication Sig Dispense Refill   • finasteride (PROSCAR) 5 MG Tab Take 5 mg by mouth every day.     • propranolol CR (INDERAL LA) 120 MG CAPSULE SR 24 HR TAKE ONE CAPSULE BY MOUTH EVERY DAY 90 Cap 4   • hydrocortisone 2.5 % Cream topical cream Apply 1 Application to affected area(s) 1 time daily  as needed.     • Insulin Pen Needle 32G X 6 MM Misc 1-2     • glucose blood (ONE TOUCH ULTRA TEST) strip 3-4     • INSULIN LISP PROT & LISP, HUM, (HUMALOG MIX 50/50) (50-50) 100 UNIT/ML Suspension 30 u     • vitamin D (CHOLECALCIFEROL) 1000 UNIT Tab 1000 IU 2 tab     • atorvastatin (LIPITOR) 20 MG Tab Take 80 mg by mouth every evening.     • gabapentin (NEURONTIN) 300 MG Cap Take 300 mg by mouth 3 times a day.     • aspirin EC 81 MG EC tablet Take 1 Tab by mouth every day. 100 Tab 2   • losartan (COZAAR) 50 MG Tab Take 1 Tab by mouth every day. 90 Tab 2   • tamsulosin (FLOMAX) 0.4 MG capsule Take 2 Caps by mouth every day. 60 Cap 2   • Semaglutide (OZEMPIC) 1 MG/DOSE Solution Pen-injector Inject 1 mg as instructed every 7 days.     • glimepiride (AMARYL) 4 MG Tab Take 1 Tab by mouth every morning. 30 Tab 11   • Empagliflozin (JARDIANCE) 25 MG Tab Take 25 mg by mouth every day. 90 Tab 4   • metformin (GLUCOPHAGE) 1000 MG tablet Take 1 Tab by mouth 2 times a day, with meals. 60 Tab 11   • primidone (MYSOLINE) 50 MG Tab Take 1 Tab by mouth every evening. 90 Tab 3   • levothyroxine (SYNTHROID) 50 MCG Tab Take 1 Tab by mouth every morning before breakfast. 30 Tab 11     No facility-administered medications prior to visit.     Hospital Outpatient Visit on 03/29/2021   Component Date Value   • 25-Hydroxy   Vitamin D 25 03/29/2021 33    • Creatinine, Urine 03/29/2021 74.67    • Microalbumin, Urine Rand* 03/29/2021 <1.2    • Micro Alb Creat Ratio 03/29/2021 see below    • Glycohemoglobin 03/29/2021 8.7*   • Est Avg Glucose 03/29/2021 203    • WBC 03/29/2021 7.3    • RBC 03/29/2021 5.02    • Hemoglobin 03/29/2021 14.8    • Hematocrit 03/29/2021 46.0    • MCV 03/29/2021 91.6    • MCH 03/29/2021 29.5    • MCHC 03/29/2021 32.2*   • RDW 03/29/2021 50.8*   • Platelet Count 03/29/2021 301    • MPV 03/29/2021 11.6    • Neutrophils-Polys 03/29/2021 55.80    • Lymphocytes 03/29/2021 31.40    • Monocytes 03/29/2021 8.90    •  Eosinophils 03/29/2021 2.70    • Basophils 03/29/2021 0.70    • Immature Granulocytes 03/29/2021 0.50    • Nucleated RBC 03/29/2021 0.00    • Neutrophils (Absolute) 03/29/2021 4.06    • Lymphs (Absolute) 03/29/2021 2.29    • Monos (Absolute) 03/29/2021 0.65    • Eos (Absolute) 03/29/2021 0.20    • Baso (Absolute) 03/29/2021 0.05    • Immature Granulocytes (a* 03/29/2021 0.04    • NRBC (Absolute) 03/29/2021 0.00    • Cholesterol,Tot 03/29/2021 105    • Triglycerides 03/29/2021 65    • HDL 03/29/2021 42    • LDL 03/29/2021 50    • Sodium 03/29/2021 141    • Potassium 03/29/2021 3.9    • Chloride 03/29/2021 102    • Co2 03/29/2021 29    • Anion Gap 03/29/2021 10.0    • Glucose 03/29/2021 175*   • Bun 03/29/2021 18    • Creatinine 03/29/2021 0.83    • Calcium 03/29/2021 9.7    • AST(SGOT) 03/29/2021 14    • ALT(SGPT) 03/29/2021 19    • Alkaline Phosphatase 03/29/2021 44    • Total Bilirubin 03/29/2021 0.6    • Albumin 03/29/2021 3.9    • Total Protein 03/29/2021 6.7    • Globulin 03/29/2021 2.8    • A-G Ratio 03/29/2021 1.4    • TSH 03/29/2021 1.330    • GFR If  03/29/2021 >60    • GFR If Non  Ameri* 03/29/2021 >60       Lab Results   Component Value Date/Time    HBA1C 8.7 (H) 03/29/2021 08:05 AM    HBA1C 7.6 (H) 03/09/2020 08:15 AM     Lab Results   Component Value Date/Time    SODIUM 141 03/29/2021 08:05 AM    POTASSIUM 3.9 03/29/2021 08:05 AM    CHLORIDE 102 03/29/2021 08:05 AM    CO2 29 03/29/2021 08:05 AM    GLUCOSE 175 (H) 03/29/2021 08:05 AM    BUN 18 03/29/2021 08:05 AM    CREATININE 0.83 03/29/2021 08:05 AM    BUNCREATRAT 24 11/21/2017 07:21 AM    ALKPHOSPHAT 44 03/29/2021 08:05 AM    ASTSGOT 14 03/29/2021 08:05 AM    ALTSGPT 19 03/29/2021 08:05 AM    TBILIRUBIN 0.6 03/29/2021 08:05 AM     Lab Results   Component Value Date/Time    INR 0.99 01/11/2017 09:05 PM    INR 0.99 01/14/2013 05:50 PM     Lab Results   Component Value Date/Time    CHOLSTRLTOT 105 03/29/2021 08:05 AM    LDL 50  "03/29/2021 08:05 AM    HDL 42 03/29/2021 08:05 AM    TRIGLYCERIDE 65 03/29/2021 08:05 AM       Lab Results   Component Value Date/Time    TESTOSTERONE 436 11/08/2019 10:50 AM     Lab Results   Component Value Date/Time    TSH 2.020 11/21/2017 07:21 AM     Lab Results   Component Value Date/Time    FREET4 1.03 10/21/2019 09:36 AM    FREET4 1.11 09/11/2019 05:25 AM     No results found for: URICACID  No components found for: VITB12  Lab Results   Component Value Date/Time    25HYDROXY 33 03/29/2021 08:05 AM    25HYDROXY 34 06/29/2020 10:01 AM               HPI    Review of Systems   Constitutional: Negative.    HENT: Negative.    Eyes: Negative.    Respiratory: Negative.    Cardiovascular: Negative.    Gastrointestinal: Negative.    Genitourinary: Negative.    Musculoskeletal: Negative.    Skin: Negative.    Neurological: Negative.    Endo/Heme/Allergies: Negative.    Psychiatric/Behavioral: Negative.           Objective:     /80 (BP Location: Left arm, Patient Position: Sitting, BP Cuff Size: Adult)   Pulse 71   Temp 36.3 °C (97.3 °F) (Temporal)   Ht 1.854 m (6' 1\")   Wt 94.2 kg (207 lb 9.6 oz)   SpO2 92%   BMI 27.39 kg/m²      Physical Exam  Constitutional:       General: He is not in acute distress.     Appearance: He is well-developed. He is not diaphoretic.   HENT:      Head: Normocephalic and atraumatic.      Right Ear: External ear normal.      Left Ear: External ear normal.      Nose: Nose normal.      Mouth/Throat:      Pharynx: No oropharyngeal exudate.   Eyes:      General: No scleral icterus.        Right eye: No discharge.         Left eye: No discharge.      Conjunctiva/sclera: Conjunctivae normal.      Pupils: Pupils are equal, round, and reactive to light.   Neck:      Thyroid: No thyromegaly.      Vascular: No JVD.      Trachea: No tracheal deviation.   Cardiovascular:      Rate and Rhythm: Normal rate and regular rhythm.      Heart sounds: Normal heart sounds. No murmur. No friction rub. " No gallop.    Pulmonary:      Effort: Pulmonary effort is normal. No respiratory distress.      Breath sounds: Normal breath sounds. No stridor. No wheezing or rales.   Chest:      Chest wall: No tenderness.   Abdominal:      General: Bowel sounds are normal. There is no distension.      Palpations: Abdomen is soft. There is no mass.      Tenderness: There is no abdominal tenderness. There is no guarding or rebound.   Musculoskeletal:         General: No tenderness. Normal range of motion.      Cervical back: Normal range of motion and neck supple.   Lymphadenopathy:      Cervical: No cervical adenopathy.   Skin:     General: Skin is warm and dry.      Coloration: Skin is not pale.      Findings: No erythema or rash.   Neurological:      Mental Status: He is alert and oriented to person, place, and time.      Motor: No abnormal muscle tone.      Coordination: Coordination normal.      Deep Tendon Reflexes: Reflexes are normal and symmetric. Reflexes normal.   Psychiatric:         Behavior: Behavior normal.         Thought Content: Thought content normal.         Judgment: Judgment normal.                 Assessment/Plan:        1. Mixed hyperlipidemia- dr dwyer    Under good control. Continue same regimen.    - Comp Metabolic Panel; Future  - Lipid Profile; Future  - CBC WITH DIFFERENTIAL; Future  - TSH; Future    2. Essential hypertension- DR DWYER     Under good control. Continue same regimen    3. Hypovitaminosis D     Under good control. Continue same regimen    4. Uncontrolled type 2 diabetes mellitus with complication, with long-term current use of insulin ([facility])- dr patterson  This is not at goal with A1c = 8.7.  Suggested possibly increasing his Ozempic from 1 to 1.5 mg weekly but will defer to his endocrinologist Dr. Patterson on this whom he will see next month.  In the meantime he will concentrate on a higher protein low-carb diet and increase activity.  - Comp Metabolic Panel; Future  - Lipid Profile; Future  -  CBC WITH DIFFERENTIAL; Future  - HEMOGLOBIN A1C; Future  - MICROALBUMIN CREAT RATIO URINE; Future    5. Need for vaccination     - Shingrix Vaccine    6. Benign non-nodular prostatic hyperplasia with lower urinary tract symptoms- [facility]  Good control continue same regimen.    7. Hypothyroidism due to acquired atrophy of thyroid- dr browning  Good control continue same regimen  - TSH; Future    8. Gastroesophageal reflux disease with esophagitis, unspecified whether hemorrhage  Good control continue same regimen    9. Benign essential tremor- DR OLIVAS, [facility]- Rx mysoline  Good control continue Mysoline and follow-up with neurology.    10. Screening for colorectal cancer  Scheduled.  We will see GI doctor later this month  - REFERRAL TO GI FOR COLONOSCOPY    11. Diabetic mononeuropathy associated with diabetes mellitus due to underlying condition ([facility]CC)      - Diabetic Monofilament LE Exam    12. Vitamin D deficiency      Needs recheck.  Ordered for next lab  - VITAMIN D,25 HYDROXY; Future

## 2021-04-18 DIAGNOSIS — G25.0 BENIGN ESSENTIAL TREMOR: ICD-10-CM

## 2021-04-19 RX ORDER — PROPRANOLOL HYDROCHLORIDE 120 MG/1
CAPSULE, EXTENDED RELEASE ORAL
Qty: 90 CAPSULE | Refills: 4 | Status: SHIPPED
Start: 2021-04-19 | End: 2021-09-30

## 2021-05-11 ENCOUNTER — APPOINTMENT (RX ONLY)
Dept: URBAN - METROPOLITAN AREA CLINIC 22 | Facility: CLINIC | Age: 76
Setting detail: DERMATOLOGY
End: 2021-05-11

## 2021-05-11 DIAGNOSIS — Z85.828 PERSONAL HISTORY OF OTHER MALIGNANT NEOPLASM OF SKIN: ICD-10-CM

## 2021-05-11 DIAGNOSIS — L57.0 ACTINIC KERATOSIS: ICD-10-CM

## 2021-05-11 DIAGNOSIS — L82.0 INFLAMED SEBORRHEIC KERATOSIS: ICD-10-CM

## 2021-05-11 DIAGNOSIS — L81.4 OTHER MELANIN HYPERPIGMENTATION: ICD-10-CM

## 2021-05-11 DIAGNOSIS — L82.1 OTHER SEBORRHEIC KERATOSIS: ICD-10-CM

## 2021-05-11 DIAGNOSIS — L71.8 OTHER ROSACEA: ICD-10-CM

## 2021-05-11 DIAGNOSIS — D22 MELANOCYTIC NEVI: ICD-10-CM

## 2021-05-11 PROBLEM — D22.5 MELANOCYTIC NEVI OF TRUNK: Status: ACTIVE | Noted: 2021-05-11

## 2021-05-11 PROCEDURE — ? PRESCRIPTION

## 2021-05-11 PROCEDURE — 99213 OFFICE O/P EST LOW 20 MIN: CPT | Mod: 25

## 2021-05-11 PROCEDURE — ? COUNSELING

## 2021-05-11 PROCEDURE — ? LIQUID NITROGEN

## 2021-05-11 PROCEDURE — 17110 DESTRUCTION B9 LES UP TO 14: CPT

## 2021-05-11 PROCEDURE — 17003 DESTRUCT PREMALG LES 2-14: CPT | Mod: 59

## 2021-05-11 PROCEDURE — 17000 DESTRUCT PREMALG LESION: CPT | Mod: 59

## 2021-05-11 RX ORDER — METRONIDAZOLE 7.5 MG/G
CREAM TOPICAL
Qty: 1 | Refills: 1 | Status: ERX | COMMUNITY
Start: 2021-05-11

## 2021-05-11 RX ADMIN — METRONIDAZOLE: 7.5 CREAM TOPICAL at 00:00

## 2021-05-11 ASSESSMENT — LOCATION DETAILED DESCRIPTION DERM
LOCATION DETAILED: LEFT TRIANGULAR FOSSA
LOCATION DETAILED: INFERIOR THORACIC SPINE
LOCATION DETAILED: LEFT MEDIAL SUPERIOR CHEST
LOCATION DETAILED: RIGHT CENTRAL MALAR CHEEK
LOCATION DETAILED: INFERIOR MID FOREHEAD
LOCATION DETAILED: RIGHT POSTERIOR NECK
LOCATION DETAILED: LEFT DISTAL DORSAL FOREARM
LOCATION DETAILED: SUPERIOR THORACIC SPINE
LOCATION DETAILED: RIGHT MEDIAL MALAR CHEEK
LOCATION DETAILED: LEFT INFERIOR FOREHEAD
LOCATION DETAILED: LEFT CENTRAL MALAR CHEEK
LOCATION DETAILED: RIGHT SUPERIOR CENTRAL MALAR CHEEK
LOCATION DETAILED: LEFT INFERIOR LATERAL FOREHEAD
LOCATION DETAILED: LEFT VENTRAL PROXIMAL FOREARM
LOCATION DETAILED: RIGHT DISTAL DORSAL FOREARM
LOCATION DETAILED: RIGHT VENTRAL PROXIMAL FOREARM

## 2021-05-11 ASSESSMENT — LOCATION SIMPLE DESCRIPTION DERM
LOCATION SIMPLE: RIGHT CHEEK
LOCATION SIMPLE: CHEST
LOCATION SIMPLE: UPPER BACK
LOCATION SIMPLE: RIGHT FOREARM
LOCATION SIMPLE: POSTERIOR NECK
LOCATION SIMPLE: LEFT EAR
LOCATION SIMPLE: INFERIOR FOREHEAD
LOCATION SIMPLE: LEFT FOREARM
LOCATION SIMPLE: LEFT FOREHEAD
LOCATION SIMPLE: LEFT CHEEK

## 2021-05-11 ASSESSMENT — LOCATION ZONE DERM
LOCATION ZONE: NECK
LOCATION ZONE: FACE
LOCATION ZONE: EAR
LOCATION ZONE: TRUNK
LOCATION ZONE: ARM
LOCATION ZONE: TRUNK

## 2021-05-11 NOTE — PROCEDURE: LIQUID NITROGEN
Post-Care Instructions: I reviewed with the patient in detail post-care instructions. Patient is to wear sunprotection, and avoid picking at any of the treated lesions. Pt may apply Vaseline to crusted or scabbing areas.
Render Note In Bullet Format When Appropriate: No
Duration Of Freeze Thaw-Cycle (Seconds): 0
Detail Level: Detailed
Number Of Freeze-Thaw Cycles: 2 freeze-thaw cycles
Consent: The patient's consent was obtained including but not limited to risks of crusting, scabbing, blistering, scarring, darker or lighter pigmentary change, recurrence, incomplete removal and infection.
Medical Necessity Information: It is in your best interest to select a reason for this procedure from the list below. All of these items fulfill various CMS LCD requirements except the new and changing color options.
Medical Necessity Clause: This procedure was medically necessary because the lesions that were treated were:

## 2021-07-06 ENCOUNTER — APPOINTMENT (OUTPATIENT)
Dept: RADIOLOGY | Facility: MEDICAL CENTER | Age: 76
DRG: 964 | End: 2021-07-06
Attending: EMERGENCY MEDICINE
Payer: OTHER MISCELLANEOUS

## 2021-07-06 ENCOUNTER — HOSPITAL ENCOUNTER (INPATIENT)
Facility: MEDICAL CENTER | Age: 76
LOS: 7 days | DRG: 964 | End: 2021-07-13
Attending: EMERGENCY MEDICINE | Admitting: SURGERY
Payer: OTHER MISCELLANEOUS

## 2021-07-06 ENCOUNTER — HOSPITAL ENCOUNTER (OUTPATIENT)
Dept: LAB | Facility: MEDICAL CENTER | Age: 76
End: 2021-07-06
Attending: INTERNAL MEDICINE
Payer: MEDICARE

## 2021-07-06 DIAGNOSIS — S42.009A: ICD-10-CM

## 2021-07-06 DIAGNOSIS — S42.125A CLOSED NONDISPLACED FRACTURE OF ACROMIAL PROCESS OF LEFT SCAPULA, INITIAL ENCOUNTER: ICD-10-CM

## 2021-07-06 DIAGNOSIS — J93.9 PNEUMOTHORAX ON LEFT: ICD-10-CM

## 2021-07-06 DIAGNOSIS — E55.9 VITAMIN D DEFICIENCY: ICD-10-CM

## 2021-07-06 DIAGNOSIS — S06.6X0A SUBARACHNOID HEMORRHAGE FOLLOWING INJURY, NO LOSS OF CONSCIOUSNESS, INITIAL ENCOUNTER (HCC): ICD-10-CM

## 2021-07-06 DIAGNOSIS — E78.2 MIXED HYPERLIPIDEMIA: ICD-10-CM

## 2021-07-06 DIAGNOSIS — S22.43XA MULTIPLE FRACTURES OF RIBS, BILATERAL, INITIAL ENCOUNTER FOR CLOSED FRACTURE: ICD-10-CM

## 2021-07-06 DIAGNOSIS — S80.02XA CONTUSION OF LEFT KNEE, INITIAL ENCOUNTER: ICD-10-CM

## 2021-07-06 DIAGNOSIS — E03.4 HYPOTHYROIDISM DUE TO ACQUIRED ATROPHY OF THYROID: ICD-10-CM

## 2021-07-06 DIAGNOSIS — S22.43XA CLOSED FRACTURE OF MULTIPLE RIBS OF BOTH SIDES, INITIAL ENCOUNTER: ICD-10-CM

## 2021-07-06 PROBLEM — E78.5 DYSLIPIDEMIA: Status: ACTIVE | Noted: 2021-07-06

## 2021-07-06 PROBLEM — E11.9 TYPE 2 DIABETES MELLITUS (HCC): Status: ACTIVE | Noted: 2021-07-06

## 2021-07-06 PROBLEM — Z89.511 HX OF RIGHT BKA (HCC): Status: ACTIVE | Noted: 2021-07-06

## 2021-07-06 PROBLEM — I10 ESSENTIAL HYPERTENSION: Status: ACTIVE | Noted: 2021-07-06

## 2021-07-06 PROBLEM — G25.0 BENIGN ESSENTIAL TREMOR: Status: ACTIVE | Noted: 2021-07-06

## 2021-07-06 PROBLEM — E03.9 HYPOTHYROID: Status: ACTIVE | Noted: 2021-07-06

## 2021-07-06 PROBLEM — S27.0XXA PNEUMOTHORAX, CLOSED, TRAUMATIC, INITIAL ENCOUNTER: Status: ACTIVE | Noted: 2021-07-06

## 2021-07-06 PROBLEM — M25.522 ELBOW PAIN, LEFT: Status: ACTIVE | Noted: 2021-07-06

## 2021-07-06 PROBLEM — T14.90XA TRAUMA: Status: ACTIVE | Noted: 2021-07-06

## 2021-07-06 LAB
25(OH)D3 SERPL-MCNC: 36 NG/ML (ref 30–100)
ABO GROUP BLD: NORMAL
ALBUMIN SERPL BCP-MCNC: 3.9 G/DL (ref 3.2–4.9)
ALBUMIN SERPL BCP-MCNC: 4.1 G/DL (ref 3.2–4.9)
ALBUMIN/GLOB SERPL: 1.4 G/DL
ALBUMIN/GLOB SERPL: 1.5 G/DL
ALP SERPL-CCNC: 48 U/L (ref 30–99)
ALP SERPL-CCNC: 51 U/L (ref 30–99)
ALT SERPL-CCNC: 16 U/L (ref 2–50)
ALT SERPL-CCNC: 20 U/L (ref 2–50)
ANION GAP SERPL CALC-SCNC: 11 MMOL/L (ref 7–16)
ANION GAP SERPL CALC-SCNC: 9 MMOL/L (ref 7–16)
APTT PPP: 34.6 SEC (ref 24.7–36)
AST SERPL-CCNC: 11 U/L (ref 12–45)
AST SERPL-CCNC: 16 U/L (ref 12–45)
BASOPHILS # BLD AUTO: 0.9 % (ref 0–1.8)
BASOPHILS # BLD: 0.06 K/UL (ref 0–0.12)
BILIRUB SERPL-MCNC: 0.4 MG/DL (ref 0.1–1.5)
BILIRUB SERPL-MCNC: 0.7 MG/DL (ref 0.1–1.5)
BLD GP AB SCN SERPL QL: NORMAL
BUN SERPL-MCNC: 19 MG/DL (ref 8–22)
BUN SERPL-MCNC: 20 MG/DL (ref 8–22)
CALCIUM SERPL-MCNC: 10 MG/DL (ref 8.5–10.5)
CALCIUM SERPL-MCNC: 9.8 MG/DL (ref 8.5–10.5)
CHLORIDE SERPL-SCNC: 100 MMOL/L (ref 96–112)
CHLORIDE SERPL-SCNC: 101 MMOL/L (ref 96–112)
CHOLEST SERPL-MCNC: 102 MG/DL (ref 100–199)
CO2 SERPL-SCNC: 27 MMOL/L (ref 20–33)
CO2 SERPL-SCNC: 28 MMOL/L (ref 20–33)
CREAT SERPL-MCNC: 0.86 MG/DL (ref 0.5–1.4)
CREAT SERPL-MCNC: 0.95 MG/DL (ref 0.5–1.4)
CREAT UR-MCNC: 74.29 MG/DL
EOSINOPHIL # BLD AUTO: 0.23 K/UL (ref 0–0.51)
EOSINOPHIL NFR BLD: 3.4 % (ref 0–6.9)
ERYTHROCYTE [DISTWIDTH] IN BLOOD BY AUTOMATED COUNT: 46.5 FL (ref 35.9–50)
ERYTHROCYTE [DISTWIDTH] IN BLOOD BY AUTOMATED COUNT: 48 FL (ref 35.9–50)
EST. AVERAGE GLUCOSE BLD GHB EST-MCNC: 200 MG/DL
ETHANOL BLD-MCNC: <10.1 MG/DL (ref 0–10)
FASTING STATUS PATIENT QL REPORTED: NORMAL
GLOBULIN SER CALC-MCNC: 2.7 G/DL (ref 1.9–3.5)
GLOBULIN SER CALC-MCNC: 2.7 G/DL (ref 1.9–3.5)
GLUCOSE BLD-MCNC: 117 MG/DL (ref 65–99)
GLUCOSE SERPL-MCNC: 165 MG/DL (ref 65–99)
GLUCOSE SERPL-MCNC: 182 MG/DL (ref 65–99)
HBA1C MFR BLD: 8.6 % (ref 4–5.6)
HCT VFR BLD AUTO: 45.7 % (ref 42–52)
HCT VFR BLD AUTO: 46.1 % (ref 42–52)
HDLC SERPL-MCNC: 43 MG/DL
HGB BLD-MCNC: 15 G/DL (ref 14–18)
HGB BLD-MCNC: 15.3 G/DL (ref 14–18)
IMM GRANULOCYTES # BLD AUTO: 0.04 K/UL (ref 0–0.11)
IMM GRANULOCYTES NFR BLD AUTO: 0.6 % (ref 0–0.9)
INR PPP: 0.97 (ref 0.87–1.13)
LDLC SERPL CALC-MCNC: 45 MG/DL
LYMPHOCYTES # BLD AUTO: 2.21 K/UL (ref 1–4.8)
LYMPHOCYTES NFR BLD: 32.6 % (ref 22–41)
MCH RBC QN AUTO: 29.5 PG (ref 27–33)
MCH RBC QN AUTO: 29.5 PG (ref 27–33)
MCHC RBC AUTO-ENTMCNC: 32.8 G/DL (ref 33.7–35.3)
MCHC RBC AUTO-ENTMCNC: 33.2 G/DL (ref 33.7–35.3)
MCV RBC AUTO: 89 FL (ref 81.4–97.8)
MCV RBC AUTO: 89.8 FL (ref 81.4–97.8)
MICROALBUMIN UR-MCNC: <1.2 MG/DL
MICROALBUMIN/CREAT UR: NORMAL MG/G (ref 0–30)
MONOCYTES # BLD AUTO: 0.75 K/UL (ref 0–0.85)
MONOCYTES NFR BLD AUTO: 11.1 % (ref 0–13.4)
NEUTROPHILS # BLD AUTO: 3.49 K/UL (ref 1.82–7.42)
NEUTROPHILS NFR BLD: 51.4 % (ref 44–72)
NRBC # BLD AUTO: 0 K/UL
NRBC BLD-RTO: 0 /100 WBC
PLATELET # BLD AUTO: 303 K/UL (ref 164–446)
PLATELET # BLD AUTO: 335 K/UL (ref 164–446)
PMV BLD AUTO: 10.4 FL (ref 9–12.9)
PMV BLD AUTO: 11.1 FL (ref 9–12.9)
POTASSIUM SERPL-SCNC: 4.3 MMOL/L (ref 3.6–5.5)
POTASSIUM SERPL-SCNC: 4.3 MMOL/L (ref 3.6–5.5)
PROT SERPL-MCNC: 6.6 G/DL (ref 6–8.2)
PROT SERPL-MCNC: 6.8 G/DL (ref 6–8.2)
PROTHROMBIN TIME: 12.6 SEC (ref 12–14.6)
RBC # BLD AUTO: 5.09 M/UL (ref 4.7–6.1)
RBC # BLD AUTO: 5.18 M/UL (ref 4.7–6.1)
RH BLD: NORMAL
SODIUM SERPL-SCNC: 138 MMOL/L (ref 135–145)
SODIUM SERPL-SCNC: 138 MMOL/L (ref 135–145)
TRIGL SERPL-MCNC: 72 MG/DL (ref 0–149)
TSH SERPL DL<=0.005 MIU/L-ACNC: 2.15 UIU/ML (ref 0.38–5.33)
WBC # BLD AUTO: 6.8 K/UL (ref 4.8–10.8)
WBC # BLD AUTO: 8.7 K/UL (ref 4.8–10.8)

## 2021-07-06 PROCEDURE — 99291 CRITICAL CARE FIRST HOUR: CPT

## 2021-07-06 PROCEDURE — A9270 NON-COVERED ITEM OR SERVICE: HCPCS | Performed by: NURSE PRACTITIONER

## 2021-07-06 PROCEDURE — 770022 HCHG ROOM/CARE - ICU (200)

## 2021-07-06 PROCEDURE — 85610 PROTHROMBIN TIME: CPT

## 2021-07-06 PROCEDURE — 700101 HCHG RX REV CODE 250: Performed by: NURSE PRACTITIONER

## 2021-07-06 PROCEDURE — 700111 HCHG RX REV CODE 636 W/ 250 OVERRIDE (IP): Performed by: EMERGENCY MEDICINE

## 2021-07-06 PROCEDURE — 85025 COMPLETE CBC W/AUTO DIFF WBC: CPT

## 2021-07-06 PROCEDURE — 71045 X-RAY EXAM CHEST 1 VIEW: CPT

## 2021-07-06 PROCEDURE — 86850 RBC ANTIBODY SCREEN: CPT

## 2021-07-06 PROCEDURE — 700111 HCHG RX REV CODE 636 W/ 250 OVERRIDE (IP): Performed by: NURSE PRACTITIONER

## 2021-07-06 PROCEDURE — 70450 CT HEAD/BRAIN W/O DYE: CPT

## 2021-07-06 PROCEDURE — 72125 CT NECK SPINE W/O DYE: CPT

## 2021-07-06 PROCEDURE — 86900 BLOOD TYPING SEROLOGIC ABO: CPT

## 2021-07-06 PROCEDURE — 82077 ASSAY SPEC XCP UR&BREATH IA: CPT

## 2021-07-06 PROCEDURE — 85027 COMPLETE CBC AUTOMATED: CPT

## 2021-07-06 PROCEDURE — G0390 TRAUMA RESPONS W/HOSP CRITI: HCPCS

## 2021-07-06 PROCEDURE — 700102 HCHG RX REV CODE 250 W/ 637 OVERRIDE(OP): Performed by: NURSE PRACTITIONER

## 2021-07-06 PROCEDURE — 82962 GLUCOSE BLOOD TEST: CPT

## 2021-07-06 PROCEDURE — 85730 THROMBOPLASTIN TIME PARTIAL: CPT

## 2021-07-06 PROCEDURE — 80053 COMPREHEN METABOLIC PANEL: CPT

## 2021-07-06 PROCEDURE — 83036 HEMOGLOBIN GLYCOSYLATED A1C: CPT | Mod: GA

## 2021-07-06 PROCEDURE — 73000 X-RAY EXAM OF COLLAR BONE: CPT | Mod: LT

## 2021-07-06 PROCEDURE — 84443 ASSAY THYROID STIM HORMONE: CPT

## 2021-07-06 PROCEDURE — 700105 HCHG RX REV CODE 258: Performed by: NURSE PRACTITIONER

## 2021-07-06 PROCEDURE — 36415 COLL VENOUS BLD VENIPUNCTURE: CPT

## 2021-07-06 PROCEDURE — 73560 X-RAY EXAM OF KNEE 1 OR 2: CPT | Mod: LT

## 2021-07-06 PROCEDURE — 82043 UR ALBUMIN QUANTITATIVE: CPT

## 2021-07-06 PROCEDURE — 80061 LIPID PANEL: CPT

## 2021-07-06 PROCEDURE — 86901 BLOOD TYPING SEROLOGIC RH(D): CPT

## 2021-07-06 PROCEDURE — 700117 HCHG RX CONTRAST REV CODE 255: Performed by: EMERGENCY MEDICINE

## 2021-07-06 PROCEDURE — 73070 X-RAY EXAM OF ELBOW: CPT | Mod: LT

## 2021-07-06 PROCEDURE — 82306 VITAMIN D 25 HYDROXY: CPT

## 2021-07-06 PROCEDURE — 71260 CT THORAX DX C+: CPT

## 2021-07-06 PROCEDURE — 96374 THER/PROPH/DIAG INJ IV PUSH: CPT

## 2021-07-06 PROCEDURE — 82570 ASSAY OF URINE CREATININE: CPT

## 2021-07-06 RX ORDER — PRIMIDONE 50 MG/1
50 TABLET ORAL DAILY
COMMUNITY

## 2021-07-06 RX ORDER — ENEMA 19; 7 G/133ML; G/133ML
1 ENEMA RECTAL
Status: DISCONTINUED | OUTPATIENT
Start: 2021-07-06 | End: 2021-07-13 | Stop reason: HOSPADM

## 2021-07-06 RX ORDER — ACETAMINOPHEN 325 MG/1
650 TABLET ORAL EVERY 6 HOURS PRN
Status: DISCONTINUED | OUTPATIENT
Start: 2021-07-11 | End: 2021-07-11

## 2021-07-06 RX ORDER — HYDROMORPHONE HYDROCHLORIDE 1 MG/ML
0.25 INJECTION, SOLUTION INTRAMUSCULAR; INTRAVENOUS; SUBCUTANEOUS
Status: DISCONTINUED | OUTPATIENT
Start: 2021-07-06 | End: 2021-07-09

## 2021-07-06 RX ORDER — BACITRACIN ZINC AND POLYMYXIN B SULFATE 500; 1000 [USP'U]/G; [USP'U]/G
OINTMENT TOPICAL 3 TIMES DAILY
Status: DISPENSED | OUTPATIENT
Start: 2021-07-06 | End: 2021-07-08

## 2021-07-06 RX ORDER — OXYCODONE HYDROCHLORIDE 5 MG/1
5 TABLET ORAL
Status: DISCONTINUED | OUTPATIENT
Start: 2021-07-06 | End: 2021-07-11

## 2021-07-06 RX ORDER — MORPHINE SULFATE 4 MG/ML
4 INJECTION, SOLUTION INTRAMUSCULAR; INTRAVENOUS ONCE
Status: COMPLETED | OUTPATIENT
Start: 2021-07-06 | End: 2021-07-06

## 2021-07-06 RX ORDER — SODIUM CHLORIDE, SODIUM LACTATE, POTASSIUM CHLORIDE, CALCIUM CHLORIDE 600; 310; 30; 20 MG/100ML; MG/100ML; MG/100ML; MG/100ML
INJECTION, SOLUTION INTRAVENOUS CONTINUOUS
Status: DISCONTINUED | OUTPATIENT
Start: 2021-07-06 | End: 2021-07-06

## 2021-07-06 RX ORDER — LOSARTAN POTASSIUM AND HYDROCHLOROTHIAZIDE 12.5; 5 MG/1; MG/1
1 TABLET ORAL DAILY
Status: ON HOLD | COMMUNITY
End: 2021-08-02

## 2021-07-06 RX ORDER — ATORVASTATIN CALCIUM 80 MG/1
80 TABLET, FILM COATED ORAL NIGHTLY
COMMUNITY

## 2021-07-06 RX ORDER — INSULIN DEGLUDEC 200 U/ML
34 INJECTION, SOLUTION SUBCUTANEOUS DAILY
Status: ON HOLD | COMMUNITY
End: 2021-07-14

## 2021-07-06 RX ORDER — SEMAGLUTIDE 1.34 MG/ML
1 INJECTION, SOLUTION SUBCUTANEOUS
COMMUNITY
End: 2023-07-26 | Stop reason: SDUPTHER

## 2021-07-06 RX ORDER — DEXTROSE MONOHYDRATE 25 G/50ML
50 INJECTION, SOLUTION INTRAVENOUS
Status: DISCONTINUED | OUTPATIENT
Start: 2021-07-06 | End: 2021-07-11

## 2021-07-06 RX ORDER — GLIMEPIRIDE 4 MG/1
4 TABLET ORAL EVERY MORNING
COMMUNITY
End: 2021-11-16

## 2021-07-06 RX ORDER — POLYETHYLENE GLYCOL 3350 17 G/17G
1 POWDER, FOR SOLUTION ORAL 2 TIMES DAILY
Status: DISCONTINUED | OUTPATIENT
Start: 2021-07-06 | End: 2021-07-13 | Stop reason: HOSPADM

## 2021-07-06 RX ORDER — EMPAGLIFLOZIN 25 MG/1
25 TABLET, FILM COATED ORAL DAILY
Status: ON HOLD | COMMUNITY
End: 2021-07-14

## 2021-07-06 RX ORDER — AMOXICILLIN 250 MG
1 CAPSULE ORAL
Status: DISCONTINUED | OUTPATIENT
Start: 2021-07-06 | End: 2021-07-13 | Stop reason: HOSPADM

## 2021-07-06 RX ORDER — LEVETIRACETAM 5 MG/ML
500 INJECTION INTRAVASCULAR 2 TIMES DAILY
Status: DISCONTINUED | OUTPATIENT
Start: 2021-07-06 | End: 2021-07-08

## 2021-07-06 RX ORDER — ACETAMINOPHEN 325 MG/1
650 TABLET ORAL EVERY 6 HOURS
Status: DISCONTINUED | OUTPATIENT
Start: 2021-07-06 | End: 2021-07-11

## 2021-07-06 RX ORDER — GABAPENTIN 300 MG/1
300 CAPSULE ORAL 3 TIMES DAILY
Status: DISCONTINUED | OUTPATIENT
Start: 2021-07-06 | End: 2021-07-10

## 2021-07-06 RX ORDER — BISACODYL 10 MG
10 SUPPOSITORY, RECTAL RECTAL
Status: DISCONTINUED | OUTPATIENT
Start: 2021-07-06 | End: 2021-07-13 | Stop reason: HOSPADM

## 2021-07-06 RX ORDER — FAMOTIDINE 20 MG/1
20 TABLET, FILM COATED ORAL 2 TIMES DAILY
Status: DISCONTINUED | OUTPATIENT
Start: 2021-07-06 | End: 2021-07-08

## 2021-07-06 RX ORDER — AMOXICILLIN 250 MG
1 CAPSULE ORAL NIGHTLY
Status: DISCONTINUED | OUTPATIENT
Start: 2021-07-06 | End: 2021-07-13 | Stop reason: HOSPADM

## 2021-07-06 RX ORDER — LIDOCAINE 50 MG/G
1-2 PATCH TOPICAL EVERY 24 HOURS
Status: DISCONTINUED | OUTPATIENT
Start: 2021-07-06 | End: 2021-07-13 | Stop reason: HOSPADM

## 2021-07-06 RX ORDER — SODIUM CHLORIDE 9 MG/ML
INJECTION, SOLUTION INTRAVENOUS CONTINUOUS
Status: DISCONTINUED | OUTPATIENT
Start: 2021-07-06 | End: 2021-07-07

## 2021-07-06 RX ORDER — LEVOTHYROXINE SODIUM 0.05 MG/1
50 TABLET ORAL
Status: DISCONTINUED | OUTPATIENT
Start: 2021-07-07 | End: 2021-07-13 | Stop reason: HOSPADM

## 2021-07-06 RX ORDER — PROPRANOLOL HYDROCHLORIDE 120 MG/1
120 CAPSULE, EXTENDED RELEASE ORAL DAILY
COMMUNITY
End: 2021-09-30

## 2021-07-06 RX ORDER — GABAPENTIN 300 MG/1
300 CAPSULE ORAL 3 TIMES DAILY
COMMUNITY
End: 2021-11-16 | Stop reason: SDUPTHER

## 2021-07-06 RX ORDER — ONDANSETRON 2 MG/ML
4 INJECTION INTRAMUSCULAR; INTRAVENOUS EVERY 4 HOURS PRN
Status: DISCONTINUED | OUTPATIENT
Start: 2021-07-06 | End: 2021-07-13 | Stop reason: HOSPADM

## 2021-07-06 RX ORDER — DOCUSATE SODIUM 100 MG/1
100 CAPSULE, LIQUID FILLED ORAL 2 TIMES DAILY
Status: DISCONTINUED | OUTPATIENT
Start: 2021-07-06 | End: 2021-07-13 | Stop reason: HOSPADM

## 2021-07-06 RX ORDER — OXYCODONE HYDROCHLORIDE 5 MG/1
2.5 TABLET ORAL
Status: DISCONTINUED | OUTPATIENT
Start: 2021-07-06 | End: 2021-07-11

## 2021-07-06 RX ORDER — GABAPENTIN 100 MG/1
100 CAPSULE ORAL 3 TIMES DAILY
Status: DISCONTINUED | OUTPATIENT
Start: 2021-07-06 | End: 2021-07-06

## 2021-07-06 RX ORDER — LEVOTHYROXINE SODIUM 0.05 MG/1
50 TABLET ORAL
COMMUNITY
End: 2021-11-16

## 2021-07-06 RX ORDER — ATORVASTATIN CALCIUM 80 MG/1
80 TABLET, FILM COATED ORAL NIGHTLY
Status: DISCONTINUED | OUTPATIENT
Start: 2021-07-06 | End: 2021-07-13 | Stop reason: HOSPADM

## 2021-07-06 RX ADMIN — Medication 1 EACH: at 17:32

## 2021-07-06 RX ADMIN — FAMOTIDINE 20 MG: 20 TABLET ORAL at 17:33

## 2021-07-06 RX ADMIN — ACETAMINOPHEN 650 MG: 325 TABLET, FILM COATED ORAL at 17:33

## 2021-07-06 RX ADMIN — SODIUM CHLORIDE: 9 INJECTION, SOLUTION INTRAVENOUS at 13:01

## 2021-07-06 RX ADMIN — DOCUSATE SODIUM 100 MG: 100 CAPSULE ORAL at 17:33

## 2021-07-06 RX ADMIN — MORPHINE SULFATE 4 MG: 4 INJECTION INTRAVENOUS at 12:25

## 2021-07-06 RX ADMIN — ACETAMINOPHEN 650 MG: 325 TABLET, FILM COATED ORAL at 14:24

## 2021-07-06 RX ADMIN — GABAPENTIN 100 MG: 100 CAPSULE ORAL at 14:24

## 2021-07-06 RX ADMIN — OXYCODONE 5 MG: 5 TABLET ORAL at 14:24

## 2021-07-06 RX ADMIN — IOHEXOL 100 ML: 350 INJECTION, SOLUTION INTRAVENOUS at 10:44

## 2021-07-06 RX ADMIN — ATORVASTATIN CALCIUM 80 MG: 80 TABLET, FILM COATED ORAL at 20:32

## 2021-07-06 RX ADMIN — Medication 1 EACH: at 14:23

## 2021-07-06 RX ADMIN — OXYCODONE 5 MG: 5 TABLET ORAL at 20:32

## 2021-07-06 RX ADMIN — GABAPENTIN 300 MG: 300 CAPSULE ORAL at 17:33

## 2021-07-06 RX ADMIN — LEVETIRACETAM INJECTION 500 MG: 5 INJECTION INTRAVENOUS at 17:34

## 2021-07-06 RX ADMIN — OXYCODONE 5 MG: 5 TABLET ORAL at 17:33

## 2021-07-06 ASSESSMENT — COGNITIVE AND FUNCTIONAL STATUS - GENERAL
DAILY ACTIVITIY SCORE: 20
TOILETING: A LITTLE
SUGGESTED CMS G CODE MODIFIER MOBILITY: CJ
STANDING UP FROM CHAIR USING ARMS: A LITTLE
HELP NEEDED FOR BATHING: A LITTLE
SUGGESTED CMS G CODE MODIFIER DAILY ACTIVITY: CJ
DRESSING REGULAR UPPER BODY CLOTHING: A LITTLE
DRESSING REGULAR LOWER BODY CLOTHING: A LITTLE
MOVING FROM LYING ON BACK TO SITTING ON SIDE OF FLAT BED: A LITTLE
TURNING FROM BACK TO SIDE WHILE IN FLAT BAD: A LITTLE
MOVING TO AND FROM BED TO CHAIR: A LITTLE
MOBILITY SCORE: 20

## 2021-07-06 ASSESSMENT — LIFESTYLE VARIABLES
EVER_SMOKED: NEVER
HOW MANY TIMES IN THE PAST YEAR HAVE YOU HAD 5 OR MORE DRINKS IN A DAY: 0
TOTAL SCORE: 0
HAVE PEOPLE ANNOYED YOU BY CRITICIZING YOUR DRINKING: NO
TOTAL SCORE: 0
ON A TYPICAL DAY WHEN YOU DRINK ALCOHOL HOW MANY DRINKS DO YOU HAVE: 0
ALCOHOL_USE: NO
AVERAGE NUMBER OF DAYS PER WEEK YOU HAVE A DRINK CONTAINING ALCOHOL: 0
CONSUMPTION TOTAL: NEGATIVE
EVER HAD A DRINK FIRST THING IN THE MORNING TO STEADY YOUR NERVES TO GET RID OF A HANGOVER: NO
HAVE YOU EVER FELT YOU SHOULD CUT DOWN ON YOUR DRINKING: NO
TOTAL SCORE: 0
EVER FELT BAD OR GUILTY ABOUT YOUR DRINKING: NO
DOES PATIENT WANT TO STOP DRINKING: NO

## 2021-07-06 ASSESSMENT — PAIN DESCRIPTION - PAIN TYPE
TYPE: ACUTE PAIN

## 2021-07-06 ASSESSMENT — COPD QUESTIONNAIRES
DURING THE PAST 4 WEEKS HOW MUCH DID YOU FEEL SHORT OF BREATH: NONE/LITTLE OF THE TIME
COPD SCREENING SCORE: 2
HAVE YOU SMOKED AT LEAST 100 CIGARETTES IN YOUR ENTIRE LIFE: NO/DON'T KNOW
DO YOU EVER COUGH UP ANY MUCUS OR PHLEGM?: NO/ONLY WITH OCCASIONAL COLDS OR INFECTIONS

## 2021-07-06 ASSESSMENT — FIBROSIS 4 INDEX: FIB4 SCORE: 0.8
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
## 2021-07-06 NOTE — ASSESSMENT & PLAN NOTE
Chronic condition treated with losartan-hydrochlorothiazide and propranolol.  Holding maintenance medication during acute traumatic illness.  7/8 Initiate propranolol with hold parameters. Hold HCTZ due to risk of hyponatremia.
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
## 2021-07-06 NOTE — ASSESSMENT & PLAN NOTE
Small medial left-sided pneumothorax and apparent small pneumomediastinum.  Chest tube not indicated on admission.  Supplemental oxygen to maintain SaO2 greater than 93%.  Aggressive pulmonary hygiene and serial chest radiography.  7/7 CXR without pneumothorax.
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
## 2021-07-06 NOTE — ED NOTES
Med Rec completed per patient and home pharmacy   Allergies reviewed  No ORAL antibiotics in last 14 days
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
## 2021-07-06 NOTE — ASSESSMENT & PLAN NOTE
Chronic condition treated with metformin, glimepiride (Amaryl), empagliflozin (Jardiance), insulin deglude, and semaglutide.  Holding maintenance metformin for 48 hours following intravenous contrast administration.  Insulin sliding scale coverage.  7/8 Hold oral medications at this time due to low need for insulin coverage.  7/12 Home oral medications resumed. SSI.
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
## 2021-07-06 NOTE — ASSESSMENT & PLAN NOTE
Left acromion fracture.  Non-operative management.  Weight bearing status - Sling for comfort. Normal range of motion. No lifting more than 1 pound.  Billy Lubin MD. Orthopedic Surgeon. [facility].

## 2021-07-06 NOTE — ASSESSMENT & PLAN NOTE
Tiny right temporal subarachnoid hemorrhage.  Repeat interval CT imaging of the brain demonstrated resolution of small acute hemorrhage.  Non-operative management.   Post traumatic pharmacologic seizure prophylaxis for 1 week.  Speech Language Pathology cognitive evaluation PENDING.  Ky Nguyen MD. Neurosurgeon. [facility].

## 2021-07-06 NOTE — ASSESSMENT & PLAN NOTE
Left 1st through 10th rib fractures. Right 7th through 11th rib fractures.  Aggressive pulmonary hygiene and multimodal pain management.   No complaints

## 2021-07-07 ENCOUNTER — APPOINTMENT (OUTPATIENT)
Dept: RADIOLOGY | Facility: MEDICAL CENTER | Age: 76
DRG: 964 | End: 2021-07-07
Attending: NURSE PRACTITIONER
Payer: OTHER MISCELLANEOUS

## 2021-07-07 ENCOUNTER — APPOINTMENT (OUTPATIENT)
Dept: RADIOLOGY | Facility: MEDICAL CENTER | Age: 76
DRG: 964 | End: 2021-07-07
Attending: NEUROLOGICAL SURGERY
Payer: OTHER MISCELLANEOUS

## 2021-07-07 LAB
ABO + RH BLD: NORMAL
ALBUMIN SERPL BCP-MCNC: 3.4 G/DL (ref 3.2–4.9)
ALBUMIN/GLOB SERPL: 1.4 G/DL
ALP SERPL-CCNC: 41 U/L (ref 30–99)
ALT SERPL-CCNC: 16 U/L (ref 2–50)
ANION GAP SERPL CALC-SCNC: 19 MMOL/L (ref 7–16)
AST SERPL-CCNC: 28 U/L (ref 12–45)
BASOPHILS # BLD AUTO: 0.4 % (ref 0–1.8)
BASOPHILS # BLD: 0.04 K/UL (ref 0–0.12)
BILIRUB SERPL-MCNC: 0.8 MG/DL (ref 0.1–1.5)
BUN SERPL-MCNC: 19 MG/DL (ref 8–22)
CALCIUM SERPL-MCNC: 8.7 MG/DL (ref 8.5–10.5)
CHLORIDE SERPL-SCNC: 102 MMOL/L (ref 96–112)
CO2 SERPL-SCNC: 21 MMOL/L (ref 20–33)
CREAT SERPL-MCNC: 0.85 MG/DL (ref 0.5–1.4)
EOSINOPHIL # BLD AUTO: 0.02 K/UL (ref 0–0.51)
EOSINOPHIL NFR BLD: 0.2 % (ref 0–6.9)
ERYTHROCYTE [DISTWIDTH] IN BLOOD BY AUTOMATED COUNT: 48.3 FL (ref 35.9–50)
GLOBULIN SER CALC-MCNC: 2.4 G/DL (ref 1.9–3.5)
GLUCOSE BLD-MCNC: 114 MG/DL (ref 65–99)
GLUCOSE BLD-MCNC: 142 MG/DL (ref 65–99)
GLUCOSE BLD-MCNC: 213 MG/DL (ref 65–99)
GLUCOSE BLD-MCNC: 98 MG/DL (ref 65–99)
GLUCOSE SERPL-MCNC: 109 MG/DL (ref 65–99)
HCT VFR BLD AUTO: 39.9 % (ref 42–52)
HGB BLD-MCNC: 13.1 G/DL (ref 14–18)
IMM GRANULOCYTES # BLD AUTO: 0.06 K/UL (ref 0–0.11)
IMM GRANULOCYTES NFR BLD AUTO: 0.6 % (ref 0–0.9)
LYMPHOCYTES # BLD AUTO: 1.97 K/UL (ref 1–4.8)
LYMPHOCYTES NFR BLD: 20.1 % (ref 22–41)
MCH RBC QN AUTO: 29.3 PG (ref 27–33)
MCHC RBC AUTO-ENTMCNC: 32.8 G/DL (ref 33.7–35.3)
MCV RBC AUTO: 89.3 FL (ref 81.4–97.8)
MONOCYTES # BLD AUTO: 1.02 K/UL (ref 0–0.85)
MONOCYTES NFR BLD AUTO: 10.4 % (ref 0–13.4)
NEUTROPHILS # BLD AUTO: 6.69 K/UL (ref 1.82–7.42)
NEUTROPHILS NFR BLD: 68.3 % (ref 44–72)
NRBC # BLD AUTO: 0 K/UL
NRBC BLD-RTO: 0 /100 WBC
PLATELET # BLD AUTO: 266 K/UL (ref 164–446)
PMV BLD AUTO: 10.5 FL (ref 9–12.9)
POTASSIUM SERPL-SCNC: 4 MMOL/L (ref 3.6–5.5)
PROT SERPL-MCNC: 5.8 G/DL (ref 6–8.2)
RBC # BLD AUTO: 4.47 M/UL (ref 4.7–6.1)
SODIUM SERPL-SCNC: 142 MMOL/L (ref 135–145)
WBC # BLD AUTO: 9.8 K/UL (ref 4.8–10.8)

## 2021-07-07 PROCEDURE — 82962 GLUCOSE BLOOD TEST: CPT | Mod: 91

## 2021-07-07 PROCEDURE — 770001 HCHG ROOM/CARE - MED/SURG/GYN PRIV*

## 2021-07-07 PROCEDURE — A9270 NON-COVERED ITEM OR SERVICE: HCPCS | Performed by: NURSE PRACTITIONER

## 2021-07-07 PROCEDURE — 71045 X-RAY EXAM CHEST 1 VIEW: CPT

## 2021-07-07 PROCEDURE — 700101 HCHG RX REV CODE 250: Performed by: NURSE PRACTITIONER

## 2021-07-07 PROCEDURE — 97166 OT EVAL MOD COMPLEX 45 MIN: CPT

## 2021-07-07 PROCEDURE — 85025 COMPLETE CBC W/AUTO DIFF WBC: CPT

## 2021-07-07 PROCEDURE — 99233 SBSQ HOSP IP/OBS HIGH 50: CPT | Performed by: SURGERY

## 2021-07-07 PROCEDURE — 700102 HCHG RX REV CODE 250 W/ 637 OVERRIDE(OP): Performed by: NURSE PRACTITIONER

## 2021-07-07 PROCEDURE — 70450 CT HEAD/BRAIN W/O DYE: CPT | Mod: MG

## 2021-07-07 PROCEDURE — 700111 HCHG RX REV CODE 636 W/ 250 OVERRIDE (IP): Performed by: SURGERY

## 2021-07-07 PROCEDURE — 700111 HCHG RX REV CODE 636 W/ 250 OVERRIDE (IP): Performed by: NURSE PRACTITIONER

## 2021-07-07 PROCEDURE — 80053 COMPREHEN METABOLIC PANEL: CPT

## 2021-07-07 PROCEDURE — 97162 PT EVAL MOD COMPLEX 30 MIN: CPT

## 2021-07-07 RX ADMIN — LIDOCAINE 1 PATCH: 50 PATCH TOPICAL at 12:17

## 2021-07-07 RX ADMIN — HYDROMORPHONE HYDROCHLORIDE 0.25 MG: 1 INJECTION, SOLUTION INTRAMUSCULAR; INTRAVENOUS; SUBCUTANEOUS at 09:48

## 2021-07-07 RX ADMIN — GABAPENTIN 300 MG: 300 CAPSULE ORAL at 05:12

## 2021-07-07 RX ADMIN — ENOXAPARIN SODIUM 30 MG: 30 INJECTION SUBCUTANEOUS at 17:40

## 2021-07-07 RX ADMIN — ATORVASTATIN CALCIUM 80 MG: 80 TABLET, FILM COATED ORAL at 20:41

## 2021-07-07 RX ADMIN — OXYCODONE 5 MG: 5 TABLET ORAL at 12:17

## 2021-07-07 RX ADMIN — DOCUSATE SODIUM 100 MG: 100 CAPSULE ORAL at 17:37

## 2021-07-07 RX ADMIN — LEVETIRACETAM INJECTION 500 MG: 5 INJECTION INTRAVENOUS at 17:38

## 2021-07-07 RX ADMIN — OXYCODONE 5 MG: 5 TABLET ORAL at 15:27

## 2021-07-07 RX ADMIN — Medication 1 EACH: at 12:17

## 2021-07-07 RX ADMIN — OXYCODONE 5 MG: 5 TABLET ORAL at 21:00

## 2021-07-07 RX ADMIN — GABAPENTIN 300 MG: 300 CAPSULE ORAL at 12:17

## 2021-07-07 RX ADMIN — ENOXAPARIN SODIUM 30 MG: 30 INJECTION SUBCUTANEOUS at 12:18

## 2021-07-07 RX ADMIN — HYDROMORPHONE HYDROCHLORIDE 0.25 MG: 1 INJECTION, SOLUTION INTRAMUSCULAR; INTRAVENOUS; SUBCUTANEOUS at 21:15

## 2021-07-07 RX ADMIN — GABAPENTIN 300 MG: 300 CAPSULE ORAL at 17:38

## 2021-07-07 RX ADMIN — LEVETIRACETAM INJECTION 500 MG: 5 INJECTION INTRAVENOUS at 05:13

## 2021-07-07 RX ADMIN — OXYCODONE 5 MG: 5 TABLET ORAL at 02:46

## 2021-07-07 RX ADMIN — ACETAMINOPHEN 650 MG: 325 TABLET, FILM COATED ORAL at 05:12

## 2021-07-07 RX ADMIN — HYDROMORPHONE HYDROCHLORIDE 0.25 MG: 1 INJECTION, SOLUTION INTRAMUSCULAR; INTRAVENOUS; SUBCUTANEOUS at 09:10

## 2021-07-07 RX ADMIN — FAMOTIDINE 20 MG: 20 TABLET ORAL at 17:37

## 2021-07-07 RX ADMIN — DOCUSATE SODIUM 50 MG AND SENNOSIDES 8.6 MG 1 TABLET: 8.6; 5 TABLET, FILM COATED ORAL at 20:42

## 2021-07-07 RX ADMIN — Medication 1 EACH: at 05:12

## 2021-07-07 RX ADMIN — LEVOTHYROXINE SODIUM 50 MCG: 0.05 TABLET ORAL at 05:12

## 2021-07-07 RX ADMIN — ACETAMINOPHEN 650 MG: 325 TABLET, FILM COATED ORAL at 12:17

## 2021-07-07 RX ADMIN — FAMOTIDINE 20 MG: 20 TABLET ORAL at 05:12

## 2021-07-07 RX ADMIN — INSULIN HUMAN 2 UNITS: 100 INJECTION, SOLUTION PARENTERAL at 17:40

## 2021-07-07 RX ADMIN — ACETAMINOPHEN 650 MG: 325 TABLET, FILM COATED ORAL at 17:37

## 2021-07-07 ASSESSMENT — PAIN DESCRIPTION - PAIN TYPE
TYPE: ACUTE PAIN

## 2021-07-07 ASSESSMENT — COGNITIVE AND FUNCTIONAL STATUS - GENERAL
DRESSING REGULAR LOWER BODY CLOTHING: A LOT
SUGGESTED CMS G CODE MODIFIER DAILY ACTIVITY: CK
DRESSING REGULAR UPPER BODY CLOTHING: A LOT
DAILY ACTIVITIY SCORE: 14
TURNING FROM BACK TO SIDE WHILE IN FLAT BAD: UNABLE
PERSONAL GROOMING: A LITTLE
MOVING TO AND FROM BED TO CHAIR: UNABLE
SUGGESTED CMS G CODE MODIFIER MOBILITY: CM
WALKING IN HOSPITAL ROOM: TOTAL
CLIMB 3 TO 5 STEPS WITH RAILING: TOTAL
HELP NEEDED FOR BATHING: A LOT
EATING MEALS: A LITTLE
MOBILITY SCORE: 8
TOILETING: A LOT
MOVING FROM LYING ON BACK TO SITTING ON SIDE OF FLAT BED: UNABLE
STANDING UP FROM CHAIR USING ARMS: A LITTLE

## 2021-07-07 ASSESSMENT — GAIT ASSESSMENTS: GAIT LEVEL OF ASSIST: UNABLE TO PARTICIPATE

## 2021-07-07 ASSESSMENT — ACTIVITIES OF DAILY LIVING (ADL): TOILETING: INDEPENDENT

## 2021-07-07 NOTE — CARE PLAN
The patient is Stable - Low risk of patient condition declining or worsening    Shift Goals  Clinical Goals: pulmonary toileting, mobilize to a chair with PT/OT  Patient Goals: pain control  Family Goals:     Progress made toward(s) clinical / shift goals:  patient continuing to utilize IS for increased lung expansion with pulmonary toileting orders for optimal lung improvement.  Patient plan to work with PT/OT to mobilize to EOB and into chair for optimal lung expansion.      Patient is not progressing towards the following goals:  Plan to advance goal:  advance diet as tolerated per MD orders, assess patient pain needs and increase multimodal pain management as needed for pain control to ensure optimal lung expansion.     Problem: Pain - Standard  Goal: Alleviation of pain or a reduction in pain to the patient’s comfort goal  Outcome: Not Progressing     Problem: Nutrition  Goal: Patient's nutritional and fluid intake will be adequate or improve  Outcome: Not Progressing  Goal: Enteral nutrition will be maintained or improve  Outcome: Not Progressing  Goal: Enteral nutrition will be maintained or improve  Outcome: Not Progressing
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
## 2021-07-07 NOTE — PROGRESS NOTES
"TRAUMA TERTIARY SURVEY     Mental status adequate for full examination?: Yes    Spine cleared (radiologically and/or clinically): Yes    PHYSICAL EXAMINATION:  Vitals: /63   Pulse 96   Temp 36.6 °C (97.8 °F) (Temporal)   Resp (!) 45   Ht 1.854 m (6' 1\")   Wt 94.3 kg (207 lb 14.3 oz)   SpO2 91%   BMI 27.43 kg/m²   Constitutional:     General Appearance: appears stated age, is in no apparent distress.  HEENT:     No significant external craniofacial trauma. The pupils are equal, round, and reactive to light bilaterally. The extraocular muscles are intact bilaterally.. The nares and oropharynx are clear. The midface and jaw are stable. No malocclusion is evident.  Neck:    No posterior midline cervical-spine tenderness, no evidence of intoxication, normal level of alertness ([place] Coma Scale 15), no focal neurologic deficit, and no painful distracting injuries.  Respiratory:   Inspection: Unlabored respirations, no intercostal retractions, paradoxical motion, or accessory muscle use.    Palpation:  The chest is tender - muscles/ribs. Clavicle tenderness left side.    Auscultation: clear to auscultation.  Cardiovascular:   Auscultation: regular rate and rhythm.   Peripheral Pulses: Normal.   Abdomen:   Abdomen is soft, nontender, without organomegaly or masses.  Genitourinary:   (MALE): normal male external genitalia.  Musculoskeletal:   The pelvis is stable. Right BKA . Elbow pain on left.  Back:   The thoracolumbar spine was examined. Examination is remarkable for no significant tenderness, swelling, or deformity in the thoracolumbar region.  Skin:   The skin is warm and dry.  Neurologic:    [place] Coma Scale (GCS) 15 E4V5M6. Neurologic examination revealed no focal deficits noted.  Psychiatric:   The patient does not appear depressed or anxious.    IMAGING:  DX-CHEST-PORTABLE (1 VIEW)   Final Result      Improving lung volumes with diminishing basilar atelectasis      CT-HEAD W/O   Final Result    "   No noncontrast CT evidence of acute intracranial hemorrhage.      Previously visualized right temporal subarachnoid hemorrhage is no longer seen         DX-CLAVICLE LEFT   Final Result      1.  Comminuted, displaced acromion fracture.      2.  Apparent nondisplaced fracture involving the lateral clavicle with widening of the acromioclavicular joint which could be postsurgical versus posttraumatic in nature.      3.  Soft tissue calcification suggesting calcific bursitis versus tendinitis.      DX-KNEE 2- LEFT   Final Result      No radiographic evidence of acute traumatic injury.      DX-ELBOW-LIMITED 2- LEFT   Final Result      No evidence of acute bony injury.      CT-CHEST,ABDOMEN,PELVIS WITH   Final Result      1.  Multiple bilateral rib fractures.      2.  Small medial left-sided pneumothorax and apparent small pneumomediastinum.      3.  Apparent left acromion fracture and possible left lateral clavicle fracture incompletely imaged.      4.  No evidence of abdominal or pelvic injury.      5.  This was discussed with Dr. Szymanski at 11:30 AM.      CT-HEAD W/O   Final Result      1.  Possible small amount of right temporal subarachnoid hemorrhage versus artifact.      2.  This was discussed with Dr. Szymanski at 11:30 AM.      CT-CSPINE WITHOUT PLUS RECONS   Final Result      1.  Degenerative change without evidence of cervical spine fracture.      2.  Left posterior first and second rib fractures.      DX-CHEST-LIMITED (1 VIEW)   Final Result         1. Symmetric low lung volumes. No focal opacities are evident.   2. Multiple acute left-sided rib fractures with left lateral basilar pleural reaction. No pneumothorax.   3. The cardiomediastinal silhouette size is normal.   4. Other chronic posttraumatic and degenerative changes.        All current laboratory studies/radiology exams reviewed: Yes    Completed Consultations:  Neurosurgical  Orthopedics    Pending Consultations:  NA    Newly Identified Diagnoses and  Injuries:  NA    TOTAL RAP SCORE:  RAP Score Total: 9      ETOH Screening  CAGE Score: 0  Assessment complete date: 7/7/2021

## 2021-07-07 NOTE — PROGRESS NOTES
Neurosurgery Progress Note    Subjective:  Patient denies HA, visual/sensory changes, dizziness, and weakness     Exam:  A&Ox4  NAD  RA, normal respiratory effort  Follows commands x4  PERRLA. EOMI. Pupils 3mm brisk  Face is symmetrical, tongue is midline  CN II-XII grossly intact bilat  No difficulty with speech  Negative pronator drift test  Appropriate muscle tone and sensation intact all four extremities.       BP  Min: 85/53  Max: 167/91  Pulse  Av.3  Min: 75  Max: 92  Resp  Avg: 15.5  Min: 8  Max: 37  Temp  Av.9 °C (98.5 °F)  Min: 36.5 °C (97.7 °F)  Max: 37.2 °C (98.9 °F)  SpO2  Av.4 %  Min: 82 %  Max: 98 %    No data recorded    Recent Labs     21  1021 21  0235   WBC 8.7 9.8   RBC 5.18 4.47*   HEMOGLOBIN 15.3 13.1*   HEMATOCRIT 46.1 39.9*   MCV 89.0 89.3   MCH 29.5 29.3   MCHC 33.2* 32.8*   RDW 46.5 48.3   PLATELETCT 335 266   MPV 10.4 10.5     Recent Labs     21  1021 21  0235   SODIUM 138 142   POTASSIUM 4.3 4.0   CHLORIDE 100 102   CO2 27 21   GLUCOSE 182* 109*   BUN 19 19   CREATININE 0.95 0.85   CALCIUM 10.0 8.7     Recent Labs     21  1021   APTT 34.6   INR 0.97           Intake/Output                       21 - 21 - 2159      Total  Total                 Intake    P.O.  50  70 120  --  -- --    P.O. 50 70 120 -- -- --    I.V.  498.3  400 898.3  --  -- --    Pre-Hospital Volume 0 -- 0 -- -- --    Trauma Resuscitation Volume 0 -- 0 -- -- --    Volume (mL) (LR infusion) 0 -- 0 -- -- --    Volume (mL) (NS infusion) 498.3 400 898.3 -- -- --    Blood  0  -- 0  --  -- --    PRBC Total Volume (Non-Barcoded) 0 -- 0 -- -- --    FFP Total Volume (Non-Barcoded) 0 -- 0 -- -- --    Platelets Total Volume (Non-Barcoded) 0 -- 0 -- -- --    Cryoprecipitate (Pooled) Total Volume (Non-Barcoded) 0 -- 0 -- -- --    IV Piggyback  100  100 200  --  -- --    Volume (mL) (levETIRAcetam (Keppra) 500  mg in 100 mL NaCl IV premix) 100 100 200 -- -- --    Total Intake 648.3 570 1218.3 -- -- --       Output    Urine  750  800 1550  --  -- --    Number of Times Voided 2 x 2 x 4 x -- -- --    Urine Void (mL)  -- -- --    Other  0  -- 0  --  -- --    Pre-Hospital Output 0 -- 0 -- -- --    Trauma Resuscitation Output 0 -- 0 -- -- --    Stool  --  -- --  --  -- --    Number of Times Stooled 0 x -- 0 x -- -- --    Blood  0  -- 0  --  -- --    Est. Blood Loss 0 -- 0 -- -- --    Total Output  -- -- --       Net I/O     -101.7 -230 -331.7 -- -- --            Intake/Output Summary (Last 24 hours) at 7/7/2021 0813  Last data filed at 7/7/2021 0600  Gross per 24 hour   Intake 1218.33 ml   Output 1550 ml   Net -331.67 ml            • Respiratory Therapy Consult   Continuous RT   • Pharmacy Consult Request  1 Each PHARMACY TO DOSE   • docusate sodium  100 mg BID   • senna-docusate  1 tablet Nightly   • senna-docusate  1 tablet Q24HRS PRN   • polyethylene glycol/lytes  1 Packet BID   • magnesium hydroxide  30 mL DAILY   • bisacodyl  10 mg Q24HRS PRN   • fleet  1 Each Once PRN   • acetaminophen  650 mg Q6HRS    Followed by   • [START ON 7/11/2021] acetaminophen  650 mg Q6HRS PRN   • oxyCODONE immediate-release  2.5 mg Q3HRS PRN    Or   • oxyCODONE immediate-release  5 mg Q3HRS PRN    Or   • HYDROmorphone  0.25 mg Q3HRS PRN   • famotidine  20 mg BID    Or   • famotidine  20 mg BID   • ondansetron  4 mg Q4HRS PRN   • levETIRAcetam (Keppra) IV  500 mg BID   • bacitracin-polymyxin b   TID   • lidocaine  1-2 Patch Q24HR   • NS   Continuous   • insulin regular  1-6 Units Q6HRS    And   • glucose  16 g Q15 MIN PRN    And   • dextrose 50%  50 mL Q15 MIN PRN   • atorvastatin  80 mg Nightly   • levothyroxine  50 mcg AM ES   • gabapentin  300 mg TID       Assessment and Plan:  Hospital day # 1    Patient is neurologically intact  Repeat head CT demonstrated resolution of SAH  Okay to transfer to floor  Okay for  lovenox  Q4 neuro checks   Keppra x 7 days   Follow up with [facility]    Case d/w Dr. Nguyen

## 2021-07-07 NOTE — CARE PLAN
The patient is Stable - Low risk of patient condition declining or worsening    Shift Goals  Clinical Goals: pulmonary toileting, mobility, stable neuro status   Patient Goals: pain control, rest   Family Goals: remain safe     Progress made toward(s) clinical / shift goals:  stable neuro assessment    Patient is not progressing towards the following goals: n/a      Problem: Pain - Standard  Goal: Alleviation of pain or a reduction in pain to the patient’s comfort goal  Outcome: Progressing     Problem: Knowledge Deficit - Standard  Goal: Patient and family/care givers will demonstrate understanding of plan of care, disease process/condition, diagnostic tests and medications  Outcome: Progressing     Problem: Respiratory  Goal: Patient will achieve/maintain optimum respiratory ventilation and gas exchange  Outcome: Met     Problem: Risk for Aspiration  Goal: Patient's risk for aspiration will be absent or decrease  Outcome: Met     Problem: Urinary - Renal Perfusion  Goal: Ability to achieve and maintain adequate renal perfusion and functioning will improve  Outcome: Met     Problem: Skin Integrity  Goal: Skin integrity is maintained or improved  Outcome: Progressing
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
## 2021-07-07 NOTE — PROGRESS NOTES
Dr. Nguyen at bedside, repeat heat CT in AM.   No deficits seen on neuro exam.  Keppra on MAR already.  No plan for surgery at this time.
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
## 2021-07-07 NOTE — CARE PLAN
Problem: Hyperinflation  Goal: Prevent or improve atelectasis  Description: 1. Instruct incentive spirometry usage  2.  Perform hyperinflation therapy as indicated  Outcome: Not Met  Flowsheets (Taken 7/6/2021 1431)  Hyperinflation Protocol Goals/Outcome: Greater Than 60% of Predicted I.S. Volume x 24 hrs   PEP QID

## 2021-07-07 NOTE — PROGRESS NOTES
2 RN skin check with Joanne RN    -Lower buttocks has area of bright red slow to ahmet skin, offloading measures in place, left open to air   -Left shoulder has abrasion, present on admission from [facility], cleansed with approved wound cleanser and dressed with biatin         -Left forearm has skin tear/abrasion extending from elbow more than half way down forearm, present on admission from [facility], cleansed with approved wound cleanser and dressed with biatin         -L knee has open abrasion, present on admission from [facility], cleansed with approved wound cleanser and dressed with Biatin        -Left foot has skin tear on top wear toes have been amputated, present on admission from [facility], cleansed with approved wound cleanser and left open to air           -right elbow has skin tear, present on admission from [facility], cleansed with approved wound cleanser and left open to air         -Abrasion to inferior aspect of chin, present on admission from [facility], cleansed with approved cleanser and left open to air            No other areas of concern at this time. Appropriate wound protocols in place, wound consult already placed for all above listed areas.

## 2021-07-08 ENCOUNTER — APPOINTMENT (OUTPATIENT)
Dept: RADIOLOGY | Facility: MEDICAL CENTER | Age: 76
DRG: 964 | End: 2021-07-08
Attending: NURSE PRACTITIONER
Payer: OTHER MISCELLANEOUS

## 2021-07-08 PROBLEM — Z75.8 DISCHARGE PLANNING ISSUES: Status: ACTIVE | Noted: 2021-07-08

## 2021-07-08 PROBLEM — Z02.9 DISCHARGE PLANNING ISSUES: Status: ACTIVE | Noted: 2021-07-08

## 2021-07-08 PROBLEM — Z78.9 NO CONTRAINDICATION TO DEEP VEIN THROMBOSIS (DVT) PROPHYLAXIS: Status: ACTIVE | Noted: 2021-07-08

## 2021-07-08 LAB
GLUCOSE BLD-MCNC: 158 MG/DL (ref 65–99)
GLUCOSE BLD-MCNC: 166 MG/DL (ref 65–99)
GLUCOSE BLD-MCNC: 174 MG/DL (ref 65–99)
GLUCOSE BLD-MCNC: 186 MG/DL (ref 65–99)
GLUCOSE BLD-MCNC: 216 MG/DL (ref 65–99)
MAGNESIUM SERPL-MCNC: 1.9 MG/DL (ref 1.5–2.5)
PHOSPHATE SERPL-MCNC: 2.7 MG/DL (ref 2.5–4.5)

## 2021-07-08 PROCEDURE — 83735 ASSAY OF MAGNESIUM: CPT

## 2021-07-08 PROCEDURE — 97530 THERAPEUTIC ACTIVITIES: CPT

## 2021-07-08 PROCEDURE — A9270 NON-COVERED ITEM OR SERVICE: HCPCS | Performed by: NURSE PRACTITIONER

## 2021-07-08 PROCEDURE — 770006 HCHG ROOM/CARE - MED/SURG/GYN SEMI*

## 2021-07-08 PROCEDURE — 700111 HCHG RX REV CODE 636 W/ 250 OVERRIDE (IP): Performed by: SURGERY

## 2021-07-08 PROCEDURE — 97116 GAIT TRAINING THERAPY: CPT

## 2021-07-08 PROCEDURE — 71045 X-RAY EXAM CHEST 1 VIEW: CPT

## 2021-07-08 PROCEDURE — 97535 SELF CARE MNGMENT TRAINING: CPT

## 2021-07-08 PROCEDURE — 99232 SBSQ HOSP IP/OBS MODERATE 35: CPT | Performed by: SURGERY

## 2021-07-08 PROCEDURE — 700111 HCHG RX REV CODE 636 W/ 250 OVERRIDE (IP): Performed by: NURSE PRACTITIONER

## 2021-07-08 PROCEDURE — 700102 HCHG RX REV CODE 250 W/ 637 OVERRIDE(OP): Performed by: SURGERY

## 2021-07-08 PROCEDURE — 700101 HCHG RX REV CODE 250: Performed by: NURSE PRACTITIONER

## 2021-07-08 PROCEDURE — 82962 GLUCOSE BLOOD TEST: CPT | Mod: 91

## 2021-07-08 PROCEDURE — 700102 HCHG RX REV CODE 250 W/ 637 OVERRIDE(OP): Performed by: NURSE PRACTITIONER

## 2021-07-08 PROCEDURE — A9270 NON-COVERED ITEM OR SERVICE: HCPCS | Performed by: SURGERY

## 2021-07-08 PROCEDURE — 84100 ASSAY OF PHOSPHORUS: CPT

## 2021-07-08 RX ORDER — LEVETIRACETAM 500 MG/1
500 TABLET ORAL 2 TIMES DAILY
Status: COMPLETED | OUTPATIENT
Start: 2021-07-08 | End: 2021-07-13

## 2021-07-08 RX ORDER — MAGNESIUM SULFATE HEPTAHYDRATE 40 MG/ML
2 INJECTION, SOLUTION INTRAVENOUS ONCE
Status: COMPLETED | OUTPATIENT
Start: 2021-07-08 | End: 2021-07-08

## 2021-07-08 RX ORDER — METAXALONE 800 MG/1
400 TABLET ORAL 3 TIMES DAILY
Status: DISCONTINUED | OUTPATIENT
Start: 2021-07-08 | End: 2021-07-13 | Stop reason: HOSPADM

## 2021-07-08 RX ORDER — PROPRANOLOL HYDROCHLORIDE 10 MG/1
20 TABLET ORAL EVERY 8 HOURS
Status: DISCONTINUED | OUTPATIENT
Start: 2021-07-08 | End: 2021-07-13 | Stop reason: HOSPADM

## 2021-07-08 RX ADMIN — PROPRANOLOL HYDROCHLORIDE 20 MG: 10 TABLET ORAL at 14:55

## 2021-07-08 RX ADMIN — MAGNESIUM SULFATE 2 G: 2 INJECTION INTRAVENOUS at 09:08

## 2021-07-08 RX ADMIN — HYDROMORPHONE HYDROCHLORIDE 0.25 MG: 1 INJECTION, SOLUTION INTRAMUSCULAR; INTRAVENOUS; SUBCUTANEOUS at 15:50

## 2021-07-08 RX ADMIN — LEVETIRACETAM 500 MG: 500 TABLET, FILM COATED ORAL at 17:05

## 2021-07-08 RX ADMIN — LEVETIRACETAM INJECTION 500 MG: 5 INJECTION INTRAVENOUS at 05:32

## 2021-07-08 RX ADMIN — METAXALONE 400 MG: 800 TABLET ORAL at 17:04

## 2021-07-08 RX ADMIN — HYDROMORPHONE HYDROCHLORIDE 0.25 MG: 1 INJECTION, SOLUTION INTRAMUSCULAR; INTRAVENOUS; SUBCUTANEOUS at 10:10

## 2021-07-08 RX ADMIN — DOCUSATE SODIUM 100 MG: 100 CAPSULE ORAL at 18:00

## 2021-07-08 RX ADMIN — ACETAMINOPHEN 650 MG: 325 TABLET, FILM COATED ORAL at 23:27

## 2021-07-08 RX ADMIN — ACETAMINOPHEN 650 MG: 325 TABLET, FILM COATED ORAL at 11:16

## 2021-07-08 RX ADMIN — FAMOTIDINE 20 MG: 10 INJECTION INTRAVENOUS at 05:32

## 2021-07-08 RX ADMIN — GABAPENTIN 300 MG: 300 CAPSULE ORAL at 11:16

## 2021-07-08 RX ADMIN — ENOXAPARIN SODIUM 30 MG: 30 INJECTION SUBCUTANEOUS at 17:04

## 2021-07-08 RX ADMIN — ACETAMINOPHEN 650 MG: 325 TABLET, FILM COATED ORAL at 17:04

## 2021-07-08 RX ADMIN — OXYCODONE 5 MG: 5 TABLET ORAL at 03:40

## 2021-07-08 RX ADMIN — ACETAMINOPHEN 650 MG: 325 TABLET, FILM COATED ORAL at 05:32

## 2021-07-08 RX ADMIN — INSULIN HUMAN 1 UNITS: 100 INJECTION, SOLUTION PARENTERAL at 06:21

## 2021-07-08 RX ADMIN — OXYCODONE 5 MG: 5 TABLET ORAL at 00:30

## 2021-07-08 RX ADMIN — DOCUSATE SODIUM 50 MG AND SENNOSIDES 8.6 MG 1 TABLET: 8.6; 5 TABLET, FILM COATED ORAL at 21:59

## 2021-07-08 RX ADMIN — PROPRANOLOL HYDROCHLORIDE 20 MG: 10 TABLET ORAL at 21:59

## 2021-07-08 RX ADMIN — LIDOCAINE 2 PATCH: 50 PATCH TOPICAL at 12:20

## 2021-07-08 RX ADMIN — INSULIN HUMAN 1 UNITS: 100 INJECTION, SOLUTION PARENTERAL at 17:12

## 2021-07-08 RX ADMIN — ATORVASTATIN CALCIUM 80 MG: 80 TABLET, FILM COATED ORAL at 21:57

## 2021-07-08 RX ADMIN — DOCUSATE SODIUM 100 MG: 100 CAPSULE ORAL at 05:33

## 2021-07-08 RX ADMIN — INSULIN HUMAN 1 UNITS: 100 INJECTION, SOLUTION PARENTERAL at 11:19

## 2021-07-08 RX ADMIN — INSULIN HUMAN 2 UNITS: 100 INJECTION, SOLUTION PARENTERAL at 00:35

## 2021-07-08 RX ADMIN — OXYCODONE 5 MG: 5 TABLET ORAL at 12:19

## 2021-07-08 RX ADMIN — POLYETHYLENE GLYCOL 3350 1 PACKET: 17 POWDER, FOR SOLUTION ORAL at 17:06

## 2021-07-08 RX ADMIN — HYDROMORPHONE HYDROCHLORIDE 0.25 MG: 1 INJECTION, SOLUTION INTRAMUSCULAR; INTRAVENOUS; SUBCUTANEOUS at 03:05

## 2021-07-08 RX ADMIN — LEVOTHYROXINE SODIUM 50 MCG: 0.05 TABLET ORAL at 05:33

## 2021-07-08 RX ADMIN — ENOXAPARIN SODIUM 30 MG: 30 INJECTION SUBCUTANEOUS at 05:33

## 2021-07-08 RX ADMIN — OXYCODONE 5 MG: 5 TABLET ORAL at 15:08

## 2021-07-08 RX ADMIN — GABAPENTIN 300 MG: 300 CAPSULE ORAL at 05:33

## 2021-07-08 RX ADMIN — GABAPENTIN 300 MG: 300 CAPSULE ORAL at 17:04

## 2021-07-08 RX ADMIN — OXYCODONE 5 MG: 5 TABLET ORAL at 21:59

## 2021-07-08 RX ADMIN — POLYETHYLENE GLYCOL 3350 1 PACKET: 17 POWDER, FOR SOLUTION ORAL at 05:33

## 2021-07-08 RX ADMIN — OXYCODONE 5 MG: 5 TABLET ORAL at 09:07

## 2021-07-08 RX ADMIN — ACETAMINOPHEN 650 MG: 325 TABLET, FILM COATED ORAL at 00:30

## 2021-07-08 ASSESSMENT — COGNITIVE AND FUNCTIONAL STATUS - GENERAL
DAILY ACTIVITIY SCORE: 15
WALKING IN HOSPITAL ROOM: A LOT
DRESSING REGULAR LOWER BODY CLOTHING: A LOT
SUGGESTED CMS G CODE MODIFIER DAILY ACTIVITY: CK
TOILETING: A LOT
HELP NEEDED FOR BATHING: A LOT
MOVING TO AND FROM BED TO CHAIR: A LOT
MOBILITY SCORE: 12
CLIMB 3 TO 5 STEPS WITH RAILING: A LOT
STANDING UP FROM CHAIR USING ARMS: A LOT
DRESSING REGULAR UPPER BODY CLOTHING: A LOT
SUGGESTED CMS G CODE MODIFIER MOBILITY: CL
PERSONAL GROOMING: A LITTLE
MOVING FROM LYING ON BACK TO SITTING ON SIDE OF FLAT BED: A LOT
TURNING FROM BACK TO SIDE WHILE IN FLAT BAD: A LOT

## 2021-07-08 ASSESSMENT — PAIN DESCRIPTION - PAIN TYPE
TYPE: ACUTE PAIN

## 2021-07-08 ASSESSMENT — GAIT ASSESSMENTS
GAIT LEVEL OF ASSIST: MODERATE ASSIST
DISTANCE (FEET): 8
ASSISTIVE DEVICE: SINGLE POINT CANE
DEVIATION: ANTALGIC;DECREASED BASE OF SUPPORT

## 2021-07-08 ASSESSMENT — ENCOUNTER SYMPTOMS
MYALGIAS: 1
ROS GI COMMENTS: LAST BM PTA
ABDOMINAL PAIN: 0
COUGH: 0
DIZZINESS: 0
BACK PAIN: 1
EYES NEGATIVE: 1
SHORTNESS OF BREATH: 0
NAUSEA: 0
CARDIOVASCULAR NEGATIVE: 1
HEADACHES: 0
VOMITING: 0

## 2021-07-08 ASSESSMENT — PAIN SCALES - WONG BAKER: WONGBAKER_NUMERICALRESPONSE: DOESN'T HURT AT ALL

## 2021-07-08 ASSESSMENT — FIBROSIS 4 INDEX: FIB4 SCORE: 1.97
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
## 2021-07-08 NOTE — ASSESSMENT & PLAN NOTE
Date of admission: 7/6/2021  Date: 7/8 Transfer orders from SICU  Date: 7/10 Rehab consult  7/13 Rehab evaluation completed  Cleared for discharge: Yes - Date: 7/9  Discharge delayed: No  Discharge date: tbd

## 2021-07-08 NOTE — THERAPY
Physical Therapy   Daily Treatment     Patient Name: Kevin Jackson  Age:  76 y.o., Sex:  male  Medical Record #: [id number]  Today's Date: 7/8/2021     Precautions: Fall Risk, Non Weight Bearing Left Upper Extremity, sling L UE for comfort.    Assessment    Today, pt agreeable to attempt ambulation, needing mod A to stand from chair with increased L rib pain with transfers and activity. Pt able to ambulate a total of 8 feet, using SPC with mod A. Pt declines to trial sling as wounds along L shoulder would be painful. Pt remains fragile, pain is a issue, PT to continue.     Plan    Continue current treatment plan.    DC Equipment Recommendations: Unable to determine at this time  Discharge Recommendations: Recommend post-acute placement for additional physical therapy services prior to discharge home         Objective       07/08/21 1133   Precautions   Precautions Fall Risk;Non Weight Bearing Left Upper Extremity   Comments h/o R BKA, prosthesis in room, 1 lb lifting restriction L UE, ROM AT L UE, sling for comfort.    Gait Analysis   Gait Level Of Assist Moderate Assist   Assistive Device Single Point Cane   Distance (Feet) 8   Functional Mobility   Sit to Stand Moderate Assist   Bed, Chair, Wheelchair Transfer Moderate Assist  (pillows placed in seat to make next sit to stand easier. )   Short Term Goals    Short Term Goal # 1 pt will be able to complete bed mobility from flat bed with SPV in 6tx in order to return home   Goal Outcome # 1 goal not met   Short Term Goal # 2 pt will be able to complete functional transfers with SPV in 6tx in order to return home   Goal Outcome # 2 Goal not met   Short Term Goal # 3 pt will be able to ambulate 50ft with LRAD and min assist in 6tx in order to progress to return home   Goal Outcome # 3 Goal not met   Anticipated Discharge Equipment and Recommendations   DC Equipment Recommendations Unable to determine at this time   Discharge Recommendations Recommend post-acute  placement for additional physical therapy services prior to discharge home

## 2021-07-08 NOTE — THERAPY
"Occupational Therapy  Daily Treatment     Patient Name: Kevin Jackson  Age:  76 y.o., Sex:  male  Medical Record #: [id number]  Today's Date: 7/8/2021     Precautions  Precautions: Fall Risk, No Weight Bearing Restrictions Left Lower Extremity  Comments: hx R BKA w/ prosthetic; 1 lb lifting restriction LUE; ROMAT LUE; sling for comfort    Assessment  Pt seen for OT Tx today. Pt presented with improved functional mobility and activity tolerance, however limited to complete more ADLs d/t c/o pain. Pt demonstrated donning lining of prosthetic in supine and fully donned prosthetic of R BKA with spv at EOB. OT will continue in this setting to maximize independence and participation in ADLs. Recommend post acute services before returning home.    Plan  Continue current treatment plan.    DC Equipment Recommendations: Unable to determine at this time  Discharge Recommendations: Recommend post-acute placement for additional occupational therapy services prior to discharge home    Subjective  \"I feel in more pain today than yesterday\"     Objective   07/08/21 0952   Precautions   Precautions Fall Risk;No Weight Bearing Restrictions Left Lower Extremity   Comments hx R BKA w/ prosthetic; 1 lb lifting restriction LUE; ROMAT LUE; sling for comfort   Pain   Pain Scales 0 to 10 Scale    Intervention Ambulation / Increased Activity   Pain 0 - 10 Group   Location Back   Therapist Pain Assessment During Activity;Nurse Notified  (no number attained by pt)   Non Verbal Descriptors   Non Verbal Scale  Calm   Cognition    Cognition / Consciousness WDL   Level of Consciousness Alert   Active ROM Upper Body   Comments LUE ROM impaired d/t injury   Sensation Upper Body   Upper Extremity Sensation  WDL   Upper Body Muscle Tone   Upper Body Muscle Tone  WDL   Balance   Sitting Balance (Static) Fair   Sitting Balance (Dynamic) Fair   Standing Balance (Static) Poor +   Standing Balance (Dynamic) Poor +   Weight Shift Sitting Fair   Weight " Shift Standing Fair   Skilled Intervention Verbal Cuing;Tactile Cuing;Postural Facilitation   Bed Mobility    Supine to Sit Maximal Assist   Sit to Supine   (pt left upright in chair)   Scooting Minimal Assist   Rolling Moderate Assist to Lt.   Skilled Intervention Verbal Cuing;Tactile Cuing   Comments HOB slgihtly elevated   Activities of Daily Living   Eating Supervision   Upper Body Dressing Moderate Assist   Lower Body Dressing Maximal Assist  (Donned R BKA prosthetic in sup. & EOB w/ spv. Max LLE)   Skilled Intervention Verbal Cuing;Tactile Cuing   How much help from another person does the patient currently need...   6 Clicks Daily Activity Score 15   Functional Mobility   Sit to Stand Moderate Assist   Bed, Chair, Wheelchair Transfer Minimal Assist   Transfer Method Stand Step   Mobility sup>EOB> STS> tsf to chair   Comments limited by c/o pain   Activity Tolerance   Comments pt limited by c/o pain   Patient / Family Goals   Patient / Family Goal #1 to go home   Goal #1 Outcome Progressing as expected   Short Term Goals   Short Term Goal # 1 pt will demo toilet txf with supv   Goal Outcome # 1 Progressing as expected   Short Term Goal # 2 pt will dress LB with supv using AE prn   Goal Outcome # 2 Progressing as expected   Short Term Goal # 3 pt will demo seated/standing sponge bath or shower w/ supv   Goal Outcome # 3 Progressing as expected   Short Term Goal # 4 pt will dress UB with supv   Goal Outcome # 4 Progressing as expected   Education Group   Education Provided Role of Occupational Therapist   Role of Occupational Therapist Patient Response Patient;Acceptance;Explanation;Verbal Demonstration   Anticipated Discharge Equipment and Recommendations   DC Equipment Recommendations Unable to determine at this time   Discharge Recommendations Recommend post-acute placement for additional occupational therapy services prior to discharge home   Interdisciplinary Plan of Care Collaboration   IDT Collaboration  with  Nursing   Patient Position at End of Therapy Call Light within Reach;Tray Table within Reach;Phone within Reach;Seated   Collaboration Comments nsg notified

## 2021-07-08 NOTE — PROGRESS NOTES
Neurosurgery Progress Note    Subjective:  No acute events    Exam:  A&Ox4  NAD  RA, normal respiratory effort  Follows commands x4  PERRLA. EOMI. Pupils 3mm brisk  Slight left facial droop, tongue is midline  CN II-XII grossly intact bilat  No difficulty with speech  Negative pronator drift test  Appropriate muscle tone and sensation intact all four extremities.       BP  Min: 100/60  Max: 133/95  Pulse  Av  Min: 86  Max: 111  Resp  Av.5  Min: 7  Max: 45  Temp  Av °C (98.6 °F)  Min: 36.7 °C (98 °F)  Max: 37.1 °C (98.8 °F)  SpO2  Av.4 %  Min: 91 %  Max: 97 %    No data recorded    Recent Labs     21  1021 21  0235   WBC 8.7 9.8   RBC 5.18 4.47*   HEMOGLOBIN 15.3 13.1*   HEMATOCRIT 46.1 39.9*   MCV 89.0 89.3   MCH 29.5 29.3   MCHC 33.2* 32.8*   RDW 46.5 48.3   PLATELETCT 335 266   MPV 10.4 10.5     Recent Labs     21  1021 21  0235   SODIUM 138 142   POTASSIUM 4.3 4.0   CHLORIDE 100 102   CO2 27 21   GLUCOSE 182* 109*   BUN 19 19   CREATININE 0.95 0.85   CALCIUM 10.0 8.7     Recent Labs     21  1021   APTT 34.6   INR 0.97           Intake/Output                       21 - 21 - 21 0659      Total  Total                 Intake    P.O.  700  450 1150  0  -- 0    P.O.  0 -- 0    I.V.  1000  -- 1000  --  -- --    Volume (mL) (NS infusion) 1000 -- 1000 -- -- --    Other  --  -- --  0  -- 0    Medications (PO/Enteral Liquids) -- -- -- 0 -- 0    IV Piggyback  0  -- 0  --  -- --    Volume (mL) (levETIRAcetam (Keppra) 500 mg in 100 mL NaCl IV premix) 0 -- 0 -- -- --    Total Intake 4467 473 3157 0 -- 0       Output    Urine  1150  1100 2250  --  -- --    Number of Times Voided 2 x 3 x 5 x -- -- --    Urine Void (mL) 1150 1100 2250 -- -- --    Stool  --  -- --  --  -- --    Number of Times Stooled 0 x 0 x 0 x -- -- --    Total Output 1150 1100 2250 -- -- --       Net I/O     550 -650 -100  0 -- 0            Intake/Output Summary (Last 24 hours) at 7/8/2021 0820  Last data filed at 7/8/2021 0800  Gross per 24 hour   Intake 1150 ml   Output 1650 ml   Net -500 ml            • enoxaparin (LOVENOX) injection  30 mg Q12HRS   • Respiratory Therapy Consult   Continuous RT   • Pharmacy Consult Request  1 Each PHARMACY TO DOSE   • docusate sodium  100 mg BID   • senna-docusate  1 tablet Nightly   • senna-docusate  1 tablet Q24HRS PRN   • polyethylene glycol/lytes  1 Packet BID   • magnesium hydroxide  30 mL DAILY   • bisacodyl  10 mg Q24HRS PRN   • fleet  1 Each Once PRN   • acetaminophen  650 mg Q6HRS    Followed by   • [START ON 7/11/2021] acetaminophen  650 mg Q6HRS PRN   • oxyCODONE immediate-release  2.5 mg Q3HRS PRN    Or   • oxyCODONE immediate-release  5 mg Q3HRS PRN    Or   • HYDROmorphone  0.25 mg Q3HRS PRN   • famotidine  20 mg BID    Or   • famotidine  20 mg BID   • ondansetron  4 mg Q4HRS PRN   • levETIRAcetam (Keppra) IV  500 mg BID   • lidocaine  1-2 Patch Q24HR   • insulin regular  1-6 Units Q6HRS    And   • glucose  16 g Q15 MIN PRN    And   • dextrose 50%  50 mL Q15 MIN PRN   • atorvastatin  80 mg Nightly   • levothyroxine  50 mcg AM ES   • gabapentin  300 mg TID       Assessment and Plan:  Hospital day # 2    Patient is neurologically intact  Repeat head CT demonstrated resolution of SAH  Okay to transfer to floor  Okay for lovenox  Q4 neuro checks   Keppra x 7 days   Follow up with [facility] prn    Case d/w Dr. Nguyen

## 2021-07-08 NOTE — CARE PLAN
The patient is Stable - Low risk of patient condition declining or worsening    Shift Goals  Clinical Goals: Pain and comfort managment, wean oxygen, work on IS  Patient Goals: pain managment and rest  Family Goals: safety and comfort    Progress made toward(s) clinical / shift goals:  Pain control!    Patient is not progressing towards the following goals:      Problem: Pain - Standard  Goal: Alleviation of pain or a reduction in pain to the patient’s comfort goal  Outcome: Not Progressing     Problem: Knowledge Deficit - Standard  Goal: Patient and family/care givers will demonstrate understanding of plan of care, disease process/condition, diagnostic tests and medications  Outcome: Progressing     Problem: Urinary Elimination  Goal: Establish and maintain regular urinary output  Outcome: Progressing

## 2021-07-08 NOTE — ASSESSMENT & PLAN NOTE
Prophylactic anticoagulation for thrombotic prevention initially contraindicated secondary to elevated bleeding risk.  7/8 Prophylactic dose enoxaparin initiated.

## 2021-07-08 NOTE — CARE PLAN
The patient is Stable - Low risk of patient condition declining or worsening    Shift Goals  Clinical Goals: Pain and comfort managment, wean oxygen, work on IS  Patient Goals: pain managment and rest  Family Goals: safety and comfort      Problem: Skin Integrity  Goal: Skin integrity is maintained or improved  Outcome: Progressing  Note: Assessment of all wounds. Infection prevention interventions in place. Dressing changed prn. Wound consult ordered.      Problem: Respiratory  Goal: Patient will achieve/maintain optimum respiratory ventilation and gas exchange  Outcome: Progressing  Flowsheets  Taken 7/7/2021 1952 by Ally Blancas, R.N.  O2 Delivery Device: Silicone Nasal Cannula  Incentive Spirometer Volume: 1250 mL  Deep Breathe and Cough: Performs Correctly  Taken 7/6/2021 2000 by Heath Li R.NLevar  Incentive Spirometer: Effective  Note: Encouraging patient to increase IS use. Education provided on importance of IS use and benefits to health. Encouraged patient to cough and deep breath

## 2021-07-09 ENCOUNTER — APPOINTMENT (OUTPATIENT)
Dept: RADIOLOGY | Facility: MEDICAL CENTER | Age: 76
DRG: 964 | End: 2021-07-09
Attending: NURSE PRACTITIONER
Payer: OTHER MISCELLANEOUS

## 2021-07-09 PROBLEM — R13.12 OROPHARYNGEAL DYSPHAGIA: Status: ACTIVE | Noted: 2021-07-09

## 2021-07-09 PROBLEM — S27.0XXA PNEUMOTHORAX, CLOSED, TRAUMATIC, INITIAL ENCOUNTER: Status: RESOLVED | Noted: 2021-07-06 | Resolved: 2021-07-09

## 2021-07-09 PROBLEM — M25.522 ELBOW PAIN, LEFT: Status: RESOLVED | Noted: 2021-07-06 | Resolved: 2021-07-09

## 2021-07-09 LAB
GLUCOSE BLD-MCNC: 133 MG/DL (ref 65–99)
GLUCOSE BLD-MCNC: 151 MG/DL (ref 65–99)
GLUCOSE BLD-MCNC: 160 MG/DL (ref 65–99)
GLUCOSE BLD-MCNC: 182 MG/DL (ref 65–99)

## 2021-07-09 PROCEDURE — 700101 HCHG RX REV CODE 250: Performed by: NURSE PRACTITIONER

## 2021-07-09 PROCEDURE — 700105 HCHG RX REV CODE 258: Performed by: NURSE PRACTITIONER

## 2021-07-09 PROCEDURE — 82962 GLUCOSE BLOOD TEST: CPT | Mod: 91

## 2021-07-09 PROCEDURE — A9270 NON-COVERED ITEM OR SERVICE: HCPCS | Performed by: NURSE PRACTITIONER

## 2021-07-09 PROCEDURE — 92610 EVALUATE SWALLOWING FUNCTION: CPT

## 2021-07-09 PROCEDURE — 71045 X-RAY EXAM CHEST 1 VIEW: CPT

## 2021-07-09 PROCEDURE — 700102 HCHG RX REV CODE 250 W/ 637 OVERRIDE(OP): Performed by: SURGERY

## 2021-07-09 PROCEDURE — 94669 MECHANICAL CHEST WALL OSCILL: CPT

## 2021-07-09 PROCEDURE — A9270 NON-COVERED ITEM OR SERVICE: HCPCS | Performed by: SURGERY

## 2021-07-09 PROCEDURE — 700102 HCHG RX REV CODE 250 W/ 637 OVERRIDE(OP): Performed by: NURSE PRACTITIONER

## 2021-07-09 PROCEDURE — 770006 HCHG ROOM/CARE - MED/SURG/GYN SEMI*

## 2021-07-09 PROCEDURE — 700111 HCHG RX REV CODE 636 W/ 250 OVERRIDE (IP): Performed by: SURGERY

## 2021-07-09 RX ORDER — SODIUM CHLORIDE 9 MG/ML
INJECTION, SOLUTION INTRAVENOUS CONTINUOUS
Status: DISCONTINUED | OUTPATIENT
Start: 2021-07-09 | End: 2021-07-10

## 2021-07-09 RX ADMIN — ACETAMINOPHEN 650 MG: 325 TABLET, FILM COATED ORAL at 11:49

## 2021-07-09 RX ADMIN — LIDOCAINE 2 PATCH: 50 PATCH TOPICAL at 13:38

## 2021-07-09 RX ADMIN — GABAPENTIN 300 MG: 300 CAPSULE ORAL at 16:47

## 2021-07-09 RX ADMIN — INSULIN HUMAN 1 UNITS: 100 INJECTION, SOLUTION PARENTERAL at 23:39

## 2021-07-09 RX ADMIN — DOCUSATE SODIUM 100 MG: 100 CAPSULE ORAL at 04:02

## 2021-07-09 RX ADMIN — PROPRANOLOL HYDROCHLORIDE 20 MG: 10 TABLET ORAL at 21:06

## 2021-07-09 RX ADMIN — METAXALONE 400 MG: 800 TABLET ORAL at 16:47

## 2021-07-09 RX ADMIN — DOCUSATE SODIUM 50 MG AND SENNOSIDES 8.6 MG 1 TABLET: 8.6; 5 TABLET, FILM COATED ORAL at 21:05

## 2021-07-09 RX ADMIN — METAXALONE 400 MG: 800 TABLET ORAL at 11:51

## 2021-07-09 RX ADMIN — GABAPENTIN 300 MG: 300 CAPSULE ORAL at 11:51

## 2021-07-09 RX ADMIN — OXYCODONE 5 MG: 5 TABLET ORAL at 04:02

## 2021-07-09 RX ADMIN — ATORVASTATIN CALCIUM 80 MG: 80 TABLET, FILM COATED ORAL at 21:05

## 2021-07-09 RX ADMIN — SODIUM CHLORIDE: 9 INJECTION, SOLUTION INTRAVENOUS at 10:24

## 2021-07-09 RX ADMIN — DOCUSATE SODIUM 100 MG: 100 CAPSULE ORAL at 16:48

## 2021-07-09 RX ADMIN — ENOXAPARIN SODIUM 30 MG: 30 INJECTION SUBCUTANEOUS at 04:05

## 2021-07-09 RX ADMIN — OXYCODONE 5 MG: 5 TABLET ORAL at 23:34

## 2021-07-09 RX ADMIN — LEVETIRACETAM 500 MG: 500 TABLET, FILM COATED ORAL at 04:05

## 2021-07-09 RX ADMIN — ACETAMINOPHEN 650 MG: 325 TABLET, FILM COATED ORAL at 16:47

## 2021-07-09 RX ADMIN — LEVOTHYROXINE SODIUM 50 MCG: 0.05 TABLET ORAL at 04:04

## 2021-07-09 RX ADMIN — MAGNESIUM HYDROXIDE 30 ML: 400 SUSPENSION ORAL at 04:02

## 2021-07-09 RX ADMIN — GABAPENTIN 300 MG: 300 CAPSULE ORAL at 04:05

## 2021-07-09 RX ADMIN — LEVETIRACETAM 500 MG: 500 TABLET, FILM COATED ORAL at 16:48

## 2021-07-09 RX ADMIN — POLYETHYLENE GLYCOL 3350 1 PACKET: 17 POWDER, FOR SOLUTION ORAL at 16:47

## 2021-07-09 RX ADMIN — PROPRANOLOL HYDROCHLORIDE 20 MG: 10 TABLET ORAL at 13:37

## 2021-07-09 RX ADMIN — METAXALONE 400 MG: 800 TABLET ORAL at 04:05

## 2021-07-09 RX ADMIN — ENOXAPARIN SODIUM 30 MG: 30 INJECTION SUBCUTANEOUS at 16:47

## 2021-07-09 RX ADMIN — ACETAMINOPHEN 650 MG: 325 TABLET, FILM COATED ORAL at 23:34

## 2021-07-09 RX ADMIN — ACETAMINOPHEN 650 MG: 325 TABLET, FILM COATED ORAL at 04:06

## 2021-07-09 RX ADMIN — SODIUM CHLORIDE: 9 INJECTION, SOLUTION INTRAVENOUS at 10:49

## 2021-07-09 RX ADMIN — OXYCODONE 5 MG: 5 TABLET ORAL at 10:23

## 2021-07-09 RX ADMIN — PROPRANOLOL HYDROCHLORIDE 20 MG: 10 TABLET ORAL at 04:00

## 2021-07-09 RX ADMIN — INSULIN HUMAN 1 UNITS: 100 INJECTION, SOLUTION PARENTERAL at 11:57

## 2021-07-09 ASSESSMENT — ENCOUNTER SYMPTOMS
HEADACHES: 0
BLURRED VISION: 0
FEVER: 0
CHILLS: 0
FOCAL WEAKNESS: 0
TINGLING: 0
CONSTIPATION: 1
NAUSEA: 0
DIZZINESS: 0
SPEECH CHANGE: 0
NECK PAIN: 0
SHORTNESS OF BREATH: 0
ABDOMINAL PAIN: 0
DOUBLE VISION: 0
BACK PAIN: 0
VOMITING: 0
SENSORY CHANGE: 0
MYALGIAS: 1
ROS GI COMMENTS: LAST BM PTA

## 2021-07-09 ASSESSMENT — PAIN DESCRIPTION - PAIN TYPE
TYPE: ACUTE PAIN

## 2021-07-09 NOTE — ASSESSMENT & PLAN NOTE
7/8 RN concerns with PO trial. NPO. SLP for swallow eval.  7/9 Easy to chew/thins with adherence to safe swallow strategies (upright at 90* for PO, slow rate, small bites, straws okay, meds as tolerated).  SLP following.
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
## 2021-07-09 NOTE — PROGRESS NOTES
4 Eyes Skin Assessment Completed by MERVIN Duvall and MERVIN Perez.    Head Bruising and Scratch (chin)  Ears WDL  Nose WDL  Mouth WDL  Neck WDL  Breast/Chest Bruising  Shoulder Blades WDL  Spine Bruising (left side)  (R) Arm/Elbow/Hand Bruising and Abrasion  (L) Arm/Elbow/Hand Redness, Bruising, Abrasion, Scab, Swelling and Weeping  Abdomen Bruising  Groin WDL  Scrotum/Coccyx/Buttocks WDL  (R) Leg BKA  (L) Leg Redness, Bruising and Abrasion  (R) Heel/Foot/Toe BKA  (L) Heel/Foot/Toe Amputation           Devices In Places Pulse Ox NC      Interventions In Place NC W/Ear Foams, Heel Mepilex, Sacral Mepilex, Pillows, Elbow Mepilex and Q2 Turns    Possible Skin Injury Yes    Pictures Uploaded Into Epic N/A  Wound Consult Placed Yes  RN Wound Prevention Protocol Ordered Yes

## 2021-07-09 NOTE — CARE PLAN
The patient is Stable - Low risk of patient condition declining or worsening    Shift Goals  Clinical Goals: Pain management   Patient Goals: Obtain plan of care  Family Goals: Family is not at bedside    Progress made toward(s) clinical / shift goals:  Provided pain medication in timely manner, distraction and comfort measures, plans to round with provider for plan of care.     Patient is not progressing towards the following goals:
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
## 2021-07-09 NOTE — CARE PLAN
The patient is Stable - Low risk of patient condition declining or worsening    Shift Goals  Clinical Goals: Pain management   Patient Goals: obtain POC  Family Goals: Family is not at bedside    Progress made toward(s) clinical / shift goals:  pain med, distraction, discuss POC with pt. APRN rounded and discussed poc with pt    Patient is not progressing towards the following goals:

## 2021-07-10 ENCOUNTER — APPOINTMENT (OUTPATIENT)
Dept: RADIOLOGY | Facility: MEDICAL CENTER | Age: 76
DRG: 964 | End: 2021-07-10
Attending: NURSE PRACTITIONER
Payer: OTHER MISCELLANEOUS

## 2021-07-10 PROBLEM — F32.A DEPRESSION: Status: ACTIVE | Noted: 2021-07-10

## 2021-07-10 LAB
GLUCOSE BLD-MCNC: 107 MG/DL (ref 65–99)
GLUCOSE BLD-MCNC: 133 MG/DL (ref 65–99)
GLUCOSE BLD-MCNC: 143 MG/DL (ref 65–99)

## 2021-07-10 PROCEDURE — 700111 HCHG RX REV CODE 636 W/ 250 OVERRIDE (IP): Performed by: NURSE PRACTITIONER

## 2021-07-10 PROCEDURE — A9270 NON-COVERED ITEM OR SERVICE: HCPCS | Performed by: NURSE PRACTITIONER

## 2021-07-10 PROCEDURE — 700102 HCHG RX REV CODE 250 W/ 637 OVERRIDE(OP): Performed by: NURSE PRACTITIONER

## 2021-07-10 PROCEDURE — 700105 HCHG RX REV CODE 258: Performed by: NURSE PRACTITIONER

## 2021-07-10 PROCEDURE — A9270 NON-COVERED ITEM OR SERVICE: HCPCS | Performed by: SURGERY

## 2021-07-10 PROCEDURE — 700111 HCHG RX REV CODE 636 W/ 250 OVERRIDE (IP): Performed by: SURGERY

## 2021-07-10 PROCEDURE — 71045 X-RAY EXAM CHEST 1 VIEW: CPT

## 2021-07-10 PROCEDURE — 82962 GLUCOSE BLOOD TEST: CPT | Mod: 91

## 2021-07-10 PROCEDURE — 700102 HCHG RX REV CODE 250 W/ 637 OVERRIDE(OP): Performed by: SURGERY

## 2021-07-10 PROCEDURE — 700101 HCHG RX REV CODE 250: Performed by: NURSE PRACTITIONER

## 2021-07-10 PROCEDURE — 770006 HCHG ROOM/CARE - MED/SURG/GYN SEMI*

## 2021-07-10 RX ORDER — PREGABALIN 75 MG/1
75 CAPSULE ORAL 3 TIMES DAILY
Status: DISCONTINUED | OUTPATIENT
Start: 2021-07-10 | End: 2021-07-13 | Stop reason: HOSPADM

## 2021-07-10 RX ORDER — MORPHINE SULFATE 4 MG/ML
2 INJECTION, SOLUTION INTRAMUSCULAR; INTRAVENOUS ONCE
Status: COMPLETED | OUTPATIENT
Start: 2021-07-10 | End: 2021-07-10

## 2021-07-10 RX ADMIN — DOCUSATE SODIUM 100 MG: 100 CAPSULE ORAL at 05:10

## 2021-07-10 RX ADMIN — METAXALONE 400 MG: 800 TABLET ORAL at 11:28

## 2021-07-10 RX ADMIN — LEVOTHYROXINE SODIUM 50 MCG: 0.05 TABLET ORAL at 05:11

## 2021-07-10 RX ADMIN — SODIUM CHLORIDE: 9 INJECTION, SOLUTION INTRAVENOUS at 06:44

## 2021-07-10 RX ADMIN — PROPRANOLOL HYDROCHLORIDE 20 MG: 10 TABLET ORAL at 05:11

## 2021-07-10 RX ADMIN — OXYCODONE 5 MG: 5 TABLET ORAL at 15:39

## 2021-07-10 RX ADMIN — OXYCODONE 5 MG: 5 TABLET ORAL at 05:10

## 2021-07-10 RX ADMIN — METAXALONE 400 MG: 800 TABLET ORAL at 05:11

## 2021-07-10 RX ADMIN — METAXALONE 400 MG: 800 TABLET ORAL at 17:10

## 2021-07-10 RX ADMIN — ACETAMINOPHEN 650 MG: 325 TABLET, FILM COATED ORAL at 11:28

## 2021-07-10 RX ADMIN — PROPRANOLOL HYDROCHLORIDE 20 MG: 10 TABLET ORAL at 13:07

## 2021-07-10 RX ADMIN — MAGNESIUM HYDROXIDE 30 ML: 400 SUSPENSION ORAL at 05:09

## 2021-07-10 RX ADMIN — OXYCODONE 5 MG: 5 TABLET ORAL at 23:46

## 2021-07-10 RX ADMIN — ATORVASTATIN CALCIUM 80 MG: 80 TABLET, FILM COATED ORAL at 21:12

## 2021-07-10 RX ADMIN — BISACODYL 10 MG: 10 SUPPOSITORY RECTAL at 10:05

## 2021-07-10 RX ADMIN — LIDOCAINE 2 PATCH: 50 PATCH TOPICAL at 05:09

## 2021-07-10 RX ADMIN — ACETAMINOPHEN 650 MG: 325 TABLET, FILM COATED ORAL at 05:11

## 2021-07-10 RX ADMIN — PREGABALIN 75 MG: 75 CAPSULE ORAL at 11:28

## 2021-07-10 RX ADMIN — PROPRANOLOL HYDROCHLORIDE 20 MG: 10 TABLET ORAL at 21:12

## 2021-07-10 RX ADMIN — LEVETIRACETAM 500 MG: 500 TABLET, FILM COATED ORAL at 17:10

## 2021-07-10 RX ADMIN — ENOXAPARIN SODIUM 30 MG: 30 INJECTION SUBCUTANEOUS at 17:10

## 2021-07-10 RX ADMIN — ACETAMINOPHEN 650 MG: 325 TABLET, FILM COATED ORAL at 17:10

## 2021-07-10 RX ADMIN — ENOXAPARIN SODIUM 30 MG: 30 INJECTION SUBCUTANEOUS at 05:09

## 2021-07-10 RX ADMIN — PREGABALIN 75 MG: 75 CAPSULE ORAL at 17:10

## 2021-07-10 RX ADMIN — ACETAMINOPHEN 650 MG: 325 TABLET, FILM COATED ORAL at 23:40

## 2021-07-10 RX ADMIN — MORPHINE SULFATE 2 MG: 4 INJECTION, SOLUTION INTRAMUSCULAR; INTRAVENOUS at 10:04

## 2021-07-10 RX ADMIN — GABAPENTIN 300 MG: 300 CAPSULE ORAL at 05:10

## 2021-07-10 RX ADMIN — DOCUSATE SODIUM 100 MG: 100 CAPSULE ORAL at 17:10

## 2021-07-10 RX ADMIN — POLYETHYLENE GLYCOL 3350 1 PACKET: 17 POWDER, FOR SOLUTION ORAL at 05:09

## 2021-07-10 RX ADMIN — LEVETIRACETAM 500 MG: 500 TABLET, FILM COATED ORAL at 05:11

## 2021-07-10 ASSESSMENT — COGNITIVE AND FUNCTIONAL STATUS - GENERAL
MOVING FROM LYING ON BACK TO SITTING ON SIDE OF FLAT BED: A LOT
SUGGESTED CMS G CODE MODIFIER MOBILITY: CL
SUGGESTED CMS G CODE MODIFIER DAILY ACTIVITY: CK
MOVING TO AND FROM BED TO CHAIR: A LOT
HELP NEEDED FOR BATHING: A LOT
DAILY ACTIVITIY SCORE: 14
DRESSING REGULAR LOWER BODY CLOTHING: A LOT
CLIMB 3 TO 5 STEPS WITH RAILING: A LOT
MOBILITY SCORE: 12
PERSONAL GROOMING: A LITTLE
TURNING FROM BACK TO SIDE WHILE IN FLAT BAD: A LOT
STANDING UP FROM CHAIR USING ARMS: A LOT
DRESSING REGULAR UPPER BODY CLOTHING: A LOT
WALKING IN HOSPITAL ROOM: A LOT
TOILETING: A LOT
EATING MEALS: A LITTLE

## 2021-07-10 ASSESSMENT — PAIN DESCRIPTION - PAIN TYPE
TYPE: ACUTE PAIN

## 2021-07-10 ASSESSMENT — ENCOUNTER SYMPTOMS
BACK PAIN: 0
FEVER: 0
TINGLING: 0
ROS GI COMMENTS: LAST BM PTA
NAUSEA: 0
SHORTNESS OF BREATH: 0
DIZZINESS: 0
SENSORY CHANGE: 0
DOUBLE VISION: 0
BLURRED VISION: 0
CONSTIPATION: 0
ABDOMINAL PAIN: 0
HEADACHES: 0
MYALGIAS: 1
FOCAL WEAKNESS: 0
NECK PAIN: 0
CHILLS: 0
SPEECH CHANGE: 0
VOMITING: 0

## 2021-07-10 NOTE — ASSESSMENT & PLAN NOTE
7/10 Pt reports feelings of sadness and depression. States he wishes he was dead but does not have a plan to harm himself. No history of SI or attempts.  - Psychiatry consult completed. Anticipate improvement with mood/depression as pain control improves.

## 2021-07-10 NOTE — PROGRESS NOTES
DATE OF SERVICE:  07/10/2021     TIME:  Approximately 9:30.     SUBJECTIVE:   The patient is lying in bed.  He complains of pain in his chest   and left shoulder.  No pains in the right arm or either lower extremity.  No   fevers or chills.     PHYSICAL EXAMINATION:  VITAL SIGNS:  Blood pressure 129/78, heart rate 79, respirations 17, and   temperature 97.2.  EXTREMITIES:  There are abrasions over the left shoulder.  He is able to move   all of his fingers.     ASSESSMENT:  Left acromion fracture.     PLAN:  Continue nonoperative treatment.  Sling for comfort.  He can be   weightbearing as tolerated for use with a walker for transfers, but no lifting   more than 1-2 pounds.  Check repeat radiographs in approximately 1 week.        ______________________________  MD DUNIA HAYES/ERWIN    DD:  07/10/2021 14:18  DT:  07/10/2021 14:53    Job#:  [id number]

## 2021-07-10 NOTE — CARE PLAN
The patient is Stable - Low risk of patient condition declining or worsening    Shift Goals  Clinical Goals: pain management, BM  Patient Goals: discharge  Family Goals: Family is not at bedside    Progress made toward(s) clinical / shift goals:  Pt medicated per MAR for pain control, ice and repositioning used and non-pharmacological adjuncts. Bowel protocol being followed according to policy.     Patient is not progressing towards the following goals:    Problem: Pain - Standard  Goal: Alleviation of pain or a reduction in pain to the patient’s comfort goal  Outcome: Not Progressing  Pt continually complaining of pain in L ribs and L shoulder/clavicle, increased pain with any movement.

## 2021-07-10 NOTE — CONSULTS
PSYCHIATRY CONSULT TEAM NOTE: Consult received for psychotherapy. Patient will be seen when able.     Thank you.

## 2021-07-10 NOTE — DISCHARGE PLANNING
[facility] Transitional Care Coordination    Referral from:  JAN Lujan    Insurance Provider on Facesheet: [employer]    Potential Rehab Diagnosis: TBI/multi-trauma    Chart review indicates patient may have on going medical management and may have therapy needs to possibly meet inpatient rehab facility criteria with the goal of returning to community.    D/C support: TBD     Physiatry consultation forwarded per protocol.     TBI/multi-trauma.  Would appreciate updated TX evals tomorrow for review by Consulting Physiatrist on Monday.   left for Litzy SMITH.     Thank you for the referral.
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
## 2021-07-11 ENCOUNTER — APPOINTMENT (OUTPATIENT)
Dept: RADIOLOGY | Facility: MEDICAL CENTER | Age: 76
DRG: 964 | End: 2021-07-11
Attending: NURSE PRACTITIONER
Payer: OTHER MISCELLANEOUS

## 2021-07-11 LAB
GLUCOSE BLD-MCNC: 131 MG/DL (ref 65–99)
GLUCOSE BLD-MCNC: 145 MG/DL (ref 65–99)
GLUCOSE BLD-MCNC: 148 MG/DL (ref 65–99)
GLUCOSE BLD-MCNC: 169 MG/DL (ref 65–99)
GLUCOSE BLD-MCNC: 177 MG/DL (ref 65–99)

## 2021-07-11 PROCEDURE — 700102 HCHG RX REV CODE 250 W/ 637 OVERRIDE(OP): Performed by: SPECIALIST

## 2021-07-11 PROCEDURE — 700111 HCHG RX REV CODE 636 W/ 250 OVERRIDE (IP): Performed by: SPECIALIST

## 2021-07-11 PROCEDURE — A9270 NON-COVERED ITEM OR SERVICE: HCPCS | Performed by: NURSE PRACTITIONER

## 2021-07-11 PROCEDURE — A9270 NON-COVERED ITEM OR SERVICE: HCPCS | Performed by: SURGERY

## 2021-07-11 PROCEDURE — 700102 HCHG RX REV CODE 250 W/ 637 OVERRIDE(OP): Performed by: NURSE PRACTITIONER

## 2021-07-11 PROCEDURE — 700101 HCHG RX REV CODE 250: Performed by: NURSE PRACTITIONER

## 2021-07-11 PROCEDURE — 82962 GLUCOSE BLOOD TEST: CPT | Mod: 91

## 2021-07-11 PROCEDURE — A9270 NON-COVERED ITEM OR SERVICE: HCPCS | Performed by: SPECIALIST

## 2021-07-11 PROCEDURE — 306467 ARJO LARGE SLING FOR UP TO 500LBS: Performed by: SURGERY

## 2021-07-11 PROCEDURE — 700102 HCHG RX REV CODE 250 W/ 637 OVERRIDE(OP): Performed by: SURGERY

## 2021-07-11 PROCEDURE — 700111 HCHG RX REV CODE 636 W/ 250 OVERRIDE (IP): Performed by: SURGERY

## 2021-07-11 PROCEDURE — 71045 X-RAY EXAM CHEST 1 VIEW: CPT

## 2021-07-11 PROCEDURE — 770006 HCHG ROOM/CARE - MED/SURG/GYN SEMI*

## 2021-07-11 RX ORDER — HYDROCODONE BITARTRATE AND ACETAMINOPHEN 5; 325 MG/1; MG/1
1 TABLET ORAL EVERY 4 HOURS PRN
Status: DISCONTINUED | OUTPATIENT
Start: 2021-07-11 | End: 2021-07-13 | Stop reason: HOSPADM

## 2021-07-11 RX ORDER — HYDROCODONE BITARTRATE AND ACETAMINOPHEN 10; 325 MG/1; MG/1
1 TABLET ORAL EVERY 4 HOURS PRN
Status: DISCONTINUED | OUTPATIENT
Start: 2021-07-11 | End: 2021-07-13 | Stop reason: HOSPADM

## 2021-07-11 RX ORDER — HYDROCODONE BITARTRATE AND ACETAMINOPHEN 10; 325 MG/1; MG/1
1 TABLET ORAL EVERY 4 HOURS PRN
Status: DISCONTINUED | OUTPATIENT
Start: 2021-07-11 | End: 2021-07-11

## 2021-07-11 RX ORDER — HYDROCODONE BITARTRATE AND ACETAMINOPHEN 5; 325 MG/1; MG/1
1 TABLET ORAL EVERY 6 HOURS PRN
Status: DISCONTINUED | OUTPATIENT
Start: 2021-07-11 | End: 2021-07-11

## 2021-07-11 RX ORDER — HYDROCODONE BITARTRATE AND ACETAMINOPHEN 5; 325 MG/1; MG/1
1 TABLET ORAL EVERY 4 HOURS PRN
Status: DISCONTINUED | OUTPATIENT
Start: 2021-07-11 | End: 2021-07-11

## 2021-07-11 RX ORDER — DEXTROSE MONOHYDRATE 25 G/50ML
50 INJECTION, SOLUTION INTRAVENOUS
Status: DISCONTINUED | OUTPATIENT
Start: 2021-07-11 | End: 2021-07-13 | Stop reason: HOSPADM

## 2021-07-11 RX ORDER — MORPHINE SULFATE 4 MG/ML
2 INJECTION, SOLUTION INTRAMUSCULAR; INTRAVENOUS ONCE
Status: COMPLETED | OUTPATIENT
Start: 2021-07-11 | End: 2021-07-11

## 2021-07-11 RX ORDER — HYDROCODONE BITARTRATE AND ACETAMINOPHEN 10; 325 MG/1; MG/1
1 TABLET ORAL EVERY 6 HOURS PRN
Status: DISCONTINUED | OUTPATIENT
Start: 2021-07-11 | End: 2021-07-11

## 2021-07-11 RX ADMIN — HYDROCODONE BITARTRATE AND ACETAMINOPHEN 1 TABLET: 10; 325 TABLET ORAL at 10:28

## 2021-07-11 RX ADMIN — PREGABALIN 75 MG: 75 CAPSULE ORAL at 11:44

## 2021-07-11 RX ADMIN — LEVETIRACETAM 500 MG: 500 TABLET, FILM COATED ORAL at 04:21

## 2021-07-11 RX ADMIN — PROPRANOLOL HYDROCHLORIDE 20 MG: 10 TABLET ORAL at 04:25

## 2021-07-11 RX ADMIN — METAXALONE 400 MG: 800 TABLET ORAL at 11:44

## 2021-07-11 RX ADMIN — ENOXAPARIN SODIUM 30 MG: 30 INJECTION SUBCUTANEOUS at 17:26

## 2021-07-11 RX ADMIN — PROPRANOLOL HYDROCHLORIDE 20 MG: 10 TABLET ORAL at 20:49

## 2021-07-11 RX ADMIN — POLYETHYLENE GLYCOL 3350 1 PACKET: 17 POWDER, FOR SOLUTION ORAL at 17:26

## 2021-07-11 RX ADMIN — LEVOTHYROXINE SODIUM 50 MCG: 0.05 TABLET ORAL at 04:21

## 2021-07-11 RX ADMIN — MORPHINE SULFATE 2 MG: 4 INJECTION, SOLUTION INTRAMUSCULAR; INTRAVENOUS at 01:50

## 2021-07-11 RX ADMIN — PROPRANOLOL HYDROCHLORIDE 20 MG: 10 TABLET ORAL at 13:40

## 2021-07-11 RX ADMIN — METAXALONE 400 MG: 800 TABLET ORAL at 17:25

## 2021-07-11 RX ADMIN — PREGABALIN 75 MG: 75 CAPSULE ORAL at 04:21

## 2021-07-11 RX ADMIN — ATORVASTATIN CALCIUM 80 MG: 80 TABLET, FILM COATED ORAL at 20:49

## 2021-07-11 RX ADMIN — ENOXAPARIN SODIUM 30 MG: 30 INJECTION SUBCUTANEOUS at 04:21

## 2021-07-11 RX ADMIN — DOCUSATE SODIUM 100 MG: 100 CAPSULE ORAL at 17:25

## 2021-07-11 RX ADMIN — LEVETIRACETAM 500 MG: 500 TABLET, FILM COATED ORAL at 17:26

## 2021-07-11 RX ADMIN — METAXALONE 400 MG: 800 TABLET ORAL at 04:21

## 2021-07-11 RX ADMIN — PREGABALIN 75 MG: 75 CAPSULE ORAL at 17:25

## 2021-07-11 RX ADMIN — ACETAMINOPHEN 650 MG: 325 TABLET, FILM COATED ORAL at 04:21

## 2021-07-11 RX ADMIN — HYDROCODONE BITARTRATE AND ACETAMINOPHEN 1 TABLET: 10; 325 TABLET ORAL at 20:49

## 2021-07-11 RX ADMIN — DOCUSATE SODIUM 50 MG AND SENNOSIDES 8.6 MG 1 TABLET: 8.6; 5 TABLET, FILM COATED ORAL at 20:49

## 2021-07-11 RX ADMIN — LIDOCAINE 1 PATCH: 50 PATCH TOPICAL at 11:54

## 2021-07-11 RX ADMIN — HYDROCODONE BITARTRATE AND ACETAMINOPHEN 1 TABLET: 10; 325 TABLET ORAL at 14:49

## 2021-07-11 RX ADMIN — DOCUSATE SODIUM 100 MG: 100 CAPSULE ORAL at 04:20

## 2021-07-11 ASSESSMENT — ENCOUNTER SYMPTOMS
BACK PAIN: 0
BLURRED VISION: 0
SHORTNESS OF BREATH: 0
NECK PAIN: 0
FOCAL WEAKNESS: 0
NAUSEA: 0
SENSORY CHANGE: 0
ABDOMINAL PAIN: 0
FEVER: 0
DOUBLE VISION: 0
ROS GI COMMENTS: LAST BM PTA
TINGLING: 0
CHILLS: 0
MYALGIAS: 1
DIZZINESS: 0
CONSTIPATION: 0
SPEECH CHANGE: 0
HEADACHES: 0
VOMITING: 0

## 2021-07-11 ASSESSMENT — PAIN DESCRIPTION - PAIN TYPE
TYPE: ACUTE PAIN

## 2021-07-11 ASSESSMENT — FIBROSIS 4 INDEX: FIB4 SCORE: 1.97

## 2021-07-11 NOTE — CARE PLAN
The patient is Stable - Low risk of patient condition declining or worsening    Shift Goals  Clinical Goals: Pain management controlled with turns  Patient Goals: Discharge, pain management  Family Goals: Family is not at bedside    Progress made toward(s) clinical / shift goals:  Medicate per MAR, educate patient on the importance of turns.    Patient is not progressing towards the following goals:
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
## 2021-07-12 ENCOUNTER — APPOINTMENT (OUTPATIENT)
Dept: RADIOLOGY | Facility: MEDICAL CENTER | Age: 76
DRG: 964 | End: 2021-07-12
Attending: NURSE PRACTITIONER
Payer: OTHER MISCELLANEOUS

## 2021-07-12 LAB
GLUCOSE BLD-MCNC: 137 MG/DL (ref 65–99)
GLUCOSE BLD-MCNC: 153 MG/DL (ref 65–99)
GLUCOSE BLD-MCNC: 153 MG/DL (ref 65–99)
GLUCOSE BLD-MCNC: 156 MG/DL (ref 65–99)

## 2021-07-12 PROCEDURE — 71045 X-RAY EXAM CHEST 1 VIEW: CPT

## 2021-07-12 PROCEDURE — A9270 NON-COVERED ITEM OR SERVICE: HCPCS | Performed by: NURSE PRACTITIONER

## 2021-07-12 PROCEDURE — 700102 HCHG RX REV CODE 250 W/ 637 OVERRIDE(OP): Performed by: NURSE PRACTITIONER

## 2021-07-12 PROCEDURE — 97530 THERAPEUTIC ACTIVITIES: CPT | Mod: CQ

## 2021-07-12 PROCEDURE — 700102 HCHG RX REV CODE 250 W/ 637 OVERRIDE(OP): Performed by: SPECIALIST

## 2021-07-12 PROCEDURE — 94760 N-INVAS EAR/PLS OXIMETRY 1: CPT

## 2021-07-12 PROCEDURE — 99223 1ST HOSP IP/OBS HIGH 75: CPT | Performed by: PHYSICAL MEDICINE & REHABILITATION

## 2021-07-12 PROCEDURE — A9270 NON-COVERED ITEM OR SERVICE: HCPCS | Performed by: SPECIALIST

## 2021-07-12 PROCEDURE — 700111 HCHG RX REV CODE 636 W/ 250 OVERRIDE (IP): Performed by: SURGERY

## 2021-07-12 PROCEDURE — 94669 MECHANICAL CHEST WALL OSCILL: CPT

## 2021-07-12 PROCEDURE — A9270 NON-COVERED ITEM OR SERVICE: HCPCS | Performed by: SURGERY

## 2021-07-12 PROCEDURE — 700101 HCHG RX REV CODE 250: Performed by: NURSE PRACTITIONER

## 2021-07-12 PROCEDURE — 770006 HCHG ROOM/CARE - MED/SURG/GYN SEMI*

## 2021-07-12 PROCEDURE — 700102 HCHG RX REV CODE 250 W/ 637 OVERRIDE(OP): Performed by: SURGERY

## 2021-07-12 PROCEDURE — 97530 THERAPEUTIC ACTIVITIES: CPT

## 2021-07-12 PROCEDURE — 82962 GLUCOSE BLOOD TEST: CPT | Mod: 91

## 2021-07-12 RX ORDER — PRIMIDONE 50 MG/1
50 TABLET ORAL ONCE
Status: COMPLETED | OUTPATIENT
Start: 2021-07-12 | End: 2021-07-12

## 2021-07-12 RX ORDER — PRIMIDONE 50 MG/1
50 TABLET ORAL 2 TIMES DAILY
Status: DISCONTINUED | OUTPATIENT
Start: 2021-07-12 | End: 2021-07-13 | Stop reason: HOSPADM

## 2021-07-12 RX ORDER — GLIMEPIRIDE 4 MG/1
4 TABLET ORAL EVERY MORNING
Status: DISCONTINUED | OUTPATIENT
Start: 2021-07-12 | End: 2021-07-13 | Stop reason: HOSPADM

## 2021-07-12 RX ADMIN — LEVETIRACETAM 500 MG: 500 TABLET, FILM COATED ORAL at 04:58

## 2021-07-12 RX ADMIN — GLIMEPIRIDE 4 MG: 4 TABLET ORAL at 12:31

## 2021-07-12 RX ADMIN — PREGABALIN 75 MG: 75 CAPSULE ORAL at 12:08

## 2021-07-12 RX ADMIN — LIDOCAINE 1 PATCH: 50 PATCH TOPICAL at 12:02

## 2021-07-12 RX ADMIN — METFORMIN HYDROCHLORIDE 1000 MG: 500 TABLET ORAL at 12:08

## 2021-07-12 RX ADMIN — METAXALONE 400 MG: 800 TABLET ORAL at 04:58

## 2021-07-12 RX ADMIN — PROPRANOLOL HYDROCHLORIDE 20 MG: 10 TABLET ORAL at 21:34

## 2021-07-12 RX ADMIN — PROPRANOLOL HYDROCHLORIDE 20 MG: 10 TABLET ORAL at 13:19

## 2021-07-12 RX ADMIN — POLYETHYLENE GLYCOL 3350 1 PACKET: 17 POWDER, FOR SOLUTION ORAL at 17:25

## 2021-07-12 RX ADMIN — METAXALONE 400 MG: 800 TABLET ORAL at 12:08

## 2021-07-12 RX ADMIN — ENOXAPARIN SODIUM 30 MG: 30 INJECTION SUBCUTANEOUS at 17:26

## 2021-07-12 RX ADMIN — ENOXAPARIN SODIUM 30 MG: 30 INJECTION SUBCUTANEOUS at 04:59

## 2021-07-12 RX ADMIN — ATORVASTATIN CALCIUM 80 MG: 80 TABLET, FILM COATED ORAL at 20:38

## 2021-07-12 RX ADMIN — PREGABALIN 75 MG: 75 CAPSULE ORAL at 04:58

## 2021-07-12 RX ADMIN — PROPRANOLOL HYDROCHLORIDE 20 MG: 10 TABLET ORAL at 04:58

## 2021-07-12 RX ADMIN — HYDROCODONE BITARTRATE AND ACETAMINOPHEN 1 TABLET: 10; 325 TABLET ORAL at 04:58

## 2021-07-12 RX ADMIN — HYDROCODONE BITARTRATE AND ACETAMINOPHEN 1 TABLET: 10; 325 TABLET ORAL at 12:29

## 2021-07-12 RX ADMIN — PRIMIDONE 50 MG: 50 TABLET ORAL at 12:29

## 2021-07-12 RX ADMIN — PRIMIDONE 50 MG: 50 TABLET ORAL at 20:38

## 2021-07-12 RX ADMIN — DOCUSATE SODIUM 100 MG: 100 CAPSULE ORAL at 04:58

## 2021-07-12 RX ADMIN — LEVOTHYROXINE SODIUM 50 MCG: 0.05 TABLET ORAL at 04:59

## 2021-07-12 RX ADMIN — METFORMIN HYDROCHLORIDE 1000 MG: 500 TABLET ORAL at 17:26

## 2021-07-12 RX ADMIN — LEVETIRACETAM 500 MG: 500 TABLET, FILM COATED ORAL at 17:26

## 2021-07-12 RX ADMIN — DOCUSATE SODIUM 50 MG AND SENNOSIDES 8.6 MG 1 TABLET: 8.6; 5 TABLET, FILM COATED ORAL at 20:38

## 2021-07-12 RX ADMIN — DOCUSATE SODIUM 100 MG: 100 CAPSULE ORAL at 17:26

## 2021-07-12 RX ADMIN — PREGABALIN 75 MG: 75 CAPSULE ORAL at 17:33

## 2021-07-12 RX ADMIN — METAXALONE 400 MG: 800 TABLET ORAL at 17:24

## 2021-07-12 ASSESSMENT — COGNITIVE AND FUNCTIONAL STATUS - GENERAL
CLIMB 3 TO 5 STEPS WITH RAILING: TOTAL
MOVING FROM LYING ON BACK TO SITTING ON SIDE OF FLAT BED: UNABLE
TURNING FROM BACK TO SIDE WHILE IN FLAT BAD: A LOT
MOBILITY SCORE: 10
WALKING IN HOSPITAL ROOM: A LOT
DAILY ACTIVITIY SCORE: 21
SUGGESTED CMS G CODE MODIFIER DAILY ACTIVITY: CJ
DRESSING REGULAR LOWER BODY CLOTHING: A LITTLE
MOVING TO AND FROM BED TO CHAIR: A LOT
STANDING UP FROM CHAIR USING ARMS: A LOT
TOILETING: A LITTLE
SUGGESTED CMS G CODE MODIFIER MOBILITY: CL
HELP NEEDED FOR BATHING: A LITTLE

## 2021-07-12 ASSESSMENT — PAIN DESCRIPTION - PAIN TYPE
TYPE: ACUTE PAIN

## 2021-07-12 ASSESSMENT — ENCOUNTER SYMPTOMS
BLURRED VISION: 0
DIZZINESS: 0
VOMITING: 0
NECK PAIN: 0
MYALGIAS: 1
FOCAL WEAKNESS: 0
SHORTNESS OF BREATH: 0
FEVER: 0
DOUBLE VISION: 0
CONSTIPATION: 0
HEADACHES: 0
BACK PAIN: 0
CHILLS: 0
ABDOMINAL PAIN: 0
SPEECH CHANGE: 0
TINGLING: 0
SENSORY CHANGE: 0
NAUSEA: 0

## 2021-07-12 ASSESSMENT — GAIT ASSESSMENTS
ASSISTIVE DEVICE: HAND HELD ASSIST
DEVIATION: BRADYKINETIC;ANTALGIC;SHUFFLED GAIT
GAIT LEVEL OF ASSIST: MODERATE ASSIST
DISTANCE (FEET): 3
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
## 2021-07-12 NOTE — THERAPY
Occupational Therapy  Daily Treatment     Patient Name: Kevin Jackson  Age:  76 y.o., Sex:  male  Medical Record #: [id number]  Today's Date: 7/12/2021     Precautions  Precautions: (P) Fall Risk, Non Weight Bearing Left Upper Extremity, Swallow Precautions ( See Comments)  Comments: h/o R BKA, prosthesis in room, 1 lb lifting restriction L UE, ROM AT L UE, sling for comfort.     Assessment     Pt currently limited by decreased functional mobility, activity tolerance,, strength, AROM, coordination, balance,  which are affecting pt's ability to complete ADLs/IADLs at baseline. Pt would benefit from OT services in the acute care setting to maximize functional recovery.     Plan    Continue current treatment plan.    DC Equipment Recommendations: Unable to determine at this time  Discharge Recommendations: (P) Recommend post-acute placement for additional occupational therapy services prior to discharge home       07/12/21 1035   Activities of Daily Living   Grooming Supervision   Upper Body Dressing Supervision   Lower Body Dressing Minimal Assist  (with prosthetic)   Functional Mobility   Sit to Stand Minimal Assist   Bed, Chair, Wheelchair Transfer Minimal Assist   Short Term Goals   Short Term Goal # 1 pt will demo toilet txf with supv   Goal Outcome # 1 Progressing as expected   Short Term Goal # 2 pt will dress LB with supv using AE prn   Goal Outcome # 2 Progressing as expected   Short Term Goal # 4 pt will dress UB with supv   Goal Outcome # 4 Progressing as expected

## 2021-07-12 NOTE — PROGRESS NOTES
Trauma Progress Note    Call placed to wife Jerome  POC discussed, all questions answered  Agreeable to [facility] for referral, pt has been there before
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
## 2021-07-12 NOTE — THERAPY
"Physical Therapy   Daily Treatment     Patient Name: Kevin Jackson  Age:  76 y.o., Sex:  male  Medical Record #: [id number]  Today's Date: 7/12/2021     Precautions: Fall Risk, Non Weight Bearing Left Upper Extremity, Swallow Precautions ( See Comments), Other (See Comments)    Assessment    Pt presenting motivated to participate despite high levels of pain. Pt mostly limited by L rib pain causing extra time to perform all mobility. Pt able to demonstrate improved dynamic EOB balance while attempting to don prosthetic. Pt very guarded of L ribs during standing and flexed towards the L, unable to maintain full upright posture for long. Pt having a hard time w/ LE advancement d/t L flexed posture.    Plan    Continue current treatment plan.    DC Equipment Recommendations: Unable to determine at this time  Discharge Recommendations: Recommend post-acute placement for additional physical therapy services prior to discharge home      Subjective    \"It actually feels better up in the chair than the bed.\"     Objective       07/12/21 1058   Precautions   Precautions Fall Risk;Non Weight Bearing Left Upper Extremity;Swallow Precautions ( See Comments);Other (See Comments)   Comments R BKA w/ prosthesis, L TMA w/ orthotic shoe, ROMAT L UE   Gait Analysis   Gait Level Of Assist Moderate Assist   Assistive Device Hand Held Assist   Distance (Feet) 3   # of Times Distance was Traveled 2   Deviation Bradykinetic;Antalgic;Shuffled Gait   Comments Pt w/ decreased control of foot placement on R LE. Poor weight shifting onto R LE d/t flexed position towards L to gaurd ribs.   Bed Mobility    Supine to Sit Minimal Assist   Sit to Supine   (NT up in chair)   Scooting Supervised   Rolling Minimal Assist to Rt.   Comments A lot of time to perform d/t high pain. Use of bed rail to roll and pull on to scoot hips over.   Functional Mobility   Sit to Stand Minimal Assist   Bed, Chair, Wheelchair Transfer Moderate Assist   Transfer Method " Stand Pivot   Mobility SPT w/ HHA. Pt w/ poor eccentric control for sitting.   Short Term Goals    Short Term Goal # 1 pt will be able to complete bed mobility from flat bed with SPV in 6tx in order to return home   Goal Outcome # 1 goal not met   Short Term Goal # 2 pt will be able to complete functional transfers with SPV in 6tx in order to return home   Goal Outcome # 2 Goal not met   Short Term Goal # 3 pt will be able to ambulate 50ft with LRAD and min assist in 6tx in order to progress to return home   Goal Outcome # 3 Goal not met

## 2021-07-12 NOTE — CARE PLAN
The patient is Stable - Low risk of patient condition declining or worsening    Shift Goals  Clinical Goals: Pain management  Patient Goals: Pain management  Family Goals: Family is not at bedside    Progress made toward(s) clinical / shift goals:  Medicate per MAR, encourage reposition and relaxation techniques.    Patient is not progressing towards the following goals:

## 2021-07-13 ENCOUNTER — PATIENT OUTREACH (OUTPATIENT)
Dept: HEALTH INFORMATION MANAGEMENT | Facility: OTHER | Age: 76
End: 2021-07-13

## 2021-07-13 ENCOUNTER — HOSPITAL ENCOUNTER (INPATIENT)
Facility: REHABILITATION | Age: 76
LOS: 1 days | DRG: 950 | End: 2021-07-14
Attending: PHYSICAL MEDICINE & REHABILITATION | Admitting: PHYSICAL MEDICINE & REHABILITATION
Payer: MEDICARE

## 2021-07-13 VITALS
OXYGEN SATURATION: 93 % | BODY MASS INDEX: 26.82 KG/M2 | HEIGHT: 73 IN | DIASTOLIC BLOOD PRESSURE: 80 MMHG | SYSTOLIC BLOOD PRESSURE: 137 MMHG | WEIGHT: 202.38 LBS | TEMPERATURE: 97.6 F | HEART RATE: 83 BPM | RESPIRATION RATE: 18 BRPM

## 2021-07-13 LAB
ANION GAP SERPL CALC-SCNC: 19 MMOL/L (ref 7–16)
BUN SERPL-MCNC: 22 MG/DL (ref 8–22)
CALCIUM SERPL-MCNC: 9.4 MG/DL (ref 8.5–10.5)
CHLORIDE SERPL-SCNC: 105 MMOL/L (ref 96–112)
CO2 SERPL-SCNC: 17 MMOL/L (ref 20–33)
CREAT SERPL-MCNC: 0.93 MG/DL (ref 0.5–1.4)
GLUCOSE BLD-MCNC: 121 MG/DL (ref 65–99)
GLUCOSE BLD-MCNC: 122 MG/DL (ref 65–99)
GLUCOSE BLD-MCNC: 129 MG/DL (ref 65–99)
GLUCOSE BLD-MCNC: 131 MG/DL (ref 65–99)
GLUCOSE SERPL-MCNC: 134 MG/DL (ref 65–99)
POTASSIUM SERPL-SCNC: 4.2 MMOL/L (ref 3.6–5.5)
SODIUM SERPL-SCNC: 141 MMOL/L (ref 135–145)

## 2021-07-13 PROCEDURE — 700102 HCHG RX REV CODE 250 W/ 637 OVERRIDE(OP): Performed by: SPECIALIST

## 2021-07-13 PROCEDURE — 36415 COLL VENOUS BLD VENIPUNCTURE: CPT

## 2021-07-13 PROCEDURE — A9270 NON-COVERED ITEM OR SERVICE: HCPCS | Performed by: NURSE PRACTITIONER

## 2021-07-13 PROCEDURE — U0005 INFEC AGEN DETEC AMPLI PROBE: HCPCS

## 2021-07-13 PROCEDURE — 700111 HCHG RX REV CODE 636 W/ 250 OVERRIDE (IP): Performed by: SURGERY

## 2021-07-13 PROCEDURE — 700102 HCHG RX REV CODE 250 W/ 637 OVERRIDE(OP): Performed by: NURSE PRACTITIONER

## 2021-07-13 PROCEDURE — 700102 HCHG RX REV CODE 250 W/ 637 OVERRIDE(OP): Performed by: PHYSICAL MEDICINE & REHABILITATION

## 2021-07-13 PROCEDURE — 99223 1ST HOSP IP/OBS HIGH 75: CPT | Mod: AI | Performed by: PHYSICAL MEDICINE & REHABILITATION

## 2021-07-13 PROCEDURE — A9270 NON-COVERED ITEM OR SERVICE: HCPCS | Performed by: SPECIALIST

## 2021-07-13 PROCEDURE — 700101 HCHG RX REV CODE 250: Performed by: NURSE PRACTITIONER

## 2021-07-13 PROCEDURE — 82962 GLUCOSE BLOOD TEST: CPT | Mod: 91

## 2021-07-13 PROCEDURE — A9270 NON-COVERED ITEM OR SERVICE: HCPCS | Performed by: PHYSICAL MEDICINE & REHABILITATION

## 2021-07-13 PROCEDURE — 700102 HCHG RX REV CODE 250 W/ 637 OVERRIDE(OP): Performed by: SURGERY

## 2021-07-13 PROCEDURE — 92523 SPEECH SOUND LANG COMPREHEN: CPT

## 2021-07-13 PROCEDURE — A9270 NON-COVERED ITEM OR SERVICE: HCPCS | Performed by: SURGERY

## 2021-07-13 PROCEDURE — 80048 BASIC METABOLIC PNL TOTAL CA: CPT

## 2021-07-13 PROCEDURE — 770010 HCHG ROOM/CARE - REHAB SEMI PRIVAT*

## 2021-07-13 PROCEDURE — 94760 N-INVAS EAR/PLS OXIMETRY 1: CPT

## 2021-07-13 PROCEDURE — U0003 INFECTIOUS AGENT DETECTION BY NUCLEIC ACID (DNA OR RNA); SEVERE ACUTE RESPIRATORY SYNDROME CORONAVIRUS 2 (SARS-COV-2) (CORONAVIRUS DISEASE [COVID-19]), AMPLIFIED PROBE TECHNIQUE, MAKING USE OF HIGH THROUGHPUT TECHNOLOGIES AS DESCRIBED BY CMS-2020-01-R: HCPCS

## 2021-07-13 RX ORDER — HYDROCODONE BITARTRATE AND ACETAMINOPHEN 10; 325 MG/1; MG/1
1 TABLET ORAL 3 TIMES DAILY
Status: DISCONTINUED | OUTPATIENT
Start: 2021-07-13 | End: 2021-07-14

## 2021-07-13 RX ORDER — POLYVINYL ALCOHOL 14 MG/ML
1 SOLUTION/ DROPS OPHTHALMIC PRN
Status: DISCONTINUED | OUTPATIENT
Start: 2021-07-13 | End: 2021-07-17 | Stop reason: HOSPADM

## 2021-07-13 RX ORDER — ECHINACEA PURPUREA EXTRACT 125 MG
2 TABLET ORAL PRN
Status: DISCONTINUED | OUTPATIENT
Start: 2021-07-13 | End: 2021-07-17 | Stop reason: HOSPADM

## 2021-07-13 RX ORDER — MIDAZOLAM HYDROCHLORIDE 5 MG/ML
5 INJECTION INTRAMUSCULAR; INTRAVENOUS PRN
Status: DISCONTINUED | OUTPATIENT
Start: 2021-07-13 | End: 2021-07-17 | Stop reason: HOSPADM

## 2021-07-13 RX ORDER — GLIMEPIRIDE 4 MG/1
4 TABLET ORAL EVERY MORNING
Status: CANCELLED | OUTPATIENT
Start: 2021-07-14

## 2021-07-13 RX ORDER — METAXALONE 800 MG/1
400 TABLET ORAL 3 TIMES DAILY
Status: CANCELLED | OUTPATIENT
Start: 2021-07-13

## 2021-07-13 RX ORDER — METAXALONE 400 MG/1
400 TABLET ORAL 3 TIMES DAILY
Qty: 90 TABLET | Status: SHIPPED
Start: 2021-07-13 | End: 2021-12-14

## 2021-07-13 RX ORDER — HYDRALAZINE HYDROCHLORIDE 25 MG/1
25 TABLET, FILM COATED ORAL EVERY 8 HOURS PRN
Status: DISCONTINUED | OUTPATIENT
Start: 2021-07-13 | End: 2021-07-17 | Stop reason: HOSPADM

## 2021-07-13 RX ORDER — AMOXICILLIN 250 MG
2 CAPSULE ORAL 2 TIMES DAILY
Status: DISCONTINUED | OUTPATIENT
Start: 2021-07-13 | End: 2021-07-17 | Stop reason: HOSPADM

## 2021-07-13 RX ORDER — DEXTROSE MONOHYDRATE 25 G/50ML
50 INJECTION, SOLUTION INTRAVENOUS
Status: CANCELLED | OUTPATIENT
Start: 2021-07-13

## 2021-07-13 RX ORDER — ATORVASTATIN CALCIUM 40 MG/1
80 TABLET, FILM COATED ORAL NIGHTLY
Status: DISCONTINUED | OUTPATIENT
Start: 2021-07-13 | End: 2021-07-17 | Stop reason: HOSPADM

## 2021-07-13 RX ORDER — PREGABALIN 75 MG/1
75 CAPSULE ORAL 3 TIMES DAILY
Status: DISCONTINUED | OUTPATIENT
Start: 2021-07-13 | End: 2021-07-17 | Stop reason: HOSPADM

## 2021-07-13 RX ORDER — PREGABALIN 75 MG/1
75 CAPSULE ORAL 3 TIMES DAILY
Qty: 21 CAPSULE | Refills: 0 | Status: ON HOLD
Start: 2021-07-13 | End: 2021-08-02

## 2021-07-13 RX ORDER — PRIMIDONE 50 MG/1
50 TABLET ORAL 2 TIMES DAILY
Status: CANCELLED | OUTPATIENT
Start: 2021-07-13

## 2021-07-13 RX ORDER — DOCUSATE SODIUM 100 MG/1
100 CAPSULE, LIQUID FILLED ORAL 2 TIMES DAILY
Status: DISCONTINUED | OUTPATIENT
Start: 2021-07-13 | End: 2021-07-13

## 2021-07-13 RX ORDER — OXYCODONE HYDROCHLORIDE 10 MG/1
10 TABLET ORAL
Status: DISCONTINUED | OUTPATIENT
Start: 2021-07-13 | End: 2021-07-13

## 2021-07-13 RX ORDER — LEVOTHYROXINE SODIUM 0.05 MG/1
50 TABLET ORAL
Status: CANCELLED | OUTPATIENT
Start: 2021-07-14

## 2021-07-13 RX ORDER — DEXTROSE MONOHYDRATE 25 G/50ML
50 INJECTION, SOLUTION INTRAVENOUS
Status: DISCONTINUED | OUTPATIENT
Start: 2021-07-13 | End: 2021-07-17 | Stop reason: HOSPADM

## 2021-07-13 RX ORDER — PREGABALIN 75 MG/1
75 CAPSULE ORAL 3 TIMES DAILY
Status: CANCELLED | OUTPATIENT
Start: 2021-07-13

## 2021-07-13 RX ORDER — ATORVASTATIN CALCIUM 80 MG/1
80 TABLET, FILM COATED ORAL NIGHTLY
Status: CANCELLED | OUTPATIENT
Start: 2021-07-13

## 2021-07-13 RX ORDER — ALUMINA, MAGNESIA, AND SIMETHICONE 2400; 2400; 240 MG/30ML; MG/30ML; MG/30ML
20 SUSPENSION ORAL
Status: DISCONTINUED | OUTPATIENT
Start: 2021-07-13 | End: 2021-07-17 | Stop reason: HOSPADM

## 2021-07-13 RX ORDER — LIDOCAINE 50 MG/G
1-2 PATCH TOPICAL EVERY 24 HOURS
Qty: 10 PATCH | Status: SHIPPED
Start: 2021-07-13 | End: 2021-12-14

## 2021-07-13 RX ORDER — ACETAMINOPHEN 325 MG/1
650 TABLET ORAL EVERY 4 HOURS PRN
Status: DISCONTINUED | OUTPATIENT
Start: 2021-07-13 | End: 2021-07-17 | Stop reason: HOSPADM

## 2021-07-13 RX ORDER — PROPRANOLOL HYDROCHLORIDE 10 MG/1
20 TABLET ORAL EVERY 8 HOURS
Status: CANCELLED | OUTPATIENT
Start: 2021-07-13

## 2021-07-13 RX ORDER — HYDROCODONE BITARTRATE AND ACETAMINOPHEN 5; 325 MG/1; MG/1
2 TABLET ORAL 3 TIMES DAILY
Status: DISCONTINUED | OUTPATIENT
Start: 2021-07-13 | End: 2021-07-13

## 2021-07-13 RX ORDER — HYDROCODONE BITARTRATE AND ACETAMINOPHEN 5; 325 MG/1; MG/1
1 TABLET ORAL EVERY 4 HOURS PRN
Status: CANCELLED | OUTPATIENT
Start: 2021-07-13

## 2021-07-13 RX ORDER — PROPRANOLOL HYDROCHLORIDE 10 MG/1
20 TABLET ORAL EVERY 8 HOURS
Status: DISCONTINUED | OUTPATIENT
Start: 2021-07-13 | End: 2021-07-17 | Stop reason: HOSPADM

## 2021-07-13 RX ORDER — LIDOCAINE 50 MG/G
1-2 PATCH TOPICAL EVERY 24 HOURS
Status: DISCONTINUED | OUTPATIENT
Start: 2021-07-14 | End: 2021-07-17 | Stop reason: HOSPADM

## 2021-07-13 RX ORDER — POLYETHYLENE GLYCOL 3350 17 G/17G
1 POWDER, FOR SOLUTION ORAL
Status: DISCONTINUED | OUTPATIENT
Start: 2021-07-13 | End: 2021-07-17 | Stop reason: HOSPADM

## 2021-07-13 RX ORDER — POLYETHYLENE GLYCOL 3350 17 G/17G
1 POWDER, FOR SOLUTION ORAL 2 TIMES DAILY
Status: DISCONTINUED | OUTPATIENT
Start: 2021-07-13 | End: 2021-07-13

## 2021-07-13 RX ORDER — HYDROCODONE BITARTRATE AND ACETAMINOPHEN 10; 325 MG/1; MG/1
1 TABLET ORAL EVERY 4 HOURS PRN
Status: CANCELLED | OUTPATIENT
Start: 2021-07-13

## 2021-07-13 RX ORDER — OXYCODONE HYDROCHLORIDE 5 MG/1
5 TABLET ORAL
Status: DISCONTINUED | OUTPATIENT
Start: 2021-07-13 | End: 2021-07-13

## 2021-07-13 RX ORDER — ONDANSETRON 4 MG/1
4 TABLET, ORALLY DISINTEGRATING ORAL 4 TIMES DAILY PRN
Status: DISCONTINUED | OUTPATIENT
Start: 2021-07-13 | End: 2021-07-17 | Stop reason: HOSPADM

## 2021-07-13 RX ORDER — ONDANSETRON 2 MG/ML
4 INJECTION INTRAMUSCULAR; INTRAVENOUS 4 TIMES DAILY PRN
Status: DISCONTINUED | OUTPATIENT
Start: 2021-07-13 | End: 2021-07-17 | Stop reason: HOSPADM

## 2021-07-13 RX ORDER — LEVOTHYROXINE SODIUM 0.05 MG/1
50 TABLET ORAL
Status: DISCONTINUED | OUTPATIENT
Start: 2021-07-14 | End: 2021-07-17 | Stop reason: HOSPADM

## 2021-07-13 RX ORDER — BISACODYL 10 MG
10 SUPPOSITORY, RECTAL RECTAL
Status: DISCONTINUED | OUTPATIENT
Start: 2021-07-13 | End: 2021-07-17 | Stop reason: HOSPADM

## 2021-07-13 RX ORDER — POLYETHYLENE GLYCOL 3350 17 G/17G
1 POWDER, FOR SOLUTION ORAL 2 TIMES DAILY
Status: CANCELLED | OUTPATIENT
Start: 2021-07-13

## 2021-07-13 RX ORDER — HYDROCODONE BITARTRATE AND ACETAMINOPHEN 5; 325 MG/1; MG/1
1 TABLET ORAL EVERY 4 HOURS PRN
Status: DISCONTINUED | OUTPATIENT
Start: 2021-07-13 | End: 2021-07-17 | Stop reason: HOSPADM

## 2021-07-13 RX ORDER — HYDROCODONE BITARTRATE AND ACETAMINOPHEN 5; 325 MG/1; MG/1
1 TABLET ORAL EVERY 4 HOURS PRN
Qty: 20 TABLET | Refills: 0 | Status: ON HOLD
Start: 2021-07-13 | End: 2021-08-02

## 2021-07-13 RX ORDER — DEXTROSE MONOHYDRATE 25 G/50ML
50 INJECTION, SOLUTION INTRAVENOUS PRN
Refills: 0 | Status: ON HOLD
Start: 2021-07-13 | End: 2021-08-02

## 2021-07-13 RX ORDER — DOCUSATE SODIUM 100 MG/1
100 CAPSULE, LIQUID FILLED ORAL 2 TIMES DAILY
Status: CANCELLED | OUTPATIENT
Start: 2021-07-13

## 2021-07-13 RX ORDER — PRIMIDONE 50 MG/1
50 TABLET ORAL 2 TIMES DAILY
Status: DISCONTINUED | OUTPATIENT
Start: 2021-07-13 | End: 2021-07-17 | Stop reason: HOSPADM

## 2021-07-13 RX ORDER — TRAZODONE HYDROCHLORIDE 50 MG/1
50 TABLET ORAL
Status: DISCONTINUED | OUTPATIENT
Start: 2021-07-13 | End: 2021-07-17 | Stop reason: HOSPADM

## 2021-07-13 RX ORDER — HYDROCODONE BITARTRATE AND ACETAMINOPHEN 10; 325 MG/1; MG/1
1 TABLET ORAL EVERY 4 HOURS PRN
Status: DISCONTINUED | OUTPATIENT
Start: 2021-07-13 | End: 2021-07-17 | Stop reason: HOSPADM

## 2021-07-13 RX ORDER — LIDOCAINE 50 MG/G
1-2 PATCH TOPICAL EVERY 24 HOURS
Status: CANCELLED | OUTPATIENT
Start: 2021-07-14

## 2021-07-13 RX ORDER — METAXALONE 800 MG/1
400 TABLET ORAL 3 TIMES DAILY
Status: DISCONTINUED | OUTPATIENT
Start: 2021-07-13 | End: 2021-07-17 | Stop reason: HOSPADM

## 2021-07-13 RX ORDER — GLIMEPIRIDE 2 MG/1
4 TABLET ORAL EVERY MORNING
Status: DISCONTINUED | OUTPATIENT
Start: 2021-07-14 | End: 2021-07-17 | Stop reason: HOSPADM

## 2021-07-13 RX ADMIN — HYDROCODONE BITARTRATE AND ACETAMINOPHEN 1 TABLET: 10; 325 TABLET ORAL at 16:10

## 2021-07-13 RX ADMIN — SENNOSIDES AND DOCUSATE SODIUM 2 TABLET: 8.6; 5 TABLET ORAL at 21:28

## 2021-07-13 RX ADMIN — PROPRANOLOL HYDROCHLORIDE 20 MG: 10 TABLET ORAL at 04:56

## 2021-07-13 RX ADMIN — DOCUSATE SODIUM 100 MG: 100 CAPSULE ORAL at 04:55

## 2021-07-13 RX ADMIN — HYDROCODONE BITARTRATE AND ACETAMINOPHEN 1 TABLET: 10; 325 TABLET ORAL at 21:29

## 2021-07-13 RX ADMIN — ENOXAPARIN SODIUM 30 MG: 30 INJECTION SUBCUTANEOUS at 04:56

## 2021-07-13 RX ADMIN — PROPRANOLOL HYDROCHLORIDE 20 MG: 10 TABLET ORAL at 21:28

## 2021-07-13 RX ADMIN — LEVOTHYROXINE SODIUM 50 MCG: 0.05 TABLET ORAL at 04:55

## 2021-07-13 RX ADMIN — LIDOCAINE 2 PATCH: 50 PATCH TOPICAL at 11:46

## 2021-07-13 RX ADMIN — METFORMIN HYDROCHLORIDE 1000 MG: 500 TABLET ORAL at 08:12

## 2021-07-13 RX ADMIN — METAXALONE 400 MG: 800 TABLET ORAL at 11:36

## 2021-07-13 RX ADMIN — PRIMIDONE 50 MG: 50 TABLET ORAL at 04:56

## 2021-07-13 RX ADMIN — PREGABALIN 75 MG: 75 CAPSULE ORAL at 04:56

## 2021-07-13 RX ADMIN — PRIMIDONE 50 MG: 50 TABLET ORAL at 21:28

## 2021-07-13 RX ADMIN — PREGABALIN 75 MG: 75 CAPSULE ORAL at 21:28

## 2021-07-13 RX ADMIN — ATORVASTATIN CALCIUM 80 MG: 40 TABLET, FILM COATED ORAL at 21:28

## 2021-07-13 RX ADMIN — MAGNESIUM HYDROXIDE 30 ML: 400 SUSPENSION ORAL at 04:56

## 2021-07-13 RX ADMIN — METAXALONE 400 MG: 800 TABLET ORAL at 04:55

## 2021-07-13 RX ADMIN — PREGABALIN 75 MG: 75 CAPSULE ORAL at 11:36

## 2021-07-13 RX ADMIN — LEVETIRACETAM 500 MG: 500 TABLET, FILM COATED ORAL at 04:56

## 2021-07-13 RX ADMIN — HYDROCODONE BITARTRATE AND ACETAMINOPHEN 1 TABLET: 10; 325 TABLET ORAL at 11:36

## 2021-07-13 RX ADMIN — METFORMIN HYDROCHLORIDE 1000 MG: 500 TABLET ORAL at 17:58

## 2021-07-13 RX ADMIN — PROPRANOLOL HYDROCHLORIDE 20 MG: 10 TABLET ORAL at 15:41

## 2021-07-13 RX ADMIN — METAXALONE 400 MG: 800 TABLET ORAL at 21:29

## 2021-07-13 ASSESSMENT — PATIENT HEALTH QUESTIONNAIRE - PHQ9
SUM OF ALL RESPONSES TO PHQ9 QUESTIONS 1 AND 2: 0
SUM OF ALL RESPONSES TO PHQ9 QUESTIONS 1 AND 2: 0
1. LITTLE INTEREST OR PLEASURE IN DOING THINGS: NOT AT ALL
2. FEELING DOWN, DEPRESSED, IRRITABLE, OR HOPELESS: NOT AT ALL
2. FEELING DOWN, DEPRESSED, IRRITABLE, OR HOPELESS: NOT AT ALL
1. LITTLE INTEREST OR PLEASURE IN DOING THINGS: NOT AT ALL

## 2021-07-13 ASSESSMENT — PAIN DESCRIPTION - PAIN TYPE
TYPE: ACUTE PAIN
TYPE: ACUTE PAIN

## 2021-07-13 ASSESSMENT — FIBROSIS 4 INDEX: FIB4 SCORE: 1.97

## 2021-07-13 ASSESSMENT — LIFESTYLE VARIABLES: EVER_SMOKED: NEVER
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
## 2021-07-13 NOTE — FLOWSHEET NOTE
07/13/21 1450   Events/Summary/Plan   Events/Summary/Plan RT assessment   Vital Signs   Pulse 85   Respiration 18   Pulse Oximetry 95 %   $ Pulse Oximetry (Spot Check) Yes   Respiratory Assessment   Level of Consciousness Alert   Chest Exam   Work Of Breathing / Effort Within Normal Limits   Breath Sounds   RML Breath Sounds Diminished   RLL Breath Sounds Diminished   LLL Breath Sounds Diminished   Secretions   Cough Non Productive   Oxygen   O2 Delivery Device None - Room Air   Smoking History   Have you ever smoked Never
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
## 2021-07-13 NOTE — PROGRESS NOTES
Pt assisted with prosthetic and shoes. Pivot and turn to wheelchair and transported to [facility].

## 2021-07-13 NOTE — PROGRESS NOTES
Patient admitted to facility at 1330 via w/c; accompanied by hospital transport. Patient assisted to room and positioned in bed for comfort and safety, call light within reach. Patient assisted with stowing belongings and oriented to room and facility. Admission assessment performed and documented in computer. 2 nurse skin check performed and wounds were re-dressed and medications given. Admission paperwork completed, signed copies placed in chart. Will continue to monitor.

## 2021-07-13 NOTE — DISCHARGE PLANNING
COG eval pending.  left for Jerome S.O. to look into D/C resources/support.     1043-Received a call back from Jerome S.CEDRICK.  She is agreeable withy an admission.  She is familiar with the program as he was there in 2019.  They live in a 1LV home with a ramp.  Jerome works F/T and is unable to take FMLA.  They have a friend that lives nearby that is retired and able to provide assist as needed.  He was the friend that cared for him upon his last admission to [facility].  JAN Lujan has medically cleared.  Case is under review by Dr. Rubin.     1057-Dr. Rubin has accepted Kevin.  Transport has been arranged via [facility] @ 1300.  Msg placed to Jerome MARTIN.DERICK & Ayanna DOEWLL & JAN Lujan are aware.
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
## 2021-07-13 NOTE — CARE PLAN
The patient is Stable - Low risk of patient condition declining or worsening    Shift Goals  Clinical Goals: Pain management  Patient Goals: Pain  management  Family Goals: Family is not at bedside    Progress made toward(s) clinical / shift goals:  Pt. Up to chair for lunch pt. In constant pain. Does not attempt to initiate self care.     Patient is not progressing towards the following goals:      Problem: Pain - Standard  Goal: Alleviation of pain or a reduction in pain to the patient’s comfort goal  Outcome: Not Progressing  Note: Pt. In constant persistent pain. PRN Norco given     Problem: Knowledge Deficit - Standard  Goal: Patient and family/care givers will demonstrate understanding of plan of care, disease process/condition, diagnostic tests and medications  Outcome: Not Progressing  Note: Pt. Does not participate well in self care. Very self limiting.
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
## 2021-07-13 NOTE — DISCHARGE PLANNING
Anticipated Discharge Disposition:   [facility]    Action:    Pt accepted by Dr. Scottie Mart[facility]nez [facility] and transport scheduled for 1300 today via [facility].  Pt agreeable. Verbal for Cobra obtained.    Verbal for Cobra obtained from JAN Lujan.    Cobra form placed with patient's hard chart and bedside RN informed.    Barriers to Discharge:    None    Plan:    DC

## 2021-07-13 NOTE — CARE PLAN
The patient is Stable - Low risk of patient condition declining or worsening    Shift Goals  Clinical Goals: encourage patient in participation of care  Patient Goals: pain management  Family Goals: Family is not at bedside    Progress made toward(s) clinical / shift goals: pain goal discussed with patient, encouraged patient to participate in care and verbalize feelings.     Patient is not progressing towards the following goals:      Problem: Psychosocial  Goal: Patient's ability to verbalize feelings about condition will improve  Outcome: Not Progressing  Patient encouraged to verbalize feelings but patient was non-responsive to questions about psychosocial status.   Goal: Patient and family will demonstrate ability to cope with life altering diagnosis and/or procedure  Outcome: Not Progressing   Patient demonstrating unwillingness to cope with diagnosis.  Problem: Nutrition  Goal: Patient's nutritional and fluid intake will be adequate or improve  Outcome: Not Progressing   Patient with poor appetite, consuming <25% of meals.

## 2021-07-13 NOTE — DISCHARGE INSTRUCTIONS
Discharge Instructions    Discharged to other by medical transportation with self. Discharged via ambulance, hospital escort: Yes.  Special equipment needed: Not Applicable    Be sure to schedule a follow-up appointment with your primary care doctor or any specialists as instructed.     Discharge Plan:        I understand that a diet low in cholesterol, fat, and sodium is recommended for good health. Unless I have been given specific instructions below for another diet, I accept this instruction as my diet prescription.   Other diet: Level 7 easy to chew, diabetic diet, clear liquids    Special Instructions: None    · Is patient discharged on Warfarin / Coumadin?   No     Depression / Suicide Risk    As you are discharged from this [facility] facility, it is important to learn how to keep safe from harming yourself.    Recognize the warning signs:  · Abrupt changes in personality, positive or negative- including increase in energy   · Giving away possessions  · Change in eating patterns- significant weight changes-  positive or negative  · Change in sleeping patterns- unable to sleep or sleeping all the time   · Unwillingness or inability to communicate  · Depression  · Unusual sadness, discouragement and loneliness  · Talk of wanting to die  · Neglect of personal appearance   · Rebelliousness- reckless behavior  · Withdrawal from people/activities they love  · Confusion- inability to concentrate     If you or a loved one observes any of these behaviors or has concerns about self-harm, here's what you can do:  · Talk about it- your feelings and reasons for harming yourself  · Remove any means that you might use to hurt yourself (examples: pills, rope, extension cords, firearm)  · Get professional help from the community (Mental Health, Substance Abuse, psychological counseling)  · Do not be alone:Call your Safe Contact- someone whom you trust who will be there for you.  · Call your local CRISIS HOTLINE 891-7295  or 975-047-6331  · Call your local Children's Mobile Crisis Response Team [place] (376) 718-8493 or [email or web address].Wheelright  · Call the toll free National Suicide Prevention Hotlines   · National Suicide Prevention Lifeline 898-062-DVCD (2163)  · National [employer] Line Network 800-SUICIDE (320-2676)
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
## 2021-07-14 ENCOUNTER — APPOINTMENT (OUTPATIENT)
Dept: RADIOLOGY | Facility: MEDICAL CENTER | Age: 76
DRG: 637 | End: 2021-07-14
Attending: EMERGENCY MEDICINE
Payer: MEDICARE

## 2021-07-14 ENCOUNTER — APPOINTMENT (OUTPATIENT)
Dept: RADIOLOGY | Facility: REHABILITATION | Age: 76
DRG: 950 | End: 2021-07-14
Attending: HOSPITALIST
Payer: MEDICARE

## 2021-07-14 ENCOUNTER — HOSPITAL ENCOUNTER (INPATIENT)
Facility: MEDICAL CENTER | Age: 76
LOS: 19 days | DRG: 637 | End: 2021-08-02
Attending: EMERGENCY MEDICINE | Admitting: STUDENT IN AN ORGANIZED HEALTH CARE EDUCATION/TRAINING PROGRAM
Payer: MEDICARE

## 2021-07-14 VITALS
HEART RATE: 96 BPM | DIASTOLIC BLOOD PRESSURE: 96 MMHG | BODY MASS INDEX: 26.3 KG/M2 | WEIGHT: 198.41 LBS | RESPIRATION RATE: 24 BRPM | SYSTOLIC BLOOD PRESSURE: 160 MMHG | HEIGHT: 73 IN | OXYGEN SATURATION: 96 % | TEMPERATURE: 97.6 F

## 2021-07-14 DIAGNOSIS — E13.10 DIABETIC KETOACIDOSIS WITHOUT COMA ASSOCIATED WITH OTHER SPECIFIED DIABETES MELLITUS (HCC): ICD-10-CM

## 2021-07-14 DIAGNOSIS — S06.6X0A SUBARACHNOID HEMORRHAGE-NO COMA, INITIAL ENCOUNTER (HCC): ICD-10-CM

## 2021-07-14 DIAGNOSIS — G93.41 ACUTE METABOLIC ENCEPHALOPATHY: ICD-10-CM

## 2021-07-14 DIAGNOSIS — S22.43XA MULTIPLE FRACTURES OF RIBS, BILATERAL, INITIAL ENCOUNTER FOR CLOSED FRACTURE: ICD-10-CM

## 2021-07-14 DIAGNOSIS — E11.10 DIABETIC KETOACIDOSIS WITHOUT COMA ASSOCIATED WITH TYPE 2 DIABETES MELLITUS (HCC): ICD-10-CM

## 2021-07-14 DIAGNOSIS — R78.81 MSSA BACTEREMIA: ICD-10-CM

## 2021-07-14 DIAGNOSIS — S42.009A: ICD-10-CM

## 2021-07-14 DIAGNOSIS — B95.61 MSSA BACTEREMIA: ICD-10-CM

## 2021-07-14 PROBLEM — E55.9 VITAMIN D INSUFFICIENCY: Status: ACTIVE | Noted: 2021-07-14

## 2021-07-14 PROBLEM — E87.29 HIGH ANION GAP METABOLIC ACIDOSIS: Status: ACTIVE | Noted: 2021-07-14

## 2021-07-14 PROBLEM — R53.83 LETHARGY: Status: ACTIVE | Noted: 2021-07-14

## 2021-07-14 PROBLEM — E87.20 METABOLIC ACIDOSIS: Status: ACTIVE | Noted: 2021-07-14

## 2021-07-14 LAB
25(OH)D3 SERPL-MCNC: 25 NG/ML (ref 30–100)
ABO GROUP BLD: NORMAL
ALBUMIN SERPL BCP-MCNC: 3.2 G/DL (ref 3.2–4.9)
ALBUMIN SERPL BCP-MCNC: 3.8 G/DL (ref 3.2–4.9)
ALBUMIN/GLOB SERPL: 1 G/DL
ALBUMIN/GLOB SERPL: 1.1 G/DL
ALP SERPL-CCNC: 61 U/L (ref 30–99)
ALP SERPL-CCNC: 74 U/L (ref 30–99)
ALT SERPL-CCNC: 13 U/L (ref 2–50)
ALT SERPL-CCNC: 9 U/L (ref 2–50)
AMMONIA PLAS-SCNC: 29 UMOL/L (ref 11–45)
ANION GAP SERPL CALC-SCNC: 23 MMOL/L (ref 7–16)
ANION GAP SERPL CALC-SCNC: 27 MMOL/L (ref 7–16)
ANION GAP SERPL CALC-SCNC: 30 MMOL/L (ref 7–16)
ANION GAP SERPL CALC-SCNC: 32 MMOL/L (ref 7–16)
ANION GAP SERPL CALC-SCNC: 32 MMOL/L (ref 7–16)
ANISOCYTOSIS BLD QL SMEAR: ABNORMAL
APAP SERPL-MCNC: <5 UG/ML (ref 10–30)
APTT PPP: 39.4 SEC (ref 24.7–36)
AST SERPL-CCNC: 15 U/L (ref 12–45)
AST SERPL-CCNC: 9 U/L (ref 12–45)
B-OH-BUTYR SERPL-MCNC: >8 MMOL/L (ref 0.02–0.27)
BASE EXCESS BLDA CALC-SCNC: -21 MMOL/L (ref -4–3)
BASE EXCESS BLDA CALC-SCNC: -23 MMOL/L (ref -4–3)
BASOPHILS # BLD AUTO: 0 % (ref 0–1.8)
BASOPHILS # BLD AUTO: 0.2 % (ref 0–1.8)
BASOPHILS # BLD: 0 K/UL (ref 0–0.12)
BASOPHILS # BLD: 0.02 K/UL (ref 0–0.12)
BILIRUB SERPL-MCNC: 0.7 MG/DL (ref 0.1–1.5)
BILIRUB SERPL-MCNC: 0.7 MG/DL (ref 0.1–1.5)
BLD GP AB SCN SERPL QL: NORMAL
BODY TEMPERATURE: ABNORMAL CENTIGRADE
BODY TEMPERATURE: ABNORMAL DEGREES
BUN SERPL-MCNC: 21 MG/DL (ref 8–22)
BUN SERPL-MCNC: 22 MG/DL (ref 8–22)
BUN SERPL-MCNC: 23 MG/DL (ref 8–22)
BUN SERPL-MCNC: 25 MG/DL (ref 8–22)
BUN SERPL-MCNC: 25 MG/DL (ref 8–22)
CALCIUM SERPL-MCNC: 8.8 MG/DL (ref 8.5–10.5)
CALCIUM SERPL-MCNC: 9.4 MG/DL (ref 8.5–10.5)
CALCIUM SERPL-MCNC: 9.6 MG/DL (ref 8.5–10.5)
CALCIUM SERPL-MCNC: 9.7 MG/DL (ref 8.5–10.5)
CALCIUM SERPL-MCNC: 9.8 MG/DL (ref 8.5–10.5)
CHLORIDE SERPL-SCNC: 100 MMOL/L (ref 96–112)
CHLORIDE SERPL-SCNC: 102 MMOL/L (ref 96–112)
CHLORIDE SERPL-SCNC: 103 MMOL/L (ref 96–112)
CHLORIDE SERPL-SCNC: 106 MMOL/L (ref 96–112)
CHLORIDE SERPL-SCNC: 99 MMOL/L (ref 96–112)
CO2 BLDA-SCNC: <10 MMOL/L (ref 20–33)
CO2 SERPL-SCNC: 3 MMOL/L (ref 20–33)
CO2 SERPL-SCNC: 6 MMOL/L (ref 20–33)
CO2 SERPL-SCNC: 7 MMOL/L (ref 20–33)
CREAT SERPL-MCNC: 0.97 MG/DL (ref 0.5–1.4)
CREAT SERPL-MCNC: 1.15 MG/DL (ref 0.5–1.4)
CREAT SERPL-MCNC: 1.2 MG/DL (ref 0.5–1.4)
DELSYS IDSYS: ABNORMAL
EKG IMPRESSION: NORMAL
EKG IMPRESSION: NORMAL
EOSINOPHIL # BLD AUTO: 0 K/UL (ref 0–0.51)
EOSINOPHIL # BLD AUTO: 0 K/UL (ref 0–0.51)
EOSINOPHIL NFR BLD: 0 % (ref 0–6.9)
EOSINOPHIL NFR BLD: 0 % (ref 0–6.9)
ERYTHROCYTE [DISTWIDTH] IN BLOOD BY AUTOMATED COUNT: 51.7 FL (ref 35.9–50)
ERYTHROCYTE [DISTWIDTH] IN BLOOD BY AUTOMATED COUNT: 52.9 FL (ref 35.9–50)
EST. AVERAGE GLUCOSE BLD GHB EST-MCNC: 212 MG/DL
ETHANOL BLD-MCNC: <10.1 MG/DL (ref 0–10)
GLOBULIN SER CALC-MCNC: 3.2 G/DL (ref 1.9–3.5)
GLOBULIN SER CALC-MCNC: 3.6 G/DL (ref 1.9–3.5)
GLUCOSE BLD-MCNC: 172 MG/DL (ref 65–99)
GLUCOSE BLD-MCNC: 187 MG/DL (ref 65–99)
GLUCOSE BLD-MCNC: 198 MG/DL (ref 65–99)
GLUCOSE BLD-MCNC: 200 MG/DL (ref 65–99)
GLUCOSE BLD-MCNC: 202 MG/DL (ref 65–99)
GLUCOSE BLD-MCNC: 203 MG/DL (ref 65–99)
GLUCOSE BLD-MCNC: 214 MG/DL (ref 65–99)
GLUCOSE SERPL-MCNC: 184 MG/DL (ref 65–99)
GLUCOSE SERPL-MCNC: 199 MG/DL (ref 65–99)
GLUCOSE SERPL-MCNC: 213 MG/DL (ref 65–99)
GLUCOSE SERPL-MCNC: 215 MG/DL (ref 65–99)
GLUCOSE SERPL-MCNC: 302 MG/DL (ref 65–99)
HBA1C MFR BLD: 9 % (ref 4–5.6)
HCO3 BLDA-SCNC: 3.1 MMOL/L (ref 17–25)
HCO3 BLDA-SCNC: 5 MMOL/L (ref 17–25)
HCT VFR BLD AUTO: 39.3 % (ref 42–52)
HCT VFR BLD AUTO: 46.3 % (ref 42–52)
HGB BLD-MCNC: 13 G/DL (ref 14–18)
HGB BLD-MCNC: 14.7 G/DL (ref 14–18)
HOROWITZ INDEX BLDA+IHG-RTO: 438 MM[HG]
IMM GRANULOCYTES # BLD AUTO: 0.1 K/UL (ref 0–0.11)
IMM GRANULOCYTES NFR BLD AUTO: 1.2 % (ref 0–0.9)
INR PPP: 1.07 (ref 0.87–1.13)
LACTATE BLD-SCNC: 2.1 MMOL/L (ref 0.5–2)
LACTATE BLD-SCNC: 2.2 MMOL/L (ref 0.5–2)
LYMPHOCYTES # BLD AUTO: 0.54 K/UL (ref 1–4.8)
LYMPHOCYTES # BLD AUTO: 0.56 K/UL (ref 1–4.8)
LYMPHOCYTES NFR BLD: 6.7 % (ref 22–41)
LYMPHOCYTES NFR BLD: 7.8 % (ref 22–41)
MAGNESIUM SERPL-MCNC: 1.7 MG/DL (ref 1.5–2.5)
MAGNESIUM SERPL-MCNC: 1.9 MG/DL (ref 1.5–2.5)
MAGNESIUM SERPL-MCNC: 2.1 MG/DL (ref 1.5–2.5)
MANUAL DIFF BLD: NORMAL
MCH RBC QN AUTO: 29.8 PG (ref 27–33)
MCH RBC QN AUTO: 30.7 PG (ref 27–33)
MCHC RBC AUTO-ENTMCNC: 31.7 G/DL (ref 33.7–35.3)
MCHC RBC AUTO-ENTMCNC: 33.1 G/DL (ref 33.7–35.3)
MCV RBC AUTO: 92.7 FL (ref 81.4–97.8)
MCV RBC AUTO: 93.7 FL (ref 81.4–97.8)
MONOCYTES # BLD AUTO: 0.06 K/UL (ref 0–0.85)
MONOCYTES # BLD AUTO: 0.48 K/UL (ref 0–0.85)
MONOCYTES NFR BLD AUTO: 0.9 % (ref 0–13.4)
MONOCYTES NFR BLD AUTO: 6 % (ref 0–13.4)
MORPHOLOGY BLD-IMP: NORMAL
MYELOCYTES NFR BLD MANUAL: 0.9 %
NEUTROPHILS # BLD AUTO: 6.51 K/UL (ref 1.82–7.42)
NEUTROPHILS # BLD AUTO: 6.92 K/UL (ref 1.82–7.42)
NEUTROPHILS NFR BLD: 85.9 % (ref 44–72)
NEUTROPHILS NFR BLD: 89.5 % (ref 44–72)
NEUTS BAND NFR BLD MANUAL: 0.9 % (ref 0–10)
NRBC # BLD AUTO: 0 K/UL
NRBC # BLD AUTO: 0 K/UL
NRBC BLD-RTO: 0 /100 WBC
NRBC BLD-RTO: 0 /100 WBC
NT-PROBNP SERPL IA-MCNC: 357 PG/ML (ref 0–125)
O2/TOTAL GAS SETTING VFR VENT: 21 %
OSMOLALITY SERPL: 316 MOSM/KG H2O (ref 278–298)
PCO2 BLDA: 13 MMHG (ref 26–37)
PCO2 BLDA: <10 MMHG (ref 26–37)
PCO2 TEMP ADJ BLDA: <10 MMHG (ref 26–37)
PH BLDA: 7.16 [PH] (ref 7.4–7.5)
PH BLDA: 7.19 [PH] (ref 7.4–7.5)
PH TEMP ADJ BLDA: 7.15 [PH] (ref 7.4–7.5)
PHOSPHATE SERPL-MCNC: 1.6 MG/DL (ref 2.5–4.5)
PHOSPHATE SERPL-MCNC: 4.3 MG/DL (ref 2.5–4.5)
PHOSPHATE SERPL-MCNC: 4.6 MG/DL (ref 2.5–4.5)
PLATELET # BLD AUTO: 350 K/UL (ref 164–446)
PLATELET # BLD AUTO: 400 K/UL (ref 164–446)
PLATELET BLD QL SMEAR: NORMAL
PMV BLD AUTO: 10 FL (ref 9–12.9)
PMV BLD AUTO: 10 FL (ref 9–12.9)
PO2 BLDA: 92 MMHG (ref 64–87)
PO2 BLDA: 96.7 MMHG (ref 64–87)
PO2 TEMP ADJ BLDA: 95 MMHG (ref 64–87)
POTASSIUM SERPL-SCNC: 3.4 MMOL/L (ref 3.6–5.5)
POTASSIUM SERPL-SCNC: 4.1 MMOL/L (ref 3.6–5.5)
POTASSIUM SERPL-SCNC: 4.5 MMOL/L (ref 3.6–5.5)
POTASSIUM SERPL-SCNC: 4.8 MMOL/L (ref 3.6–5.5)
POTASSIUM SERPL-SCNC: 5.1 MMOL/L (ref 3.6–5.5)
PROT SERPL-MCNC: 6.4 G/DL (ref 6–8.2)
PROT SERPL-MCNC: 7.4 G/DL (ref 6–8.2)
PROTHROMBIN TIME: 13.6 SEC (ref 12–14.6)
RBC # BLD AUTO: 4.24 M/UL (ref 4.7–6.1)
RBC # BLD AUTO: 4.94 M/UL (ref 4.7–6.1)
RBC BLD AUTO: PRESENT
RH BLD: NORMAL
SALICYLATES SERPL-MCNC: <1 MG/DL (ref 15–25)
SAO2 % BLDA: 95 % (ref 93–99)
SAO2 % BLDA: 95.4 % (ref 93–99)
SARS-COV-2 RNA RESP QL NAA+PROBE: NOTDETECTED
SODIUM SERPL-SCNC: 136 MMOL/L (ref 135–145)
SODIUM SERPL-SCNC: 136 MMOL/L (ref 135–145)
SODIUM SERPL-SCNC: 137 MMOL/L (ref 135–145)
SODIUM SERPL-SCNC: 137 MMOL/L (ref 135–145)
SODIUM SERPL-SCNC: 138 MMOL/L (ref 135–145)
SPECIMEN DRAWN FROM PATIENT: ABNORMAL
SPECIMEN SOURCE: NORMAL
TROPONIN T SERPL-MCNC: 16 NG/L (ref 6–19)
TROPONIN T SERPL-MCNC: 18 NG/L (ref 6–19)
TSH SERPL DL<=0.005 MIU/L-ACNC: 0.76 UIU/ML (ref 0.38–5.33)
WBC # BLD AUTO: 7.2 K/UL (ref 4.8–10.8)
WBC # BLD AUTO: 8.1 K/UL (ref 4.8–10.8)

## 2021-07-14 PROCEDURE — 83605 ASSAY OF LACTIC ACID: CPT

## 2021-07-14 PROCEDURE — 71045 X-RAY EXAM CHEST 1 VIEW: CPT

## 2021-07-14 PROCEDURE — 83735 ASSAY OF MAGNESIUM: CPT | Mod: 91

## 2021-07-14 PROCEDURE — 99291 CRITICAL CARE FIRST HOUR: CPT

## 2021-07-14 PROCEDURE — A9270 NON-COVERED ITEM OR SERVICE: HCPCS | Performed by: PHYSICAL MEDICINE & REHABILITATION

## 2021-07-14 PROCEDURE — 86901 BLOOD TYPING SEROLOGIC RH(D): CPT

## 2021-07-14 PROCEDURE — 99233 SBSQ HOSP IP/OBS HIGH 50: CPT | Performed by: PHYSICAL MEDICINE & REHABILITATION

## 2021-07-14 PROCEDURE — 36600 WITHDRAWAL OF ARTERIAL BLOOD: CPT

## 2021-07-14 PROCEDURE — 85007 BL SMEAR W/DIFF WBC COUNT: CPT

## 2021-07-14 PROCEDURE — 770022 HCHG ROOM/CARE - ICU (200)

## 2021-07-14 PROCEDURE — 700105 HCHG RX REV CODE 258: Performed by: STUDENT IN AN ORGANIZED HEALTH CARE EDUCATION/TRAINING PROGRAM

## 2021-07-14 PROCEDURE — 87077 CULTURE AEROBIC IDENTIFY: CPT

## 2021-07-14 PROCEDURE — 82306 VITAMIN D 25 HYDROXY: CPT

## 2021-07-14 PROCEDURE — 84443 ASSAY THYROID STIM HORMONE: CPT

## 2021-07-14 PROCEDURE — 84484 ASSAY OF TROPONIN QUANT: CPT

## 2021-07-14 PROCEDURE — 71260 CT THORAX DX C+: CPT

## 2021-07-14 PROCEDURE — 700102 HCHG RX REV CODE 250 W/ 637 OVERRIDE(OP): Performed by: PHYSICAL MEDICINE & REHABILITATION

## 2021-07-14 PROCEDURE — 82962 GLUCOSE BLOOD TEST: CPT | Mod: 91

## 2021-07-14 PROCEDURE — 80048 BASIC METABOLIC PNL TOTAL CA: CPT | Mod: 91

## 2021-07-14 PROCEDURE — 82803 BLOOD GASES ANY COMBINATION: CPT

## 2021-07-14 PROCEDURE — 0042T CT-CEREBRAL PERFUSION ANALYSIS: CPT

## 2021-07-14 PROCEDURE — 700102 HCHG RX REV CODE 250 W/ 637 OVERRIDE(OP): Performed by: STUDENT IN AN ORGANIZED HEALTH CARE EDUCATION/TRAINING PROGRAM

## 2021-07-14 PROCEDURE — 82140 ASSAY OF AMMONIA: CPT

## 2021-07-14 PROCEDURE — 83036 HEMOGLOBIN GLYCOSYLATED A1C: CPT

## 2021-07-14 PROCEDURE — 94760 N-INVAS EAR/PLS OXIMETRY 1: CPT

## 2021-07-14 PROCEDURE — 86900 BLOOD TYPING SEROLOGIC ABO: CPT

## 2021-07-14 PROCEDURE — 770024 HCHG ROOM/CARE - REHAB LOA (180)

## 2021-07-14 PROCEDURE — 82010 KETONE BODYS QUAN: CPT

## 2021-07-14 PROCEDURE — 99223 1ST HOSP IP/OBS HIGH 75: CPT | Performed by: STUDENT IN AN ORGANIZED HEALTH CARE EDUCATION/TRAINING PROGRAM

## 2021-07-14 PROCEDURE — 80179 DRUG ASSAY SALICYLATE: CPT

## 2021-07-14 PROCEDURE — 82077 ASSAY SPEC XCP UR&BREATH IA: CPT

## 2021-07-14 PROCEDURE — 84484 ASSAY OF TROPONIN QUANT: CPT | Mod: 91

## 2021-07-14 PROCEDURE — 87040 BLOOD CULTURE FOR BACTERIA: CPT | Mod: 91

## 2021-07-14 PROCEDURE — 83880 ASSAY OF NATRIURETIC PEPTIDE: CPT

## 2021-07-14 PROCEDURE — 99223 1ST HOSP IP/OBS HIGH 75: CPT | Performed by: HOSPITALIST

## 2021-07-14 PROCEDURE — 85730 THROMBOPLASTIN TIME PARTIAL: CPT

## 2021-07-14 PROCEDURE — 93005 ELECTROCARDIOGRAM TRACING: CPT | Performed by: EMERGENCY MEDICINE

## 2021-07-14 PROCEDURE — 99291 CRITICAL CARE FIRST HOUR: CPT | Performed by: STUDENT IN AN ORGANIZED HEALTH CARE EDUCATION/TRAINING PROGRAM

## 2021-07-14 PROCEDURE — 99356 PR PROLONGED SVC I/P OR OBS SETTING 1ST HOUR: CPT | Performed by: PHYSICAL MEDICINE & REHABILITATION

## 2021-07-14 PROCEDURE — 86850 RBC ANTIBODY SCREEN: CPT

## 2021-07-14 PROCEDURE — 80053 COMPREHEN METABOLIC PANEL: CPT

## 2021-07-14 PROCEDURE — 84100 ASSAY OF PHOSPHORUS: CPT

## 2021-07-14 PROCEDURE — 97166 OT EVAL MOD COMPLEX 45 MIN: CPT

## 2021-07-14 PROCEDURE — 93005 ELECTROCARDIOGRAM TRACING: CPT | Performed by: HOSPITALIST

## 2021-07-14 PROCEDURE — 700111 HCHG RX REV CODE 636 W/ 250 OVERRIDE (IP): Performed by: HOSPITALIST

## 2021-07-14 PROCEDURE — 85027 COMPLETE CBC AUTOMATED: CPT

## 2021-07-14 PROCEDURE — 85610 PROTHROMBIN TIME: CPT

## 2021-07-14 PROCEDURE — 80143 DRUG ASSAY ACETAMINOPHEN: CPT

## 2021-07-14 PROCEDURE — 85025 COMPLETE CBC W/AUTO DIFF WBC: CPT

## 2021-07-14 PROCEDURE — 87150 DNA/RNA AMPLIFIED PROBE: CPT

## 2021-07-14 PROCEDURE — 83930 ASSAY OF BLOOD OSMOLALITY: CPT

## 2021-07-14 PROCEDURE — 70450 CT HEAD/BRAIN W/O DYE: CPT | Mod: ME

## 2021-07-14 PROCEDURE — 700117 HCHG RX CONTRAST REV CODE 255: Performed by: EMERGENCY MEDICINE

## 2021-07-14 PROCEDURE — 80053 COMPREHEN METABOLIC PANEL: CPT | Mod: 91

## 2021-07-14 PROCEDURE — 36415 COLL VENOUS BLD VENIPUNCTURE: CPT

## 2021-07-14 PROCEDURE — 93010 ELECTROCARDIOGRAM REPORT: CPT | Performed by: INTERNAL MEDICINE

## 2021-07-14 PROCEDURE — 70498 CT ANGIOGRAPHY NECK: CPT | Mod: MG

## 2021-07-14 PROCEDURE — 700111 HCHG RX REV CODE 636 W/ 250 OVERRIDE (IP): Performed by: STUDENT IN AN ORGANIZED HEALTH CARE EDUCATION/TRAINING PROGRAM

## 2021-07-14 PROCEDURE — 70496 CT ANGIOGRAPHY HEAD: CPT | Mod: MG

## 2021-07-14 PROCEDURE — 84100 ASSAY OF PHOSPHORUS: CPT | Mod: 91

## 2021-07-14 PROCEDURE — 87186 SC STD MICRODIL/AGAR DIL: CPT

## 2021-07-14 PROCEDURE — 700111 HCHG RX REV CODE 636 W/ 250 OVERRIDE (IP): Performed by: PHYSICAL MEDICINE & REHABILITATION

## 2021-07-14 PROCEDURE — 700101 HCHG RX REV CODE 250: Performed by: PHYSICAL MEDICINE & REHABILITATION

## 2021-07-14 RX ORDER — NALOXONE HYDROCHLORIDE 0.4 MG/ML
0.4 INJECTION, SOLUTION INTRAMUSCULAR; INTRAVENOUS; SUBCUTANEOUS ONCE
Status: COMPLETED | OUTPATIENT
Start: 2021-07-14 | End: 2021-07-14

## 2021-07-14 RX ORDER — SODIUM CHLORIDE 9 MG/ML
INJECTION, SOLUTION INTRAVENOUS CONTINUOUS
Status: DISCONTINUED | OUTPATIENT
Start: 2021-07-14 | End: 2021-07-15

## 2021-07-14 RX ORDER — SODIUM CHLORIDE, SODIUM LACTATE, POTASSIUM CHLORIDE, AND CALCIUM CHLORIDE .6; .31; .03; .02 G/100ML; G/100ML; G/100ML; G/100ML
2000 INJECTION, SOLUTION INTRAVENOUS ONCE
Status: COMPLETED | OUTPATIENT
Start: 2021-07-14 | End: 2021-07-14

## 2021-07-14 RX ORDER — KETOROLAC TROMETHAMINE 30 MG/ML
15 INJECTION, SOLUTION INTRAMUSCULAR; INTRAVENOUS EVERY 6 HOURS PRN
Status: DISPENSED | OUTPATIENT
Start: 2021-07-14 | End: 2021-07-19

## 2021-07-14 RX ORDER — LIDOCAINE 50 MG/G
1-2 PATCH TOPICAL EVERY 24 HOURS
Status: DISCONTINUED | OUTPATIENT
Start: 2021-07-15 | End: 2021-08-02 | Stop reason: HOSPADM

## 2021-07-14 RX ORDER — LEVOTHYROXINE SODIUM 0.05 MG/1
50 TABLET ORAL
Status: DISCONTINUED | OUTPATIENT
Start: 2021-07-15 | End: 2021-07-16

## 2021-07-14 RX ORDER — DEXTROSE AND SODIUM CHLORIDE 5; .45 G/100ML; G/100ML
INJECTION, SOLUTION INTRAVENOUS CONTINUOUS
Status: DISCONTINUED | OUTPATIENT
Start: 2021-07-14 | End: 2021-07-15

## 2021-07-14 RX ORDER — PROPRANOLOL HYDROCHLORIDE 20 MG/1
20 TABLET ORAL EVERY 8 HOURS
Status: DISCONTINUED | OUTPATIENT
Start: 2021-07-14 | End: 2021-07-16

## 2021-07-14 RX ORDER — DEXTROSE AND SODIUM CHLORIDE 10; .45 G/100ML; G/100ML
INJECTION, SOLUTION INTRAVENOUS CONTINUOUS
Status: DISCONTINUED | OUTPATIENT
Start: 2021-07-14 | End: 2021-07-17

## 2021-07-14 RX ORDER — SODIUM CHLORIDE, SODIUM LACTATE, POTASSIUM CHLORIDE, AND CALCIUM CHLORIDE .6; .31; .03; .02 G/100ML; G/100ML; G/100ML; G/100ML
1000 INJECTION, SOLUTION INTRAVENOUS ONCE
Status: DISCONTINUED | OUTPATIENT
Start: 2021-07-14 | End: 2021-07-14

## 2021-07-14 RX ORDER — SODIUM CHLORIDE, SODIUM LACTATE, POTASSIUM CHLORIDE, AND CALCIUM CHLORIDE .6; .31; .03; .02 G/100ML; G/100ML; G/100ML; G/100ML
1000 INJECTION, SOLUTION INTRAVENOUS ONCE
Status: COMPLETED | OUTPATIENT
Start: 2021-07-14 | End: 2021-07-14

## 2021-07-14 RX ORDER — LABETALOL HYDROCHLORIDE 5 MG/ML
10-20 INJECTION, SOLUTION INTRAVENOUS EVERY 4 HOURS
Status: DISCONTINUED | OUTPATIENT
Start: 2021-07-14 | End: 2021-07-14

## 2021-07-14 RX ORDER — SODIUM CHLORIDE 9 MG/ML
2000 INJECTION, SOLUTION INTRAVENOUS ONCE
Status: DISCONTINUED | OUTPATIENT
Start: 2021-07-14 | End: 2021-07-14

## 2021-07-14 RX ORDER — POTASSIUM CHLORIDE 7.45 MG/ML
10 INJECTION INTRAVENOUS
Status: COMPLETED | OUTPATIENT
Start: 2021-07-15 | End: 2021-07-15

## 2021-07-14 RX ORDER — MAGNESIUM SULFATE HEPTAHYDRATE 40 MG/ML
2 INJECTION, SOLUTION INTRAVENOUS
Status: COMPLETED | OUTPATIENT
Start: 2021-07-14 | End: 2021-07-15

## 2021-07-14 RX ORDER — SODIUM CHLORIDE, SODIUM LACTATE, POTASSIUM CHLORIDE, CALCIUM CHLORIDE 600; 310; 30; 20 MG/100ML; MG/100ML; MG/100ML; MG/100ML
1000 INJECTION, SOLUTION INTRAVENOUS ONCE
Status: DISCONTINUED | OUTPATIENT
Start: 2021-07-14 | End: 2021-07-14

## 2021-07-14 RX ORDER — POTASSIUM CHLORIDE 7.45 MG/ML
10 INJECTION INTRAVENOUS ONCE
Status: COMPLETED | OUTPATIENT
Start: 2021-07-14 | End: 2021-07-14

## 2021-07-14 RX ORDER — ATORVASTATIN CALCIUM 40 MG/1
80 TABLET, FILM COATED ORAL NIGHTLY
Status: DISCONTINUED | OUTPATIENT
Start: 2021-07-14 | End: 2021-07-16

## 2021-07-14 RX ORDER — HYDRALAZINE HYDROCHLORIDE 20 MG/ML
10 INJECTION INTRAMUSCULAR; INTRAVENOUS EVERY 6 HOURS PRN
Status: DISCONTINUED | OUTPATIENT
Start: 2021-07-14 | End: 2021-08-02 | Stop reason: HOSPADM

## 2021-07-14 RX ORDER — LABETALOL HYDROCHLORIDE 5 MG/ML
10-20 INJECTION, SOLUTION INTRAVENOUS EVERY 4 HOURS PRN
Status: DISCONTINUED | OUTPATIENT
Start: 2021-07-14 | End: 2021-08-02 | Stop reason: HOSPADM

## 2021-07-14 RX ORDER — MAGNESIUM SULFATE HEPTAHYDRATE 40 MG/ML
4 INJECTION, SOLUTION INTRAVENOUS
Status: COMPLETED | OUTPATIENT
Start: 2021-07-14 | End: 2021-07-15

## 2021-07-14 RX ADMIN — IOHEXOL 80 ML: 350 INJECTION, SOLUTION INTRAVENOUS at 14:12

## 2021-07-14 RX ADMIN — PROPRANOLOL HYDROCHLORIDE 20 MG: 10 TABLET ORAL at 05:48

## 2021-07-14 RX ADMIN — IOHEXOL 80 ML: 350 INJECTION, SOLUTION INTRAVENOUS at 15:05

## 2021-07-14 RX ADMIN — LIDOCAINE 2 PATCH: 50 PATCH TOPICAL at 10:19

## 2021-07-14 RX ADMIN — HYDRALAZINE HYDROCHLORIDE 25 MG: 25 TABLET, FILM COATED ORAL at 10:19

## 2021-07-14 RX ADMIN — IOHEXOL 40 ML: 350 INJECTION, SOLUTION INTRAVENOUS at 14:11

## 2021-07-14 RX ADMIN — SODIUM CHLORIDE, POTASSIUM CHLORIDE, SODIUM LACTATE AND CALCIUM CHLORIDE 1000 ML: 600; 310; 30; 20 INJECTION, SOLUTION INTRAVENOUS at 18:55

## 2021-07-14 RX ADMIN — SODIUM CHLORIDE 5 UNITS/HR: 9 INJECTION, SOLUTION INTRAVENOUS at 18:25

## 2021-07-14 RX ADMIN — SODIUM CHLORIDE, POTASSIUM CHLORIDE, SODIUM LACTATE AND CALCIUM CHLORIDE 1000 ML: 600; 310; 30; 20 INJECTION, SOLUTION INTRAVENOUS at 19:49

## 2021-07-14 RX ADMIN — NALOXONE HYDROCHLORIDE 0.4 MG: 0.4 INJECTION, SOLUTION INTRAMUSCULAR; INTRAVENOUS; SUBCUTANEOUS at 09:43

## 2021-07-14 RX ADMIN — HYDROCODONE BITARTRATE AND ACETAMINOPHEN 1 TABLET: 10; 325 TABLET ORAL at 07:46

## 2021-07-14 RX ADMIN — INSULIN HUMAN 1 UNITS: 100 INJECTION, SOLUTION PARENTERAL at 11:06

## 2021-07-14 RX ADMIN — LEVOTHYROXINE SODIUM 50 MCG: 50 TABLET ORAL at 05:46

## 2021-07-14 RX ADMIN — INSULIN HUMAN 1 UNITS: 100 INJECTION, SOLUTION PARENTERAL at 08:03

## 2021-07-14 RX ADMIN — DEXTROSE AND SODIUM CHLORIDE: 5; 450 INJECTION, SOLUTION INTRAVENOUS at 18:24

## 2021-07-14 RX ADMIN — NALOXONE HYDROCHLORIDE 0.4 MG: 0.4 INJECTION, SOLUTION INTRAMUSCULAR; INTRAVENOUS; SUBCUTANEOUS at 08:50

## 2021-07-14 RX ADMIN — ENOXAPARIN SODIUM 40 MG: 40 INJECTION SUBCUTANEOUS at 09:43

## 2021-07-14 RX ADMIN — SODIUM CHLORIDE, POTASSIUM CHLORIDE, SODIUM LACTATE AND CALCIUM CHLORIDE 2000 ML: 600; 310; 30; 20 INJECTION, SOLUTION INTRAVENOUS at 17:27

## 2021-07-14 RX ADMIN — POTASSIUM CHLORIDE 10 MEQ: 7.46 INJECTION, SOLUTION INTRAVENOUS at 19:22

## 2021-07-14 ASSESSMENT — ENCOUNTER SYMPTOMS
WEAKNESS: 1
SENSORY CHANGE: 0
PHOTOPHOBIA: 0
EYE PAIN: 0
SHORTNESS OF BREATH: 1
TINGLING: 0
NAUSEA: 1
SEIZURES: 0
NERVOUS/ANXIOUS: 0
ABDOMINAL PAIN: 1
FEVER: 0
NECK PAIN: 0
COUGH: 0
FOCAL WEAKNESS: 0
MYALGIAS: 1
CHILLS: 0
SPEECH CHANGE: 0
PALPITATIONS: 0
SPUTUM PRODUCTION: 0
VOMITING: 0

## 2021-07-14 ASSESSMENT — BRIEF INTERVIEW FOR MENTAL STATUS (BIMS)
WHAT MONTH IS IT: ACCURATE WITHIN 5 DAYS
INITIAL REPETITION OF BED BLUE SOCK - FIRST ATTEMPT: 3
ASKED TO RECALL SOCK: NO, COULD NOT RECALL
BIMS SUMMARY SCORE: 9
ASKED TO RECALL BED: NO, COULD NOT RECALL
WHAT YEAR IS IT: CORRECT
ASKED TO RECALL BLUE: YES, AFTER CUEING (A COLOR")"
WHAT DAY OF THE WEEK IS IT: INCORRECT

## 2021-07-14 ASSESSMENT — PATIENT HEALTH QUESTIONNAIRE - PHQ9
SUM OF ALL RESPONSES TO PHQ9 QUESTIONS 1 AND 2: 0
1. LITTLE INTEREST OR PLEASURE IN DOING THINGS: NOT AT ALL
2. FEELING DOWN, DEPRESSED, IRRITABLE, OR HOPELESS: NOT AT ALL

## 2021-07-14 ASSESSMENT — ACTIVITIES OF DAILY LIVING (ADL)
TOILETING_LEVEL_OF_ASSIST_DESCRIPTION: ADAPTIVE EQUIPMENT
TOILETING: INDEPENDENT

## 2021-07-14 ASSESSMENT — FIBROSIS 4 INDEX: FIB4 SCORE: 1.14

## 2021-07-14 ASSESSMENT — LIFESTYLE VARIABLES: SUBSTANCE_ABUSE: 0

## 2021-07-14 NOTE — ED NOTES
Med Rec complete per pt's wife and Pt's pharmacy  Allergies Reviewed      Pt discharged from [facility] yesterday 7/13

## 2021-07-14 NOTE — CARE PLAN
The patient is Watcher - Medium risk of patient condition declining or worsening    Shift Goals  Clinical Goals: pain control    Problem: Knowledge Deficit - Standard  Goal: Patient and family/care givers will demonstrate understanding of plan of care, disease process/condition, diagnostic tests and medications  Outcome: Progressing patient is AOx4 oriented to unit and unit routine. Patient oriented to safety precautions that are in place and how to call for assistance when needed. Patient has call light close by, alarms set on bed and w/c, bed in low position, non-skid socks in place, frequently rounded on to make sure needs are being met, will continue to educate as needed.     Problem: Skin Integrity  Goal: Patient's skin integrity will be maintained or improve  Outcome: Progressing 2 RN skin check performed with Monique, patient has abrasions to right elbow, abrasions to left foot, left knee and left elbow/forearm, patient has abrasions to left shoulder, areas cleansed and dressed, LDAs opened. Patient has redness to coccyx that is blanching. Wound care consult placed.

## 2021-07-14 NOTE — ASSESSMENT & PLAN NOTE
Confusion, , likely delirum superimposed on a cognitive impairment  Much improved today, fully oriented on my examination   He is not capacitated to make medical and discharge decisions per psych  Avoid sedating agents  Minimize risk of delirium such as avoiding day time napping and promote night time sleep, monitor for constipation, remove lines/tubing that is not needed, avoid early lab draws and vital checks, limit polypharmacy as able, and keep close to the window  Head CT no acute intracranial abnormalities  TSH, ammonia normal   [id number]

## 2021-07-14 NOTE — ASSESSMENT & PLAN NOTE
BP elevated: 169/88  HR:  (likely 2nd to shallow breathing)  Will start Lopressor  Note: home meds include Cozaar 50 mg daily and HCTZ 12.5 mg daily  Cont to monitor
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
## 2021-07-14 NOTE — THERAPY
Missed Therapy     Patient Name: Kevin Jackson  Age:  76 y.o., Sex:  male  Medical Record #: [id number]  Today's Date: 7/14/2021    Discussed missed therapy with hospitalist, Dr. Castañeda, nursing, respiratory therapist. Attempted ST evaluation. Pt with elevated HR (ranging from 103-105), SPO2 remained at 95-96%. Pt in severe pain, decreased level of alertness. Med hold placed per physician.      07/14/21 0834   Interdisciplinary Plan of Care Collaboration   IDT Collaboration with  Physician;Nursing;Respiratory Therapist   Patient Position at End of Therapy In Bed;Tray Table within Reach;Call Light within Reach   Collaboration Comments Hospitalist examining pt, elevated HR, increased pain, decreased alertness secondary to pain, med hold placed   Therapy Missed   Missed Therapy (Minutes) 60   Reason For Missed Therapy Medical - Patient on Hold from Therapy

## 2021-07-14 NOTE — PROGRESS NOTES
Order received to send to ED for critical CO2 level. Patient left at about 1147 am via gurney by MARIEL.

## 2021-07-14 NOTE — THERAPY
Missed Therapy     Patient Name: Kevin Jackson  Age:  76 y.o., Sex:  male  Medical Record #: [id number]  Today's Date: 7/14/2021    Discussed missed therapy with MD, therapy .  Pt sent to [facility] d/t critical lab value.      07/14/21 1301   Therapy Missed   Missed Therapy (Minutes) 60   Reason For Missed Therapy Medical - Patient on Hold from Therapy  (Pt admitted to [facility] hospital)
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
## 2021-07-14 NOTE — ED NOTES
Lab called with critical result of Ph of 7.19 at 1421. Critical lab result read back to lab.   Dr. Ocampo notified of critical lab result at 1423.  Critical lab result read back by Dr. Ocampo.
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
## 2021-07-14 NOTE — ASSESSMENT & PLAN NOTE
Due to DKA.  DKA protocol ordered.  Closely monitor BMP and blood gas as needed.  May require bicarbonate supplementation with no improvement.

## 2021-07-14 NOTE — ASSESSMENT & PLAN NOTE
Hba1c: no values in Epic  -198  On Metformin  On Amaryl  Will get current A1c level  Note: has a diminished appetite  Note: home meds include Metformin 1000 mg bid, Amaryl 4 mg daily, Tresiba, and Jardiance  Cont to monitor
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
## 2021-07-14 NOTE — ASSESSMENT & PLAN NOTE
A1c 9  DKA resolved after insulin drip  Currently on Lantus and continue sliding scale insulin and monitor CBGs  Hypoglycemic protocol
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
## 2021-07-14 NOTE — ASSESSMENT & PLAN NOTE
Was noticed to have increased lethargy/somnolence this am  Pt responds to question appropriately  Pt complains of rib pain  Had [place] last night and early this am  Has shallow breathing 2nd to rib pain  Pt drooling a little  O2 sats good on RA  VSS (BP up a little)  CO2: 17 (7/13) --> 7 (7/14) --> repeat shows 6 (7/14)  CXR: resolution of subsegmental atelectasis; residual small left pleural effusion; no pneumothorax; stable multiple left-sided rib fractures  EKG: sinus tachy at 100; no ST elevation or depressions  S/P Narcan x 2 -- minimal effect  Will get Trop, BNP  Concern for CO2 narcosis with impending resp failure/coma    WILL SENT TO ER STAT (7/14)

## 2021-07-14 NOTE — FLOWSHEET NOTE
"   07/14/21 0831   Events/Summary/Plan   Events/Summary/Plan Pt with low C02, increased RR and HR.  C/o pain  \"everywhere:   Vital Signs   Pulse (!) 107   Respiration (!) 28   Pulse Oximetry 95 %   $ Pulse Oximetry (Spot Check) Yes   Respiratory Assessment   Level of Consciousness Alert   Chest Exam   Work Of Breathing / Effort Tachypnea   Breath Sounds   RUL Breath Sounds Clear   RML Breath Sounds Clear   RLL Breath Sounds Diminished   VITOR Breath Sounds Clear   LLL Breath Sounds Pleural Rub   Secretions   Cough Non Productive   Oxygen   O2 Delivery Device None - Room Air     "
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
## 2021-07-14 NOTE — CARE PLAN
Problem: Pain - Standard  Goal: Alleviation of pain or a reduction in pain to the patient’s comfort goal  Note: Pain is manageable with scheduled norco.Repositioned with pillows for comfort.Will continue to monitor and assess pain level and medicate as needed.     Problem: Bladder / Voiding  Goal: Patient will establish and maintain regular urinary output  Note: Pt is continent of bladder using condom catheter at hs.Voiding adequate amount of urine.PVR 20.Will continue to monitor.

## 2021-07-14 NOTE — SENIOR ADMIT NOTE
Lab called with critical result of CO2 at 7. Phone call message and Voalte message sent to  Dr. Rubin notified of critical lab result and RR of 26, HR of 106 at 0826, response received at 0835 to consult Dr. Castañeda. Notified Dr. Castañeda at 0830.

## 2021-07-15 ENCOUNTER — APPOINTMENT (OUTPATIENT)
Dept: RADIOLOGY | Facility: MEDICAL CENTER | Age: 76
DRG: 637 | End: 2021-07-15
Attending: INTERNAL MEDICINE
Payer: MEDICARE

## 2021-07-15 PROBLEM — R78.81 BACTEREMIA: Status: ACTIVE | Noted: 2021-07-15

## 2021-07-15 LAB
ALBUMIN SERPL BCP-MCNC: 1.8 G/DL (ref 3.2–4.9)
ALBUMIN/GLOB SERPL: 0.8 G/DL
ALP SERPL-CCNC: 35 U/L (ref 30–99)
ALT SERPL-CCNC: 7 U/L (ref 2–50)
AMPHET UR QL SCN: NEGATIVE
ANION GAP SERPL CALC-SCNC: 10 MMOL/L (ref 7–16)
ANION GAP SERPL CALC-SCNC: 12 MMOL/L (ref 7–16)
ANION GAP SERPL CALC-SCNC: 12 MMOL/L (ref 7–16)
ANION GAP SERPL CALC-SCNC: 13 MMOL/L (ref 7–16)
APPEARANCE FLD: NORMAL
APPEARANCE UR: CLEAR
AST SERPL-CCNC: 12 U/L (ref 12–45)
B-OH-BUTYR SERPL-MCNC: 2.57 MMOL/L (ref 0.02–0.27)
BACTERIA #/AREA URNS HPF: NEGATIVE /HPF
BARBITURATES UR QL SCN: POSITIVE
BASOPHILS # BLD AUTO: 0.2 % (ref 0–1.8)
BASOPHILS # BLD: 0.02 K/UL (ref 0–0.12)
BENZODIAZ UR QL SCN: NEGATIVE
BILIRUB SERPL-MCNC: 0.3 MG/DL (ref 0.1–1.5)
BILIRUB UR QL STRIP.AUTO: NEGATIVE
BODY FLD TYPE: NORMAL
BUN SERPL-MCNC: 11 MG/DL (ref 8–22)
BUN SERPL-MCNC: 12 MG/DL (ref 8–22)
BUN SERPL-MCNC: 17 MG/DL (ref 8–22)
BUN SERPL-MCNC: 9 MG/DL (ref 8–22)
BZE UR QL SCN: NEGATIVE
CALCIUM SERPL-MCNC: 5.8 MG/DL (ref 8.5–10.5)
CALCIUM SERPL-MCNC: 8.3 MG/DL (ref 8.5–10.5)
CALCIUM SERPL-MCNC: 8.6 MG/DL (ref 8.5–10.5)
CALCIUM SERPL-MCNC: 9 MG/DL (ref 8.5–10.5)
CANNABINOIDS UR QL SCN: NEGATIVE
CHLORIDE SERPL-SCNC: 109 MMOL/L (ref 96–112)
CHLORIDE SERPL-SCNC: 110 MMOL/L (ref 96–112)
CHLORIDE SERPL-SCNC: 111 MMOL/L (ref 96–112)
CHLORIDE SERPL-SCNC: 122 MMOL/L (ref 96–112)
CO2 SERPL-SCNC: 11 MMOL/L (ref 20–33)
CO2 SERPL-SCNC: 12 MMOL/L (ref 20–33)
CO2 SERPL-SCNC: 15 MMOL/L (ref 20–33)
CO2 SERPL-SCNC: 17 MMOL/L (ref 20–33)
COLOR FLD: NORMAL
COLOR UR: YELLOW
CREAT SERPL-MCNC: 0.49 MG/DL (ref 0.5–1.4)
CREAT SERPL-MCNC: 0.74 MG/DL (ref 0.5–1.4)
CREAT SERPL-MCNC: 0.78 MG/DL (ref 0.5–1.4)
CREAT SERPL-MCNC: 0.85 MG/DL (ref 0.5–1.4)
EOSINOPHIL # BLD AUTO: 0 K/UL (ref 0–0.51)
EOSINOPHIL NFR BLD: 0 % (ref 0–6.9)
EPI CELLS #/AREA URNS HPF: NEGATIVE /HPF
ERYTHROCYTE [DISTWIDTH] IN BLOOD BY AUTOMATED COUNT: 52.8 FL (ref 35.9–50)
GLOBULIN SER CALC-MCNC: 2.2 G/DL (ref 1.9–3.5)
GLUCOSE BLD-MCNC: 137 MG/DL (ref 65–99)
GLUCOSE BLD-MCNC: 150 MG/DL (ref 65–99)
GLUCOSE BLD-MCNC: 152 MG/DL (ref 65–99)
GLUCOSE BLD-MCNC: 155 MG/DL (ref 65–99)
GLUCOSE BLD-MCNC: 155 MG/DL (ref 65–99)
GLUCOSE BLD-MCNC: 161 MG/DL (ref 65–99)
GLUCOSE BLD-MCNC: 169 MG/DL (ref 65–99)
GLUCOSE BLD-MCNC: 169 MG/DL (ref 65–99)
GLUCOSE BLD-MCNC: 178 MG/DL (ref 65–99)
GLUCOSE BLD-MCNC: 179 MG/DL (ref 65–99)
GLUCOSE BLD-MCNC: 183 MG/DL (ref 65–99)
GLUCOSE BLD-MCNC: 185 MG/DL (ref 65–99)
GLUCOSE BLD-MCNC: 189 MG/DL (ref 65–99)
GLUCOSE BLD-MCNC: 192 MG/DL (ref 65–99)
GLUCOSE BLD-MCNC: 205 MG/DL (ref 65–99)
GLUCOSE BLD-MCNC: 207 MG/DL (ref 65–99)
GLUCOSE BLD-MCNC: 209 MG/DL (ref 65–99)
GLUCOSE BLD-MCNC: 214 MG/DL (ref 65–99)
GLUCOSE BLD-MCNC: 220 MG/DL (ref 65–99)
GLUCOSE BLD-MCNC: 223 MG/DL (ref 65–99)
GLUCOSE BLD-MCNC: 224 MG/DL (ref 65–99)
GLUCOSE BLD-MCNC: 226 MG/DL (ref 65–99)
GLUCOSE BLD-MCNC: 226 MG/DL (ref 65–99)
GLUCOSE BLD-MCNC: 241 MG/DL (ref 65–99)
GLUCOSE BLD-MCNC: 243 MG/DL (ref 65–99)
GLUCOSE BLD-MCNC: 264 MG/DL (ref 65–99)
GLUCOSE FLD-MCNC: 200 MG/DL
GLUCOSE SERPL-MCNC: 149 MG/DL (ref 65–99)
GLUCOSE SERPL-MCNC: 159 MG/DL (ref 65–99)
GLUCOSE SERPL-MCNC: 214 MG/DL (ref 65–99)
GLUCOSE SERPL-MCNC: 250 MG/DL (ref 65–99)
GLUCOSE UR STRIP.AUTO-MCNC: >=1000 MG/DL
GRAM STN SPEC: NORMAL
HCT VFR BLD AUTO: 40.8 % (ref 42–52)
HGB BLD-MCNC: 13.3 G/DL (ref 14–18)
IMM GRANULOCYTES # BLD AUTO: 0.07 K/UL (ref 0–0.11)
IMM GRANULOCYTES NFR BLD AUTO: 0.9 % (ref 0–0.9)
KETONES UR STRIP.AUTO-MCNC: 15 MG/DL
LACTATE BLD-SCNC: 1.8 MMOL/L (ref 0.5–2)
LDH FLD L TO P-CCNC: 787 U/L
LDH SERPL L TO P-CCNC: 278 U/L (ref 107–266)
LEUKOCYTE ESTERASE UR QL STRIP.AUTO: NEGATIVE
LYMPHOCYTES # BLD AUTO: 0.64 K/UL (ref 1–4.8)
LYMPHOCYTES NFR BLD: 7.9 % (ref 22–41)
MAGNESIUM SERPL-MCNC: 1.3 MG/DL (ref 1.5–2.5)
MAGNESIUM SERPL-MCNC: 1.9 MG/DL (ref 1.5–2.5)
MCH RBC QN AUTO: 29.8 PG (ref 27–33)
MCHC RBC AUTO-ENTMCNC: 32.6 G/DL (ref 33.7–35.3)
MCV RBC AUTO: 91.3 FL (ref 81.4–97.8)
METHADONE UR QL SCN: NEGATIVE
MICRO URNS: ABNORMAL
MONOCYTES # BLD AUTO: 0.9 K/UL (ref 0–0.85)
MONOCYTES NFR BLD AUTO: 11.2 % (ref 0–13.4)
MONONUC CELLS NFR FLD: 8 %
MRSA DNA SPEC QL NAA+PROBE: NORMAL
NEUTROPHILS # BLD AUTO: 6.44 K/UL (ref 1.82–7.42)
NEUTROPHILS NFR BLD: 79.8 % (ref 44–72)
NEUTROPHILS NFR FLD: 92 %
NITRITE UR QL STRIP.AUTO: NEGATIVE
NRBC # BLD AUTO: 0 K/UL
NRBC BLD-RTO: 0 /100 WBC
OPIATES UR QL SCN: NEGATIVE
OXYCODONE UR QL SCN: NEGATIVE
PCP UR QL SCN: NEGATIVE
PH FLD: 8 [PH]
PH UR STRIP.AUTO: 5 [PH] (ref 5–8)
PHOSPHATE SERPL-MCNC: 1.1 MG/DL (ref 2.5–4.5)
PHOSPHATE SERPL-MCNC: 1.2 MG/DL (ref 2.5–4.5)
PLATELET # BLD AUTO: 299 K/UL (ref 164–446)
PMV BLD AUTO: 9.5 FL (ref 9–12.9)
POTASSIUM SERPL-SCNC: 2.8 MMOL/L (ref 3.6–5.5)
POTASSIUM SERPL-SCNC: 3 MMOL/L (ref 3.6–5.5)
POTASSIUM SERPL-SCNC: 3.5 MMOL/L (ref 3.6–5.5)
POTASSIUM SERPL-SCNC: 5.2 MMOL/L (ref 3.6–5.5)
PROCALCITONIN SERPL-MCNC: 0.55 NG/ML
PROPOXYPH UR QL SCN: NEGATIVE
PROT FLD-MCNC: 3 G/DL
PROT SERPL-MCNC: 4 G/DL (ref 6–8.2)
PROT UR QL STRIP: 30 MG/DL
RBC # BLD AUTO: 4.47 M/UL (ref 4.7–6.1)
RBC # FLD: NORMAL CELLS/UL
RBC # URNS HPF: ABNORMAL /HPF
RBC UR QL AUTO: ABNORMAL
RENAL EPI CELLS #/AREA URNS HPF: ABNORMAL /HPF
SIGNIFICANT IND 70042: NORMAL
SIGNIFICANT IND 70042: NORMAL
SITE SITE: NORMAL
SITE SITE: NORMAL
SODIUM SERPL-SCNC: 134 MMOL/L (ref 135–145)
SODIUM SERPL-SCNC: 137 MMOL/L (ref 135–145)
SODIUM SERPL-SCNC: 140 MMOL/L (ref 135–145)
SODIUM SERPL-SCNC: 143 MMOL/L (ref 135–145)
SOURCE SOURCE: NORMAL
SOURCE SOURCE: NORMAL
SP GR UR STRIP.AUTO: 1.02
UROBILINOGEN UR STRIP.AUTO-MCNC: 0.2 MG/DL
WBC # BLD AUTO: 8.1 K/UL (ref 4.8–10.8)
WBC # FLD: NORMAL CELLS/UL
WBC #/AREA URNS HPF: ABNORMAL /HPF

## 2021-07-15 PROCEDURE — 700105 HCHG RX REV CODE 258: Performed by: INTERNAL MEDICINE

## 2021-07-15 PROCEDURE — 700111 HCHG RX REV CODE 636 W/ 250 OVERRIDE (IP): Performed by: STUDENT IN AN ORGANIZED HEALTH CARE EDUCATION/TRAINING PROGRAM

## 2021-07-15 PROCEDURE — 87205 SMEAR GRAM STAIN: CPT

## 2021-07-15 PROCEDURE — 82962 GLUCOSE BLOOD TEST: CPT | Mod: 91

## 2021-07-15 PROCEDURE — 700101 HCHG RX REV CODE 250: Performed by: STUDENT IN AN ORGANIZED HEALTH CARE EDUCATION/TRAINING PROGRAM

## 2021-07-15 PROCEDURE — 83735 ASSAY OF MAGNESIUM: CPT

## 2021-07-15 PROCEDURE — A9270 NON-COVERED ITEM OR SERVICE: HCPCS | Performed by: INTERNAL MEDICINE

## 2021-07-15 PROCEDURE — 700102 HCHG RX REV CODE 250 W/ 637 OVERRIDE(OP): Performed by: INTERNAL MEDICINE

## 2021-07-15 PROCEDURE — 84157 ASSAY OF PROTEIN OTHER: CPT

## 2021-07-15 PROCEDURE — 87086 URINE CULTURE/COLONY COUNT: CPT

## 2021-07-15 PROCEDURE — 99291 CRITICAL CARE FIRST HOUR: CPT | Performed by: INTERNAL MEDICINE

## 2021-07-15 PROCEDURE — 89051 BODY FLUID CELL COUNT: CPT

## 2021-07-15 PROCEDURE — 87077 CULTURE AEROBIC IDENTIFY: CPT

## 2021-07-15 PROCEDURE — 82010 KETONE BODYS QUAN: CPT

## 2021-07-15 PROCEDURE — 84145 PROCALCITONIN (PCT): CPT

## 2021-07-15 PROCEDURE — 700111 HCHG RX REV CODE 636 W/ 250 OVERRIDE (IP): Performed by: INTERNAL MEDICINE

## 2021-07-15 PROCEDURE — 84100 ASSAY OF PHOSPHORUS: CPT

## 2021-07-15 PROCEDURE — 80053 COMPREHEN METABOLIC PANEL: CPT

## 2021-07-15 PROCEDURE — 87186 SC STD MICRODIL/AGAR DIL: CPT

## 2021-07-15 PROCEDURE — 4410569 US-THORACENTESIS PUNCTURE

## 2021-07-15 PROCEDURE — 71045 X-RAY EXAM CHEST 1 VIEW: CPT

## 2021-07-15 PROCEDURE — 83615 LACTATE (LD) (LDH) ENZYME: CPT | Mod: 91

## 2021-07-15 PROCEDURE — 82945 GLUCOSE OTHER FLUID: CPT

## 2021-07-15 PROCEDURE — 80307 DRUG TEST PRSMV CHEM ANLYZR: CPT

## 2021-07-15 PROCEDURE — 700105 HCHG RX REV CODE 258: Performed by: STUDENT IN AN ORGANIZED HEALTH CARE EDUCATION/TRAINING PROGRAM

## 2021-07-15 PROCEDURE — 87147 CULTURE TYPE IMMUNOLOGIC: CPT

## 2021-07-15 PROCEDURE — 83605 ASSAY OF LACTIC ACID: CPT

## 2021-07-15 PROCEDURE — 87641 MR-STAPH DNA AMP PROBE: CPT

## 2021-07-15 PROCEDURE — 83986 ASSAY PH BODY FLUID NOS: CPT

## 2021-07-15 PROCEDURE — 80048 BASIC METABOLIC PNL TOTAL CA: CPT | Mod: 91

## 2021-07-15 PROCEDURE — A9270 NON-COVERED ITEM OR SERVICE: HCPCS | Performed by: STUDENT IN AN ORGANIZED HEALTH CARE EDUCATION/TRAINING PROGRAM

## 2021-07-15 PROCEDURE — 99221 1ST HOSP IP/OBS SF/LOW 40: CPT | Mod: GC | Performed by: INTERNAL MEDICINE

## 2021-07-15 PROCEDURE — 81001 URINALYSIS AUTO W/SCOPE: CPT

## 2021-07-15 PROCEDURE — 700101 HCHG RX REV CODE 250: Performed by: INTERNAL MEDICINE

## 2021-07-15 PROCEDURE — 87070 CULTURE OTHR SPECIMN AEROBIC: CPT

## 2021-07-15 PROCEDURE — 85025 COMPLETE CBC W/AUTO DIFF WBC: CPT

## 2021-07-15 PROCEDURE — 94669 MECHANICAL CHEST WALL OSCILL: CPT

## 2021-07-15 PROCEDURE — 87040 BLOOD CULTURE FOR BACTERIA: CPT | Mod: 91

## 2021-07-15 PROCEDURE — 700102 HCHG RX REV CODE 250 W/ 637 OVERRIDE(OP): Performed by: STUDENT IN AN ORGANIZED HEALTH CARE EDUCATION/TRAINING PROGRAM

## 2021-07-15 PROCEDURE — 87075 CULTR BACTERIA EXCEPT BLOOD: CPT

## 2021-07-15 PROCEDURE — 770022 HCHG ROOM/CARE - ICU (200)

## 2021-07-15 PROCEDURE — 0W9B3ZX DRAINAGE OF LEFT PLEURAL CAVITY, PERCUTANEOUS APPROACH, DIAGNOSTIC: ICD-10-PCS | Performed by: RADIOLOGY

## 2021-07-15 RX ORDER — ACETAMINOPHEN 650 MG/1
325 SUPPOSITORY RECTAL EVERY 6 HOURS PRN
Status: DISCONTINUED | OUTPATIENT
Start: 2021-07-15 | End: 2021-07-15

## 2021-07-15 RX ORDER — ACETAMINOPHEN 325 MG/1
650 TABLET ORAL EVERY 4 HOURS PRN
Status: DISCONTINUED | OUTPATIENT
Start: 2021-07-15 | End: 2021-07-16

## 2021-07-15 RX ORDER — ACETAMINOPHEN 650 MG/1
650 SUPPOSITORY RECTAL EVERY 6 HOURS PRN
Status: DISCONTINUED | OUTPATIENT
Start: 2021-07-15 | End: 2021-07-23

## 2021-07-15 RX ORDER — POTASSIUM CHLORIDE 7.45 MG/ML
10 INJECTION INTRAVENOUS
Status: COMPLETED | OUTPATIENT
Start: 2021-07-15 | End: 2021-07-15

## 2021-07-15 RX ORDER — CEFAZOLIN SODIUM 2 G/100ML
2 INJECTION, SOLUTION INTRAVENOUS EVERY 8 HOURS
Status: DISCONTINUED | OUTPATIENT
Start: 2021-07-15 | End: 2021-07-16

## 2021-07-15 RX ORDER — POTASSIUM CHLORIDE 7.45 MG/ML
10 INJECTION INTRAVENOUS
Status: ACTIVE | OUTPATIENT
Start: 2021-07-15 | End: 2021-07-15

## 2021-07-15 RX ORDER — MAGNESIUM SULFATE HEPTAHYDRATE 40 MG/ML
2 INJECTION, SOLUTION INTRAVENOUS ONCE
Status: COMPLETED | OUTPATIENT
Start: 2021-07-15 | End: 2021-07-15

## 2021-07-15 RX ORDER — POTASSIUM CHLORIDE 7.45 MG/ML
10 INJECTION INTRAVENOUS ONCE
Status: COMPLETED | OUTPATIENT
Start: 2021-07-15 | End: 2021-07-15

## 2021-07-15 RX ADMIN — MAGNESIUM SULFATE 2 G: 2 INJECTION INTRAVENOUS at 13:15

## 2021-07-15 RX ADMIN — ENOXAPARIN SODIUM 40 MG: 40 INJECTION SUBCUTANEOUS at 09:15

## 2021-07-15 RX ADMIN — POTASSIUM PHOSPHATE, MONOBASIC AND POTASSIUM PHOSPHATE, DIBASIC 30 MMOL: 224; 236 INJECTION, SOLUTION, CONCENTRATE INTRAVENOUS at 14:29

## 2021-07-15 RX ADMIN — ACETAMINOPHEN 650 MG: 650 SUPPOSITORY RECTAL at 15:01

## 2021-07-15 RX ADMIN — POTASSIUM CHLORIDE 10 MEQ: 7.46 INJECTION, SOLUTION INTRAVENOUS at 00:07

## 2021-07-15 RX ADMIN — PROPRANOLOL HYDROCHLORIDE 20 MG: 20 TABLET ORAL at 22:16

## 2021-07-15 RX ADMIN — DEXTROSE AND SODIUM CHLORIDE: 10; .45 INJECTION, SOLUTION INTRAVENOUS at 11:39

## 2021-07-15 RX ADMIN — POTASSIUM CHLORIDE 10 MEQ: 7.46 INJECTION, SOLUTION INTRAVENOUS at 20:24

## 2021-07-15 RX ADMIN — CEFAZOLIN SODIUM 2 G: 2 INJECTION, SOLUTION INTRAVENOUS at 22:16

## 2021-07-15 RX ADMIN — POTASSIUM CHLORIDE 10 MEQ: 7.46 INJECTION, SOLUTION INTRAVENOUS at 19:22

## 2021-07-15 RX ADMIN — VANCOMYCIN HYDROCHLORIDE 2250 MG: 500 INJECTION, POWDER, LYOPHILIZED, FOR SOLUTION INTRAVENOUS at 10:06

## 2021-07-15 RX ADMIN — POTASSIUM CHLORIDE 10 MEQ: 7.46 INJECTION, SOLUTION INTRAVENOUS at 03:26

## 2021-07-15 RX ADMIN — DEXTROSE AND SODIUM CHLORIDE: 10; .45 INJECTION, SOLUTION INTRAVENOUS at 18:45

## 2021-07-15 RX ADMIN — MAGNESIUM SULFATE HEPTAHYDRATE 2 G: 2 INJECTION, SOLUTION INTRAVENOUS at 00:08

## 2021-07-15 RX ADMIN — LIDOCAINE 2 PATCH: 50 PATCH TOPICAL at 11:38

## 2021-07-15 RX ADMIN — SODIUM CHLORIDE 7 UNITS/HR: 9 INJECTION, SOLUTION INTRAVENOUS at 17:10

## 2021-07-15 RX ADMIN — POTASSIUM CHLORIDE 10 MEQ: 7.46 INJECTION, SOLUTION INTRAVENOUS at 01:12

## 2021-07-15 RX ADMIN — POTASSIUM CHLORIDE 10 MEQ: 7.46 INJECTION, SOLUTION INTRAVENOUS at 06:57

## 2021-07-15 RX ADMIN — POTASSIUM CHLORIDE 10 MEQ: 7.46 INJECTION, SOLUTION INTRAVENOUS at 21:18

## 2021-07-15 RX ADMIN — DEXTROSE AND SODIUM CHLORIDE: 10; .45 INJECTION, SOLUTION INTRAVENOUS at 04:24

## 2021-07-15 RX ADMIN — POTASSIUM PHOSPHATE, MONOBASIC AND POTASSIUM PHOSPHATE, DIBASIC 30 MMOL: 224; 236 INJECTION, SOLUTION, CONCENTRATE INTRAVENOUS at 00:37

## 2021-07-15 RX ADMIN — POTASSIUM CHLORIDE 10 MEQ: 7.46 INJECTION, SOLUTION INTRAVENOUS at 13:01

## 2021-07-15 RX ADMIN — CEFAZOLIN SODIUM 2 G: 2 INJECTION, SOLUTION INTRAVENOUS at 15:01

## 2021-07-15 RX ADMIN — POTASSIUM CHLORIDE 10 MEQ: 7.46 INJECTION, SOLUTION INTRAVENOUS at 02:22

## 2021-07-15 RX ADMIN — POTASSIUM CHLORIDE 10 MEQ: 7.46 INJECTION, SOLUTION INTRAVENOUS at 14:30

## 2021-07-15 RX ADMIN — ATORVASTATIN CALCIUM 80 MG: 40 TABLET, FILM COATED ORAL at 22:17

## 2021-07-15 RX ADMIN — POTASSIUM CHLORIDE 10 MEQ: 7.46 INJECTION, SOLUTION INTRAVENOUS at 22:15

## 2021-07-15 RX ADMIN — SODIUM CHLORIDE 7 UNITS/HR: 9 INJECTION, SOLUTION INTRAVENOUS at 06:57

## 2021-07-15 ASSESSMENT — PAIN DESCRIPTION - PAIN TYPE
TYPE: ACUTE PAIN

## 2021-07-15 NOTE — PROGRESS NOTES
Wife, Jerome, updated on plan of care and DKA pathophysiology. Verbalized understanding.    All belongings sent home with Jerome.
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
## 2021-07-15 NOTE — CARE PLAN
The patient is Watcher - Medium risk of patient condition declining or worsening    Shift Goals  Clinical Goals: Close anion gap, replace electrolytes  Patient Goals: Increase alertness/improve orientation status     Progress made toward(s) clinical / shift goals:  progressing, remains on DKA protocol     Patient is not progressing towards the following goals:      Problem: Pain - Standard  Goal: Alleviation of pain or a reduction in pain to the patient’s comfort goal  Outcome: Progressing     Problem: Knowledge Deficit - Standard  Goal: Patient and family/care givers will demonstrate understanding of plan of care, disease process/condition, diagnostic tests and medications  Outcome: Progressing     Problem: Skin Integrity  Goal: Skin integrity is maintained or improved  Outcome: Progressing     Problem: Fall Risk  Goal: Patient will remain free from falls  Outcome: Progressing
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
## 2021-07-15 NOTE — PROGRESS NOTES
Dr. Alvarez notified of increase in FSBG from 150 to 223 mg/dL. Orders received to increase insulin gtt to 7 units/hr and keep D10 0.45NS at 150 ml/hr.     MD also notified of positive blood culture results. Acknowledged, MD to address in AM rounds.

## 2021-07-15 NOTE — PROGRESS NOTES
Patient arrived to SICU at 1645.  Pt. Weight upon arrival: 86.2 kg  Pt. Temp upon arrival: 97.0 F     Patient hypertensive, tachycardic, and 96% on room air.     Ordered labs drawn and sent to lab for processing. ABG drawn by RT at bedside. Updated Malika HERNANDEZ on ABG results.   Initial 2L bolus started.    Pt's skin note to follow, photos take dressings changed.
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
## 2021-07-15 NOTE — ASSESSMENT & PLAN NOTE
Resolved   Currently on insulin 180 protocol   HbA1c is 9.0 from 07/14   Will switch to subcutaneous once BESSIE complete

## 2021-07-15 NOTE — PROGRESS NOTES
Dr. Alvarez at bedside. Notified , verbal orders to keep insulin drip at 7 and D10 1/2NS at 150, and will titrate throughout day as needed. Plans for thoracentesis on the L today for pleural effusion.

## 2021-07-15 NOTE — CARE PLAN
Problem: Bathing  Goal: STG-Within one week, patient will bathe with mod A seated on bench in shower.  Outcome: Not Met  Note: Pt transferred to [facility] after eval yesterday.     Problem: Dressing  Goal: STG-Within one week, patient will dress UB with min A.  Outcome: Not Met  Note: Pt transferred to [facility] after eval yesterday.  Goal: STG-Within one week, patient will dress LB with min A.  Outcome: Not Met  Note: Pt transferred to [facility] after eval yesterday.     Problem: Toileting  Goal: STG-Within one week, patient will complete toileting tasks with mod A.  Outcome: Not Met  Note: Pt transferred to [facility] after eval yesterday.     Problem: Functional Transfers  Goal: STG-Within one week, patient will transfer to toilet with mod A.  Outcome: Not Met  Note: Pt transferred to [facility] after eval yesterday.

## 2021-07-15 NOTE — PROGRESS NOTES
Dr. Peres notified blood cultures drawn yesterday and pt already received doses of vancomycin and ancef. Per MD, proceed with second set of blood cultures.

## 2021-07-15 NOTE — CARE PLAN
Problem: Knowledge Deficit - Standard  Goal: Patient and family/care givers will demonstrate understanding of plan of care, disease process/condition, diagnostic tests and medications  Outcome: Progressing     Problem: Fall Risk  Goal: Patient will remain free from falls  Outcome: Progressing   The patient is Watcher - Medium risk of patient condition declining or worsening         Progress made toward(s) clinical / shift goals:  Patient and family educated, pain assessed using non-verbal descriptors, reinforcement needed    Patient is not progressing towards the following goals:
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
## 2021-07-15 NOTE — THERAPY
Recreational Therapy  Daily Treatment     Patient Name: Kevin Jackson  AGE:  76 y.o., SEX:  male  Medical Record #: [id number]  Today's Date: 7/15/2021       Subjective    Short responses to questions. Pt falling asleep during evaluation.      Objective       07/14/21 1001   Treatment Time   Total Time Spent (mins) 0   Total Time Missed 30   Reasons for Time Missed Medical-Patient With Nursing;Medical-Other (Please Comment)  (with physician, falling asleep)       Assessment    Pt unable to participate in the evaluation. Hand off of care to Nursing.          Plan    Eval at a later date.

## 2021-07-15 NOTE — PROGRESS NOTES
Dr Daly notified of patient's continued confusion and lethargy. Oriented x 2-3. Plan to monitor for desaturation or increase in respiratory rate. Will notify MD with any acute changes to mentation or respiratory status.

## 2021-07-15 NOTE — PROGRESS NOTES
Dr. Banks consented patient and performed a left thoracentesis  in patietn's room   .  Vitals monitored during procedure by the nurse.   16 ml of fluid removed,   16  ml of fluid sent to lab .  Patient tolerated procedure well.. Report given to RN in room .

## 2021-07-15 NOTE — DISCHARGE PLANNING
Kevin was sent acute from [facility].  Will need a PMR consult referral as well as [place] burke if Kevin remains acute for 3 midnights or more.  Will f/u with Physiatry for a possible return once medically cleared.  Please reach out to myself @ [phone] with any questions.

## 2021-07-15 NOTE — ASSESSMENT & PLAN NOTE
Remains lethargic but A/O x2-3  CT head negative for acute abnormalities.   Suspect acute metabolic encephalopathy due to DKA versus infection  Slowly improving

## 2021-07-15 NOTE — PROGRESS NOTES
Some discrepancies with am labs, redraws were required. Thus, AM K was not replaced. Will replace K of 3.0 now with 40mEq KCl per protocol. Continue DKA protocol.     Update: Replace K of 3.0 with 20mEq KCl and pharmacy to order K Phos and Magnesium replacement. Pharmacy and Dr. Alvarez updated.

## 2021-07-16 ENCOUNTER — APPOINTMENT (OUTPATIENT)
Dept: CARDIOLOGY | Facility: MEDICAL CENTER | Age: 76
DRG: 637 | End: 2021-07-16
Attending: INTERNAL MEDICINE
Payer: MEDICARE

## 2021-07-16 ENCOUNTER — APPOINTMENT (OUTPATIENT)
Dept: RADIOLOGY | Facility: MEDICAL CENTER | Age: 76
DRG: 637 | End: 2021-07-16
Attending: INTERNAL MEDICINE
Payer: MEDICARE

## 2021-07-16 LAB
ANION GAP SERPL CALC-SCNC: 10 MMOL/L (ref 7–16)
ANION GAP SERPL CALC-SCNC: 11 MMOL/L (ref 7–16)
ANION GAP SERPL CALC-SCNC: 12 MMOL/L (ref 7–16)
ANION GAP SERPL CALC-SCNC: 8 MMOL/L (ref 7–16)
BASOPHILS # BLD AUTO: 0 % (ref 0–1.8)
BASOPHILS # BLD: 0 K/UL (ref 0–0.12)
BUN SERPL-MCNC: 7 MG/DL (ref 8–22)
CALCIUM SERPL-MCNC: 8.3 MG/DL (ref 8.5–10.5)
CALCIUM SERPL-MCNC: 8.4 MG/DL (ref 8.5–10.5)
CHLORIDE SERPL-SCNC: 111 MMOL/L (ref 96–112)
CHLORIDE SERPL-SCNC: 113 MMOL/L (ref 96–112)
CHLORIDE SERPL-SCNC: 115 MMOL/L (ref 96–112)
CHLORIDE SERPL-SCNC: 116 MMOL/L (ref 96–112)
CO2 SERPL-SCNC: 17 MMOL/L (ref 20–33)
CO2 SERPL-SCNC: 17 MMOL/L (ref 20–33)
CO2 SERPL-SCNC: 19 MMOL/L (ref 20–33)
CO2 SERPL-SCNC: 20 MMOL/L (ref 20–33)
CREAT SERPL-MCNC: 0.64 MG/DL (ref 0.5–1.4)
CREAT SERPL-MCNC: 0.66 MG/DL (ref 0.5–1.4)
CREAT SERPL-MCNC: 0.68 MG/DL (ref 0.5–1.4)
CREAT SERPL-MCNC: 0.69 MG/DL (ref 0.5–1.4)
EOSINOPHIL # BLD AUTO: 0 K/UL (ref 0–0.51)
EOSINOPHIL NFR BLD: 0 % (ref 0–6.9)
ERYTHROCYTE [DISTWIDTH] IN BLOOD BY AUTOMATED COUNT: 51.5 FL (ref 35.9–50)
GLUCOSE BLD-MCNC: 152 MG/DL (ref 65–99)
GLUCOSE BLD-MCNC: 159 MG/DL (ref 65–99)
GLUCOSE BLD-MCNC: 161 MG/DL (ref 65–99)
GLUCOSE BLD-MCNC: 163 MG/DL (ref 65–99)
GLUCOSE BLD-MCNC: 177 MG/DL (ref 65–99)
GLUCOSE BLD-MCNC: 178 MG/DL (ref 65–99)
GLUCOSE BLD-MCNC: 180 MG/DL (ref 65–99)
GLUCOSE BLD-MCNC: 187 MG/DL (ref 65–99)
GLUCOSE BLD-MCNC: 191 MG/DL (ref 65–99)
GLUCOSE BLD-MCNC: 193 MG/DL (ref 65–99)
GLUCOSE BLD-MCNC: 196 MG/DL (ref 65–99)
GLUCOSE BLD-MCNC: 198 MG/DL (ref 65–99)
GLUCOSE BLD-MCNC: 220 MG/DL (ref 65–99)
GLUCOSE BLD-MCNC: 228 MG/DL (ref 65–99)
GLUCOSE SERPL-MCNC: 179 MG/DL (ref 65–99)
GLUCOSE SERPL-MCNC: 213 MG/DL (ref 65–99)
GLUCOSE SERPL-MCNC: 222 MG/DL (ref 65–99)
GLUCOSE SERPL-MCNC: 247 MG/DL (ref 65–99)
HCT VFR BLD AUTO: 34.8 % (ref 42–52)
HGB BLD-MCNC: 11.6 G/DL (ref 14–18)
LV EJECT FRACT  99904: 45
LV EJECT FRACT MOD 2C 99903: 46.33
LV EJECT FRACT MOD 4C 99902: 44.66
LV EJECT FRACT MOD BP 99901: 45.8
LYMPHOCYTES # BLD AUTO: 0.62 K/UL (ref 1–4.8)
LYMPHOCYTES NFR BLD: 10.4 % (ref 22–41)
MANUAL DIFF BLD: NORMAL
MCH RBC QN AUTO: 29.7 PG (ref 27–33)
MCHC RBC AUTO-ENTMCNC: 33.3 G/DL (ref 33.7–35.3)
MCV RBC AUTO: 89 FL (ref 81.4–97.8)
MONOCYTES # BLD AUTO: 0.21 K/UL (ref 0–0.85)
MONOCYTES NFR BLD AUTO: 3.5 % (ref 0–13.4)
MORPHOLOGY BLD-IMP: NORMAL
NEUTROPHILS # BLD AUTO: 5.17 K/UL (ref 1.82–7.42)
NEUTROPHILS NFR BLD: 81.7 % (ref 44–72)
NEUTS BAND NFR BLD MANUAL: 4.4 % (ref 0–10)
NRBC # BLD AUTO: 0 K/UL
NRBC BLD-RTO: 0 /100 WBC
PLATELET # BLD AUTO: 273 K/UL (ref 164–446)
PLATELET BLD QL SMEAR: NORMAL
PMV BLD AUTO: 10.6 FL (ref 9–12.9)
POTASSIUM SERPL-SCNC: 2.8 MMOL/L (ref 3.6–5.5)
POTASSIUM SERPL-SCNC: 3.1 MMOL/L (ref 3.6–5.5)
POTASSIUM SERPL-SCNC: 3.1 MMOL/L (ref 3.6–5.5)
POTASSIUM SERPL-SCNC: 3.2 MMOL/L (ref 3.6–5.5)
RBC # BLD AUTO: 3.91 M/UL (ref 4.7–6.1)
SODIUM SERPL-SCNC: 140 MMOL/L (ref 135–145)
SODIUM SERPL-SCNC: 142 MMOL/L (ref 135–145)
SODIUM SERPL-SCNC: 143 MMOL/L (ref 135–145)
SODIUM SERPL-SCNC: 144 MMOL/L (ref 135–145)
WBC # BLD AUTO: 6 K/UL (ref 4.8–10.8)

## 2021-07-16 PROCEDURE — 700102 HCHG RX REV CODE 250 W/ 637 OVERRIDE(OP): Performed by: INTERNAL MEDICINE

## 2021-07-16 PROCEDURE — 700102 HCHG RX REV CODE 250 W/ 637 OVERRIDE(OP): Performed by: STUDENT IN AN ORGANIZED HEALTH CARE EDUCATION/TRAINING PROGRAM

## 2021-07-16 PROCEDURE — 92610 EVALUATE SWALLOWING FUNCTION: CPT

## 2021-07-16 PROCEDURE — 700105 HCHG RX REV CODE 258: Performed by: INTERNAL MEDICINE

## 2021-07-16 PROCEDURE — 85007 BL SMEAR W/DIFF WBC COUNT: CPT

## 2021-07-16 PROCEDURE — 770022 HCHG ROOM/CARE - ICU (200)

## 2021-07-16 PROCEDURE — 93306 TTE W/DOPPLER COMPLETE: CPT | Mod: 26 | Performed by: INTERNAL MEDICINE

## 2021-07-16 PROCEDURE — 700101 HCHG RX REV CODE 250: Performed by: INTERNAL MEDICINE

## 2021-07-16 PROCEDURE — 700111 HCHG RX REV CODE 636 W/ 250 OVERRIDE (IP): Performed by: STUDENT IN AN ORGANIZED HEALTH CARE EDUCATION/TRAINING PROGRAM

## 2021-07-16 PROCEDURE — 99233 SBSQ HOSP IP/OBS HIGH 50: CPT | Mod: GC | Performed by: INTERNAL MEDICINE

## 2021-07-16 PROCEDURE — 80048 BASIC METABOLIC PNL TOTAL CA: CPT | Mod: 91

## 2021-07-16 PROCEDURE — 700105 HCHG RX REV CODE 258: Performed by: STUDENT IN AN ORGANIZED HEALTH CARE EDUCATION/TRAINING PROGRAM

## 2021-07-16 PROCEDURE — A9270 NON-COVERED ITEM OR SERVICE: HCPCS | Performed by: INTERNAL MEDICINE

## 2021-07-16 PROCEDURE — 85027 COMPLETE CBC AUTOMATED: CPT

## 2021-07-16 PROCEDURE — 700111 HCHG RX REV CODE 636 W/ 250 OVERRIDE (IP): Performed by: INTERNAL MEDICINE

## 2021-07-16 PROCEDURE — 82962 GLUCOSE BLOOD TEST: CPT | Mod: 91

## 2021-07-16 PROCEDURE — 700101 HCHG RX REV CODE 250: Performed by: STUDENT IN AN ORGANIZED HEALTH CARE EDUCATION/TRAINING PROGRAM

## 2021-07-16 PROCEDURE — 99291 CRITICAL CARE FIRST HOUR: CPT | Performed by: INTERNAL MEDICINE

## 2021-07-16 PROCEDURE — 93306 TTE W/DOPPLER COMPLETE: CPT

## 2021-07-16 PROCEDURE — A9270 NON-COVERED ITEM OR SERVICE: HCPCS | Performed by: STUDENT IN AN ORGANIZED HEALTH CARE EDUCATION/TRAINING PROGRAM

## 2021-07-16 PROCEDURE — 302136 NUTRITION PUMP: Performed by: INTERNAL MEDICINE

## 2021-07-16 RX ORDER — ATORVASTATIN CALCIUM 40 MG/1
80 TABLET, FILM COATED ORAL NIGHTLY
Status: DISCONTINUED | OUTPATIENT
Start: 2021-07-16 | End: 2021-07-21

## 2021-07-16 RX ORDER — POTASSIUM CHLORIDE 7.45 MG/ML
10 INJECTION INTRAVENOUS
Status: COMPLETED | OUTPATIENT
Start: 2021-07-16 | End: 2021-07-16

## 2021-07-16 RX ORDER — LEVOTHYROXINE SODIUM 0.05 MG/1
50 TABLET ORAL
Status: DISCONTINUED | OUTPATIENT
Start: 2021-07-17 | End: 2021-07-21

## 2021-07-16 RX ORDER — DEXTROSE MONOHYDRATE 25 G/50ML
25-50 INJECTION, SOLUTION INTRAVENOUS PRN
Status: DISCONTINUED | OUTPATIENT
Start: 2021-07-16 | End: 2021-07-19

## 2021-07-16 RX ORDER — LIDOCAINE HYDROCHLORIDE 20 MG/ML
JELLY TOPICAL ONCE
Status: COMPLETED | OUTPATIENT
Start: 2021-07-16 | End: 2021-07-16

## 2021-07-16 RX ORDER — ACETAMINOPHEN 325 MG/1
650 TABLET ORAL EVERY 4 HOURS PRN
Status: DISCONTINUED | OUTPATIENT
Start: 2021-07-16 | End: 2021-07-21

## 2021-07-16 RX ORDER — MIDAZOLAM HYDROCHLORIDE 1 MG/ML
1 INJECTION INTRAMUSCULAR; INTRAVENOUS ONCE
Status: COMPLETED | OUTPATIENT
Start: 2021-07-16 | End: 2021-07-16

## 2021-07-16 RX ORDER — MIDAZOLAM HYDROCHLORIDE 1 MG/ML
INJECTION INTRAMUSCULAR; INTRAVENOUS
Status: ACTIVE
Start: 2021-07-16 | End: 2021-07-17

## 2021-07-16 RX ORDER — PROPRANOLOL HYDROCHLORIDE 10 MG/1
20 TABLET ORAL EVERY 8 HOURS
Status: DISCONTINUED | OUTPATIENT
Start: 2021-07-16 | End: 2021-07-21

## 2021-07-16 RX ADMIN — DEXTROSE AND SODIUM CHLORIDE: 10; .45 INJECTION, SOLUTION INTRAVENOUS at 15:09

## 2021-07-16 RX ADMIN — POTASSIUM CHLORIDE 10 MEQ: 7.46 INJECTION, SOLUTION INTRAVENOUS at 01:21

## 2021-07-16 RX ADMIN — DEXTROSE AND SODIUM CHLORIDE: 10; .45 INJECTION, SOLUTION INTRAVENOUS at 07:55

## 2021-07-16 RX ADMIN — POTASSIUM CHLORIDE 10 MEQ: 7.46 INJECTION, SOLUTION INTRAVENOUS at 02:25

## 2021-07-16 RX ADMIN — ACETAMINOPHEN 650 MG: 650 SUPPOSITORY RECTAL at 00:08

## 2021-07-16 RX ADMIN — POTASSIUM CHLORIDE 10 MEQ: 7.46 INJECTION, SOLUTION INTRAVENOUS at 12:57

## 2021-07-16 RX ADMIN — ENOXAPARIN SODIUM 40 MG: 40 INJECTION SUBCUTANEOUS at 05:45

## 2021-07-16 RX ADMIN — SODIUM CHLORIDE 11 UNITS/HR: 9 INJECTION, SOLUTION INTRAVENOUS at 21:03

## 2021-07-16 RX ADMIN — ATORVASTATIN CALCIUM 80 MG: 40 TABLET, FILM COATED ORAL at 20:40

## 2021-07-16 RX ADMIN — POTASSIUM CHLORIDE 10 MEQ: 7.46 INJECTION, SOLUTION INTRAVENOUS at 10:04

## 2021-07-16 RX ADMIN — POTASSIUM CHLORIDE 10 MEQ: 7.46 INJECTION, SOLUTION INTRAVENOUS at 18:31

## 2021-07-16 RX ADMIN — DEXTROSE AND SODIUM CHLORIDE: 10; .45 INJECTION, SOLUTION INTRAVENOUS at 21:40

## 2021-07-16 RX ADMIN — POTASSIUM CHLORIDE 10 MEQ: 7.46 INJECTION, SOLUTION INTRAVENOUS at 20:40

## 2021-07-16 RX ADMIN — MIDAZOLAM HYDROCHLORIDE 1 MG: 1 INJECTION, SOLUTION INTRAMUSCULAR; INTRAVENOUS at 16:24

## 2021-07-16 RX ADMIN — KETOROLAC TROMETHAMINE 15 MG: 30 INJECTION, SOLUTION INTRAMUSCULAR; INTRAVENOUS at 08:18

## 2021-07-16 RX ADMIN — POTASSIUM CHLORIDE 10 MEQ: 7.46 INJECTION, SOLUTION INTRAVENOUS at 04:20

## 2021-07-16 RX ADMIN — PROPRANOLOL HYDROCHLORIDE 20 MG: 20 TABLET ORAL at 22:16

## 2021-07-16 RX ADMIN — NAFCILLIN SODIUM 2 G: 2 INJECTION, POWDER, FOR SOLUTION INTRAMUSCULAR; INTRAVENOUS at 22:15

## 2021-07-16 RX ADMIN — POTASSIUM CHLORIDE 10 MEQ: 7.46 INJECTION, SOLUTION INTRAVENOUS at 07:56

## 2021-07-16 RX ADMIN — POTASSIUM CHLORIDE 10 MEQ: 7.46 INJECTION, SOLUTION INTRAVENOUS at 11:45

## 2021-07-16 RX ADMIN — POTASSIUM CHLORIDE 10 MEQ: 7.46 INJECTION, SOLUTION INTRAVENOUS at 20:02

## 2021-07-16 RX ADMIN — POTASSIUM CHLORIDE 10 MEQ: 7.46 INJECTION, SOLUTION INTRAVENOUS at 15:19

## 2021-07-16 RX ADMIN — POTASSIUM CHLORIDE 10 MEQ: 7.46 INJECTION, SOLUTION INTRAVENOUS at 16:06

## 2021-07-16 RX ADMIN — SODIUM CHLORIDE 5.5 UNITS/HR: 9 INJECTION, SOLUTION INTRAVENOUS at 13:11

## 2021-07-16 RX ADMIN — CEFAZOLIN SODIUM 2 G: 2 INJECTION, SOLUTION INTRAVENOUS at 05:45

## 2021-07-16 RX ADMIN — POTASSIUM CHLORIDE 10 MEQ: 7.46 INJECTION, SOLUTION INTRAVENOUS at 09:02

## 2021-07-16 RX ADMIN — LIDOCAINE HYDROCHLORIDE 5 ML: 20 JELLY TOPICAL at 16:45

## 2021-07-16 RX ADMIN — NAFCILLIN SODIUM 2 G: 2 INJECTION, POWDER, FOR SOLUTION INTRAMUSCULAR; INTRAVENOUS at 17:24

## 2021-07-16 RX ADMIN — PROPRANOLOL HYDROCHLORIDE 20 MG: 20 TABLET ORAL at 05:45

## 2021-07-16 RX ADMIN — POTASSIUM CHLORIDE 10 MEQ: 7.46 INJECTION, SOLUTION INTRAVENOUS at 14:00

## 2021-07-16 RX ADMIN — POTASSIUM CHLORIDE 10 MEQ: 7.46 INJECTION, SOLUTION INTRAVENOUS at 03:15

## 2021-07-16 RX ADMIN — NAFCILLIN SODIUM 2 G: 2 INJECTION, POWDER, FOR SOLUTION INTRAMUSCULAR; INTRAVENOUS at 14:00

## 2021-07-16 RX ADMIN — LEVOTHYROXINE SODIUM 50 MCG: 0.05 TABLET ORAL at 05:45

## 2021-07-16 ASSESSMENT — PAIN DESCRIPTION - PAIN TYPE
TYPE: ACUTE PAIN

## 2021-07-16 ASSESSMENT — FIBROSIS 4 INDEX: FIB4 SCORE: 1.14
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
## 2021-07-16 NOTE — PROGRESS NOTES
Bedside rounds performed with Dr. Alvarez and interdisciplinary team.  The decision to transition the patient from the DKA protocol to the insulin protocol was made.  Per Dr. Alvarez he would like me to continue the insulin infusion at 7units/hr at start of the insulin protocol.  He would like for me to continue his D10-0.45%NaCl infusion at 150ml/hr.  In addition, next BMP draw for K-scale to be performed at 1100.  0900  and insulin infusion left at 7units/hr.  Per protocol, will check BS in 1 hour and then follow insulin protocol.
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
## 2021-07-16 NOTE — CARE PLAN
Problem: Skin Integrity  Goal: Skin integrity is maintained or improved  Outcome: Progressing  Note: Problem: Skin Integrity  Goal: Skin Integrity is maintained or improved  Intervention: Eagle Skin Risk Assessment  Eagle assessed q shift-13, repositioned q 2hrs  Intervention: TURN EVERY 2 HOURS WHILE ON BEDREST  Pillows for positioning, mattress pressure weight specific, SCDs in place     Problem: Fall Risk  Goal: Patient will remain free from falls  Note: Bed alarm active; call light within reach; patient educated regarding bed alarm & call light   The patient is Watcher - Medium risk of patient condition declining or worsening    Shift Goals  Clinical Goals: Close anion gap, replace electrolytes  Patient Goals: Increase alertness/improve orientation status     Progress made toward(s) clinical / shift goals:  stable blood sugars    Patient is not progressing towards the following goals: N/A
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
## 2021-07-16 NOTE — CARE PLAN
The patient is Watcher - Medium risk of patient condition declining or worsening    Shift Goals  Clinical Goals: Close anion gap, replace electrolytes  Patient Goals: Increase alertness/improve orientation status     Progress made toward(s) clinical / shift goals:    Problem: Pain - Standard  Goal: Alleviation of pain or a reduction in pain to the patient’s comfort goal  Outcome: Progressing     Problem: Skin Integrity  Goal: Skin integrity is maintained or improved  Outcome: Progressing     Problem: Fall Risk  Goal: Patient will remain free from falls  Outcome: Progressing       Patient is not progressing towards the following goals:

## 2021-07-16 NOTE — DISCHARGE PLANNING
Kevin has been acute for 2 midnights and does not appear corie be ready for a return today.  Therefore, I would appreciate a PMR consult referral and TX evals once appropriate.  Litzy SMITH is aware.
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
## 2021-07-17 ENCOUNTER — APPOINTMENT (OUTPATIENT)
Dept: RADIOLOGY | Facility: MEDICAL CENTER | Age: 76
DRG: 637 | End: 2021-07-17
Attending: INTERNAL MEDICINE
Payer: MEDICARE

## 2021-07-17 ENCOUNTER — APPOINTMENT (OUTPATIENT)
Dept: RADIOLOGY | Facility: MEDICAL CENTER | Age: 76
DRG: 637 | End: 2021-07-17
Attending: STUDENT IN AN ORGANIZED HEALTH CARE EDUCATION/TRAINING PROGRAM
Payer: MEDICARE

## 2021-07-17 LAB
ALBUMIN SERPL BCP-MCNC: 1.9 G/DL (ref 3.2–4.9)
ALBUMIN/GLOB SERPL: 0.7 G/DL
ALP SERPL-CCNC: 61 U/L (ref 30–99)
ALT SERPL-CCNC: 9 U/L (ref 2–50)
ANION GAP SERPL CALC-SCNC: 10 MMOL/L (ref 7–16)
ANION GAP SERPL CALC-SCNC: 6 MMOL/L (ref 7–16)
ANION GAP SERPL CALC-SCNC: 9 MMOL/L (ref 7–16)
ANISOCYTOSIS BLD QL SMEAR: ABNORMAL
AST SERPL-CCNC: 17 U/L (ref 12–45)
B-OH-BUTYR SERPL-MCNC: 0.03 MMOL/L (ref 0.02–0.27)
BACTERIA BLD CULT: ABNORMAL
BACTERIA UR CULT: NORMAL
BASOPHILS # BLD AUTO: 0 % (ref 0–1.8)
BASOPHILS # BLD: 0 K/UL (ref 0–0.12)
BILIRUB SERPL-MCNC: 0.6 MG/DL (ref 0.1–1.5)
BUN SERPL-MCNC: 10 MG/DL (ref 8–22)
BUN SERPL-MCNC: 13 MG/DL (ref 8–22)
BUN SERPL-MCNC: 7 MG/DL (ref 8–22)
BUN SERPL-MCNC: 8 MG/DL (ref 8–22)
BUN SERPL-MCNC: 9 MG/DL (ref 8–22)
CALCIUM SERPL-MCNC: 7.3 MG/DL (ref 8.5–10.5)
CALCIUM SERPL-MCNC: 7.5 MG/DL (ref 8.5–10.5)
CALCIUM SERPL-MCNC: 7.8 MG/DL (ref 8.5–10.5)
CALCIUM SERPL-MCNC: 8 MG/DL (ref 8.5–10.5)
CALCIUM SERPL-MCNC: 8.1 MG/DL (ref 8.5–10.5)
CHLORIDE SERPL-SCNC: 106 MMOL/L (ref 96–112)
CHLORIDE SERPL-SCNC: 107 MMOL/L (ref 96–112)
CHLORIDE SERPL-SCNC: 109 MMOL/L (ref 96–112)
CHLORIDE SERPL-SCNC: 111 MMOL/L (ref 96–112)
CHLORIDE SERPL-SCNC: 111 MMOL/L (ref 96–112)
CO2 SERPL-SCNC: 18 MMOL/L (ref 20–33)
CO2 SERPL-SCNC: 21 MMOL/L (ref 20–33)
CO2 SERPL-SCNC: 23 MMOL/L (ref 20–33)
CO2 SERPL-SCNC: 24 MMOL/L (ref 20–33)
CO2 SERPL-SCNC: 24 MMOL/L (ref 20–33)
CREAT SERPL-MCNC: 0.54 MG/DL (ref 0.5–1.4)
CREAT SERPL-MCNC: 0.56 MG/DL (ref 0.5–1.4)
CREAT SERPL-MCNC: 0.57 MG/DL (ref 0.5–1.4)
CREAT SERPL-MCNC: 0.59 MG/DL (ref 0.5–1.4)
CREAT SERPL-MCNC: 0.63 MG/DL (ref 0.5–1.4)
CRP SERPL HS-MCNC: 14.65 MG/DL (ref 0–0.75)
EOSINOPHIL # BLD AUTO: 0 K/UL (ref 0–0.51)
EOSINOPHIL NFR BLD: 0 % (ref 0–6.9)
ERYTHROCYTE [DISTWIDTH] IN BLOOD BY AUTOMATED COUNT: 51.1 FL (ref 35.9–50)
GLOBULIN SER CALC-MCNC: 2.7 G/DL (ref 1.9–3.5)
GLUCOSE BLD-MCNC: 111 MG/DL (ref 65–99)
GLUCOSE BLD-MCNC: 113 MG/DL (ref 65–99)
GLUCOSE BLD-MCNC: 113 MG/DL (ref 65–99)
GLUCOSE BLD-MCNC: 118 MG/DL (ref 65–99)
GLUCOSE BLD-MCNC: 122 MG/DL (ref 65–99)
GLUCOSE BLD-MCNC: 129 MG/DL (ref 65–99)
GLUCOSE BLD-MCNC: 131 MG/DL (ref 65–99)
GLUCOSE BLD-MCNC: 142 MG/DL (ref 65–99)
GLUCOSE BLD-MCNC: 149 MG/DL (ref 65–99)
GLUCOSE BLD-MCNC: 150 MG/DL (ref 65–99)
GLUCOSE BLD-MCNC: 154 MG/DL (ref 65–99)
GLUCOSE BLD-MCNC: 155 MG/DL (ref 65–99)
GLUCOSE BLD-MCNC: 161 MG/DL (ref 65–99)
GLUCOSE BLD-MCNC: 168 MG/DL (ref 65–99)
GLUCOSE BLD-MCNC: 174 MG/DL (ref 65–99)
GLUCOSE BLD-MCNC: 179 MG/DL (ref 65–99)
GLUCOSE BLD-MCNC: 184 MG/DL (ref 65–99)
GLUCOSE BLD-MCNC: 191 MG/DL (ref 65–99)
GLUCOSE SERPL-MCNC: 131 MG/DL (ref 65–99)
GLUCOSE SERPL-MCNC: 154 MG/DL (ref 65–99)
GLUCOSE SERPL-MCNC: 172 MG/DL (ref 65–99)
GLUCOSE SERPL-MCNC: 192 MG/DL (ref 65–99)
GLUCOSE SERPL-MCNC: 200 MG/DL (ref 65–99)
HCT VFR BLD AUTO: 30.8 % (ref 42–52)
HGB BLD-MCNC: 10.5 G/DL (ref 14–18)
LYMPHOCYTES # BLD AUTO: 0.78 K/UL (ref 1–4.8)
LYMPHOCYTES NFR BLD: 9.1 % (ref 22–41)
MAGNESIUM SERPL-MCNC: 1.7 MG/DL (ref 1.5–2.5)
MANUAL DIFF BLD: NORMAL
MCH RBC QN AUTO: 30 PG (ref 27–33)
MCHC RBC AUTO-ENTMCNC: 34.1 G/DL (ref 33.7–35.3)
MCV RBC AUTO: 88 FL (ref 81.4–97.8)
MONOCYTES # BLD AUTO: 0.23 K/UL (ref 0–0.85)
MONOCYTES NFR BLD AUTO: 2.7 % (ref 0–13.4)
MORPHOLOGY BLD-IMP: NORMAL
NEUTROPHILS # BLD AUTO: 7.59 K/UL (ref 1.82–7.42)
NEUTROPHILS NFR BLD: 82.7 % (ref 44–72)
NEUTS BAND NFR BLD MANUAL: 5.5 % (ref 0–10)
NRBC # BLD AUTO: 0 K/UL
NRBC BLD-RTO: 0 /100 WBC
PHOSPHATE SERPL-MCNC: 1.2 MG/DL (ref 2.5–4.5)
PLATELET # BLD AUTO: 229 K/UL (ref 164–446)
PLATELET BLD QL SMEAR: NORMAL
PMV BLD AUTO: 10.6 FL (ref 9–12.9)
POTASSIUM SERPL-SCNC: 3.1 MMOL/L (ref 3.6–5.5)
POTASSIUM SERPL-SCNC: 3.1 MMOL/L (ref 3.6–5.5)
POTASSIUM SERPL-SCNC: 3.4 MMOL/L (ref 3.6–5.5)
POTASSIUM SERPL-SCNC: 3.4 MMOL/L (ref 3.6–5.5)
POTASSIUM SERPL-SCNC: 3.6 MMOL/L (ref 3.6–5.5)
PREALB SERPL-MCNC: 3.6 MG/DL (ref 18–38)
PROT SERPL-MCNC: 4.6 G/DL (ref 6–8.2)
RBC # BLD AUTO: 3.5 M/UL (ref 4.7–6.1)
RBC BLD AUTO: PRESENT
SIGNIFICANT IND 70042: ABNORMAL
SIGNIFICANT IND 70042: NORMAL
SITE SITE: ABNORMAL
SITE SITE: NORMAL
SODIUM SERPL-SCNC: 138 MMOL/L (ref 135–145)
SODIUM SERPL-SCNC: 139 MMOL/L (ref 135–145)
SODIUM SERPL-SCNC: 139 MMOL/L (ref 135–145)
SODIUM SERPL-SCNC: 141 MMOL/L (ref 135–145)
SODIUM SERPL-SCNC: 142 MMOL/L (ref 135–145)
SOURCE SOURCE: ABNORMAL
SOURCE SOURCE: NORMAL
WBC # BLD AUTO: 8.6 K/UL (ref 4.8–10.8)

## 2021-07-17 PROCEDURE — 85007 BL SMEAR W/DIFF WBC COUNT: CPT

## 2021-07-17 PROCEDURE — 84134 ASSAY OF PREALBUMIN: CPT

## 2021-07-17 PROCEDURE — 700111 HCHG RX REV CODE 636 W/ 250 OVERRIDE (IP): Performed by: INTERNAL MEDICINE

## 2021-07-17 PROCEDURE — 700101 HCHG RX REV CODE 250: Performed by: INTERNAL MEDICINE

## 2021-07-17 PROCEDURE — 700102 HCHG RX REV CODE 250 W/ 637 OVERRIDE(OP): Performed by: INTERNAL MEDICINE

## 2021-07-17 PROCEDURE — 700105 HCHG RX REV CODE 258: Performed by: INTERNAL MEDICINE

## 2021-07-17 PROCEDURE — 700111 HCHG RX REV CODE 636 W/ 250 OVERRIDE (IP): Performed by: STUDENT IN AN ORGANIZED HEALTH CARE EDUCATION/TRAINING PROGRAM

## 2021-07-17 PROCEDURE — 85027 COMPLETE CBC AUTOMATED: CPT

## 2021-07-17 PROCEDURE — 93971 EXTREMITY STUDY: CPT | Mod: RT

## 2021-07-17 PROCEDURE — A9270 NON-COVERED ITEM OR SERVICE: HCPCS | Performed by: STUDENT IN AN ORGANIZED HEALTH CARE EDUCATION/TRAINING PROGRAM

## 2021-07-17 PROCEDURE — 770022 HCHG ROOM/CARE - ICU (200)

## 2021-07-17 PROCEDURE — 99291 CRITICAL CARE FIRST HOUR: CPT | Mod: GC | Performed by: INTERNAL MEDICINE

## 2021-07-17 PROCEDURE — 99233 SBSQ HOSP IP/OBS HIGH 50: CPT | Performed by: INTERNAL MEDICINE

## 2021-07-17 PROCEDURE — A9270 NON-COVERED ITEM OR SERVICE: HCPCS | Performed by: INTERNAL MEDICINE

## 2021-07-17 PROCEDURE — 87040 BLOOD CULTURE FOR BACTERIA: CPT

## 2021-07-17 PROCEDURE — 80048 BASIC METABOLIC PNL TOTAL CA: CPT

## 2021-07-17 PROCEDURE — 80053 COMPREHEN METABOLIC PANEL: CPT

## 2021-07-17 PROCEDURE — 83735 ASSAY OF MAGNESIUM: CPT

## 2021-07-17 PROCEDURE — 700105 HCHG RX REV CODE 258: Performed by: STUDENT IN AN ORGANIZED HEALTH CARE EDUCATION/TRAINING PROGRAM

## 2021-07-17 PROCEDURE — 84100 ASSAY OF PHOSPHORUS: CPT

## 2021-07-17 PROCEDURE — 86140 C-REACTIVE PROTEIN: CPT

## 2021-07-17 PROCEDURE — 71045 X-RAY EXAM CHEST 1 VIEW: CPT

## 2021-07-17 PROCEDURE — 700102 HCHG RX REV CODE 250 W/ 637 OVERRIDE(OP): Performed by: STUDENT IN AN ORGANIZED HEALTH CARE EDUCATION/TRAINING PROGRAM

## 2021-07-17 PROCEDURE — 82962 GLUCOSE BLOOD TEST: CPT

## 2021-07-17 PROCEDURE — 82010 KETONE BODYS QUAN: CPT

## 2021-07-17 RX ORDER — POTASSIUM CHLORIDE 20 MEQ/1
40 TABLET, EXTENDED RELEASE ORAL 2 TIMES DAILY
Status: DISCONTINUED | OUTPATIENT
Start: 2021-07-17 | End: 2021-07-17

## 2021-07-17 RX ORDER — POTASSIUM CHLORIDE 7.45 MG/ML
10 INJECTION INTRAVENOUS
Status: COMPLETED | OUTPATIENT
Start: 2021-07-17 | End: 2021-07-17

## 2021-07-17 RX ORDER — MAGNESIUM SULFATE HEPTAHYDRATE 40 MG/ML
2 INJECTION, SOLUTION INTRAVENOUS ONCE
Status: COMPLETED | OUTPATIENT
Start: 2021-07-17 | End: 2021-07-17

## 2021-07-17 RX ORDER — POTASSIUM CHLORIDE 20 MEQ/1
40 TABLET, EXTENDED RELEASE ORAL 2 TIMES DAILY
Status: DISCONTINUED | OUTPATIENT
Start: 2021-07-17 | End: 2021-07-21

## 2021-07-17 RX ADMIN — POTASSIUM CHLORIDE 10 MEQ: 7.46 INJECTION, SOLUTION INTRAVENOUS at 01:46

## 2021-07-17 RX ADMIN — POTASSIUM CHLORIDE 10 MEQ: 7.46 INJECTION, SOLUTION INTRAVENOUS at 19:46

## 2021-07-17 RX ADMIN — POTASSIUM CHLORIDE 10 MEQ: 7.46 INJECTION, SOLUTION INTRAVENOUS at 20:37

## 2021-07-17 RX ADMIN — POTASSIUM CHLORIDE 10 MEQ: 7.46 INJECTION, SOLUTION INTRAVENOUS at 06:41

## 2021-07-17 RX ADMIN — NAFCILLIN SODIUM 2 G: 2 INJECTION, POWDER, FOR SOLUTION INTRAMUSCULAR; INTRAVENOUS at 02:14

## 2021-07-17 RX ADMIN — POTASSIUM CHLORIDE 10 MEQ: 7.46 INJECTION, SOLUTION INTRAVENOUS at 03:00

## 2021-07-17 RX ADMIN — POTASSIUM CHLORIDE 40 MEQ: 1500 TABLET, EXTENDED RELEASE ORAL at 08:52

## 2021-07-17 RX ADMIN — POTASSIUM CHLORIDE 10 MEQ: 7.46 INJECTION, SOLUTION INTRAVENOUS at 16:12

## 2021-07-17 RX ADMIN — SODIUM CHLORIDE 4.5 UNITS/HR: 9 INJECTION, SOLUTION INTRAVENOUS at 14:11

## 2021-07-17 RX ADMIN — NAFCILLIN SODIUM 2 G: 2 INJECTION, POWDER, FOR SOLUTION INTRAMUSCULAR; INTRAVENOUS at 17:20

## 2021-07-17 RX ADMIN — MAGNESIUM SULFATE HEPTAHYDRATE 2 G: 2 INJECTION, SOLUTION INTRAVENOUS at 08:52

## 2021-07-17 RX ADMIN — NAFCILLIN SODIUM 2 G: 2 INJECTION, POWDER, FOR SOLUTION INTRAMUSCULAR; INTRAVENOUS at 14:47

## 2021-07-17 RX ADMIN — POTASSIUM CHLORIDE 10 MEQ: 7.46 INJECTION, SOLUTION INTRAVENOUS at 14:47

## 2021-07-17 RX ADMIN — ACETAMINOPHEN 650 MG: 325 TABLET, FILM COATED ORAL at 01:46

## 2021-07-17 RX ADMIN — SODIUM PHOSPHATE, MONOBASIC, MONOHYDRATE AND SODIUM PHOSPHATE, DIBASIC, ANHYDROUS 30 MMOL: 276; 142 INJECTION, SOLUTION INTRAVENOUS at 09:41

## 2021-07-17 RX ADMIN — KETOROLAC TROMETHAMINE 15 MG: 30 INJECTION, SOLUTION INTRAMUSCULAR; INTRAVENOUS at 20:37

## 2021-07-17 RX ADMIN — PROPRANOLOL HYDROCHLORIDE 20 MG: 20 TABLET ORAL at 22:51

## 2021-07-17 RX ADMIN — ATORVASTATIN CALCIUM 80 MG: 40 TABLET, FILM COATED ORAL at 20:37

## 2021-07-17 RX ADMIN — POTASSIUM CHLORIDE 10 MEQ: 7.46 INJECTION, SOLUTION INTRAVENOUS at 17:19

## 2021-07-17 RX ADMIN — POTASSIUM CHLORIDE 10 MEQ: 7.46 INJECTION, SOLUTION INTRAVENOUS at 07:59

## 2021-07-17 RX ADMIN — PROPRANOLOL HYDROCHLORIDE 20 MG: 20 TABLET ORAL at 14:47

## 2021-07-17 RX ADMIN — ENOXAPARIN SODIUM 40 MG: 40 INJECTION SUBCUTANEOUS at 05:21

## 2021-07-17 RX ADMIN — POTASSIUM CHLORIDE 40 MEQ: 1500 TABLET, EXTENDED RELEASE ORAL at 17:19

## 2021-07-17 RX ADMIN — NAFCILLIN SODIUM 2 G: 2 INJECTION, POWDER, FOR SOLUTION INTRAMUSCULAR; INTRAVENOUS at 11:22

## 2021-07-17 RX ADMIN — PROPRANOLOL HYDROCHLORIDE 20 MG: 20 TABLET ORAL at 05:21

## 2021-07-17 RX ADMIN — LEVOTHYROXINE SODIUM 50 MCG: 0.05 TABLET ORAL at 05:21

## 2021-07-17 RX ADMIN — POTASSIUM CHLORIDE 10 MEQ: 7.46 INJECTION, SOLUTION INTRAVENOUS at 22:15

## 2021-07-17 RX ADMIN — ACETAMINOPHEN 650 MG: 325 TABLET, FILM COATED ORAL at 11:22

## 2021-07-17 RX ADMIN — NAFCILLIN SODIUM 2 G: 2 INJECTION, POWDER, FOR SOLUTION INTRAMUSCULAR; INTRAVENOUS at 22:51

## 2021-07-17 RX ADMIN — NAFCILLIN SODIUM 2 G: 2 INJECTION, POWDER, FOR SOLUTION INTRAMUSCULAR; INTRAVENOUS at 05:21

## 2021-07-17 RX ADMIN — DEXTROSE AND SODIUM CHLORIDE: 10; .45 INJECTION, SOLUTION INTRAVENOUS at 05:13

## 2021-07-17 ASSESSMENT — ENCOUNTER SYMPTOMS
TINGLING: 0
ABDOMINAL PAIN: 0
SHORTNESS OF BREATH: 1
PHOTOPHOBIA: 0
PALPITATIONS: 0
DIZZINESS: 0
DEPRESSION: 0
BLURRED VISION: 0
CHILLS: 0
COUGH: 0
HEMOPTYSIS: 0
VOMITING: 0
ORTHOPNEA: 0
NAUSEA: 0
WEIGHT LOSS: 0
DOUBLE VISION: 0
SPUTUM PRODUCTION: 0
FEVER: 0
HEADACHES: 0

## 2021-07-17 ASSESSMENT — PAIN DESCRIPTION - PAIN TYPE
TYPE: ACUTE PAIN

## 2021-07-17 ASSESSMENT — FIBROSIS 4 INDEX: FIB4 SCORE: 1.86

## 2021-07-17 NOTE — PROGRESS NOTES
Brief Cardiology Note:    I was called to discuss this patients care with Dr. Rosenberg. We discussed persistently positive blood cultures with staph aureus bacteremia and TTE with poor quality images of the valve.  BESSIE is indicated and will be set up for Monday potentially. Will make NPO at midnight on Sunday.    At this time it was deemed no formal in person cardiology consultation was necessary, however if this changes due to changes in patient condition or abnormal test results, please re-consult cardiology.     Electronically Signed by:  Clive Mejia MD  7/17/2021  10:54 AM
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
## 2021-07-17 NOTE — PROGRESS NOTES
Patient had blood sugar check performed from lab draw for BMP at 1200 which resulted as 184.  This number was very out of range compared to prior blood glucose check at 1100 resulting at 111 and 1300 blood glucose check resulting at 118.  The BMP that was sent to lab was called back to me as hemolyzed and had a super elevated potassium.  I redrew the BMP but discussed the potentially erroneous blood glucose level at 1200 with Dr. Ortega.  Per his recommendation, the insulin drip was dropped back to 4.5 units/hr.  We both agreed we felt the 184 was an erroneous result.  The insulin drip was adjusted appropriately and cosigned by Tosin BARROSO RN.  Blood sugar checks will resume at 1400.
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
## 2021-07-17 NOTE — PROGRESS NOTES
2 RN skin check completed with MERVIN Marcano    Areas of concern/Skin observations:  · Left forearm and elbow abrasions - cleansed with wound care spray.  Then covered with adaptic, mepilex, and kerlix.   · Left knee abrasion - cleansed with wound care spray.  Then covered with adaptic and a mepilex   · Left stump abrasion/opening - cleansed with wound care spray.  Then covered with adaptic and a biotin.   · Left shoulder abrasion - cleansed with wound care spray.  Then covered with adaptic and a mepilex.   · Right elbow abrasion - cleansed with wound care spray.  Then covered with adaptic and a biotin.   · Sacrum noted to be pink but blanching - mepilex placed  · Extensive bruising noted to bilateral flanks and left shoulder    Devices in use, assessed under and interventions (as appropriate) for skin protection:  · SCDs, BP cuff, SaO2 monitor  ·     Interventions in place such as:   · q2 hour turns  · Keeping skin clean and dry  · TAPS system placed under patient to assist in appropriate turning

## 2021-07-17 NOTE — PROGRESS NOTES
Updated wife at patient's bedside regarding tourniquet that was accidentally left on patient's Right arm.  In addition, notified her that a ultrasound has been performed of that arm due to the swelling and it has shown to have a superficial venous thrombus.
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
## 2021-07-18 LAB
ALBUMIN SERPL BCP-MCNC: 3.9 G/DL (ref 3.2–4.9)
ALBUMIN/GLOB SERPL: 1.4 G/DL
ALP SERPL-CCNC: 91 U/L (ref 30–99)
ALT SERPL-CCNC: 12 U/L (ref 2–50)
ANION GAP SERPL CALC-SCNC: 15 MMOL/L (ref 7–16)
ANION GAP SERPL CALC-SCNC: 7 MMOL/L (ref 7–16)
ANION GAP SERPL CALC-SCNC: 9 MMOL/L (ref 7–16)
AST SERPL-CCNC: 17 U/L (ref 12–45)
B-OH-BUTYR SERPL-MCNC: 0.06 MMOL/L (ref 0.02–0.27)
BACTERIA BLD CULT: ABNORMAL
BACTERIA BLD CULT: ABNORMAL
BACTERIA FLD AEROBE CULT: ABNORMAL
BACTERIA FLD AEROBE CULT: ABNORMAL
BACTERIA SPEC ANAEROBE CULT: NORMAL
BASOPHILS # BLD AUTO: 0.3 % (ref 0–1.8)
BASOPHILS # BLD: 0.04 K/UL (ref 0–0.12)
BILIRUB SERPL-MCNC: 2.2 MG/DL (ref 0.1–1.5)
BUN SERPL-MCNC: 17 MG/DL (ref 8–22)
BUN SERPL-MCNC: 18 MG/DL (ref 8–22)
BUN SERPL-MCNC: 19 MG/DL (ref 8–22)
CALCIUM SERPL-MCNC: 7.8 MG/DL (ref 8.5–10.5)
CALCIUM SERPL-MCNC: 8.2 MG/DL (ref 8.5–10.5)
CALCIUM SERPL-MCNC: 8.8 MG/DL (ref 8.5–10.5)
CHLORIDE SERPL-SCNC: 105 MMOL/L (ref 96–112)
CHLORIDE SERPL-SCNC: 106 MMOL/L (ref 96–112)
CHLORIDE SERPL-SCNC: 98 MMOL/L (ref 96–112)
CO2 SERPL-SCNC: 24 MMOL/L (ref 20–33)
CO2 SERPL-SCNC: 25 MMOL/L (ref 20–33)
CO2 SERPL-SCNC: 27 MMOL/L (ref 20–33)
CREAT SERPL-MCNC: 0.65 MG/DL (ref 0.5–1.4)
CREAT SERPL-MCNC: 0.69 MG/DL (ref 0.5–1.4)
CREAT SERPL-MCNC: 1.51 MG/DL (ref 0.5–1.4)
EOSINOPHIL # BLD AUTO: 0.51 K/UL (ref 0–0.51)
EOSINOPHIL NFR BLD: 4.2 % (ref 0–6.9)
ERYTHROCYTE [DISTWIDTH] IN BLOOD BY AUTOMATED COUNT: 45.8 FL (ref 35.9–50)
GLOBULIN SER CALC-MCNC: 2.7 G/DL (ref 1.9–3.5)
GLUCOSE BLD-MCNC: 127 MG/DL (ref 65–99)
GLUCOSE BLD-MCNC: 128 MG/DL (ref 65–99)
GLUCOSE BLD-MCNC: 131 MG/DL (ref 65–99)
GLUCOSE BLD-MCNC: 149 MG/DL (ref 65–99)
GLUCOSE BLD-MCNC: 153 MG/DL (ref 65–99)
GLUCOSE BLD-MCNC: 158 MG/DL (ref 65–99)
GLUCOSE BLD-MCNC: 161 MG/DL (ref 65–99)
GLUCOSE BLD-MCNC: 164 MG/DL (ref 65–99)
GLUCOSE BLD-MCNC: 165 MG/DL (ref 65–99)
GLUCOSE BLD-MCNC: 166 MG/DL (ref 65–99)
GLUCOSE BLD-MCNC: 168 MG/DL (ref 65–99)
GLUCOSE BLD-MCNC: 183 MG/DL (ref 65–99)
GLUCOSE BLD-MCNC: 187 MG/DL (ref 65–99)
GLUCOSE BLD-MCNC: 195 MG/DL (ref 65–99)
GLUCOSE SERPL-MCNC: 115 MG/DL (ref 65–99)
GLUCOSE SERPL-MCNC: 165 MG/DL (ref 65–99)
GLUCOSE SERPL-MCNC: 167 MG/DL (ref 65–99)
GRAM STN SPEC: ABNORMAL
HCT VFR BLD AUTO: 39.8 % (ref 42–52)
HGB BLD-MCNC: 13.6 G/DL (ref 14–18)
IMM GRANULOCYTES # BLD AUTO: 0.06 K/UL (ref 0–0.11)
IMM GRANULOCYTES NFR BLD AUTO: 0.5 % (ref 0–0.9)
LYMPHOCYTES # BLD AUTO: 1.02 K/UL (ref 1–4.8)
LYMPHOCYTES NFR BLD: 8.4 % (ref 22–41)
MAGNESIUM SERPL-MCNC: 1.7 MG/DL (ref 1.5–2.5)
MCH RBC QN AUTO: 34.3 PG (ref 27–33)
MCHC RBC AUTO-ENTMCNC: 34.2 G/DL (ref 33.7–35.3)
MCV RBC AUTO: 100.5 FL (ref 81.4–97.8)
MONOCYTES # BLD AUTO: 1.78 K/UL (ref 0–0.85)
MONOCYTES NFR BLD AUTO: 14.7 % (ref 0–13.4)
NEUTROPHILS # BLD AUTO: 8.74 K/UL (ref 1.82–7.42)
NEUTROPHILS NFR BLD: 71.9 % (ref 44–72)
NRBC # BLD AUTO: 0 K/UL
NRBC BLD-RTO: 0 /100 WBC
PHOSPHATE SERPL-MCNC: 4.4 MG/DL (ref 2.5–4.5)
PLATELET # BLD AUTO: 302 K/UL (ref 164–446)
PMV BLD AUTO: 9.6 FL (ref 9–12.9)
POTASSIUM SERPL-SCNC: 3.8 MMOL/L (ref 3.6–5.5)
POTASSIUM SERPL-SCNC: 4 MMOL/L (ref 3.6–5.5)
POTASSIUM SERPL-SCNC: 4.3 MMOL/L (ref 3.6–5.5)
PROT SERPL-MCNC: 6.6 G/DL (ref 6–8.2)
RBC # BLD AUTO: 3.96 M/UL (ref 4.7–6.1)
SIGNIFICANT IND 70042: ABNORMAL
SIGNIFICANT IND 70042: ABNORMAL
SIGNIFICANT IND 70042: NORMAL
SITE SITE: ABNORMAL
SITE SITE: ABNORMAL
SITE SITE: NORMAL
SODIUM SERPL-SCNC: 138 MMOL/L (ref 135–145)
SODIUM SERPL-SCNC: 139 MMOL/L (ref 135–145)
SODIUM SERPL-SCNC: 139 MMOL/L (ref 135–145)
SOURCE SOURCE: ABNORMAL
SOURCE SOURCE: ABNORMAL
SOURCE SOURCE: NORMAL
WBC # BLD AUTO: 12.2 K/UL (ref 4.8–10.8)

## 2021-07-18 PROCEDURE — 770022 HCHG ROOM/CARE - ICU (200)

## 2021-07-18 PROCEDURE — 700105 HCHG RX REV CODE 258: Performed by: INTERNAL MEDICINE

## 2021-07-18 PROCEDURE — 99291 CRITICAL CARE FIRST HOUR: CPT | Performed by: INTERNAL MEDICINE

## 2021-07-18 PROCEDURE — 82962 GLUCOSE BLOOD TEST: CPT | Mod: 91

## 2021-07-18 PROCEDURE — 82010 KETONE BODYS QUAN: CPT

## 2021-07-18 PROCEDURE — 84100 ASSAY OF PHOSPHORUS: CPT

## 2021-07-18 PROCEDURE — 700101 HCHG RX REV CODE 250: Performed by: INTERNAL MEDICINE

## 2021-07-18 PROCEDURE — 700102 HCHG RX REV CODE 250 W/ 637 OVERRIDE(OP): Performed by: INTERNAL MEDICINE

## 2021-07-18 PROCEDURE — A9270 NON-COVERED ITEM OR SERVICE: HCPCS | Performed by: INTERNAL MEDICINE

## 2021-07-18 PROCEDURE — 80048 BASIC METABOLIC PNL TOTAL CA: CPT

## 2021-07-18 PROCEDURE — A9270 NON-COVERED ITEM OR SERVICE: HCPCS | Performed by: STUDENT IN AN ORGANIZED HEALTH CARE EDUCATION/TRAINING PROGRAM

## 2021-07-18 PROCEDURE — 700111 HCHG RX REV CODE 636 W/ 250 OVERRIDE (IP): Performed by: INTERNAL MEDICINE

## 2021-07-18 PROCEDURE — 83735 ASSAY OF MAGNESIUM: CPT

## 2021-07-18 PROCEDURE — 87040 BLOOD CULTURE FOR BACTERIA: CPT

## 2021-07-18 PROCEDURE — 80053 COMPREHEN METABOLIC PANEL: CPT

## 2021-07-18 PROCEDURE — 85025 COMPLETE CBC W/AUTO DIFF WBC: CPT

## 2021-07-18 PROCEDURE — 99233 SBSQ HOSP IP/OBS HIGH 50: CPT | Performed by: INTERNAL MEDICINE

## 2021-07-18 PROCEDURE — 700111 HCHG RX REV CODE 636 W/ 250 OVERRIDE (IP): Performed by: STUDENT IN AN ORGANIZED HEALTH CARE EDUCATION/TRAINING PROGRAM

## 2021-07-18 PROCEDURE — 700102 HCHG RX REV CODE 250 W/ 637 OVERRIDE(OP): Performed by: STUDENT IN AN ORGANIZED HEALTH CARE EDUCATION/TRAINING PROGRAM

## 2021-07-18 RX ORDER — CEFAZOLIN SODIUM 2 G/100ML
2 INJECTION, SOLUTION INTRAVENOUS EVERY 8 HOURS
Status: DISCONTINUED | OUTPATIENT
Start: 2021-07-18 | End: 2021-08-02 | Stop reason: HOSPADM

## 2021-07-18 RX ORDER — MAGNESIUM SULFATE HEPTAHYDRATE 40 MG/ML
2 INJECTION, SOLUTION INTRAVENOUS ONCE
Status: COMPLETED | OUTPATIENT
Start: 2021-07-18 | End: 2021-07-18

## 2021-07-18 RX ORDER — POTASSIUM CHLORIDE 7.45 MG/ML
10 INJECTION INTRAVENOUS ONCE
Status: COMPLETED | OUTPATIENT
Start: 2021-07-18 | End: 2021-07-18

## 2021-07-18 RX ORDER — POTASSIUM CHLORIDE 7.45 MG/ML
10 INJECTION INTRAVENOUS
Status: COMPLETED | OUTPATIENT
Start: 2021-07-18 | End: 2021-07-18

## 2021-07-18 RX ADMIN — PROPRANOLOL HYDROCHLORIDE 20 MG: 20 TABLET ORAL at 05:17

## 2021-07-18 RX ADMIN — ATORVASTATIN CALCIUM 80 MG: 40 TABLET, FILM COATED ORAL at 20:36

## 2021-07-18 RX ADMIN — SODIUM CHLORIDE 4 UNITS/HR: 9 INJECTION, SOLUTION INTRAVENOUS at 11:45

## 2021-07-18 RX ADMIN — CEFAZOLIN SODIUM 2 G: 2 INJECTION, SOLUTION INTRAVENOUS at 10:45

## 2021-07-18 RX ADMIN — POTASSIUM CHLORIDE 10 MEQ: 7.46 INJECTION, SOLUTION INTRAVENOUS at 20:36

## 2021-07-18 RX ADMIN — NAFCILLIN SODIUM 2 G: 2 INJECTION, POWDER, FOR SOLUTION INTRAMUSCULAR; INTRAVENOUS at 05:18

## 2021-07-18 RX ADMIN — KETOROLAC TROMETHAMINE 15 MG: 30 INJECTION, SOLUTION INTRAMUSCULAR; INTRAVENOUS at 08:57

## 2021-07-18 RX ADMIN — KETOROLAC TROMETHAMINE 15 MG: 30 INJECTION, SOLUTION INTRAMUSCULAR; INTRAVENOUS at 20:59

## 2021-07-18 RX ADMIN — POTASSIUM CHLORIDE 10 MEQ: 7.46 INJECTION, SOLUTION INTRAVENOUS at 06:23

## 2021-07-18 RX ADMIN — POTASSIUM CHLORIDE 40 MEQ: 1500 TABLET, EXTENDED RELEASE ORAL at 18:07

## 2021-07-18 RX ADMIN — ENOXAPARIN SODIUM 40 MG: 40 INJECTION SUBCUTANEOUS at 05:17

## 2021-07-18 RX ADMIN — PROPRANOLOL HYDROCHLORIDE 20 MG: 20 TABLET ORAL at 13:41

## 2021-07-18 RX ADMIN — LEVOTHYROXINE SODIUM 50 MCG: 0.05 TABLET ORAL at 05:17

## 2021-07-18 RX ADMIN — NAFCILLIN SODIUM 2 G: 2 INJECTION, POWDER, FOR SOLUTION INTRAMUSCULAR; INTRAVENOUS at 02:11

## 2021-07-18 RX ADMIN — KETOROLAC TROMETHAMINE 15 MG: 30 INJECTION, SOLUTION INTRAMUSCULAR; INTRAVENOUS at 02:10

## 2021-07-18 RX ADMIN — POTASSIUM CHLORIDE 10 MEQ: 7.46 INJECTION, SOLUTION INTRAVENOUS at 02:58

## 2021-07-18 RX ADMIN — MAGNESIUM SULFATE HEPTAHYDRATE 2 G: 2 INJECTION, SOLUTION INTRAVENOUS at 08:57

## 2021-07-18 RX ADMIN — CEFAZOLIN SODIUM 2 G: 2 INJECTION, SOLUTION INTRAVENOUS at 21:55

## 2021-07-18 RX ADMIN — CEFAZOLIN SODIUM 2 G: 2 INJECTION, SOLUTION INTRAVENOUS at 13:41

## 2021-07-18 RX ADMIN — PROPRANOLOL HYDROCHLORIDE 20 MG: 20 TABLET ORAL at 21:55

## 2021-07-18 RX ADMIN — POTASSIUM CHLORIDE 10 MEQ: 7.46 INJECTION, SOLUTION INTRAVENOUS at 02:07

## 2021-07-18 RX ADMIN — POTASSIUM CHLORIDE 40 MEQ: 1500 TABLET, EXTENDED RELEASE ORAL at 05:17

## 2021-07-18 ASSESSMENT — ENCOUNTER SYMPTOMS
MYALGIAS: 0
CHILLS: 0
DIARRHEA: 0
SHORTNESS OF BREATH: 0
COUGH: 0
NECK PAIN: 0
ABDOMINAL PAIN: 0
CONSTIPATION: 1
NAUSEA: 0
NERVOUS/ANXIOUS: 0
BACK PAIN: 0
VOMITING: 0
FEVER: 0

## 2021-07-18 ASSESSMENT — PAIN DESCRIPTION - PAIN TYPE
TYPE: ACUTE PAIN
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
## 2021-07-18 NOTE — CARE PLAN
"The patient is Watcher - Medium risk of patient condition declining or worsening    Shift Goals  Clinical Goals: wean insulin drip, improved mentation   Patient Goals: sleep, \"be left alone\"  Family Goals: improved neuro status, safety     Progress made toward(s) clinical / shift goals:    Problem: Pain - Standard  Goal: Alleviation of pain or a reduction in pain to the patient’s comfort goal  Outcome: Progressing     Problem: Knowledge Deficit - Standard  Goal: Patient and family/care givers will demonstrate understanding of plan of care, disease process/condition, diagnostic tests and medications  Outcome: Progressing     Problem: Skin Integrity  Goal: Skin integrity is maintained or improved  Outcome: Progressing     Problem: Fall Risk  Goal: Patient will remain free from falls  Outcome: Progressing       Patient is not progressing towards the following goals: Patient remains confused.  Will continue to orient him.       "
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
## 2021-07-18 NOTE — CARE PLAN
"The patient is Stable - Low risk of patient condition declining or worsening    Shift Goals  Clinical Goals: wean insulin drip, improved mentation   Patient Goals: sleep, \"be left alone\"  Family Goals: improved neuro status, safety     Progress made toward(s) clinical / shift goals:  pain control, glucose control     Patient is not progressing towards the following goals:n/a      Problem: Pain - Standard  Goal: Alleviation of pain or a reduction in pain to the patient’s comfort goal  Outcome: Progressing     Problem: Knowledge Deficit - Standard  Goal: Patient and family/care givers will demonstrate understanding of plan of care, disease process/condition, diagnostic tests and medications  Outcome: Progressing     Problem: Skin Integrity  Goal: Skin integrity is maintained or improved  Outcome: Progressing     Problem: Fall Risk  Goal: Patient will remain free from falls  Outcome: Progressing         "

## 2021-07-19 ENCOUNTER — APPOINTMENT (OUTPATIENT)
Dept: RADIOLOGY | Facility: MEDICAL CENTER | Age: 76
DRG: 637 | End: 2021-07-19
Attending: INTERNAL MEDICINE
Payer: MEDICARE

## 2021-07-19 ENCOUNTER — APPOINTMENT (OUTPATIENT)
Dept: CARDIOLOGY | Facility: MEDICAL CENTER | Age: 76
DRG: 637 | End: 2021-07-19
Attending: INTERNAL MEDICINE
Payer: MEDICARE

## 2021-07-19 LAB
ALBUMIN SERPL BCP-MCNC: 2.2 G/DL (ref 3.2–4.9)
ALBUMIN/GLOB SERPL: 0.7 G/DL
ALP SERPL-CCNC: 84 U/L (ref 30–99)
ALT SERPL-CCNC: 8 U/L (ref 2–50)
ANION GAP SERPL CALC-SCNC: 8 MMOL/L (ref 7–16)
ANISOCYTOSIS BLD QL SMEAR: ABNORMAL
AST SERPL-CCNC: 24 U/L (ref 12–45)
B-OH-BUTYR SERPL-MCNC: 0.15 MMOL/L (ref 0.02–0.27)
BASOPHILS # BLD AUTO: 0 % (ref 0–1.8)
BASOPHILS # BLD: 0 K/UL (ref 0–0.12)
BILIRUB SERPL-MCNC: 0.5 MG/DL (ref 0.1–1.5)
BUN SERPL-MCNC: 17 MG/DL (ref 8–22)
BUN SERPL-MCNC: 18 MG/DL (ref 8–22)
BUN SERPL-MCNC: 19 MG/DL (ref 8–22)
BURR CELLS BLD QL SMEAR: NORMAL
CALCIUM SERPL-MCNC: 8.4 MG/DL (ref 8.5–10.5)
CALCIUM SERPL-MCNC: 8.6 MG/DL (ref 8.5–10.5)
CALCIUM SERPL-MCNC: 8.7 MG/DL (ref 8.5–10.5)
CHLORIDE SERPL-SCNC: 104 MMOL/L (ref 96–112)
CHLORIDE SERPL-SCNC: 105 MMOL/L (ref 96–112)
CHLORIDE SERPL-SCNC: 105 MMOL/L (ref 96–112)
CO2 SERPL-SCNC: 27 MMOL/L (ref 20–33)
CO2 SERPL-SCNC: 27 MMOL/L (ref 20–33)
CO2 SERPL-SCNC: 28 MMOL/L (ref 20–33)
CREAT SERPL-MCNC: 0.61 MG/DL (ref 0.5–1.4)
CREAT SERPL-MCNC: 0.61 MG/DL (ref 0.5–1.4)
CREAT SERPL-MCNC: 0.62 MG/DL (ref 0.5–1.4)
CRP SERPL HS-MCNC: 17.07 MG/DL (ref 0–0.75)
EOSINOPHIL # BLD AUTO: 0 K/UL (ref 0–0.51)
EOSINOPHIL NFR BLD: 0 % (ref 0–6.9)
ERYTHROCYTE [DISTWIDTH] IN BLOOD BY AUTOMATED COUNT: 54.2 FL (ref 35.9–50)
GLOBULIN SER CALC-MCNC: 3.1 G/DL (ref 1.9–3.5)
GLUCOSE BLD-MCNC: 122 MG/DL (ref 65–99)
GLUCOSE BLD-MCNC: 132 MG/DL (ref 65–99)
GLUCOSE BLD-MCNC: 138 MG/DL (ref 65–99)
GLUCOSE BLD-MCNC: 142 MG/DL (ref 65–99)
GLUCOSE BLD-MCNC: 147 MG/DL (ref 65–99)
GLUCOSE BLD-MCNC: 153 MG/DL (ref 65–99)
GLUCOSE BLD-MCNC: 164 MG/DL (ref 65–99)
GLUCOSE BLD-MCNC: 181 MG/DL (ref 65–99)
GLUCOSE BLD-MCNC: 183 MG/DL (ref 65–99)
GLUCOSE BLD-MCNC: 188 MG/DL (ref 65–99)
GLUCOSE BLD-MCNC: 191 MG/DL (ref 65–99)
GLUCOSE SERPL-MCNC: 139 MG/DL (ref 65–99)
GLUCOSE SERPL-MCNC: 149 MG/DL (ref 65–99)
GLUCOSE SERPL-MCNC: 219 MG/DL (ref 65–99)
HCT VFR BLD AUTO: 31.4 % (ref 42–52)
HGB BLD-MCNC: 10.6 G/DL (ref 14–18)
LYMPHOCYTES # BLD AUTO: 1.23 K/UL (ref 1–4.8)
LYMPHOCYTES NFR BLD: 12.4 % (ref 22–41)
MAGNESIUM SERPL-MCNC: 2.2 MG/DL (ref 1.5–2.5)
MANUAL DIFF BLD: NORMAL
MCH RBC QN AUTO: 29.8 PG (ref 27–33)
MCHC RBC AUTO-ENTMCNC: 33.8 G/DL (ref 33.7–35.3)
MCV RBC AUTO: 88.2 FL (ref 81.4–97.8)
MONOCYTES # BLD AUTO: 1.75 K/UL (ref 0–0.85)
MONOCYTES NFR BLD AUTO: 17.7 % (ref 0–13.4)
MORPHOLOGY BLD-IMP: NORMAL
NEUTROPHILS # BLD AUTO: 6.92 K/UL (ref 1.82–7.42)
NEUTROPHILS NFR BLD: 69.9 % (ref 44–72)
NRBC # BLD AUTO: 0 K/UL
NRBC BLD-RTO: 0 /100 WBC
OVALOCYTES BLD QL SMEAR: NORMAL
PHOSPHATE SERPL-MCNC: 2.2 MG/DL (ref 2.5–4.5)
PLATELET # BLD AUTO: 347 K/UL (ref 164–446)
PLATELET BLD QL SMEAR: NORMAL
PMV BLD AUTO: 10.4 FL (ref 9–12.9)
POIKILOCYTOSIS BLD QL SMEAR: NORMAL
POTASSIUM SERPL-SCNC: 4.3 MMOL/L (ref 3.6–5.5)
POTASSIUM SERPL-SCNC: 4.6 MMOL/L (ref 3.6–5.5)
POTASSIUM SERPL-SCNC: 4.7 MMOL/L (ref 3.6–5.5)
PREALB SERPL-MCNC: 5.2 MG/DL (ref 18–38)
PROT SERPL-MCNC: 5.3 G/DL (ref 6–8.2)
RBC # BLD AUTO: 3.56 M/UL (ref 4.7–6.1)
RBC BLD AUTO: PRESENT
SODIUM SERPL-SCNC: 140 MMOL/L (ref 135–145)
WBC # BLD AUTO: 9.9 K/UL (ref 4.8–10.8)

## 2021-07-19 PROCEDURE — B24BZZ4 ULTRASONOGRAPHY OF HEART WITH AORTA, TRANSESOPHAGEAL: ICD-10-PCS | Performed by: INTERNAL MEDICINE

## 2021-07-19 PROCEDURE — 770022 HCHG ROOM/CARE - ICU (200)

## 2021-07-19 PROCEDURE — 700111 HCHG RX REV CODE 636 W/ 250 OVERRIDE (IP): Performed by: STUDENT IN AN ORGANIZED HEALTH CARE EDUCATION/TRAINING PROGRAM

## 2021-07-19 PROCEDURE — 97166 OT EVAL MOD COMPLEX 45 MIN: CPT

## 2021-07-19 PROCEDURE — 700111 HCHG RX REV CODE 636 W/ 250 OVERRIDE (IP): Performed by: INTERNAL MEDICINE

## 2021-07-19 PROCEDURE — 99291 CRITICAL CARE FIRST HOUR: CPT | Mod: GC | Performed by: INTERNAL MEDICINE

## 2021-07-19 PROCEDURE — 82962 GLUCOSE BLOOD TEST: CPT

## 2021-07-19 PROCEDURE — A9270 NON-COVERED ITEM OR SERVICE: HCPCS | Performed by: STUDENT IN AN ORGANIZED HEALTH CARE EDUCATION/TRAINING PROGRAM

## 2021-07-19 PROCEDURE — 99232 SBSQ HOSP IP/OBS MODERATE 35: CPT | Performed by: INTERNAL MEDICINE

## 2021-07-19 PROCEDURE — A9270 NON-COVERED ITEM OR SERVICE: HCPCS | Performed by: INTERNAL MEDICINE

## 2021-07-19 PROCEDURE — 97162 PT EVAL MOD COMPLEX 30 MIN: CPT

## 2021-07-19 PROCEDURE — 80053 COMPREHEN METABOLIC PANEL: CPT

## 2021-07-19 PROCEDURE — 700101 HCHG RX REV CODE 250: Performed by: STUDENT IN AN ORGANIZED HEALTH CARE EDUCATION/TRAINING PROGRAM

## 2021-07-19 PROCEDURE — 93325 DOPPLER ECHO COLOR FLOW MAPG: CPT

## 2021-07-19 PROCEDURE — 84134 ASSAY OF PREALBUMIN: CPT

## 2021-07-19 PROCEDURE — 700102 HCHG RX REV CODE 250 W/ 637 OVERRIDE(OP): Performed by: STUDENT IN AN ORGANIZED HEALTH CARE EDUCATION/TRAINING PROGRAM

## 2021-07-19 PROCEDURE — 83735 ASSAY OF MAGNESIUM: CPT

## 2021-07-19 PROCEDURE — 85027 COMPLETE CBC AUTOMATED: CPT

## 2021-07-19 PROCEDURE — 700102 HCHG RX REV CODE 250 W/ 637 OVERRIDE(OP): Performed by: INTERNAL MEDICINE

## 2021-07-19 PROCEDURE — 80048 BASIC METABOLIC PNL TOTAL CA: CPT

## 2021-07-19 PROCEDURE — 700105 HCHG RX REV CODE 258: Performed by: INTERNAL MEDICINE

## 2021-07-19 PROCEDURE — 82010 KETONE BODYS QUAN: CPT

## 2021-07-19 PROCEDURE — 85007 BL SMEAR W/DIFF WBC COUNT: CPT

## 2021-07-19 PROCEDURE — 700111 HCHG RX REV CODE 636 W/ 250 OVERRIDE (IP): Performed by: NURSE PRACTITIONER

## 2021-07-19 PROCEDURE — 84100 ASSAY OF PHOSPHORUS: CPT

## 2021-07-19 PROCEDURE — 86140 C-REACTIVE PROTEIN: CPT

## 2021-07-19 PROCEDURE — 93312 ECHO TRANSESOPHAGEAL: CPT | Mod: 26 | Performed by: INTERNAL MEDICINE

## 2021-07-19 RX ORDER — OXYCODONE HYDROCHLORIDE 5 MG/1
5 TABLET ORAL EVERY 4 HOURS PRN
Status: DISCONTINUED | OUTPATIENT
Start: 2021-07-19 | End: 2021-08-02 | Stop reason: HOSPADM

## 2021-07-19 RX ORDER — DEXTROSE MONOHYDRATE 25 G/50ML
50 INJECTION, SOLUTION INTRAVENOUS
Status: DISCONTINUED | OUTPATIENT
Start: 2021-07-19 | End: 2021-07-20

## 2021-07-19 RX ORDER — OXYCODONE HYDROCHLORIDE 10 MG/1
10 TABLET ORAL EVERY 4 HOURS PRN
Status: DISCONTINUED | OUTPATIENT
Start: 2021-07-19 | End: 2021-08-02 | Stop reason: HOSPADM

## 2021-07-19 RX ORDER — MIDAZOLAM HYDROCHLORIDE 1 MG/ML
1-5 INJECTION INTRAMUSCULAR; INTRAVENOUS ONCE
Status: COMPLETED | OUTPATIENT
Start: 2021-07-19 | End: 2021-07-19

## 2021-07-19 RX ADMIN — POTASSIUM CHLORIDE 40 MEQ: 1500 TABLET, EXTENDED RELEASE ORAL at 18:41

## 2021-07-19 RX ADMIN — PROPRANOLOL HYDROCHLORIDE 20 MG: 20 TABLET ORAL at 05:18

## 2021-07-19 RX ADMIN — ENOXAPARIN SODIUM 40 MG: 40 INJECTION SUBCUTANEOUS at 05:18

## 2021-07-19 RX ADMIN — FENTANYL CITRATE 100 MCG: 50 INJECTION, SOLUTION INTRAMUSCULAR; INTRAVENOUS at 15:36

## 2021-07-19 RX ADMIN — DIBASIC SODIUM PHOSPHATE, MONOBASIC POTASSIUM PHOSPHATE AND MONOBASIC SODIUM PHOSPHATE 250 MG: 852; 155; 130 TABLET ORAL at 08:30

## 2021-07-19 RX ADMIN — PROPRANOLOL HYDROCHLORIDE 20 MG: 20 TABLET ORAL at 16:00

## 2021-07-19 RX ADMIN — DIBASIC SODIUM PHOSPHATE, MONOBASIC POTASSIUM PHOSPHATE AND MONOBASIC SODIUM PHOSPHATE 500 MG: 852; 155; 130 TABLET ORAL at 18:41

## 2021-07-19 RX ADMIN — POTASSIUM CHLORIDE 40 MEQ: 1500 TABLET, EXTENDED RELEASE ORAL at 05:18

## 2021-07-19 RX ADMIN — KETOROLAC TROMETHAMINE 15 MG: 30 INJECTION, SOLUTION INTRAMUSCULAR; INTRAVENOUS at 06:09

## 2021-07-19 RX ADMIN — ATORVASTATIN CALCIUM 80 MG: 40 TABLET, FILM COATED ORAL at 21:58

## 2021-07-19 RX ADMIN — CEFAZOLIN SODIUM 2 G: 2 INJECTION, SOLUTION INTRAVENOUS at 21:58

## 2021-07-19 RX ADMIN — INSULIN GLARGINE 20 UNITS: 100 INJECTION, SOLUTION SUBCUTANEOUS at 18:37

## 2021-07-19 RX ADMIN — OXYCODONE HYDROCHLORIDE 10 MG: 10 TABLET ORAL at 22:00

## 2021-07-19 RX ADMIN — MIDAZOLAM HYDROCHLORIDE 4 MG: 1 INJECTION, SOLUTION INTRAMUSCULAR; INTRAVENOUS at 15:36

## 2021-07-19 RX ADMIN — CEFAZOLIN SODIUM 2 G: 2 INJECTION, SOLUTION INTRAVENOUS at 05:18

## 2021-07-19 RX ADMIN — CEFAZOLIN SODIUM 2 G: 2 INJECTION, SOLUTION INTRAVENOUS at 14:02

## 2021-07-19 RX ADMIN — LEVOTHYROXINE SODIUM 50 MCG: 0.05 TABLET ORAL at 05:18

## 2021-07-19 RX ADMIN — LIDOCAINE 1 PATCH: 50 PATCH TOPICAL at 10:49

## 2021-07-19 RX ADMIN — SODIUM CHLORIDE 4 UNITS/HR: 9 INJECTION, SOLUTION INTRAVENOUS at 17:19

## 2021-07-19 RX ADMIN — PROPRANOLOL HYDROCHLORIDE 20 MG: 20 TABLET ORAL at 21:58

## 2021-07-19 ASSESSMENT — COGNITIVE AND FUNCTIONAL STATUS - GENERAL
TOILETING: A LOT
MOVING FROM LYING ON BACK TO SITTING ON SIDE OF FLAT BED: UNABLE
STANDING UP FROM CHAIR USING ARMS: TOTAL
MOBILITY SCORE: 6
DAILY ACTIVITIY SCORE: 12
SUGGESTED CMS G CODE MODIFIER DAILY ACTIVITY: CL
WALKING IN HOSPITAL ROOM: TOTAL
CLIMB 3 TO 5 STEPS WITH RAILING: TOTAL
PERSONAL GROOMING: A LOT
TURNING FROM BACK TO SIDE WHILE IN FLAT BAD: UNABLE
MOVING TO AND FROM BED TO CHAIR: UNABLE
EATING MEALS: A LOT
SUGGESTED CMS G CODE MODIFIER MOBILITY: CN
DRESSING REGULAR LOWER BODY CLOTHING: A LOT
DRESSING REGULAR UPPER BODY CLOTHING: A LOT
HELP NEEDED FOR BATHING: A LOT

## 2021-07-19 ASSESSMENT — GAIT ASSESSMENTS: GAIT LEVEL OF ASSIST: UNABLE TO PARTICIPATE

## 2021-07-19 ASSESSMENT — ACTIVITIES OF DAILY LIVING (ADL): TOILETING: INDEPENDENT

## 2021-07-19 ASSESSMENT — PAIN DESCRIPTION - PAIN TYPE: TYPE: ACUTE PAIN
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
## 2021-07-19 NOTE — PROGRESS NOTES
2 RN skin check complete with Heath  · Devices in place: pulse ox, BP cuff, cardiac monitor leads, NG, silicone oxygen tubing, peripheral IVs, SCDs, burch catheter  · Skin assessed under devices: Yes.  · Confirmed pressure ulcers found on: N/A  · New potential pressure ulcers noted on: N/A  · Skin breakdown noted on:   · Generalized bruising, abrasions and redness throughout   · Wounds to L foot, L elbow & L shoulder- dressing in place   · R BKA   · The following interventions in place: Q2H turns and repositioning, rotate pulse ox. Heels floated on pillows. Q2 turns in place.No redness or indications of pressure from devices or bony prominences. Mepilex in place. Low air loss mattress in use and mattress pressure appropriate for patient. Silicone oxygen tubing in place. TAPS system in place
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
## 2021-07-19 NOTE — CONSULTS
BRIEF INPATIENT BEHAVIORAL HEALTH NOTE      Met with patient at bedside in ICU.  Patient reports he had a promising day then his condition deteriorated.  He reports he continues have difficulty tolerating pain.  Patient's wife came in for a visit and patient requested follow up occur later.    Racquel Dhaliwal [facility]  Behavioral Health Therapist.

## 2021-07-19 NOTE — PROGRESS NOTES
BESSIE performed at bedside. Prior to starting the procedure, procedure consent was verified and a timeout was performed at 1531. The patient was pre-medicated for comfort with 4 mg of versed and 100 mcg of fentanyl. The procedure start was 1535. Patient's vitals were taken every 3 minutes. The patient remained hemodynamically stable throughout the procedure. The procedure end was 1541.

## 2021-07-19 NOTE — PROCEDURES
Patient was in SICU.  Consent was obtained. Risks and benefits of procedures were explained.    Moderate sesation was used for sedation process.    Indication: To rule out endocarditis.    Complication: none    Diagnosis: No evidence of left atrial appendage thrombus. There is NO evidence of endocarditis. No significant valvular disease.    Thank you for referring this patient to our cardiology service.    Aida Darby MD.  [facility].
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
## 2021-07-19 NOTE — CARE PLAN
Problem: Nutritional:  Goal: Nutrition support tolerated and meeting greater than 85% of estimated needs  Outcome: Met     Diabetisource @ full goal 70 ml/hr. Currently off for BESSIE today. Will resume at goal post procedure.

## 2021-07-19 NOTE — DISCHARGE PLANNING
Anticipated Discharge Disposition: Return to [facility]     Action: Patient discussed in IDT rounds with Dr. Ortega. Per MD, patient's dispo is to return to [facility] once medically cleared. No additional CM or dc needs. Assessment was completed from chart review and from pt's previous admission which was prior to dc to [facility]. Patient's significant other is his dc support. Patient lives in a single level house and prior to admission was completely independent with ADL's/IADL's.    SLP has evaluated.   PT/OT once able.     [facility] following patient.     Barriers to Discharge: Medically complex and requires ICU level of care     Plan: Continue to assist with social/dc needs     Care Transition Team Assessment    Information Source  Orientation Level: Unable to assess  Information Given By: Other (Comments)  Informant's Name: chart review  Who is responsible for making decisions for patient? : Patient    Readmission Evaluation  Is this a readmission?: Yes - unplanned readmission  Why do you think you were readmitted?: medical condition worsened while at IRF  Did you understand your discharge instructions?: Yes  Did you have enough support after your last discharge?: Yes    Elopement Risk  Legal Hold: No  Ambulatory or Self Mobile in Wheelchair: No-Not an Elopement Risk  Elopement Risk: Not at Risk for Elopement    Interdisciplinary Discharge Planning  Lives with - Patient's Self Care Capacity: Spouse  Housing / Facility: [address]    Discharge Preparedness  What is your plan after discharge?: Other (comment) (return to rehab)  What are your discharge supports?: Partner  Prior Functional Level: Ambulatory, Independent with Activities of Daily Living, Independent with Medication Management    Functional Assesment  Prior Functional Level: Ambulatory, Independent with Activities of Daily Living, Independent with Medication Management    Finances  Financial Barriers to Discharge: No  Prescription Coverage:  Yes    Advance Directive  Advance Directive?: None    Domestic Abuse  Have you ever been the victim of abuse or violence?: No    Psychological Assessment  History of Substance Abuse: None  History of Psychiatric Problems: No  Non-compliant with Treatment: No  Newly Diagnosed Illness: Yes    Discharge Risks or Barriers  Discharge risks or barriers?: Complex medical needs    Anticipated Discharge Information  Discharge Disposition: Disch to  rehab facility or distinct part unit (62) (Return to [facility])
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
## 2021-07-20 PROBLEM — E87.29 HIGH ANION GAP METABOLIC ACIDOSIS: Status: RESOLVED | Noted: 2021-07-14 | Resolved: 2021-07-20

## 2021-07-20 PROBLEM — B95.61 MSSA BACTEREMIA: Status: ACTIVE | Noted: 2021-07-15

## 2021-07-20 PROBLEM — E11.10 LACTIC ACIDOSIS DUE TO DIABETES MELLITUS (HCC): Status: RESOLVED | Noted: 2021-07-14 | Resolved: 2021-07-20

## 2021-07-20 PROBLEM — E87.20 METABOLIC ACIDOSIS: Status: RESOLVED | Noted: 2021-07-14 | Resolved: 2021-07-20

## 2021-07-20 LAB
ALBUMIN SERPL BCP-MCNC: 1.9 G/DL (ref 3.2–4.9)
ALBUMIN/GLOB SERPL: 0.5 G/DL
ALP SERPL-CCNC: 89 U/L (ref 30–99)
ALT SERPL-CCNC: 8 U/L (ref 2–50)
ANION GAP SERPL CALC-SCNC: 13 MMOL/L (ref 7–16)
AST SERPL-CCNC: 24 U/L (ref 12–45)
BASOPHILS # BLD AUTO: 0 % (ref 0–1.8)
BASOPHILS # BLD: 0 K/UL (ref 0–0.12)
BILIRUB SERPL-MCNC: 0.5 MG/DL (ref 0.1–1.5)
BUN SERPL-MCNC: 18 MG/DL (ref 8–22)
CALCIUM SERPL-MCNC: 8.7 MG/DL (ref 8.5–10.5)
CHLORIDE SERPL-SCNC: 100 MMOL/L (ref 96–112)
CO2 SERPL-SCNC: 26 MMOL/L (ref 20–33)
CREAT SERPL-MCNC: 0.63 MG/DL (ref 0.5–1.4)
EOSINOPHIL # BLD AUTO: 0.24 K/UL (ref 0–0.51)
EOSINOPHIL NFR BLD: 2.8 % (ref 0–6.9)
ERYTHROCYTE [DISTWIDTH] IN BLOOD BY AUTOMATED COUNT: 53.5 FL (ref 35.9–50)
GIANT PLATELETS BLD QL SMEAR: NORMAL
GLOBULIN SER CALC-MCNC: 3.5 G/DL (ref 1.9–3.5)
GLUCOSE BLD-MCNC: 225 MG/DL (ref 65–99)
GLUCOSE BLD-MCNC: 227 MG/DL (ref 65–99)
GLUCOSE BLD-MCNC: 230 MG/DL (ref 65–99)
GLUCOSE BLD-MCNC: 240 MG/DL (ref 65–99)
GLUCOSE BLD-MCNC: 271 MG/DL (ref 65–99)
GLUCOSE SERPL-MCNC: 274 MG/DL (ref 65–99)
HCT VFR BLD AUTO: 30.6 % (ref 42–52)
HGB BLD-MCNC: 10.2 G/DL (ref 14–18)
LYMPHOCYTES # BLD AUTO: 2.25 K/UL (ref 1–4.8)
LYMPHOCYTES NFR BLD: 26.8 % (ref 22–41)
MANUAL DIFF BLD: NORMAL
MCH RBC QN AUTO: 29.6 PG (ref 27–33)
MCHC RBC AUTO-ENTMCNC: 33.3 G/DL (ref 33.7–35.3)
MCV RBC AUTO: 88.7 FL (ref 81.4–97.8)
MONOCYTES # BLD AUTO: 1.17 K/UL (ref 0–0.85)
MONOCYTES NFR BLD AUTO: 13.9 % (ref 0–13.4)
MORPHOLOGY BLD-IMP: NORMAL
NEUTROPHILS # BLD AUTO: 4.75 K/UL (ref 1.82–7.42)
NEUTROPHILS NFR BLD: 56.5 % (ref 44–72)
NRBC # BLD AUTO: 0 K/UL
NRBC BLD-RTO: 0 /100 WBC
PLATELET # BLD AUTO: 497 K/UL (ref 164–446)
PLATELET BLD QL SMEAR: NORMAL
PMV BLD AUTO: 10 FL (ref 9–12.9)
POTASSIUM SERPL-SCNC: 4.4 MMOL/L (ref 3.6–5.5)
PROT SERPL-MCNC: 5.4 G/DL (ref 6–8.2)
RBC # BLD AUTO: 3.45 M/UL (ref 4.7–6.1)
RBC BLD AUTO: PRESENT
SODIUM SERPL-SCNC: 139 MMOL/L (ref 135–145)
WBC # BLD AUTO: 8.4 K/UL (ref 4.8–10.8)

## 2021-07-20 PROCEDURE — A9270 NON-COVERED ITEM OR SERVICE: HCPCS | Performed by: INTERNAL MEDICINE

## 2021-07-20 PROCEDURE — 700102 HCHG RX REV CODE 250 W/ 637 OVERRIDE(OP): Performed by: STUDENT IN AN ORGANIZED HEALTH CARE EDUCATION/TRAINING PROGRAM

## 2021-07-20 PROCEDURE — 80053 COMPREHEN METABOLIC PANEL: CPT

## 2021-07-20 PROCEDURE — 700111 HCHG RX REV CODE 636 W/ 250 OVERRIDE (IP): Performed by: INTERNAL MEDICINE

## 2021-07-20 PROCEDURE — 85027 COMPLETE CBC AUTOMATED: CPT

## 2021-07-20 PROCEDURE — 82962 GLUCOSE BLOOD TEST: CPT | Mod: 91

## 2021-07-20 PROCEDURE — A9270 NON-COVERED ITEM OR SERVICE: HCPCS | Performed by: STUDENT IN AN ORGANIZED HEALTH CARE EDUCATION/TRAINING PROGRAM

## 2021-07-20 PROCEDURE — 99233 SBSQ HOSP IP/OBS HIGH 50: CPT | Performed by: INTERNAL MEDICINE

## 2021-07-20 PROCEDURE — 770006 HCHG ROOM/CARE - MED/SURG/GYN SEMI*

## 2021-07-20 PROCEDURE — 700101 HCHG RX REV CODE 250: Performed by: STUDENT IN AN ORGANIZED HEALTH CARE EDUCATION/TRAINING PROGRAM

## 2021-07-20 PROCEDURE — 99233 SBSQ HOSP IP/OBS HIGH 50: CPT | Performed by: HOSPITALIST

## 2021-07-20 PROCEDURE — 700102 HCHG RX REV CODE 250 W/ 637 OVERRIDE(OP): Performed by: INTERNAL MEDICINE

## 2021-07-20 PROCEDURE — 85007 BL SMEAR W/DIFF WBC COUNT: CPT

## 2021-07-20 RX ORDER — DEXTROSE MONOHYDRATE 25 G/50ML
50 INJECTION, SOLUTION INTRAVENOUS
Status: DISCONTINUED | OUTPATIENT
Start: 2021-07-20 | End: 2021-07-28

## 2021-07-20 RX ORDER — CELECOXIB 200 MG/1
200 CAPSULE ORAL DAILY
Status: DISCONTINUED | OUTPATIENT
Start: 2021-07-20 | End: 2021-08-02 | Stop reason: HOSPADM

## 2021-07-20 RX ORDER — ACETAMINOPHEN 500 MG
1000 TABLET ORAL EVERY 6 HOURS
Status: DISPENSED | OUTPATIENT
Start: 2021-07-20 | End: 2021-07-25

## 2021-07-20 RX ORDER — PREGABALIN 75 MG/1
75 CAPSULE ORAL EVERY 8 HOURS
Status: DISCONTINUED | OUTPATIENT
Start: 2021-07-20 | End: 2021-07-22

## 2021-07-20 RX ADMIN — INSULIN HUMAN 4 UNITS: 100 INJECTION, SOLUTION PARENTERAL at 13:39

## 2021-07-20 RX ADMIN — PROPRANOLOL HYDROCHLORIDE 20 MG: 20 TABLET ORAL at 05:20

## 2021-07-20 RX ADMIN — INSULIN GLARGINE 30 UNITS: 100 INJECTION, SOLUTION SUBCUTANEOUS at 17:28

## 2021-07-20 RX ADMIN — OXYCODONE HYDROCHLORIDE 10 MG: 10 TABLET ORAL at 08:15

## 2021-07-20 RX ADMIN — INSULIN HUMAN 4 UNITS: 100 INJECTION, SOLUTION PARENTERAL at 17:28

## 2021-07-20 RX ADMIN — ATORVASTATIN CALCIUM 80 MG: 40 TABLET, FILM COATED ORAL at 21:34

## 2021-07-20 RX ADMIN — PREGABALIN 75 MG: 75 CAPSULE ORAL at 13:11

## 2021-07-20 RX ADMIN — ACETAMINOPHEN 1000 MG: 500 TABLET ORAL at 12:59

## 2021-07-20 RX ADMIN — INSULIN HUMAN 3 UNITS: 100 INJECTION, SOLUTION PARENTERAL at 00:04

## 2021-07-20 RX ADMIN — INSULIN HUMAN 5 UNITS: 100 INJECTION, SOLUTION PARENTERAL at 05:36

## 2021-07-20 RX ADMIN — CEFAZOLIN SODIUM 2 G: 2 INJECTION, SOLUTION INTRAVENOUS at 21:35

## 2021-07-20 RX ADMIN — INSULIN HUMAN 4 UNITS: 100 INJECTION, SOLUTION PARENTERAL at 08:14

## 2021-07-20 RX ADMIN — POTASSIUM CHLORIDE 40 MEQ: 1500 TABLET, EXTENDED RELEASE ORAL at 17:25

## 2021-07-20 RX ADMIN — LEVOTHYROXINE SODIUM 50 MCG: 0.05 TABLET ORAL at 05:20

## 2021-07-20 RX ADMIN — ENOXAPARIN SODIUM 40 MG: 40 INJECTION SUBCUTANEOUS at 05:19

## 2021-07-20 RX ADMIN — POTASSIUM CHLORIDE 40 MEQ: 1500 TABLET, EXTENDED RELEASE ORAL at 05:20

## 2021-07-20 RX ADMIN — OXYCODONE HYDROCHLORIDE 10 MG: 10 TABLET ORAL at 21:34

## 2021-07-20 RX ADMIN — LIDOCAINE 2 PATCH: 50 PATCH TOPICAL at 13:05

## 2021-07-20 RX ADMIN — DIBASIC SODIUM PHOSPHATE, MONOBASIC POTASSIUM PHOSPHATE AND MONOBASIC SODIUM PHOSPHATE 500 MG: 852; 155; 130 TABLET ORAL at 17:24

## 2021-07-20 RX ADMIN — PROPRANOLOL HYDROCHLORIDE 20 MG: 20 TABLET ORAL at 13:14

## 2021-07-20 RX ADMIN — CEFAZOLIN SODIUM 2 G: 2 INJECTION, SOLUTION INTRAVENOUS at 05:19

## 2021-07-20 RX ADMIN — OXYCODONE HYDROCHLORIDE 10 MG: 10 TABLET ORAL at 15:40

## 2021-07-20 RX ADMIN — ACETAMINOPHEN 1000 MG: 500 TABLET ORAL at 17:25

## 2021-07-20 RX ADMIN — CELECOXIB 200 MG: 200 CAPSULE ORAL at 17:25

## 2021-07-20 RX ADMIN — PROPRANOLOL HYDROCHLORIDE 20 MG: 20 TABLET ORAL at 21:34

## 2021-07-20 RX ADMIN — PREGABALIN 75 MG: 75 CAPSULE ORAL at 21:34

## 2021-07-20 RX ADMIN — CEFAZOLIN SODIUM 2 G: 2 INJECTION, SOLUTION INTRAVENOUS at 13:14

## 2021-07-20 RX ADMIN — OXYCODONE HYDROCHLORIDE 10 MG: 10 TABLET ORAL at 02:47

## 2021-07-20 RX ADMIN — DIBASIC SODIUM PHOSPHATE, MONOBASIC POTASSIUM PHOSPHATE AND MONOBASIC SODIUM PHOSPHATE 500 MG: 852; 155; 130 TABLET ORAL at 05:20

## 2021-07-20 ASSESSMENT — PAIN DESCRIPTION - PAIN TYPE
TYPE: ACUTE PAIN

## 2021-07-20 ASSESSMENT — FIBROSIS 4 INDEX: FIB4 SCORE: 1.83

## 2021-07-20 NOTE — CARE PLAN
"The patient is Watcher - Medium risk of patient condition declining or worsening    Shift Goals  Clinical Goals: Limit agitation, stable neurologic exam, manage pain adequately, transition off insulin gtt and regain euglycemia  Patient Goals: Control pain, \"leave me alone\"  Family Goals: No family present at this time    Progress made toward(s) clinical / shift goals: Pt intermittently agitated/irritable, but pain appears to be a factor (which is improved since calling MD at start of shift for addition of PRN pain Rx). Pt reporting better rest periods following pain medication addition. Insulin gtt now off in favor of SQ and long-acting insulin with BG's in 200's thus far. Neuro exam stable thus far without major change.    Problem: Pain - Standard  Goal: Alleviation of pain or a reduction in pain to the patient’s comfort goal  Outcome: Progressing     Patient is not progressing towards the following goals: See below.     Problem: Knowledge Deficit - Standard  Goal: Patient and family/care givers will demonstrate understanding of plan of care, disease process/condition, diagnostic tests and medications  Outcome: Not Progressing     Problem: Skin Integrity  Goal: Skin integrity is maintained or improved  Outcome: Not Progressing     Problem: Fall Risk  Goal: Patient will remain free from falls  Outcome: Not Met     "

## 2021-07-20 NOTE — CARE PLAN
Problem: Pain - Standard  Goal: Alleviation of pain or a reduction in pain to the patient’s comfort goal  Outcome: Progressing     Problem: Knowledge Deficit - Standard  Goal: Patient and family/care givers will demonstrate understanding of plan of care, disease process/condition, diagnostic tests and medications  Outcome: Progressing     Problem: Skin Integrity  Goal: Skin integrity is maintained or improved  Outcome: Progressing     Problem: Fall Risk  Goal: Patient will remain free from falls  Outcome: Progressing   The patient is Watcher - Medium risk of patient condition declining or worsening    Shift Goals  Clinical Goals: Re-orient patient, blood sugar stability  Patient Goals: Pain control  Family Goals: improved neuro status, safety     Progress made toward(s) clinical / shift goals:     Patient is not progressing towards the following goals:
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
## 2021-07-20 NOTE — PROGRESS NOTES
4 Eyes Skin Assessment Completed by MERVIN Andujar and MERVIN Romero.    Head WDL - Scratches/abrasions under both eyes  Ears WDL  Nose WDL -NG tube in left nare  Mouth WDL  Neck WDL  Breast/Chest Abrasion and Bruising  Shoulder Blades Redness, Abrasions  Spine WDL  (R) Arm/Elbow/Hand Redness, Bruising, Scab and Swelling  (L) Arm/Elbow/Hand Redness, Bruising, Scab and Swelling, large abrasion proximal to left elbow covered adaptic and mepilex   Abdomen Bruising  Groin WDL  Scrotum/Coccyx/Buttocks Redness and Blanching, mepilex applied  (R) Leg WDL, historical right BKA  (L) Leg WDL.   (R) Heel/Foot/Toe WDL, BKA   (L) Heel/Foot/Toe WDL, Historical amputation of all toes and abrasions to the dorsal aspect of stump          Devices In Places Pulse Ox, SCD's, OG/NG and Nasal Cannula      Interventions In Place Gray Ear Foams, Sacral Mepilex, Pillows, Elbow Mepilex, Q2 Turns and Barrier Cream    Possible Skin Injury Yes    Pictures Uploaded Into Epic Yes  Wound Consult Placed Yes  RN Wound Prevention Protocol Ordered Yes

## 2021-07-20 NOTE — DISCHARGE PLANNING
Following for post acute services Therapy noted indicate dependent for mobility and self care. Will follow for tolerance and improvement with therapy intervention. Based on 07/19 therapy note anticipate skilled nursing for post acute care.

## 2021-07-20 NOTE — CARE PLAN
"The patient is Watcher - Medium risk of patient condition declining or worsening    Shift Goals  Clinical Goals: Pain control, increased mobility  Patient Goals: Control pain, \"leave me alone\"  Family Goals: No family present at this time    Progress made toward(s) clinical / shift goals:   Problem: Pain - Standard  Goal: Alleviation of pain or a reduction in pain to the patient’s comfort goal  Outcome: Not Progressing       Patient is not progressing towards the following goals: Pain control provided but pain continues to restrict movement.      Problem: Pain - Standard  Goal: Alleviation of pain or a reduction in pain to the patient’s comfort goal  Outcome: Not Progressing     Problem: Skin Integrity  Goal: Skin integrity is maintained or improved  Outcome: Not Progressing  Patient has abrasions and scabs that need to heal.     "

## 2021-07-20 NOTE — PROGRESS NOTES
Upon morning assessment at 0730 I noted that patient's right forearm was extremely swollen and tight.  It appeared that maybe one of his IV's had infiltrated.  However, I checked all three IVs on his right forearm and found that each of them flushed without difficulty and had blood return.  I continued to then assess up his arm and lifted the patients sleeve to his gown.  I found that a tourniquet had been left on the patient's arm and was still tied in place.  I immediately removed the tourniquet and noted that there was a substantial indentation in his arm.  I then called Ariana YU, charge RN, who came to the patient bedside.  We elevated the patient's arm on a couple of pillows and were able to easily palpate a radial pulse.  Dr. Alvarez was notified of the tourniquet.  Throughout the day, I continued to assess the site for any issues for a pressure injury.  The site continued to be red and was slow to ahmet but did continue to ahmet.  His pulse was also still easily palpable.  Report was given to oncoming shift nurse to continue to elevate and monitor for any complications.

## 2021-07-20 NOTE — PROGRESS NOTES
2-RN skin check performed with MERVIN Trevino. Noted as follows:   -Diffuse light/dark purple bruising to chest wall bilaterally, as well as bilat flanks and hips.   -Multiple skin tears to R posterior upper arm/elbow (LEESA), and L forearm/upper arm (new heel Mepilex/Adaptic placed over wounds this shift). Both with red/yellow/white wound beds and pink periwound. BUE edema also appreciated.   -Large, open abrasions to L knee (covered with Adaptic and Heel Mepilex - changed 7/17) and distal L foot (stub - no toes noted to LLE following prior amputations). Stub wound covered with Biatain and Adaptic (also changed 7/17).  -RLE amputated below the knee. Stub CDI, LEESA.  -Nonblanching pink/purple bruise to R medial bicep - per report, this may be due to accidental prolonged tourniquet application on a prior shift. 2 photos taken of upper arm for documentation this shift.  -Tiny bruise to L cheek. LEESA.  -Scattered scabs to BUE/BLE, most notable over the bilat forearms and fingers. LEESA, nondraining.   -Sacrum/posterior perineum red/pink and blanching. New sacral Mepilex placed and barrier cream applied following cleansing after episode of incontnence.     Devices in place include: LLE SCD, BP cuff, SpO2 finger probe, EKG leads and electrode stickers, NGT infusing tube feeds, SNC, PIV x3, Tim[place].    Interventions in place include: See those listed specifically above, additionally-  -Q2h turns using TAPS system for repositioning. Pillows in use to elevate BUE and LLE.   -Frequent assessment beneath and around medical devices to ensure no prolonged or unnecessary skin contact.   -Checks beneath and around pt for stray items (caps, etc) that could cause pressure concerns if in contact with skin.   -TF's running at goal for promotion of skin integrity from nutritional standpoint.   -All WOCN dressing orders adhered to, dressings changed PRN soiling.   -Dry flow padding placed beneath weepy wounds to wick moisture and prevent further  associated breakdown.
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
## 2021-07-20 NOTE — ASSESSMENT & PLAN NOTE
Blood culture and thoracentesis fluid culture 7/15: pos MSSA  BESSIE: neg for vegetation  ID following, rec Continue cefazolin 2 g every 8 hours - will plan on a 4 week antibiotic course (end 8/14/21)   Midline placed 7/21

## 2021-07-21 ENCOUNTER — APPOINTMENT (OUTPATIENT)
Dept: RADIOLOGY | Facility: MEDICAL CENTER | Age: 76
DRG: 637 | End: 2021-07-21
Attending: ORTHOPAEDIC SURGERY
Payer: MEDICARE

## 2021-07-21 ENCOUNTER — APPOINTMENT (OUTPATIENT)
Dept: RADIOLOGY | Facility: MEDICAL CENTER | Age: 76
DRG: 637 | End: 2021-07-21
Attending: STUDENT IN AN ORGANIZED HEALTH CARE EDUCATION/TRAINING PROGRAM
Payer: MEDICARE

## 2021-07-21 PROBLEM — J90 PLEURAL EFFUSION: Status: ACTIVE | Noted: 2021-07-21

## 2021-07-21 PROBLEM — I82.611 SUPERFICIAL VENOUS THROMBOSIS OF ARM, RIGHT: Status: ACTIVE | Noted: 2021-07-21

## 2021-07-21 LAB
ALBUMIN SERPL BCP-MCNC: 2.1 G/DL (ref 3.2–4.9)
ALBUMIN/GLOB SERPL: 0.7 G/DL
ALP SERPL-CCNC: 84 U/L (ref 30–99)
ALT SERPL-CCNC: 7 U/L (ref 2–50)
ANION GAP SERPL CALC-SCNC: 6 MMOL/L (ref 7–16)
AST SERPL-CCNC: 16 U/L (ref 12–45)
BASOPHILS # BLD AUTO: 0 % (ref 0–1.8)
BASOPHILS # BLD: 0 K/UL (ref 0–0.12)
BILIRUB SERPL-MCNC: 0.4 MG/DL (ref 0.1–1.5)
BUN SERPL-MCNC: 19 MG/DL (ref 8–22)
CALCIUM SERPL-MCNC: 8.6 MG/DL (ref 8.5–10.5)
CHLORIDE SERPL-SCNC: 100 MMOL/L (ref 96–112)
CO2 SERPL-SCNC: 35 MMOL/L (ref 20–33)
CREAT SERPL-MCNC: 0.65 MG/DL (ref 0.5–1.4)
EOSINOPHIL # BLD AUTO: 0.14 K/UL (ref 0–0.51)
EOSINOPHIL NFR BLD: 1.8 % (ref 0–6.9)
ERYTHROCYTE [DISTWIDTH] IN BLOOD BY AUTOMATED COUNT: 55 FL (ref 35.9–50)
GLOBULIN SER CALC-MCNC: 3.2 G/DL (ref 1.9–3.5)
GLUCOSE BLD-MCNC: 129 MG/DL (ref 65–99)
GLUCOSE BLD-MCNC: 170 MG/DL (ref 65–99)
GLUCOSE BLD-MCNC: 203 MG/DL (ref 65–99)
GLUCOSE BLD-MCNC: 205 MG/DL (ref 65–99)
GLUCOSE BLD-MCNC: 219 MG/DL (ref 65–99)
GLUCOSE SERPL-MCNC: 258 MG/DL (ref 65–99)
HCT VFR BLD AUTO: 30.6 % (ref 42–52)
HGB BLD-MCNC: 9.8 G/DL (ref 14–18)
LYMPHOCYTES # BLD AUTO: 0.77 K/UL (ref 1–4.8)
LYMPHOCYTES NFR BLD: 9.7 % (ref 22–41)
MANUAL DIFF BLD: NORMAL
MCH RBC QN AUTO: 29.3 PG (ref 27–33)
MCHC RBC AUTO-ENTMCNC: 32 G/DL (ref 33.7–35.3)
MCV RBC AUTO: 91.6 FL (ref 81.4–97.8)
MONOCYTES # BLD AUTO: 0.56 K/UL (ref 0–0.85)
MONOCYTES NFR BLD AUTO: 7.1 % (ref 0–13.4)
MORPHOLOGY BLD-IMP: NORMAL
MYELOCYTES NFR BLD MANUAL: 0.9 %
NEUTROPHILS # BLD AUTO: 6.36 K/UL (ref 1.82–7.42)
NEUTROPHILS NFR BLD: 79.6 % (ref 44–72)
NEUTS BAND NFR BLD MANUAL: 0.9 % (ref 0–10)
NRBC # BLD AUTO: 0 K/UL
NRBC BLD-RTO: 0 /100 WBC
PLATELET # BLD AUTO: 572 K/UL (ref 164–446)
PLATELET BLD QL SMEAR: NORMAL
PMV BLD AUTO: 9.8 FL (ref 9–12.9)
POTASSIUM SERPL-SCNC: 4.2 MMOL/L (ref 3.6–5.5)
PROT SERPL-MCNC: 5.3 G/DL (ref 6–8.2)
RBC # BLD AUTO: 3.34 M/UL (ref 4.7–6.1)
RBC BLD AUTO: NORMAL
SODIUM SERPL-SCNC: 141 MMOL/L (ref 135–145)
WBC # BLD AUTO: 7.9 K/UL (ref 4.8–10.8)

## 2021-07-21 PROCEDURE — 700111 HCHG RX REV CODE 636 W/ 250 OVERRIDE (IP): Performed by: INTERNAL MEDICINE

## 2021-07-21 PROCEDURE — 80053 COMPREHEN METABOLIC PANEL: CPT

## 2021-07-21 PROCEDURE — 99233 SBSQ HOSP IP/OBS HIGH 50: CPT | Performed by: STUDENT IN AN ORGANIZED HEALTH CARE EDUCATION/TRAINING PROGRAM

## 2021-07-21 PROCEDURE — 99223 1ST HOSP IP/OBS HIGH 75: CPT | Performed by: PHYSICAL MEDICINE & REHABILITATION

## 2021-07-21 PROCEDURE — 700101 HCHG RX REV CODE 250: Performed by: STUDENT IN AN ORGANIZED HEALTH CARE EDUCATION/TRAINING PROGRAM

## 2021-07-21 PROCEDURE — A9270 NON-COVERED ITEM OR SERVICE: HCPCS | Performed by: INTERNAL MEDICINE

## 2021-07-21 PROCEDURE — 700102 HCHG RX REV CODE 250 W/ 637 OVERRIDE(OP): Performed by: STUDENT IN AN ORGANIZED HEALTH CARE EDUCATION/TRAINING PROGRAM

## 2021-07-21 PROCEDURE — B54MZZA ULTRASONOGRAPHY OF RIGHT UPPER EXTREMITY VEINS, GUIDANCE: ICD-10-PCS | Performed by: STUDENT IN AN ORGANIZED HEALTH CARE EDUCATION/TRAINING PROGRAM

## 2021-07-21 PROCEDURE — 700102 HCHG RX REV CODE 250 W/ 637 OVERRIDE(OP): Performed by: INTERNAL MEDICINE

## 2021-07-21 PROCEDURE — C1751 CATH, INF, PER/CENT/MIDLINE: HCPCS

## 2021-07-21 PROCEDURE — 94760 N-INVAS EAR/PLS OXIMETRY 1: CPT

## 2021-07-21 PROCEDURE — A9270 NON-COVERED ITEM OR SERVICE: HCPCS | Performed by: PHYSICAL MEDICINE & REHABILITATION

## 2021-07-21 PROCEDURE — 05HB33Z INSERTION OF INFUSION DEVICE INTO RIGHT BASILIC VEIN, PERCUTANEOUS APPROACH: ICD-10-PCS | Performed by: STUDENT IN AN ORGANIZED HEALTH CARE EDUCATION/TRAINING PROGRAM

## 2021-07-21 PROCEDURE — 700102 HCHG RX REV CODE 250 W/ 637 OVERRIDE(OP): Performed by: PHYSICAL MEDICINE & REHABILITATION

## 2021-07-21 PROCEDURE — 770006 HCHG ROOM/CARE - MED/SURG/GYN SEMI*

## 2021-07-21 PROCEDURE — A9270 NON-COVERED ITEM OR SERVICE: HCPCS | Performed by: STUDENT IN AN ORGANIZED HEALTH CARE EDUCATION/TRAINING PROGRAM

## 2021-07-21 PROCEDURE — 92526 ORAL FUNCTION THERAPY: CPT

## 2021-07-21 PROCEDURE — 73030 X-RAY EXAM OF SHOULDER: CPT | Mod: LT

## 2021-07-21 PROCEDURE — 700105 HCHG RX REV CODE 258: Performed by: STUDENT IN AN ORGANIZED HEALTH CARE EDUCATION/TRAINING PROGRAM

## 2021-07-21 PROCEDURE — 82962 GLUCOSE BLOOD TEST: CPT | Mod: 91

## 2021-07-21 PROCEDURE — 36415 COLL VENOUS BLD VENIPUNCTURE: CPT

## 2021-07-21 PROCEDURE — 99233 SBSQ HOSP IP/OBS HIGH 50: CPT | Performed by: INTERNAL MEDICINE

## 2021-07-21 PROCEDURE — 85027 COMPLETE CBC AUTOMATED: CPT

## 2021-07-21 PROCEDURE — 85007 BL SMEAR W/DIFF WBC COUNT: CPT

## 2021-07-21 RX ORDER — PROPRANOLOL HYDROCHLORIDE 10 MG/1
20 TABLET ORAL EVERY 8 HOURS
Status: DISCONTINUED | OUTPATIENT
Start: 2021-07-21 | End: 2021-08-02 | Stop reason: HOSPADM

## 2021-07-21 RX ORDER — SODIUM CHLORIDE, SODIUM LACTATE, POTASSIUM CHLORIDE, CALCIUM CHLORIDE 600; 310; 30; 20 MG/100ML; MG/100ML; MG/100ML; MG/100ML
INJECTION, SOLUTION INTRAVENOUS CONTINUOUS
Status: DISCONTINUED | OUTPATIENT
Start: 2021-07-21 | End: 2021-07-22

## 2021-07-21 RX ORDER — ATORVASTATIN CALCIUM 40 MG/1
80 TABLET, FILM COATED ORAL NIGHTLY
Status: DISCONTINUED | OUTPATIENT
Start: 2021-07-21 | End: 2021-07-22

## 2021-07-21 RX ORDER — LEVOTHYROXINE SODIUM 0.05 MG/1
50 TABLET ORAL
Status: DISCONTINUED | OUTPATIENT
Start: 2021-07-22 | End: 2021-07-22

## 2021-07-21 RX ORDER — CHOLECALCIFEROL (VITAMIN D3) 125 MCG
5 CAPSULE ORAL NIGHTLY
Status: DISCONTINUED | OUTPATIENT
Start: 2021-07-21 | End: 2021-08-02 | Stop reason: HOSPADM

## 2021-07-21 RX ORDER — POTASSIUM CHLORIDE 20 MEQ/1
40 TABLET, EXTENDED RELEASE ORAL 2 TIMES DAILY
Status: DISCONTINUED | OUTPATIENT
Start: 2021-07-22 | End: 2021-07-22

## 2021-07-21 RX ORDER — ACETAMINOPHEN 325 MG/1
650 TABLET ORAL EVERY 4 HOURS PRN
Status: DISCONTINUED | OUTPATIENT
Start: 2021-07-21 | End: 2021-07-23

## 2021-07-21 RX ADMIN — CEFAZOLIN SODIUM 2 G: 2 INJECTION, SOLUTION INTRAVENOUS at 13:57

## 2021-07-21 RX ADMIN — INSULIN GLARGINE 10 UNITS: 100 INJECTION, SOLUTION SUBCUTANEOUS at 17:49

## 2021-07-21 RX ADMIN — ACETAMINOPHEN 1000 MG: 500 TABLET ORAL at 13:08

## 2021-07-21 RX ADMIN — CEFAZOLIN SODIUM 2 G: 2 INJECTION, SOLUTION INTRAVENOUS at 22:00

## 2021-07-21 RX ADMIN — ACETAMINOPHEN 1000 MG: 500 TABLET ORAL at 17:42

## 2021-07-21 RX ADMIN — ACETAMINOPHEN 1000 MG: 500 TABLET ORAL at 00:02

## 2021-07-21 RX ADMIN — PROPRANOLOL HYDROCHLORIDE 20 MG: 20 TABLET ORAL at 15:46

## 2021-07-21 RX ADMIN — OXYCODONE HYDROCHLORIDE 10 MG: 10 TABLET ORAL at 08:58

## 2021-07-21 RX ADMIN — INSULIN HUMAN 4 UNITS: 100 INJECTION, SOLUTION PARENTERAL at 06:21

## 2021-07-21 RX ADMIN — ATORVASTATIN CALCIUM 80 MG: 40 TABLET, FILM COATED ORAL at 21:59

## 2021-07-21 RX ADMIN — PROPRANOLOL HYDROCHLORIDE 20 MG: 10 TABLET ORAL at 22:47

## 2021-07-21 RX ADMIN — INSULIN HUMAN 4 UNITS: 100 INJECTION, SOLUTION PARENTERAL at 00:03

## 2021-07-21 RX ADMIN — PREGABALIN 75 MG: 75 CAPSULE ORAL at 13:57

## 2021-07-21 RX ADMIN — Medication 5 MG: at 22:00

## 2021-07-21 RX ADMIN — ENOXAPARIN SODIUM 40 MG: 40 INJECTION SUBCUTANEOUS at 06:21

## 2021-07-21 RX ADMIN — CEFAZOLIN SODIUM 2 G: 2 INJECTION, SOLUTION INTRAVENOUS at 06:21

## 2021-07-21 RX ADMIN — PREGABALIN 75 MG: 75 CAPSULE ORAL at 22:00

## 2021-07-21 RX ADMIN — INSULIN HUMAN 3 UNITS: 100 INJECTION, SOLUTION PARENTERAL at 13:05

## 2021-07-21 RX ADMIN — SODIUM CHLORIDE, POTASSIUM CHLORIDE, SODIUM LACTATE AND CALCIUM CHLORIDE: 600; 310; 30; 20 INJECTION, SOLUTION INTRAVENOUS at 16:41

## 2021-07-21 RX ADMIN — LIDOCAINE 2 PATCH: 50 PATCH TOPICAL at 13:12

## 2021-07-21 RX ADMIN — OXYCODONE HYDROCHLORIDE 10 MG: 10 TABLET ORAL at 13:57

## 2021-07-21 ASSESSMENT — PAIN DESCRIPTION - PAIN TYPE
TYPE: ACUTE PAIN

## 2021-07-21 ASSESSMENT — ENCOUNTER SYMPTOMS
SPUTUM PRODUCTION: 0
WEAKNESS: 1
ABDOMINAL PAIN: 0
EYE DISCHARGE: 1
SHORTNESS OF BREATH: 0
VOMITING: 0
NERVOUS/ANXIOUS: 0
NAUSEA: 0
DIARRHEA: 0
BACK PAIN: 0
MYALGIAS: 0
CHILLS: 0
CONSTIPATION: 0
COUGH: 0
EYE REDNESS: 1
FEVER: 0

## 2021-07-21 NOTE — PROGRESS NOTES
Assumed care of pt at 1900.   Report received from MERVIN Andujar. Assessment completed.  Pt in bed, A/O x  4. Pt reports pain  7/10. Bed in lowest and locked position, fall precautions in place, call light within pt reach. Hourly rounding in place. Pt denies other needs at this time.

## 2021-07-21 NOTE — PROGRESS NOTES
Pulmonary/Critical Care Medicine   Progress Note    Date of service: 7/20/2021  Time: 07:23    Transition to SSI tolerated, mildly hyperglycemic . Increased insulin. Patient stable to be transferred out of ICU today to medical.  I have discussed this case with hospitalist Dr. Enriqueta Cantu, he will assume care at this time. [facility] will sign off. Please call with any questions.    NANCIE Joseph Jr.O.  [facility]

## 2021-07-21 NOTE — PROGRESS NOTES
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 eval and orogastric tube placed  Pleural effusion, status post thoracentesis  -7/15 pleural fluid cultures positive MSSA  DKA  Diabetes mellitus type 2  Thrombophlebitis, right arm edema,  RUE US on 7/17 with acute nonocclusive superficial venous thrombosis in cephalic vein, no DVT  BERENICE, improved   Conjunctivitis with discharge, bilateral     Plan:     --- Continue cefazolin 2 g every 8 hours - will plan on a 4 week antibiotic course (end 8/14/21)   --- Follow-up repeat blood cultures to final   --- OK to place midline    --- Monitor for any new head, neck, back or joint pain and image appropriately  --- If he continues to have discharge and redness in eyes recommend  ophthalmology consult  --- Noted that shoulder x-ray is pending.  If any concerns for infection of the joint will discuss with Ortho and reconsult ID  --- Would consider rehab/SNF placement for IV abx and ongoing rehab.  Discussed this with wife at bedside and this is her preference.      Discussed with Dr. Tate. ID will sign off.

## 2021-07-21 NOTE — CARE PLAN
The patient is Watcher - Medium risk of patient condition declining or worsening    Shift Goals  Clinical Goals: Pain management, increase mobility, diet advancement, wound care  Patient Goals: Rest, pain management, diet  Family Goals: na    Progress made toward(s) clinical / shift goals:  Patient tolerated turns with increased pain.     Patient is not progressing towards the following goals:Mobility is not progressing due to increased pain.    Problem: Pain - Standard  Goal: Alleviation of pain or a reduction in pain to the patient’s comfort goal  Outcome: Not Progressing     Problem: Skin Integrity  Goal: Skin integrity is maintained or improved  Outcome: Not Progressing

## 2021-07-21 NOTE — CARE PLAN
The patient is Watcher - Medium risk of patient condition declining or worsening    Shift Goals  Clinical Goals: pain control  Patient Goals: pain control  Family Goals: na    Progress made toward(s) clinical / shift goals:  medicated per MAR    Patient is not progressing towards the following goals: na      Problem: Pain - Standard  Goal: Alleviation of pain or a reduction in pain to the patient’s comfort goal  Outcome: Progressing     Problem: Knowledge Deficit - Standard  Goal: Patient and family/care givers will demonstrate understanding of plan of care, disease process/condition, diagnostic tests and medications  Outcome: Progressing     Problem: Skin Integrity  Goal: Skin integrity is maintained or improved  Outcome: Progressing     Problem: Fall Risk  Goal: Patient will remain free from falls  Outcome: Progressing

## 2021-07-21 NOTE — THERAPY
"Speech Language Pathology  Daily Treatment     Patient Name: Kevin Jackson  Age:  76 y.o., Sex:  male  Medical Record #: [id number]  Today's Date: 7/21/2021     Precautions: Fall Risk, Swallow Precautions (See Comments), Non Weight Bearing Left Upper Extremity, Sling Left Upper Extremity  Comments: R BKA, L TMA; Pt previously on EC7/TN0 diet     Assessment    Pt was seen today for f/u dysphagia tx and swallow re-assessment. Per RN, Pt's Cortrak was found partially dislodged this morning and he is now NPO with no nutrition. Pt was found reclined in bed, and was significantly resistant to repositioning bed upright due to high pain levels. With RN and SLP encouragement, he was able to tolerate HOB to 60 degrees max and began to yell, \"No more! No more!\" Pt was educated re: safe swallow precautions and he continued to decline to sit fully upright. Pt was provided with tsps ice chips and demonstrated improved bolus manipulation and control (as compared to initial eval where he held PO in mouth for >10-seconds), with no coughing/choking appreciated after the swallow. Pt was then provided with tsps applesauce and had mildly delayed a/p transit, but no clinical s/sx of aspiration/penetration. RN present to administer med floated in puree and he again tolerated well but then adamantly refused any further PO trials (of ice, water, pudding, cereal, milk, etc.) Therefore, limited PO trials were given this date due to patient refusal. He then endorsed mid-esophageal pain/discomfort, and endorsed \"it could be from the applesauce, I don't know.\"    At this time, recommend to continue NPO status. Recommend diagnostic swallow study (Modified Barium Swallow Study) to further evaluate oral, pharyngeal and esophageal swallow functioning and determine safest PO diet (as Pt is able/willing to participate.) Discussed with RN and MD. Pt is okay for 5-10 ice chips/hr with RN supervision to maintain swallow integrity and reduce xerostomia. " "Small meds okay to be floated in applesauce, please crush large meds but hold if any difficulties/concerns or change in status. SLP following. Thank you.     Plan    Continue current treatment plan.    Discharge Recommendations: Recommend post-acute placement for additional speech therapy services prior to discharge home       Objective     07/21/21 0912   Cognitive-Linguistic   Level of Consciousness Alert   Dysphagia    Positioning / Behavior Modification Self Monitoring;Multiple Swallows   Other Treatments PO trials ice chips, tsps applesauce, med floated   Diet / Liquid Recommendation NPO;Pre-Feeding Trials with SLP Only   Nutritional Liquid Intake Rating Scale Nothing by mouth   Nutritional Food Intake Rating Scale Tube dependent with minimal attempts of oral intake   Nursing Communication   (NPO)   Skilled Intervention Compensatory Strategies;Verbal Cueing   Recommended Route of Medication Administration   Medication Administration  Float Whole with Puree;Crush all Medications in Puree  (float small pills, crush large pills as tolerated)   Patient / Family Goals   Patient / Family Goal #1 Per spouse: \"To eat safely\"   Short Term Goals   Short Term Goal # 1 The patient will consume prefeeding trials with SLP only, given no overt s/sx of aspiration   Goal Outcome # 1 Progressing slower than expected   Education Group   Education Provided Dysphagia   Dysphagia Patient Response Patient;Acceptance;Explanation;Verbal Demonstration;Action Demonstration;Reinforcement Needed         "

## 2021-07-21 NOTE — DISCHARGE PLANNING
TCC/ rehab follow up.   PMR follow up - continue w/ attempts for PT/OT; however poor tolerance and self limiting behaviors are barrier to being accepted to IRF.      At this time, current level of care does not support in pt rehab stay.  Recommendations made for SNF.     Thank you for referral .

## 2021-07-21 NOTE — PROGRESS NOTES
Received report from MERVIN Klein in neuro at 1032. Pt transferred via bed and arrived on unit 1045. Pt greeted and made comfortable. Assessment completed, 2 RN skin check completed with Nick. Hourly rounding in place. This nurse agrees with assessment completed by neuro nurse.

## 2021-07-21 NOTE — THERAPY
Missed Therapy     Patient Name: Kevin Jackson  Age:  76 y.o., Sex:  male  Medical Record #: [id number]  Today's Date: 7/21/2021    Discussed missed therapy with nursing and family        07/21/21 5289   Interdisciplinary Plan of Care Collaboration   IDT Collaboration with  Nursing   Collaboration Comments Modified barium swallow evaluation received and acknowledged. Patient agreeable to participate in MBSS, however unable to transfer patient d/t MBSS chair malfunction. Patient agreeable to re-evalaution in the AM with possible FEES as an alternative diagnostic.
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
## 2021-07-21 NOTE — ASSESSMENT & PLAN NOTE
status post thoracentesis 7/15/21  Pleural fluid culture pos MSSA  On Ancef  SOB improved, xray 7/25 stable   Off oxygen

## 2021-07-21 NOTE — ASSESSMENT & PLAN NOTE
Patient is noted depressed, frustrate and has self limiting behavior, declined to work with PT/OT  consult inpatient behavior health, thought to be due to delirium   Stable and improved, motivated to work with PT/OT

## 2021-07-21 NOTE — PROCEDURES
Vascular Access Team    Date of Insertion: 7/21/2021  Arm Circumference: 29  Internal length: 18  External Length: 0  Vein Occupancy %: 30  Reason for Midline: IV abx  Labs: WBC 7.9, , BUN 19, Cr 0.65, GFR >60, INR 1.07    Orders confirmed, vessel patency confirmed with ultrasound. Risks and benefits of procedure explained to patient and significant other and education regarding line associated bloodstream infections provided. Questions answered.     Power Midline placed in RUE per licensed provider order with ultrasound guidance. 4 Fr, 1 lumen Power Midline placed in basilic vein after 1 attempt(s). 2 mL of 1% lidocaine injected intradermally, 21 gauge microintroducer needle and modified Seldinger technique used. 18 cm catheter inserted with good blood return. Secured at 0 cm marker. Each lumen flushed without resistance with 10 mL 0.9% normal saline. Midline secured with Biopatch and Tegaderm.     Midline placement is confirmed by nurse using ultrasound and ability to flush and draw blood. Midline is appropriate for use at this time.  Patient tolerated procedure well, without complications.  No X-ray is needed for placement confirmation.  Patient condition relayed to unit RN or ordering physician via this post procedure note in the EMR.     Ultrasound images uploaded to PACS and viewable in the EMR - yes  Ultrasound imaged printed and placed in paper chart - no     BARD Power Midline ref # [id number], Lot # [id number], Expiration Date 5/31/2022

## 2021-07-21 NOTE — PROGRESS NOTES
"Assumed care of pt at 0700 from MERVIN Joe. Assessment completed. Pt in bed, A&Ox4. Pt reports pain at 10/10 after skin prevalence check, medicated and repositioned patient. Bed in lowest and locked position, fall precautions in place, call light within pt reach, whiteboard updated. Hourly rounding in place. Pt denies other needs at this time. /66   Pulse 87   Temp 37.1 °C (98.7 °F) (Temporal)   Resp 18   Ht 1.854 m (6' 1\")   Wt 96.2 kg (212 lb 1.3 oz)   SpO2 97%     "

## 2021-07-21 NOTE — THERAPY
Occupational Therapy Contact Note    Attempted OT treatment x2 today, in morning and after lunch. Pt known to this therapist from recent admit before Rehab. Pt extremely frustrated, upset about how many staff members have come to do tasks w/ him today. Edu on importance of OOB activity, and told him therapist would return later to see if he was feeling better. Upon second attempt pt still upset reporting he wasn't going to do anything else today. Wife at bedside both attempts, attempting to encourage him as well. Will attempt tomorrow morning. Reached out to MD re: pt's self limiting behavior and overall discouraged affect for inpatient behavioral health consult.    Eloina Mariscal, OTR/L
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
## 2021-07-21 NOTE — ASSESSMENT & PLAN NOTE
Per doppler 7/17: Acute, non occlusive, superficial venous thrombosis is seen in a short segment of the cephalic vein at distal bicep, proximal to the IV. IV removed  Midline placed on R basilic vein 7/21
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
## 2021-07-22 ENCOUNTER — APPOINTMENT (OUTPATIENT)
Dept: RADIOLOGY | Facility: MEDICAL CENTER | Age: 76
DRG: 637 | End: 2021-07-22
Attending: STUDENT IN AN ORGANIZED HEALTH CARE EDUCATION/TRAINING PROGRAM
Payer: MEDICARE

## 2021-07-22 LAB
ALBUMIN SERPL BCP-MCNC: 2.1 G/DL (ref 3.2–4.9)
ALBUMIN/GLOB SERPL: 0.6 G/DL
ALP SERPL-CCNC: 81 U/L (ref 30–99)
ALT SERPL-CCNC: 5 U/L (ref 2–50)
ANION GAP SERPL CALC-SCNC: 6 MMOL/L (ref 7–16)
AST SERPL-CCNC: 19 U/L (ref 12–45)
BACTERIA BLD CULT: NORMAL
BASOPHILS # BLD AUTO: 0 % (ref 0–1.8)
BASOPHILS # BLD: 0 K/UL (ref 0–0.12)
BILIRUB SERPL-MCNC: 0.5 MG/DL (ref 0.1–1.5)
BUN SERPL-MCNC: 17 MG/DL (ref 8–22)
BURR CELLS BLD QL SMEAR: NORMAL
CALCIUM SERPL-MCNC: 8.7 MG/DL (ref 8.5–10.5)
CHLORIDE SERPL-SCNC: 100 MMOL/L (ref 96–112)
CO2 SERPL-SCNC: 33 MMOL/L (ref 20–33)
CREAT SERPL-MCNC: 0.65 MG/DL (ref 0.5–1.4)
EOSINOPHIL # BLD AUTO: 0.14 K/UL (ref 0–0.51)
EOSINOPHIL NFR BLD: 1.7 % (ref 0–6.9)
ERYTHROCYTE [DISTWIDTH] IN BLOOD BY AUTOMATED COUNT: 53.3 FL (ref 35.9–50)
GLOBULIN SER CALC-MCNC: 3.5 G/DL (ref 1.9–3.5)
GLUCOSE BLD-MCNC: 149 MG/DL (ref 65–99)
GLUCOSE BLD-MCNC: 149 MG/DL (ref 65–99)
GLUCOSE BLD-MCNC: 154 MG/DL (ref 65–99)
GLUCOSE BLD-MCNC: 155 MG/DL (ref 65–99)
GLUCOSE BLD-MCNC: 180 MG/DL (ref 65–99)
GLUCOSE SERPL-MCNC: 141 MG/DL (ref 65–99)
HCT VFR BLD AUTO: 28.4 % (ref 42–52)
HGB BLD-MCNC: 9.2 G/DL (ref 14–18)
LYMPHOCYTES # BLD AUTO: 1.51 K/UL (ref 1–4.8)
LYMPHOCYTES NFR BLD: 18.4 % (ref 22–41)
MAGNESIUM SERPL-MCNC: 2.2 MG/DL (ref 1.5–2.5)
MANUAL DIFF BLD: NORMAL
MCH RBC QN AUTO: 29.5 PG (ref 27–33)
MCHC RBC AUTO-ENTMCNC: 32.4 G/DL (ref 33.7–35.3)
MCV RBC AUTO: 91 FL (ref 81.4–97.8)
MONOCYTES # BLD AUTO: 0.43 K/UL (ref 0–0.85)
MONOCYTES NFR BLD AUTO: 5.3 % (ref 0–13.4)
MORPHOLOGY BLD-IMP: NORMAL
NEUTROPHILS # BLD AUTO: 6.12 K/UL (ref 1.82–7.42)
NEUTROPHILS NFR BLD: 74.6 % (ref 44–72)
NRBC # BLD AUTO: 0 K/UL
NRBC BLD-RTO: 0 /100 WBC
PHOSPHATE SERPL-MCNC: 3.5 MG/DL (ref 2.5–4.5)
PLATELET # BLD AUTO: 651 K/UL (ref 164–446)
PLATELET BLD QL SMEAR: NORMAL
PMV BLD AUTO: 9.3 FL (ref 9–12.9)
POIKILOCYTOSIS BLD QL SMEAR: NORMAL
POTASSIUM SERPL-SCNC: 3.9 MMOL/L (ref 3.6–5.5)
PROT SERPL-MCNC: 5.6 G/DL (ref 6–8.2)
RBC # BLD AUTO: 3.12 M/UL (ref 4.7–6.1)
RBC BLD AUTO: PRESENT
SCHISTOCYTES BLD QL SMEAR: NORMAL
SIGNIFICANT IND 70042: NORMAL
SITE SITE: NORMAL
SODIUM SERPL-SCNC: 139 MMOL/L (ref 135–145)
SOURCE SOURCE: NORMAL
WBC # BLD AUTO: 8.2 K/UL (ref 4.8–10.8)

## 2021-07-22 PROCEDURE — 36415 COLL VENOUS BLD VENIPUNCTURE: CPT

## 2021-07-22 PROCEDURE — 92526 ORAL FUNCTION THERAPY: CPT

## 2021-07-22 PROCEDURE — 97530 THERAPEUTIC ACTIVITIES: CPT

## 2021-07-22 PROCEDURE — 82962 GLUCOSE BLOOD TEST: CPT | Mod: 91

## 2021-07-22 PROCEDURE — 700102 HCHG RX REV CODE 250 W/ 637 OVERRIDE(OP): Performed by: INTERNAL MEDICINE

## 2021-07-22 PROCEDURE — A9270 NON-COVERED ITEM OR SERVICE: HCPCS | Performed by: STUDENT IN AN ORGANIZED HEALTH CARE EDUCATION/TRAINING PROGRAM

## 2021-07-22 PROCEDURE — 700111 HCHG RX REV CODE 636 W/ 250 OVERRIDE (IP): Performed by: INTERNAL MEDICINE

## 2021-07-22 PROCEDURE — 700101 HCHG RX REV CODE 250: Performed by: STUDENT IN AN ORGANIZED HEALTH CARE EDUCATION/TRAINING PROGRAM

## 2021-07-22 PROCEDURE — 700102 HCHG RX REV CODE 250 W/ 637 OVERRIDE(OP): Performed by: STUDENT IN AN ORGANIZED HEALTH CARE EDUCATION/TRAINING PROGRAM

## 2021-07-22 PROCEDURE — 84100 ASSAY OF PHOSPHORUS: CPT

## 2021-07-22 PROCEDURE — A9270 NON-COVERED ITEM OR SERVICE: HCPCS | Performed by: PHYSICAL MEDICINE & REHABILITATION

## 2021-07-22 PROCEDURE — A9270 NON-COVERED ITEM OR SERVICE: HCPCS | Performed by: INTERNAL MEDICINE

## 2021-07-22 PROCEDURE — 99233 SBSQ HOSP IP/OBS HIGH 50: CPT | Performed by: STUDENT IN AN ORGANIZED HEALTH CARE EDUCATION/TRAINING PROGRAM

## 2021-07-22 PROCEDURE — 92612 ENDOSCOPY SWALLOW (FEES) VID: CPT

## 2021-07-22 PROCEDURE — 83735 ASSAY OF MAGNESIUM: CPT

## 2021-07-22 PROCEDURE — 97535 SELF CARE MNGMENT TRAINING: CPT

## 2021-07-22 PROCEDURE — 94760 N-INVAS EAR/PLS OXIMETRY 1: CPT

## 2021-07-22 PROCEDURE — 85027 COMPLETE CBC AUTOMATED: CPT

## 2021-07-22 PROCEDURE — 700102 HCHG RX REV CODE 250 W/ 637 OVERRIDE(OP): Performed by: PHYSICAL MEDICINE & REHABILITATION

## 2021-07-22 PROCEDURE — 80053 COMPREHEN METABOLIC PANEL: CPT

## 2021-07-22 PROCEDURE — 770006 HCHG ROOM/CARE - MED/SURG/GYN SEMI*

## 2021-07-22 PROCEDURE — 85007 BL SMEAR W/DIFF WBC COUNT: CPT

## 2021-07-22 RX ORDER — CARBOXYMETHYLCELLULOSE SODIUM 5 MG/ML
1 SOLUTION/ DROPS OPHTHALMIC EVERY 8 HOURS
Status: DISPENSED | OUTPATIENT
Start: 2021-07-22 | End: 2021-07-27

## 2021-07-22 RX ORDER — ATORVASTATIN CALCIUM 40 MG/1
80 TABLET, FILM COATED ORAL NIGHTLY
Status: DISCONTINUED | OUTPATIENT
Start: 2021-07-22 | End: 2021-08-02 | Stop reason: HOSPADM

## 2021-07-22 RX ORDER — PRIMIDONE 50 MG/1
50 TABLET ORAL DAILY
Status: DISCONTINUED | OUTPATIENT
Start: 2021-07-23 | End: 2021-08-02 | Stop reason: HOSPADM

## 2021-07-22 RX ORDER — LEVOTHYROXINE SODIUM 0.05 MG/1
50 TABLET ORAL
Status: DISCONTINUED | OUTPATIENT
Start: 2021-07-23 | End: 2021-08-02 | Stop reason: HOSPADM

## 2021-07-22 RX ORDER — GABAPENTIN 300 MG/1
300 CAPSULE ORAL 3 TIMES DAILY
Status: DISCONTINUED | OUTPATIENT
Start: 2021-07-22 | End: 2021-08-02 | Stop reason: HOSPADM

## 2021-07-22 RX ADMIN — ATORVASTATIN CALCIUM 80 MG: 40 TABLET, FILM COATED ORAL at 21:06

## 2021-07-22 RX ADMIN — ACETAMINOPHEN 1000 MG: 500 TABLET ORAL at 18:02

## 2021-07-22 RX ADMIN — INSULIN HUMAN 3 UNITS: 100 INJECTION, SOLUTION PARENTERAL at 00:32

## 2021-07-22 RX ADMIN — LIDOCAINE 2 PATCH: 50 PATCH TOPICAL at 12:02

## 2021-07-22 RX ADMIN — PROPRANOLOL HYDROCHLORIDE 20 MG: 10 TABLET ORAL at 21:07

## 2021-07-22 RX ADMIN — GABAPENTIN 300 MG: 300 CAPSULE ORAL at 18:03

## 2021-07-22 RX ADMIN — INSULIN GLARGINE 30 UNITS: 100 INJECTION, SOLUTION SUBCUTANEOUS at 18:03

## 2021-07-22 RX ADMIN — INSULIN HUMAN 3 UNITS: 100 INJECTION, SOLUTION PARENTERAL at 06:17

## 2021-07-22 RX ADMIN — LEVOTHYROXINE SODIUM 50 MCG: 0.05 TABLET ORAL at 07:32

## 2021-07-22 RX ADMIN — ENOXAPARIN SODIUM 40 MG: 40 INJECTION SUBCUTANEOUS at 05:57

## 2021-07-22 RX ADMIN — OXYCODONE HYDROCHLORIDE 10 MG: 10 TABLET ORAL at 07:32

## 2021-07-22 RX ADMIN — Medication 5 MG: at 21:06

## 2021-07-22 RX ADMIN — INSULIN HUMAN 3 UNITS: 100 INJECTION, SOLUTION PARENTERAL at 13:51

## 2021-07-22 RX ADMIN — ACETAMINOPHEN 1000 MG: 500 TABLET ORAL at 00:16

## 2021-07-22 RX ADMIN — PREGABALIN 75 MG: 75 CAPSULE ORAL at 05:56

## 2021-07-22 RX ADMIN — CARBOXYMETHYLCELLULOSE SODIUM 1 DROP: 5 SOLUTION/ DROPS OPHTHALMIC at 21:07

## 2021-07-22 RX ADMIN — PROPRANOLOL HYDROCHLORIDE 20 MG: 10 TABLET ORAL at 14:15

## 2021-07-22 RX ADMIN — MINERAL OIL, PETROLATUM 1 APPLICATION: 425; 573 OINTMENT OPHTHALMIC at 11:59

## 2021-07-22 RX ADMIN — CARBOXYMETHYLCELLULOSE SODIUM 1 DROP: 5 SOLUTION/ DROPS OPHTHALMIC at 18:03

## 2021-07-22 RX ADMIN — CEFAZOLIN SODIUM 2 G: 2 INJECTION, SOLUTION INTRAVENOUS at 05:57

## 2021-07-22 RX ADMIN — PREGABALIN 75 MG: 75 CAPSULE ORAL at 14:15

## 2021-07-22 RX ADMIN — CEFAZOLIN SODIUM 2 G: 2 INJECTION, SOLUTION INTRAVENOUS at 14:16

## 2021-07-22 RX ADMIN — CELECOXIB 200 MG: 200 CAPSULE ORAL at 05:56

## 2021-07-22 RX ADMIN — CEFAZOLIN SODIUM 2 G: 2 INJECTION, SOLUTION INTRAVENOUS at 21:58

## 2021-07-22 ASSESSMENT — COGNITIVE AND FUNCTIONAL STATUS - GENERAL
STANDING UP FROM CHAIR USING ARMS: TOTAL
TOILETING: A LOT
MOVING FROM LYING ON BACK TO SITTING ON SIDE OF FLAT BED: UNABLE
MOVING TO AND FROM BED TO CHAIR: UNABLE
WALKING IN HOSPITAL ROOM: TOTAL
TURNING FROM BACK TO SIDE WHILE IN FLAT BAD: UNABLE
CLIMB 3 TO 5 STEPS WITH RAILING: TOTAL
DAILY ACTIVITIY SCORE: 15
DRESSING REGULAR LOWER BODY CLOTHING: A LOT
SUGGESTED CMS G CODE MODIFIER MOBILITY: CN
SUGGESTED CMS G CODE MODIFIER DAILY ACTIVITY: CK
HELP NEEDED FOR BATHING: A LOT
MOBILITY SCORE: 6
PERSONAL GROOMING: A LITTLE
DRESSING REGULAR UPPER BODY CLOTHING: A LITTLE
EATING MEALS: A LITTLE

## 2021-07-22 ASSESSMENT — PAIN DESCRIPTION - PAIN TYPE
TYPE: ACUTE PAIN

## 2021-07-22 ASSESSMENT — GAIT ASSESSMENTS: GAIT LEVEL OF ASSIST: UNABLE TO PARTICIPATE

## 2021-07-22 NOTE — CARE PLAN
The patient is Watcher - Medium risk of patient condition declining or worsening    Shift Goals  Clinical Goals: Pain management  Patient Goals: Rest and pain management  Family Goals: na    Progress made toward(s) clinical / shift goals:  Patient does not tolerate q2h turning in bed. Yells out in pain during movement only. Patient given PO meds with pudding. Tolerated well. Larger pills need to be split.     Patient is not progressing towards the following goals:
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
## 2021-07-22 NOTE — THERAPY
"Speech Language Pathology  Daily Treatment     Patient Name: Kevin Jackson  Age:  76 y.o., Sex:  male  Medical Record #: [id number]  Today's Date: 7/22/2021     Precautions  Precautions: Fall Risk  Comments: LYLA SEO, L TMA    Assessment    Pt was seen for a clinical swallowing reassessment at bedside today. Pt was alert, cooperative, demo'ing some confusion (RN notified). He was presented with ice chips, thins via tsp/cup, liqudized and pureed solids via tsp. Pt declined other textures, stating he wasn't hungry and couldn't eat anymore. Pt continues to present with signs of dysphagia, including intermittent wet vocal quality following PO intake, report of globus sensation in throat, and multiple weak swallows per bolus. Recommend proceeding with a FEES prior to initiating a PO diet. Discussed with the patient the risks, benefits, and alternatives of the FEES procedure. Patient acknowledges and agreeable to proceed with the procedure. SLP will plan to complete the FEES today.       Plan    NPO pending FEES today; recs to follow    Continue current treatment plan.    Discharge Recommendations: Recommend post-acute placement for additional speech therapy services prior to discharge home    Subjective    \"It feels like it's moving slow.\"     Objective       07/22/21 1030   Dysphagia    Positioning / Behavior Modification Modulate Rate or Bite Size;Self Monitoring;Cough / Clear after Swallow   Other Treatments PO trials of ice chips, thins via tsp/cup, applesauce and pudding; pt declined further PO trials   Diet / Liquid Recommendation NPO;Pre-Feeding Trials with SLP Only  (pending FEES today)   Recommended Route of Medication Administration   Medication Administration    (recs pending FEES today)   Short Term Goals   Short Term Goal # 1 The patient will consume prefeeding trials with SLP only, given no overt s/sx of aspiration   Goal Outcome # 1 Progressing as expected         "

## 2021-07-22 NOTE — PROGRESS NOTES
Shoulder xrays OK  Continue nonop treatment of left shoulder  No lifting > 1 pound  Pendulum exercises  Start active-assisted shoulder ROM exercises in 1 week  F/u LISETH 1 month

## 2021-07-22 NOTE — ASSESSMENT & PLAN NOTE
"FEES and speech following  Diet per speech  Having pain with swollow, denies food getting \"stuck in throat\", denies sore throat   Will start viscous lidocaine prior to meals and prn   Start omeprazole as GERD maybe possible etiology   No signs of thrush, follow closely   "

## 2021-07-22 NOTE — CARE PLAN
The patient is Watcher - Medium risk of patient condition declining or worsening    Shift Goals  Clinical Goals: Pain management, comply with PT/OT, swallow eval, diet advancement  Patient Goals: Rest, pain management, diet advancement  Family Goals: na    Progress made toward(s) clinical / shift goals: Pt sat at the edge of the bed with OT today. Pt states his pain is a 3 when he does not move. Pt is having swallow evaluation done today to determine if we can advance his diet.    Patient is not progressing towards the following goals: Pt continues to have 10/10 pain with movement. Pt refuses q2 turns.    Problem: Pain - Standard  Goal: Alleviation of pain or a reduction in pain to the patient’s comfort goal  Outcome: Not Progressing     Problem: Skin Integrity  Goal: Skin integrity is maintained or improved  Outcome: Not Progressing
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
## 2021-07-22 NOTE — THERAPY
Physical Therapy   Daily Treatment     Patient Name: Kevin Jackson  Age:  76 y.o., Sex:  male  Medical Record #: [id number]  Today's Date: 7/22/2021     Precautions: Fall Risk, Swallow Precautions ( See Comments)    Assessment    Rec'd pt alert, in bed, needing encouragement to participate.  He needs total assist to move legs off the bed and max assist to elevate his head and trunk into edge of bed sitting.  Once sitting, pt began resisting, pulling away from therapist and returning himself back to supine, refusing to perform any further mobility despite cues for encouragement.  Progressing slowly.  Limited by pain    Plan    Continue current treatment plan.    DC Equipment Recommendations: Unable to determine at this time  Discharge Recommendations: Recommend post-acute placement for additional physical therapy services prior to discharge home        Objective       07/22/21 0814   Balance   Sitting Balance (Static) Trace +   Sitting Balance (Dynamic) Trace +   Standing Balance (Static)   (unable)   Weight Shift Sitting Poor   Weight Shift Standing Absent   Gait Analysis   Gait Level Of Assist Unable to Participate   Bed Mobility    Supine to Sit Maximal Assist   Sit to Supine Supervised   Scooting Total Assist   Functional Mobility   Sit to Stand Unable to Participate   Short Term Goals    Short Term Goal # 1 pt will be able to complete bed mobility with mod assist in 6tx in order to progress   Goal Outcome # 1 goal not met   Short Term Goal # 2 pt will be able to complete functional transfers with LRAD and mod assist in 6tx in order to progress   Goal Outcome # 2 Goal not met   Short Term Goal # 3 pt will be able to sit EOB with SPV for >8min in 6tx in order to increase safety   Goal Outcome # 3 Goal not met   Anticipated Discharge Equipment and Recommendations   DC Equipment Recommendations Unable to determine at this time   Discharge Recommendations Recommend post-acute placement for additional physical  therapy services prior to discharge home

## 2021-07-22 NOTE — THERAPY
"Occupational Therapy  Daily Treatment     Patient Name: Kevin Jackson  Age:  76 y.o., Sex:  male  Medical Record #: [id number]  Today's Date: 7/22/2021     Precautions  Precautions: Fall Risk, Swallow Precautions ( See Comments)  Comments: R BKA, L TMA    Assessment    Pt agreeable to attempting EOB today. He was still quite limited by pain however tolerated sitting EOB better than initial evaluation. He reported he was not ready to try standing today. Will attempt to don prosthetic and  prep for ADL txfs next session.    Plan    Continue current treatment plan.    DC Equipment Recommendations: Unable to determine at this time  Discharge Recommendations: Recommend post-acute placement for additional occupational therapy services prior to discharge home    Subjective    \"I wanna keep sitting, but I also feel ready to lay down\"     Objective       07/22/21 0750   Cognition    Cognition / Consciousness X   Level of Consciousness Alert   Comments still limited by pain today, but participating w/ encouragement and extra time   Balance   Sitting Balance (Static) Fair   Sitting Balance (Dynamic) Fair -   Weight Shift Sitting Poor   Skilled Intervention Verbal Cuing;Tactile Cuing;Sequencing   Bed Mobility    Supine to Sit Maximal Assist   Sit to Supine Moderate Assist   Scooting Maximal Assist   Skilled Intervention Verbal Cuing;Tactile Cuing;Sequencing   Comments to L side of bed   Activities of Daily Living   Grooming Minimal Assist;Seated   Lower Body Dressing Maximal Assist   Skilled Intervention Verbal Cuing;Tactile Cuing;Sequencing   How much help from another person does the patient currently need...   Putting on and taking off regular lower body clothing? 2   Bathing (including washing, rinsing, and drying)? 2   Toileting, which includes using a toilet, bedpan, or urinal? 2   Putting on and taking off regular upper body clothing? 3   Taking care of personal grooming such as brushing teeth? 3   Eating " meals? 3   6 Clicks Daily Activity Score 15   Functional Mobility   Sit to Stand Refused   Bed, Chair, Wheelchair Transfer Refused   Mobility EOB only today, able to tolerate it better than initial eval   Skilled Intervention Verbal Cuing;Tactile Cuing;Sequencing   Patient / Family Goals   Patient / Family Goal #1 Let me lay down   Short Term Goals   Short Term Goal # 1 pt will complete EOB grooming w/set up    Goal Outcome # 1 Progressing as expected   Short Term Goal # 2 pt will complete LB dressing w/mod A   Goal Outcome # 2 Goal not met   Short Term Goal # 3 pt will complete txf to BSC w/mod A    Goal Outcome # 3 Goal not met

## 2021-07-22 NOTE — PROGRESS NOTES
Assumed care of pt at 1900. Report received and bedside rounding completed with  RN. Pt is calm no SOB, no acute distress noted. Patient is sitting up in bed*. Pain 0/10 during rest.   Call light and pt belongings within reach - hourly rounding in place. See flowsheets for further assessment.     Pt is considered a HIGH fall risk. edu provided on risk level.   Fall precautions in place,  bed alarm on. Treaded non slip socks. Bed locked. Communication board updated with POC.

## 2021-07-22 NOTE — PROGRESS NOTES
"Assumed care of pt at 0700 from MERVIN Ruiz. Assessment completed. Pt in bed, A&Ox4. Pt reports pain at 10/10 after OT worked with him, medicated and repositioned patient. Bed in lowest and locked position, fall precautions in place, call light within pt reach, whiteboard updated. Hourly rounding in place. Pt denies other needs at this time. /68   Pulse 75   Temp 36.5 °C (97.7 °F) (Temporal)   Resp 16   Ht 1.854 m (6' 1\")   Wt 96.2 kg (212 lb 1.3 oz)   SpO2 94%    "

## 2021-07-22 NOTE — DIETARY
Nutrition services: Tube feeding discontinued and soft and bite sized mildly thick liquid diet started.  Pt to be seen daily by nutrition representative for meal preferences.

## 2021-07-22 NOTE — CARE PLAN
Problem: Nutritional:  Goal: Achieve adequate nutritional intake  Description: Patient will consume 50% of meals  Outcome: Not Met

## 2021-07-23 LAB
AMMONIA PLAS-SCNC: 19 UMOL/L (ref 11–45)
ANION GAP SERPL CALC-SCNC: 8 MMOL/L (ref 7–16)
BACTERIA BLD CULT: NORMAL
BACTERIA BLD CULT: NORMAL
BASOPHILS # BLD AUTO: 0 % (ref 0–1.8)
BASOPHILS # BLD: 0 K/UL (ref 0–0.12)
BUN SERPL-MCNC: 15 MG/DL (ref 8–22)
CALCIUM SERPL-MCNC: 8.7 MG/DL (ref 8.5–10.5)
CHLORIDE SERPL-SCNC: 100 MMOL/L (ref 96–112)
CO2 SERPL-SCNC: 31 MMOL/L (ref 20–33)
CREAT SERPL-MCNC: 0.69 MG/DL (ref 0.5–1.4)
EOSINOPHIL # BLD AUTO: 0 K/UL (ref 0–0.51)
EOSINOPHIL NFR BLD: 0 % (ref 0–6.9)
ERYTHROCYTE [DISTWIDTH] IN BLOOD BY AUTOMATED COUNT: 53.8 FL (ref 35.9–50)
GLUCOSE BLD-MCNC: 139 MG/DL (ref 65–99)
GLUCOSE BLD-MCNC: 142 MG/DL (ref 65–99)
GLUCOSE BLD-MCNC: 148 MG/DL (ref 65–99)
GLUCOSE BLD-MCNC: 180 MG/DL (ref 65–99)
GLUCOSE BLD-MCNC: 97 MG/DL (ref 65–99)
GLUCOSE SERPL-MCNC: 175 MG/DL (ref 65–99)
HCT VFR BLD AUTO: 29.8 % (ref 42–52)
HGB BLD-MCNC: 9.5 G/DL (ref 14–18)
LYMPHOCYTES # BLD AUTO: 1.27 K/UL (ref 1–4.8)
LYMPHOCYTES NFR BLD: 14.9 % (ref 22–41)
MANUAL DIFF BLD: NORMAL
MCH RBC QN AUTO: 29.4 PG (ref 27–33)
MCHC RBC AUTO-ENTMCNC: 31.9 G/DL (ref 33.7–35.3)
MCV RBC AUTO: 92.3 FL (ref 81.4–97.8)
MONOCYTES # BLD AUTO: 0.75 K/UL (ref 0–0.85)
MONOCYTES NFR BLD AUTO: 8.8 % (ref 0–13.4)
MORPHOLOGY BLD-IMP: NORMAL
NEUTROPHILS # BLD AUTO: 6.49 K/UL (ref 1.82–7.42)
NEUTROPHILS NFR BLD: 76.3 % (ref 44–72)
NRBC # BLD AUTO: 0 K/UL
NRBC BLD-RTO: 0 /100 WBC
PLATELET # BLD AUTO: 685 K/UL (ref 164–446)
PLATELET BLD QL SMEAR: NORMAL
PMV BLD AUTO: 9.1 FL (ref 9–12.9)
POTASSIUM SERPL-SCNC: 3.8 MMOL/L (ref 3.6–5.5)
RBC # BLD AUTO: 3.23 M/UL (ref 4.7–6.1)
SIGNIFICANT IND 70042: NORMAL
SIGNIFICANT IND 70042: NORMAL
SITE SITE: NORMAL
SITE SITE: NORMAL
SODIUM SERPL-SCNC: 139 MMOL/L (ref 135–145)
SOURCE SOURCE: NORMAL
SOURCE SOURCE: NORMAL
WBC # BLD AUTO: 8.5 K/UL (ref 4.8–10.8)

## 2021-07-23 PROCEDURE — 700111 HCHG RX REV CODE 636 W/ 250 OVERRIDE (IP): Performed by: INTERNAL MEDICINE

## 2021-07-23 PROCEDURE — 700102 HCHG RX REV CODE 250 W/ 637 OVERRIDE(OP): Performed by: INTERNAL MEDICINE

## 2021-07-23 PROCEDURE — 85007 BL SMEAR W/DIFF WBC COUNT: CPT

## 2021-07-23 PROCEDURE — 99233 SBSQ HOSP IP/OBS HIGH 50: CPT | Performed by: STUDENT IN AN ORGANIZED HEALTH CARE EDUCATION/TRAINING PROGRAM

## 2021-07-23 PROCEDURE — 82140 ASSAY OF AMMONIA: CPT

## 2021-07-23 PROCEDURE — 82962 GLUCOSE BLOOD TEST: CPT

## 2021-07-23 PROCEDURE — A9270 NON-COVERED ITEM OR SERVICE: HCPCS | Performed by: INTERNAL MEDICINE

## 2021-07-23 PROCEDURE — 700102 HCHG RX REV CODE 250 W/ 637 OVERRIDE(OP): Performed by: PHYSICAL MEDICINE & REHABILITATION

## 2021-07-23 PROCEDURE — 85027 COMPLETE CBC AUTOMATED: CPT

## 2021-07-23 PROCEDURE — 700102 HCHG RX REV CODE 250 W/ 637 OVERRIDE(OP): Performed by: STUDENT IN AN ORGANIZED HEALTH CARE EDUCATION/TRAINING PROGRAM

## 2021-07-23 PROCEDURE — 770006 HCHG ROOM/CARE - MED/SURG/GYN SEMI*

## 2021-07-23 PROCEDURE — A9270 NON-COVERED ITEM OR SERVICE: HCPCS | Performed by: STUDENT IN AN ORGANIZED HEALTH CARE EDUCATION/TRAINING PROGRAM

## 2021-07-23 PROCEDURE — 36415 COLL VENOUS BLD VENIPUNCTURE: CPT

## 2021-07-23 PROCEDURE — A9270 NON-COVERED ITEM OR SERVICE: HCPCS | Performed by: PHYSICAL MEDICINE & REHABILITATION

## 2021-07-23 PROCEDURE — 80048 BASIC METABOLIC PNL TOTAL CA: CPT

## 2021-07-23 RX ORDER — ACETAMINOPHEN 325 MG/1
650 TABLET ORAL EVERY 4 HOURS PRN
Status: DISCONTINUED | OUTPATIENT
Start: 2021-07-25 | End: 2021-08-02 | Stop reason: HOSPADM

## 2021-07-23 RX ORDER — ACETAMINOPHEN 650 MG/1
650 SUPPOSITORY RECTAL EVERY 6 HOURS PRN
Status: DISCONTINUED | OUTPATIENT
Start: 2021-07-25 | End: 2021-08-02 | Stop reason: HOSPADM

## 2021-07-23 RX ADMIN — Medication 5 MG: at 21:22

## 2021-07-23 RX ADMIN — PROPRANOLOL HYDROCHLORIDE 20 MG: 10 TABLET ORAL at 21:22

## 2021-07-23 RX ADMIN — ACETAMINOPHEN 1000 MG: 500 TABLET ORAL at 12:08

## 2021-07-23 RX ADMIN — PROPRANOLOL HYDROCHLORIDE 20 MG: 10 TABLET ORAL at 05:34

## 2021-07-23 RX ADMIN — INSULIN GLARGINE 30 UNITS: 100 INJECTION, SOLUTION SUBCUTANEOUS at 17:35

## 2021-07-23 RX ADMIN — ACETAMINOPHEN 1000 MG: 500 TABLET ORAL at 17:35

## 2021-07-23 RX ADMIN — ENOXAPARIN SODIUM 40 MG: 40 INJECTION SUBCUTANEOUS at 05:35

## 2021-07-23 RX ADMIN — OXYCODONE HYDROCHLORIDE 10 MG: 10 TABLET ORAL at 21:39

## 2021-07-23 RX ADMIN — CEFAZOLIN SODIUM 2 G: 2 INJECTION, SOLUTION INTRAVENOUS at 21:31

## 2021-07-23 RX ADMIN — CEFAZOLIN SODIUM 2 G: 2 INJECTION, SOLUTION INTRAVENOUS at 05:35

## 2021-07-23 RX ADMIN — GABAPENTIN 300 MG: 300 CAPSULE ORAL at 17:35

## 2021-07-23 RX ADMIN — PROPRANOLOL HYDROCHLORIDE 20 MG: 10 TABLET ORAL at 14:33

## 2021-07-23 RX ADMIN — ATORVASTATIN CALCIUM 80 MG: 40 TABLET, FILM COATED ORAL at 21:22

## 2021-07-23 RX ADMIN — CEFAZOLIN SODIUM 2 G: 2 INJECTION, SOLUTION INTRAVENOUS at 14:32

## 2021-07-23 RX ADMIN — CARBOXYMETHYLCELLULOSE SODIUM 1 DROP: 5 SOLUTION/ DROPS OPHTHALMIC at 14:00

## 2021-07-23 RX ADMIN — CARBOXYMETHYLCELLULOSE SODIUM 1 DROP: 5 SOLUTION/ DROPS OPHTHALMIC at 06:14

## 2021-07-23 RX ADMIN — PRIMIDONE 50 MG: 50 TABLET ORAL at 05:35

## 2021-07-23 RX ADMIN — INSULIN HUMAN 3 UNITS: 100 INJECTION, SOLUTION PARENTERAL at 06:20

## 2021-07-23 RX ADMIN — LEVOTHYROXINE SODIUM 50 MCG: 0.05 TABLET ORAL at 05:35

## 2021-07-23 ASSESSMENT — PAIN DESCRIPTION - PAIN TYPE
TYPE: ACUTE PAIN
TYPE: ACUTE PAIN
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
## 2021-07-23 NOTE — PROGRESS NOTES
Assumed care of pt at 1900. Report received and bedside rounding completed with Pratima JEFFRIES. Pt is calm no SOB, no acute distress noted. Patient is sitting up in bed. Pain 0/10 at rest.  Call light and pt belongings within reach - hourly rounding in place. See flowsheets for further assessment.     Pt is considered a HIGH fall risk. edu provided on risk level.   Fall precautions in place,  bed alarm on Treaded non slip socks. Bed locked. Communication board updated with POC.

## 2021-07-23 NOTE — PROGRESS NOTES
1040- Report received from Litzy JEFFRIES    1130- Received patient A&Ox2; breathing even and unlabored; on o2 at 1LPM via nasal cannula; positioned to bed comfortably; discussed POC;  Call light within easy reach.
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
## 2021-07-23 NOTE — CARE PLAN
The patient is Stable - Low risk of patient condition declining or worsening    Shift Goals  Clinical Goals: comply w/ pt and ot  Patient Goals: rest  Family Goals: na    Progress made toward(s) clinical / shift goals:  Turned and reposition every 2 hours; kept bed alarm on; kept call light within easy reach.    Patient is not progressing towards the following goals:      Problem: Skin Integrity  Goal: Skin integrity is maintained or improved  Outcome: Progressing     Problem: Fall Risk  Goal: Patient will remain free from falls  Outcome: Progressing

## 2021-07-23 NOTE — CARE PLAN
The patient is Watcher - Medium risk of patient condition declining or worsening    Shift Goals  Clinical Goals: Pain management, comply with PT/OT, swallow eval, diet advancement  Patient Goals: Rest, pain management, diet advancement  Family Goals: na    Progress made toward(s) clinical / shift goals:  Patient reports no pain at rest. Pain is exacerbated by movement. Encouraged rest. Medications crushed and slowly given in applesauce. Patient tolerates small bites.      Problem: Pain - Standard  Goal: Alleviation of pain or a reduction in pain to the patient’s comfort goal  Outcome: Progressing     Problem: Knowledge Deficit - Standard  Goal: Patient and family/care givers will demonstrate understanding of plan of care, disease process/condition, diagnostic tests and medications  Outcome: Progressing       Patient is not progressing towards the following goals:

## 2021-07-23 NOTE — PROGRESS NOTES
Bedside report received from Sara JEFFRIES. Assumed care of pt at 0645 . Pt is asleep at this time with equal chest rise and no signs of distress. Plan of care discussed with pt. Pt is A&O x 1. Pt is on 1L nasal cannula. Bed alarm is on, bed in lowest position, bed rails up x 2, belongings and call light within reach. Hourly rounding in place.

## 2021-07-23 NOTE — CONSULTS
"BRIEF PSYCHIATRIC CONSULT NOTE: patient seen and assessed, Affect was tired. Mood was stated as \"ok.\" He denies current and active suicidal thoughts, plans, and intent. He denies homicidality. No legal hold needed. The patient was only oriented to his name, the year, and the fact he is currently in a hospital. However, he was not oriented to the current city, name of the hospital, or month. Speech was delayed and he had obvious word finding difficulties. He was unable to explain his current medical conditions other than having diabetes. He does not know why he is currently in the hospital. He endorsed difficulty with memory and does state a desire to \"get back to normal.\" However, he was not able to explain what \"normal\" meant.     Per 7/13/21 cognitive evaluation performed at [facility]:   \"pt is oriented (4/6), minus date/day of week, with fxnl verbal  expression, naming and auditory comprehension.  Attention,  recall, insight, verbal problem-solving are moderately reduced\"    Given the patient's history of high functioning employment, his recent history of encephalopathy, and what appears to be a sudden change in his cognition (new word finding difficulties, worsening orientation), the patient is likely experiencing a delirium superimposed on a cognitive impairment. A mood disorder does not appear to fully explain his current cognitive functioning.    At this time, the patient is not capacitated to make medical and discharge decisions. [facility] should be contacted if they haven't been already to make decisions. If his cognition improves, his capacity to make these decisions may return. This can be evaluated by any physician and/or licensed clinical psychologist.     Full note to follow.    -Legal Hold:not indicated   -Not capacitated to make medical or discharge decisions right now. See note above for more information.  -Delirium; Unspecified Major Neurocognitive Disorder  -Signing off. "

## 2021-07-23 NOTE — PROGRESS NOTES
4 Eyes Skin Assessment Completed by MERVIN Collins and MERVIN Núñez.    Head WDL  Ears WDL  Nose WDL  Mouth WDL  Neck WDL  Breast/Chest- Bruising on left upper chest  Shoulder Blades- Left shoulder open area with dressing in place.Brusing is also seen.   Spine- with bruising on left and right side of the back.   (R) Arm/Elbow/Hand- scabs, bruising and open area at right elbow with dressing in place  (L) Arm/Elbow/Hand Bruising, Abrasion and Scab  Abdomen Bruising  Groin WDL  Scrotum/Coccyx/Buttocks; Redness;blanching  (R) Leg - BKA; WDL  (L) Leg Scab and Bruising; open area at left knee with dressing in place  (R) Heel/Foot/Toe- BKA  (L) Heel/Foot/Toe- amputated toes          Devices In Places Condom Cath and Nasal Cannula      Interventions In Place NC W/Ear Foams and Pillows    Possible Skin Injury No    Pictures Uploaded Into Epic N/A  Wound Consult Placed N/A  RN Wound Prevention Protocol Ordered No

## 2021-07-24 LAB
ALBUMIN SERPL BCP-MCNC: 2.2 G/DL (ref 3.2–4.9)
ALBUMIN/GLOB SERPL: 0.6 G/DL
ALP SERPL-CCNC: 87 U/L (ref 30–99)
ALT SERPL-CCNC: <5 U/L (ref 2–50)
ANION GAP SERPL CALC-SCNC: 7 MMOL/L (ref 7–16)
AST SERPL-CCNC: 14 U/L (ref 12–45)
BASOPHILS # BLD AUTO: 0.5 % (ref 0–1.8)
BASOPHILS # BLD: 0.05 K/UL (ref 0–0.12)
BILIRUB SERPL-MCNC: 0.5 MG/DL (ref 0.1–1.5)
BUN SERPL-MCNC: 14 MG/DL (ref 8–22)
CALCIUM SERPL-MCNC: 8.8 MG/DL (ref 8.5–10.5)
CHLORIDE SERPL-SCNC: 98 MMOL/L (ref 96–112)
CO2 SERPL-SCNC: 31 MMOL/L (ref 20–33)
CREAT SERPL-MCNC: 0.61 MG/DL (ref 0.5–1.4)
EOSINOPHIL # BLD AUTO: 0.07 K/UL (ref 0–0.51)
EOSINOPHIL NFR BLD: 0.7 % (ref 0–6.9)
ERYTHROCYTE [DISTWIDTH] IN BLOOD BY AUTOMATED COUNT: 54.1 FL (ref 35.9–50)
GLOBULIN SER CALC-MCNC: 3.6 G/DL (ref 1.9–3.5)
GLUCOSE BLD-MCNC: 124 MG/DL (ref 65–99)
GLUCOSE BLD-MCNC: 129 MG/DL (ref 65–99)
GLUCOSE BLD-MCNC: 130 MG/DL (ref 65–99)
GLUCOSE BLD-MCNC: 146 MG/DL (ref 65–99)
GLUCOSE SERPL-MCNC: 125 MG/DL (ref 65–99)
HCT VFR BLD AUTO: 29.1 % (ref 42–52)
HGB BLD-MCNC: 9.3 G/DL (ref 14–18)
IMM GRANULOCYTES # BLD AUTO: 0.06 K/UL (ref 0–0.11)
IMM GRANULOCYTES NFR BLD AUTO: 0.6 % (ref 0–0.9)
LYMPHOCYTES # BLD AUTO: 1.76 K/UL (ref 1–4.8)
LYMPHOCYTES NFR BLD: 17.4 % (ref 22–41)
MCH RBC QN AUTO: 29.7 PG (ref 27–33)
MCHC RBC AUTO-ENTMCNC: 32 G/DL (ref 33.7–35.3)
MCV RBC AUTO: 93 FL (ref 81.4–97.8)
MONOCYTES # BLD AUTO: 0.93 K/UL (ref 0–0.85)
MONOCYTES NFR BLD AUTO: 9.2 % (ref 0–13.4)
NEUTROPHILS # BLD AUTO: 7.26 K/UL (ref 1.82–7.42)
NEUTROPHILS NFR BLD: 71.6 % (ref 44–72)
NRBC # BLD AUTO: 0 K/UL
NRBC BLD-RTO: 0 /100 WBC
PLATELET # BLD AUTO: 695 K/UL (ref 164–446)
PMV BLD AUTO: 9.3 FL (ref 9–12.9)
POTASSIUM SERPL-SCNC: 3.9 MMOL/L (ref 3.6–5.5)
PROT SERPL-MCNC: 5.8 G/DL (ref 6–8.2)
RBC # BLD AUTO: 3.13 M/UL (ref 4.7–6.1)
SODIUM SERPL-SCNC: 136 MMOL/L (ref 135–145)
VIT B12 SERPL-MCNC: 485 PG/ML (ref 211–911)
WBC # BLD AUTO: 10.1 K/UL (ref 4.8–10.8)

## 2021-07-24 PROCEDURE — 770006 HCHG ROOM/CARE - MED/SURG/GYN SEMI*

## 2021-07-24 PROCEDURE — A9270 NON-COVERED ITEM OR SERVICE: HCPCS | Performed by: STUDENT IN AN ORGANIZED HEALTH CARE EDUCATION/TRAINING PROGRAM

## 2021-07-24 PROCEDURE — 700101 HCHG RX REV CODE 250: Performed by: STUDENT IN AN ORGANIZED HEALTH CARE EDUCATION/TRAINING PROGRAM

## 2021-07-24 PROCEDURE — 82962 GLUCOSE BLOOD TEST: CPT

## 2021-07-24 PROCEDURE — 80053 COMPREHEN METABOLIC PANEL: CPT

## 2021-07-24 PROCEDURE — 700102 HCHG RX REV CODE 250 W/ 637 OVERRIDE(OP): Performed by: PHYSICAL MEDICINE & REHABILITATION

## 2021-07-24 PROCEDURE — 85025 COMPLETE CBC W/AUTO DIFF WBC: CPT

## 2021-07-24 PROCEDURE — 700102 HCHG RX REV CODE 250 W/ 637 OVERRIDE(OP): Performed by: INTERNAL MEDICINE

## 2021-07-24 PROCEDURE — 700111 HCHG RX REV CODE 636 W/ 250 OVERRIDE (IP): Performed by: INTERNAL MEDICINE

## 2021-07-24 PROCEDURE — 99233 SBSQ HOSP IP/OBS HIGH 50: CPT | Performed by: STUDENT IN AN ORGANIZED HEALTH CARE EDUCATION/TRAINING PROGRAM

## 2021-07-24 PROCEDURE — 90832 PSYTX W PT 30 MINUTES: CPT | Performed by: PSYCHOLOGIST

## 2021-07-24 PROCEDURE — 82607 VITAMIN B-12: CPT

## 2021-07-24 PROCEDURE — 700102 HCHG RX REV CODE 250 W/ 637 OVERRIDE(OP): Performed by: STUDENT IN AN ORGANIZED HEALTH CARE EDUCATION/TRAINING PROGRAM

## 2021-07-24 PROCEDURE — A9270 NON-COVERED ITEM OR SERVICE: HCPCS | Performed by: INTERNAL MEDICINE

## 2021-07-24 PROCEDURE — A9270 NON-COVERED ITEM OR SERVICE: HCPCS | Performed by: PHYSICAL MEDICINE & REHABILITATION

## 2021-07-24 RX ADMIN — LEVOTHYROXINE SODIUM 50 MCG: 0.05 TABLET ORAL at 04:47

## 2021-07-24 RX ADMIN — GABAPENTIN 300 MG: 300 CAPSULE ORAL at 02:15

## 2021-07-24 RX ADMIN — ENOXAPARIN SODIUM 40 MG: 40 INJECTION SUBCUTANEOUS at 04:47

## 2021-07-24 RX ADMIN — PROPRANOLOL HYDROCHLORIDE 20 MG: 10 TABLET ORAL at 21:41

## 2021-07-24 RX ADMIN — CARBOXYMETHYLCELLULOSE SODIUM 1 DROP: 5 SOLUTION/ DROPS OPHTHALMIC at 21:48

## 2021-07-24 RX ADMIN — PRIMIDONE 50 MG: 50 TABLET ORAL at 04:55

## 2021-07-24 RX ADMIN — CEFAZOLIN SODIUM 2 G: 2 INJECTION, SOLUTION INTRAVENOUS at 04:47

## 2021-07-24 RX ADMIN — PROPRANOLOL HYDROCHLORIDE 20 MG: 10 TABLET ORAL at 16:02

## 2021-07-24 RX ADMIN — Medication 5 MG: at 21:42

## 2021-07-24 RX ADMIN — ACETAMINOPHEN 1000 MG: 500 TABLET ORAL at 16:01

## 2021-07-24 RX ADMIN — PROPRANOLOL HYDROCHLORIDE 20 MG: 10 TABLET ORAL at 04:47

## 2021-07-24 RX ADMIN — ACETAMINOPHEN 1000 MG: 500 TABLET ORAL at 04:47

## 2021-07-24 RX ADMIN — ATORVASTATIN CALCIUM 80 MG: 40 TABLET, FILM COATED ORAL at 21:41

## 2021-07-24 RX ADMIN — CARBOXYMETHYLCELLULOSE SODIUM 1 DROP: 5 SOLUTION/ DROPS OPHTHALMIC at 05:16

## 2021-07-24 RX ADMIN — ACETAMINOPHEN 1000 MG: 500 TABLET ORAL at 11:30

## 2021-07-24 RX ADMIN — GABAPENTIN 300 MG: 300 CAPSULE ORAL at 08:32

## 2021-07-24 RX ADMIN — CARBOXYMETHYLCELLULOSE SODIUM 1 DROP: 5 SOLUTION/ DROPS OPHTHALMIC at 13:55

## 2021-07-24 RX ADMIN — INSULIN GLARGINE 30 UNITS: 100 INJECTION, SOLUTION SUBCUTANEOUS at 16:14

## 2021-07-24 RX ADMIN — GABAPENTIN 300 MG: 300 CAPSULE ORAL at 16:01

## 2021-07-24 RX ADMIN — CELECOXIB 200 MG: 200 CAPSULE ORAL at 04:47

## 2021-07-24 RX ADMIN — CEFAZOLIN SODIUM 2 G: 2 INJECTION, SOLUTION INTRAVENOUS at 13:00

## 2021-07-24 RX ADMIN — LIDOCAINE 2 PATCH: 50 PATCH TOPICAL at 08:33

## 2021-07-24 RX ADMIN — CEFAZOLIN SODIUM 2 G: 2 INJECTION, SOLUTION INTRAVENOUS at 21:46

## 2021-07-24 ASSESSMENT — PAIN DESCRIPTION - PAIN TYPE
TYPE: ACUTE PAIN

## 2021-07-24 ASSESSMENT — ENCOUNTER SYMPTOMS
FEVER: 0
HEADACHES: 0
ABDOMINAL PAIN: 0

## 2021-07-24 NOTE — CARE PLAN
The patient is Stable - Low risk of patient condition declining or worsening    Shift Goals  Clinical Goals: Pain Control  Patient Goals: Pain Control  Family Goals: na    Progress made toward(s) clinical / shift goals:    Problem: Pain - Standard  Goal: Alleviation of pain or a reduction in pain to the patient’s comfort goal  Outcome: Progressing   Available pain medications reviewed with patient. Pain managed by PRN and scheduled medications. Encouraged to call if pain is no longer managed.     Problem: Knowledge Deficit - Standard  Goal: Patient and family/care givers will demonstrate understanding of plan of care, disease process/condition, diagnostic tests and medications  Outcome: Progressing   Plan of care discussed with patient, verbalizes understanding. Encouraged to voice feelings or concerns.     Problem: Skin Integrity  Goal: Skin integrity is maintained or improved  Outcome: Progressing   Q2 Hour turns in place. Pillows in use for support and position. Mepilex and barrier paste in use. TAPS system in use. Checked frequently for incontinence.     Problem: Fall Risk  Goal: Patient will remain free from falls  Outcome: Progressing   Fall precautions in place. Patient rounded on hourly. Clutter-free environment maintained. Bed alarm on, bed locked and in lowest position. Call light in reach.     Patient is not progressing towards the following goals:

## 2021-07-24 NOTE — CARE PLAN
The patient is Stable - Low risk of patient condition declining or worsening    Shift Goals  Clinical Goals: Pain Control, safety  Patient Goals: Pain Control, safety  Family Goals: safety and pain control    Progress made toward(s) clinical / shift goals:      Patient is not progressing towards the following goals:    Progress made toward(s) clinical / shift goals:    Problem: Pain - Standard  Goal: Alleviation of pain or a reduction in pain to the patient’s comfort goal  Outcome: Progressing   Available pain medications reviewed with patient. Pain managed by PRN and scheduled medications. Encouraged to call if pain is no longer managed.      Problem: Knowledge Deficit - Standard  Goal: Patient and family/care givers will demonstrate understanding of plan of care, disease process/condition, diagnostic tests and medications  Outcome: Progressing   Plan of care discussed with patient (confused, re oriented) and spouse. Questions answered. Verbalized understanding.      Problem: Skin Integrity  Goal: Skin integrity is maintained or improved  Outcome: Progressing   Q2 Hour turns in place. Pillows in use for support and position. Mepilex and barrier paste in use. TAPS system in use. Checked frequently for incontinence.      Problem: Fall Risk  Goal: Patient will remain free from falls  Outcome: Progressing   Fall precautions in place. HOurly rounds in effect. Kept room clutter free. Bed alarm on. Call light within reach. Reminded patient to call for assist. Call light within reach.      Patient is not progressing towards the following goals:
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
## 2021-07-24 NOTE — PROGRESS NOTES
4 Eyes Skin Assessment Completed by MERVIN Cheatham and MERVIN Smyth.    Head WDL  Ears WDL  Nose WDL  Mouth WDL  Neck WDL  Breast/Chest Left upper chest, Bruising  Shoulder Blades Open area to left shoulder, dressing in place; bruising  Spine Bruising to left and right side of back.  (R) Arm/Elbow/Hand Bruising, Abrasion, Scab and Scar; open area to right elbow, dressing in place  (L) Arm/Elbow/Hand Bruising, Abrasion and Scab  Abdomen Bruising  Groin WDL  Scrotum/Coccyx/Buttocks Redness and Blanching  (R) Leg BKA WDL  (L) Leg Open area to left knee, dressing in place Scab and Bruising  (R) Heel/Foot/Toe BKA  (L) Heel/Foot/Toe Amputated Toes          Devices In Places Pulse Ox, Condom Cath and Nasal Cannula      Interventions In Place NC W/Ear Foams, TAP System, Pillows, Q2 Turns and Barrier Cream    Possible Skin Injury No    Pictures Uploaded Into Epic N/A  Wound Consult Placed N/A  RN Wound Prevention Protocol Ordered No

## 2021-07-24 NOTE — PROGRESS NOTES
4 Eyes Skin Assessment Completed by MERVIN Núñez and MERVIN Armenta.    Head WDL  Ears WDL  Nose WDL  Mouth WDL  Neck WDL  Breast/Chest Bruising on left upper chest  Shoulder Blades left shoulder open area with dressing in place, also bruising noted  Spine Bruising on left and right side of back  (R) Arm/Elbow/Hand scabs, bruising and open area at the right elbow with dressing in place.  (L) Arm/Elbow/Hand Bruising, Abrasion and Scab  Abdomen Bruising  Groin WDL  Scrotum/Coccyx/Buttocks Redness and Blanching  (R) Leg BKA   (L) Leg Scar and Bruising, open area on left knee with dressing in place  (R) Heel/Foot/Toe BKA  (L) Heel/Foot/Toe amputated toes          Devices In Places Pulse Ox, condom, Oxygen nasal cannula      Interventions In Place Gray Ear Foams, TAP System, Pillows, Q2 Turns and Dri-Rich Pads    Possible Skin Injury No    Pictures Uploaded Into Epic N/A  Wound Consult Placed N/A  RN Wound Prevention Protocol Ordered No

## 2021-07-25 ENCOUNTER — APPOINTMENT (OUTPATIENT)
Dept: RADIOLOGY | Facility: MEDICAL CENTER | Age: 76
DRG: 637 | End: 2021-07-25
Attending: STUDENT IN AN ORGANIZED HEALTH CARE EDUCATION/TRAINING PROGRAM
Payer: MEDICARE

## 2021-07-25 LAB
GLUCOSE BLD-MCNC: 129 MG/DL (ref 65–99)
GLUCOSE BLD-MCNC: 148 MG/DL (ref 65–99)
GLUCOSE BLD-MCNC: 77 MG/DL (ref 65–99)

## 2021-07-25 PROCEDURE — A9270 NON-COVERED ITEM OR SERVICE: HCPCS | Performed by: INTERNAL MEDICINE

## 2021-07-25 PROCEDURE — 99233 SBSQ HOSP IP/OBS HIGH 50: CPT | Performed by: STUDENT IN AN ORGANIZED HEALTH CARE EDUCATION/TRAINING PROGRAM

## 2021-07-25 PROCEDURE — A9270 NON-COVERED ITEM OR SERVICE: HCPCS | Performed by: STUDENT IN AN ORGANIZED HEALTH CARE EDUCATION/TRAINING PROGRAM

## 2021-07-25 PROCEDURE — 700102 HCHG RX REV CODE 250 W/ 637 OVERRIDE(OP): Performed by: INTERNAL MEDICINE

## 2021-07-25 PROCEDURE — 700102 HCHG RX REV CODE 250 W/ 637 OVERRIDE(OP): Performed by: PHYSICAL MEDICINE & REHABILITATION

## 2021-07-25 PROCEDURE — 71045 X-RAY EXAM CHEST 1 VIEW: CPT

## 2021-07-25 PROCEDURE — 700102 HCHG RX REV CODE 250 W/ 637 OVERRIDE(OP): Performed by: STUDENT IN AN ORGANIZED HEALTH CARE EDUCATION/TRAINING PROGRAM

## 2021-07-25 PROCEDURE — 700111 HCHG RX REV CODE 636 W/ 250 OVERRIDE (IP): Performed by: INTERNAL MEDICINE

## 2021-07-25 PROCEDURE — 700101 HCHG RX REV CODE 250: Performed by: STUDENT IN AN ORGANIZED HEALTH CARE EDUCATION/TRAINING PROGRAM

## 2021-07-25 PROCEDURE — 770006 HCHG ROOM/CARE - MED/SURG/GYN SEMI*

## 2021-07-25 PROCEDURE — 82962 GLUCOSE BLOOD TEST: CPT | Mod: 91

## 2021-07-25 PROCEDURE — A9270 NON-COVERED ITEM OR SERVICE: HCPCS | Performed by: PHYSICAL MEDICINE & REHABILITATION

## 2021-07-25 RX ORDER — LIDOCAINE HYDROCHLORIDE 20 MG/ML
15 SOLUTION OROPHARYNGEAL
Status: DISCONTINUED | OUTPATIENT
Start: 2021-07-25 | End: 2021-08-02 | Stop reason: HOSPADM

## 2021-07-25 RX ADMIN — PRIMIDONE 50 MG: 50 TABLET ORAL at 04:39

## 2021-07-25 RX ADMIN — CARBOXYMETHYLCELLULOSE SODIUM 1 DROP: 5 SOLUTION/ DROPS OPHTHALMIC at 15:01

## 2021-07-25 RX ADMIN — GABAPENTIN 300 MG: 300 CAPSULE ORAL at 04:39

## 2021-07-25 RX ADMIN — CARBOXYMETHYLCELLULOSE SODIUM 1 DROP: 5 SOLUTION/ DROPS OPHTHALMIC at 04:39

## 2021-07-25 RX ADMIN — CEFAZOLIN SODIUM 2 G: 2 INJECTION, SOLUTION INTRAVENOUS at 15:02

## 2021-07-25 RX ADMIN — INSULIN GLARGINE 30 UNITS: 100 INJECTION, SOLUTION SUBCUTANEOUS at 17:37

## 2021-07-25 RX ADMIN — ENOXAPARIN SODIUM 40 MG: 40 INJECTION SUBCUTANEOUS at 04:39

## 2021-07-25 RX ADMIN — GABAPENTIN 300 MG: 300 CAPSULE ORAL at 17:31

## 2021-07-25 RX ADMIN — GABAPENTIN 300 MG: 300 CAPSULE ORAL at 10:14

## 2021-07-25 RX ADMIN — Medication 5 MG: at 20:03

## 2021-07-25 RX ADMIN — CARBOXYMETHYLCELLULOSE SODIUM 1 DROP: 5 SOLUTION/ DROPS OPHTHALMIC at 22:11

## 2021-07-25 RX ADMIN — LEVOTHYROXINE SODIUM 50 MCG: 0.05 TABLET ORAL at 04:39

## 2021-07-25 RX ADMIN — LIDOCAINE HYDROCHLORIDE 15 ML: 20 SOLUTION ORAL at 22:11

## 2021-07-25 RX ADMIN — CELECOXIB 200 MG: 200 CAPSULE ORAL at 04:39

## 2021-07-25 RX ADMIN — ACETAMINOPHEN 1000 MG: 500 TABLET ORAL at 04:39

## 2021-07-25 RX ADMIN — PROPRANOLOL HYDROCHLORIDE 20 MG: 10 TABLET ORAL at 22:11

## 2021-07-25 RX ADMIN — OXYCODONE HYDROCHLORIDE 10 MG: 10 TABLET ORAL at 20:03

## 2021-07-25 RX ADMIN — PROPRANOLOL HYDROCHLORIDE 20 MG: 10 TABLET ORAL at 04:39

## 2021-07-25 RX ADMIN — CEFAZOLIN SODIUM 2 G: 2 INJECTION, SOLUTION INTRAVENOUS at 22:11

## 2021-07-25 RX ADMIN — ATORVASTATIN CALCIUM 80 MG: 40 TABLET, FILM COATED ORAL at 20:03

## 2021-07-25 RX ADMIN — CEFAZOLIN SODIUM 2 G: 2 INJECTION, SOLUTION INTRAVENOUS at 04:43

## 2021-07-25 RX ADMIN — PROPRANOLOL HYDROCHLORIDE 20 MG: 10 TABLET ORAL at 15:04

## 2021-07-25 RX ADMIN — LIDOCAINE 2 PATCH: 50 PATCH TOPICAL at 10:15

## 2021-07-25 ASSESSMENT — PAIN DESCRIPTION - PAIN TYPE
TYPE: ACUTE PAIN

## 2021-07-25 ASSESSMENT — LIFESTYLE VARIABLES
TOTAL SCORE: 0
HAVE YOU EVER FELT YOU SHOULD CUT DOWN ON YOUR DRINKING: NO
EVER HAD A DRINK FIRST THING IN THE MORNING TO STEADY YOUR NERVES TO GET RID OF A HANGOVER: NO
AVERAGE NUMBER OF DAYS PER WEEK YOU HAVE A DRINK CONTAINING ALCOHOL: 0
TOTAL SCORE: 0
TOTAL SCORE: 0
EVER FELT BAD OR GUILTY ABOUT YOUR DRINKING: NO
ALCOHOL_USE: NO
CONSUMPTION TOTAL: NEGATIVE
DOES PATIENT WANT TO STOP DRINKING: NO
HAVE PEOPLE ANNOYED YOU BY CRITICIZING YOUR DRINKING: NO
ON A TYPICAL DAY WHEN YOU DRINK ALCOHOL HOW MANY DRINKS DO YOU HAVE: 0
HOW MANY TIMES IN THE PAST YEAR HAVE YOU HAD 5 OR MORE DRINKS IN A DAY: 0

## 2021-07-25 ASSESSMENT — ENCOUNTER SYMPTOMS
DOUBLE VISION: 0
SHORTNESS OF BREATH: 1
DEPRESSION: 1
ABDOMINAL PAIN: 0
FEVER: 0
HEADACHES: 0
NERVOUS/ANXIOUS: 0
SPUTUM PRODUCTION: 0
DIZZINESS: 0
MYALGIAS: 1
COUGH: 0
NAUSEA: 0
EYE PAIN: 0
VOMITING: 0
WHEEZING: 0
BLURRED VISION: 0
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
## 2021-07-25 NOTE — CARE PLAN
The patient is Stable - Low risk of patient condition declining or worsening    Shift Goals  Clinical Goals: Pain Control  Patient Goals: Pain Control  Family Goals: na    Progress made toward(s) clinical / shift goals:    Problem: Pain - Standard  Goal: Alleviation of pain or a reduction in pain to the patient’s comfort goal  Outcome: Progressing   Available pain medications reviewed with patient. Pain managed by PRN and scheduled medications. Encouraged to call if pain is no longer managed.     Problem: Knowledge Deficit - Standard  Goal: Patient and family/care givers will demonstrate understanding of plan of care, disease process/condition, diagnostic tests and medications  Outcome: Progressing   Plan of care discussed with patient, verbalizes understanding. Encouraged to voice feelings or concerns.     Problem: Skin Integrity  Goal: Skin integrity is maintained or improved  Outcome: Progressing   Q2 Hour turns in place. Pillows in use for support and position. Mepilex and barrier paste in use. Checked frequently for incontinence.     Problem: Fall Risk  Goal: Patient will remain free from falls  Outcome: Progressing   Fall precautions in place. Patient rounded on hourly. Clutter-free environment maintained. Bed alarm on, bed locked and in lowest position. Call light in reach.

## 2021-07-26 ENCOUNTER — TELEPHONE (OUTPATIENT)
Dept: MEDICAL GROUP | Age: 76
End: 2021-07-26

## 2021-07-26 PROBLEM — R13.10 DYSPHAGIA: Status: ACTIVE | Noted: 2021-07-09

## 2021-07-26 LAB
CRP SERPL HS-MCNC: 9.05 MG/DL (ref 0–0.75)
GLUCOSE BLD-MCNC: 78 MG/DL (ref 65–99)
PREALB SERPL-MCNC: 7.2 MG/DL (ref 18–38)

## 2021-07-26 PROCEDURE — 82962 GLUCOSE BLOOD TEST: CPT

## 2021-07-26 PROCEDURE — 36415 COLL VENOUS BLD VENIPUNCTURE: CPT

## 2021-07-26 PROCEDURE — 700102 HCHG RX REV CODE 250 W/ 637 OVERRIDE(OP): Performed by: STUDENT IN AN ORGANIZED HEALTH CARE EDUCATION/TRAINING PROGRAM

## 2021-07-26 PROCEDURE — 700111 HCHG RX REV CODE 636 W/ 250 OVERRIDE (IP): Performed by: INTERNAL MEDICINE

## 2021-07-26 PROCEDURE — A9270 NON-COVERED ITEM OR SERVICE: HCPCS | Performed by: INTERNAL MEDICINE

## 2021-07-26 PROCEDURE — A9270 NON-COVERED ITEM OR SERVICE: HCPCS | Performed by: PHYSICAL MEDICINE & REHABILITATION

## 2021-07-26 PROCEDURE — 99233 SBSQ HOSP IP/OBS HIGH 50: CPT | Performed by: PHYSICAL MEDICINE & REHABILITATION

## 2021-07-26 PROCEDURE — 700102 HCHG RX REV CODE 250 W/ 637 OVERRIDE(OP): Performed by: INTERNAL MEDICINE

## 2021-07-26 PROCEDURE — 86140 C-REACTIVE PROTEIN: CPT

## 2021-07-26 PROCEDURE — 97116 GAIT TRAINING THERAPY: CPT

## 2021-07-26 PROCEDURE — 700102 HCHG RX REV CODE 250 W/ 637 OVERRIDE(OP): Performed by: PHYSICAL MEDICINE & REHABILITATION

## 2021-07-26 PROCEDURE — 97530 THERAPEUTIC ACTIVITIES: CPT

## 2021-07-26 PROCEDURE — 84134 ASSAY OF PREALBUMIN: CPT

## 2021-07-26 PROCEDURE — 770006 HCHG ROOM/CARE - MED/SURG/GYN SEMI*

## 2021-07-26 PROCEDURE — 99233 SBSQ HOSP IP/OBS HIGH 50: CPT | Performed by: STUDENT IN AN ORGANIZED HEALTH CARE EDUCATION/TRAINING PROGRAM

## 2021-07-26 PROCEDURE — A9270 NON-COVERED ITEM OR SERVICE: HCPCS | Performed by: STUDENT IN AN ORGANIZED HEALTH CARE EDUCATION/TRAINING PROGRAM

## 2021-07-26 RX ORDER — OMEPRAZOLE 20 MG/1
20 CAPSULE, DELAYED RELEASE ORAL DAILY
Status: DISCONTINUED | OUTPATIENT
Start: 2021-07-26 | End: 2021-08-02 | Stop reason: HOSPADM

## 2021-07-26 RX ORDER — LIDOCAINE HYDROCHLORIDE 20 MG/ML
5 SOLUTION OROPHARYNGEAL
Status: DISCONTINUED | OUTPATIENT
Start: 2021-07-26 | End: 2021-08-02 | Stop reason: HOSPADM

## 2021-07-26 RX ADMIN — PROPRANOLOL HYDROCHLORIDE 20 MG: 10 TABLET ORAL at 20:44

## 2021-07-26 RX ADMIN — CARBOXYMETHYLCELLULOSE SODIUM 1 DROP: 5 SOLUTION/ DROPS OPHTHALMIC at 06:14

## 2021-07-26 RX ADMIN — LEVOTHYROXINE SODIUM 50 MCG: 0.05 TABLET ORAL at 06:14

## 2021-07-26 RX ADMIN — CEFAZOLIN SODIUM 2 G: 2 INJECTION, SOLUTION INTRAVENOUS at 20:45

## 2021-07-26 RX ADMIN — ENOXAPARIN SODIUM 40 MG: 40 INJECTION SUBCUTANEOUS at 06:14

## 2021-07-26 RX ADMIN — ATORVASTATIN CALCIUM 80 MG: 40 TABLET, FILM COATED ORAL at 20:44

## 2021-07-26 RX ADMIN — CARBOXYMETHYLCELLULOSE SODIUM 1 DROP: 5 SOLUTION/ DROPS OPHTHALMIC at 13:31

## 2021-07-26 RX ADMIN — PROPRANOLOL HYDROCHLORIDE 20 MG: 10 TABLET ORAL at 06:14

## 2021-07-26 RX ADMIN — CEFAZOLIN SODIUM 2 G: 2 INJECTION, SOLUTION INTRAVENOUS at 06:14

## 2021-07-26 RX ADMIN — Medication 5 MG: at 20:44

## 2021-07-26 RX ADMIN — PROPRANOLOL HYDROCHLORIDE 20 MG: 10 TABLET ORAL at 14:00

## 2021-07-26 RX ADMIN — PRIMIDONE 50 MG: 50 TABLET ORAL at 06:14

## 2021-07-26 RX ADMIN — GABAPENTIN 300 MG: 300 CAPSULE ORAL at 20:44

## 2021-07-26 RX ADMIN — CELECOXIB 200 MG: 200 CAPSULE ORAL at 06:14

## 2021-07-26 RX ADMIN — CEFAZOLIN SODIUM 2 G: 2 INJECTION, SOLUTION INTRAVENOUS at 13:32

## 2021-07-26 RX ADMIN — GABAPENTIN 300 MG: 300 CAPSULE ORAL at 06:14

## 2021-07-26 RX ADMIN — CARBOXYMETHYLCELLULOSE SODIUM 1 DROP: 5 SOLUTION/ DROPS OPHTHALMIC at 20:46

## 2021-07-26 ASSESSMENT — ENCOUNTER SYMPTOMS
SPUTUM PRODUCTION: 0
DOUBLE VISION: 0
NAUSEA: 0
VOMITING: 0
ABDOMINAL PAIN: 0
DIZZINESS: 0
BLURRED VISION: 0
NERVOUS/ANXIOUS: 0
SORE THROAT: 1
SHORTNESS OF BREATH: 1
FEVER: 0
EYE PAIN: 0
COUGH: 0
DEPRESSION: 1
WHEEZING: 0
MYALGIAS: 1
HEADACHES: 0

## 2021-07-26 ASSESSMENT — COGNITIVE AND FUNCTIONAL STATUS - GENERAL
STANDING UP FROM CHAIR USING ARMS: A LOT
MOVING FROM LYING ON BACK TO SITTING ON SIDE OF FLAT BED: A LOT
MOBILITY SCORE: 10
TURNING FROM BACK TO SIDE WHILE IN FLAT BAD: A LOT
CLIMB 3 TO 5 STEPS WITH RAILING: TOTAL
SUGGESTED CMS G CODE MODIFIER MOBILITY: CL
WALKING IN HOSPITAL ROOM: TOTAL
MOVING TO AND FROM BED TO CHAIR: A LOT

## 2021-07-26 ASSESSMENT — GAIT ASSESSMENTS: GAIT LEVEL OF ASSIST: UNABLE TO PARTICIPATE
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
## 2021-07-26 NOTE — CARE PLAN
Problem: Nutritional:  Goal: Achieve adequate nutritional intake  Description: Patient will consume 50% of meals  Outcome: Progressing  PO intake % x 2 meals yesterday. Improvement shown since 7/23 (0% x 2 meals documented). Continue to follow closely for trend/consistency of PO intake.

## 2021-07-26 NOTE — CARE PLAN
Problem: Skin Integrity  Goal: Skin integrity is maintained or improved  Outcome: Progressing     Problem: Fall Risk  Goal: Patient will remain free from falls  Outcome: Progressing   The patient is Stable - Low risk of patient condition declining or worsening    Shift Goals  Clinical Goals: Pain control, skin integrity  Patient Goals: pain control, rest  Family Goals: na

## 2021-07-26 NOTE — CARE PLAN
The patient is Watcher - Medium risk of patient condition declining or worsening    Shift Goals  Clinical Goals: Pain control, Maintain skin integrity, improved activity tolerance  Patient Goals: Pain control, rest, discharge planning  Family Goals: na    Progress made toward(s) clinical / shift goals:  Increased turning as patient will allow. Dressings changed, wounds healing well to shoulder, foot and elbows.     Patient is not progressing towards the following goals:

## 2021-07-26 NOTE — DISCHARGE PLANNING
Anticipated Discharge Disposition: Rehab vs SNF    Action: Spoke to S/O Jerome about SNF choice. She does NOT want pt to go to SNF States that pt is willing to work with therapy and will be able to tolerate 3hrs of therapy. Voalte messaged PT requesting that they see pt today.    Barriers to Discharge: Pending therapy.    Plan: Cont to follow for post acute placement.
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
## 2021-07-26 NOTE — THERAPY
"Physical Therapy   Daily Treatment     Patient Name: Kevin Jackson  Age:  76 y.o., Sex:  male  Medical Record #: [id number]  Today's Date: 7/26/2021     Precautions: Fall Risk    Assessment    Patient seen for follow up PT session today and cornelius improved participation. He is limited by weakness and back pain, but able to stand pivot to chair with MaxAx2 people. He was able to sit EOB and don B LE prosthesis with supervision. He expresses a desire to return to [place] for further therapy with the ultimate goal of going home. Will continue to follow while in house.     Plan    Continue current treatment plan.    DC Equipment Recommendations: Unable to determine at this time  Discharge Recommendations: Recommend post-acute placement for additional physical therapy services prior to discharge home      Subjective    \"I'm ready\"     Objective       07/26/21 1530   Precautions   Precautions Fall Risk   Comments R BKA L TMA    Pain 0 - 10 Group   Location Back   Therapist Pain Assessment During Activity;5   Cognition    Cognition / Consciousness WDL   Level of Consciousness Alert   Comments improved participation with wife present for encouragement    Strength Lower Body   Lower Body Strength  X   Gross Strength Generalized Weakness, Equal Bilaterally   Sitting Lower Body Exercises   Sitting Lower Body Exercises Yes   Long Arc Quad 2 sets of 10   Marching 2 sets of 10   Neuro-Muscular Treatments   Neuro-Muscular Treatments Facilitation;Sequencing;Tactile Cuing;Verbal Cuing   Other Treatments   Other Treatments Provided education about the importance of participation in PT when it is offered in acute care   Balance   Sitting Balance (Static) Fair   Sitting Balance (Dynamic) Fair -   Standing Balance (Static) Poor +   Weight Shift Sitting Poor   Weight Shift Standing Poor   Skilled Intervention Tactile Cuing;Verbal Cuing   Gait Analysis   Gait Level Of Assist Unable to Participate   Bed Mobility    Supine to Sit Maximal Assist "   Scooting Maximal Assist   Skilled Intervention Tactile Cuing;Verbal Cuing   Functional Mobility   Sit to Stand Maximal Assist   Bed, Chair, Wheelchair Transfer Maximal Assist   Transfer Method Stand Step   Skilled Intervention Verbal Cuing;Tactile Cuing;Sequencing;Postural Facilitation   How much difficulty does the patient currently have...   Turning over in bed (including adjusting bedclothes, sheets and blankets)? 2   Sitting down on and standing up from a chair with arms (e.g., wheelchair, bedside commode, etc.) 2   Moving from lying on back to sitting on the side of the bed? 2   How much help from another person does the patient currently need...   Moving to and from a bed to a chair (including a wheelchair)? 2   Need to walk in a hospital room? 1   Climbing 3-5 steps with a railing? 1   6 clicks Mobility Score 10   Activity Tolerance   Sitting in Chair post session    Sitting Edge of Bed 10 min   Standing 2 min    Comments easily fatigues    Patient / Family Goals    Patient / Family Goal #1 to go home    Short Term Goals    Short Term Goal # 1 pt will be able to complete bed mobility with mod assist in 6tx in order to progress   Goal Outcome # 1 Progressing as expected   Short Term Goal # 2 pt will be able to complete functional transfers with LRAD and mod assist in 6tx in order to progress   Goal Outcome # 2 Progressing as expected   Short Term Goal # 3 pt will be able to sit EOB with SPV for >8min in 6tx in order to increase safety   Goal Outcome # 3 Progressing as expected   Anticipated Discharge Equipment and Recommendations   DC Equipment Recommendations Unable to determine at this time   Discharge Recommendations Recommend post-acute placement for additional physical therapy services prior to discharge home       Laila Jones, PT, DPT, GCS
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
## 2021-07-27 LAB
GLUCOSE BLD-MCNC: 132 MG/DL (ref 65–99)
GLUCOSE BLD-MCNC: 153 MG/DL (ref 65–99)
GLUCOSE BLD-MCNC: 159 MG/DL (ref 65–99)

## 2021-07-27 PROCEDURE — 770006 HCHG ROOM/CARE - MED/SURG/GYN SEMI*

## 2021-07-27 PROCEDURE — 700102 HCHG RX REV CODE 250 W/ 637 OVERRIDE(OP): Performed by: STUDENT IN AN ORGANIZED HEALTH CARE EDUCATION/TRAINING PROGRAM

## 2021-07-27 PROCEDURE — 99232 SBSQ HOSP IP/OBS MODERATE 35: CPT | Performed by: PHYSICAL MEDICINE & REHABILITATION

## 2021-07-27 PROCEDURE — 97535 SELF CARE MNGMENT TRAINING: CPT | Mod: CO

## 2021-07-27 PROCEDURE — A9270 NON-COVERED ITEM OR SERVICE: HCPCS | Performed by: INTERNAL MEDICINE

## 2021-07-27 PROCEDURE — 700111 HCHG RX REV CODE 636 W/ 250 OVERRIDE (IP): Performed by: INTERNAL MEDICINE

## 2021-07-27 PROCEDURE — 99232 SBSQ HOSP IP/OBS MODERATE 35: CPT | Performed by: INTERNAL MEDICINE

## 2021-07-27 PROCEDURE — 700102 HCHG RX REV CODE 250 W/ 637 OVERRIDE(OP): Performed by: INTERNAL MEDICINE

## 2021-07-27 PROCEDURE — 700102 HCHG RX REV CODE 250 W/ 637 OVERRIDE(OP): Performed by: PHYSICAL MEDICINE & REHABILITATION

## 2021-07-27 PROCEDURE — 97112 NEUROMUSCULAR REEDUCATION: CPT | Mod: CO

## 2021-07-27 PROCEDURE — A9270 NON-COVERED ITEM OR SERVICE: HCPCS | Performed by: STUDENT IN AN ORGANIZED HEALTH CARE EDUCATION/TRAINING PROGRAM

## 2021-07-27 PROCEDURE — 92526 ORAL FUNCTION THERAPY: CPT

## 2021-07-27 PROCEDURE — 97530 THERAPEUTIC ACTIVITIES: CPT | Mod: CO

## 2021-07-27 PROCEDURE — A9270 NON-COVERED ITEM OR SERVICE: HCPCS | Performed by: PHYSICAL MEDICINE & REHABILITATION

## 2021-07-27 PROCEDURE — 82962 GLUCOSE BLOOD TEST: CPT

## 2021-07-27 RX ADMIN — OXYCODONE HYDROCHLORIDE 10 MG: 10 TABLET ORAL at 14:24

## 2021-07-27 RX ADMIN — GABAPENTIN 300 MG: 300 CAPSULE ORAL at 14:24

## 2021-07-27 RX ADMIN — PROPRANOLOL HYDROCHLORIDE 20 MG: 10 TABLET ORAL at 21:59

## 2021-07-27 RX ADMIN — GABAPENTIN 300 MG: 300 CAPSULE ORAL at 22:00

## 2021-07-27 RX ADMIN — CARBOXYMETHYLCELLULOSE SODIUM 1 DROP: 5 SOLUTION/ DROPS OPHTHALMIC at 04:16

## 2021-07-27 RX ADMIN — OMEPRAZOLE 20 MG: 20 CAPSULE, DELAYED RELEASE ORAL at 04:17

## 2021-07-27 RX ADMIN — INSULIN HUMAN 3 UNITS: 100 INJECTION, SOLUTION PARENTERAL at 12:22

## 2021-07-27 RX ADMIN — CEFAZOLIN SODIUM 2 G: 2 INJECTION, SOLUTION INTRAVENOUS at 14:24

## 2021-07-27 RX ADMIN — ENOXAPARIN SODIUM 40 MG: 40 INJECTION SUBCUTANEOUS at 04:18

## 2021-07-27 RX ADMIN — ATORVASTATIN CALCIUM 80 MG: 40 TABLET, FILM COATED ORAL at 21:59

## 2021-07-27 RX ADMIN — PROPRANOLOL HYDROCHLORIDE 20 MG: 10 TABLET ORAL at 14:24

## 2021-07-27 RX ADMIN — INSULIN HUMAN 4 UNITS: 100 INJECTION, SOLUTION PARENTERAL at 23:46

## 2021-07-27 RX ADMIN — CEFAZOLIN SODIUM 2 G: 2 INJECTION, SOLUTION INTRAVENOUS at 04:43

## 2021-07-27 RX ADMIN — OXYCODONE HYDROCHLORIDE 10 MG: 10 TABLET ORAL at 04:53

## 2021-07-27 RX ADMIN — CEFAZOLIN SODIUM 2 G: 2 INJECTION, SOLUTION INTRAVENOUS at 21:59

## 2021-07-27 RX ADMIN — Medication 5 MG: at 22:00

## 2021-07-27 ASSESSMENT — ENCOUNTER SYMPTOMS
HEADACHES: 0
WHEEZING: 0
FEVER: 0
NERVOUS/ANXIOUS: 0
EYE PAIN: 0
DEPRESSION: 1
ABDOMINAL PAIN: 0
BLURRED VISION: 0
MYALGIAS: 1
SORE THROAT: 1
DOUBLE VISION: 0
NAUSEA: 0
SPUTUM PRODUCTION: 0
DIZZINESS: 0
SHORTNESS OF BREATH: 1
COUGH: 0
VOMITING: 0

## 2021-07-27 ASSESSMENT — COGNITIVE AND FUNCTIONAL STATUS - GENERAL
DAILY ACTIVITIY SCORE: 14
DRESSING REGULAR UPPER BODY CLOTHING: A LOT
HELP NEEDED FOR BATHING: A LOT
PERSONAL GROOMING: A LITTLE
SUGGESTED CMS G CODE MODIFIER DAILY ACTIVITY: CK
TOILETING: A LOT
EATING MEALS: A LITTLE
DRESSING REGULAR LOWER BODY CLOTHING: A LOT

## 2021-07-27 ASSESSMENT — PAIN DESCRIPTION - PAIN TYPE
TYPE: ACUTE PAIN
TYPE: ACUTE PAIN

## 2021-07-27 ASSESSMENT — GAIT ASSESSMENTS: DISTANCE (FEET): 5

## 2021-07-27 NOTE — CARE PLAN
Problem: Nutritional:  Goal: Achieve adequate nutritional intake  Description: Patient will consume 50% of meals  Outcome: Not Progressing   See RD note; RD continues to follow.

## 2021-07-27 NOTE — PROGRESS NOTES
Rec'd report from day shift RN. Assumed pt care. Assessment completed. AA&OX4. Denies pain at this time. No s/s of discomfort or distress. Have not ambulated pt at this time, per report pt ambulates with 2 assist and use of prosthetic. Condom cath in place. Bed in lowest position, bed locked, bed alarm on for safety, treaded sock in place, RN and CNA numbers provided, call light within reach.
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
## 2021-07-27 NOTE — CARE PLAN
The patient is Stable - Low risk of patient condition declining or worsening    Shift Goals  Clinical Goals: comfort   Patient Goals: comfort    Progress made toward(s) clinical / shift goals:  pillows used for positioning, made comfortable in bed, turns encouraged, dark and quiet area provided for rest.     Patient is not progressing towards the following goals:

## 2021-07-27 NOTE — THERAPY
"Occupational Therapy  Daily Treatment     Patient Name: Kevin Jackson  Age:  76 y.o., Sex:  male  Medical Record #: [id number]  Today's Date: 7/27/2021     Precautions  Precautions: Fall Risk, Swallow Precautions ( See Comments)  Comments: R BKA;  L TMA    Assessment    Pt was seen for OT treatment. Pt needed increased encouragement to attempt self care tasks at EOB. Pt in BM in bed. \"I need to be cleaned up first\". Pt required Max A to left and right to roll in bed. Total A for chace care and for full toilet hygiene in bed. Max A X2 for supine to sit at EOB. Pt demos lateral and posterior leaning at times with activity needing Max A to come back to midline. Min A and with extended time to don prothesis on RLE.  Mod A to don sock and shoe on LLE. Max A for sit to stand with FWW and 2 person assist and bed height up. Max A X2 with extended time and verbal cues for sequencing side steps to chair at the bedside. Pt c/o level 9 pain in his back. No c/o pain in either arm or leg. Pt gave good effort but demos frustration with activity and high anxiety with all movement. Pt left up in chair at the bedside with friend in room. RN aware of pain level and OT treatment findings/recommendations .Will continue OT POC. RN informed and agreed.  Family training is on going.           Plan    Continue current treatment plan.    DC Equipment Recommendations: (P) Unable to determine at this time  Discharge Recommendations: (P) Recommend post-acute placement for additional occupational therapy services prior to discharge home    Subjective    \"I will need several rests.\" \" I can put on my own prosthesis\". Pt easily frustrated that his prosthesis took longer than usual to don.      Objective       07/27/21 1407   Cognition    Cognition / Consciousness WDL   Level of Consciousness Alert   New Learning Impaired   Attention Impaired   Comments Improving in participation. Pt does have a low activity tolerance. Easily fatigues    Passive " ROM Upper Body   Comments RUE WFL; LUE limited by back pain .   Active ROM Upper Body   Comments LUE limited by apin    Strength Upper Body   Comments LUE not fully tested, limited by pain. RUE WFL   Balance   Sitting Balance (Static) Fair -   Sitting Balance (Dynamic) Fair -   Standing Balance (Static) Poor +   Standing Balance (Dynamic) Poor   Weight Shift Sitting Poor   Weight Shift Standing Poor   Skilled Intervention Verbal Cuing;Tactile Cuing;Sequencing;Postural Facilitation;Compensatory Strategies   Comments with FWW and prosthesis on.    Bed Mobility    Supine to Sit Maximal Assist   Sit to Supine   (Pt left up in chair. )   Scooting Maximal Assist   Comments HOB level, use of bedrail    Activities of Daily Living   Eating Supervision   Grooming Minimal Assist;Seated   Bathing   (declined)   Upper Body Dressing Moderate Assist   Lower Body Dressing Maximal Assist   Toileting Total Assist  (BM in bed . Total A for clean up. )   Skilled Intervention Verbal Cuing;Tactile Cuing;Sequencing;Postural Facilitation;Compensatory Strategies   Comments Pt demos low activity tolerance . Easily fatigues. Needs frequent rest periods and v/c's for breath control.     How much help from another person does the patient currently need...   6 Clicks Daily Activity Score 14   Functional Mobility   Sit to Stand Maximal Assist  (X2)   Bed, Chair, Wheelchair Transfer Maximal Assist  (X2 with FWW)   Transfer Method Stand Step   Skilled Intervention Verbal Cuing;Tactile Cuing;Sequencing;Postural Facilitation;Compensatory Strategies   Comments with FWW and 2 person assist.    Activity Tolerance   Comments easily and quickly fatigues.    Patient / Family Goals   Patient / Family Goal #1 Let me lay down   Goal #1 Outcome Progressing slower than expected   Short Term Goals   Short Term Goal # 1 pt will complete EOB grooming w/set up    Goal Outcome # 1 Progressing as expected   Short Term Goal # 2 pt will complete LB dressing w/mod A    Goal Outcome # 2 Goal not met   Short Term Goal # 3 pt will complete txf to BSC w/mod A    Goal Outcome # 3 Progressing slower than expected   Anticipated Discharge Equipment and Recommendations   DC Equipment Recommendations Unable to determine at this time   Discharge Recommendations Recommend post-acute placement for additional occupational therapy services prior to discharge home   Interdisciplinary Plan of Care Collaboration   IDT Collaboration with  Nursing;Certified Nursing Assistant   Collaboration Comments RN updated

## 2021-07-27 NOTE — CARE PLAN
Problem: Pain - Standard  Goal: Alleviation of pain or a reduction in pain to the patient’s comfort goal  Outcome: Progressing     Problem: Fall Risk  Goal: Patient will remain free from falls  Outcome: Progressing   The patient is Stable - Low risk of patient condition declining or worsening    Shift Goals  Clinical Goals: comfort   Patient Goals: comfort  Family Goals: na

## 2021-07-27 NOTE — DISCHARGE PLANNING
Anticipated Discharge Disposition: SNF    Action: Acute Rehab has again declined. Will need to discuss SNF choice with s/o. Called land-line and mobile, left voicemail messages.     Barriers to Discharge: Pending SNF choice and accept.    Plan: F/U with S/O on SNF choice.

## 2021-07-27 NOTE — THERAPY
"Speech Language Pathology  Daily Treatment     Patient Name: Kevin Jackson  Age:  76 y.o., Sex:  male  Medical Record #: [id number]  Today's Date: 7/27/2021     Precautions  Precautions: Fall Risk, Swallow Precautions ( See Comments)  Comments: LYLA CABAN TMA     Assessment    Pt seen on this date for dysphagia therapy. Per RN, pt now requiring 1:1 feeding and has had poor PO intake. Pt unable to tolerated HOB past 40* 2/2 severe pain and required multiple breaks when raising HOB. He endorsed difficulty feeding self 2/2 upper extremity tremor so 1:1 feeding provided by this clinician. He endorsed that cup sips would be too big so trials presented via teaspoon and no overt s/sx of aspiration appreciated with trials of MTL and thin liquids via teaspoon and soft and bite size texture. He endorsed midesophageal/rib pain which he attributed to reflux and reported that he does not experience this pain at home. Per RN, pt is taking Omeprazole while in hospital. He denied globus sensation with PO trials and reported increase pain while sitting up as session progressed (HOB lowered for comfort). Printed swallowing exercises were presented to pt and he completed reps with \"fair-good\" accuracy. Pt is severely limited by pain during meals which may be impacting his nutrition (of note, pt with poor PO intake with breakfast this morning). Spoke with RD regarding concerns who will follow with pt tomorrow. At this time, recommend continuation of current diet, soft and bite size/thin liquids, and implementation of strategies (small bites/sips, 1:1 feeding as needed, slow rate, liquids via teaspoon per pt preference, up as high as tolerated during meals). Please encourage HOB raised as high as possible to reduce aspiration risk and assist with reflux precautions.    Plan    1) continue soft and bite size/MTL, okay for sips of water between meals  2) RD to see pt tomorrow    Continue current treatment plan.    Discharge " Recommendations: (P) Recommend post-acute placement for additional speech therapy services prior to discharge home       Objective       07/27/21 1125   Vitals   O2 (LPM) 0   O2 Delivery Device None - Room Air   Pain 0 - 10 Group   Therapist Pain Assessment Post Activity Pain Same as Prior to Activity;Nurse Notified  (severe back pain with HOB raised)   Cognitive-Linguistic   Level of Consciousness Alert   Dysphagia    Dysphagia X   Positioning / Behavior Modification Modulate Rate or Bite Size;Self Monitoring   Oral / Pharyngeal / Laryngeal Exercises Pharyngeal Constriction Exercises;Base of Tongue Exercises;Laryngeal Exercises   Other Treatments min PO trials of MTL and thins via teaspoon and soft and bite size texture   Diet / Liquid Recommendation Soft & Bite-Sized (6) - (Dysphagia III);Mildly Thick (2) - (Nectar Thick)  (ok for sips of H20 b/w meals after oral care)   Nutritional Liquid Intake Rating Scale Thickened beverages (mildly thick unless otherwise specified)   Nutritional Food Intake Rating Scale Total oral diet with multiple consistencies but requiring special preparation or compensations   Nursing Communication Swallow Precaution Sign Posted at Head of Bed   Skilled Intervention Compensatory Strategies;Verbal Cueing   Recommended Route of Medication Administration   Medication Administration  Crush all Medications in Puree

## 2021-07-27 NOTE — DIETARY
Nutrition Quick Update:  SLP alerted this RD that patient not eating well due to pain, requiring 1:1 feeding assistance. RD following patient, next scheduled follow up 7/27. Will add Boost supplements (MT2 consistency) at this time to bolster intake, reschedule follow up to 7/28 to see if PO intake improves with addition of supplements.    Appreciate SLP collaboration.    RD continues to follow.
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
## 2021-07-28 LAB
ANION GAP SERPL CALC-SCNC: 12 MMOL/L (ref 7–16)
BUN SERPL-MCNC: 13 MG/DL (ref 8–22)
CALCIUM SERPL-MCNC: 8.9 MG/DL (ref 8.5–10.5)
CHLORIDE SERPL-SCNC: 98 MMOL/L (ref 96–112)
CO2 SERPL-SCNC: 26 MMOL/L (ref 20–33)
CREAT SERPL-MCNC: 0.6 MG/DL (ref 0.5–1.4)
ERYTHROCYTE [DISTWIDTH] IN BLOOD BY AUTOMATED COUNT: 54.7 FL (ref 35.9–50)
GLUCOSE BLD-MCNC: 173 MG/DL (ref 65–99)
GLUCOSE BLD-MCNC: 212 MG/DL (ref 65–99)
GLUCOSE BLD-MCNC: 212 MG/DL (ref 65–99)
GLUCOSE BLD-MCNC: 213 MG/DL (ref 65–99)
GLUCOSE BLD-MCNC: 234 MG/DL (ref 65–99)
GLUCOSE SERPL-MCNC: 177 MG/DL (ref 65–99)
HCT VFR BLD AUTO: 30.6 % (ref 42–52)
HGB BLD-MCNC: 9.6 G/DL (ref 14–18)
MCH RBC QN AUTO: 29.4 PG (ref 27–33)
MCHC RBC AUTO-ENTMCNC: 31.4 G/DL (ref 33.7–35.3)
MCV RBC AUTO: 93.9 FL (ref 81.4–97.8)
PLATELET # BLD AUTO: 572 K/UL (ref 164–446)
PMV BLD AUTO: 9.3 FL (ref 9–12.9)
POTASSIUM SERPL-SCNC: 3.9 MMOL/L (ref 3.6–5.5)
RBC # BLD AUTO: 3.26 M/UL (ref 4.7–6.1)
SODIUM SERPL-SCNC: 136 MMOL/L (ref 135–145)
WBC # BLD AUTO: 5.6 K/UL (ref 4.8–10.8)

## 2021-07-28 PROCEDURE — 700101 HCHG RX REV CODE 250: Performed by: STUDENT IN AN ORGANIZED HEALTH CARE EDUCATION/TRAINING PROGRAM

## 2021-07-28 PROCEDURE — A9270 NON-COVERED ITEM OR SERVICE: HCPCS | Performed by: INTERNAL MEDICINE

## 2021-07-28 PROCEDURE — A9270 NON-COVERED ITEM OR SERVICE: HCPCS | Performed by: STUDENT IN AN ORGANIZED HEALTH CARE EDUCATION/TRAINING PROGRAM

## 2021-07-28 PROCEDURE — A9270 NON-COVERED ITEM OR SERVICE: HCPCS | Performed by: PHYSICAL MEDICINE & REHABILITATION

## 2021-07-28 PROCEDURE — 700111 HCHG RX REV CODE 636 W/ 250 OVERRIDE (IP): Performed by: INTERNAL MEDICINE

## 2021-07-28 PROCEDURE — 85027 COMPLETE CBC AUTOMATED: CPT

## 2021-07-28 PROCEDURE — 700102 HCHG RX REV CODE 250 W/ 637 OVERRIDE(OP): Performed by: INTERNAL MEDICINE

## 2021-07-28 PROCEDURE — 700102 HCHG RX REV CODE 250 W/ 637 OVERRIDE(OP): Performed by: PHYSICAL MEDICINE & REHABILITATION

## 2021-07-28 PROCEDURE — 82962 GLUCOSE BLOOD TEST: CPT | Mod: 91

## 2021-07-28 PROCEDURE — 36415 COLL VENOUS BLD VENIPUNCTURE: CPT

## 2021-07-28 PROCEDURE — 700102 HCHG RX REV CODE 250 W/ 637 OVERRIDE(OP): Performed by: STUDENT IN AN ORGANIZED HEALTH CARE EDUCATION/TRAINING PROGRAM

## 2021-07-28 PROCEDURE — 770006 HCHG ROOM/CARE - MED/SURG/GYN SEMI*

## 2021-07-28 PROCEDURE — 99231 SBSQ HOSP IP/OBS SF/LOW 25: CPT | Performed by: INTERNAL MEDICINE

## 2021-07-28 PROCEDURE — 80048 BASIC METABOLIC PNL TOTAL CA: CPT

## 2021-07-28 RX ORDER — FLUOXETINE 10 MG/1
10 CAPSULE ORAL DAILY
Status: DISCONTINUED | OUTPATIENT
Start: 2021-07-28 | End: 2021-08-02 | Stop reason: HOSPADM

## 2021-07-28 RX ORDER — DEXTROSE MONOHYDRATE 25 G/50ML
50 INJECTION, SOLUTION INTRAVENOUS
Status: DISCONTINUED | OUTPATIENT
Start: 2021-07-28 | End: 2021-08-02 | Stop reason: HOSPADM

## 2021-07-28 RX ADMIN — GABAPENTIN 300 MG: 300 CAPSULE ORAL at 21:05

## 2021-07-28 RX ADMIN — OXYCODONE HYDROCHLORIDE 10 MG: 10 TABLET ORAL at 15:50

## 2021-07-28 RX ADMIN — GABAPENTIN 300 MG: 300 CAPSULE ORAL at 13:57

## 2021-07-28 RX ADMIN — OXYCODONE HYDROCHLORIDE 10 MG: 10 TABLET ORAL at 21:06

## 2021-07-28 RX ADMIN — GABAPENTIN 300 MG: 300 CAPSULE ORAL at 06:17

## 2021-07-28 RX ADMIN — LIDOCAINE 2 PATCH: 50 PATCH TOPICAL at 10:32

## 2021-07-28 RX ADMIN — OMEPRAZOLE 20 MG: 20 CAPSULE, DELAYED RELEASE ORAL at 06:17

## 2021-07-28 RX ADMIN — ATORVASTATIN CALCIUM 80 MG: 40 TABLET, FILM COATED ORAL at 21:05

## 2021-07-28 RX ADMIN — PROPRANOLOL HYDROCHLORIDE 20 MG: 10 TABLET ORAL at 15:44

## 2021-07-28 RX ADMIN — CELECOXIB 200 MG: 200 CAPSULE ORAL at 06:17

## 2021-07-28 RX ADMIN — INSULIN GLARGINE 30 UNITS: 100 INJECTION, SOLUTION SUBCUTANEOUS at 19:15

## 2021-07-28 RX ADMIN — CEFAZOLIN SODIUM 2 G: 2 INJECTION, SOLUTION INTRAVENOUS at 13:57

## 2021-07-28 RX ADMIN — PROPRANOLOL HYDROCHLORIDE 20 MG: 10 TABLET ORAL at 06:16

## 2021-07-28 RX ADMIN — LIDOCAINE HYDROCHLORIDE 5 ML: 20 SOLUTION ORAL at 06:16

## 2021-07-28 RX ADMIN — OXYCODONE 5 MG: 5 TABLET ORAL at 10:31

## 2021-07-28 RX ADMIN — INSULIN HUMAN 3 UNITS: 100 INJECTION, SOLUTION PARENTERAL at 06:25

## 2021-07-28 RX ADMIN — PROPRANOLOL HYDROCHLORIDE 20 MG: 10 TABLET ORAL at 21:06

## 2021-07-28 RX ADMIN — LIDOCAINE HYDROCHLORIDE 5 ML: 20 SOLUTION ORAL at 19:15

## 2021-07-28 RX ADMIN — CEFAZOLIN SODIUM 2 G: 2 INJECTION, SOLUTION INTRAVENOUS at 21:46

## 2021-07-28 RX ADMIN — LEVOTHYROXINE SODIUM 50 MCG: 0.05 TABLET ORAL at 06:17

## 2021-07-28 RX ADMIN — ENOXAPARIN SODIUM 40 MG: 40 INJECTION SUBCUTANEOUS at 06:17

## 2021-07-28 RX ADMIN — PRIMIDONE 50 MG: 50 TABLET ORAL at 06:16

## 2021-07-28 RX ADMIN — CEFAZOLIN SODIUM 2 G: 2 INJECTION, SOLUTION INTRAVENOUS at 06:16

## 2021-07-28 RX ADMIN — FLUOXETINE 10 MG: 10 CAPSULE ORAL at 16:29

## 2021-07-28 RX ADMIN — Medication 5 MG: at 21:05

## 2021-07-28 ASSESSMENT — ENCOUNTER SYMPTOMS
BLURRED VISION: 0
SORE THROAT: 1
WHEEZING: 0
NAUSEA: 0
NERVOUS/ANXIOUS: 0
DOUBLE VISION: 0
ABDOMINAL PAIN: 0
DEPRESSION: 1
SHORTNESS OF BREATH: 1
VOMITING: 0
HEADACHES: 0
DIZZINESS: 0
COUGH: 0
MYALGIAS: 1
SPUTUM PRODUCTION: 0
EYE PAIN: 0
FEVER: 0

## 2021-07-28 ASSESSMENT — PAIN DESCRIPTION - PAIN TYPE: TYPE: ACUTE PAIN

## 2021-07-28 NOTE — PROGRESS NOTES
Assumed care at 1900. Received bedside report from MERVIN Curtis. Patient was asleep and resting comfortably in bed. No signs of distress. Bed is in low and locked position. Non-slip socks in place. Call light is within reach. Bed alarm is on. Hourly rounding in place.
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
## 2021-07-28 NOTE — CARE PLAN
Problem: Pain - Standard  Goal: Alleviation of pain or a reduction in pain to the patient’s comfort goal  Outcome: Progressing     Problem: Knowledge Deficit - Standard  Goal: Patient and family/care givers will demonstrate understanding of plan of care, disease process/condition, diagnostic tests and medications  Outcome: Progressing     Problem: Skin Integrity  Goal: Skin integrity is maintained or improved  Outcome: Progressing     Problem: Fall Risk  Goal: Patient will remain free from falls  Outcome: Progressing   The patient is Stable - Low risk of patient condition declining or worsening    Shift Goals  Clinical Goals: increased mobility no skin breakdown  Patient Goals: comfort  Family Goals: No family present    Progress made toward(s) clinical / shift goals:  patient up in chair position    Patient is not progressing towards the following goals:

## 2021-07-28 NOTE — DISCHARGE PLANNING
Anticipated Discharge Disposition: SNF    Action: Spoke to pt's S/OJerome about SNF choice. She gave phone consent for referral to [facility].     Barriers to Discharge: pending SNF accept.    Plan: F/U with SNF referrals.

## 2021-07-28 NOTE — CARE PLAN
Problem: Pain - Standard  Goal: Alleviation of pain or a reduction in pain to the patient’s comfort goal  Outcome: Progressing     Problem: Skin Integrity  Goal: Skin integrity is maintained or improved  Outcome: Progressing   The patient is Stable - Low risk of patient condition declining or worsening    Shift Goals  Clinical Goals: comfort  Patient Goals: sleep  Family Goals: No family present    Progress made toward(s) clinical / shift goals:  Patient education on comfort measures completed. Patient denied any discomfort or need for intervention. Patient able to sleep throughout shift.     Patient is not progressing towards the following goals:

## 2021-07-28 NOTE — DISCHARGE PLANNING
Received Choice form at 1150  Agency/Facility Name: (1) [facility] (2) [facility] (3) [facility]  Referral sent per Choice form @ 4137

## 2021-07-29 LAB
GLUCOSE BLD-MCNC: 175 MG/DL (ref 65–99)
GLUCOSE BLD-MCNC: 224 MG/DL (ref 65–99)
GLUCOSE BLD-MCNC: 232 MG/DL (ref 65–99)
GLUCOSE BLD-MCNC: 280 MG/DL (ref 65–99)

## 2021-07-29 PROCEDURE — 700102 HCHG RX REV CODE 250 W/ 637 OVERRIDE(OP): Performed by: INTERNAL MEDICINE

## 2021-07-29 PROCEDURE — 700102 HCHG RX REV CODE 250 W/ 637 OVERRIDE(OP): Performed by: STUDENT IN AN ORGANIZED HEALTH CARE EDUCATION/TRAINING PROGRAM

## 2021-07-29 PROCEDURE — A9270 NON-COVERED ITEM OR SERVICE: HCPCS | Performed by: INTERNAL MEDICINE

## 2021-07-29 PROCEDURE — 700111 HCHG RX REV CODE 636 W/ 250 OVERRIDE (IP): Performed by: INTERNAL MEDICINE

## 2021-07-29 PROCEDURE — 97530 THERAPEUTIC ACTIVITIES: CPT | Mod: CQ

## 2021-07-29 PROCEDURE — A9270 NON-COVERED ITEM OR SERVICE: HCPCS | Performed by: PHYSICAL MEDICINE & REHABILITATION

## 2021-07-29 PROCEDURE — 770006 HCHG ROOM/CARE - MED/SURG/GYN SEMI*

## 2021-07-29 PROCEDURE — 700101 HCHG RX REV CODE 250: Performed by: STUDENT IN AN ORGANIZED HEALTH CARE EDUCATION/TRAINING PROGRAM

## 2021-07-29 PROCEDURE — A9270 NON-COVERED ITEM OR SERVICE: HCPCS | Performed by: STUDENT IN AN ORGANIZED HEALTH CARE EDUCATION/TRAINING PROGRAM

## 2021-07-29 PROCEDURE — 92526 ORAL FUNCTION THERAPY: CPT

## 2021-07-29 PROCEDURE — 82962 GLUCOSE BLOOD TEST: CPT | Mod: 91

## 2021-07-29 PROCEDURE — 700102 HCHG RX REV CODE 250 W/ 637 OVERRIDE(OP): Performed by: PHYSICAL MEDICINE & REHABILITATION

## 2021-07-29 PROCEDURE — 99231 SBSQ HOSP IP/OBS SF/LOW 25: CPT | Performed by: INTERNAL MEDICINE

## 2021-07-29 RX ADMIN — CEFAZOLIN SODIUM 2 G: 2 INJECTION, SOLUTION INTRAVENOUS at 16:30

## 2021-07-29 RX ADMIN — OMEPRAZOLE 20 MG: 20 CAPSULE, DELAYED RELEASE ORAL at 09:00

## 2021-07-29 RX ADMIN — GABAPENTIN 300 MG: 300 CAPSULE ORAL at 09:00

## 2021-07-29 RX ADMIN — ENOXAPARIN SODIUM 40 MG: 40 INJECTION SUBCUTANEOUS at 05:46

## 2021-07-29 RX ADMIN — PRIMIDONE 50 MG: 50 TABLET ORAL at 09:01

## 2021-07-29 RX ADMIN — PROPRANOLOL HYDROCHLORIDE 20 MG: 10 TABLET ORAL at 09:01

## 2021-07-29 RX ADMIN — INSULIN GLARGINE 30 UNITS: 100 INJECTION, SOLUTION SUBCUTANEOUS at 17:55

## 2021-07-29 RX ADMIN — CEFAZOLIN SODIUM 2 G: 2 INJECTION, SOLUTION INTRAVENOUS at 21:57

## 2021-07-29 RX ADMIN — ATORVASTATIN CALCIUM 80 MG: 40 TABLET, FILM COATED ORAL at 21:50

## 2021-07-29 RX ADMIN — FLUOXETINE 10 MG: 10 CAPSULE ORAL at 09:00

## 2021-07-29 RX ADMIN — PROPRANOLOL HYDROCHLORIDE 20 MG: 10 TABLET ORAL at 21:49

## 2021-07-29 RX ADMIN — CEFAZOLIN SODIUM 2 G: 2 INJECTION, SOLUTION INTRAVENOUS at 05:38

## 2021-07-29 RX ADMIN — PROPRANOLOL HYDROCHLORIDE 20 MG: 10 TABLET ORAL at 16:29

## 2021-07-29 RX ADMIN — GABAPENTIN 300 MG: 300 CAPSULE ORAL at 21:50

## 2021-07-29 RX ADMIN — Medication 5 MG: at 21:49

## 2021-07-29 RX ADMIN — OXYCODONE HYDROCHLORIDE 10 MG: 10 TABLET ORAL at 16:30

## 2021-07-29 RX ADMIN — GABAPENTIN 300 MG: 300 CAPSULE ORAL at 16:30

## 2021-07-29 RX ADMIN — LIDOCAINE 1 PATCH: 50 PATCH TOPICAL at 09:01

## 2021-07-29 RX ADMIN — CELECOXIB 200 MG: 200 CAPSULE ORAL at 09:00

## 2021-07-29 RX ADMIN — LEVOTHYROXINE SODIUM 50 MCG: 0.05 TABLET ORAL at 09:01

## 2021-07-29 ASSESSMENT — COGNITIVE AND FUNCTIONAL STATUS - GENERAL
TURNING FROM BACK TO SIDE WHILE IN FLAT BAD: A LOT
MOBILITY SCORE: 10
WALKING IN HOSPITAL ROOM: TOTAL
STANDING UP FROM CHAIR USING ARMS: A LOT
SUGGESTED CMS G CODE MODIFIER MOBILITY: CL
MOVING TO AND FROM BED TO CHAIR: A LOT
MOVING FROM LYING ON BACK TO SITTING ON SIDE OF FLAT BED: A LOT
CLIMB 3 TO 5 STEPS WITH RAILING: TOTAL

## 2021-07-29 ASSESSMENT — ENCOUNTER SYMPTOMS
SHORTNESS OF BREATH: 1
DEPRESSION: 1
HEADACHES: 0
DOUBLE VISION: 0
VOMITING: 0
WHEEZING: 0
COUGH: 0
MYALGIAS: 1
SORE THROAT: 1
NAUSEA: 0
FEVER: 0
DIZZINESS: 0
SPUTUM PRODUCTION: 0
NERVOUS/ANXIOUS: 0
EYE PAIN: 0
ABDOMINAL PAIN: 0
BLURRED VISION: 0

## 2021-07-29 ASSESSMENT — PAIN DESCRIPTION - PAIN TYPE
TYPE: ACUTE PAIN
TYPE: ACUTE PAIN

## 2021-07-29 ASSESSMENT — GAIT ASSESSMENTS: GAIT LEVEL OF ASSIST: UNABLE TO PARTICIPATE

## 2021-07-29 ASSESSMENT — FIBROSIS 4 INDEX: FIB4 SCORE: 0.87
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
## 2021-07-29 NOTE — CARE PLAN
The patient is Stable - Low risk of patient condition declining or worsening    Shift Goals  Clinical Goals: increased mobility no skin breakdown  Patient Goals: comfort  Family Goals: No family present    Progress made toward(s) clinical / shift goals:  PT/OT following, will perform bed mobility with patient, encourage q 2 hour turns, will continue to monitor and hourly rounding    Patient is not progressing towards the following goals:

## 2021-07-29 NOTE — DISCHARGE PLANNING
Agency/Facility Name: [facility]  Outcome: Left voice message for Malinda regarding patient referral. Requested a call back.

## 2021-07-29 NOTE — DISCHARGE PLANNING
Anticipated Discharge Disposition: SNF    Action: Received call from S/O. She did research and would like [facility] as first choice. DPA called Malinda, she will contact us with decision.    Barriers to Discharge: Pending SNF accept ([facility].)    Plan: F/U with Malinda at [facility].

## 2021-07-29 NOTE — CARE PLAN
Problem: Nutritional:  Goal: Achieve adequate nutritional intake  Description: Patient will consume 50% of meals  Outcome: Progressing slower than expected  PO intake 50-75% breakfast this a.m. + 0% Boost. Per pt, received and drank 1 full boost plus supplement yesterday; agreeable to continuing. RD encouraged pt to save supplements from meal trays and sip between meals. Meals since last RD visit: <25% x 1, 25-50% yesterday. Per RD note 7/27, pt needing 1:1 feed assist due to pain. Reviewed w/RN.

## 2021-07-30 LAB
GLUCOSE BLD-MCNC: 159 MG/DL (ref 65–99)
GLUCOSE BLD-MCNC: 188 MG/DL (ref 65–99)
GLUCOSE BLD-MCNC: 225 MG/DL (ref 65–99)
GLUCOSE BLD-MCNC: 285 MG/DL (ref 65–99)

## 2021-07-30 PROCEDURE — 770006 HCHG ROOM/CARE - MED/SURG/GYN SEMI*

## 2021-07-30 PROCEDURE — 700102 HCHG RX REV CODE 250 W/ 637 OVERRIDE(OP): Performed by: PHYSICAL MEDICINE & REHABILITATION

## 2021-07-30 PROCEDURE — 700101 HCHG RX REV CODE 250: Performed by: STUDENT IN AN ORGANIZED HEALTH CARE EDUCATION/TRAINING PROGRAM

## 2021-07-30 PROCEDURE — A9270 NON-COVERED ITEM OR SERVICE: HCPCS | Performed by: INTERNAL MEDICINE

## 2021-07-30 PROCEDURE — 99231 SBSQ HOSP IP/OBS SF/LOW 25: CPT | Performed by: INTERNAL MEDICINE

## 2021-07-30 PROCEDURE — 700102 HCHG RX REV CODE 250 W/ 637 OVERRIDE(OP): Performed by: STUDENT IN AN ORGANIZED HEALTH CARE EDUCATION/TRAINING PROGRAM

## 2021-07-30 PROCEDURE — 700102 HCHG RX REV CODE 250 W/ 637 OVERRIDE(OP): Performed by: INTERNAL MEDICINE

## 2021-07-30 PROCEDURE — 700111 HCHG RX REV CODE 636 W/ 250 OVERRIDE (IP): Performed by: INTERNAL MEDICINE

## 2021-07-30 PROCEDURE — A9270 NON-COVERED ITEM OR SERVICE: HCPCS | Performed by: PHYSICAL MEDICINE & REHABILITATION

## 2021-07-30 PROCEDURE — 82962 GLUCOSE BLOOD TEST: CPT | Mod: 91

## 2021-07-30 PROCEDURE — A9270 NON-COVERED ITEM OR SERVICE: HCPCS | Performed by: STUDENT IN AN ORGANIZED HEALTH CARE EDUCATION/TRAINING PROGRAM

## 2021-07-30 PROCEDURE — 99232 SBSQ HOSP IP/OBS MODERATE 35: CPT | Performed by: PHYSICAL MEDICINE & REHABILITATION

## 2021-07-30 PROCEDURE — 97535 SELF CARE MNGMENT TRAINING: CPT

## 2021-07-30 RX ADMIN — PROPRANOLOL HYDROCHLORIDE 20 MG: 10 TABLET ORAL at 17:08

## 2021-07-30 RX ADMIN — CEFAZOLIN SODIUM 2 G: 2 INJECTION, SOLUTION INTRAVENOUS at 21:26

## 2021-07-30 RX ADMIN — LIDOCAINE HYDROCHLORIDE 5 ML: 20 SOLUTION ORAL at 05:47

## 2021-07-30 RX ADMIN — GABAPENTIN 300 MG: 300 CAPSULE ORAL at 17:08

## 2021-07-30 RX ADMIN — ATORVASTATIN CALCIUM 80 MG: 40 TABLET, FILM COATED ORAL at 21:26

## 2021-07-30 RX ADMIN — GABAPENTIN 300 MG: 300 CAPSULE ORAL at 05:38

## 2021-07-30 RX ADMIN — LEVOTHYROXINE SODIUM 50 MCG: 0.05 TABLET ORAL at 05:38

## 2021-07-30 RX ADMIN — INSULIN GLARGINE 30 UNITS: 100 INJECTION, SOLUTION SUBCUTANEOUS at 17:04

## 2021-07-30 RX ADMIN — FLUOXETINE 10 MG: 10 CAPSULE ORAL at 05:38

## 2021-07-30 RX ADMIN — PROPRANOLOL HYDROCHLORIDE 20 MG: 10 TABLET ORAL at 05:38

## 2021-07-30 RX ADMIN — CELECOXIB 200 MG: 200 CAPSULE ORAL at 05:39

## 2021-07-30 RX ADMIN — GABAPENTIN 300 MG: 300 CAPSULE ORAL at 21:26

## 2021-07-30 RX ADMIN — CEFAZOLIN SODIUM 2 G: 2 INJECTION, SOLUTION INTRAVENOUS at 06:02

## 2021-07-30 RX ADMIN — ENOXAPARIN SODIUM 40 MG: 40 INJECTION SUBCUTANEOUS at 05:51

## 2021-07-30 RX ADMIN — OMEPRAZOLE 20 MG: 20 CAPSULE, DELAYED RELEASE ORAL at 05:38

## 2021-07-30 RX ADMIN — PROPRANOLOL HYDROCHLORIDE 20 MG: 10 TABLET ORAL at 21:26

## 2021-07-30 RX ADMIN — PRIMIDONE 50 MG: 50 TABLET ORAL at 05:39

## 2021-07-30 RX ADMIN — CEFAZOLIN SODIUM 2 G: 2 INJECTION, SOLUTION INTRAVENOUS at 14:55

## 2021-07-30 RX ADMIN — Medication 5 MG: at 21:26

## 2021-07-30 ASSESSMENT — ENCOUNTER SYMPTOMS
ABDOMINAL PAIN: 0
SHORTNESS OF BREATH: 1
MYALGIAS: 1
DOUBLE VISION: 0
DEPRESSION: 1
NERVOUS/ANXIOUS: 0
HEADACHES: 0
VOMITING: 0
WHEEZING: 0
COUGH: 0
FEVER: 0
SPUTUM PRODUCTION: 0
SORE THROAT: 1
NAUSEA: 0
BLURRED VISION: 0
EYE PAIN: 0
DIZZINESS: 0

## 2021-07-30 ASSESSMENT — PAIN DESCRIPTION - PAIN TYPE
TYPE: ACUTE PAIN
TYPE: ACUTE PAIN

## 2021-07-30 ASSESSMENT — COGNITIVE AND FUNCTIONAL STATUS - GENERAL
PERSONAL GROOMING: A LITTLE
HELP NEEDED FOR BATHING: A LOT
DRESSING REGULAR UPPER BODY CLOTHING: A LITTLE
DAILY ACTIVITIY SCORE: 15
EATING MEALS: A LITTLE
TOILETING: A LOT
DRESSING REGULAR LOWER BODY CLOTHING: A LOT
SUGGESTED CMS G CODE MODIFIER DAILY ACTIVITY: CK

## 2021-07-30 NOTE — CARE PLAN
The patient is Stable - Low risk of patient condition declining or worsening    Shift Goals  Clinical Goals: increased mobility no skin breakdown  Patient Goals: comfort  Family Goals: No family present    Progress made toward(s) clinical / shift goals:      Problem: Pain - Standard  Goal: Alleviation of pain or a reduction in pain to the patient’s comfort goal  Outcome: Progressing  Note: Pt denied severe pain issues this evening.      Problem: Knowledge Deficit - Standard  Goal: Patient and family/care givers will demonstrate understanding of plan of care, disease process/condition, diagnostic tests and medications  Outcome: Progressing       Patient is not progressing towards the following goals:
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
## 2021-07-30 NOTE — CARE PLAN
The patient is Stable - Low risk of patient condition declining or worsening    Shift Goals  Clinical Goals: increase mobility and pain control   Patient Goals: comfort   Family Goals: No family present    Progress made toward(s) clinical / shift goals:  hourly rounding in place. Pt encouraged to turn and assisted with turns when needed.   Problem: Pain - Standard  Goal: Alleviation of pain or a reduction in pain to the patient’s comfort goal  Outcome: Progressing     Problem: Knowledge Deficit - Standard  Goal: Patient and family/care givers will demonstrate understanding of plan of care, disease process/condition, diagnostic tests and medications  Outcome: Progressing     Problem: Skin Integrity  Goal: Skin integrity is maintained or improved  Outcome: Progressing       Patient is not progressing towards the following goals:
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
## 2021-07-30 NOTE — PROGRESS NOTES
Bedside report received. Assumed care of patient this morning. Assessment completed      Patient is A&O x 4, pt calls for assistance appropriately  Reports 2 /10 pain, medication administered prn.  Pt is on room air   Mobility: pt is 2x assist to edge of bed Bed alarm in on.  Voiding +  Flatus +            Plan of care reviewed with the patient. Bed is locked and in the lowest position. Call light is within reach. Patient encouraged to voice needs and concerns, all needs met at this time. Hourly rounding in place.

## 2021-07-31 LAB
GLUCOSE BLD-MCNC: 142 MG/DL (ref 65–99)
GLUCOSE BLD-MCNC: 220 MG/DL (ref 65–99)
GLUCOSE BLD-MCNC: 245 MG/DL (ref 65–99)
GLUCOSE BLD-MCNC: 276 MG/DL (ref 65–99)

## 2021-07-31 PROCEDURE — 700102 HCHG RX REV CODE 250 W/ 637 OVERRIDE(OP): Performed by: PHYSICAL MEDICINE & REHABILITATION

## 2021-07-31 PROCEDURE — 700102 HCHG RX REV CODE 250 W/ 637 OVERRIDE(OP): Performed by: INTERNAL MEDICINE

## 2021-07-31 PROCEDURE — 700102 HCHG RX REV CODE 250 W/ 637 OVERRIDE(OP): Performed by: STUDENT IN AN ORGANIZED HEALTH CARE EDUCATION/TRAINING PROGRAM

## 2021-07-31 PROCEDURE — 82962 GLUCOSE BLOOD TEST: CPT | Mod: 91

## 2021-07-31 PROCEDURE — A9270 NON-COVERED ITEM OR SERVICE: HCPCS | Performed by: INTERNAL MEDICINE

## 2021-07-31 PROCEDURE — A9270 NON-COVERED ITEM OR SERVICE: HCPCS | Performed by: STUDENT IN AN ORGANIZED HEALTH CARE EDUCATION/TRAINING PROGRAM

## 2021-07-31 PROCEDURE — 770006 HCHG ROOM/CARE - MED/SURG/GYN SEMI*

## 2021-07-31 PROCEDURE — 700111 HCHG RX REV CODE 636 W/ 250 OVERRIDE (IP): Performed by: INTERNAL MEDICINE

## 2021-07-31 PROCEDURE — 99231 SBSQ HOSP IP/OBS SF/LOW 25: CPT | Performed by: INTERNAL MEDICINE

## 2021-07-31 PROCEDURE — 94760 N-INVAS EAR/PLS OXIMETRY 1: CPT

## 2021-07-31 PROCEDURE — A9270 NON-COVERED ITEM OR SERVICE: HCPCS | Performed by: PHYSICAL MEDICINE & REHABILITATION

## 2021-07-31 RX ADMIN — PROPRANOLOL HYDROCHLORIDE 20 MG: 10 TABLET ORAL at 14:50

## 2021-07-31 RX ADMIN — PROPRANOLOL HYDROCHLORIDE 20 MG: 10 TABLET ORAL at 05:25

## 2021-07-31 RX ADMIN — CELECOXIB 200 MG: 200 CAPSULE ORAL at 05:24

## 2021-07-31 RX ADMIN — PRIMIDONE 50 MG: 50 TABLET ORAL at 05:31

## 2021-07-31 RX ADMIN — INSULIN GLARGINE 30 UNITS: 100 INJECTION, SOLUTION SUBCUTANEOUS at 16:21

## 2021-07-31 RX ADMIN — OMEPRAZOLE 20 MG: 20 CAPSULE, DELAYED RELEASE ORAL at 05:25

## 2021-07-31 RX ADMIN — FLUOXETINE 10 MG: 10 CAPSULE ORAL at 05:24

## 2021-07-31 RX ADMIN — GABAPENTIN 300 MG: 300 CAPSULE ORAL at 14:50

## 2021-07-31 RX ADMIN — GABAPENTIN 300 MG: 300 CAPSULE ORAL at 05:25

## 2021-07-31 RX ADMIN — Medication 5 MG: at 20:25

## 2021-07-31 RX ADMIN — ENOXAPARIN SODIUM 40 MG: 40 INJECTION SUBCUTANEOUS at 05:24

## 2021-07-31 RX ADMIN — CEFAZOLIN SODIUM 2 G: 2 INJECTION, SOLUTION INTRAVENOUS at 05:24

## 2021-07-31 RX ADMIN — PROPRANOLOL HYDROCHLORIDE 20 MG: 10 TABLET ORAL at 20:24

## 2021-07-31 RX ADMIN — CEFAZOLIN SODIUM 2 G: 2 INJECTION, SOLUTION INTRAVENOUS at 21:36

## 2021-07-31 RX ADMIN — GABAPENTIN 300 MG: 300 CAPSULE ORAL at 20:25

## 2021-07-31 RX ADMIN — CEFAZOLIN SODIUM 2 G: 2 INJECTION, SOLUTION INTRAVENOUS at 14:50

## 2021-07-31 RX ADMIN — LEVOTHYROXINE SODIUM 50 MCG: 0.05 TABLET ORAL at 05:24

## 2021-07-31 RX ADMIN — ATORVASTATIN CALCIUM 80 MG: 40 TABLET, FILM COATED ORAL at 20:25

## 2021-07-31 ASSESSMENT — ENCOUNTER SYMPTOMS
BLURRED VISION: 0
NAUSEA: 0
COUGH: 0
ABDOMINAL PAIN: 0
WHEEZING: 0
FEVER: 0
HEADACHES: 0
SHORTNESS OF BREATH: 1
SPUTUM PRODUCTION: 0
MYALGIAS: 1
NERVOUS/ANXIOUS: 0
DIZZINESS: 0
SORE THROAT: 1
DOUBLE VISION: 0
DEPRESSION: 1
VOMITING: 0
EYE PAIN: 0

## 2021-07-31 ASSESSMENT — PAIN DESCRIPTION - PAIN TYPE: TYPE: ACUTE PAIN
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
## 2021-07-31 NOTE — CARE PLAN
Problem: Skin Integrity  Goal: Skin integrity is maintained or improved  Outcome: Progressing     Problem: Fall Risk  Goal: Patient will remain free from falls  Outcome: Progressing       The patient is Stable - Low risk of patient condition declining or worsening    Shift Goals  Clinical Goals: increase mobility and pain control   Patient Goals: comfort   Family Goals: No family present    Progress made toward(s) clinical / shift goals:    Pt refusing [id number], able to turn self side to side at > 2h intervals.  Fall precautions in place, pt calls appropriately for assistance.

## 2021-07-31 NOTE — PROGRESS NOTES
4 Eyes Skin Assessment Completed by MERVIN Clement and MERVIN Cabrera.    Head WDL  Ears WDL  Nose WDL  Mouth WDL  Neck WDL  Breast/Chest WDL  Shoulder Blades WDL  Spine WDL  (R) Arm/Elbow/Hand Scab  (L) Arm/Elbow/Hand Scab  Abdomen  Bruising  Groin Redness/Pink to tip of penis, blanching  Scrotum/Coccyx/Buttocks Redness, Blanching, Excoriation and Discoloration  (R) Leg WDL  (L) Leg Scab  (R) Heel/Foot/Toe N/A  (L) Heel/Foot/Toe Redness, Blanching and Scab          Devices In Places Pulse Ox      Interventions In Place Heel Mepilex, Pillows, Q2 Turns, Low Air Loss Mattress and Dri-Rich Pads    Possible Skin Injury Yes    Pictures Uploaded Into Epic Yes  Wound Consult Placed Yes  RN Wound Prevention Protocol Ordered Yes

## 2021-08-01 LAB
ANION GAP SERPL CALC-SCNC: 10 MMOL/L (ref 7–16)
BUN SERPL-MCNC: 14 MG/DL (ref 8–22)
CALCIUM SERPL-MCNC: 8.9 MG/DL (ref 8.5–10.5)
CHLORIDE SERPL-SCNC: 102 MMOL/L (ref 96–112)
CO2 SERPL-SCNC: 29 MMOL/L (ref 20–33)
CREAT SERPL-MCNC: 0.59 MG/DL (ref 0.5–1.4)
ERYTHROCYTE [DISTWIDTH] IN BLOOD BY AUTOMATED COUNT: 54.1 FL (ref 35.9–50)
GLUCOSE BLD-MCNC: 136 MG/DL (ref 65–99)
GLUCOSE BLD-MCNC: 242 MG/DL (ref 65–99)
GLUCOSE BLD-MCNC: 94 MG/DL (ref 65–99)
GLUCOSE SERPL-MCNC: 93 MG/DL (ref 65–99)
HCT VFR BLD AUTO: 31.7 % (ref 42–52)
HGB BLD-MCNC: 9.9 G/DL (ref 14–18)
MCH RBC QN AUTO: 29.2 PG (ref 27–33)
MCHC RBC AUTO-ENTMCNC: 31.2 G/DL (ref 33.7–35.3)
MCV RBC AUTO: 93.5 FL (ref 81.4–97.8)
PLATELET # BLD AUTO: 421 K/UL (ref 164–446)
PMV BLD AUTO: 9.2 FL (ref 9–12.9)
POTASSIUM SERPL-SCNC: 4 MMOL/L (ref 3.6–5.5)
RBC # BLD AUTO: 3.39 M/UL (ref 4.7–6.1)
SODIUM SERPL-SCNC: 141 MMOL/L (ref 135–145)
WBC # BLD AUTO: 6.6 K/UL (ref 4.8–10.8)

## 2021-08-01 PROCEDURE — 82962 GLUCOSE BLOOD TEST: CPT

## 2021-08-01 PROCEDURE — 700102 HCHG RX REV CODE 250 W/ 637 OVERRIDE(OP): Performed by: INTERNAL MEDICINE

## 2021-08-01 PROCEDURE — A9270 NON-COVERED ITEM OR SERVICE: HCPCS | Performed by: STUDENT IN AN ORGANIZED HEALTH CARE EDUCATION/TRAINING PROGRAM

## 2021-08-01 PROCEDURE — A9270 NON-COVERED ITEM OR SERVICE: HCPCS | Performed by: INTERNAL MEDICINE

## 2021-08-01 PROCEDURE — 700111 HCHG RX REV CODE 636 W/ 250 OVERRIDE (IP): Performed by: INTERNAL MEDICINE

## 2021-08-01 PROCEDURE — 700102 HCHG RX REV CODE 250 W/ 637 OVERRIDE(OP): Performed by: PHYSICAL MEDICINE & REHABILITATION

## 2021-08-01 PROCEDURE — 770006 HCHG ROOM/CARE - MED/SURG/GYN SEMI*

## 2021-08-01 PROCEDURE — 700101 HCHG RX REV CODE 250: Performed by: STUDENT IN AN ORGANIZED HEALTH CARE EDUCATION/TRAINING PROGRAM

## 2021-08-01 PROCEDURE — 99231 SBSQ HOSP IP/OBS SF/LOW 25: CPT | Performed by: INTERNAL MEDICINE

## 2021-08-01 PROCEDURE — 80048 BASIC METABOLIC PNL TOTAL CA: CPT

## 2021-08-01 PROCEDURE — 36415 COLL VENOUS BLD VENIPUNCTURE: CPT

## 2021-08-01 PROCEDURE — A9270 NON-COVERED ITEM OR SERVICE: HCPCS | Performed by: PHYSICAL MEDICINE & REHABILITATION

## 2021-08-01 PROCEDURE — 85027 COMPLETE CBC AUTOMATED: CPT

## 2021-08-01 PROCEDURE — 700102 HCHG RX REV CODE 250 W/ 637 OVERRIDE(OP): Performed by: STUDENT IN AN ORGANIZED HEALTH CARE EDUCATION/TRAINING PROGRAM

## 2021-08-01 RX ADMIN — Medication 5 MG: at 21:23

## 2021-08-01 RX ADMIN — CELECOXIB 200 MG: 200 CAPSULE ORAL at 06:14

## 2021-08-01 RX ADMIN — LEVOTHYROXINE SODIUM 50 MCG: 0.05 TABLET ORAL at 06:14

## 2021-08-01 RX ADMIN — FLUOXETINE 10 MG: 10 CAPSULE ORAL at 06:14

## 2021-08-01 RX ADMIN — PROPRANOLOL HYDROCHLORIDE 20 MG: 10 TABLET ORAL at 06:14

## 2021-08-01 RX ADMIN — PROPRANOLOL HYDROCHLORIDE 20 MG: 10 TABLET ORAL at 14:30

## 2021-08-01 RX ADMIN — ENOXAPARIN SODIUM 40 MG: 40 INJECTION SUBCUTANEOUS at 06:17

## 2021-08-01 RX ADMIN — CEFAZOLIN SODIUM 2 G: 2 INJECTION, SOLUTION INTRAVENOUS at 21:23

## 2021-08-01 RX ADMIN — GABAPENTIN 300 MG: 300 CAPSULE ORAL at 21:23

## 2021-08-01 RX ADMIN — GABAPENTIN 300 MG: 300 CAPSULE ORAL at 14:30

## 2021-08-01 RX ADMIN — OXYCODONE HYDROCHLORIDE 10 MG: 10 TABLET ORAL at 19:10

## 2021-08-01 RX ADMIN — CEFAZOLIN SODIUM 2 G: 2 INJECTION, SOLUTION INTRAVENOUS at 14:30

## 2021-08-01 RX ADMIN — LIDOCAINE HYDROCHLORIDE 5 ML: 20 SOLUTION ORAL at 16:13

## 2021-08-01 RX ADMIN — OMEPRAZOLE 20 MG: 20 CAPSULE, DELAYED RELEASE ORAL at 06:14

## 2021-08-01 RX ADMIN — INSULIN GLARGINE 30 UNITS: 100 INJECTION, SOLUTION SUBCUTANEOUS at 17:08

## 2021-08-01 RX ADMIN — ATORVASTATIN CALCIUM 80 MG: 40 TABLET, FILM COATED ORAL at 21:23

## 2021-08-01 RX ADMIN — CEFAZOLIN SODIUM 2 G: 2 INJECTION, SOLUTION INTRAVENOUS at 06:17

## 2021-08-01 RX ADMIN — PRIMIDONE 50 MG: 50 TABLET ORAL at 06:14

## 2021-08-01 RX ADMIN — PROPRANOLOL HYDROCHLORIDE 20 MG: 10 TABLET ORAL at 21:23

## 2021-08-01 RX ADMIN — GABAPENTIN 300 MG: 300 CAPSULE ORAL at 06:14

## 2021-08-01 ASSESSMENT — ENCOUNTER SYMPTOMS
DEPRESSION: 1
NAUSEA: 0
EYE PAIN: 0
DIZZINESS: 0
FEVER: 0
SORE THROAT: 1
SPUTUM PRODUCTION: 0
MYALGIAS: 1
SHORTNESS OF BREATH: 1
WHEEZING: 0
COUGH: 0
HEADACHES: 0
DOUBLE VISION: 0
ABDOMINAL PAIN: 0
NERVOUS/ANXIOUS: 0
BLURRED VISION: 0
VOMITING: 0

## 2021-08-01 ASSESSMENT — PAIN DESCRIPTION - PAIN TYPE
TYPE: ACUTE PAIN

## 2021-08-01 NOTE — CARE PLAN
The patient is Watcher - Medium risk of patient condition declining or worsening    Shift Goals  Clinical Goals: increase mobility and pain control   Patient Goals: comfort   Family Goals: No family present    Progress made toward(s) clinical / shift goals:    Problem: Skin Integrity  Goal: Skin integrity is maintained or improved  Outcome: Progressing     Problem: Fall Risk  Goal: Patient will remain free from falls  Outcome: Progressing       Patient is not progressing towards the following goals:
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
## 2021-08-01 NOTE — CARE PLAN
Problem: Skin Integrity  Goal: Skin integrity is maintained or improved  Outcome: Progressing     Problem: Fall Risk  Goal: Patient will remain free from falls  Outcome: Progressing       The patient is Stable - Low risk of patient condition declining or worsening    Shift Goals  Clinical Goals: increase mobility and pain control   Patient Goals: comfort   Family Goals: No family present    Progress made toward(s) clinical / shift goals:  Fall precautions in place, pt calls appropriately for assistance, r0rifco and incontinence checks in place

## 2021-08-02 VITALS
HEART RATE: 64 BPM | OXYGEN SATURATION: 92 % | RESPIRATION RATE: 15 BRPM | DIASTOLIC BLOOD PRESSURE: 65 MMHG | HEIGHT: 73 IN | TEMPERATURE: 97.1 F | WEIGHT: 201.72 LBS | SYSTOLIC BLOOD PRESSURE: 116 MMHG | BODY MASS INDEX: 26.73 KG/M2

## 2021-08-02 LAB
CRP SERPL HS-MCNC: 9.11 MG/DL (ref 0–0.75)
GLUCOSE BLD-MCNC: 132 MG/DL (ref 65–99)
GLUCOSE BLD-MCNC: 178 MG/DL (ref 65–99)
GLUCOSE BLD-MCNC: 233 MG/DL (ref 65–99)
PREALB SERPL-MCNC: 4.9 MG/DL (ref 18–38)

## 2021-08-02 PROCEDURE — A9270 NON-COVERED ITEM OR SERVICE: HCPCS | Performed by: INTERNAL MEDICINE

## 2021-08-02 PROCEDURE — 86140 C-REACTIVE PROTEIN: CPT

## 2021-08-02 PROCEDURE — 700102 HCHG RX REV CODE 250 W/ 637 OVERRIDE(OP): Performed by: INTERNAL MEDICINE

## 2021-08-02 PROCEDURE — 700102 HCHG RX REV CODE 250 W/ 637 OVERRIDE(OP): Performed by: STUDENT IN AN ORGANIZED HEALTH CARE EDUCATION/TRAINING PROGRAM

## 2021-08-02 PROCEDURE — 36415 COLL VENOUS BLD VENIPUNCTURE: CPT

## 2021-08-02 PROCEDURE — 84134 ASSAY OF PREALBUMIN: CPT

## 2021-08-02 PROCEDURE — 700101 HCHG RX REV CODE 250: Performed by: STUDENT IN AN ORGANIZED HEALTH CARE EDUCATION/TRAINING PROGRAM

## 2021-08-02 PROCEDURE — 82962 GLUCOSE BLOOD TEST: CPT

## 2021-08-02 PROCEDURE — 99239 HOSP IP/OBS DSCHRG MGMT >30: CPT | Performed by: INTERNAL MEDICINE

## 2021-08-02 PROCEDURE — 700111 HCHG RX REV CODE 636 W/ 250 OVERRIDE (IP): Performed by: INTERNAL MEDICINE

## 2021-08-02 PROCEDURE — A9270 NON-COVERED ITEM OR SERVICE: HCPCS | Performed by: STUDENT IN AN ORGANIZED HEALTH CARE EDUCATION/TRAINING PROGRAM

## 2021-08-02 PROCEDURE — 92526 ORAL FUNCTION THERAPY: CPT

## 2021-08-02 PROCEDURE — 97530 THERAPEUTIC ACTIVITIES: CPT | Mod: CQ

## 2021-08-02 RX ORDER — OXYCODONE HYDROCHLORIDE 5 MG/1
5 TABLET ORAL EVERY 8 HOURS PRN
Qty: 9 TABLET | Refills: 0 | OUTPATIENT
Start: 2021-08-02 | End: 2021-08-05

## 2021-08-02 RX ORDER — FLUOXETINE 10 MG/1
10 CAPSULE ORAL DAILY
Qty: 30 CAPSULE | Status: SHIPPED
Start: 2021-08-03 | End: 2021-12-14

## 2021-08-02 RX ORDER — OXYCODONE HYDROCHLORIDE 5 MG/1
5 TABLET ORAL EVERY 8 HOURS PRN
Qty: 9 TABLET | Refills: 0 | Status: SHIPPED | OUTPATIENT
Start: 2021-08-02 | End: 2021-08-02

## 2021-08-02 RX ORDER — CEFAZOLIN SODIUM 2 G/100ML
2 INJECTION, SOLUTION INTRAVENOUS EVERY 8 HOURS
Qty: 3000 ML | Refills: 0 | Status: SHIPPED
Start: 2021-08-02 | End: 2021-08-14

## 2021-08-02 RX ORDER — OXYCODONE HYDROCHLORIDE 5 MG/1
5 TABLET ORAL EVERY 8 HOURS PRN
Qty: 9 TABLET | Refills: 0 | Status: SHIPPED | OUTPATIENT
Start: 2021-08-02 | End: 2021-08-05

## 2021-08-02 RX ORDER — LOSARTAN POTASSIUM 50 MG/1
50 TABLET ORAL DAILY
Qty: 30 TABLET | Status: SHIPPED
Start: 2021-08-02 | End: 2021-09-30

## 2021-08-02 RX ORDER — CELECOXIB 200 MG/1
200 CAPSULE ORAL DAILY
Qty: 60 CAPSULE | Status: SHIPPED
Start: 2021-08-03 | End: 2021-12-14

## 2021-08-02 RX ADMIN — OMEPRAZOLE 20 MG: 20 CAPSULE, DELAYED RELEASE ORAL at 05:16

## 2021-08-02 RX ADMIN — LEVOTHYROXINE SODIUM 50 MCG: 0.05 TABLET ORAL at 05:16

## 2021-08-02 RX ADMIN — CELECOXIB 200 MG: 200 CAPSULE ORAL at 05:16

## 2021-08-02 RX ADMIN — GABAPENTIN 300 MG: 300 CAPSULE ORAL at 05:16

## 2021-08-02 RX ADMIN — PROPRANOLOL HYDROCHLORIDE 20 MG: 10 TABLET ORAL at 14:13

## 2021-08-02 RX ADMIN — CEFAZOLIN SODIUM 2 G: 2 INJECTION, SOLUTION INTRAVENOUS at 14:12

## 2021-08-02 RX ADMIN — PROPRANOLOL HYDROCHLORIDE 20 MG: 10 TABLET ORAL at 05:16

## 2021-08-02 RX ADMIN — LIDOCAINE 1 PATCH: 50 PATCH TOPICAL at 11:42

## 2021-08-02 RX ADMIN — PRIMIDONE 50 MG: 50 TABLET ORAL at 05:16

## 2021-08-02 RX ADMIN — CEFAZOLIN SODIUM 2 G: 2 INJECTION, SOLUTION INTRAVENOUS at 05:16

## 2021-08-02 RX ADMIN — GABAPENTIN 300 MG: 300 CAPSULE ORAL at 14:13

## 2021-08-02 RX ADMIN — ENOXAPARIN SODIUM 40 MG: 40 INJECTION SUBCUTANEOUS at 05:16

## 2021-08-02 RX ADMIN — FLUOXETINE 10 MG: 10 CAPSULE ORAL at 05:16

## 2021-08-02 ASSESSMENT — ENCOUNTER SYMPTOMS
DEPRESSION: 1
FEVER: 0
NAUSEA: 0
NERVOUS/ANXIOUS: 0
ABDOMINAL PAIN: 0
MYALGIAS: 1
SPUTUM PRODUCTION: 0
DIZZINESS: 0
DOUBLE VISION: 0
HEADACHES: 0
SHORTNESS OF BREATH: 1
VOMITING: 0
WHEEZING: 0
EYE PAIN: 0
SORE THROAT: 1
BLURRED VISION: 0
COUGH: 0

## 2021-08-02 ASSESSMENT — COGNITIVE AND FUNCTIONAL STATUS - GENERAL
CLIMB 3 TO 5 STEPS WITH RAILING: TOTAL
MOVING TO AND FROM BED TO CHAIR: A LOT
MOVING FROM LYING ON BACK TO SITTING ON SIDE OF FLAT BED: A LOT
MOBILITY SCORE: 10
STANDING UP FROM CHAIR USING ARMS: A LOT
TURNING FROM BACK TO SIDE WHILE IN FLAT BAD: A LOT
WALKING IN HOSPITAL ROOM: TOTAL
SUGGESTED CMS G CODE MODIFIER MOBILITY: CL

## 2021-08-02 ASSESSMENT — GAIT ASSESSMENTS: GAIT LEVEL OF ASSIST: UNABLE TO PARTICIPATE

## 2021-08-02 ASSESSMENT — PAIN DESCRIPTION - PAIN TYPE: TYPE: ACUTE PAIN

## 2021-08-02 NOTE — CARE PLAN
Problem: Pain - Standard  Goal: Alleviation of pain or a reduction in pain to the patient’s comfort goal  Outcome: Progressing     Problem: Knowledge Deficit - Standard  Goal: Patient and family/care givers will demonstrate understanding of plan of care, disease process/condition, diagnostic tests and medications  Outcome: Progressing     Problem: Skin Integrity  Goal: Skin integrity is maintained or improved  Outcome: Progressing     Problem: Fall Risk  Goal: Patient will remain free from falls  Outcome: Progressing   The patient is Watcher - Medium risk of patient condition declining or worsening    Shift Goals  Clinical Goals: Skin integrity  Patient Goals: comfort  Family Goals: No family present    Progress made toward(s) clinical / shift goals:  Q2 turns in place, barrier cream in place. Patient does have Deep tissue pressure injury noted on sacrum. Wound consulted. Patient has no complaints of pain at this time.    Patient is not progressing towards the following goals:

## 2021-08-02 NOTE — THERAPY
Physical Therapy   Daily Treatment     Patient Name: Kevin Jackson  Age:  76 y.o., Sex:  male  Medical Record #: [id number]  Today's Date: 8/2/2021     Precautions: (P) Fall Risk, Swallow Precautions ( See Comments)    Assessment    Pt eager to participate w/PT, wanting to get OOB. Pt managed to tramaine his sock/prosthesis w/set up only. Improvement w/his bed mobility tasks from last PT session, now min assist. Pt tolerated 1 sit<->stand from EOB w/FWW, does not demonstrate good upright posture. Pt transfer to the chair max assist stand pivot transfer. Pt is more approp for // bars to initiate his amb efforts. Improvement w/pain overall which allows for ease of mobility. Ongoing LBP.     Plan    Continue current treatment plan.    DC Equipment Recommendations: Unable to determine at this time  Discharge Recommendations: Recommend post-acute placement for additional physical therapy services prior to discharge home     Objective       08/02/21 1518   Other Treatments   Other Treatments Provided Pt donned his socks supine and prosthetic while seated EOB.    Balance   Sitting Balance (Static) Fair -   Sitting Balance (Dynamic) Fair -   Standing Balance (Static) Poor -   Standing Balance (Dynamic) Poor -   Weight Shift Sitting Fair   Weight Shift Standing Poor   Comments standing w/FWW   Gait Analysis   Gait Level Of Assist Unable to Participate   Comments Pt is more approp for the // bars @ this time.    Bed Mobility    Supine to Sit Minimal Assist  (HOB partially elevated and use of railing)   Sit to Supine   (pt left seated in chair)   Scooting Minimal Assist  (seated)   Rolling Minimal Assist to Rt.  (w/railing)   Skilled Intervention Verbal Cuing;Postural Facilitation;Compensatory Strategies   Comments Improvement w/his bed moblity from previous PT session.    Functional Mobility   Sit to Stand Moderate Assist  (from raised bed->FWW)   Bed, Chair, Wheelchair Transfer Maximal Assist  (EOB->chair)   Transfer Method  Stand Pivot   Skilled Intervention Verbal Cuing;Postural Facilitation;Compensatory Strategies   Comments Pt managed 1 sit->stand w/FWW, unable to stand upright long enough to initiate transfer w/the walker. Pt stand pivot transfer to the chair.    How much difficulty does the patient currently have...   Turning over in bed (including adjusting bedclothes, sheets and blankets)? 2   Sitting down on and standing up from a chair with arms (e.g., wheelchair, bedside commode, etc.) 2   Moving from lying on back to sitting on the side of the bed? 2   How much help from another person does the patient currently need...   Moving to and from a bed to a chair (including a wheelchair)? 2   Need to walk in a hospital room? 1   Climbing 3-5 steps with a railing? 1   6 clicks Mobility Score 10   Short Term Goals    Short Term Goal # 1 pt will be able to complete bed mobility with mod assist in 6tx in order to progress   Goal Outcome # 1 Progressing as expected   Short Term Goal # 2 pt will be able to complete functional transfers with LRAD and mod assist in 6tx in order to progress   Goal Outcome # 2 Goal not met

## 2021-08-02 NOTE — DISCHARGE PLANNING
Agency/Facility Name: [facility]  Spoke To: Malinda  Outcome: Malinda stated that pt can transfer to [facility] at 1600 today and has arranged wheelchair van transport    LSW Litzy Sharma notified
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
## 2021-08-02 NOTE — DISCHARGE PLANNING
Agency[facility]Facility Name: [facility]  Outcome: Left vmail regarding bed availability    Agency[facility] Name: [facility]  Spoke To: Malinda  Outcome: Malinda stated that [facility] will have a bed for pt tomorrow. Malinda also stated that she needs to review pt's chart further and will f/u with this DPA

## 2021-08-02 NOTE — PROGRESS NOTES
4 Eyes Skin Assessment Completed by MERVIN Mares and MERVIN Lazcano.    Head WDL  Ears WDL  Nose WDL  Mouth WDL  Neck WDL  Breast/Chest Scab  Shoulder Blades Redness  Spine Blanching  (R) Arm/Elbow/Hand Abrasion, Scab and Scar  (L) Arm/Elbow/Hand Abrasion, Scab and Scar  Abdomen Scab  Groin Blanching  Scrotum/Coccyx/Buttocks Redness, Non-Blanching and Discoloration  (R) Leg Scar, BKA  (L) Leg Abrasion  (R) Heel/Foot/Toe Scar, BKA  (L) Heel/Foot/Toe WDL, abrasion          Devices In Places: None      Interventions In Place Heel Mepilex, Pillows, Q2 Turns, Low Air Loss Mattress, Barrier Cream and Dri-Rich Pads    Possible Skin Injury Yes    Pictures Uploaded Into Epic Yes  Wound Consult Placed Yes  RN Wound Prevention Protocol Ordered Yes
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
## 2021-08-02 NOTE — DISCHARGE PLANNING
Anticipated Discharge Disposition: SNF     Action: Patient accepted to [facility] and [facility] confirmed that they have a bed available for him today. [facility] to [facility] today at 1600. Patient is agreeable to discharge to [facility] and signed COBRA. Attending nurse notified of discharge time.    LSW left message with patient's spouse, Jerome (#[phone]), informing her of transportation update.     Barriers to Discharge: None identified.     Plan: [facility] to [facility] at [facility].

## 2021-08-02 NOTE — DISCHARGE INSTRUCTIONS
Discharge Instructions    Discharged to [facility] by medical transportation with escort. Discharged via wheelchair, hospital escort: Yes.  Special equipment needed: Not Applicable    Be sure to schedule a follow-up appointment with your primary care doctor or any specialists as instructed.     Discharge Plan:   Diet Plan: Discussed  Activity Level: Discussed  Confirmed Follow up Appointment: Patient to Call and Schedule Appointment  Confirmed Symptoms Management: Discussed  Medication Reconciliation Updated: Yes    I understand that a diet low in cholesterol, fat, and sodium is recommended for good health. Unless I have been given specific instructions below for another diet, I accept this instruction as my diet prescription.   Other diet: Diabetic, soft and bite sized    Special Instructions:    Diabetic Ketoacidosis  Diabetic ketoacidosis is a serious complication of diabetes. This condition develops when there is not enough insulin in the body. Insulin is an hormone that regulates blood sugar levels in the body. Normally, insulin allows glucose to enter the cells in the body. The cells break down glucose for energy. Without enough insulin, the body cannot break down glucose, so it breaks down fats instead. This leads to high blood glucose levels in the body and the production of acids that are called ketones. Ketones are poisonous at high levels.  If diabetic ketoacidosis is not treated, it can cause severe dehydration and can lead to a coma or death.  What are the causes?  This condition develops when a lack of insulin causes the body to break down fats instead of glucose. This may be triggered by:  · Stress on the body. This stress is brought on by an illness.  · Infection.  · Medicines that raise blood glucose levels.  · Not taking diabetes medicine.  · New onset of type 1 diabetes mellitus.  What are the signs or symptoms?  Symptoms of this condition include:  · Fatigue.  · Weight loss.  · Excessive  thirst.  · Light-headedness.  · Fruity or sweet-smelling breath.  · Excessive urination.  · Vision changes.  · Confusion or irritability.  · Nausea.  · Vomiting.  · Rapid breathing.  · Abdominal pain.  · Feeling flushed.  How is this diagnosed?  This condition is diagnosed based on your medical history, a physical exam, and blood tests. You may also have a urine test to check for ketones.  How is this treated?  This condition may be treated with:  · Fluid replacement. This may be done to correct dehydration.  · Insulin injections. These may be given through the skin or through an IV tube.  · Electrolyte replacement. Electrolytes are minerals in your blood. Electrolytes such as potassium and sodium may be given in pill form or through an IV tube.  · Antibiotic medicines. These may be prescribed if your condition was caused by an infection.  Diabetic ketoacidosis is a serious medical condition. You may need emergency treatment in the hospital to monitor your condition.  Follow these instructions at home:  Eating and drinking  · Drink enough fluids to keep your urine clear or pale yellow.  · If you are not able to eat, drink clear fluids in small amounts as you are able. Clear fluids include water, ice chips, fruit juice with water added (diluted), and low-calorie sports drinks. You may also have sugar-free jello or popsicles.  · If you are able to eat, follow your usual diet and drink sugar-free liquids, such as water.  Medicines  · Take over-the-counter and prescription medicines only as told by your health care provider.  · Continue to take insulin and other diabetes medicines as told by your health care provider.  · If you were prescribed an antibiotic, take it as told by your health care provider. Do not stop taking the antibiotic even if you start to feel better.  General instructions    · Check your urine for ketones when you are ill and as told by your health care provider.  ? If your blood glucose is 240 mg/dL  (13.3 mmol/L) or higher, check your urine ketones every 4-6 hours.  · Check your blood glucose every day, as often as told by your health care provider.  ? If your blood glucose is high, drink plenty of fluids. This helps to flush out ketones.  ? If your blood glucose is above your target for 2 tests in a row, contact your health care provider.  · Carry a medical alert card or wear medical alert jewelry that says that you have diabetes.  · Rest and exercise only as told by your health care provider. Do not exercise when your blood glucose is high and you have ketones in your urine.  · If you get sick, call your health care provider and begin treatment quickly. Your body often needs extra insulin to fight an illness. Check your blood glucose every 4-6 hours when you are sick.  · Keep all follow-up visits as told by your health care provider. This is important.  Contact a health care provider if:  · Your blood glucose level is higher than 240 mg/dL (13.3 mmol/L) for 2 days in a row.  · You have moderate or large ketones in your urine.  · You have a fever.  · You cannot eat or drink without vomiting.  · You have been vomiting for more than 2 hours.  · You continue to have symptoms of diabetic ketoacidosis.  · You develop new symptoms.  Get help right away if:  · Your blood glucose monitor reads “high” even when you are taking insulin.  · You faint.  · You have chest pain.  · You have trouble breathing.  · You have sudden trouble speaking or swallowing.  · You have vomiting or diarrhea that gets worse after 3 hours.  · You are unable to stay awake.  · You have trouble thinking.  · You are severely dehydrated. Symptoms of severe dehydration include:  ? Extreme thirst.  ? Dry mouth.  ? Rapid breathing.  These symptoms may represent a serious problem that is an emergency. Do not wait to see if the symptoms will go away. Get medical help right away. Call your local emergency services (911 in the [place] Do not drive yourself  to the hospital.  Summary  · Diabetic ketoacidosis is a serious complication of diabetes. This condition develops when there is not enough insulin in the body.  · This condition is diagnosed based on your medical history, a physical exam, and blood tests. You may also have a urine test to check for ketones.  · Diabetic ketoacidosis is a serious medical condition. You may need emergency treatment in the hospital to monitor your condition.  · Contact your health care provider if your blood glucose is higher than 240 mg/dl for 2 days in a row or if you have moderate or large ketones in your urine.  This information is not intended to replace advice given to you by your health care provider. Make sure you discuss any questions you have with your health care provider.  Document Released: 12/15/2001 Document Revised: 02/02/2018 Document Reviewed: 01/22/2018  [employer] Patient Education © 2020 [employer]      Soft-Food Eating Plan  A soft-food eating plan includes foods that are safe and easy to chew and swallow. Your health care provider or dietitian can help you find foods and flavors that fit into this plan. Follow this plan until your health care provider or dietitian says it is safe to start eating other foods and food textures.  What are tips for following this plan?  General guidelines    · Take small bites of food, or cut food into pieces about ½ inch or smaller. Bite-sized pieces of food are easier to chew and swallow.  · Eat moist foods. Avoid overly dry foods.  · Avoid foods that:  ? Are difficult to swallow, such as dry, chunky, crispy, or sticky foods.  ? Are difficult to chew, such as hard, tough, or stringy foods.  ? Contain nuts, seeds, or fruits.  · Follow instructions from your dietitian about the types of liquids that are safe for you to swallow. You may be allowed to have:  ? Thick liquids only. This includes only liquids that are thicker than honey.  ? Thin and thick liquids. This includes all  beverages and foods that become liquid at room temperature.  · To make thick liquids:  ? Purchase a commercial liquid thickening powder. These are available at grocery stores and pharmacies.  ? Mix the thickener into liquids according to instructions on the label.  ? Purchase ready-made thickened liquids.  ? Thicken soup by pureeing, straining to remove chunks, and adding flour, potato flakes, or corn starch.  ? Add commercial thickener to foods that become liquid at room temperature, such as milk shakes, yogurt, ice cream, gelatin, and sherbet.  · Ask your health care provider whether you need to take a fiber supplement.  Cooking  · Cook meats so they stay tender and moist. Use methods like braising, stewing, or baking in liquid.  · Cook vegetables and fruit until they are soft enough to be mashed with a fork.  · Peel soft, fresh fruits such as peaches, nectarines, and melons.  · When making soup, make sure chunks of meat and vegetables are smaller than ½ inch.  · Reheat leftover foods slowly so that a tough crust does not form.  What foods are allowed?  The items listed below may not be a complete list. Talk with your dietitian about what dietary choices are best for you.  Grains  Breads, muffins, pancakes, or waffles moistened with syrup, jelly, or butter. Dry cereals well-moistened with milk. Moist, cooked cereals. Well-cooked pasta and rice.  Vegetables  All soft-cooked vegetables. Shredded lettuce.  Fruits  All canned and cooked fruits. Soft, peeled fresh fruits. Strawberries.  Dairy  Milk. Cream. Yogurt. Cottage cheese. Soft cheese without the rind.  Meats and other protein foods  Tender, moist ground meat, poultry, or fish. Meat cooked in gravy or sauces. Eggs.  Sweets and desserts  Ice cream. Milk shakes. Sherbet. Pudding.  Fats and oils  Butter. Margarine. Olive, canola, sunflower, and grapeseed oil. Smooth salad dressing. Smooth cream cheese. Mayonnaise. Gravy.  What foods are not allowed?  The items  listed bemay not be a complete list. Talk with your dietitian about what dietary choices are best for you.  Grains  Coarse or dry cereals, such as bran, granola, and shredded wheat. Tough or chewy crusty breads, such as Serbian bread or baguettes. Breads with nuts, seeds, or fruit.  Vegetables  All raw vegetables. Cooked corn. Cooked vegetables that are tough or stringy. Tough, crisp, fried potatoes and potato skins.  Fruits  Fresh fruits with skins or seeds, or both, such as apples, pears, and grapes. Stringy, high-pulp fruits, such as papaya, pineapple, coconut, and nidia. Fruit leather and all dried fruit.  Dairy  Yogurt with nuts or coconut.  Meats and other protein foods  Hard, dry sausages. Dry meat, poultry, or fish. Meats with gristle. Fish with bones. Fried meat or fish. Lunch meat and hotdogs. Nuts and seeds. Robinson peanut butter or other nut butters.  Sweets and desserts  Cakes or cookies that are very dry or chewy. Desserts with dried fruit, nuts, or coconut. Fried pastries. Very rich pastries.  Fats and oils  Cream cheese with fruit or nuts. Salad dressings with seeds or chunks.  Summary  · A soft-food eating plan includes foods that are safe and easy to swallow. Generally, the foods should be soft enough to be mashed with a fork.  · Avoid foods that are dry, hard to chew, crunchy, sticky, stringy, or crispy.  · Ask your health care provider whether you need to thicken your liquids and if you need to take a fiber supplement.  This information is not intended to replace advice given to you by your health care provider. Make sure you discuss any questions you have with your health care provider.  Document Released: 03/26/2009 Document Revised: 04/09/2020 Document Reviewed: 02/20/2018  Elsevier Patient Education © 2020 [employer]      · Is patient discharged on Warfarin / Coumadin?   No     Depression / Suicide Risk    As you are discharged from this [facility] facility, it is important to learn how  to keep safe from harming yourself.    Recognize the warning signs:  · Abrupt changes in personality, positive or negative- including increase in energy   · Giving away possessions  · Change in eating patterns- significant weight changes-  positive or negative  · Change in sleeping patterns- unable to sleep or sleeping all the time   · Unwillingness or inability to communicate  · Depression  · Unusual sadness, discouragement and loneliness  · Talk of wanting to die  · Neglect of personal appearance   · Rebelliousness- reckless behavior  · Withdrawal from people/activities they love  · Confusion- inability to concentrate     If you or a loved one observes any of these behaviors or has concerns about self-harm, here's what you can do:  · Talk about it- your feelings and reasons for harming yourself  · Remove any means that you might use to hurt yourself (examples: pills, rope, extension cords, firearm)  · Get professional help from the community (Mental Health, Substance Abuse, psychological counseling)  · Do not be alone:Call your Safe Contact- someone whom you trust who will be there for you.  · Call your local CRISIS HOTLINE 275-9142 or 906-962-5459  · Call your local Children's Mobile Crisis Response Team [place] (397) 789-9983 or www.[employer]  · Call the toll free National Suicide Prevention Hotlines   · National Suicide Prevention Lifeline 196-918-TIQQ (3205)  · National Hope Line Network 800-SUICIDE (576-5489)

## 2021-08-02 NOTE — PROGRESS NOTES
Pt [facility].  Discharge instructions given to pt. Pt verbalized understanding of instructions.   Patient has Right upper midline in place  Pt' s belongings with patient upon leaving unit.

## 2021-08-02 NOTE — CARE PLAN
The patient is Stable - Low risk of patient condition declining or worsening    Shift Goals  Clinical Goals: monitor blood glucose and abx effectiveness  Patient Goals: comfort   Family Goals: No family present    Progress made toward(s) clinical / shift goals:  meds effective    Patient is not progressing towards the following goals:
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
## 2021-08-02 NOTE — DISCHARGE PLANNING
Received Choice form at [id number]  Agency/Facility Name: [facility]  Referral sent per Choice form @ [phone]

## 2021-08-11 PROBLEM — L89.92 PRESSURE INJURY, STAGE 2 (HCC): Status: ACTIVE | Noted: 2021-08-11

## 2021-08-18 ENCOUNTER — TELEPHONE (OUTPATIENT)
Dept: INFECTIOUS DISEASES | Facility: MEDICAL CENTER | Age: 76
End: 2021-08-18

## 2021-08-18 NOTE — TELEPHONE ENCOUNTER
[facility] called in reference to scheduling pt a follow up appointment. PerGerri APRSANDRA a follow up after completion of IV abx on 8/14 is not necessary if the patient is feeling okay and no signs of infection are present. Spoke with Unit Nurse Nona and she states the patient is doing well and does not feel a follow up is needed .

## 2021-09-01 PROBLEM — S80.811A ABRASION OF RIGHT LEG: Status: ACTIVE | Noted: 2021-09-01

## 2021-09-02 ENCOUNTER — TELEPHONE (OUTPATIENT)
Dept: HEALTH INFORMATION MANAGEMENT | Facility: OTHER | Age: 76
End: 2021-09-02

## 2021-09-02 NOTE — TELEPHONE ENCOUNTER
HIPAA Verified - Spouse called to verify appointments scheduled. Informed that these are with [facility], provided their number to them to clarify.     All was understood.    -aep

## 2021-09-29 ENCOUNTER — TELEPHONE (OUTPATIENT)
Dept: MEDICAL GROUP | Age: 76
End: 2021-09-29

## 2021-09-29 DIAGNOSIS — L89.90 PRESSURE INJURY OF SKIN, UNSPECIFIED INJURY STAGE, UNSPECIFIED LOCATION: ICD-10-CM

## 2021-09-29 NOTE — TELEPHONE ENCOUNTER
VOICEMAIL  1. Caller Name: Soheila RN at [facility]                      Call Back Number: [phone]    2. Message: Soheila would like an order for wound care. Please sign pending referral and I will fax and call her back when done?    3. Patient approves office to leave a detailed voicemail/MyChart message: N\A

## 2021-09-30 ENCOUNTER — OFFICE VISIT (OUTPATIENT)
Dept: MEDICAL GROUP | Age: 76
End: 2021-09-30
Payer: MEDICARE

## 2021-09-30 VITALS
HEART RATE: 80 BPM | HEIGHT: 73 IN | DIASTOLIC BLOOD PRESSURE: 60 MMHG | SYSTOLIC BLOOD PRESSURE: 94 MMHG | OXYGEN SATURATION: 92 % | WEIGHT: 180 LBS | BODY MASS INDEX: 23.86 KG/M2 | TEMPERATURE: 97.6 F

## 2021-09-30 DIAGNOSIS — Z09 HOSPITAL DISCHARGE FOLLOW-UP: ICD-10-CM

## 2021-09-30 DIAGNOSIS — T14.90XA TRAUMA: ICD-10-CM

## 2021-09-30 DIAGNOSIS — M54.40 CHRONIC LOW BACK PAIN WITH SCIATICA, SCIATICA LATERALITY UNSPECIFIED, UNSPECIFIED BACK PAIN LATERALITY: ICD-10-CM

## 2021-09-30 DIAGNOSIS — R78.81 MSSA BACTEREMIA: ICD-10-CM

## 2021-09-30 DIAGNOSIS — E03.4 HYPOTHYROIDISM DUE TO ACQUIRED ATROPHY OF THYROID: ICD-10-CM

## 2021-09-30 DIAGNOSIS — G89.21 CHRONIC PAIN DUE TO TRAUMA: ICD-10-CM

## 2021-09-30 DIAGNOSIS — E78.2 MIXED HYPERLIPIDEMIA: ICD-10-CM

## 2021-09-30 DIAGNOSIS — I10 ESSENTIAL HYPERTENSION: ICD-10-CM

## 2021-09-30 DIAGNOSIS — G89.29 CHRONIC LOW BACK PAIN WITH SCIATICA, SCIATICA LATERALITY UNSPECIFIED, UNSPECIFIED BACK PAIN LATERALITY: ICD-10-CM

## 2021-09-30 DIAGNOSIS — S06.6X0A SUBARACHNOID HEMORRHAGE-NO COMA, INITIAL ENCOUNTER (HCC): ICD-10-CM

## 2021-09-30 DIAGNOSIS — E11.10 DIABETIC KETOACIDOSIS WITHOUT COMA ASSOCIATED WITH TYPE 2 DIABETES MELLITUS (HCC): ICD-10-CM

## 2021-09-30 DIAGNOSIS — S22.43XA MULTIPLE FRACTURES OF RIBS, BILATERAL, INITIAL ENCOUNTER FOR CLOSED FRACTURE: ICD-10-CM

## 2021-09-30 DIAGNOSIS — F32.4 MAJOR DEPRESSIVE DISORDER WITH SINGLE EPISODE, IN PARTIAL REMISSION (HCC): ICD-10-CM

## 2021-09-30 DIAGNOSIS — F51.01 PRIMARY INSOMNIA: ICD-10-CM

## 2021-09-30 DIAGNOSIS — B95.61 MSSA BACTEREMIA: ICD-10-CM

## 2021-09-30 PROCEDURE — 99214 OFFICE O/P EST MOD 30 MIN: CPT | Performed by: INTERNAL MEDICINE

## 2021-09-30 RX ORDER — GABAPENTIN 600 MG/1
600 TABLET ORAL 3 TIMES DAILY
Qty: 90 TABLET | Refills: 5 | Status: SHIPPED
Start: 2021-09-30 | End: 2021-12-14

## 2021-09-30 RX ORDER — HYDROCODONE BITARTRATE AND ACETAMINOPHEN 5; 325 MG/1; MG/1
1 TABLET ORAL EVERY 4 HOURS PRN
Qty: 42 TABLET | Refills: 0 | Status: SHIPPED | OUTPATIENT
Start: 2021-09-30 | End: 2021-10-07

## 2021-09-30 RX ORDER — MIRTAZAPINE 15 MG/1
15 TABLET, FILM COATED ORAL
Qty: 30 TABLET | Refills: 5 | Status: SHIPPED
Start: 2021-09-30 | End: 2021-09-30

## 2021-09-30 RX ORDER — MIRTAZAPINE 30 MG/1
30 TABLET, FILM COATED ORAL
Qty: 30 TABLET | Refills: 5 | Status: SHIPPED
Start: 2021-09-30 | End: 2022-05-18

## 2021-09-30 ASSESSMENT — ENCOUNTER SYMPTOMS
EYES NEGATIVE: 1
NEUROLOGICAL NEGATIVE: 1
MUSCULOSKELETAL NEGATIVE: 1
PSYCHIATRIC NEGATIVE: 1
RESPIRATORY NEGATIVE: 1
CONSTITUTIONAL NEGATIVE: 1
GASTROINTESTINAL NEGATIVE: 1
CARDIOVASCULAR NEGATIVE: 1

## 2021-09-30 ASSESSMENT — FIBROSIS 4 INDEX: FIB4 SCORE: 1.18

## 2021-09-30 NOTE — LETTER
September 30, 2021        Kevin Petros Jackson  1945    Dear to whom it may concern:       Mr Jackson was seen in my clinic today on 09/30/2021 and is cleared to have any dental work done.    Please call me if you have any questions.     Thank you      Ky Hernandez M.D.  Electronically signed

## 2021-09-30 NOTE — TELEPHONE ENCOUNTER
Phone Number Called: [phone]    Call outcome: Spoke with Soheila at [facility]    Message: I faxed over the order to [facility] to [phone].
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
## 2021-11-16 ENCOUNTER — OFFICE VISIT (OUTPATIENT)
Dept: MEDICAL GROUP | Age: 76
End: 2021-11-16
Payer: MEDICARE

## 2021-11-16 VITALS
BODY MASS INDEX: 25.63 KG/M2 | TEMPERATURE: 98.4 F | OXYGEN SATURATION: 93 % | DIASTOLIC BLOOD PRESSURE: 84 MMHG | WEIGHT: 193.4 LBS | HEIGHT: 73 IN | SYSTOLIC BLOOD PRESSURE: 128 MMHG | HEART RATE: 86 BPM

## 2021-11-16 DIAGNOSIS — E78.2 MIXED HYPERLIPIDEMIA: ICD-10-CM

## 2021-11-16 DIAGNOSIS — E03.4 HYPOTHYROIDISM DUE TO ACQUIRED ATROPHY OF THYROID: ICD-10-CM

## 2021-11-16 DIAGNOSIS — E55.9 VITAMIN D DEFICIENCY: ICD-10-CM

## 2021-11-16 DIAGNOSIS — E08.41 DIABETIC MONONEUROPATHY ASSOCIATED WITH DIABETES MELLITUS DUE TO UNDERLYING CONDITION (HCC): ICD-10-CM

## 2021-11-16 DIAGNOSIS — R60.9 EDEMA, UNSPECIFIED TYPE: ICD-10-CM

## 2021-11-16 DIAGNOSIS — G25.0 BENIGN ESSENTIAL TREMOR: ICD-10-CM

## 2021-11-16 DIAGNOSIS — I10 ESSENTIAL HYPERTENSION: ICD-10-CM

## 2021-11-16 PROCEDURE — 99214 OFFICE O/P EST MOD 30 MIN: CPT | Performed by: INTERNAL MEDICINE

## 2021-11-16 RX ORDER — GABAPENTIN 300 MG/1
300 CAPSULE ORAL
Qty: 180 CAPSULE | Refills: 3 | Status: SHIPPED | OUTPATIENT
Start: 2021-11-16 | End: 2022-10-05

## 2021-11-16 RX ORDER — INSULIN DEGLUDEC 100 U/ML
34 INJECTION, SOLUTION SUBCUTANEOUS
Qty: 15 EACH | Refills: 3 | Status: SHIPPED | DISCHARGE
Start: 2021-11-16 | End: 2023-07-26 | Stop reason: SDUPTHER

## 2021-11-16 RX ORDER — LOSARTAN POTASSIUM AND HYDROCHLOROTHIAZIDE 12.5; 5 MG/1; MG/1
TABLET ORAL
COMMUNITY
Start: 2021-08-23 | End: 2021-11-16

## 2021-11-16 RX ORDER — MIRTAZAPINE 15 MG/1
TABLET, FILM COATED ORAL
COMMUNITY
Start: 2021-10-09 | End: 2021-11-16

## 2021-11-16 RX ORDER — LOSARTAN POTASSIUM AND HYDROCHLOROTHIAZIDE 12.5; 5 MG/1; MG/1
TABLET ORAL
COMMUNITY
End: 2021-12-14

## 2021-11-16 RX ORDER — LEVOTHYROXINE SODIUM 50 UG/1
CAPSULE ORAL
COMMUNITY
End: 2021-11-16

## 2021-11-16 RX ORDER — AZITHROMYCIN 250 MG/1
TABLET, FILM COATED ORAL
COMMUNITY
Start: 2021-09-17 | End: 2021-11-16

## 2021-11-16 ASSESSMENT — FIBROSIS 4 INDEX: FIB4 SCORE: 1.18

## 2021-11-16 ASSESSMENT — ENCOUNTER SYMPTOMS
NEUROLOGICAL NEGATIVE: 1
CARDIOVASCULAR NEGATIVE: 1
CONSTITUTIONAL NEGATIVE: 1
PSYCHIATRIC NEGATIVE: 1
MUSCULOSKELETAL NEGATIVE: 1
GASTROINTESTINAL NEGATIVE: 1
EYES NEGATIVE: 1
RESPIRATORY NEGATIVE: 1
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
## 2021-11-21 ENCOUNTER — OFFICE VISIT (OUTPATIENT)
Dept: URGENT CARE | Facility: CLINIC | Age: 76
End: 2021-11-21
Payer: MEDICARE

## 2021-11-21 VITALS
TEMPERATURE: 97.5 F | SYSTOLIC BLOOD PRESSURE: 108 MMHG | WEIGHT: 193 LBS | HEART RATE: 94 BPM | OXYGEN SATURATION: 98 % | BODY MASS INDEX: 25.58 KG/M2 | HEIGHT: 73 IN | RESPIRATION RATE: 14 BRPM | DIASTOLIC BLOOD PRESSURE: 72 MMHG

## 2021-11-21 DIAGNOSIS — L03.011 PARONYCHIA OF FINGER, RIGHT: ICD-10-CM

## 2021-11-21 DIAGNOSIS — L03.011 CELLULITIS OF RIGHT INDEX FINGER: ICD-10-CM

## 2021-11-21 PROCEDURE — 10060 I&D ABSCESS SIMPLE/SINGLE: CPT | Mod: F5 | Performed by: FAMILY MEDICINE

## 2021-11-21 RX ORDER — CEPHALEXIN 500 MG/1
500 CAPSULE ORAL 4 TIMES DAILY
Qty: 20 CAPSULE | Refills: 0 | Status: SHIPPED | OUTPATIENT
Start: 2021-11-21 | End: 2021-11-26

## 2021-11-21 ASSESSMENT — ENCOUNTER SYMPTOMS
CHILLS: 0
FEVER: 0
VOMITING: 0
COUGH: 0
MYALGIAS: 0
SORE THROAT: 0

## 2021-11-21 ASSESSMENT — FIBROSIS 4 INDEX: FIB4 SCORE: 1.18

## 2021-11-21 NOTE — PROGRESS NOTES
Subjective:   Kevin Jackson is a 76 y.o. male who presents for Hand Pain (3x days, right hand index finger, not able to bend, swollen )        Hand Injury  Chronicity: Complains of redness, swelling, distal aspect right index finger, denies recollection of traumatic injury. Episode onset: 3 days. The problem occurs constantly. The problem has been gradually worsening. Pertinent negatives include no chills, coughing, fever, myalgias, rash, sore throat or vomiting. Exacerbated by: Direct pressure, movement. He has tried rest for the symptoms. The treatment provided no relief.     PMH:  has a past medical history of Arrhythmia (01/2018), Bowel habit changes, Diabetes, Diabetes (HCC), High cholesterol, Hyperlipidemia, Hypertension, Muscle disorder, Pain, Pneumonia, Tremors of nervous system, and Urinary incontinence. He also has no past medical history of CAD (coronary artery disease).  MEDS:   Current Outpatient Medications:   •  cephALEXin (KEFLEX) 500 MG Cap, Take 1 Capsule by mouth 4 times a day for 5 days., Disp: 20 Capsule, Rfl: 0  •  losartan-hydrochlorothiazide (HYZAAR) 50-12.5 MG per tablet, 1 tablet, Disp: , Rfl:   •  mupirocin (BACTROBAN) 2 % Ointment, , Disp: , Rfl:   •  Insulin Degludec (TRESIBA FLEXTOUCH) 100 UNIT/ML Solution Pen-injector, Inject 34 Units under the skin at bedtime., Disp: 15 Each, Rfl: 3  •  gabapentin (NEURONTIN) 300 MG Cap, Take 1 Capsule by mouth 2 times a day., Disp: 180 Capsule, Rfl: 3  •  gabapentin (NEURONTIN) 600 MG tablet, Take 1 Tablet by mouth 3 times a day., Disp: 90 Tablet, Rfl: 5  •  mirtazapine (REMERON) 30 MG TABLET DISPERSIBLE, Take 1 Tablet by mouth at bedtime., Disp: 30 Tablet, Rfl: 5  •  celecoxib (CELEBREX) 200 MG Cap, Take 1 capsule by mouth every day., Disp: 60 capsule, Rfl:   •  FLUoxetine (PROZAC) 10 MG Cap, Take 1 capsule by mouth every day., Disp: 30 capsule, Rfl:   •  enoxaparin (LOVENOX) 30 MG/0.3ML Solution inj, Inject 0.3 mL under the skin every 12  hours., Disp: , Rfl:   •  insulin regular (HUMULIN R) 100 Unit/mL Solution, Inject 1-6 Units under the skin 4 Times a Day,Before Meals and at Bedtime., Disp: 10 mL, Rfl:   •  Dextrose, Diabetic Use, (GLUCOSE) 4 g chewable tablet, Chew 4 Tablets as needed for Low Blood Sugar (If FSBG is less than or equal to 70 mg/dL and patient able to eat or drink)., Disp: 30 tablet, Rfl:   •  lidocaine (LIDODERM) 5 % Patch, Place 1-2 Patches on the skin every 24 hours., Disp: 10 Patch, Rfl:   •  metaxalone (SKELAXIN) 400 MG Tab, Take 1 tablet by mouth 3 times a day., Disp: 90 tablet, Rfl:   •  atorvastatin (LIPITOR) 80 MG tablet, Take 80 mg by mouth every evening., Disp: , Rfl:   •  primidone (MYSOLINE) 50 MG Tab, Take 50 mg by mouth every day. Once daily, Disp: , Rfl:   •  Semaglutide, 1 MG/DOSE, (OZEMPIC, 1 MG/DOSE,) 2 MG/1.5ML Solution Pen-injector, Inject 1 mg under the skin every Monday., Disp: , Rfl:   •  finasteride (PROSCAR) 5 MG Tab, Take 5 mg by mouth every day., Disp: , Rfl:   •  hydrocortisone 2.5 % Cream topical cream, Apply 1 Application to affected area(s) 1 time daily as needed., Disp: , Rfl:   •  Insulin Pen Needle 32G X 6 MM Misc, 1-2, Disp: , Rfl:   •  glucose blood (ONE TOUCH ULTRA TEST) strip, 3-4, Disp: , Rfl:   •  INSULIN LISP PROT & LISP, HUM, (HUMALOG MIX 50/50) (50-50) 100 UNIT/ML Suspension, 30 u, Disp: , Rfl:   •  vitamin D (CHOLECALCIFEROL) 1000 UNIT Tab, 1000 IU 2 tab, Disp: , Rfl:   •  atorvastatin (LIPITOR) 20 MG Tab, Take 80 mg by mouth every evening., Disp: , Rfl:   •  aspirin EC 81 MG EC tablet, Take 1 Tab by mouth every day., Disp: 100 Tab, Rfl: 2  •  losartan (COZAAR) 50 MG Tab, Take 1 Tab by mouth every day., Disp: 90 Tab, Rfl: 2  •  tamsulosin (FLOMAX) 0.4 MG capsule, Take 2 Caps by mouth every day., Disp: 60 Cap, Rfl: 2  •  glimepiride (AMARYL) 4 MG Tab, Take 1 Tab by mouth every morning., Disp: 30 Tab, Rfl: 11  •  Empagliflozin (JARDIANCE) 25 MG Tab, Take 25 mg by mouth every day., Disp: 90  "Tab, Rfl: 4  •  metformin (GLUCOPHAGE) 1000 MG tablet, Take 1 Tab by mouth 2 times a day, with meals., Disp: 60 Tab, Rfl: 11  •  levothyroxine (SYNTHROID) 50 MCG Tab, Take 1 Tab by mouth every morning before breakfast., Disp: 30 Tab, Rfl: 11  ALLERGIES:   Allergies   Allergen Reactions   • Sulfa Drugs Unspecified     SURGHX:   Past Surgical History:   Procedure Laterality Date   • KNEE AMPUTATION BELOW Right 9/5/2019    Procedure: AMPUTATION, BELOW KNEE;  Surgeon: Shivam Amezcua M.D.;  Location: SURGERY [facility];  Service: Vascular   • AMPUTATION, TOE  2/2013    all 5 toes left foot   • CARPAL TUNNEL RELEASE     • OTHER Left     rotator cuff    • OTHER Bilateral     carpal tunnle surgery    • OTHER ORTHOPEDIC SURGERY      right foot    • OTHER ORTHOPEDIC SURGERY       SOCHX:  reports that he has never smoked. He has never used smokeless tobacco. He reports previous alcohol use. He reports that he does not use drugs.  FH:   Family History   Problem Relation Age of Onset   • Arthritis Mother    • Cancer Mother    • Hypertension Mother    • Heart Disease Mother    • Cancer Father         colon   • Arthritis Father    • Genetic Disorder Father    • Diabetes Father    • Hyperlipidemia Father    • Stroke Father    • Heart Disease Father      Review of Systems   Constitutional: Negative for chills and fever.   HENT: Negative for sore throat.    Respiratory: Negative for cough.    Gastrointestinal: Negative for vomiting.   Musculoskeletal: Negative for myalgias.   Skin: Negative for rash.        Objective:   /72 (BP Location: Left arm, Patient Position: Sitting, BP Cuff Size: Adult)   Pulse 94   Temp 36.4 °C (97.5 °F) (Temporal)   Resp 14   Ht 1.854 m (6' 1\")   Wt 87.5 kg (193 lb)   SpO2 98%   BMI 25.46 kg/m²   Physical Exam  Vitals and nursing note reviewed.   Constitutional:       General: He is not in acute distress.     Appearance: He is well-developed.   HENT:      Head: Normocephalic and atraumatic. "      Right Ear: External ear normal.      Left Ear: External ear normal.      Nose: Nose normal.      Mouth/Throat:      Mouth: Mucous membranes are moist.   Eyes:      Conjunctiva/sclera: Conjunctivae normal.   Cardiovascular:      Rate and Rhythm: Normal rate.   Pulmonary:      Effort: Pulmonary effort is normal. No respiratory distress.      Breath sounds: Normal breath sounds.   Abdominal:      General: There is no distension.   Musculoskeletal:         General: Normal range of motion.        Hands:    Skin:     General: Skin is warm and dry.   Neurological:      General: No focal deficit present.      Mental Status: He is alert and oriented to person, place, and time. Mental status is at baseline.      Gait: Gait (gait at baseline) normal.   Psychiatric:         Judgment: Judgment normal.           Assessment/Plan:   1. Paronychia of finger, right  - cephALEXin (KEFLEX) 500 MG Cap; Take 1 Capsule by mouth 4 times a day for 5 days.  Dispense: 20 Capsule; Refill: 0    2. Cellulitis of right index finger  - cephALEXin (KEFLEX) 500 MG Cap; Take 1 Capsule by mouth 4 times a day for 5 days.  Dispense: 20 Capsule; Refill: 0        Medical Decision Making/Course:  In the course of preparing for this visit with review of the pertinent past medical history, recent and past clinic visits, current medications, and performing chart, immunization, medical history and medication reconciliation, and in the further course of obtaining the current history pertinent to the clinic visit today, performing an exam and evaluation, ordering and independently evaluating labs, tests, and/or procedures including incision and drainage of paronychia see procedure note, prescribing any recommended new medications as noted above, providing any pertinent counseling and education and recommending further coordination of care, at least  30 minutes of total time were spent during this encounter.      Discussed close monitoring, return  precautions, and supportive measures of maintaining adequate fluid hydration and caloric intake, relative rest and symptom management as needed for pain and/or fever.    Differential diagnosis, natural history, supportive care, and indications for immediate follow-up discussed.     Advised the patient to follow-up with the primary care physician for recheck, reevaluation, and consideration of further management.    Please note that this dictation was created using voice recognition software. I have worked with consultants from the vendor as well as technical experts from [employer] [employer] to optimize the interface. I have made every reasonable attempt to correct obvious errors, but I expect that there are errors of grammar and possibly content that I did not discover before finalizing the note.

## 2021-11-21 NOTE — PROCEDURES
I and D    Date/Time: 11/21/2021 9:54 AM  Performed by: Evan Heck M.D.  Authorized by: Evan Heck M.D.   Type: abscess  Location: Right index finger.  Scalpel size: 15  Incision type: single straight  Incision depth: subcutaneous (Eponychium elevated from fingernail)  Complexity: simple  Drainage: purulent  Drainage amount: moderate  Wound treatment: wound left open  Patient tolerance: patient tolerated the procedure well with no immediate complications  Comments: The patient was given wound care instructions and precautions

## 2021-11-26 ENCOUNTER — OFFICE VISIT (OUTPATIENT)
Dept: URGENT CARE | Facility: CLINIC | Age: 76
End: 2021-11-26
Payer: MEDICARE

## 2021-11-26 VITALS
BODY MASS INDEX: 25.58 KG/M2 | RESPIRATION RATE: 16 BRPM | OXYGEN SATURATION: 92 % | TEMPERATURE: 97.3 F | HEIGHT: 73 IN | DIASTOLIC BLOOD PRESSURE: 68 MMHG | SYSTOLIC BLOOD PRESSURE: 120 MMHG | WEIGHT: 193 LBS | HEART RATE: 98 BPM

## 2021-11-26 DIAGNOSIS — L03.011 CELLULITIS OF RIGHT INDEX FINGER: ICD-10-CM

## 2021-11-26 DIAGNOSIS — L03.011 PARONYCHIA OF FINGER, RIGHT: ICD-10-CM

## 2021-11-26 PROCEDURE — 99213 OFFICE O/P EST LOW 20 MIN: CPT | Performed by: PHYSICIAN ASSISTANT

## 2021-11-26 RX ORDER — DOXYCYCLINE HYCLATE 100 MG
100 TABLET ORAL 2 TIMES DAILY
Qty: 10 TABLET | Refills: 0 | Status: SHIPPED | OUTPATIENT
Start: 2021-11-26 | End: 2021-12-01

## 2021-11-26 ASSESSMENT — ENCOUNTER SYMPTOMS
FEVER: 0
ABDOMINAL PAIN: 0
PALPITATIONS: 0
MYALGIAS: 0
NAUSEA: 0
CHILLS: 0
HEADACHES: 0
VOMITING: 0
DIZZINESS: 0
TINGLING: 0

## 2021-11-26 ASSESSMENT — FIBROSIS 4 INDEX: FIB4 SCORE: 1.18

## 2021-11-26 NOTE — PROGRESS NOTES
Subjective:   Kevin Jackson is a 76 y.o. male who presents for Wound Infection (seen 11/21/2021, right index finger, might need drained )      HPI:  This is a very pleasant 76-year-old male presenting to the clinic with a persistent infection on his right index finger x1 week.  The patient was last seen in clinic on 11/21/2021 and had a paronychia drained.  He was placed on a 5-day course of Keflex.  He completed this antibiotic regimen.  States over the last few days he has noticed once again continued swelling and pain to the right index finger proximal to the nail.  He has been soaking in Epson salt.  Denies any discharge or drainage.  There is no ascending redness or streaking.  Denies any fevers, chills or myalgias.  Pain is minimal at this time.  Believes he may benefit from repeat I&D at this time.     Review of Systems   Constitutional: Negative for chills, fever and malaise/fatigue.   Cardiovascular: Negative for chest pain and palpitations.   Gastrointestinal: Negative for abdominal pain, nausea and vomiting.   Musculoskeletal: Negative for joint pain and myalgias.   Skin:        Right index finger infection   Neurological: Negative for dizziness, tingling and headaches.       Medications:    • aspirin  • atorvastatin  • atorvastatin Tabs  • celecoxib Caps  • cephALEXin Caps  • doxycycline Tabs  • Empagliflozin Tabs  • enoxaparin Soln  • finasteride Tabs  • FLUoxetine Caps  • gabapentin  • gabapentin Caps  • glimepiride Tabs  • glucose  • glucose blood  • hydrocortisone Crea  • INSULIN LISP PROT & LISP (HUM) Susp  • Insulin Pen Needle Misc  • insulin regular Soln  • levothyroxine Tabs  • lidocaine Ptch  • losartan Tabs  • losartan-hydrochlorothiazide  • metaxalone Tabs  • metformin  • mirtazapine Tbdp  • mupirocin Oint  • Ozempic (1 MG/DOSE) Sopn  • primidone Tabs  • tamsulosin  • Tresiba FlexTouch Sopn  • vitamin D Tabs    Allergies: Sulfa drugs    Problem List: Kevin Jackson does not have any  "pertinent problems on file.    Surgical History:  Past Surgical History:   Procedure Laterality Date   • KNEE AMPUTATION BELOW Right 9/5/2019    Procedure: AMPUTATION, BELOW KNEE;  Surgeon: Shivam Amezcua M.D.;  Location: SURGERY [facility];  Service: Vascular   • AMPUTATION, TOE  2/2013    all 5 toes left foot   • CARPAL TUNNEL RELEASE     • OTHER Left     rotator cuff    • OTHER Bilateral     carpal tunnle surgery    • OTHER ORTHOPEDIC SURGERY      right foot    • OTHER ORTHOPEDIC SURGERY         Past Social Hx: Kevin Jackson  reports that he has never smoked. He has never used smokeless tobacco. He reports previous alcohol use. He reports that he does not use drugs.     Past Family Hx:  Kevin Jackson family history includes Arthritis in his father and mother; Cancer in his father and mother; Diabetes in his father; Genetic Disorder in his father; Heart Disease in his father and mother; Hyperlipidemia in his father; Hypertension in his mother; Stroke in his father.     Problem list, medications, and allergies reviewed by myself today in Epic.     Objective:     /68 (BP Location: Left arm, Patient Position: Sitting, BP Cuff Size: Adult)   Pulse 98   Temp 36.3 °C (97.3 °F) (Temporal)   Resp 16   Ht 1.854 m (6' 1\")   Wt 87.5 kg (193 lb)   SpO2 92%   BMI 25.46 kg/m²     Physical Exam  Constitutional:       General: He is not in acute distress.     Appearance: Normal appearance. He is not ill-appearing, toxic-appearing or diaphoretic.   HENT:      Head: Normocephalic and atraumatic.   Eyes:      Conjunctiva/sclera: Conjunctivae normal.   Cardiovascular:      Rate and Rhythm: Normal rate and regular rhythm.      Pulses: Normal pulses.      Heart sounds: Normal heart sounds.   Pulmonary:      Effort: Pulmonary effort is normal.   Musculoskeletal:      Cervical back: Normal range of motion.      Comments: Right index finger: Paronychia extending from the cuticle.  Measures approximately 1 x 1 cm " in size.  Underlying purulence present.  No surrounding erythema.  No ascending redness or streaking.  Sensation is intact distally.   Neurological:      General: No focal deficit present.      Mental Status: He is alert and oriented to person, place, and time. Mental status is at baseline.           Assessment/Plan:     Comments/MDM:     • Index finger was gently massaged.  Purulence was able to be expressed from the prior incision near the cuticle.  All pus was removed.  Topical antibiotic and sterile dressing placed.  We will place patient on a course of doxycycline.  Encouraged frequent warm soaks and Epson salt.  Gradual massage over the area to maintain drainage if present.  Discussed signs and symptoms related to worsening infection that would warrant emergent follow-up.  Please call with any questions or concerns.     Diagnosis and associated orders:     1. Paronychia of finger, right  doxycycline (VIBRAMYCIN) 100 MG Tab   2. Cellulitis of right index finger  doxycycline (VIBRAMYCIN) 100 MG Tab              Differential diagnosis, natural history, supportive care, and indications for immediate follow-up discussed.    Advised the patient to follow-up with the primary care physician for recheck, reevaluation, and consideration of further management.    Please note that this dictation was created using voice recognition software. I have made reasonable attempt to correct obvious errors, but I expect that there are errors of grammar and possibly content that I did not discover before finalizing the note.    This note was electronically signed by JAMIE Javier PA-C

## 2021-12-03 ENCOUNTER — HOSPITAL ENCOUNTER (OUTPATIENT)
Dept: RADIOLOGY | Facility: MEDICAL CENTER | Age: 76
End: 2021-12-03
Attending: INTERNAL MEDICINE
Payer: MEDICARE

## 2021-12-03 DIAGNOSIS — R60.9 EDEMA, UNSPECIFIED TYPE: ICD-10-CM

## 2021-12-03 DIAGNOSIS — M71.20 SYNOVIAL CYST OF POPLITEAL SPACE, UNSPECIFIED LATERALITY: ICD-10-CM

## 2021-12-03 PROCEDURE — 93971 EXTREMITY STUDY: CPT | Mod: LT

## 2021-12-14 ENCOUNTER — OFFICE VISIT (OUTPATIENT)
Dept: MEDICAL GROUP | Age: 76
End: 2021-12-14
Payer: MEDICARE

## 2021-12-14 VITALS
WEIGHT: 201.4 LBS | HEIGHT: 73 IN | HEART RATE: 93 BPM | OXYGEN SATURATION: 94 % | TEMPERATURE: 98.5 F | DIASTOLIC BLOOD PRESSURE: 78 MMHG | SYSTOLIC BLOOD PRESSURE: 112 MMHG | BODY MASS INDEX: 26.69 KG/M2

## 2021-12-14 DIAGNOSIS — M71.22 SYNOVIAL CYST OF LEFT POPLITEAL SPACE: ICD-10-CM

## 2021-12-14 DIAGNOSIS — M79.89 LEFT LEG SWELLING: ICD-10-CM

## 2021-12-14 PROCEDURE — 99214 OFFICE O/P EST MOD 30 MIN: CPT | Performed by: INTERNAL MEDICINE

## 2021-12-14 RX ORDER — PROPRANOLOL HYDROCHLORIDE 120 MG/1
CAPSULE, EXTENDED RELEASE ORAL
COMMUNITY
Start: 2021-11-26 | End: 2022-07-17

## 2021-12-14 RX ORDER — LEVOTHYROXINE SODIUM 0.05 MG/1
1 TABLET ORAL DAILY
COMMUNITY
End: 2021-12-14

## 2021-12-14 RX ORDER — MIRTAZAPINE 15 MG/1
TABLET, FILM COATED ORAL
COMMUNITY
Start: 2021-11-19 | End: 2021-12-14

## 2021-12-14 RX ORDER — ATORVASTATIN CALCIUM 80 MG/1
80 TABLET, FILM COATED ORAL DAILY
COMMUNITY
Start: 2021-12-08 | End: 2021-12-14

## 2021-12-14 ASSESSMENT — ENCOUNTER SYMPTOMS
RESPIRATORY NEGATIVE: 1
CARDIOVASCULAR NEGATIVE: 1
PSYCHIATRIC NEGATIVE: 1
EYES NEGATIVE: 1
MUSCULOSKELETAL NEGATIVE: 1
NEUROLOGICAL NEGATIVE: 1
GASTROINTESTINAL NEGATIVE: 1
CONSTITUTIONAL NEGATIVE: 1

## 2021-12-14 ASSESSMENT — FIBROSIS 4 INDEX: FIB4 SCORE: 1.18
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
## 2022-01-03 NOTE — CARE PLAN
Problem: Skin Integrity  Goal: Skin integrity is maintained or improved  Outcome: Progressing     Problem: Fall Risk  Goal: Patient will remain free from falls  Outcome: Progressing   The patient is Stable - Low risk of patient condition declining or worsening    Shift Goals  Clinical Goals: Pain control, skin integrity  Patient Goals: pain control, rest  Family Goals: na    Progress made toward(s) clinical / shift goals:  Q2H turn, bed locked in lowest position, lidocaine oral liquid available for ongoing throat pain    Patient is not progressing towards the following goals:       monthly or less

## 2022-01-21 ENCOUNTER — HOSPITAL ENCOUNTER (OUTPATIENT)
Dept: LAB | Facility: MEDICAL CENTER | Age: 77
End: 2022-01-21
Attending: INTERNAL MEDICINE
Payer: MEDICARE

## 2022-01-21 LAB
ALBUMIN SERPL BCP-MCNC: 4 G/DL (ref 3.2–4.9)
ALBUMIN/GLOB SERPL: 1.3 G/DL
ALP SERPL-CCNC: 63 U/L (ref 30–99)
ALT SERPL-CCNC: 14 U/L (ref 2–50)
ANION GAP SERPL CALC-SCNC: 12 MMOL/L (ref 7–16)
AST SERPL-CCNC: 8 U/L (ref 12–45)
BILIRUB SERPL-MCNC: 0.4 MG/DL (ref 0.1–1.5)
BUN SERPL-MCNC: 26 MG/DL (ref 8–22)
CALCIUM SERPL-MCNC: 10.2 MG/DL (ref 8.5–10.5)
CHLORIDE SERPL-SCNC: 101 MMOL/L (ref 96–112)
CHOLEST SERPL-MCNC: 131 MG/DL (ref 100–199)
CO2 SERPL-SCNC: 27 MMOL/L (ref 20–33)
CREAT SERPL-MCNC: 0.9 MG/DL (ref 0.5–1.4)
FASTING STATUS PATIENT QL REPORTED: NORMAL
GLOBULIN SER CALC-MCNC: 3.2 G/DL (ref 1.9–3.5)
GLUCOSE SERPL-MCNC: 132 MG/DL (ref 65–99)
HDLC SERPL-MCNC: 50 MG/DL
LDLC SERPL CALC-MCNC: 61 MG/DL
POTASSIUM SERPL-SCNC: 4.8 MMOL/L (ref 3.6–5.5)
PROT SERPL-MCNC: 7.2 G/DL (ref 6–8.2)
SODIUM SERPL-SCNC: 140 MMOL/L (ref 135–145)
TRIGL SERPL-MCNC: 102 MG/DL (ref 0–149)

## 2022-01-21 PROCEDURE — 80061 LIPID PANEL: CPT

## 2022-01-21 PROCEDURE — 36415 COLL VENOUS BLD VENIPUNCTURE: CPT

## 2022-01-21 PROCEDURE — 80053 COMPREHEN METABOLIC PANEL: CPT

## 2022-02-15 ENCOUNTER — APPOINTMENT (RX ONLY)
Dept: URBAN - METROPOLITAN AREA CLINIC 22 | Facility: CLINIC | Age: 77
Setting detail: DERMATOLOGY
End: 2022-02-15

## 2022-02-15 DIAGNOSIS — L82.1 OTHER SEBORRHEIC KERATOSIS: ICD-10-CM

## 2022-02-15 DIAGNOSIS — Z85.828 PERSONAL HISTORY OF OTHER MALIGNANT NEOPLASM OF SKIN: ICD-10-CM

## 2022-02-15 DIAGNOSIS — L81.4 OTHER MELANIN HYPERPIGMENTATION: ICD-10-CM

## 2022-02-15 DIAGNOSIS — L57.0 ACTINIC KERATOSIS: ICD-10-CM

## 2022-02-15 DIAGNOSIS — L72.0 EPIDERMAL CYST: ICD-10-CM

## 2022-02-15 DIAGNOSIS — D22 MELANOCYTIC NEVI: ICD-10-CM

## 2022-02-15 PROBLEM — D22.5 MELANOCYTIC NEVI OF TRUNK: Status: ACTIVE | Noted: 2022-02-15

## 2022-02-15 PROCEDURE — ? SUNSCREEN TREATMENT REGIMEN

## 2022-02-15 PROCEDURE — 99213 OFFICE O/P EST LOW 20 MIN: CPT | Mod: 25

## 2022-02-15 PROCEDURE — ? INTRALESIONAL KENALOG

## 2022-02-15 PROCEDURE — ? COUNSELING

## 2022-02-15 PROCEDURE — 11900 INJECT SKIN LESIONS </W 7: CPT | Mod: 59

## 2022-02-15 PROCEDURE — 17000 DESTRUCT PREMALG LESION: CPT

## 2022-02-15 PROCEDURE — ? LIQUID NITROGEN

## 2022-02-15 PROCEDURE — 17003 DESTRUCT PREMALG LES 2-14: CPT

## 2022-02-15 ASSESSMENT — LOCATION DETAILED DESCRIPTION DERM
LOCATION DETAILED: RIGHT PROXIMAL DORSAL FOREARM
LOCATION DETAILED: STERNAL NOTCH
LOCATION DETAILED: RIGHT AXILLARY VAULT
LOCATION DETAILED: LEFT MEDIAL SUPERIOR CHEST
LOCATION DETAILED: RIGHT SUPERIOR PARIETAL SCALP
LOCATION DETAILED: SUPERIOR THORACIC SPINE
LOCATION DETAILED: RIGHT MEDIAL UPPER BACK
LOCATION DETAILED: LEFT VENTRAL PROXIMAL FOREARM
LOCATION DETAILED: LEFT CENTRAL FRONTAL SCALP
LOCATION DETAILED: LEFT CENTRAL PARIETAL SCALP
LOCATION DETAILED: RIGHT POSTERIOR NECK
LOCATION DETAILED: LEFT INFERIOR MEDIAL FOREHEAD
LOCATION DETAILED: LEFT CENTRAL MALAR CHEEK
LOCATION DETAILED: RIGHT VENTRAL PROXIMAL FOREARM
LOCATION DETAILED: RIGHT INFERIOR HELIX

## 2022-02-15 ASSESSMENT — LOCATION SIMPLE DESCRIPTION DERM
LOCATION SIMPLE: CHEST
LOCATION SIMPLE: RIGHT EAR
LOCATION SIMPLE: LEFT FOREARM
LOCATION SIMPLE: POSTERIOR NECK
LOCATION SIMPLE: RIGHT FOREARM
LOCATION SIMPLE: LEFT FOREHEAD
LOCATION SIMPLE: SCALP
LOCATION SIMPLE: LEFT SCALP
LOCATION SIMPLE: CHEST
LOCATION SIMPLE: UPPER BACK
LOCATION SIMPLE: RIGHT UPPER BACK
LOCATION SIMPLE: LEFT CHEEK
LOCATION SIMPLE: RIGHT AXILLARY VAULT

## 2022-02-15 ASSESSMENT — LOCATION ZONE DERM
LOCATION ZONE: TRUNK
LOCATION ZONE: ARM
LOCATION ZONE: SCALP
LOCATION ZONE: EAR
LOCATION ZONE: FACE
LOCATION ZONE: TRUNK
LOCATION ZONE: NECK
LOCATION ZONE: AXILLAE

## 2022-02-15 NOTE — HPI: EVALUATION OF SKIN LESION(S)
What Type Of Note Output Would You Prefer (Optional)?: Standard Output
How Severe Are Your Spot(S)?: moderate
Have Your Spot(S) Been Treated In The Past?: has not been treated
Hpi Title: Evaluation of a Skin Lesion
Additional History: Pt states the bump appeared about a month ago and got very large and painful and then popped. After it popped and drained, he states it got better but has some residual growth left.

## 2022-02-15 NOTE — PROCEDURE: LIQUID NITROGEN
Render Post-Care Instructions In Note?: no
Number Of Freeze-Thaw Cycles: 2 freeze-thaw cycles
Consent: The patient's consent was obtained including but not limited to risks of crusting, scabbing, blistering, scarring, darker or lighter pigmentary change, recurrence, incomplete removal and infection.
Duration Of Freeze Thaw-Cycle (Seconds): 0
Post-Care Instructions: I reviewed with the patient in detail post-care instructions. Patient is to wear sunprotection, and avoid picking at any of the treated lesions. Pt may apply Vaseline to crusted or scabbing areas.
Show Aperture Variable?: Yes
Detail Level: Detailed

## 2022-02-15 NOTE — PROCEDURE: MIPS QUALITY
Quality 226: Preventive Care And Screening: Tobacco Use: Screening And Cessation Intervention: Patient screened for tobacco use and is an ex/non-smoker
Detail Level: Detailed
Quality 402: Tobacco Use And Help With Quitting Among Adolescents: Patient screened for tobacco and never smoked
Quality 111:Pneumonia Vaccination Status For Older Adults: Pneumococcal Vaccination Previously Received
Quality 130: Documentation Of Current Medications In The Medical Record: Current Medications Documented

## 2022-02-15 NOTE — PROCEDURE: INTRALESIONAL KENALOG
Validate Note Data When Using Inventory: Yes
Medical Necessity Clause: This procedure was medically necessary because the lesions that were treated were:
Detail Level: Detailed
X Size Of Lesion In Cm (Optional): 0
Total Volume (Ccs): 0.2
Include Z78.9 (Other Specified Conditions Influencing Health Status) As An Associated Diagnosis?: No
Kenalog Preparation: Kenalog
Concentration Of Kenalog Solution Injected (Mg/Ml): 10.0
Consent: The risks of atrophy were reviewed with the patient.
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
## 2022-02-24 ENCOUNTER — HOSPITAL ENCOUNTER (OUTPATIENT)
Dept: LAB | Facility: MEDICAL CENTER | Age: 77
End: 2022-02-24
Attending: INTERNAL MEDICINE
Payer: MEDICARE

## 2022-02-24 LAB
25(OH)D3 SERPL-MCNC: 24 NG/ML (ref 30–100)
CREAT UR-MCNC: 44.36 MG/DL
MICROALBUMIN UR-MCNC: <1.2 MG/DL
MICROALBUMIN/CREAT UR: NORMAL MG/G (ref 0–30)
T4 FREE SERPL-MCNC: 1.26 NG/DL (ref 0.93–1.7)
TSH SERPL DL<=0.005 MIU/L-ACNC: 2.33 UIU/ML (ref 0.38–5.33)

## 2022-02-24 PROCEDURE — 84443 ASSAY THYROID STIM HORMONE: CPT

## 2022-02-24 PROCEDURE — 84439 ASSAY OF FREE THYROXINE: CPT

## 2022-02-24 PROCEDURE — 82306 VITAMIN D 25 HYDROXY: CPT

## 2022-02-24 PROCEDURE — 36415 COLL VENOUS BLD VENIPUNCTURE: CPT

## 2022-02-24 PROCEDURE — 82570 ASSAY OF URINE CREATININE: CPT

## 2022-02-24 PROCEDURE — 82043 UR ALBUMIN QUANTITATIVE: CPT

## 2022-05-06 ENCOUNTER — HOSPITAL ENCOUNTER (OUTPATIENT)
Dept: LAB | Facility: MEDICAL CENTER | Age: 77
End: 2022-05-06
Attending: INTERNAL MEDICINE
Payer: MEDICARE

## 2022-05-06 ENCOUNTER — HOSPITAL ENCOUNTER (OUTPATIENT)
Dept: LAB | Facility: MEDICAL CENTER | Age: 77
End: 2022-05-06
Attending: PHYSICIAN ASSISTANT
Payer: MEDICARE

## 2022-05-06 DIAGNOSIS — E78.2 MIXED HYPERLIPIDEMIA: ICD-10-CM

## 2022-05-06 DIAGNOSIS — E03.4 HYPOTHYROIDISM DUE TO ACQUIRED ATROPHY OF THYROID: ICD-10-CM

## 2022-05-06 DIAGNOSIS — E55.9 VITAMIN D DEFICIENCY: ICD-10-CM

## 2022-05-06 LAB
25(OH)D3 SERPL-MCNC: 36 NG/ML (ref 30–100)
ALBUMIN SERPL BCP-MCNC: 3.9 G/DL (ref 3.2–4.9)
ALBUMIN/GLOB SERPL: 1.2 G/DL
ALBUMIN/GLOB SERPL: 1.3 G/DL
ALBUMIN/GLOB SERPL: 1.3 G/DL
ALP SERPL-CCNC: 58 U/L (ref 30–99)
ALP SERPL-CCNC: 58 U/L (ref 30–99)
ALP SERPL-CCNC: 59 U/L (ref 30–99)
ALT SERPL-CCNC: 17 U/L (ref 2–50)
ALT SERPL-CCNC: 19 U/L (ref 2–50)
ALT SERPL-CCNC: 19 U/L (ref 2–50)
ANION GAP SERPL CALC-SCNC: 13 MMOL/L (ref 7–16)
ANION GAP SERPL CALC-SCNC: 13 MMOL/L (ref 7–16)
ANION GAP SERPL CALC-SCNC: 14 MMOL/L (ref 7–16)
AST SERPL-CCNC: 14 U/L (ref 12–45)
AST SERPL-CCNC: 15 U/L (ref 12–45)
AST SERPL-CCNC: 15 U/L (ref 12–45)
BASOPHILS # BLD AUTO: 1 % (ref 0–1.8)
BASOPHILS # BLD: 0.06 K/UL (ref 0–0.12)
BILIRUB SERPL-MCNC: 0.3 MG/DL (ref 0.1–1.5)
BILIRUB SERPL-MCNC: 0.3 MG/DL (ref 0.1–1.5)
BILIRUB SERPL-MCNC: 0.4 MG/DL (ref 0.1–1.5)
BUN SERPL-MCNC: 13 MG/DL (ref 8–22)
BUN SERPL-MCNC: 14 MG/DL (ref 8–22)
BUN SERPL-MCNC: 14 MG/DL (ref 8–22)
CALCIUM SERPL-MCNC: 10 MG/DL (ref 8.5–10.5)
CHLORIDE SERPL-SCNC: 103 MMOL/L (ref 96–112)
CHLORIDE SERPL-SCNC: 104 MMOL/L (ref 96–112)
CHLORIDE SERPL-SCNC: 104 MMOL/L (ref 96–112)
CHOLEST SERPL-MCNC: 100 MG/DL (ref 100–199)
CHOLEST SERPL-MCNC: 101 MG/DL (ref 100–199)
CHOLEST SERPL-MCNC: 99 MG/DL (ref 100–199)
CO2 SERPL-SCNC: 24 MMOL/L (ref 20–33)
CO2 SERPL-SCNC: 25 MMOL/L (ref 20–33)
CO2 SERPL-SCNC: 25 MMOL/L (ref 20–33)
CREAT SERPL-MCNC: 0.82 MG/DL (ref 0.5–1.4)
CREAT SERPL-MCNC: 0.84 MG/DL (ref 0.5–1.4)
CREAT SERPL-MCNC: 0.86 MG/DL (ref 0.5–1.4)
CREAT UR-MCNC: 64.55 MG/DL
CREAT UR-MCNC: 65.69 MG/DL
EOSINOPHIL # BLD AUTO: 0.43 K/UL (ref 0–0.51)
EOSINOPHIL NFR BLD: 7 % (ref 0–6.9)
ERYTHROCYTE [DISTWIDTH] IN BLOOD BY AUTOMATED COUNT: 51.8 FL (ref 35.9–50)
EST. AVERAGE GLUCOSE BLD GHB EST-MCNC: 226 MG/DL
GFR SERPLBLD CREATININE-BSD FMLA CKD-EPI: 90 ML/MIN/1.73 M 2
GFR SERPLBLD CREATININE-BSD FMLA CKD-EPI: 90 ML/MIN/1.73 M 2
GFR SERPLBLD CREATININE-BSD FMLA CKD-EPI: 91 ML/MIN/1.73 M 2
GLOBULIN SER CALC-MCNC: 3.1 G/DL (ref 1.9–3.5)
GLOBULIN SER CALC-MCNC: 3.1 G/DL (ref 1.9–3.5)
GLOBULIN SER CALC-MCNC: 3.2 G/DL (ref 1.9–3.5)
GLUCOSE SERPL-MCNC: 163 MG/DL (ref 65–99)
GLUCOSE SERPL-MCNC: 163 MG/DL (ref 65–99)
GLUCOSE SERPL-MCNC: 166 MG/DL (ref 65–99)
HBA1C MFR BLD: 9.5 % (ref 4–5.6)
HCT VFR BLD AUTO: 45 % (ref 42–52)
HDLC SERPL-MCNC: 39 MG/DL
HGB BLD-MCNC: 14.2 G/DL (ref 14–18)
IMM GRANULOCYTES # BLD AUTO: 0.02 K/UL (ref 0–0.11)
IMM GRANULOCYTES NFR BLD AUTO: 0.3 % (ref 0–0.9)
LDLC SERPL CALC-MCNC: 44 MG/DL
LDLC SERPL CALC-MCNC: 45 MG/DL
LDLC SERPL CALC-MCNC: 46 MG/DL
LYMPHOCYTES # BLD AUTO: 1.91 K/UL (ref 1–4.8)
LYMPHOCYTES NFR BLD: 31.2 % (ref 22–41)
MCH RBC QN AUTO: 28.7 PG (ref 27–33)
MCHC RBC AUTO-ENTMCNC: 31.6 G/DL (ref 33.7–35.3)
MCV RBC AUTO: 90.9 FL (ref 81.4–97.8)
MICROALBUMIN UR-MCNC: <1.2 MG/DL
MICROALBUMIN UR-MCNC: <1.2 MG/DL
MICROALBUMIN/CREAT UR: NORMAL MG/G (ref 0–30)
MICROALBUMIN/CREAT UR: NORMAL MG/G (ref 0–30)
MONOCYTES # BLD AUTO: 0.68 K/UL (ref 0–0.85)
MONOCYTES NFR BLD AUTO: 11.1 % (ref 0–13.4)
NEUTROPHILS # BLD AUTO: 3.03 K/UL (ref 1.82–7.42)
NEUTROPHILS NFR BLD: 49.4 % (ref 44–72)
NRBC # BLD AUTO: 0 K/UL
NRBC BLD-RTO: 0 /100 WBC
PLATELET # BLD AUTO: 336 K/UL (ref 164–446)
PMV BLD AUTO: 11.3 FL (ref 9–12.9)
POTASSIUM SERPL-SCNC: 4.4 MMOL/L (ref 3.6–5.5)
POTASSIUM SERPL-SCNC: 4.5 MMOL/L (ref 3.6–5.5)
POTASSIUM SERPL-SCNC: 4.5 MMOL/L (ref 3.6–5.5)
PROT SERPL-MCNC: 7 G/DL (ref 6–8.2)
PROT SERPL-MCNC: 7 G/DL (ref 6–8.2)
PROT SERPL-MCNC: 7.1 G/DL (ref 6–8.2)
RBC # BLD AUTO: 4.95 M/UL (ref 4.7–6.1)
SODIUM SERPL-SCNC: 141 MMOL/L (ref 135–145)
SODIUM SERPL-SCNC: 142 MMOL/L (ref 135–145)
SODIUM SERPL-SCNC: 142 MMOL/L (ref 135–145)
T4 FREE SERPL-MCNC: 1.45 NG/DL (ref 0.93–1.7)
TRIGL SERPL-MCNC: 80 MG/DL (ref 0–149)
TRIGL SERPL-MCNC: 80 MG/DL (ref 0–149)
TRIGL SERPL-MCNC: 81 MG/DL (ref 0–149)
TSH SERPL DL<=0.005 MIU/L-ACNC: 1.45 UIU/ML (ref 0.38–5.33)
TSH SERPL DL<=0.005 MIU/L-ACNC: 1.46 UIU/ML (ref 0.38–5.33)
WBC # BLD AUTO: 6.1 K/UL (ref 4.8–10.8)

## 2022-05-06 PROCEDURE — 82570 ASSAY OF URINE CREATININE: CPT | Mod: 91

## 2022-05-06 PROCEDURE — 82043 UR ALBUMIN QUANTITATIVE: CPT

## 2022-05-06 PROCEDURE — 80061 LIPID PANEL: CPT | Mod: 91

## 2022-05-06 PROCEDURE — 36415 COLL VENOUS BLD VENIPUNCTURE: CPT

## 2022-05-06 PROCEDURE — 85025 COMPLETE CBC W/AUTO DIFF WBC: CPT

## 2022-05-06 PROCEDURE — 80053 COMPREHEN METABOLIC PANEL: CPT | Mod: 91

## 2022-05-06 PROCEDURE — 83036 HEMOGLOBIN GLYCOSYLATED A1C: CPT | Mod: GA

## 2022-05-06 PROCEDURE — 84443 ASSAY THYROID STIM HORMONE: CPT

## 2022-05-06 PROCEDURE — 84439 ASSAY OF FREE THYROXINE: CPT

## 2022-05-06 PROCEDURE — 82306 VITAMIN D 25 HYDROXY: CPT

## 2022-05-06 PROCEDURE — 82570 ASSAY OF URINE CREATININE: CPT

## 2022-05-06 PROCEDURE — 84443 ASSAY THYROID STIM HORMONE: CPT | Mod: 91

## 2022-05-06 PROCEDURE — 80061 LIPID PANEL: CPT

## 2022-05-06 PROCEDURE — 80053 COMPREHEN METABOLIC PANEL: CPT

## 2022-05-06 PROCEDURE — 82043 UR ALBUMIN QUANTITATIVE: CPT | Mod: 91

## 2022-05-09 DIAGNOSIS — F32.4 MAJOR DEPRESSIVE DISORDER WITH SINGLE EPISODE, IN PARTIAL REMISSION (HCC): ICD-10-CM

## 2022-05-09 DIAGNOSIS — F51.01 PRIMARY INSOMNIA: ICD-10-CM

## 2022-05-09 RX ORDER — MIRTAZAPINE 15 MG/1
TABLET, FILM COATED ORAL
Qty: 90 TABLET | Refills: 1 | Status: SHIPPED | OUTPATIENT
Start: 2022-05-09 | End: 2022-11-21 | Stop reason: SDUPTHER

## 2022-05-18 ENCOUNTER — OFFICE VISIT (OUTPATIENT)
Dept: MEDICAL GROUP | Age: 77
End: 2022-05-18
Payer: MEDICARE

## 2022-05-18 VITALS
TEMPERATURE: 98.2 F | SYSTOLIC BLOOD PRESSURE: 126 MMHG | HEIGHT: 73 IN | OXYGEN SATURATION: 100 % | RESPIRATION RATE: 16 BRPM | BODY MASS INDEX: 28.1 KG/M2 | WEIGHT: 212 LBS | DIASTOLIC BLOOD PRESSURE: 80 MMHG | HEART RATE: 90 BPM

## 2022-05-18 DIAGNOSIS — E78.2 MIXED HYPERLIPIDEMIA: ICD-10-CM

## 2022-05-18 DIAGNOSIS — L84 CALLUS OF FOOT: ICD-10-CM

## 2022-05-18 DIAGNOSIS — E55.9 VITAMIN D DEFICIENCY: ICD-10-CM

## 2022-05-18 DIAGNOSIS — E08.42 DIABETIC POLYNEUROPATHY ASSOCIATED WITH DIABETES MELLITUS DUE TO UNDERLYING CONDITION (HCC): ICD-10-CM

## 2022-05-18 PROCEDURE — 99214 OFFICE O/P EST MOD 30 MIN: CPT | Performed by: INTERNAL MEDICINE

## 2022-05-18 ASSESSMENT — PATIENT HEALTH QUESTIONNAIRE - PHQ9: CLINICAL INTERPRETATION OF PHQ2 SCORE: 0

## 2022-05-18 ASSESSMENT — FIBROSIS 4 INDEX: FIB4 SCORE: 0.72

## 2022-05-19 ASSESSMENT — ENCOUNTER SYMPTOMS
PSYCHIATRIC NEGATIVE: 1
GASTROINTESTINAL NEGATIVE: 1
RESPIRATORY NEGATIVE: 1
CONSTITUTIONAL NEGATIVE: 1
CARDIOVASCULAR NEGATIVE: 1
NEUROLOGICAL NEGATIVE: 1
EYES NEGATIVE: 1
MUSCULOSKELETAL NEGATIVE: 1
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
## 2022-06-22 ENCOUNTER — HOSPITAL ENCOUNTER (OUTPATIENT)
Dept: LAB | Facility: MEDICAL CENTER | Age: 77
End: 2022-06-22
Attending: PHYSICIAN ASSISTANT
Payer: MEDICARE

## 2022-06-22 LAB — CORTIS SERPL-MCNC: 2 UG/DL (ref 0–23)

## 2022-06-22 PROCEDURE — 36415 COLL VENOUS BLD VENIPUNCTURE: CPT

## 2022-06-22 PROCEDURE — 82533 TOTAL CORTISOL: CPT

## 2022-06-22 PROCEDURE — 82024 ASSAY OF ACTH: CPT

## 2022-06-23 LAB — ACTH PLAS-MCNC: 4.1 PG/ML (ref 7.2–63.3)

## 2022-06-28 ENCOUNTER — PATIENT OUTREACH (OUTPATIENT)
Dept: HEALTH INFORMATION MANAGEMENT | Facility: OTHER | Age: 77
End: 2022-06-28

## 2022-06-28 ENCOUNTER — OFFICE VISIT (OUTPATIENT)
Dept: MEDICAL GROUP | Age: 77
End: 2022-06-28
Payer: MEDICARE

## 2022-06-28 VITALS
HEART RATE: 91 BPM | OXYGEN SATURATION: 95 % | RESPIRATION RATE: 16 BRPM | SYSTOLIC BLOOD PRESSURE: 122 MMHG | TEMPERATURE: 98.1 F | WEIGHT: 208.2 LBS | DIASTOLIC BLOOD PRESSURE: 76 MMHG | HEIGHT: 73 IN | BODY MASS INDEX: 27.59 KG/M2

## 2022-06-28 DIAGNOSIS — N32.81 OAB (OVERACTIVE BLADDER): ICD-10-CM

## 2022-06-28 DIAGNOSIS — Z79.4 TYPE 2 DIABETES MELLITUS WITH OTHER SPECIFIED COMPLICATION, WITH LONG-TERM CURRENT USE OF INSULIN (HCC): ICD-10-CM

## 2022-06-28 DIAGNOSIS — E11.69 TYPE 2 DIABETES MELLITUS WITH OTHER SPECIFIED COMPLICATION, WITH LONG-TERM CURRENT USE OF INSULIN (HCC): ICD-10-CM

## 2022-06-28 DIAGNOSIS — Z02.9 ADMINISTRATIVE ENCOUNTER: ICD-10-CM

## 2022-06-28 DIAGNOSIS — I10 ESSENTIAL HYPERTENSION: ICD-10-CM

## 2022-06-28 DIAGNOSIS — I73.9 PVD (PERIPHERAL VASCULAR DISEASE) (HCC): ICD-10-CM

## 2022-06-28 PROCEDURE — 99214 OFFICE O/P EST MOD 30 MIN: CPT | Performed by: INTERNAL MEDICINE

## 2022-06-28 ASSESSMENT — FIBROSIS 4 INDEX: FIB4 SCORE: 0.73
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
## 2022-06-28 NOTE — PROGRESS NOTES
Nurse CM outreach call to patient to provide information on CCM program. Patient has history of DM. A1C 5/6/22 was 9.5.  Nurse left  with CM contact information requesting return call.

## 2022-07-05 ENCOUNTER — PATIENT OUTREACH (OUTPATIENT)
Dept: HEALTH INFORMATION MANAGEMENT | Facility: OTHER | Age: 77
End: 2022-07-05
Payer: MEDICARE

## 2022-07-05 DIAGNOSIS — E11.10 DIABETIC KETOACIDOSIS WITHOUT COMA ASSOCIATED WITH TYPE 2 DIABETES MELLITUS (HCC): ICD-10-CM

## 2022-07-05 NOTE — PROGRESS NOTES
7/5/22 3:20pm:  Nurse CM outreach call second attempt to provide information on CCM program. Message left with CM contact number of [phone].

## 2022-07-17 RX ORDER — PROPRANOLOL HYDROCHLORIDE 120 MG/1
CAPSULE, EXTENDED RELEASE ORAL
Qty: 90 CAPSULE | Refills: 4 | Status: SHIPPED | OUTPATIENT
Start: 2022-07-17

## 2022-08-06 ENCOUNTER — HOSPITAL ENCOUNTER (OUTPATIENT)
Dept: LAB | Facility: MEDICAL CENTER | Age: 77
End: 2022-08-06
Attending: PHYSICIAN ASSISTANT
Payer: MEDICARE

## 2022-08-06 LAB — CORTIS SERPL-MCNC: 1.8 UG/DL (ref 0–23)

## 2022-08-06 PROCEDURE — 82024 ASSAY OF ACTH: CPT

## 2022-08-06 PROCEDURE — 82533 TOTAL CORTISOL: CPT

## 2022-08-06 PROCEDURE — 80299 QUANTITATIVE ASSAY DRUG: CPT

## 2022-08-06 PROCEDURE — 36415 COLL VENOUS BLD VENIPUNCTURE: CPT

## 2022-08-08 ENCOUNTER — HOSPITAL ENCOUNTER (OUTPATIENT)
Facility: MEDICAL CENTER | Age: 77
End: 2022-08-08
Attending: PHYSICIAN ASSISTANT
Payer: MEDICARE

## 2022-08-08 LAB — ACTH PLAS-MCNC: <1.5 PG/ML (ref 7.2–63.3)

## 2022-08-08 PROCEDURE — 82533 TOTAL CORTISOL: CPT

## 2022-08-12 LAB — TEST NAME 95000: NORMAL

## 2022-08-13 LAB — CORTIS SAL-MCNC: NORMAL UG/DL

## 2022-08-16 ENCOUNTER — APPOINTMENT (RX ONLY)
Dept: URBAN - METROPOLITAN AREA CLINIC 22 | Facility: CLINIC | Age: 77
Setting detail: DERMATOLOGY
End: 2022-08-16

## 2022-08-16 DIAGNOSIS — L82.1 OTHER SEBORRHEIC KERATOSIS: ICD-10-CM

## 2022-08-16 DIAGNOSIS — Z85.828 PERSONAL HISTORY OF OTHER MALIGNANT NEOPLASM OF SKIN: ICD-10-CM

## 2022-08-16 DIAGNOSIS — L24 IRRITANT CONTACT DERMATITIS: ICD-10-CM

## 2022-08-16 DIAGNOSIS — L57.0 ACTINIC KERATOSIS: ICD-10-CM

## 2022-08-16 DIAGNOSIS — D22 MELANOCYTIC NEVI: ICD-10-CM

## 2022-08-16 DIAGNOSIS — L21.8 OTHER SEBORRHEIC DERMATITIS: ICD-10-CM

## 2022-08-16 DIAGNOSIS — L81.4 OTHER MELANIN HYPERPIGMENTATION: ICD-10-CM

## 2022-08-16 PROBLEM — D48.5 NEOPLASM OF UNCERTAIN BEHAVIOR OF SKIN: Status: ACTIVE | Noted: 2022-08-16

## 2022-08-16 PROBLEM — L24.9 IRRITANT CONTACT DERMATITIS, UNSPECIFIED CAUSE: Status: ACTIVE | Noted: 2022-08-16

## 2022-08-16 PROBLEM — D22.5 MELANOCYTIC NEVI OF TRUNK: Status: ACTIVE | Noted: 2022-08-16

## 2022-08-16 PROCEDURE — ? PRESCRIPTION

## 2022-08-16 PROCEDURE — ? LIQUID NITROGEN

## 2022-08-16 PROCEDURE — ? BIOPSY BY SHAVE METHOD

## 2022-08-16 PROCEDURE — ? COUNSELING

## 2022-08-16 PROCEDURE — ? TREATMENT REGIMEN

## 2022-08-16 PROCEDURE — 17000 DESTRUCT PREMALG LESION: CPT | Mod: 59

## 2022-08-16 PROCEDURE — 99213 OFFICE O/P EST LOW 20 MIN: CPT | Mod: 25

## 2022-08-16 PROCEDURE — ? SUNSCREEN TREATMENT REGIMEN

## 2022-08-16 PROCEDURE — 11102 TANGNTL BX SKIN SINGLE LES: CPT

## 2022-08-16 RX ORDER — HYDROCORTISONE 25 MG/G
CREAM TOPICAL BID
Qty: 28 | Refills: 1 | Status: ERX | COMMUNITY
Start: 2022-08-16

## 2022-08-16 RX ADMIN — HYDROCORTISONE: 25 CREAM TOPICAL at 00:00

## 2022-08-16 ASSESSMENT — LOCATION SIMPLE DESCRIPTION DERM
LOCATION SIMPLE: RIGHT FOREHEAD
LOCATION SIMPLE: GLABELLA
LOCATION SIMPLE: UPPER BACK
LOCATION SIMPLE: RIGHT AXILLARY VAULT
LOCATION SIMPLE: LEFT FOREARM
LOCATION SIMPLE: LEFT AXILLARY VAULT
LOCATION SIMPLE: INFERIOR FOREHEAD
LOCATION SIMPLE: RIGHT EYEBROW
LOCATION SIMPLE: LEFT EYEBROW
LOCATION SIMPLE: POSTERIOR NECK
LOCATION SIMPLE: RIGHT FOREARM

## 2022-08-16 ASSESSMENT — LOCATION DETAILED DESCRIPTION DERM
LOCATION DETAILED: RIGHT POSTERIOR NECK
LOCATION DETAILED: LEFT CENTRAL EYEBROW
LOCATION DETAILED: GLABELLA
LOCATION DETAILED: RIGHT VENTRAL PROXIMAL FOREARM
LOCATION DETAILED: LEFT AXILLARY VAULT
LOCATION DETAILED: RIGHT CENTRAL EYEBROW
LOCATION DETAILED: INFERIOR THORACIC SPINE
LOCATION DETAILED: RIGHT AXILLARY VAULT
LOCATION DETAILED: RIGHT FOREHEAD
LOCATION DETAILED: SUPERIOR THORACIC SPINE
LOCATION DETAILED: LEFT VENTRAL PROXIMAL FOREARM
LOCATION DETAILED: INFERIOR MID FOREHEAD

## 2022-08-16 ASSESSMENT — LOCATION ZONE DERM
LOCATION ZONE: TRUNK
LOCATION ZONE: NECK
LOCATION ZONE: ARM
LOCATION ZONE: FACE
LOCATION ZONE: AXILLAE

## 2022-08-16 NOTE — PROCEDURE: LIQUID NITROGEN
Detail Level: Detailed
Show Aperture Variable?: Yes
Duration Of Freeze Thaw-Cycle (Seconds): 0
Post-Care Instructions: I reviewed with the patient in detail post-care instructions. Patient is to wear sunprotection, and avoid picking at any of the treated lesions. Pt may apply Vaseline to crusted or scabbing areas.
Render Note In Bullet Format When Appropriate: No
Number Of Freeze-Thaw Cycles: 1 freeze-thaw cycle
Consent: The patient's consent was obtained including but not limited to risks of crusting, scabbing, blistering, scarring, darker or lighter pigmentary change, recurrence, incomplete removal and infection.

## 2022-09-09 ENCOUNTER — APPOINTMENT (RX ONLY)
Dept: URBAN - METROPOLITAN AREA CLINIC 36 | Facility: CLINIC | Age: 77
Setting detail: DERMATOLOGY
End: 2022-09-09

## 2022-09-09 PROBLEM — C44.319 BASAL CELL CARCINOMA OF SKIN OF OTHER PARTS OF FACE: Status: ACTIVE | Noted: 2022-09-09

## 2022-09-09 PROCEDURE — 17311 MOHS 1 STAGE H/N/HF/G: CPT

## 2022-09-09 PROCEDURE — 14040 TIS TRNFR F/C/C/M/N/A/G/H/F: CPT

## 2022-09-09 PROCEDURE — 17312 MOHS ADDL STAGE: CPT

## 2022-09-09 PROCEDURE — ? PRESCRIPTION

## 2022-09-09 PROCEDURE — ? MEDICATION COUNSELING

## 2022-09-09 PROCEDURE — ? MOHS SURGERY

## 2022-09-09 RX ORDER — DOXYCYCLINE HYCLATE 100 MG/1
1 CAPSULE, GELATIN COATED ORAL BID
Qty: 20 | Refills: 0 | Status: ERX | COMMUNITY
Start: 2022-09-09

## 2022-09-09 RX ADMIN — DOXYCYCLINE HYCLATE 1: 100 CAPSULE, GELATIN COATED ORAL at 00:00

## 2022-09-09 NOTE — PROCEDURE: MEDICATION COUNSELING
Hydroxyzine Counseling: Patient advised that the medication is sedating and not to drive a car after taking this medication.  Patient informed of potential adverse effects including but not limited to dry mouth, urinary retention, and blurry vision.  The patient verbalized understanding of the proper use and possible adverse effects of hydroxyzine.  All of the patient's questions and concerns were addressed.
Hydroxychloroquine Pregnancy And Lactation Text: This medication has been shown to cause fetal harm but it isn't assigned a Pregnancy Risk Category. There are small amounts excreted in breast milk.
Griseofulvin Pregnancy And Lactation Text: This medication is Pregnancy Category X and is known to cause serious birth defects. It is unknown if this medication is excreted in breast milk but breast feeding should be avoided.
Rinvoq Pregnancy And Lactation Text: Based on animal studies, Rinvoq may cause embryo-fetal harm when administered to pregnant women.  The medication should not be used in pregnancy.  Breastfeeding is not recommended during treatment and for 6 days after the last dose.
Imiquimod Counseling:  I discussed with the patient the risks of imiquimod including but not limited to erythema, scaling, itching, weeping, crusting, and pain.  Patient understands that the inflammatory response to imiquimod is variable from person to person and was educated regarded proper titration schedule.  If flu-like symptoms develop, patient knows to discontinue the medication and contact us.
Oral Minoxidil Counseling- I discussed with the patient the risks of oral minoxidil including but not limited to shortness of breath, swelling of the feet or ankles, dizziness, lightheadedness, unwanted hair growth and allergic reaction.  The patient verbalized understanding of the proper use and possible adverse effects of oral minoxidil.  All of the patient's questions and concerns were addressed.
Birth Control Pills Pregnancy And Lactation Text: This medication should be avoided if pregnant and for the first 30 days post-partum.
Stelara Counseling:  I discussed with the patient the risks of ustekinumab including but not limited to immunosuppression, malignancy, posterior leukoencephalopathy syndrome, and serious infections.  The patient understands that monitoring is required including a PPD at baseline and must alert us or the primary physician if symptoms of infection or other concerning signs are noted.
Rifampin Counseling: I discussed with the patient the risks of rifampin including but not limited to liver damage, kidney damage, red-orange body fluids, nausea/vomiting and severe allergy.
Methotrexate Counseling:  Patient counseled regarding adverse effects of methotrexate including but not limited to nausea, vomiting, abnormalities in liver function tests. Patients may develop mouth sores, rash, diarrhea, and abnormalities in blood counts. The patient understands that monitoring is required including LFT's and blood counts.  There is a rare possibility of scarring of the liver and lung problems that can occur when taking methotrexate. Persistent nausea, loss of appetite, pale stools, dark urine, cough, and shortness of breath should be reported immediately. Patient advised to discontinue methotrexate treatment at least three months before attempting to become pregnant.  I discussed the need for folate supplements while taking methotrexate.  These supplements can decrease side effects during methotrexate treatment. The patient verbalized understanding of the proper use and possible adverse effects of methotrexate.  All of the patient's questions and concerns were addressed.
Opzelura Pregnancy And Lactation Text: There is insufficient data to evaluate drug-associated risk for major birth defects, miscarriage, or other adverse maternal or fetal outcomes.  There is a pregnancy registry that monitors pregnancy outcomes in pregnant persons exposed to the medication during pregnancy.  It is unknown if this medication is excreted in breast milk.  Do not breastfeed during treatment and for about 4 weeks after the last dose.
Arava Counseling:  Patient counseled regarding adverse effects of Arava including but not limited to nausea, vomiting, abnormalities in liver function tests. Patients may develop mouth sores, rash, diarrhea, and abnormalities in blood counts. The patient understands that monitoring is required including LFTs and blood counts.  There is a rare possibility of scarring of the liver and lung problems that can occur when taking methotrexate. Persistent nausea, loss of appetite, pale stools, dark urine, cough, and shortness of breath should be reported immediately. Patient advised to discontinue Arava treatment and consult with a physician prior to attempting conception. The patient will have to undergo a treatment to eliminate Arava from the body prior to conception.
Picato Counseling:  I discussed with the patient the risks of Picato including but not limited to erythema, scaling, itching, weeping, crusting, and pain.
Methotrexate Pregnancy And Lactation Text: This medication is Pregnancy Category X and is known to cause fetal harm. This medication is excreted in breast milk.
Erivedge Counseling- I discussed with the patient the risks of Erivedge including but not limited to nausea, vomiting, diarrhea, constipation, weight loss, changes in the sense of taste, decreased appetite, muscle spasms, and hair loss.  The patient verbalized understanding of the proper use and possible adverse effects of Erivedge.  All of the patient's questions and concerns were addressed.
Stelara Pregnancy And Lactation Text: This medication is Pregnancy Category B and is considered safe during pregnancy. It is unknown if this medication is excreted in breast milk.
Spironolactone Counseling: Patient advised regarding risks of diarrhea, abdominal pain, hyperkalemia, birth defects (for female patients), liver toxicity and renal toxicity. The patient may need blood work to monitor liver and kidney function and potassium levels while on therapy. The patient verbalized understanding of the proper use and possible adverse effects of spironolactone.  All of the patient's questions and concerns were addressed.
Benzoyl Peroxide Counseling: Patient counseled that medicine may cause skin irritation and bleach clothing.  In the event of skin irritation, the patient was advised to reduce the amount of the drug applied or use it less frequently.   The patient verbalized understanding of the proper use and possible adverse effects of benzoyl peroxide.  All of the patient's questions and concerns were addressed.
Arava Pregnancy And Lactation Text: This medication is Pregnancy Category X and is absolutely contraindicated during pregnancy. It is unknown if it is excreted in breast milk.
Valtrex Pregnancy And Lactation Text: this medication is Pregnancy Category B and is considered safe during pregnancy. This medication is not directly found in breast milk but it's metabolite acyclovir is present.
Solaraze Pregnancy And Lactation Text: This medication is Pregnancy Category B and is considered safe. There is some data to suggest avoiding during the third trimester. It is unknown if this medication is excreted in breast milk.
Azithromycin Counseling:  I discussed with the patient the risks of azithromycin including but not limited to GI upset, allergic reaction, drug rash, diarrhea, and yeast infections.
5-Fu Pregnancy And Lactation Text: This medication is Pregnancy Category X and contraindicated in pregnancy and in women who may become pregnant. It is unknown if this medication is excreted in breast milk.
Topical Sulfur Applications Counseling: Topical Sulfur Counseling: Patient counseled that this medication may cause skin irritation or allergic reactions.  In the event of skin irritation, the patient was advised to reduce the amount of the drug applied or use it less frequently.   The patient verbalized understanding of the proper use and possible adverse effects of topical sulfur application.  All of the patient's questions and concerns were addressed.
Doxycycline Pregnancy And Lactation Text: This medication is Pregnancy Category D and not consider safe during pregnancy. It is also excreted in breast milk but is considered safe for shorter treatment courses.
Bexarotene Pregnancy And Lactation Text: This medication is Pregnancy Category X and should not be given to women who are pregnant or may become pregnant. This medication should not be used if you are breast feeding.
Humira Counseling:  I discussed with the patient the risks of adalimumab including but not limited to myelosuppression, immunosuppression, autoimmune hepatitis, demyelinating diseases, lymphoma, and serious infections.  The patient understands that monitoring is required including a PPD at baseline and must alert us or the primary physician if symptoms of infection or other concerning signs are noted.
Topical Sulfur Applications Pregnancy And Lactation Text: This medication is Pregnancy Category C and has an unknown safety profile during pregnancy. It is unknown if this topical medication is excreted in breast milk.
Drysol Counseling:  I discussed with the patient the risks of drysol/aluminum chloride including but not limited to skin rash, itching, irritation, burning.
Libtayo Counseling- I discussed with the patient the risks of Libtayo including but not limited to nausea, vomiting, diarrhea, and bone or muscle pain.  The patient verbalized understanding of the proper use and possible adverse effects of Libtayo.  All of the patient's questions and concerns were addressed.
Imiquimod Pregnancy And Lactation Text: This medication is Pregnancy Category C. It is unknown if this medication is excreted in breast milk.
Rituxan Counseling:  I discussed with the patient the risks of Rituxan infusions. Side effects can include infusion reactions, severe drug rashes including mucocutaneous reactions, reactivation of latent hepatitis and other infections and rarely progressive multifocal leukoencephalopathy.  All of the patient's questions and concerns were addressed.
Erythromycin Counseling:  I discussed with the patient the risks of erythromycin including but not limited to GI upset, allergic reaction, drug rash, diarrhea, increase in liver enzymes, and yeast infections.
Oral Minoxidil Pregnancy And Lactation Text: This medication should only be used when clearly needed if you are pregnant, attempting to become pregnant or breast feeding.
Itraconazole Counseling:  I discussed with the patient the risks of itraconazole including but not limited to liver damage, nausea/vomiting, neuropathy, and severe allergy.  The patient understands that this medication is best absorbed when taken with acidic beverages such as non-diet cola or ginger ale.  The patient understands that monitoring is required including baseline LFTs and repeat LFTs at intervals.  The patient understands that they are to contact us or the primary physician if concerning signs are noted.
Rifampin Pregnancy And Lactation Text: This medication is Pregnancy Category C and it isn't know if it is safe during pregnancy. It is also excreted in breast milk and should not be used if you are breast feeding.
Cibinqo Pregnancy And Lactation Text: It is unknown if this medication will adversely affect pregnancy or breast feeding.  You should not take this medication if you are currently pregnant or planning a pregnancy or while breastfeeding.
Azathioprine Counseling:  I discussed with the patient the risks of azathioprine including but not limited to myelosuppression, immunosuppression, hepatotoxicity, lymphoma, and infections.  The patient understands that monitoring is required including baseline LFTs, Creatinine, possible TPMP genotyping and weekly CBCs for the first month and then every 2 weeks thereafter.  The patient verbalized understanding of the proper use and possible adverse effects of azathioprine.  All of the patient's questions and concerns were addressed.
Hydroxyzine Pregnancy And Lactation Text: This medication is not safe during pregnancy and should not be taken. It is also excreted in breast milk and breast feeding isn't recommended.
Cimzia Counseling:  I discussed with the patient the risks of Cimzia including but not limited to immunosuppression, allergic reactions and infections.  The patient understands that monitoring is required including a PPD at baseline and must alert us or the primary physician if symptoms of infection or other concerning signs are noted.
Otezla Counseling: The side effects of Otezla were discussed with the patient, including but not limited to worsening or new depression, weight loss, diarrhea, nausea, upper respiratory tract infection, and headache. Patient instructed to call the office should any adverse effect occur.  The patient verbalized understanding of the proper use and possible adverse effects of Otezla.  All the patient's questions and concerns were addressed.
Clofazimine Counseling:  I discussed with the patient the risks of clofazimine including but not limited to skin and eye pigmentation, liver damage, nausea/vomiting, gastrointestinal bleeding and allergy.
Spironolactone Pregnancy And Lactation Text: This medication can cause feminization of the male fetus and should be avoided during pregnancy. The active metabolite is also found in breast milk.
Taltz Counseling: I discussed with the patient the risks of ixekizumab including but not limited to immunosuppression, serious infections, worsening of inflammatory bowel disease and drug reactions.  The patient understands that monitoring is required including a PPD at baseline and must alert us or the primary physician if symptoms of infection or other concerning signs are noted.
Use Enhanced Medication Counseling?: No
Prednisone Counseling:  I discussed with the patient the risks of prolonged use of prednisone including but not limited to weight gain, insomnia, osteoporosis, mood changes, diabetes, susceptibility to infection, glaucoma and high blood pressure.  In cases where prednisone use is prolonged, patients should be monitored with blood pressure checks, serum glucose levels and an eye exam.  Additionally, the patient may need to be placed on GI prophylaxis, PCP prophylaxis, and calcium and vitamin D supplementation and/or a bisphosphonate.  The patient verbalized understanding of the proper use and the possible adverse effects of prednisone.  All of the patient's questions and concerns were addressed.
Azithromycin Pregnancy And Lactation Text: This medication is considered safe during pregnancy and is also secreted in breast milk.
Isotretinoin Counseling: Patient should get monthly blood tests, not donate blood, not drive at night if vision affected, not share medication, and not undergo elective surgery for 6 months after tx completed. Side effects reviewed, pt to contact office should one occur.
Topical Retinoid counseling:  Patient advised to apply a pea-sized amount only at bedtime and wait 30 minutes after washing their face before applying.  If too drying, patient may add a non-comedogenic moisturizer. The patient verbalized understanding of the proper use and possible adverse effects of retinoids.  All of the patient's questions and concerns were addressed.
Benzoyl Peroxide Pregnancy And Lactation Text: This medication is Pregnancy Category C. It is unknown if benzoyl peroxide is excreted in breast milk.
Isotretinoin Pregnancy And Lactation Text: This medication is Pregnancy Category X and is considered extremely dangerous during pregnancy. It is unknown if it is excreted in breast milk.
Carac Counseling:  I discussed with the patient the risks of Carac including but not limited to erythema, scaling, itching, weeping, crusting, and pain.
Drysol Pregnancy And Lactation Text: This medication is considered safe during pregnancy and breast feeding.
Finasteride Male Counseling: Finasteride Counseling:  I discussed with the patient the risks of use of finasteride including but not limited to decreased libido, decreased ejaculate volume, gynecomastia, and depression. Women should not handle medication.  All of the patient's questions and concerns were addressed.
Ilumya Counseling: I discussed with the patient the risks of tildrakizumab including but not limited to immunosuppression, malignancy, posterior leukoencephalopathy syndrome, and serious infections.  The patient understands that monitoring is required including a PPD at baseline and must alert us or the primary physician if symptoms of infection or other concerning signs are noted.
Libtayo Pregnancy And Lactation Text: This medication is contraindicated in pregnancy and when breast feeding.
Wartpeel Counseling:  I discussed with the patient the risks of Wartpeel including but not limited to erythema, scaling, itching, weeping, crusting, and pain.
Albendazole Counseling:  I discussed with the patient the risks of albendazole including but not limited to cytopenia, kidney damage, nausea/vomiting and severe allergy.  The patient understands that this medication is being used in an off-label manner.
Erythromycin Pregnancy And Lactation Text: This medication is Pregnancy Category B and is considered safe during pregnancy. It is also excreted in breast milk.
Rituxan Pregnancy And Lactation Text: This medication is Pregnancy Category C and it isn't know if it is safe during pregnancy. It is unknown if this medication is excreted in breast milk but similar antibodies are known to be excreted.
Klisyri Counseling:  I discussed with the patient the risks of Klisyri including but not limited to erythema, scaling, itching, weeping, crusting, and pain.
Itraconazole Pregnancy And Lactation Text: This medication is Pregnancy Category C and it isn't know if it is safe during pregnancy. It is also excreted in breast milk.
Azathioprine Pregnancy And Lactation Text: This medication is Pregnancy Category D and isn't considered safe during pregnancy. It is unknown if this medication is excreted in breast milk.
Sarecycline Counseling: Patient advised regarding possible photosensitivity and discoloration of the teeth, skin, lips, tongue and gums.  Patient instructed to avoid sunlight, if possible.  When exposed to sunlight, patients should wear protective clothing, sunglasses, and sunscreen.  The patient was instructed to call the office immediately if the following severe adverse effects occur:  hearing changes, easy bruising/bleeding, severe headache, or vision changes.  The patient verbalized understanding of the proper use and possible adverse effects of sarecycline.  All of the patient's questions and concerns were addressed.
Cellcept Counseling:  I discussed with the patient the risks of mycophenolate mofetil including but not limited to infection/immunosuppression, GI upset, hypokalemia, hypercholesterolemia, bone marrow suppression, lymphoproliferative disorders, malignancy, GI ulceration/bleed/perforation, colitis, interstitial lung disease, kidney failure, progressive multifocal leukoencephalopathy, and birth defects.  The patient understands that monitoring is required including a baseline creatinine and regular CBC testing. In addition, patient must alert us immediately if symptoms of infection or other concerning signs are noted.
Clofazimine Pregnancy And Lactation Text: This medication is Pregnancy Category C and isn't considered safe during pregnancy. It is excreted in breast milk.
Metronidazole Counseling:  I discussed with the patient the risks of metronidazole including but not limited to seizures, nausea/vomiting, a metallic taste in the mouth, nausea/vomiting and severe allergy.
Siliq Counseling:  I discussed with the patient the risks of Siliq including but not limited to new or worsening depression, suicidal thoughts and behavior, immunosuppression, malignancy, posterior leukoencephalopathy syndrome, and serious infections.  The patient understands that monitoring is required including a PPD at baseline and must alert us or the primary physician if symptoms of infection or other concerning signs are noted. There is also a special program designed to monitor depression which is required with Siliq.
Otezla Pregnancy And Lactation Text: This medication is Pregnancy Category C and it isn't known if it is safe during pregnancy. It is unknown if it is excreted in breast milk.
Klisyri Pregnancy And Lactation Text: It is unknown if this medication can harm a developing fetus or if it is excreted in breast milk.
Sarecycline Pregnancy And Lactation Text: This medication is Pregnancy Category D and not consider safe during pregnancy. It is also excreted in breast milk.
Taltz Pregnancy And Lactation Text: The risk during pregnancy and breastfeeding is uncertain with this medication.
Protopic Counseling: Patient may experience a mild burning sensation during topical application. Protopic is not approved in children less than 2 years of age. There have been case reports of hematologic and skin malignancies in patients using topical calcineurin inhibitors although causality is questionable.
SSKI Counseling:  I discussed with the patient the risks of SSKI including but not limited to thyroid abnormalities, metallic taste, GI upset, fever, headache, acne, arthralgias, paraesthesias, lymphadenopathy, easy bleeding, arrhythmias, and allergic reaction.
Prednisone Pregnancy And Lactation Text: This medication is Pregnancy Category C and it isn't know if it is safe during pregnancy. This medication is excreted in breast milk.
Bactrim Counseling:  I discussed with the patient the risks of sulfa antibiotics including but not limited to GI upset, allergic reaction, drug rash, diarrhea, dizziness, photosensitivity, and yeast infections.  Rarely, more serious reactions can occur including but not limited to aplastic anemia, agranulocytosis, methemoglobinemia, blood dyscrasias, liver or kidney failure, lung infiltrates or desquamative/blistering drug rashes.
Cimzia Pregnancy And Lactation Text: This medication crosses the placenta but can be considered safe in certain situations. Cimzia may be excreted in breast milk.
Tazorac Counseling:  Patient advised that medication is irritating and drying.  Patient may need to apply sparingly and wash off after an hour before eventually leaving it on overnight.  The patient verbalized understanding of the proper use and possible adverse effects of tazorac.  All of the patient's questions and concerns were addressed.
Sski Pregnancy And Lactation Text: This medication is Pregnancy Category D and isn't considered safe during pregnancy. It is excreted in breast milk.
Finasteride Pregnancy And Lactation Text: This medication is absolutely contraindicated during pregnancy. It is unknown if it is excreted in breast milk.
Cosentyx Counseling:  I discussed with the patient the risks of Cosentyx including but not limited to worsening of Crohn's disease, immunosuppression, allergic reactions and infections.  The patient understands that monitoring is required including a PPD at baseline and must alert us or the primary physician if symptoms of infection or other concerning signs are noted.
Albendazole Pregnancy And Lactation Text: This medication is Pregnancy Category C and it isn't known if it is safe during pregnancy. It is also excreted in breast milk.
Bactrim Pregnancy And Lactation Text: This medication is Pregnancy Category D and is known to cause fetal risk.  It is also excreted in breast milk.
Niacinamide Counseling: I recommended taking niacin or niacinamide, also know as vitamin B3, twice daily. Recent evidence suggests that taking vitamin B3 (500 mg twice daily) can reduce the risk of actinic keratoses and non-melanoma skin cancers. Side effects of vitamin B3 include flushing and headache.
High Dose Vitamin A Counseling: Side effects reviewed, pt to contact office should one occur.
Ketoconazole Counseling:   Patient counseled regarding improving absorption with orange juice.  Adverse effects include but are not limited to breast enlargement, headache, diarrhea, nausea, upset stomach, liver function test abnormalities, taste disturbance, and stomach pain.  There is a rare possibility of liver failure that can occur when taking ketoconazole. The patient understands that monitoring of LFTs may be required, especially at baseline. The patient verbalized understanding of the proper use and possible adverse effects of ketoconazole.  All of the patient's questions and concerns were addressed.
Elidel Counseling: Patient may experience a mild burning sensation during topical application. Elidel is not approved in children less than 2 years of age. There have been case reports of hematologic and skin malignancies in patients using topical calcineurin inhibitors although causality is questionable.
Niacinamide Pregnancy And Lactation Text: These medications are considered safe during pregnancy.
Ketoconazole Pregnancy And Lactation Text: This medication is Pregnancy Category C and it isn't know if it is safe during pregnancy. It is also excreted in breast milk and breast feeding isn't recommended.
Acitretin Counseling:  I discussed with the patient the risks of acitretin including but not limited to hair loss, dry lips/skin/eyes, liver damage, hyperlipidemia, depression/suicidal ideation, photosensitivity.  Serious rare side effects can include but are not limited to pancreatitis, pseudotumor cerebri, bony changes, clot formation/stroke/heart attack.  Patient understands that alcohol is contraindicated since it can result in liver toxicity and significantly prolong the elimination of the drug by many years.
Minoxidil Counseling: Minoxidil is a topical medication which can increase blood flow where it is applied. It is uncertain how this medication increases hair growth. Side effects are uncommon and include stinging and allergic reactions.
Oxybutynin Counseling:  I discussed with the patient the risks of oxybutynin including but not limited to skin rash, drowsiness, dry mouth, difficulty urinating, and blurred vision.
Tremfya Counseling: I discussed with the patient the risks of guselkumab including but not limited to immunosuppression, serious infections, and drug reactions.  The patient understands that monitoring is required including a PPD at baseline and must alert us or the primary physician if symptoms of infection or other concerning signs are noted.
Protopic Pregnancy And Lactation Text: This medication is Pregnancy Category C. It is unknown if this medication is excreted in breast milk when applied topically.
Tetracycline Counseling: Patient counseled regarding possible photosensitivity and increased risk for sunburn.  Patient instructed to avoid sunlight, if possible.  When exposed to sunlight, patients should wear protective clothing, sunglasses, and sunscreen.  The patient was instructed to call the office immediately if the following severe adverse effects occur:  hearing changes, easy bruising/bleeding, severe headache, or vision changes.  The patient verbalized understanding of the proper use and possible adverse effects of tetracycline.  All of the patient's questions and concerns were addressed. Patient understands to avoid pregnancy while on therapy due to potential birth defects.
Colchicine Counseling:  Patient counseled regarding adverse effects including but not limited to stomach upset (nausea, vomiting, stomach pain, or diarrhea).  Patient instructed to limit alcohol consumption while taking this medication.  Colchicine may reduce blood counts especially with prolonged use.  The patient understands that monitoring of kidney function and blood counts may be required, especially at baseline. The patient verbalized understanding of the proper use and possible adverse effects of colchicine.  All of the patient's questions and concerns were addressed.
Qbrexza Counseling:  I discussed with the patient the risks of Qbrexza including but not limited to headache, mydriasis, blurred vision, dry eyes, nasal dryness, dry mouth, dry throat, dry skin, urinary hesitation, and constipation.  Local skin reactions including erythema, burning, stinging, and itching can also occur.
Gabapentin Counseling: I discussed with the patient the risks of gabapentin including but not limited to dizziness, somnolence, fatigue and ataxia.
Ivermectin Counseling:  Patient instructed to take medication on an empty stomach with a full glass of water.  Patient informed of potential adverse effects including but not limited to nausea, diarrhea, dizziness, itching, and swelling of the extremities or lymph nodes.  The patient verbalized understanding of the proper use and possible adverse effects of ivermectin.  All of the patient's questions and concerns were addressed.
Thalidomide Counseling: I discussed with the patient the risks of thalidomide including but not limited to birth defects, anxiety, weakness, chest pain, dizziness, cough and severe allergy.
Calcipotriene Counseling:  I discussed with the patient the risks of calcipotriene including but not limited to erythema, scaling, itching, and irritation.
Tazorac Pregnancy And Lactation Text: This medication is not safe during pregnancy. It is unknown if this medication is excreted in breast milk.
Cephalexin Counseling: I counseled the patient regarding use of cephalexin as an antibiotic for prophylactic and/or therapeutic purposes. Cephalexin (commonly prescribed under brand name Keflex) is a cephalosporin antibiotic which is active against numerous classes of bacteria, including most skin bacteria. Side effects may include nausea, diarrhea, gastrointestinal upset, rash, hives, yeast infections, and in rare cases, hepatitis, kidney disease, seizures, fever, confusion, neurologic symptoms, and others. Patients with severe allergies to penicillin medications are cautioned that there is about a 10% incidence of cross-reactivity with cephalosporins. When possible, patients with penicillin allergies should use alternatives to cephalosporins for antibiotic therapy.
Infliximab Counseling:  I discussed with the patient the risks of infliximab including but not limited to myelosuppression, immunosuppression, autoimmune hepatitis, demyelinating diseases, lymphoma, and serious infections.  The patient understands that monitoring is required including a PPD at baseline and must alert us or the primary physician if symptoms of infection or other concerning signs are noted.
Winlevi Counseling:  I discussed with the patient the risks of topical clascoterone including but not limited to erythema, scaling, itching, and stinging. Patient voiced their understanding.
High Dose Vitamin A Pregnancy And Lactation Text: High dose vitamin A therapy is contraindicated during pregnancy and breast feeding.
Metronidazole Pregnancy And Lactation Text: This medication is Pregnancy Category B and considered safe during pregnancy.  It is also excreted in breast milk.
Winlevi Pregnancy And Lactation Text: This medication is considered safe during pregnancy and breastfeeding.
Eucrisa Counseling: Patient may experience a mild burning sensation during topical application. Eucrisa is not approved in children less than 2 years of age.
Acitretin Pregnancy And Lactation Text: This medication is Pregnancy Category X and should not be given to women who are pregnant or may become pregnant in the future. This medication is excreted in breast milk.
Minocycline Counseling: Patient advised regarding possible photosensitivity and discoloration of the teeth, skin, lips, tongue and gums.  Patient instructed to avoid sunlight, if possible.  When exposed to sunlight, patients should wear protective clothing, sunglasses, and sunscreen.  The patient was instructed to call the office immediately if the following severe adverse effects occur:  hearing changes, easy bruising/bleeding, severe headache, or vision changes.  The patient verbalized understanding of the proper use and possible adverse effects of minocycline.  All of the patient's questions and concerns were addressed.
Nsaids Counseling: NSAID Counseling: I discussed with the patient that NSAIDs should be taken with food. Prolonged use of NSAIDs can result in the development of stomach ulcers.  Patient advised to stop taking NSAIDs if abdominal pain occurs.  The patient verbalized understanding of the proper use and possible adverse effects of NSAIDs.  All of the patient's questions and concerns were addressed.
Terbinafine Counseling: Patient counseling regarding adverse effects of terbinafine including but not limited to headache, diarrhea, rash, upset stomach, liver function test abnormalities, itching, taste/smell disturbance, nausea, abdominal pain, and flatulence.  There is a rare possibility of liver failure that can occur when taking terbinafine.  The patient understands that a baseline LFT and kidney function test may be required. The patient verbalized understanding of the proper use and possible adverse effects of terbinafine.  All of the patient's questions and concerns were addressed.
Simponi Counseling:  I discussed with the patient the risks of golimumab including but not limited to myelosuppression, immunosuppression, autoimmune hepatitis, demyelinating diseases, lymphoma, and serious infections.  The patient understands that monitoring is required including a PPD at baseline and must alert us or the primary physician if symptoms of infection or other concerning signs are noted.
Oxybutynin Pregnancy And Lactation Text: This medication is Pregnancy Category B and is considered safe during pregnancy. It is unknown if it is excreted in breast milk.
Detail Level: Detailed
Cyclophosphamide Counseling:  I discussed with the patient the risks of cyclophosphamide including but not limited to hair loss, hormonal abnormalities, decreased fertility, abdominal pain, diarrhea, nausea and vomiting, bone marrow suppression and infection. The patient understands that monitoring is required while taking this medication.
Minoxidil Pregnancy And Lactation Text: This medication has not been assigned a Pregnancy Risk Category but animal studies failed to show danger with the topical medication. It is unknown if the medication is excreted in breast milk.
Qbrexza Pregnancy And Lactation Text: There is no available data on Qbrexza use in pregnant women.  There is no available data on Qbrexza use in lactation.
Dapsone Counseling: I discussed with the patient the risks of dapsone including but not limited to hemolytic anemia, agranulocytosis, rashes, methemoglobinemia, kidney failure, peripheral neuropathy, headaches, GI upset, and liver toxicity.  Patients who start dapsone require monitoring including baseline LFTs and weekly CBCs for the first month, then every month thereafter.  The patient verbalized understanding of the proper use and possible adverse effects of dapsone.  All of the patient's questions and concerns were addressed.
Propranolol Counseling:  I discussed with the patient the risks of propranolol including but not limited to low heart rate, low blood pressure, low blood sugar, restlessness and increased cold sensitivity. They should call the office if they experience any of these side effects.
Xeljanz Counseling: I discussed with the patient the risks of Xeljanz therapy including increased risk of infection, liver issues, headache, diarrhea, or cold symptoms. Live vaccines should be avoided. They were instructed to call if they have any problems.
Aklief counseling:  Patient advised to apply a pea-sized amount only at bedtime and wait 30 minutes after washing their face before applying.  If too drying, patient may add a non-comedogenic moisturizer.  The most commonly reported side effects including irritation, redness, scaling, dryness, stinging, burning, itching, and increased risk of sunburn.  The patient verbalized understanding of the proper use and possible adverse effects of retinoids.  All of the patient's questions and concerns were addressed.
Topical Clindamycin Counseling: Patient counseled that this medication may cause skin irritation or allergic reactions.  In the event of skin irritation, the patient was advised to reduce the amount of the drug applied or use it less frequently.   The patient verbalized understanding of the proper use and possible adverse effects of clindamycin.  All of the patient's questions and concerns were addressed.
Cephalexin Pregnancy And Lactation Text: This medication is Pregnancy Category B and considered safe during pregnancy.  It is also excreted in breast milk but can be used safely for shorter doses.
Calcipotriene Pregnancy And Lactation Text: This medication has not been proven safe during pregnancy. It is unknown if this medication is excreted in breast milk.
Dupixent Counseling: I discussed with the patient the risks of dupilumab including but not limited to eye infection and irritation, cold sores, injection site reactions, worsening of asthma, allergic reactions and increased risk of parasitic infection.  Live vaccines should be avoided while taking dupilumab. Dupilumab will also interact with certain medications such as warfarin and cyclosporine. The patient understands that monitoring is required and they must alert us or the primary physician if symptoms of infection or other concerning signs are noted.
Glycopyrrolate Counseling:  I discussed with the patient the risks of glycopyrrolate including but not limited to skin rash, drowsiness, dry mouth, difficulty urinating, and blurred vision.
Fluconazole Counseling:  Patient counseled regarding adverse effects of fluconazole including but not limited to headache, diarrhea, nausea, upset stomach, liver function test abnormalities, taste disturbance, and stomach pain.  There is a rare possibility of liver failure that can occur when taking fluconazole.  The patient understands that monitoring of LFTs and kidney function test may be required, especially at baseline. The patient verbalized understanding of the proper use and possible adverse effects of fluconazole.  All of the patient's questions and concerns were addressed.
Olumiant Counseling: I discussed with the patient the risks of Olumiant therapy including but not limited to upper respiratory tract infections, shingles, cold sores, and nausea. Live vaccines should be avoided.  This medication has been linked to serious infections; higher rate of mortality; malignancy and lymphoproliferative disorders; major adverse cardiovascular events; thrombosis; gastrointestinal perforations; neutropenia; lymphopenia; anemia; liver enzyme elevations; and lipid elevations.
Nsaids Pregnancy And Lactation Text: These medications are considered safe up to 30 weeks gestation. It is excreted in breast milk.
Zyclara Counseling:  I discussed with the patient the risks of imiquimod including but not limited to erythema, scaling, itching, weeping, crusting, and pain.  Patient understands that the inflammatory response to imiquimod is variable from person to person and was educated regarded proper titration schedule.  If flu-like symptoms develop, patient knows to discontinue the medication and contact us.
Clindamycin Counseling: I counseled the patient regarding use of clindamycin as an antibiotic for prophylactic and/or therapeutic purposes. Clindamycin is active against numerous classes of bacteria, including skin bacteria. Side effects may include nausea, diarrhea, gastrointestinal upset, rash, hives, yeast infections, and in rare cases, colitis.
Mirvaso Counseling: Mirvaso is a topical medication which can decrease superficial blood flow where applied. Side effects are uncommon and include stinging, redness and allergic reactions.
Terbinafine Pregnancy And Lactation Text: This medication is Pregnancy Category B and is considered safe during pregnancy. It is also excreted in breast milk and breast feeding isn't recommended.
Cyclophosphamide Pregnancy And Lactation Text: This medication is Pregnancy Category D and it isn't considered safe during pregnancy. This medication is excreted in breast milk.
Cyclosporine Counseling:  I discussed with the patient the risks of cyclosporine including but not limited to hypertension, gingival hyperplasia,myelosuppression, immunosuppression, liver damage, kidney damage, neurotoxicity, lymphoma, and serious infections. The patient understands that monitoring is required including baseline blood pressure, CBC, CMP, lipid panel and uric acid, and then 1-2 times monthly CMP and blood pressure.
Adbry Counseling: I discussed with the patient the risks of tralokinumab including but not limited to eye infection and irritation, cold sores, injection site reactions, worsening of asthma, allergic reactions and increased risk of parasitic infection.  Live vaccines should be avoided while taking tralokinumab. The patient understands that monitoring is required and they must alert us or the primary physician if symptoms of infection or other concerning signs are noted.
Mirvaso Pregnancy And Lactation Text: This medication has not been assigned a Pregnancy Risk Category. It is unknown if the medication is excreted in breast milk.
Skyrizi Counseling: I discussed with the patient the risks of risankizumab-rzaa including but not limited to immunosuppression, and serious infections.  The patient understands that monitoring is required including a PPD at baseline and must alert us or the primary physician if symptoms of infection or other concerning signs are noted.
Aklief Pregnancy And Lactation Text: It is unknown if this medication is safe to use during pregnancy.  It is unknown if this medication is excreted in breast milk.  Breastfeeding women should use the topical cream on the smallest area of the skin for the shortest time needed while breastfeeding.  Do not apply to nipple and areola.
Propranolol Pregnancy And Lactation Text: This medication is Pregnancy Category C and it isn't known if it is safe during pregnancy. It is excreted in breast milk.
Rhofade Counseling: Rhofade is a topical medication which can decrease superficial blood flow where applied. Side effects are uncommon and include stinging, redness and allergic reactions.
Xelmiquelz Pregnancy And Lactation Text: This medication is Pregnancy Category D and is not considered safe during pregnancy.  The risk during breast feeding is also uncertain.
Tranexamic Acid Counseling:  Patient advised of the small risk of bleeding problems with tranexamic acid. They were also instructed to call if they developed any nausea, vomiting or diarrhea. All of the patient's questions and concerns were addressed.
Opioid Counseling: I discussed with the patient the potential side effects of opioids including but not limited to addiction, altered mental status, and depression. I stressed avoiding alcohol, benzodiazepines, muscle relaxants and sleep aids unless specifically okayed by a physician. The patient verbalized understanding of the proper use and possible adverse effects of opioids. All of the patient's questions and concerns were addressed. They were instructed to flush the remaining pills down the toilet if they did not need them for pain.
Dupixent Pregnancy And Lactation Text: This medication likely crosses the placenta but the risk for the fetus is uncertain. This medication is excreted in breast milk.
Dapsone Pregnancy And Lactation Text: This medication is Pregnancy Category C and is not considered safe during pregnancy or breast feeding.
Xolair Counseling:  Patient informed of potential adverse effects including but not limited to fever, muscle aches, rash and allergic reactions.  The patient verbalized understanding of the proper use and possible adverse effects of Xolair.  All of the patient's questions and concerns were addressed.
Tranexamic Acid Pregnancy And Lactation Text: It is unknown if this medication is safe during pregnancy or breast feeding.
Enbrel Counseling:  I discussed with the patient the risks of etanercept including but not limited to myelosuppression, immunosuppression, autoimmune hepatitis, demyelinating diseases, lymphoma, and infections.  The patient understands that monitoring is required including a PPD at baseline and must alert us or the primary physician if symptoms of infection or other concerning signs are noted.
Cimetidine Counseling:  I discussed with the patient the risks of Cimetidine including but not limited to gynecomastia, headache, diarrhea, nausea, drowsiness, arrhythmias, pancreatitis, skin rashes, psychosis, bone marrow suppression and kidney toxicity.
Cantharidin Pregnancy And Lactation Text: The use of this medication during pregnancy or lactation is not recommended as there is insufficient data.
Glycopyrrolate Pregnancy And Lactation Text: This medication is Pregnancy Category B and is considered safe during pregnancy. It is unknown if it is excreted breast milk.
Clindamycin Pregnancy And Lactation Text: This medication can be used in pregnancy if certain situations. Clindamycin is also present in breast milk.
Opioid Pregnancy And Lactation Text: These medications can lead to premature delivery and should be avoided during pregnancy. These medications are also present in breast milk in small amounts.
Topical Ketoconazole Counseling: Patient counseled that this medication may cause skin irritation or allergic reactions.  In the event of skin irritation, the patient was advised to reduce the amount of the drug applied or use it less frequently.   The patient verbalized understanding of the proper use and possible adverse effects of ketoconazole.  All of the patient's questions and concerns were addressed.
Olumiant Pregnancy And Lactation Text: Based on animal studies, Olumiant may cause embryo-fetal harm when administered to pregnant women.  The medication should not be used in pregnancy.  Breastfeeding is not recommended during treatment.
Odomzo Counseling- I discussed with the patient the risks of Odomzo including but not limited to nausea, vomiting, diarrhea, constipation, weight loss, changes in the sense of taste, decreased appetite, muscle spasms, and hair loss.  The patient verbalized understanding of the proper use and possible adverse effects of Odomzo.  All of the patient's questions and concerns were addressed.
Quinolones Counseling:  I discussed with the patient the risks of fluoroquinolones including but not limited to GI upset, allergic reaction, drug rash, diarrhea, dizziness, photosensitivity, yeast infections, liver function test abnormalities, tendonitis/tendon rupture.
Hydroquinone Counseling:  Patient advised that medication may result in skin irritation, lightening (hypopigmentation), dryness, and burning.  In the event of skin irritation, the patient was advised to reduce the amount of the drug applied or use it less frequently.  Rarely, spots that are treated with hydroquinone can become darker (pseudoochronosis).  Should this occur, patient instructed to stop medication and call the office. The patient verbalized understanding of the proper use and possible adverse effects of hydroquinone.  All of the patient's questions and concerns were addressed.
Doxepin Counseling:  Patient advised that the medication is sedating and not to drive a car after taking this medication. Patient informed of potential adverse effects including but not limited to dry mouth, urinary retention, and blurry vision.  The patient verbalized understanding of the proper use and possible adverse effects of doxepin.  All of the patient's questions and concerns were addressed.
Rinvoq Counseling: I discussed with the patient the risks of Rinvoq therapy including but not limited to upper respiratory tract infections, shingles, cold sores, bronchitis, nausea, cough, fever, acne, and headache. Live vaccines should be avoided.  This medication has been linked to serious infections; higher rate of mortality; malignancy and lymphoproliferative disorders; major adverse cardiovascular events; thrombosis; thrombocytopenia, anemia, and neutropenia; lipid elevations; liver enzyme elevations; and gastrointestinal perforations.
Opzelura Counseling:  I discussed with the patient the risks of Opzelura including but not limited to nasopharngitis, bronchitis, ear infection, eosinophila, hives, diarrhea, folliculitis, tonsillitis, and rhinorrhea.  Taken orally, this medication has been linked to serious infections; higher rate of mortality; malignancy and lymphoproliferative disorders; major adverse cardiovascular events; thrombosis; thrombocytopenia, anemia, and neutropenia; and lipid elevations.
Birth Control Pills Counseling: Birth Control Pill Counseling: I discussed with the patient the potential side effects of OCPs including but not limited to increased risk of stroke, heart attack, thrombophlebitis, deep venous thrombosis, hepatic adenomas, breast changes, GI upset, headaches, and depression.  The patient verbalized understanding of the proper use and possible adverse effects of OCPs. All of the patient's questions and concerns were addressed.
Azelaic Acid Counseling: Patient counseled that medicine may cause skin irritation and to avoid applying near the eyes.  In the event of skin irritation, the patient was advised to reduce the amount of the drug applied or use it less frequently.   The patient verbalized understanding of the proper use and possible adverse effects of azelaic acid.  All of the patient's questions and concerns were addressed.
Dutasteride Male Counseling: Dustasteride Counseling:  I discussed with the patient the risks of use of dutasteride including but not limited to decreased libido, decreased ejaculate volume, and gynecomastia. Women who can become pregnant should not handle medication.  All of the patient's questions and concerns were addressed.
Adbry Pregnancy And Lactation Text: It is unknown if this medication will adversely affect pregnancy or breast feeding.
Solaraze Counseling:  I discussed with the patient the risks of Solaraze including but not limited to erythema, scaling, itching, weeping, crusting, and pain.
Cibinqo Counseling: I discussed with the patient the risks of Cibinqo therapy including but not limited to common cold, nausea, headache, cold sores, increased blood CPK levels, dizziness, UTIs, fatigue, acne, and vomitting. Live vaccines should be avoided.  This medication has been linked to serious infections; higher rate of mortality; malignancy and lymphoproliferative disorders; major adverse cardiovascular events; thrombosis; thrombocytopenia and lymphopenia; lipid elevations; and retinal detachment.
Dutasteride Pregnancy And Lactation Text: This medication is absolutely contraindicated in women, especially during pregnancy and breast feeding. Feminization of male fetuses is possible if taking while pregnant.
Xolair Pregnancy And Lactation Text: This medication is Pregnancy Category B and is considered safe during pregnancy. This medication is excreted in breast milk.
Valtrex Counseling: I discussed with the patient the risks of valacyclovir including but not limited to kidney damage, nausea, vomiting and severe allergy.  The patient understands that if the infection seems to be worsening or is not improving, they are to call.
Doxycycline Counseling:  Patient counseled regarding possible photosensitivity and increased risk for sunburn.  Patient instructed to avoid sunlight, if possible.  When exposed to sunlight, patients should wear protective clothing, sunglasses, and sunscreen.  The patient was instructed to call the office immediately if the following severe adverse effects occur:  hearing changes, easy bruising/bleeding, severe headache, or vision changes.  The patient verbalized understanding of the proper use and possible adverse effects of doxycycline.  All of the patient's questions and concerns were addressed.
Hydroxychloroquine Counseling:  I discussed with the patient that a baseline ophthalmologic exam is needed at the start of therapy and every year thereafter while on therapy. A CBC may also be warranted for monitoring.  The side effects of this medication were discussed with the patient, including but not limited to agranulocytosis, aplastic anemia, seizures, rashes, retinopathy, and liver toxicity. Patient instructed to call the office should any adverse effect occur.  The patient verbalized understanding of the proper use and possible adverse effects of Plaquenil.  All the patient's questions and concerns were addressed.
Bexarotene Counseling:  I discussed with the patient the risks of bexarotene including but not limited to hair loss, dry lips/skin/eyes, liver abnormalities, hyperlipidemia, pancreatitis, depression/suicidal ideation, photosensitivity, drug rash/allergic reactions, hypothyroidism, anemia, leukopenia, infection, cataracts, and teratogenicity.  Patient understands that they will need regular blood tests to check lipid profile, liver function tests, white blood cell count, thyroid function tests and pregnancy test if applicable.
Doxepin Pregnancy And Lactation Text: This medication is Pregnancy Category C and it isn't known if it is safe during pregnancy. It is also excreted in breast milk and breast feeding isn't recommended.
Griseofulvin Counseling:  I discussed with the patient the risks of griseofulvin including but not limited to photosensitivity, cytopenia, liver damage, nausea/vomiting and severe allergy.  The patient understands that this medication is best absorbed when taken with a fatty meal (e.g., ice cream or french fries).
5-Fu Counseling: 5-Fluorouracil Counseling:  I discussed with the patient the risks of 5-fluorouracil including but not limited to erythema, scaling, itching, weeping, crusting, and pain.

## 2022-09-09 NOTE — PROCEDURE: MIPS QUALITY
Quality 111:Pneumonia Vaccination Status For Older Adults: Pneumococcal Vaccination Previously Received
Quality 130: Documentation Of Current Medications In The Medical Record: Current Medications Documented
Quality 265: Biopsy Follow-Up: Biopsy results reviewed, communicated, tracked, and documented
Quality 431: Preventive Care And Screening: Unhealthy Alcohol Use - Screening: Patient not identified as an unhealthy alcohol user when screened for unhealthy alcohol use using a systematic screening method
Detail Level: Detailed
Quality 226: Preventive Care And Screening: Tobacco Use: Screening And Cessation Intervention: Patient screened for tobacco use and is an ex/non-smoker

## 2022-09-09 NOTE — PROCEDURE: MOHS SURGERY
Lazy S Complex Repair Preamble Text (Leave Blank If You Do Not Want): Extensive wide undermining was performed.
Surgical Defect Length In Cm (Optional): 1.7
Bi-Rhombic Flap Text: The defect edges were debeveled with a #15 scalpel blade.  Given the location of the defect and the proximity to free margins a bi-rhombic flap was deemed most appropriate.  Using a sterile surgical marker, an appropriate rhombic flap was drawn incorporating the defect. The area thus outlined was incised deep to adipose tissue with a #15 scalpel blade.  The skin margins were undermined to an appropriate distance in all directions utilizing iris scissors.
Adjacent Tissue Transfer Text: The defect edges were debeveled with a #15 scalpel blade.  Given the location of the defect and the proximity to free margins an adjacent tissue transfer was deemed most appropriate.  Using a sterile surgical marker, an appropriate flap was drawn incorporating the defect and placing the expected incisions within the relaxed skin tension lines where possible.    The area thus outlined was incised deep to adipose tissue with a #15 scalpel blade.  The skin margins were undermined to an appropriate distance in all directions utilizing iris scissors.
Muscle Hinge Flap Text: The defect edges were debeveled with a #15 scalpel blade.  Given the size, depth and location of the defect and the proximity to free margins a muscle hinge flap was deemed most appropriate.  Using a sterile surgical marker, an appropriate hinge flap was drawn incorporating the defect. The area thus outlined was incised with a #15 scalpel blade.  The skin margins were undermined to an appropriate distance in all directions utilizing iris scissors.
Oculoplastic Surgeon Procedure Text (D): After obtaining clear surgical margins the patient was sent to oculoplastics for surgical repair.  The patient understands they will receive post-surgical care and follow-up from the referring physician's office.
Zygomaticofacial Flap Text: Given the location of the defect, shape of the defect and the proximity to free margins a zygomaticofacial flap was deemed most appropriate for repair.  Using a sterile surgical marker, the appropriate flap was drawn incorporating the defect and placing the expected incisions within the relaxed skin tension lines where possible. The area thus outlined was incised deep to adipose tissue with a #15 scalpel blade with preservation of a vascular pedicle.  The skin margins were undermined to an appropriate distance in all directions utilizing iris scissors.  The flap was then placed into the defect and anchored with interrupted buried subcutaneous sutures.
Tissue Cultured Epidermal Autograft Text: The defect edges were debeveled with a #15 scalpel blade.  Given the location of the defect, shape of the defect and the proximity to free margins a tissue cultured epidermal autograft was deemed most appropriate.  The graft was then trimmed to fit the size of the defect.  The graft was then placed in the primary defect and oriented appropriately.
Quadrant Reporting?: no
Alar Island Pedicle Flap Text: The defect edges were debeveled with a #15 scalpel blade.  Given the location of the defect, shape of the defect and the proximity to the alar rim an island pedicle advancement flap was deemed most appropriate.  Using a sterile surgical marker, an appropriate advancement flap was drawn incorporating the defect, outlining the appropriate donor tissue and placing the expected incisions within the nasal ala running parallel to the alar rim. The area thus outlined was incised with a #15 scalpel blade.  The skin margins were undermined minimally to an appropriate distance in all directions around the primary defect and laterally outward around the island pedicle utilizing iris scissors.  There was minimal undermining beneath the pedicle flap.
Asc Procedure Text (F): After obtaining clear surgical margins the patient was sent to an ASC for surgical repair.  The patient understands they will receive post-surgical care and follow-up from the ASC physician.
Stage 9: Additional Anesthesia Volume In Cc: 0
Postop Diagnosis: same
Surgical Defect Width In Cm (Optional): 1.3
Spiral Flap Text: The defect edges were debeveled with a #15 scalpel blade.  Given the location of the defect, shape of the defect and the proximity to free margins a spiral flap was deemed most appropriate.  Using a sterile surgical marker, an appropriate rotation flap was drawn incorporating the defect and placing the expected incisions within the relaxed skin tension lines where possible. The area thus outlined was incised deep to adipose tissue with a #15 scalpel blade.  The skin margins were undermined to an appropriate distance in all directions utilizing iris scissors.
Consent 1/Introductory Paragraph: The rationale for Mohs was explained to the patient and consent was obtained. The risks, benefits and alternatives to therapy were discussed in detail. Specifically, the risks of infection, scarring, bleeding, prolonged wound healing, incomplete removal, allergy to anesthesia, nerve injury and recurrence were addressed. Prior to the procedure, the treatment site was clearly identified and confirmed by the patient. All components of Universal Protocol/PAUSE Rule completed.
Undermining Location (Optional): in the superficial subcutaneous fat
Consent (Marginal Mandibular)/Introductory Paragraph: The rationale for Mohs was explained to the patient and consent was obtained. The risks, benefits and alternatives to therapy were discussed in detail. Specifically, the risks of damage to the marginal mandibular branch of the facial nerve, infection, scarring, bleeding, prolonged wound healing, incomplete removal, allergy to anesthesia, and recurrence were addressed. Prior to the procedure, the treatment site was clearly identified and confirmed by the patient. All components of Universal Protocol/PAUSE Rule completed.
Show Specific Providers When Referring To Others For Closure?: Yes
Special Stains Stage 5 - Results: Base On Clearance Noted Above
Area H Indication Text: Tumors in this location are included in Area H (eyelids, eyebrows, nose, lips, chin, ear, pre-auricular, post-auricular, temple, genitalia, hands, feet, ankles and areola).  Tissue conservation is critical in these anatomic locations.
Epidermal Autograft Text: The defect edges were debeveled with a #15 scalpel blade.  Given the location of the defect, shape of the defect and the proximity to free margins an epidermal autograft was deemed most appropriate.  Using a sterile surgical marker, the primary defect shape was transferred to the donor site. The epidermal graft was then harvested.  The skin graft was then placed in the primary defect and oriented appropriately.
Lazy S Intermediate Repair Preamble Text (Leave Blank If You Do Not Want): Undermining was performed with blunt dissection.
Non-Graft Cartilage Fenestration Text: The cartilage was fenestrated with a 2mm punch biopsy to help facilitate healing.
S Plasty Text: Given the location and shape of the defect, and the orientation of relaxed skin tension lines, an S-plasty was deemed most appropriate for repair.  Using a sterile surgical marker, the appropriate outline of the S-plasty was drawn, incorporating the defect and placing the expected incisions within the relaxed skin tension lines where possible.  The area thus outlined was incised deep to adipose tissue with a #15 scalpel blade.  The skin margins were undermined to an appropriate distance in all directions utilizing iris scissors. The skin flaps were advanced over the defect.  The opposing margins were then approximated with interrupted buried subcutaneous sutures.
Bcc Histology Text: There were numerous aggregates of basaloid cells.
Estimated Blood Loss (Cc): minimal
Area M Indication Text: Tumors in this location are included in Area M (cheek, forehead, scalp, neck, jawline and pretibial skin).  Mohs surgery is indicated for tumors in these anatomic locations.
Secondary Defect Length In Cm (Required For Flaps): 2
Stage 6: Additional Anesthesia Type: 1% lidocaine with epinephrine
Nasalis-Muscle-Based Myocutaneous Island Pedicle Flap Text: Using a #15 blade, an incision was made around the donor flap to the level of the nasalis muscle. Wide lateral undermining was then performed in both the subcutaneous plane above the nasalis muscle, and in a submuscular plane just above periosteum. This allowed the formation of a free nasalis muscle axial pedicle (based on the angular artery) which was still attached to the actual cutaneous flap, increasing its mobility and vascular viability. Hemostasis was obtained with pinpoint electrocoagulation. The flap was mobilized into position and the pivotal anchor points positioned and stabilized with buried interrupted sutures. Subcutaneous and dermal tissues were closed in a multilayered fashion with sutures. Tissue redundancies were excised, and the epidermal edges were apposed without significant tension and sutured with sutures.
Provider Procedure Text (D): After obtaining clear surgical margins the defect was repaired by another provider.
Mid-Level Procedure Text (D): After obtaining clear surgical margins the patient was sent to a mid-level provider for surgical repair.  The patient understands they will receive post-surgical care and follow-up from the mid-level provider.
O-T Advancement Flap Text: The defect edges were debeveled with a #15 scalpel blade.  Given the location of the defect, shape of the defect and the proximity to free margins an O-T advancement flap was deemed most appropriate.  Using a sterile surgical marker, an appropriate advancement flap was drawn incorporating the defect and placing the expected incisions within the relaxed skin tension lines where possible.    The area thus outlined was incised deep to adipose tissue with a #15 scalpel blade.  The skin margins were undermined to an appropriate distance in all directions utilizing iris scissors.
Plastic Surgeon Procedure Text (E): After obtaining clear surgical margins the patient was sent to plastics for surgical repair.  The patient understands they will receive post-surgical care and follow-up from the referring physician's office.
Otolaryngologist Procedure Text (B): After obtaining clear surgical margins the patient was sent to otolaryngology for surgical repair.  The patient understands they will receive post-surgical care and follow-up from the referring physician's office.
Graft Donor Site Dermal Sutures (Optional): 5-0 Polysorb
Dressing: pressure dressing with telfa
Consent (Lip)/Introductory Paragraph: The rationale for Mohs was explained to the patient and consent was obtained. The risks, benefits and alternatives to therapy were discussed in detail. Specifically, the risks of lip deformity, changes in the oral aperture, infection, scarring, bleeding, prolonged wound healing, incomplete removal, allergy to anesthesia, nerve injury and recurrence were addressed. Prior to the procedure, the treatment site was clearly identified and confirmed by the patient. All components of Universal Protocol/PAUSE Rule completed.
Was The Patient On Physician Recommended Anticoagulation Therapy?: Please Select the Appropriate Response
Orbicularis Oris Muscle Flap Text: The defect edges were debeveled with a #15 scalpel blade.  Given that the defect affected the competency of the oral sphincter an orbicularis oris muscle flap was deemed most appropriate to restore this competency and normal muscle function.  Using a sterile surgical marker, an appropriate flap was drawn incorporating the defect. The area thus outlined was incised with a #15 scalpel blade.
Chonodrocutaneous Helical Advancement Flap Text: The defect edges were debeveled with a #15 scalpel blade.  Given the location of the defect and the proximity to free margins a chondrocutaneous helical advancement flap was deemed most appropriate.  Using a sterile surgical marker, the appropriate advancement flap was drawn incorporating the defect and placing the expected incisions within the relaxed skin tension lines where possible.    The area thus outlined was incised deep to adipose tissue with a #15 scalpel blade.  The skin margins were undermined to an appropriate distance in all directions utilizing iris scissors.
Ear Star Wedge Flap Text: The defect edges were debeveled with a #15 blade scalpel.  Given the location of the defect and the proximity to free margins (helical rim) an ear star wedge flap was deemed most appropriate.  Using a sterile surgical marker, the appropriate flap was drawn incorporating the defect and placing the expected incisions between the helical rim and antihelix where possible.  The area thus outlined was incised through and through with a #15 scalpel blade.
Double Island Pedicle Flap Text: The defect edges were debeveled with a #15 scalpel blade.  Given the location of the defect, shape of the defect and the proximity to free margins a double island pedicle advancement flap was deemed most appropriate.  Using a sterile surgical marker, an appropriate advancement flap was drawn incorporating the defect, outlining the appropriate donor tissue and placing the expected incisions within the relaxed skin tension lines where possible.    The area thus outlined was incised deep to adipose tissue with a #15 scalpel blade.  The skin margins were undermined to an appropriate distance in all directions around the primary defect and laterally outward around the island pedicle utilizing iris scissors.  There was minimal undermining beneath the pedicle flap.
Initial Size Of Lesion: 1
Mustarde Flap Text: The defect edges were debeveled with a #15 scalpel blade.  Given the size, depth and location of the defect and the proximity to free margins a Mustarde flap was deemed most appropriate.  Using a sterile surgical marker, an appropriate flap was drawn incorporating the defect. The area thus outlined was incised with a #15 scalpel blade.  The skin margins were undermined to an appropriate distance in all directions utilizing iris scissors.
Epidermal Sutures: 5-0 Surgipro
Ear Wedge Repair Text: A wedge excision was completed by carrying down an excision through the full thickness of the ear and cartilage with an inward facing Burow's triangle. The wound was then closed in a layered fashion.
V-Y Plasty Text: The defect edges were debeveled with a #15 scalpel blade.  Given the location of the defect, shape of the defect and the proximity to free margins an V-Y advancement flap was deemed most appropriate.  Using a sterile surgical marker, an appropriate advancement flap was drawn incorporating the defect and placing the expected incisions within the relaxed skin tension lines where possible.    The area thus outlined was incised deep to adipose tissue with a #15 scalpel blade.  The skin margins were undermined to an appropriate distance in all directions utilizing iris scissors.
Consent 2/Introductory Paragraph: Mohs surgery was explained to the patient and consent was obtained. The risks, benefits and alternatives to therapy were discussed in detail. Specifically, the risks of infection, scarring, bleeding, prolonged wound healing, incomplete removal, allergy to anesthesia, nerve injury and recurrence were addressed. Prior to the procedure, the treatment site was clearly identified and confirmed by the patient. All components of Universal Protocol/PAUSE Rule completed.
Paramedian Forehead Flap Text: A decision was made to reconstruct the defect utilizing an interpolation axial flap and a staged reconstruction.  A telfa template was made of the defect.  This telfa template was then used to outline the paramedian forehead pedicle flap.  The donor area for the pedicle flap was then injected with anesthesia.  The flap was excised through the skin and subcutaneous tissue down to the layer of the underlying musculature.  The pedicle flap was carefully excised within this deep plane to maintain its blood supply.  The edges of the donor site were undermined.   The donor site was closed in a primary fashion.  The pedicle was then rotated into position and sutured.  Once the tube was sutured into place, adequate blood supply was confirmed with blanching and refill.  The pedicle was then wrapped with xeroform gauze and dressed appropriately with a telfa and gauze bandage to ensure continued blood supply and protect the attached pedicle.
Staging Info: By selecting yes to the question above you will include information on AJCC 8 tumor staging in your Mohs note. Information on tumor staging will be automatically added for SCCs on the head and neck. AJCC 8 includes tumor size, tumor depth, perineural involvement and bone invasion.
Repair Hemostasis (Optional): Pinpoint electrocautery
Post-Care Instructions: I reviewed with the patient in detail post-care instructions. Patient is not to engage in any heavy lifting, exercise, or swimming for the next 14 days. Should the patient develop any fevers, chills, bleeding, severe pain patient will contact the office immediately.
Helical Rim Text: The closure involved the helical rim.
Melolabial Transposition Flap Text: The defect edges were debeveled with a #15 scalpel blade.  Given the location of the defect and the proximity to free margins a melolabial flap was deemed most appropriate.  Using a sterile surgical marker, an appropriate melolabial transposition flap was drawn incorporating the defect.    The area thus outlined was incised deep to adipose tissue with a #15 scalpel blade.  The skin margins were undermined to an appropriate distance in all directions utilizing iris scissors.
Consent (Spinal Accessory)/Introductory Paragraph: The rationale for Mohs was explained to the patient and consent was obtained. The risks, benefits and alternatives to therapy were discussed in detail. Specifically, the risks of damage to the spinal accessory nerve, infection, scarring, bleeding, prolonged wound healing, incomplete removal, allergy to anesthesia, and recurrence were addressed. Prior to the procedure, the treatment site was clearly identified and confirmed by the patient. All components of Universal Protocol/PAUSE Rule completed.
Surgeon: Thomas Faith MD
Brow Lift Text: A midfrontal incision was made medially to the defect to allow access to the tissues just superior to the left eyebrow. Following careful dissection inferiorly in a supraperiosteal plane to the level of the left eyebrow, several 3-0 monocryl sutures were used to resuspend the eyebrow orbicularis oculi muscular unit to the superior frontal bone periosteum. This resulted in an appropriate reapproximation of static eyebrow symmetry and correction of the left brow ptosis.
Partial Purse String (Intermediate) Text: Given the location of the defect and the characteristics of the surrounding skin an intermediate purse string closure was deemed most appropriate.  Undermining was performed circumfirentially around the surgical defect.  A purse string suture was then placed and tightened. Wound tension only allowed a partial closure of the circular defect.
Hatchet Flap Text: The defect edges were debeveled with a #15 scalpel blade.  Given the location of the defect, shape of the defect and the proximity to free margins a hatchet flap was deemed most appropriate.  Using a sterile surgical marker, an appropriate hatchet flap was drawn incorporating the defect and placing the expected incisions within the relaxed skin tension lines where possible.    The area thus outlined was incised deep to adipose tissue with a #15 scalpel blade.  The skin margins were undermined to an appropriate distance in all directions utilizing iris scissors.
Mucosal Advancement Flap Text: Given the location of the defect, shape of the defect and the proximity to free margins a mucosal advancement flap was deemed most appropriate. Incisions were made with a 15 blade scalpel in the appropriate fashion along the cutaneous vermilion border and the mucosal lip. The remaining actinically damaged mucosal tissue was excised.  The mucosal advancement flap was then elevated to the gingival sulcus with care taken to preserve the neurovascular structures and advanced into the primary defect. Care was taken to ensure that precise realignment of the vermilion border was achieved.
Area L Indication Text: Tumors in this location are included in Area L (trunk and extremities).  Mohs surgery is indicated for larger tumors, or tumors with aggressive histologic features, in these anatomic locations.
Vermilion Border Text: The closure involved the vermilion border.
Burow's Graft Text: The defect edges were debeveled with a #15 scalpel blade.  Given the location of the defect, shape of the defect, the proximity to free margins and the presence of a standing cone deformity a Burow's skin graft was deemed most appropriate. The standing cone was removed and this tissue was then trimmed to the shape of the primary defect. The adipose tissue was also removed until only dermis and epidermis were left.  The skin margins of the secondary defect were undermined to an appropriate distance in all directions utilizing iris scissors.  The secondary defect was closed with interrupted buried subcutaneous sutures.  The skin edges were then re-apposed with running  sutures.  The skin graft was then placed in the primary defect and oriented appropriately.
Dermal Autograft Text: The defect edges were debeveled with a #15 scalpel blade.  Given the location of the defect, shape of the defect and the proximity to free margins a dermal autograft was deemed most appropriate.  Using a sterile surgical marker, the primary defect shape was transferred to the donor site. The area thus outlined was incised deep to adipose tissue with a #15 scalpel blade.  The harvested graft was then trimmed of adipose and epidermal tissue until only dermis was left.  The skin graft was then placed in the primary defect and oriented appropriately.
Mohs Histo Method Verbiage: Each section was then chromacoded and processed in the Mohs lab using the Mohs protocol and submitted for frozen section.
Mercedes Flap Text: The defect edges were debeveled with a #15 scalpel blade.  Given the location of the defect, shape of the defect and the proximity to free margins a Mercedes flap was deemed most appropriate.  Using a sterile surgical marker, an appropriate advancement flap was drawn incorporating the defect and placing the expected incisions within the relaxed skin tension lines where possible. The area thus outlined was incised deep to adipose tissue with a #15 scalpel blade.  The skin margins were undermined to an appropriate distance in all directions utilizing iris scissors.
Eye Protection Verbiage: Before proceeding with the stage, a plastic scleral shield was inserted. The globe was anesthetized with a few drops of 1% lidocaine with 1:100,000 epinephrine. Then, an appropriate sized scleral shield was chosen and coated with lacrilube ointment. The shield was gently inserted and left in place for the duration of each stage. After the stage was completed, the shield was gently removed.
No Repair - Repaired With Adjacent Surgical Defect Text (Leave Blank If You Do Not Want): After obtaining clear surgical margins the defect was repaired concurrently with another surgical defect which was in close approximation.
Peng Advancement Flap Text: The defect edges were debeveled with a #15 scalpel blade.  Given the location of the defect, shape of the defect and the proximity to free margins a Peng advancement flap was deemed most appropriate.  Using a sterile surgical marker, an appropriate advancement flap was drawn incorporating the defect and placing the expected incisions within the relaxed skin tension lines where possible. The area thus outlined was incised deep to adipose tissue with a #15 scalpel blade.  The skin margins were undermined to an appropriate distance in all directions utilizing iris scissors.
Secondary Defect Width In Cm (Required For Flaps): 1.5
Suturegard Retention Suture: 0-0 Nylon
Consent (Scalp)/Introductory Paragraph: The rationale for Mohs was explained to the patient and consent was obtained. The risks, benefits and alternatives to therapy were discussed in detail. Specifically, the risks of changes in hair growth pattern secondary to repair, infection, scarring, bleeding, prolonged wound healing, incomplete removal, allergy to anesthesia, nerve injury and recurrence were addressed. Prior to the procedure, the treatment site was clearly identified and confirmed by the patient. All components of Universal Protocol/PAUSE Rule completed.
Bilobed Transposition Flap Text: The defect edges were debeveled with a #15 scalpel blade.  Given the location of the defect and the proximity to free margins a bilobed transposition flap was deemed most appropriate.  Using a sterile surgical marker, an appropriate bilobe flap drawn around the defect.    The area thus outlined was incised deep to adipose tissue with a #15 scalpel blade.  The skin margins were undermined to an appropriate distance in all directions utilizing iris scissors.
Transposition Flap Text: The defect edges were debeveled with a #15 scalpel blade.  Given the location of the defect and the proximity to free margins a transposition flap was deemed most appropriate.  Using a sterile surgical marker, an appropriate transposition flap was drawn incorporating the defect.    The area thus outlined was incised deep to adipose tissue with a #15 scalpel blade.  The skin margins were undermined to an appropriate distance in all directions utilizing iris scissors.
Retention Suture Text: Retention sutures were placed to support the closure and prevent dehiscence.
Split-Thickness Skin Graft Text: The defect edges were debeveled with a #15 scalpel blade.  Given the location of the defect, shape of the defect and the proximity to free margins a split thickness skin graft was deemed most appropriate.  Using a sterile surgical marker, the primary defect shape was transferred to the donor site. The split thickness graft was then harvested.  The skin graft was then placed in the primary defect and oriented appropriately.
Alternatives Discussed Intro (Do Not Add Period): I discussed alternative treatments to Mohs surgery and specifically discussed the risks and benefits of
Cheiloplasty (Less Than 50%) Text: A decision was made to reconstruct the defect with a  cheiloplasty.  The defect was undermined extensively.  Additional obicularis oris muscle was excised with a 15 blade scalpel.  The defect was converted into a full thickness wedge, of less than 50% of the vertical height of the lip, to facilite a better cosmetic result.  Small vessels were then tied off with 5-0 monocyrl. The obicularis oris, superficial fascia, adipose and dermis were then reapproximated.  After the deeper layers were approximated the epidermis was reapproximated with particular care given to realign the vermilion border.
Island Pedicle Flap With Canthal Suspension Text: The defect edges were debeveled with a #15 scalpel blade.  Given the location of the defect, shape of the defect and the proximity to free margins an island pedicle advancement flap was deemed most appropriate.  Using a sterile surgical marker, an appropriate advancement flap was drawn incorporating the defect, outlining the appropriate donor tissue and placing the expected incisions within the relaxed skin tension lines where possible. The area thus outlined was incised deep to adipose tissue with a #15 scalpel blade.  The skin margins were undermined to an appropriate distance in all directions around the primary defect and laterally outward around the island pedicle utilizing iris scissors.  There was minimal undermining beneath the pedicle flap. A suspension suture was placed in the canthal tendon to prevent tension and prevent ectropion.
Pain Refusal Text: I offered to prescribe pain medication but the patient refused to take this medication.
Graft Donor Site Bandage (Optional-Leave Blank If You Don't Want In Note): Aquaplast was fitted to the graft site and sewn into place. A pressure bandage were applied to the donor site and over the aquaplast bolster.
Inflammation Suggestive Of Cancer Camouflage Histology Text: There was a dense lymphocytic infiltrate which prevented adequate histologic evaluation of adjacent structures.
Consent (Ear)/Introductory Paragraph: The rationale for Mohs was explained to the patient and consent was obtained. The risks, benefits and alternatives to therapy were discussed in detail. Specifically, the risks of ear deformity, infection, scarring, bleeding, prolonged wound healing, incomplete removal, allergy to anesthesia, nerve injury and recurrence were addressed. Prior to the procedure, the treatment site was clearly identified and confirmed by the patient. All components of Universal Protocol/PAUSE Rule completed.
Skin Substitute Text: The defect edges were debeveled with a #15 scalpel blade.  Given the location of the defect, shape of the defect and the proximity to free margins a skin substitute graft was deemed most appropriate.  The graft material was trimmed to fit the size of the defect. The graft was then placed in the primary defect and oriented appropriately.
Xenograft Text: The defect edges were debeveled with a #15 scalpel blade.  Given the location of the defect, shape of the defect and the proximity to free margins a xenograft was deemed most appropriate.  The graft was then trimmed to fit the size of the defect.  The graft was then placed in the primary defect and oriented appropriately.
Closure 4 Information: This tab is for additional flaps and grafts above and beyond our usual structured repairs.  Please note if you enter information here it will not currently bill and you will need to add the billing information manually.
Undermining Type: Entire Wound
Referring Physician (Optional): Lyric Dolan MD
Purse String (Intermediate) Text: Given the location of the defect and the characteristics of the surrounding skin a purse string intermediate closure was deemed most appropriate.  Undermining was performed circumfirentially around the surgical defect.  A purse string suture was then placed and tightened.
Bilobed Flap Text: The defect edges were debeveled with a #15 scalpel blade.  Given the location of the defect and the proximity to free margins a bilobe flap was deemed most appropriate.  Using a sterile surgical marker, an appropriate bilobe flap drawn around the defect.    The area thus outlined was incised deep to adipose tissue with a #15 scalpel blade.  The skin margins were undermined to an appropriate distance in all directions utilizing iris scissors.
Burow's Advancement Flap Text: The defect edges were debeveled with a #15 scalpel blade.  Given the location of the defect and the proximity to free margins a Burow's advancement flap was deemed most appropriate.  Using a sterile surgical marker, the appropriate advancement flap was drawn incorporating the defect and placing the expected incisions within the relaxed skin tension lines where possible.    The area thus outlined was incised deep to adipose tissue with a #15 scalpel blade.  The skin margins were undermined to an appropriate distance in all directions utilizing iris scissors.
Z Plasty Text: The lesion was extirpated to the level of the fat with a #15 scalpel blade.  Given the location of the defect, shape of the defect and the proximity to free margins a Z-plasty was deemed most appropriate for repair.  Using a sterile surgical marker, the appropriate transposition arms of the Z-plasty were drawn incorporating the defect and placing the expected incisions within the relaxed skin tension lines where possible.    The area thus outlined was incised deep to adipose tissue with a #15 scalpel blade.  The skin margins were undermined to an appropriate distance in all directions utilizing iris scissors.  The opposing transposition arms were then transposed into place in opposite direction and anchored with interrupted buried subcutaneous sutures.
Medical Necessity Statement: Based on my medical judgement, Mohs surgery is the most appropriate treatment for this cancer compared to other treatments.
Advancement-Rotation Flap Text: The defect edges were debeveled with a #15 scalpel blade.  Given the location of the defect, shape of the defect and the proximity to free margins an advancement-rotation flap was deemed most appropriate.  Using a sterile surgical marker, an appropriate flap was drawn incorporating the defect and placing the expected incisions within the relaxed skin tension lines where possible. The area thus outlined was incised deep to adipose tissue with a #15 scalpel blade.  The skin margins were undermined to an appropriate distance in all directions utilizing iris scissors.
Mauc Instructions: By selecting yes to the question below the MAUC number will be added into the note.  This will be calculated automatically based on the diagnosis chosen, the size entered, the body zone selected (H,M,L) and the specific indications you chose. You will also have the option to override the Mohs AUC if you disagree with the automatically calculated number and this option is found in the Case Summary tab.
Donor Site Anesthesia Type: same as repair anesthesia
Consent Type: Consent 1 (Standard)
Epidermal Closure: running and interrupted
Graft Cartilage Fenestration Text: The cartilage was fenestrated with a 2mm punch biopsy to help facilitate graft survival and healing.
Double O-Z Plasty Text: The defect edges were debeveled with a #15 scalpel blade.  Given the location of the defect, shape of the defect and the proximity to free margins a Double O-Z plasty (double transposition flap) was deemed most appropriate.  Using a sterile surgical marker, the appropriate transposition flaps were drawn incorporating the defect and placing the expected incisions within the relaxed skin tension lines where possible. The area thus outlined was incised deep to adipose tissue with a #15 scalpel blade.  The skin margins were undermined to an appropriate distance in all directions utilizing iris scissors.  Hemostasis was achieved with electrocautery.  The flaps were then transposed into place, one clockwise and the other counterclockwise, and anchored with interrupted buried subcutaneous sutures.
H Plasty Text: Given the location of the defect, shape of the defect and the proximity to free margins a H-plasty was deemed most appropriate for repair.  Using a sterile surgical marker, the appropriate advancement arms of the H-plasty were drawn incorporating the defect and placing the expected incisions within the relaxed skin tension lines where possible. The area thus outlined was incised deep to adipose tissue with a #15 scalpel blade. The skin margins were undermined to an appropriate distance in all directions utilizing iris scissors.  The opposing advancement arms were then advanced into place in opposite direction and anchored with interrupted buried subcutaneous sutures.
Closure 2 Information: This tab is for additional flaps and grafts, including complex repair and grafts and complex repair and flaps. You can also specify a different location for the additional defect, if the location is the same you do not need to select a new one. We will insert the automated text for the repair you select below just as we do for solitary flaps and grafts. Please note that at this time if you select a location with a different insurance zone you will need to override the ICD10 and CPT if appropriate.
Unna Boot Text: An Unna boot was placed to help immobilize the limb and facilitate more rapid healing.
Island Pedicle Flap-Requiring Vessel Identification Text: The defect edges were debeveled with a #15 scalpel blade.  Given the location of the defect, shape of the defect and the proximity to free margins an island pedicle advancement flap was deemed most appropriate.  Using a sterile surgical marker, an appropriate advancement flap was drawn, based on the axial vessel mentioned above, incorporating the defect, outlining the appropriate donor tissue and placing the expected incisions within the relaxed skin tension lines where possible.    The area thus outlined was incised deep to adipose tissue with a #15 scalpel blade.  The skin margins were undermined to an appropriate distance in all directions around the primary defect and laterally outward around the island pedicle utilizing iris scissors.  There was minimal undermining beneath the pedicle flap.
Crescentic Advancement Flap Text: The defect edges were debeveled with a #15 scalpel blade.  Given the location of the defect and the proximity to free margins a crescentic advancement flap was deemed most appropriate.  Using a sterile surgical marker, the appropriate advancement flap was drawn incorporating the defect and placing the expected incisions within the relaxed skin tension lines where possible.    The area thus outlined was incised deep to adipose tissue with a #15 scalpel blade.  The skin margins were undermined to an appropriate distance in all directions utilizing iris scissors.
Star Wedge Flap Text: The defect edges were debeveled with a #15 scalpel blade.  Given the location of the defect, shape of the defect and the proximity to free margins a star wedge flap was deemed most appropriate.  Using a sterile surgical marker, an appropriate rotation flap was drawn incorporating the defect and placing the expected incisions within the relaxed skin tension lines where possible. The area thus outlined was incised deep to adipose tissue with a #15 scalpel blade.  The skin margins were undermined to an appropriate distance in all directions utilizing iris scissors.
Wound Care: Vaseline
Hemigard Postcare Instructions: The HEMIGARD strips are to remain completely dry for at least 5-7 days.
Helical Rim Advancement Flap Text: The defect edges were debeveled with a #15 blade scalpel.  Given the location of the defect and the proximity to free margins (helical rim) a double helical rim advancement flap was deemed most appropriate.  Using a sterile surgical marker, the appropriate advancement flaps were drawn incorporating the defect and placing the expected incisions between the helical rim and antihelix where possible.  The area thus outlined was incised through and through with a #15 scalpel blade.  With a skin hook and iris scissors, the flaps were gently and sharply undermined and freed up.
Hemostasis: Electrocautery
Bilateral Helical Rim Advancement Flap Text: The defect edges were debeveled with a #15 blade scalpel.  Given the location of the defect and the proximity to free margins (helical rim) a bilateral helical rim advancement flap was deemed most appropriate.  Using a sterile surgical marker, the appropriate advancement flaps were drawn incorporating the defect and placing the expected incisions between the helical rim and antihelix where possible.  The area thus outlined was incised through and through with a #15 scalpel blade.  With a skin hook and iris scissors, the flaps were gently and sharply undermined and freed up.
Composite Graft Text: The defect edges were debeveled with a #15 scalpel blade.  Given the location of the defect, shape of the defect, the proximity to free margins and the fact the defect was full thickness a composite graft was deemed most appropriate.  The defect was outline and then transferred to the donor site.  A full thickness graft was then excised from the donor site. The graft was then placed in the primary defect, oriented appropriately and then sutured into place.  The secondary defect was then repaired using a primary closure.
Mart-1 - Negative Histology Text: MART-1 staining demonstrates a normal density and pattern of melanocytes along the dermal-epidermal junction. The surgical margins are negative for tumor cells.
Where Do You Want The Question To Include Opioid Counseling Located?: Case Summary Tab
Melolabial Interpolation Flap Text: A decision was made to reconstruct the defect utilizing an interpolation axial flap and a staged reconstruction.  A telfa template was made of the defect.  This telfa template was then used to outline the melolabial interpolation flap.  The donor area for the pedicle flap was then injected with anesthesia.  The flap was excised through the skin and subcutaneous tissue down to the layer of the underlying musculature.  The pedicle flap was carefully excised within this deep plane to maintain its blood supply.  The edges of the donor site were undermined.   The donor site was closed in a primary fashion.  The pedicle was then rotated into position and sutured.  Once the tube was sutured into place, adequate blood supply was confirmed with blanching and refill.  The pedicle was then wrapped with xeroform gauze and dressed appropriately with a telfa and gauze bandage to ensure continued blood supply and protect the attached pedicle.
O-L Flap Text: The defect edges were debeveled with a #15 scalpel blade.  Given the location of the defect, shape of the defect and the proximity to free margins an O-L flap was deemed most appropriate.  Using a sterile surgical marker, an appropriate advancement flap was drawn incorporating the defect and placing the expected incisions within the relaxed skin tension lines where possible.    The area thus outlined was incised deep to adipose tissue with a #15 scalpel blade.  The skin margins were undermined to an appropriate distance in all directions utilizing iris scissors.
O-Z Flap Text: The defect edges were debeveled with a #15 scalpel blade.  Given the location of the defect, shape of the defect and the proximity to free margins an O-Z flap was deemed most appropriate.  Using a sterile surgical marker, an appropriate transposition flap was drawn incorporating the defect and placing the expected incisions within the relaxed skin tension lines where possible. The area thus outlined was incised deep to adipose tissue with a #15 scalpel blade.  The skin margins were undermined to an appropriate distance in all directions utilizing iris scissors.
Home Suture Removal Text: Patient was provided instructions on removing sutures and will remove their sutures at home.  If they have any questions or difficulties they will call the office.
Cheek Interpolation Flap Text: A decision was made to reconstruct the defect utilizing an interpolation axial flap and a staged reconstruction.  A telfa template was made of the defect.  This telfa template was then used to outline the Cheek Interpolation flap.  The donor area for the pedicle flap was then injected with anesthesia.  The flap was excised through the skin and subcutaneous tissue down to the layer of the underlying musculature.  The interpolation flap was carefully excised within this deep plane to maintain its blood supply.  The edges of the donor site were undermined.   The donor site was closed in a primary fashion.  The pedicle was then rotated into position and sutured.  Once the tube was sutured into place, adequate blood supply was confirmed with blanching and refill.  The pedicle was then wrapped with xeroform gauze and dressed appropriately with a telfa and gauze bandage to ensure continued blood supply and protect the attached pedicle.
Complex Repair And Flap Additional Text (Will Appearing After The Standard Complex Repair Text): The complex repair was not sufficient to completely close the primary defect. The remaining additional defect was repaired with the flap mentioned below.
Consent (Nose)/Introductory Paragraph: The rationale for Mohs was explained to the patient and consent was obtained. The risks, benefits and alternatives to therapy were discussed in detail. Specifically, the risks of nasal deformity, changes in the flow of air through the nose, infection, scarring, bleeding, prolonged wound healing, incomplete removal, allergy to anesthesia, nerve injury and recurrence were addressed. Prior to the procedure, the treatment site was clearly identified and confirmed by the patient. All components of Universal Protocol/PAUSE Rule completed.
Repair Anesthesia Method: local infiltration
Keystone Flap Text: The defect edges were debeveled with a #15 scalpel blade.  Given the location of the defect, shape of the defect a keystone flap was deemed most appropriate.  Using a sterile surgical marker, an appropriate keystone flap was drawn incorporating the defect, outlining the appropriate donor tissue and placing the expected incisions within the relaxed skin tension lines where possible. The area thus outlined was incised deep to adipose tissue with a #15 scalpel blade.  The skin margins were undermined to an appropriate distance in all directions around the primary defect and laterally outward around the flap utilizing iris scissors.
Mohs Method Verbiage: An incision at a 45 degree angle following the standard Mohs approach was done and the specimen was harvested as a microscopic controlled layer.
Consent (Near Eyelid Margin)/Introductory Paragraph: The rationale for Mohs was explained to the patient and consent was obtained. The risks, benefits and alternatives to therapy were discussed in detail. Specifically, the risks of ectropion or eyelid deformity, infection, scarring, bleeding, prolonged wound healing, incomplete removal, allergy to anesthesia, nerve injury and recurrence were addressed. Prior to the procedure, the treatment site was clearly identified and confirmed by the patient. All components of Universal Protocol/PAUSE Rule completed.
V-Y Flap Text: The defect edges were debeveled with a #15 scalpel blade.  Given the location of the defect, shape of the defect and the proximity to free margins a V-Y flap was deemed most appropriate.  Using a sterile surgical marker, an appropriate advancement flap was drawn incorporating the defect and placing the expected incisions within the relaxed skin tension lines where possible.    The area thus outlined was incised deep to adipose tissue with a #15 scalpel blade.  The skin margins were undermined to an appropriate distance in all directions utilizing iris scissors.
Wound Care (No Sutures): Petrolatum
O-Z Plasty Text: The defect edges were debeveled with a #15 scalpel blade.  Given the location of the defect, shape of the defect and the proximity to free margins an O-Z plasty (double transposition flap) was deemed most appropriate.  Using a sterile surgical marker, the appropriate transposition flaps were drawn incorporating the defect and placing the expected incisions within the relaxed skin tension lines where possible.    The area thus outlined was incised deep to adipose tissue with a #15 scalpel blade.  The skin margins were undermined to an appropriate distance in all directions utilizing iris scissors.  Hemostasis was achieved with electrocautery.  The flaps were then transposed into place, one clockwise and the other counterclockwise, and anchored with interrupted buried subcutaneous sutures.
Rotation Flap Text: The defect edges were debeveled with a #15 scalpel blade.  Given the location of the defect, shape of the defect and the proximity to free margins a rotation flap was deemed most appropriate.  Using a sterile surgical marker, an appropriate rotation flap was drawn incorporating the defect and placing the expected incisions within the relaxed skin tension lines where possible.    The area thus outlined was incised deep to adipose tissue with a #15 scalpel blade.  The skin margins were undermined to an appropriate distance in all directions utilizing iris scissors.
Trilobed Flap Text: The defect edges were debeveled with a #15 scalpel blade.  Given the location of the defect and the proximity to free margins a trilobed flap was deemed most appropriate.  Using a sterile surgical marker, an appropriate trilobed flap drawn around the defect.    The area thus outlined was incised deep to adipose tissue with a #15 scalpel blade.  The skin margins were undermined to an appropriate distance in all directions utilizing iris scissors.
Tumor Depth: Less than 6mm from granular layer and no invasion beyond the subcutaneous fat
Cheiloplasty (Complex) Text: A decision was made to reconstruct the defect with a  cheiloplasty.  The defect was undermined extensively.  Additional obicularis oris muscle was excised with a 15 blade scalpel.  The defect was converted into a full thickness wedge to facilite a better cosmetic result.  Small vessels were then tied off with 5-0 monocyrl. The obicularis oris, superficial fascia, adipose and dermis were then reapproximated.  After the deeper layers were approximated the epidermis was reapproximated with particular care given to realign the vermilion border.
Rhombic Flap Text: The defect edges were debeveled with a #15 scalpel blade.  Given the location of the defect and the proximity to free margins a rhombic flap was deemed most appropriate.  Using a sterile surgical marker, an appropriate rhombic flap was drawn incorporating the defect.    The area thus outlined was incised deep to adipose tissue with a #15 scalpel blade.  The skin margins were undermined to an appropriate distance in all directions utilizing iris scissors.
A-T Advancement Flap Text: The defect edges were debeveled with a #15 scalpel blade.  Given the location of the defect, shape of the defect and the proximity to free margins an A-T advancement flap was deemed most appropriate.  Using a sterile surgical marker, an appropriate advancement flap was drawn incorporating the defect and placing the expected incisions within the relaxed skin tension lines where possible.    The area thus outlined was incised deep to adipose tissue with a #15 scalpel blade.  The skin margins were undermined to an appropriate distance in all directions utilizing iris scissors.
W Plasty Text: The lesion was extirpated to the level of the fat with a #15 scalpel blade.  Given the location of the defect, shape of the defect and the proximity to free margins a W-plasty was deemed most appropriate for repair.  Using a sterile surgical marker, the appropriate transposition arms of the W-plasty were drawn incorporating the defect and placing the expected incisions within the relaxed skin tension lines where possible.    The area thus outlined was incised deep to adipose tissue with a #15 scalpel blade.  The skin margins were undermined to an appropriate distance in all directions utilizing iris scissors.  The opposing transposition arms were then transposed into place in opposite direction and anchored with interrupted buried subcutaneous sutures.
Same Histology In Subsequent Stages Text: The pattern and morphology of the tumor is as described in the first stage.
Epidermal Closure Graft Donor Site (Optional): running
Posterior Auricular Interpolation Flap Text: A decision was made to reconstruct the defect utilizing an interpolation axial flap and a staged reconstruction.  A telfa template was made of the defect.  This telfa template was then used to outline the posterior auricular interpolation flap.  The donor area for the pedicle flap was then injected with anesthesia.  The flap was excised through the skin and subcutaneous tissue down to the layer of the underlying musculature.  The pedicle flap was carefully excised within this deep plane to maintain its blood supply.  The edges of the donor site were undermined.   The donor site was closed in a primary fashion.  The pedicle was then rotated into position and sutured.  Once the tube was sutured into place, adequate blood supply was confirmed with blanching and refill.  The pedicle was then wrapped with xeroform gauze and dressed appropriately with a telfa and gauze bandage to ensure continued blood supply and protect the attached pedicle.
Bcc Infiltrative Histology Text: There were numerous aggregates of basaloid cells demonstrating an infiltrative pattern.
Manual Repair Warning Statement: We plan on removing the manually selected variable below in favor of our much easier automatic structured text blocks found in the previous tab. We decided to do this to help make the flow better and give you the full power of structured data. Manual selection is never going to be ideal in our platform and I would encourage you to avoid using manual selection from this point on, especially since I will be sunsetting this feature. It is important that you do one of two things with the customized text below. First, you can save all of the text in a word file so you can have it for future reference. Second, transfer the text to the appropriate area in the Library tab. Lastly, if there is a flap or graft type which we do not have you need to let us know right away so I can add it in before the variable is hidden. No need to panic, we plan to give you roughly 6 months to make the change.
Depth Of Tumor Invasion (For Histology): dermis
Interpolation Flap Text: A decision was made to reconstruct the defect utilizing an interpolation axial flap and a staged reconstruction.  A telfa template was made of the defect.  This telfa template was then used to outline the interpolation flap.  The donor area for the pedicle flap was then injected with anesthesia.  The flap was excised through the skin and subcutaneous tissue down to the layer of the underlying musculature.  The interpolation flap was carefully excised within this deep plane to maintain its blood supply.  The edges of the donor site were undermined.   The donor site was closed in a primary fashion.  The pedicle was then rotated into position and sutured.  Once the tube was sutured into place, adequate blood supply was confirmed with blanching and refill.  The pedicle was then wrapped with xeroform gauze and dressed appropriately with a telfa and gauze bandage to ensure continued blood supply and protect the attached pedicle.
Anesthesia Type: 1% lidocaine with epinephrine and 0.25% bupivacaine in a 2:3 ration buffered with 8.4% sodium bicarbonate
Flap Type: Advancement Flap (Single)
Subsequent Stages Histo Method Verbiage: Using a similar technique to that described above, a thin layer of tissue was removed from all areas where tumor was visible on the previous stage.  The tissue was again oriented, mapped, dyed, and processed as above.
Partial Purse String (Simple) Text: Given the location of the defect and the characteristics of the surrounding skin a simple purse string closure was deemed most appropriate.  Undermining was performed circumfirentially around the surgical defect.  A purse string suture was then placed and tightened. Wound tension only allowed a partial closure of the circular defect.
O-T Plasty Text: The defect edges were debeveled with a #15 scalpel blade.  Given the location of the defect, shape of the defect and the proximity to free margins an O-T plasty was deemed most appropriate.  Using a sterile surgical marker, an appropriate O-T plasty was drawn incorporating the defect and placing the expected incisions within the relaxed skin tension lines where possible.    The area thus outlined was incised deep to adipose tissue with a #15 scalpel blade.  The skin margins were undermined to an appropriate distance in all directions utilizing iris scissors.
Purse String (Simple) Text: Given the location of the defect and the characteristics of the surrounding skin a purse string closure was deemed most appropriate.  Undermining was performed circumfirentially around the surgical defect.  A purse string suture was then placed and tightened.
Information: Selecting Yes will display possible errors in your note based on the variables you have selected. This validation is only offered as a suggestion for you. PLEASE NOTE THAT THE VALIDATION TEXT WILL BE REMOVED WHEN YOU FINALIZE YOUR NOTE. IF YOU WANT TO FAX A PRELIMINARY NOTE YOU WILL NEED TO TOGGLE THIS TO 'NO' IF YOU DO NOT WANT IT IN YOUR FAXED NOTE.
Detail Level: Detailed
Mohs Case Number: [id number]
Suturegard Intro: Intraoperative tissue expansion was performed, utilizing the SUTUREGARD device, in order to reduce wound tension.
Hemigard Intro: Due to skin fragility and wound tension, it was decided to use HEMIGARD adhesive retention suture devices to permit a linear closure. The skin was cleaned and dried for a 6cm distance away from the wound. Excessive hair, if present, was removed to allow for adhesion.
Suture Removal: 12 days
Consent (Temporal Branch)/Introductory Paragraph: The rationale for Mohs was explained to the patient and consent was obtained. The risks, benefits and alternatives to therapy were discussed in detail. Specifically, the risks of damage to the temporal branch of the facial nerve, infection, scarring, bleeding, prolonged wound healing, incomplete removal, allergy to anesthesia, and recurrence were addressed. Prior to the procedure, the treatment site was clearly identified and confirmed by the patient. All components of Universal Protocol/PAUSE Rule completed.
Island Pedicle Flap Text: The defect edges were debeveled with a #15 scalpel blade.  Given the location of the defect, shape of the defect and the proximity to free margins an island pedicle advancement flap was deemed most appropriate.  Using a sterile surgical marker, an appropriate advancement flap was drawn incorporating the defect, outlining the appropriate donor tissue and placing the expected incisions within the relaxed skin tension lines where possible.    The area thus outlined was incised deep to adipose tissue with a #15 scalpel blade.  The skin margins were undermined to an appropriate distance in all directions around the primary defect and laterally outward around the island pedicle utilizing iris scissors.  There was minimal undermining beneath the pedicle flap.
Cheek-To-Nose Interpolation Flap Text: A decision was made to reconstruct the defect utilizing an interpolation axial flap and a staged reconstruction.  A telfa template was made of the defect.  This telfa template was then used to outline the Cheek-To-Nose Interpolation flap.  The donor area for the pedicle flap was then injected with anesthesia.  The flap was excised through the skin and subcutaneous tissue down to the layer of the underlying musculature.  The interpolation flap was carefully excised within this deep plane to maintain its blood supply.  The edges of the donor site were undermined.   The donor site was closed in a primary fashion.  The pedicle was then rotated into position and sutured.  Once the tube was sutured into place, adequate blood supply was confirmed with blanching and refill.  The pedicle was then wrapped with xeroform gauze and dressed appropriately with a telfa and gauze bandage to ensure continued blood supply and protect the attached pedicle.
Localized Dermabrasion With Wire Brush Text: The patient was draped in routine manner.  Localized dermabrasion using 3 x 17 mm wire brush was performed in routine manner to papillary dermis. This spot dermabrasion is being performed to complete skin cancer reconstruction. It also will eliminate the other sun damaged precancerous cells that are known to be part of the regional effect of a lifetime's worth of sun exposure. This localized dermabrasion is therapeutic and should not be considered cosmetic in any regard.
Date Of Previous Biopsy (Optional): 8/16/22
Ftsg Text: The defect edges were debeveled with a #15 scalpel blade.  Given the location of the defect, shape of the defect and the proximity to free margins a full thickness skin graft was deemed most appropriate.  Using a sterile surgical marker, the primary defect shape was transferred to the donor site. The area thus outlined was incised deep to adipose tissue with a #15 scalpel blade.  The harvested graft was then trimmed of adipose tissue until only dermis and epidermis was left.  The skin margins of the secondary defect were undermined to an appropriate distance in all directions utilizing iris scissors.  The secondary defect was closed with interrupted buried subcutaneous sutures.  The skin edges were then re-apposed with running  sutures.  The skin graft was then placed in the primary defect and oriented appropriately.
Surgeon/Pathologist Verbiage (Will Incorporate Name Of Surgeon From Intro If Not Blank): operated in two distinct and integrated capacities as the surgeon and pathologist.
Full Thickness Lip Wedge Repair (Flap) Text: Given the location of the defect and the proximity to free margins a full thickness wedge repair was deemed most appropriate.  Using a sterile surgical marker, the appropriate repair was drawn incorporating the defect and placing the expected incisions perpendicular to the vermilion border.  The vermilion border was also meticulously outlined to ensure appropriate reapproximation during the repair.  The area thus outlined was incised through and through with a #15 scalpel blade.  The muscularis and dermis were reaproximated with deep sutures following hemostasis. Care was taken to realign the vermilion border before proceeding with the superficial closure.  Once the vermilion was realigned the superfical and mucosal closure was finished.
No Residual Tumor Seen Histology Text: There were no malignant cells seen in the sections examined.
Cartilage Graft Text: The defect edges were debeveled with a #15 scalpel blade.  Given the location of the defect, shape of the defect, the fact the defect involved a full thickness cartilage defect a cartilage graft was deemed most appropriate.  An appropriate donor site was identified, cleansed, and anesthetized. The cartilage graft was then harvested and transferred to the recipient site, oriented appropriately and then sutured into place.  The secondary defect was then repaired using a primary closure.
Previous Accession (Optional): [id number]
Nostril Rim Text: The closure involved the nostril rim.
Mohs Rapid Report Verbiage: The area of clinically evident tumor was marked with skin marking ink and appropriately hatched.  The initial incision was made following the Mohs approach through the skin.  The specimen was taken to the lab, divided into the necessary number of pieces, chromacoded and processed according to the Mohs protocol.  This was repeated in successive stages until a tumor free defect was achieved.
Debridement Text: The wound edges were debrided prior to proceeding with the closure to facilitate wound healing.
Complex Repair And Graft Additional Text (Will Appearing After The Standard Complex Repair Text): The complex repair was not sufficient to completely close the primary defect. The remaining additional defect was repaired with the graft mentioned below.
Mart-1 - Positive Histology Text: MART-1 staining demonstrates areas of higher density and clustering of melanocytes with Pagetoid spread upwards within the epidermis. The surgical margins are positive for tumor cells.
Nasal Turnover Hinge Flap Text: The defect edges were debeveled with a #15 scalpel blade.  Given the size, depth, location of the defect and the defect being full thickness a nasal turnover hinge flap was deemed most appropriate.  Using a sterile surgical marker, an appropriate hinge flap was drawn incorporating the defect. The area thus outlined was incised with a #15 scalpel blade. The flap was designed to recreate the nasal mucosal lining and the alar rim. The skin margins were undermined to an appropriate distance in all directions utilizing iris scissors.
Suturegard Body: The suture ends were repeatedly re-tightened and re-clamped to achieve the desired tissue expansion.
Staged Advancement Flap Text: The defect edges were debeveled with a #15 scalpel blade.  Given the location of the defect, shape of the defect and the proximity to free margins a staged advancement flap was deemed most appropriate.  Using a sterile surgical marker, an appropriate advancement flap was drawn incorporating the defect and placing the expected incisions within the relaxed skin tension lines where possible. The area thus outlined was incised deep to adipose tissue with a #15 scalpel blade.  The skin margins were undermined to an appropriate distance in all directions utilizing iris scissors.
Advancement Flap (Single) Text: The defect edges were debeveled with a #15 scalpel blade.  Given the location of the defect and the proximity to free margins a single advancement flap was deemed most appropriate.  Using a sterile surgical marker, an appropriate advancement flap was drawn incorporating the defect and placing the expected incisions within the relaxed skin tension lines where possible.    The area thus outlined was incised deep to adipose tissue with a #15 scalpel blade.  The skin margins were undermined to an appropriate distance in all directions utilizing iris scissors.
Rhomboid Transposition Flap Text: The defect edges were debeveled with a #15 scalpel blade.  Given the location of the defect and the proximity to free margins a rhomboid transposition flap was deemed most appropriate.  Using a sterile surgical marker, an appropriate rhomboid flap was drawn incorporating the defect.    The area thus outlined was incised deep to adipose tissue with a #15 scalpel blade.  The skin margins were undermined to an appropriate distance in all directions utilizing iris scissors.
Repair Type: Flap
Retention Suture Bite Size: 1 mm
Banner Transposition Flap Text: The defect edges were debeveled with a #15 scalpel blade.  Given the location of the defect and the proximity to free margins a Banner transposition flap was deemed most appropriate.  Using a sterile surgical marker, an appropriate flap drawn around the defect. The area thus outlined was incised deep to adipose tissue with a #15 scalpel blade.  The skin margins were undermined to an appropriate distance in all directions utilizing iris scissors.
Consent 3/Introductory Paragraph: I gave the patient a chance to ask questions they had about the procedure.  Following this I explained the Mohs procedure and consent was obtained. The risks, benefits and alternatives to therapy were discussed in detail. Specifically, the risks of infection, scarring, bleeding, prolonged wound healing, incomplete removal, allergy to anesthesia, nerve injury and recurrence were addressed. Prior to the procedure, the treatment site was clearly identified and confirmed by the patient. All components of Universal Protocol/PAUSE Rule completed.
Secondary Intention Text (Leave Blank If You Do Not Want): The defect will heal with secondary intention.
Tarsorrhaphy Text: A tarsorrhaphy was performed using Frost sutures.
Mastoid Interpolation Flap Text: A decision was made to reconstruct the defect utilizing an interpolation axial flap and a staged reconstruction.  A telfa template was made of the defect.  This telfa template was then used to outline the mastoid interpolation flap.  The donor area for the pedicle flap was then injected with anesthesia.  The flap was excised through the skin and subcutaneous tissue down to the layer of the underlying musculature.  The pedicle flap was carefully excised within this deep plane to maintain its blood supply.  The edges of the donor site were undermined.   The donor site was closed in a primary fashion.  The pedicle was then rotated into position and sutured.  Once the tube was sutured into place, adequate blood supply was confirmed with blanching and refill.  The pedicle was then wrapped with xeroform gauze and dressed appropriately with a telfa and gauze bandage to ensure continued blood supply and protect the attached pedicle.
Double O-Z Flap Text: The defect edges were debeveled with a #15 scalpel blade.  Given the location of the defect, shape of the defect and the proximity to free margins a Double O-Z flap was deemed most appropriate.  Using a sterile surgical marker, an appropriate transposition flap was drawn incorporating the defect and placing the expected incisions within the relaxed skin tension lines where possible. The area thus outlined was incised deep to adipose tissue with a #15 scalpel blade.  The skin margins were undermined to an appropriate distance in all directions utilizing iris scissors.
Location Indication Override (Is Already Calculated Based On Selected Body Location): Area M
Modified Advancement Flap Text: The defect edges were debeveled with a #15 scalpel blade.  Given the location of the defect, shape of the defect and the proximity to free margins a modified advancement flap was deemed most appropriate.  Using a sterile surgical marker, an appropriate advancement flap was drawn incorporating the defect and placing the expected incisions within the relaxed skin tension lines where possible.    The area thus outlined was incised deep to adipose tissue with a #15 scalpel blade.  The skin margins were undermined to an appropriate distance in all directions utilizing iris scissors.
Dorsal Nasal Flap Text: The defect edges were debeveled with a #15 scalpel blade.  Given the location of the defect and the proximity to free margins a dorsal nasal flap was deemed most appropriate.  Using a sterile surgical marker, an appropriate dorsal nasal flap was drawn around the defect.    The area thus outlined was incised deep to adipose tissue with a #15 scalpel blade.  The skin margins were undermined to an appropriate distance in all directions utilizing iris scissors.
Advancement Flap (Double) Text: The defect edges were debeveled with a #15 scalpel blade.  Given the location of the defect and the proximity to free margins a double advancement flap was deemed most appropriate.  Using a sterile surgical marker, the appropriate advancement flaps were drawn incorporating the defect and placing the expected incisions within the relaxed skin tension lines where possible.    The area thus outlined was incised deep to adipose tissue with a #15 scalpel blade.  The skin margins were undermined to an appropriate distance in all directions utilizing iris scissors.
Abbe Flap (Lower To Upper Lip) Text: The defect of the upper lip was assessed and measured.  Given the location and size of the defect, an Abbe flap was deemed most appropriate.  Using a sterile surgical marker, an appropriate Abbe flap was measured and drawn on the lower lip. Local anesthesia was then infiltrated. A scalpel was then used to incise the upper lip through and through the skin, vermilion, muscle and mucosa, leaving the flap pedicled on the opposite side.  The flap was then rotated and transferred to the lower lip defect.  The flap was then sutured into place with a three layer technique, closing the orbicularis oris muscle layer with subcutaneous buried sutures, followed by a mucosal layer and an epidermal layer.
Abbe Flap (Upper To Lower Lip) Text: The defect of the lower lip was assessed and measured.  Given the location and size of the defect, an Abbe flap was deemed most appropriate.  Using a sterile surgical marker, an appropriate Abbe flap was measured and drawn on the upper lip. Local anesthesia was then infiltrated.  A scalpel was then used to incise the upper lip through and through the skin, vermilion, muscle and mucosa, leaving the flap pedicled on the opposite side.  The flap was then rotated and transferred to the lower lip defect.  The flap was then sutured into place with a three layer technique, closing the orbicularis oris muscle layer with subcutaneous buried sutures, followed by a mucosal layer and an epidermal layer.
Estlander Flap (Upper To Lower Lip) Text: The defect of the lower lip was assessed and measured.  Given the location and size of the defect, an Estlander flap was deemed most appropriate.  Using a sterile surgical marker, an appropriate Estlander flap was measured and drawn on the upper lip. Local anesthesia was then infiltrated. A scalpel was then used to incise the lateral aspect of the flap, through skin, muscle and mucosa, leaving the flap pedicled medially.  The flap was then rotated and positioned to fill the lower lip defect.  The flap was then sutured into place with a three layer technique, closing the orbicularis oris muscle layer with subcutaneous buried sutures, followed by a mucosal layer and an epidermal layer.

## 2022-09-21 ENCOUNTER — APPOINTMENT (RX ONLY)
Dept: URBAN - METROPOLITAN AREA CLINIC 6 | Facility: CLINIC | Age: 77
Setting detail: DERMATOLOGY
End: 2022-09-21

## 2022-09-21 DIAGNOSIS — Z48.02 ENCOUNTER FOR REMOVAL OF SUTURES: ICD-10-CM

## 2022-09-21 PROCEDURE — ? SUTURE REMOVAL (GLOBAL PERIOD)

## 2022-09-21 ASSESSMENT — LOCATION DETAILED DESCRIPTION DERM: LOCATION DETAILED: RIGHT SUPERIOR LATERAL MALAR CHEEK

## 2022-09-21 ASSESSMENT — LOCATION ZONE DERM: LOCATION ZONE: FACE

## 2022-09-21 ASSESSMENT — LOCATION SIMPLE DESCRIPTION DERM: LOCATION SIMPLE: RIGHT CHEEK

## 2022-09-21 NOTE — PROCEDURE: SUTURE REMOVAL (GLOBAL PERIOD)
Body Location Override (Optional - Billing Will Still Be Based On Selected Body Map Location If Applicable): right superior malar cheek
Detail Level: Detailed
Add 76126 Cpt? (Important Note: In 2017 The Use Of 91253 Is Being Tracked By Cms To Determine Future Global Period Reimbursement For Global Periods): no

## 2022-10-03 DIAGNOSIS — E08.41 DIABETIC MONONEUROPATHY ASSOCIATED WITH DIABETES MELLITUS DUE TO UNDERLYING CONDITION (HCC): ICD-10-CM

## 2022-10-05 RX ORDER — GABAPENTIN 300 MG/1
CAPSULE ORAL
Qty: 180 CAPSULE | Refills: 3 | Status: SHIPPED | OUTPATIENT
Start: 2022-10-05 | End: 2023-07-26 | Stop reason: SDUPTHER

## 2022-10-07 NOTE — PROGRESS NOTES
Lily from Lab called with critical result of CO2 at 6. Critical lab result read back to Lily.   Dr. Castañeda notified of critical lab result at 1127.  Critical lab result read back by Dr. Castañeda.   wound check

## 2022-11-08 ENCOUNTER — PATIENT MESSAGE (OUTPATIENT)
Dept: HEALTH INFORMATION MANAGEMENT | Facility: OTHER | Age: 77
End: 2022-11-08

## 2022-11-21 ENCOUNTER — APPOINTMENT (OUTPATIENT)
Dept: MEDICAL GROUP | Age: 77
End: 2022-11-21
Payer: MEDICARE

## 2022-11-21 DIAGNOSIS — F32.4 MAJOR DEPRESSIVE DISORDER WITH SINGLE EPISODE, IN PARTIAL REMISSION (HCC): ICD-10-CM

## 2022-11-21 DIAGNOSIS — F51.01 PRIMARY INSOMNIA: ICD-10-CM

## 2022-11-22 RX ORDER — MIRTAZAPINE 15 MG/1
15 TABLET, FILM COATED ORAL
Qty: 90 TABLET | Refills: 3 | Status: SHIPPED | OUTPATIENT
Start: 2022-11-22 | End: 2023-12-27 | Stop reason: SDUPTHER

## 2023-01-10 ENCOUNTER — HOSPITAL ENCOUNTER (OUTPATIENT)
Dept: LAB | Facility: MEDICAL CENTER | Age: 78
End: 2023-01-10
Attending: INTERNAL MEDICINE
Payer: MEDICARE

## 2023-01-10 DIAGNOSIS — E55.9 VITAMIN D DEFICIENCY: ICD-10-CM

## 2023-01-10 DIAGNOSIS — E78.2 MIXED HYPERLIPIDEMIA: ICD-10-CM

## 2023-01-10 LAB
25(OH)D3 SERPL-MCNC: 34 NG/ML (ref 30–100)
ALBUMIN SERPL BCP-MCNC: 3.9 G/DL (ref 3.2–4.9)
ALBUMIN/GLOB SERPL: 1.3 G/DL
ALP SERPL-CCNC: 50 U/L (ref 30–99)
ALT SERPL-CCNC: 21 U/L (ref 2–50)
ANION GAP SERPL CALC-SCNC: 14 MMOL/L (ref 7–16)
AST SERPL-CCNC: 20 U/L (ref 12–45)
BASOPHILS # BLD AUTO: 0.7 % (ref 0–1.8)
BASOPHILS # BLD: 0.05 K/UL (ref 0–0.12)
BILIRUB SERPL-MCNC: 0.4 MG/DL (ref 0.1–1.5)
BUN SERPL-MCNC: 19 MG/DL (ref 8–22)
CALCIUM ALBUM COR SERPL-MCNC: 10 MG/DL (ref 8.5–10.5)
CALCIUM SERPL-MCNC: 9.9 MG/DL (ref 8.5–10.5)
CHLORIDE SERPL-SCNC: 108 MMOL/L (ref 96–112)
CHOLEST SERPL-MCNC: 112 MG/DL (ref 100–199)
CO2 SERPL-SCNC: 25 MMOL/L (ref 20–33)
CREAT SERPL-MCNC: 0.92 MG/DL (ref 0.5–1.4)
CREAT UR-MCNC: 87.19 MG/DL
EOSINOPHIL # BLD AUTO: 0.2 K/UL (ref 0–0.51)
EOSINOPHIL NFR BLD: 2.9 % (ref 0–6.9)
ERYTHROCYTE [DISTWIDTH] IN BLOOD BY AUTOMATED COUNT: 50.7 FL (ref 35.9–50)
FASTING STATUS PATIENT QL REPORTED: NORMAL
GFR SERPLBLD CREATININE-BSD FMLA CKD-EPI: 86 ML/MIN/1.73 M 2
GLOBULIN SER CALC-MCNC: 2.9 G/DL (ref 1.9–3.5)
GLUCOSE SERPL-MCNC: 84 MG/DL (ref 65–99)
HCT VFR BLD AUTO: 45.9 % (ref 42–52)
HDLC SERPL-MCNC: 47 MG/DL
HGB BLD-MCNC: 15.1 G/DL (ref 14–18)
IMM GRANULOCYTES # BLD AUTO: 0.03 K/UL (ref 0–0.11)
IMM GRANULOCYTES NFR BLD AUTO: 0.4 % (ref 0–0.9)
LDLC SERPL CALC-MCNC: 51 MG/DL
LYMPHOCYTES # BLD AUTO: 2.4 K/UL (ref 1–4.8)
LYMPHOCYTES NFR BLD: 34.9 % (ref 22–41)
MCH RBC QN AUTO: 29.8 PG (ref 27–33)
MCHC RBC AUTO-ENTMCNC: 32.9 G/DL (ref 33.7–35.3)
MCV RBC AUTO: 90.7 FL (ref 81.4–97.8)
MICROALBUMIN UR-MCNC: <1.2 MG/DL
MICROALBUMIN/CREAT UR: NORMAL MG/G (ref 0–30)
MONOCYTES # BLD AUTO: 0.72 K/UL (ref 0–0.85)
MONOCYTES NFR BLD AUTO: 10.5 % (ref 0–13.4)
NEUTROPHILS # BLD AUTO: 3.47 K/UL (ref 1.82–7.42)
NEUTROPHILS NFR BLD: 50.6 % (ref 44–72)
NRBC # BLD AUTO: 0 K/UL
NRBC BLD-RTO: 0 /100 WBC
PLATELET # BLD AUTO: 320 K/UL (ref 164–446)
PMV BLD AUTO: 11 FL (ref 9–12.9)
POTASSIUM SERPL-SCNC: 4.7 MMOL/L (ref 3.6–5.5)
PROT SERPL-MCNC: 6.8 G/DL (ref 6–8.2)
RBC # BLD AUTO: 5.06 M/UL (ref 4.7–6.1)
SODIUM SERPL-SCNC: 147 MMOL/L (ref 135–145)
TRIGL SERPL-MCNC: 71 MG/DL (ref 0–149)
TSH SERPL DL<=0.005 MIU/L-ACNC: 1.91 UIU/ML (ref 0.38–5.33)
WBC # BLD AUTO: 6.9 K/UL (ref 4.8–10.8)

## 2023-01-10 PROCEDURE — 84443 ASSAY THYROID STIM HORMONE: CPT

## 2023-01-10 PROCEDURE — 80061 LIPID PANEL: CPT

## 2023-01-10 PROCEDURE — 85025 COMPLETE CBC W/AUTO DIFF WBC: CPT

## 2023-01-10 PROCEDURE — 36415 COLL VENOUS BLD VENIPUNCTURE: CPT

## 2023-01-10 PROCEDURE — 82570 ASSAY OF URINE CREATININE: CPT

## 2023-01-10 PROCEDURE — 80053 COMPREHEN METABOLIC PANEL: CPT

## 2023-01-10 PROCEDURE — 82306 VITAMIN D 25 HYDROXY: CPT

## 2023-01-10 PROCEDURE — 82043 UR ALBUMIN QUANTITATIVE: CPT

## 2023-01-19 ENCOUNTER — APPOINTMENT (OUTPATIENT)
Dept: MEDICAL GROUP | Age: 78
End: 2023-01-19
Payer: MEDICARE

## 2023-01-25 ENCOUNTER — OFFICE VISIT (OUTPATIENT)
Dept: MEDICAL GROUP | Age: 78
End: 2023-01-25
Payer: MEDICARE

## 2023-01-25 VITALS
SYSTOLIC BLOOD PRESSURE: 120 MMHG | WEIGHT: 217 LBS | TEMPERATURE: 96.5 F | HEART RATE: 85 BPM | HEIGHT: 72 IN | RESPIRATION RATE: 16 BRPM | DIASTOLIC BLOOD PRESSURE: 62 MMHG | BODY MASS INDEX: 29.39 KG/M2 | OXYGEN SATURATION: 98 %

## 2023-01-25 DIAGNOSIS — E08.41 DIABETIC MONONEUROPATHY ASSOCIATED WITH DIABETES MELLITUS DUE TO UNDERLYING CONDITION (HCC): ICD-10-CM

## 2023-01-25 DIAGNOSIS — N32.81 OAB (OVERACTIVE BLADDER): ICD-10-CM

## 2023-01-25 DIAGNOSIS — I70.219 ATHEROSCLEROSIS OF NATIVE ARTERY OF EXTREMITY WITH INTERMITTENT CLAUDICATION, UNSPECIFIED EXTREMITY (HCC): ICD-10-CM

## 2023-01-25 DIAGNOSIS — E55.9 HYPOVITAMINOSIS D: ICD-10-CM

## 2023-01-25 DIAGNOSIS — E55.9 VITAMIN D INSUFFICIENCY: ICD-10-CM

## 2023-01-25 DIAGNOSIS — E11.8 CONTROLLED TYPE 2 DIABETES MELLITUS WITH COMPLICATION, WITH LONG-TERM CURRENT USE OF INSULIN (HCC): ICD-10-CM

## 2023-01-25 DIAGNOSIS — E78.2 MIXED HYPERLIPIDEMIA: ICD-10-CM

## 2023-01-25 DIAGNOSIS — I47.29 NONSUSTAINED VENTRICULAR TACHYCARDIA (HCC): ICD-10-CM

## 2023-01-25 DIAGNOSIS — K21.00 GASTROESOPHAGEAL REFLUX DISEASE WITH ESOPHAGITIS, UNSPECIFIED WHETHER HEMORRHAGE: ICD-10-CM

## 2023-01-25 DIAGNOSIS — E03.4 HYPOTHYROIDISM DUE TO ACQUIRED ATROPHY OF THYROID: ICD-10-CM

## 2023-01-25 DIAGNOSIS — E03.9 HYPOTHYROIDISM, UNSPECIFIED TYPE: ICD-10-CM

## 2023-01-25 DIAGNOSIS — Z89.511 S/P BKA (BELOW KNEE AMPUTATION) UNILATERAL, RIGHT (HCC): ICD-10-CM

## 2023-01-25 DIAGNOSIS — Z79.4 TYPE 2 DIABETES MELLITUS WITH OTHER SPECIFIED COMPLICATION, WITH LONG-TERM CURRENT USE OF INSULIN (HCC): ICD-10-CM

## 2023-01-25 DIAGNOSIS — I10 ESSENTIAL HYPERTENSION: ICD-10-CM

## 2023-01-25 DIAGNOSIS — E55.9 VITAMIN D DEFICIENCY: ICD-10-CM

## 2023-01-25 DIAGNOSIS — I73.9 PVD (PERIPHERAL VASCULAR DISEASE) (HCC): ICD-10-CM

## 2023-01-25 DIAGNOSIS — E11.69 TYPE 2 DIABETES MELLITUS WITH OTHER SPECIFIED COMPLICATION, WITH LONG-TERM CURRENT USE OF INSULIN (HCC): ICD-10-CM

## 2023-01-25 DIAGNOSIS — E78.5 DYSLIPIDEMIA: ICD-10-CM

## 2023-01-25 DIAGNOSIS — N40.1 BENIGN NON-NODULAR PROSTATIC HYPERPLASIA WITH LOWER URINARY TRACT SYMPTOMS: ICD-10-CM

## 2023-01-25 DIAGNOSIS — Z79.4 CONTROLLED TYPE 2 DIABETES MELLITUS WITH COMPLICATION, WITH LONG-TERM CURRENT USE OF INSULIN (HCC): ICD-10-CM

## 2023-01-25 DIAGNOSIS — G25.0 BENIGN ESSENTIAL TREMOR: ICD-10-CM

## 2023-01-25 PROBLEM — L89.92 PRESSURE INJURY, STAGE 2 (HCC): Status: RESOLVED | Noted: 2021-08-11 | Resolved: 2023-01-25

## 2023-01-25 PROBLEM — F32.A DEPRESSION: Status: RESOLVED | Noted: 2021-07-10 | Resolved: 2023-01-25

## 2023-01-25 PROBLEM — M79.89 LEFT LEG SWELLING: Status: RESOLVED | Noted: 2021-12-14 | Resolved: 2023-01-25

## 2023-01-25 PROBLEM — R13.10 DYSPHAGIA: Status: RESOLVED | Noted: 2021-07-09 | Resolved: 2023-01-25

## 2023-01-25 PROBLEM — S42.009A: Status: RESOLVED | Noted: 2021-07-06 | Resolved: 2023-01-25

## 2023-01-25 PROBLEM — E11.9 TYPE 2 DIABETES MELLITUS (HCC): Status: RESOLVED | Noted: 2021-07-06 | Resolved: 2023-01-25

## 2023-01-25 PROBLEM — Z78.9 NO CONTRAINDICATION TO DEEP VEIN THROMBOSIS (DVT) PROPHYLAXIS: Chronic | Status: RESOLVED | Noted: 2021-07-08 | Resolved: 2023-01-25

## 2023-01-25 PROBLEM — I82.611 SUPERFICIAL VENOUS THROMBOSIS OF ARM, RIGHT: Status: RESOLVED | Noted: 2021-07-21 | Resolved: 2023-01-25

## 2023-01-25 PROBLEM — B95.61 MSSA BACTEREMIA: Status: RESOLVED | Noted: 2021-07-15 | Resolved: 2023-01-25

## 2023-01-25 PROBLEM — J90 PLEURAL EFFUSION: Status: RESOLVED | Noted: 2021-07-21 | Resolved: 2023-01-25

## 2023-01-25 PROBLEM — E11.10 DIABETIC KETOACIDOSIS WITHOUT COMA ASSOCIATED WITH TYPE 2 DIABETES MELLITUS (HCC): Status: RESOLVED | Noted: 2021-07-14 | Resolved: 2023-01-25

## 2023-01-25 PROBLEM — S06.6X0A SUBARACHNOID HEMORRHAGE-NO COMA, INITIAL ENCOUNTER (HCC): Status: RESOLVED | Noted: 2021-07-06 | Resolved: 2023-01-25

## 2023-01-25 PROBLEM — R78.81 MSSA BACTEREMIA: Status: RESOLVED | Noted: 2021-07-15 | Resolved: 2023-01-25

## 2023-01-25 PROBLEM — M71.22 SYNOVIAL CYST OF LEFT POPLITEAL SPACE: Status: RESOLVED | Noted: 2021-12-14 | Resolved: 2023-01-25

## 2023-01-25 PROBLEM — G89.21 CHRONIC PAIN DUE TO TRAUMA: Status: RESOLVED | Noted: 2021-09-30 | Resolved: 2023-01-25

## 2023-01-25 PROBLEM — Z13.9 ENCOUNTER FOR SCREENING: Status: RESOLVED | Noted: 2019-03-13 | Resolved: 2023-01-25

## 2023-01-25 PROBLEM — S22.43XA MULTIPLE FRACTURES OF RIBS, BILATERAL, INITIAL ENCOUNTER FOR CLOSED FRACTURE: Status: RESOLVED | Noted: 2021-07-06 | Resolved: 2023-01-25

## 2023-01-25 PROBLEM — S80.811A ABRASION OF RIGHT LEG: Status: RESOLVED | Noted: 2021-09-01 | Resolved: 2023-01-25

## 2023-01-25 PROBLEM — T14.90XA TRAUMA: Status: RESOLVED | Noted: 2021-07-06 | Resolved: 2023-01-25

## 2023-01-25 PROBLEM — Z78.9 NO CONTRAINDICATION TO DEEP VEIN THROMBOSIS (DVT) PROPHYLAXIS: Chronic | Status: ACTIVE | Noted: 2021-07-08

## 2023-01-25 PROBLEM — G93.41 ACUTE METABOLIC ENCEPHALOPATHY: Status: RESOLVED | Noted: 2021-07-14 | Resolved: 2023-01-25

## 2023-01-25 PROCEDURE — 99214 OFFICE O/P EST MOD 30 MIN: CPT | Performed by: INTERNAL MEDICINE

## 2023-01-25 ASSESSMENT — ENCOUNTER SYMPTOMS
NEUROLOGICAL NEGATIVE: 1
RESPIRATORY NEGATIVE: 1
CONSTITUTIONAL NEGATIVE: 1
MUSCULOSKELETAL NEGATIVE: 1
CARDIOVASCULAR NEGATIVE: 1
PSYCHIATRIC NEGATIVE: 1
EYES NEGATIVE: 1
GASTROINTESTINAL NEGATIVE: 1

## 2023-01-25 ASSESSMENT — FIBROSIS 4 INDEX: FIB4 SCORE: 1.036534978620963811

## 2023-01-25 ASSESSMENT — PATIENT HEALTH QUESTIONNAIRE - PHQ9: CLINICAL INTERPRETATION OF PHQ2 SCORE: 0

## 2023-01-25 NOTE — PROGRESS NOTES
Subjective     Kevin Jackson is a 77 y.o. male who presents with Follow-Up (Lab review )  The patient is here for followup of chronic medical problems listed below. The patient is compliant with medications and having no side effects from them. Denies chest pain, abdominal pain, dyspnea, myalgias, or cough.   Patient Active Problem List   Diagnosis    Essential hypertension- DR DWYER    Controlled type 2 diabetes mellitus with complication, with long-term current use of insulin ([facility])    Benign non-nodular prostatic hyperplasia with lower urinary tract symptoms- [facility]    Gastroesophageal reflux disease with esophagitis- PPI PRN    Hypothyroidism due to acquired atrophy of thyroid- dr browning    Benign essential tremor- DR OLIVAS, [facility]- Rx mysoline    PVD (peripheral vascular disease) ([facility])- s/p right BKA    Diabetic mononeuropathy associated with diabetes mellitus due to underlying condition ([facility])-m rx gabapentin    Nonsustained ventricular tachycardia ([facility])- mariana may 2019; PAC's and PVC's, NSR; NO A. FIB; dr dwyer, [facility];  dr mohsen (cardilogy) at [facility]    S/P BKA (below knee amputation) unilateral, right (HCC)    Atherosclerosis of native artery of extremity with intermittent claudication ([facility])    Dyslipidemia    Vitamin D deficiency    OAB (overactive bladder)     Outpatient Medications Prior to Visit   Medication Sig Dispense Refill    mirtazapine (REMERON) 15 MG Tab Take 1 Tablet by mouth at bedtime. 90 Tablet 3    gabapentin (NEURONTIN) 300 MG Cap TAKE ONE CAPSULE BY MOUTH TWO TIMES A  Capsule 3    propranolol CR (INDERAL LA) 120 MG CAPSULE SR 24 HR TAKE 1 CAPSULE BY MOUTH DAILY 90 Capsule 4    Mirabegron ER 25 MG TABLET SR 24 HR Take 25 mg by mouth every day. 30 Tablet 11    Misc. Devices Misc Diabetic shoes size 12- due to neuropathy, calluses and skin injuries. Needs fillers for left shoe to to mismatch in right and left shoe size due to BKA on right 2 Each 1    mupirocin (BACTROBAN)  2 % Ointment       Insulin Degludec (TRESIBA FLEXTOUCH) 100 UNIT/ML Solution Pen-injector Inject 34 Units under the skin at bedtime. 15 Each 3    insulin regular (HUMULIN R) 100 Unit/mL Solution Inject 1-6 Units under the skin 4 Times a Day,Before Meals and at Bedtime. 10 mL     atorvastatin (LIPITOR) 80 MG tablet Take 80 mg by mouth every evening.      primidone (MYSOLINE) 50 MG Tab Take 50 mg by mouth every day. Once daily      Semaglutide, 1 MG/DOSE, (OZEMPIC, 1 MG/DOSE,) 2 MG/1.5ML Solution Pen-injector Inject 1 mg under the skin every Monday.      finasteride (PROSCAR) 5 MG Tab Take 5 mg by mouth every day.      hydrocortisone 2.5 % Cream topical cream Apply 1 Application to affected area(s) 1 time daily as needed.      Insulin Pen Needle 32G X 6 MM Misc 1-2      glucose blood strip 3-4      INSULIN LISP PROT & LISP, HUM, (50-50) 100 UNIT/ML Suspension 30 u      aspirin EC 81 MG EC tablet Take 1 Tab by mouth every day. 100 Tab 2    losartan (COZAAR) 50 MG Tab Take 1 Tab by mouth every day. 90 Tab 2    glimepiride (AMARYL) 4 MG Tab Take 1 Tab by mouth every morning. 30 Tab 11    Empagliflozin (JARDIANCE) 25 MG Tab Take 25 mg by mouth every day. 90 Tab 4    metformin (GLUCOPHAGE) 1000 MG tablet Take 1 Tab by mouth 2 times a day, with meals. 60 Tab 11    levothyroxine (SYNTHROID) 50 MCG Tab Take 1 Tab by mouth every morning before breakfast. 30 Tab 11     No facility-administered medications prior to visit.     No Active Allergies  Hospital Outpatient Visit on 01/10/2023   Component Date Value    Creatinine, Urine 01/10/2023 87.19     Microalbumin, Urine Rand* 01/10/2023 <1.2     Micro Alb Creat Ratio 01/10/2023 see below     25-Hydroxy   Vitamin D 25 01/10/2023 34     WBC 01/10/2023 6.9     RBC 01/10/2023 5.06     Hemoglobin 01/10/2023 15.1     Hematocrit 01/10/2023 45.9     MCV 01/10/2023 90.7     MCH 01/10/2023 29.8     MCHC 01/10/2023 32.9 (L)     RDW 01/10/2023 50.7 (H)     Platelet Count 01/10/2023 320      MPV 01/10/2023 11.0     Neutrophils-Polys 01/10/2023 50.60     Lymphocytes 01/10/2023 34.90     Monocytes 01/10/2023 10.50     Eosinophils 01/10/2023 2.90     Basophils 01/10/2023 0.70     Immature Granulocytes 01/10/2023 0.40     Nucleated RBC 01/10/2023 0.00     Neutrophils (Absolute) 01/10/2023 3.47     Lymphs (Absolute) 01/10/2023 2.40     Monos (Absolute) 01/10/2023 0.72     Eos (Absolute) 01/10/2023 0.20     Baso (Absolute) 01/10/2023 0.05     Immature Granulocytes (a* 01/10/2023 0.03     NRBC (Absolute) 01/10/2023 0.00     Cholesterol,Tot 01/10/2023 112     Triglycerides 01/10/2023 71     HDL 01/10/2023 47     LDL 01/10/2023 51     Sodium 01/10/2023 147 (H)     Potassium 01/10/2023 4.7     Chloride 01/10/2023 108     Co2 01/10/2023 25     Anion Gap 01/10/2023 14.0     Glucose 01/10/2023 84     Bun 01/10/2023 19     Creatinine 01/10/2023 0.92     Calcium 01/10/2023 9.9     AST(SGOT) 01/10/2023 20     ALT(SGPT) 01/10/2023 21     Alkaline Phosphatase 01/10/2023 50     Total Bilirubin 01/10/2023 0.4     Albumin 01/10/2023 3.9     Total Protein 01/10/2023 6.8     Globulin 01/10/2023 2.9     A-G Ratio 01/10/2023 1.3     TSH 01/10/2023 1.910     Fasting Status 01/10/2023 Fasting     GFR (CKD-EPI) 01/10/2023 86     Correct Calcium 01/10/2023 10.0       Lab Results   Component Value Date/Time    HBA1C 9.5 (H) 05/06/2022 08:53 AM    HBA1C 9.0 (H) 07/14/2021 06:02 AM     Lab Results   Component Value Date/Time    SODIUM 147 (H) 01/10/2023 07:48 AM    POTASSIUM 4.7 01/10/2023 07:48 AM    CHLORIDE 108 01/10/2023 07:48 AM    CO2 25 01/10/2023 07:48 AM    GLUCOSE 84 01/10/2023 07:48 AM    BUN 19 01/10/2023 07:48 AM    CREATININE 0.92 01/10/2023 07:48 AM    BUNCREATRAT 24 11/21/2017 07:21 AM    ALKPHOSPHAT 50 01/10/2023 07:48 AM    ASTSGOT 20 01/10/2023 07:48 AM    ALTSGPT 21 01/10/2023 07:48 AM    TBILIRUBIN 0.4 01/10/2023 07:48 AM     Lab Results   Component Value Date/Time    INR 1.07 07/14/2021 01:28 PM    INR 0.97  07/06/2021 10:21 AM    INR 0.99 01/11/2017 09:05 PM     Lab Results   Component Value Date/Time    CHOLSTRLTOT 112 01/10/2023 07:48 AM    LDL 51 01/10/2023 07:48 AM    HDL 47 01/10/2023 07:48 AM    TRIGLYCERIDE 71 01/10/2023 07:48 AM       Lab Results   Component Value Date/Time    TESTOSTERONE 436 11/08/2019 10:50 AM     Lab Results   Component Value Date/Time    TSH 2.020 11/21/2017 07:21 AM     Lab Results   Component Value Date/Time    FREET4 1.45 05/06/2022 08:10 AM    FREET4 1.26 02/24/2022 03:52 PM     No results found for: URICACID  No components found for: VITB12  Lab Results   Component Value Date/Time    25HYDROXY 34 01/10/2023 07:48 AM    25HYDROXY 36 05/06/2022 08:53 AM               HPI    Review of Systems   Constitutional: Negative.    HENT: Negative.     Eyes: Negative.    Respiratory: Negative.     Cardiovascular: Negative.    Gastrointestinal: Negative.    Genitourinary: Negative.    Musculoskeletal: Negative.    Skin: Negative.    Neurological: Negative.    Endo/Heme/Allergies: Negative.    Psychiatric/Behavioral: Negative.              Objective     /62 (BP Location: Right arm, Patient Position: Sitting, BP Cuff Size: Adult)   Pulse 85   Temp 35.8 °C (96.5 °F) (Temporal)   Resp 16   Ht 1.829 m (6')   Wt 98.4 kg (217 lb)   SpO2 98%   BMI 29.43 kg/m²      Physical Exam  Constitutional:       General: He is not in acute distress.     Appearance: He is well-developed. He is not diaphoretic.   HENT:      Head: Normocephalic and atraumatic.      Right Ear: External ear normal.      Left Ear: External ear normal.      Nose: Nose normal.      Mouth/Throat:      Pharynx: No oropharyngeal exudate.   Eyes:      General: No scleral icterus.        Right eye: No discharge.         Left eye: No discharge.      Conjunctiva/sclera: Conjunctivae normal.      Pupils: Pupils are equal, round, and reactive to light.   Neck:      Thyroid: No thyromegaly.      Vascular: No JVD.      Trachea: No tracheal  deviation.   Cardiovascular:      Rate and Rhythm: Normal rate and regular rhythm.      Heart sounds: Normal heart sounds. No murmur heard.    No friction rub. No gallop.   Pulmonary:      Effort: Pulmonary effort is normal. No respiratory distress.      Breath sounds: Normal breath sounds. No stridor. No wheezing or rales.   Chest:      Chest wall: No tenderness.   Abdominal:      General: Bowel sounds are normal. There is no distension.      Palpations: Abdomen is soft. There is no mass.      Tenderness: There is no abdominal tenderness. There is no guarding or rebound.   Musculoskeletal:         General: No tenderness. Normal range of motion.      Cervical back: Normal range of motion and neck supple.   Lymphadenopathy:      Cervical: No cervical adenopathy.   Skin:     General: Skin is warm and dry.      Coloration: Skin is not pale.      Findings: No erythema or rash.   Neurological:      Mental Status: He is alert and oriented to person, place, and time.      Motor: No abnormal muscle tone.      Coordination: Coordination normal.      Deep Tendon Reflexes: Reflexes are normal and symmetric. Reflexes normal.   Psychiatric:         Behavior: Behavior normal.         Thought Content: Thought content normal.         Judgment: Judgment normal.                           Assessment & Plan        1. Essential hypertension    Under good control. Continue same regimen..   - Comp Metabolic Panel; Future  - HEMOGLOBIN A1C; Future    2. Dyslipidemia    Under good control. Continue same regimen.  Atorvastatin 80 mg daily  - TSH; Future  - Comp Metabolic Panel; Future  - Lipid Profile; Future  - CBC WITH DIFFERENTIAL; Future    3. Hypothyroidism, unspecified type    Under good control. Continue same regimen.  Synthroid 50 mcg daily.  Continue follow-up with endocrinology Dr. Patterson  4. Vitamin D deficiency Under good control. Continue same regimen.  2000 units vitamin D3 daily.     - VITAMIN D,25 HYDROXY (DEFICIENCY);  Future    5. Class 2 severe obesity with body mass index (BMI) of 35 to 39.9 with serious comorbidity ([facility])     diet/exercise/lose 15 lbs.; patient counseled    - Comp Metabolic Panel; Future  - Lipid Profile; Future  - HEMOGLOBIN A1C; Future  - MICROALBUMIN CREAT RATIO URINE; Future    6. S/P BKA (below knee amputation) unilateral, right ([facility])      Under good control. Continue same regimen.  No stump infection.  Continue with Bactroban ointment as needed.  7. Vitamin D insufficiency  Continue vitamin D3 2000 and is daily.    8. PVD (peripheral vascular disease) ([facility])- s/p right BKA    Under good control. Continue same regimen.  Continue with aspirin and statin and good diabetic control as above    9. OAB (overactive bladder)     Under good control. Continue same regimen.  Continue Myrbetriq 25 mg daily    10. Nonsustained ventricular tachycardia (HCC)- mariana may 2019; PAC's and PVC's, NSR; NO A. FIB; dr dwyer, [facility];  dr mohsen (cardilogy) at [facility]       11. Mixed hyperlipidemia- dr dwyer    Under good control. Continue same regimen.  Atorvastatin 80 mg daily.    12. Hypovitaminosis D-good control  Vitamin D3 2000 units daily.    13. Hypothyroidism due to acquired atrophy of thyroid- dr browning  As above.  Continue Synthroid 50 mcg daily    14. Diabetic mononeuropathy associated with diabetes mellitus due to underlying condition ([facility])- rx gabapentin    Under good control. Continue same regimen.  Gabapentin 300 mg twice daily  15. Gastroesophageal reflux disease with esophagitis, unspecified whether hemorrhage  Omeprazole 40 mg daily.  Good control with this.    16. Controlled type 2 diabetes mellitus with complication, with long-term current use of insulin ([facility])  Controlled on current regimen.  Glimepiride 4 mg daily.  Jardiance 25 mg daily.  Ozempic has been increased to 2 mg weekly.  Continue insulin LS IP 50-50 30 units daily, and regular insulin 1 6 units sliding scale 4 times daily before meals and at bedtime,  and Tresiba 42 units at bedtime    17. Benign non-nodular prostatic hyperplasia with lower urinary tract symptoms- [facility]    Under good control. Continue same regimen.    18. Benign essential tremor- DR OLIVAS, [facility]- Rx mysoline  Under good control with propranolol 120 mg daily and primidone 50 mg daily.    19. Atherosclerosis of native artery of extremity with intermittent claudication, unspecified extremity ([facility])    Under good control. Continue same regimen.

## 2023-02-16 ENCOUNTER — APPOINTMENT (RX ONLY)
Dept: URBAN - METROPOLITAN AREA CLINIC 22 | Facility: CLINIC | Age: 78
Setting detail: DERMATOLOGY
End: 2023-02-16

## 2023-02-16 DIAGNOSIS — D22 MELANOCYTIC NEVI: ICD-10-CM

## 2023-02-16 DIAGNOSIS — Z85.828 PERSONAL HISTORY OF OTHER MALIGNANT NEOPLASM OF SKIN: ICD-10-CM

## 2023-02-16 DIAGNOSIS — L81.4 OTHER MELANIN HYPERPIGMENTATION: ICD-10-CM

## 2023-02-16 DIAGNOSIS — L24 IRRITANT CONTACT DERMATITIS: ICD-10-CM | Status: INADEQUATELY CONTROLLED

## 2023-02-16 DIAGNOSIS — L82.1 OTHER SEBORRHEIC KERATOSIS: ICD-10-CM

## 2023-02-16 DIAGNOSIS — L21.8 OTHER SEBORRHEIC DERMATITIS: ICD-10-CM | Status: STABLE

## 2023-02-16 PROBLEM — D22.4 MELANOCYTIC NEVI OF SCALP AND NECK: Status: ACTIVE | Noted: 2023-02-16

## 2023-02-16 PROBLEM — D22.5 MELANOCYTIC NEVI OF TRUNK: Status: ACTIVE | Noted: 2023-02-16

## 2023-02-16 PROBLEM — L24.9 IRRITANT CONTACT DERMATITIS, UNSPECIFIED CAUSE: Status: ACTIVE | Noted: 2023-02-16

## 2023-02-16 PROCEDURE — ? COUNSELING

## 2023-02-16 PROCEDURE — ? PRESCRIPTION

## 2023-02-16 PROCEDURE — ? TREATMENT REGIMEN

## 2023-02-16 PROCEDURE — ? SUNSCREEN TREATMENT REGIMEN

## 2023-02-16 PROCEDURE — 99214 OFFICE O/P EST MOD 30 MIN: CPT

## 2023-02-16 RX ORDER — HYDROCORTISONE 25 MG/G
CREAM TOPICAL BID
Qty: 28 | Refills: 1 | Status: ERX | COMMUNITY
Start: 2023-02-16

## 2023-02-16 RX ADMIN — HYDROCORTISONE: 25 CREAM TOPICAL at 00:00

## 2023-02-16 ASSESSMENT — LOCATION SIMPLE DESCRIPTION DERM
LOCATION SIMPLE: RIGHT EYEBROW
LOCATION SIMPLE: GLABELLA
LOCATION SIMPLE: LEFT EYEBROW
LOCATION SIMPLE: RIGHT AXILLARY VAULT
LOCATION SIMPLE: POSTERIOR NECK
LOCATION SIMPLE: UPPER BACK
LOCATION SIMPLE: RIGHT CHEEK
LOCATION SIMPLE: INFERIOR FOREHEAD
LOCATION SIMPLE: LEFT FOREARM
LOCATION SIMPLE: LEFT AXILLARY VAULT
LOCATION SIMPLE: LEFT ANTERIOR NECK
LOCATION SIMPLE: RIGHT FOREARM

## 2023-02-16 ASSESSMENT — LOCATION ZONE DERM
LOCATION ZONE: FACE
LOCATION ZONE: ARM
LOCATION ZONE: NECK
LOCATION ZONE: TRUNK
LOCATION ZONE: AXILLAE

## 2023-02-16 ASSESSMENT — LOCATION DETAILED DESCRIPTION DERM
LOCATION DETAILED: LEFT AXILLARY VAULT
LOCATION DETAILED: GLABELLA
LOCATION DETAILED: INFERIOR MID FOREHEAD
LOCATION DETAILED: RIGHT POSTERIOR NECK
LOCATION DETAILED: RIGHT CENTRAL EYEBROW
LOCATION DETAILED: LEFT CENTRAL EYEBROW
LOCATION DETAILED: RIGHT AXILLARY VAULT
LOCATION DETAILED: INFERIOR THORACIC SPINE
LOCATION DETAILED: SUPERIOR THORACIC SPINE
LOCATION DETAILED: LEFT CLAVICULAR NECK
LOCATION DETAILED: RIGHT SUPERIOR MEDIAL MALAR CHEEK
LOCATION DETAILED: RIGHT VENTRAL PROXIMAL FOREARM
LOCATION DETAILED: LEFT VENTRAL PROXIMAL FOREARM

## 2023-02-16 ASSESSMENT — SEVERITY ASSESSMENT 2020: SEVERITY 2020: MODERATE

## 2023-02-16 ASSESSMENT — ITCH NUMERIC RATING SCALE: HOW SEVERE IS YOUR ITCHING?: 3

## 2023-02-16 NOTE — PROCEDURE: TREATMENT REGIMEN
Initiate Treatment: Hydrocortisone 2.5% cream twice a day for 1 week as needed with flares
Detail Level: Simple
Samples Given: Vanicream Deoderant
Discontinue Regimen: Deodorant
Initiate Treatment: Zeasorb powder daily\\nHydrocortisone 2.5% cream twice a day for 2 weeks as needed with flares

## 2023-03-03 ENCOUNTER — HOSPITAL ENCOUNTER (OUTPATIENT)
Dept: LAB | Facility: MEDICAL CENTER | Age: 78
End: 2023-03-03
Attending: INTERNAL MEDICINE
Payer: MEDICARE

## 2023-03-03 ENCOUNTER — HOSPITAL ENCOUNTER (OUTPATIENT)
Dept: LAB | Facility: MEDICAL CENTER | Age: 78
End: 2023-03-03
Attending: PHYSICIAN ASSISTANT
Payer: MEDICARE

## 2023-03-03 DIAGNOSIS — E55.9 VITAMIN D DEFICIENCY: ICD-10-CM

## 2023-03-03 DIAGNOSIS — E78.5 DYSLIPIDEMIA: ICD-10-CM

## 2023-03-03 DIAGNOSIS — Z79.4 CONTROLLED TYPE 2 DIABETES MELLITUS WITH COMPLICATION, WITH LONG-TERM CURRENT USE OF INSULIN (HCC): ICD-10-CM

## 2023-03-03 DIAGNOSIS — E11.8 CONTROLLED TYPE 2 DIABETES MELLITUS WITH COMPLICATION, WITH LONG-TERM CURRENT USE OF INSULIN (HCC): ICD-10-CM

## 2023-03-03 DIAGNOSIS — I10 ESSENTIAL HYPERTENSION: ICD-10-CM

## 2023-03-03 LAB
25(OH)D3 SERPL-MCNC: 37 NG/ML (ref 30–100)
ALBUMIN SERPL BCP-MCNC: 4 G/DL (ref 3.2–4.9)
ALBUMIN SERPL BCP-MCNC: 4 G/DL (ref 3.2–4.9)
ALBUMIN/GLOB SERPL: 1.5 G/DL
ALBUMIN/GLOB SERPL: 1.5 G/DL
ALP SERPL-CCNC: 46 U/L (ref 30–99)
ALP SERPL-CCNC: 48 U/L (ref 30–99)
ALT SERPL-CCNC: 17 U/L (ref 2–50)
ALT SERPL-CCNC: 18 U/L (ref 2–50)
ANION GAP SERPL CALC-SCNC: 11 MMOL/L (ref 7–16)
ANION GAP SERPL CALC-SCNC: 12 MMOL/L (ref 7–16)
AST SERPL-CCNC: 13 U/L (ref 12–45)
AST SERPL-CCNC: 14 U/L (ref 12–45)
BASOPHILS # BLD AUTO: 0.7 % (ref 0–1.8)
BASOPHILS # BLD: 0.05 K/UL (ref 0–0.12)
BILIRUB SERPL-MCNC: 0.6 MG/DL (ref 0.1–1.5)
BILIRUB SERPL-MCNC: 0.6 MG/DL (ref 0.1–1.5)
BUN SERPL-MCNC: 23 MG/DL (ref 8–22)
BUN SERPL-MCNC: 23 MG/DL (ref 8–22)
CALCIUM ALBUM COR SERPL-MCNC: 10.5 MG/DL (ref 8.5–10.5)
CALCIUM ALBUM COR SERPL-MCNC: 10.6 MG/DL (ref 8.5–10.5)
CALCIUM SERPL-MCNC: 10.5 MG/DL (ref 8.5–10.5)
CALCIUM SERPL-MCNC: 10.6 MG/DL (ref 8.5–10.5)
CHLORIDE SERPL-SCNC: 102 MMOL/L (ref 96–112)
CHLORIDE SERPL-SCNC: 102 MMOL/L (ref 96–112)
CHOLEST SERPL-MCNC: 111 MG/DL (ref 100–199)
CHOLEST SERPL-MCNC: 111 MG/DL (ref 100–199)
CO2 SERPL-SCNC: 28 MMOL/L (ref 20–33)
CO2 SERPL-SCNC: 28 MMOL/L (ref 20–33)
CREAT SERPL-MCNC: 0.98 MG/DL (ref 0.5–1.4)
CREAT SERPL-MCNC: 1.01 MG/DL (ref 0.5–1.4)
CREAT UR-MCNC: 80.59 MG/DL
CREAT UR-MCNC: 80.76 MG/DL
EOSINOPHIL # BLD AUTO: 0.21 K/UL (ref 0–0.51)
EOSINOPHIL NFR BLD: 3.1 % (ref 0–6.9)
ERYTHROCYTE [DISTWIDTH] IN BLOOD BY AUTOMATED COUNT: 50.4 FL (ref 35.9–50)
EST. AVERAGE GLUCOSE BLD GHB EST-MCNC: 183 MG/DL
FASTING STATUS PATIENT QL REPORTED: NORMAL
FASTING STATUS PATIENT QL REPORTED: NORMAL
GFR SERPLBLD CREATININE-BSD FMLA CKD-EPI: 77 ML/MIN/1.73 M 2
GFR SERPLBLD CREATININE-BSD FMLA CKD-EPI: 80 ML/MIN/1.73 M 2
GLOBULIN SER CALC-MCNC: 2.7 G/DL (ref 1.9–3.5)
GLOBULIN SER CALC-MCNC: 2.7 G/DL (ref 1.9–3.5)
GLUCOSE SERPL-MCNC: 125 MG/DL (ref 65–99)
GLUCOSE SERPL-MCNC: 130 MG/DL (ref 65–99)
HBA1C MFR BLD: 8 % (ref 4–5.6)
HCT VFR BLD AUTO: 46.6 % (ref 42–52)
HDLC SERPL-MCNC: 44 MG/DL
HDLC SERPL-MCNC: 44 MG/DL
HGB BLD-MCNC: 15.1 G/DL (ref 14–18)
IMM GRANULOCYTES # BLD AUTO: 0.02 K/UL (ref 0–0.11)
IMM GRANULOCYTES NFR BLD AUTO: 0.3 % (ref 0–0.9)
LDLC SERPL CALC-MCNC: 53 MG/DL
LDLC SERPL CALC-MCNC: 53 MG/DL
LYMPHOCYTES # BLD AUTO: 2.44 K/UL (ref 1–4.8)
LYMPHOCYTES NFR BLD: 36.5 % (ref 22–41)
MCH RBC QN AUTO: 29.7 PG (ref 27–33)
MCHC RBC AUTO-ENTMCNC: 32.4 G/DL (ref 33.7–35.3)
MCV RBC AUTO: 91.7 FL (ref 81.4–97.8)
MICROALBUMIN UR-MCNC: <1.2 MG/DL
MICROALBUMIN UR-MCNC: <1.2 MG/DL
MICROALBUMIN/CREAT UR: NORMAL MG/G (ref 0–30)
MICROALBUMIN/CREAT UR: NORMAL MG/G (ref 0–30)
MONOCYTES # BLD AUTO: 0.79 K/UL (ref 0–0.85)
MONOCYTES NFR BLD AUTO: 11.8 % (ref 0–13.4)
NEUTROPHILS # BLD AUTO: 3.18 K/UL (ref 1.82–7.42)
NEUTROPHILS NFR BLD: 47.6 % (ref 44–72)
NRBC # BLD AUTO: 0 K/UL
NRBC BLD-RTO: 0 /100 WBC
PLATELET # BLD AUTO: 293 K/UL (ref 164–446)
PMV BLD AUTO: 11.3 FL (ref 9–12.9)
POTASSIUM SERPL-SCNC: 4.7 MMOL/L (ref 3.6–5.5)
POTASSIUM SERPL-SCNC: 4.8 MMOL/L (ref 3.6–5.5)
PROT SERPL-MCNC: 6.7 G/DL (ref 6–8.2)
PROT SERPL-MCNC: 6.7 G/DL (ref 6–8.2)
RBC # BLD AUTO: 5.08 M/UL (ref 4.7–6.1)
SODIUM SERPL-SCNC: 141 MMOL/L (ref 135–145)
SODIUM SERPL-SCNC: 142 MMOL/L (ref 135–145)
T4 FREE SERPL-MCNC: 1.4 NG/DL (ref 0.93–1.7)
TRIGL SERPL-MCNC: 71 MG/DL (ref 0–149)
TRIGL SERPL-MCNC: 71 MG/DL (ref 0–149)
TSH SERPL DL<=0.005 MIU/L-ACNC: 1.78 UIU/ML (ref 0.38–5.33)
TSH SERPL DL<=0.005 MIU/L-ACNC: 1.8 UIU/ML (ref 0.38–5.33)
WBC # BLD AUTO: 6.7 K/UL (ref 4.8–10.8)

## 2023-03-03 PROCEDURE — 82570 ASSAY OF URINE CREATININE: CPT | Mod: 91

## 2023-03-03 PROCEDURE — 80061 LIPID PANEL: CPT | Mod: 91

## 2023-03-03 PROCEDURE — 82043 UR ALBUMIN QUANTITATIVE: CPT | Mod: 91

## 2023-03-03 PROCEDURE — 84443 ASSAY THYROID STIM HORMONE: CPT

## 2023-03-03 PROCEDURE — 80061 LIPID PANEL: CPT

## 2023-03-03 PROCEDURE — 82306 VITAMIN D 25 HYDROXY: CPT

## 2023-03-03 PROCEDURE — 82570 ASSAY OF URINE CREATININE: CPT

## 2023-03-03 PROCEDURE — 80053 COMPREHEN METABOLIC PANEL: CPT

## 2023-03-03 PROCEDURE — 80053 COMPREHEN METABOLIC PANEL: CPT | Mod: 91

## 2023-03-03 PROCEDURE — 84443 ASSAY THYROID STIM HORMONE: CPT | Mod: 91

## 2023-03-03 PROCEDURE — 84439 ASSAY OF FREE THYROXINE: CPT

## 2023-03-03 PROCEDURE — 83036 HEMOGLOBIN GLYCOSYLATED A1C: CPT | Mod: GA

## 2023-03-03 PROCEDURE — 82043 UR ALBUMIN QUANTITATIVE: CPT

## 2023-03-03 PROCEDURE — 36415 COLL VENOUS BLD VENIPUNCTURE: CPT

## 2023-03-03 PROCEDURE — 85025 COMPLETE CBC W/AUTO DIFF WBC: CPT

## 2023-04-26 NOTE — PROGRESS NOTES
Jose Alejandro sky  One grey sock  RLE stump cover  Right prosthetic leg  Brown belt  Black shoes  Black helmet  black gloves  glasses    Abrasion to left knee  Abrasion to right elbow, right wrist, right thumb   Abrasion left shoulder, left hand , left forearm to elbow  Chin abrasion   Left foot abrasion        [Time Spent: ___ minutes] : I have spent [unfilled] minutes of time on the encounter.

## 2023-05-17 ENCOUNTER — APPOINTMENT (RX ONLY)
Dept: URBAN - METROPOLITAN AREA CLINIC 22 | Facility: CLINIC | Age: 78
Setting detail: DERMATOLOGY
End: 2023-05-17

## 2023-05-17 DIAGNOSIS — L72.0 EPIDERMAL CYST: ICD-10-CM | Status: RESOLVING

## 2023-05-17 DIAGNOSIS — L24 IRRITANT CONTACT DERMATITIS: ICD-10-CM | Status: WELL CONTROLLED

## 2023-05-17 PROBLEM — L24.9 IRRITANT CONTACT DERMATITIS, UNSPECIFIED CAUSE: Status: ACTIVE | Noted: 2023-05-17

## 2023-05-17 PROCEDURE — ? TREATMENT REGIMEN

## 2023-05-17 PROCEDURE — ? ADDITIONAL NOTES

## 2023-05-17 PROCEDURE — ? COUNSELING

## 2023-05-17 PROCEDURE — ? PRESCRIPTION

## 2023-05-17 PROCEDURE — 99213 OFFICE O/P EST LOW 20 MIN: CPT

## 2023-05-17 RX ORDER — CLINDAMYCIN PHOSPHATE 10 MG/G
GEL TOPICAL
Qty: 30 | Refills: 0 | Status: ERX | COMMUNITY
Start: 2023-05-17

## 2023-05-17 RX ADMIN — CLINDAMYCIN PHOSPHATE: 10 GEL TOPICAL at 00:00

## 2023-05-17 ASSESSMENT — LOCATION DETAILED DESCRIPTION DERM
LOCATION DETAILED: LEFT AXILLARY VAULT
LOCATION DETAILED: RIGHT AXILLARY VAULT
LOCATION DETAILED: LEFT MEDIAL INFERIOR CHEST

## 2023-05-17 ASSESSMENT — LOCATION ZONE DERM
LOCATION ZONE: AXILLAE
LOCATION ZONE: TRUNK

## 2023-05-17 ASSESSMENT — LOCATION SIMPLE DESCRIPTION DERM
LOCATION SIMPLE: RIGHT AXILLARY VAULT
LOCATION SIMPLE: CHEST
LOCATION SIMPLE: LEFT AXILLARY VAULT

## 2023-05-17 ASSESSMENT — SEVERITY ASSESSMENT 2020: SEVERITY 2020: CLEAR

## 2023-05-17 NOTE — PROCEDURE: ADDITIONAL NOTES
Detail Level: Detailed
Render Risk Assessment In Note?: no
Additional Notes: Advised pt to continue use of rx hydrocortisone cream when needed .

## 2023-05-17 NOTE — PROCEDURE: TREATMENT REGIMEN
Detail Level: Simple
Samples Given: Vanicream Deoderant
Discontinue Regimen: Deodorant
Initiate Treatment: Gold Bond powder daily\\nHydrocortisone 2.5% cream twice a day as needed with flares.

## 2023-07-26 ENCOUNTER — OFFICE VISIT (OUTPATIENT)
Dept: MEDICAL GROUP | Age: 78
End: 2023-07-26
Payer: MEDICARE

## 2023-07-26 VITALS
TEMPERATURE: 97.9 F | HEART RATE: 88 BPM | WEIGHT: 207 LBS | OXYGEN SATURATION: 98 % | SYSTOLIC BLOOD PRESSURE: 110 MMHG | HEIGHT: 72 IN | DIASTOLIC BLOOD PRESSURE: 70 MMHG | BODY MASS INDEX: 28.04 KG/M2

## 2023-07-26 DIAGNOSIS — I73.9 PVD (PERIPHERAL VASCULAR DISEASE) (HCC): ICD-10-CM

## 2023-07-26 DIAGNOSIS — Z00.00 MEDICARE ANNUAL WELLNESS VISIT, SUBSEQUENT: ICD-10-CM

## 2023-07-26 DIAGNOSIS — N32.81 OAB (OVERACTIVE BLADDER): ICD-10-CM

## 2023-07-26 DIAGNOSIS — N40.1 BENIGN NON-NODULAR PROSTATIC HYPERPLASIA WITH LOWER URINARY TRACT SYMPTOMS: ICD-10-CM

## 2023-07-26 DIAGNOSIS — E08.41 DIABETIC MONONEUROPATHY ASSOCIATED WITH DIABETES MELLITUS DUE TO UNDERLYING CONDITION (HCC): ICD-10-CM

## 2023-07-26 DIAGNOSIS — Z79.4 CONTROLLED TYPE 2 DIABETES MELLITUS WITH COMPLICATION, WITH LONG-TERM CURRENT USE OF INSULIN (HCC): ICD-10-CM

## 2023-07-26 DIAGNOSIS — E78.5 DYSLIPIDEMIA: ICD-10-CM

## 2023-07-26 DIAGNOSIS — E03.4 HYPOTHYROIDISM DUE TO ACQUIRED ATROPHY OF THYROID: ICD-10-CM

## 2023-07-26 DIAGNOSIS — E11.8 CONTROLLED TYPE 2 DIABETES MELLITUS WITH COMPLICATION, WITH LONG-TERM CURRENT USE OF INSULIN (HCC): ICD-10-CM

## 2023-07-26 DIAGNOSIS — I47.29 NONSUSTAINED VENTRICULAR TACHYCARDIA (HCC): ICD-10-CM

## 2023-07-26 DIAGNOSIS — E55.9 VITAMIN D DEFICIENCY: ICD-10-CM

## 2023-07-26 DIAGNOSIS — I10 ESSENTIAL HYPERTENSION: ICD-10-CM

## 2023-07-26 DIAGNOSIS — Z89.511 S/P BKA (BELOW KNEE AMPUTATION) UNILATERAL, RIGHT (HCC): ICD-10-CM

## 2023-07-26 DIAGNOSIS — G25.0 BENIGN ESSENTIAL TREMOR: ICD-10-CM

## 2023-07-26 PROCEDURE — 3074F SYST BP LT 130 MM HG: CPT | Performed by: INTERNAL MEDICINE

## 2023-07-26 PROCEDURE — 3078F DIAST BP <80 MM HG: CPT | Performed by: INTERNAL MEDICINE

## 2023-07-26 PROCEDURE — G0439 PPPS, SUBSEQ VISIT: HCPCS | Performed by: INTERNAL MEDICINE

## 2023-07-26 RX ORDER — FINASTERIDE 5 MG/1
5 TABLET, FILM COATED ORAL DAILY
Qty: 90 TABLET | Refills: 3 | Status: SHIPPED
Start: 2023-07-26 | End: 2023-12-05

## 2023-07-26 RX ORDER — GABAPENTIN 300 MG/1
300 CAPSULE ORAL 3 TIMES DAILY
Qty: 270 CAPSULE | Refills: 3 | Status: SHIPPED | OUTPATIENT
Start: 2023-07-26

## 2023-07-26 RX ORDER — INSULIN DEGLUDEC 100 U/ML
44 INJECTION, SOLUTION SUBCUTANEOUS
Qty: 15 EACH | Refills: 3 | Status: SHIPPED
Start: 2023-07-26

## 2023-07-26 RX ORDER — SEMAGLUTIDE 1.34 MG/ML
2 INJECTION, SOLUTION SUBCUTANEOUS
Qty: 4 EACH | Refills: 11 | Status: SHIPPED
Start: 2023-07-31

## 2023-07-26 ASSESSMENT — ACTIVITIES OF DAILY LIVING (ADL): BATHING_REQUIRES_ASSISTANCE: 0

## 2023-07-26 ASSESSMENT — FIBROSIS 4 INDEX: FIB4 SCORE: 0.81

## 2023-07-26 ASSESSMENT — PATIENT HEALTH QUESTIONNAIRE - PHQ9: CLINICAL INTERPRETATION OF PHQ2 SCORE: 0

## 2023-07-26 ASSESSMENT — ENCOUNTER SYMPTOMS: GENERAL WELL-BEING: FAIR
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
## 2023-09-26 ENCOUNTER — OFFICE VISIT (OUTPATIENT)
Dept: MEDICAL GROUP | Age: 78
End: 2023-09-26
Payer: MEDICARE

## 2023-09-26 VITALS
HEART RATE: 79 BPM | TEMPERATURE: 97.1 F | WEIGHT: 214 LBS | DIASTOLIC BLOOD PRESSURE: 68 MMHG | OXYGEN SATURATION: 97 % | SYSTOLIC BLOOD PRESSURE: 120 MMHG | HEIGHT: 72 IN | BODY MASS INDEX: 28.99 KG/M2

## 2023-09-26 DIAGNOSIS — Z23 NEED FOR VACCINATION: ICD-10-CM

## 2023-09-26 DIAGNOSIS — B37.2 CANDIDAL INTERTRIGO: ICD-10-CM

## 2023-09-26 PROCEDURE — 3078F DIAST BP <80 MM HG: CPT | Performed by: FAMILY MEDICINE

## 2023-09-26 PROCEDURE — 90662 IIV NO PRSV INCREASED AG IM: CPT | Performed by: FAMILY MEDICINE

## 2023-09-26 PROCEDURE — 3074F SYST BP LT 130 MM HG: CPT | Performed by: FAMILY MEDICINE

## 2023-09-26 PROCEDURE — 99214 OFFICE O/P EST MOD 30 MIN: CPT | Mod: 25 | Performed by: FAMILY MEDICINE

## 2023-09-26 PROCEDURE — G0008 ADMIN INFLUENZA VIRUS VAC: HCPCS | Performed by: FAMILY MEDICINE

## 2023-09-26 RX ORDER — TRIAMCINOLONE ACETONIDE 1 MG/G
CREAM TOPICAL
Qty: 50 G | Refills: 0 | Status: SHIPPED
Start: 2023-09-26 | End: 2023-12-05

## 2023-09-26 RX ORDER — CLOTRIMAZOLE 1 %
CREAM (GRAM) TOPICAL
Qty: 12 G | Refills: 2 | Status: SHIPPED
Start: 2023-09-26 | End: 2023-12-05

## 2023-09-26 RX ORDER — NYSTATIN 100000 [USP'U]/G
POWDER TOPICAL
Qty: 30 G | Refills: 6 | Status: SHIPPED
Start: 2023-09-26 | End: 2023-12-05

## 2023-09-26 ASSESSMENT — FIBROSIS 4 INDEX: FIB4 SCORE: 0.81
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
## 2023-10-02 NOTE — CARE PLAN
Problem: Mobility  Goal: STG-Within one week, patient will propel wheelchair community  Description  1) Individualized goal:  SPV/setup indoors and outdoors for 500 feet, R stump board, RLE rigid post-op orthosis, cueing prn  2) Interventions:  PT Group Therapy, PT Prosthetic Training, PT Gait Training, PT Self Care/Home Eval, PT Therapeutic Exercises, PT Neuro Re-Ed/Balance, PT Therapeutic Activity and PT Manual Therapy     Outcome: PROGRESSING AS EXPECTED  Note:   PT eval completed yesterday; will monitor pt's progress towards goals.  Goal: STG-Within one week, patient will ambulate household distance  Description  1) Individualized goal:  SPV and setup, 50 feet indoors, FWW, hop-to pattern, RLE rigid post-op orthosis, cueing prn  2) Interventions:  PT Group Therapy, PT Prosthetic Training, PT Gait Training, PT Self Care/Home Eval, PT Therapeutic Exercises, PT Neuro Re-Ed/Balance, PT Therapeutic Activity and PT Manual Therapy   Outcome: PROGRESSING AS EXPECTED  Note:   PT eval completed yesterday; will monitor pt's progress towards goals.  Goal: STG-Within one week, patient will ascend and descend four to six stairs  Description  1) Individualized goal:  2 standard stairs to enter home, SBA, seated bumping approach, RLE rigid post-op orthosis, cueing prn  2) Interventions:  PT Group Therapy, PT Prosthetic Training, PT Gait Training, PT Self Care/Home Eval, PT Therapeutic Exercises, PT Neuro Re-Ed/Balance, PT Therapeutic Activity and PT Manual Therapy   Outcome: PROGRESSING AS EXPECTED  Note:   PT eval completed yesterday; will monitor pt's progress towards goals.  Goal: STG-Within one week, patient will  Description  1) Individualized goal:  Perform HEP for RLE strengthening and AROM  2) Interventions:  PT Group Therapy, PT Prosthetic Training, PT Gait Training, PT Self Care/Home Eval, PT Therapeutic Exercises, PT Neuro Re-Ed/Balance, PT Therapeutic Activity and PT Manual Therapy   Outcome: PROGRESSING AS  EXPECTED  Note:   PT eval completed yesterday; will monitor pt's progress towards goals.     Problem: Mobility Transfers  Goal: STG-Within one week, patient will sit to stand  Description  1) Individualized goal: SPV and setup, FWW, RLE rigid post-op orthosis, cueing prn  2) Interventions: PT Group Therapy, PT Prosthetic Training, PT Gait Training, PT Self Care/Home Eval, PT Therapeutic Exercises, PT Neuro Re-Ed/Balance, PT Therapeutic Activity and PT Manual Therapy     Outcome: PROGRESSING AS EXPECTED  Note:   PT eval completed yesterday; will monitor pt's progress towards goals.  Goal: STG-Within one week, patient will transfer bed to chair  Description  1) Individualized goal: SPV and setup, FWW, RLE rigid post-op orthosis, cueing prn  2) Interventions: PT Group Therapy, PT Prosthetic Training, PT Gait Training, PT Self Care/Home Eval, PT Therapeutic Exercises, PT Neuro Re-Ed/Balance, PT Therapeutic Activity and PT Manual Therapy     Outcome: PROGRESSING AS EXPECTED  Note:   PT eval completed yesterday; will monitor pt's progress towards goals.  Goal: STG-Within one week, patient will transfer in/out of car  Description  1) Individualized goal: SPV and setup, FWW, RLE rigid post-op orthosis, cueing prn  2) Interventions: PT Group Therapy, PT Prosthetic Training, PT Gait Training, PT Self Care/Home Eval, PT Therapeutic Exercises, PT Neuro Re-Ed/Balance, PT Therapeutic Activity and PT Manual Therapy     Outcome: PROGRESSING AS EXPECTED  Note:   PT eval completed yesterday; will monitor pt's progress towards goals.      Surgeon Performing Repair (Optional): Dr. Vamsi Fung

## 2023-10-04 ENCOUNTER — APPOINTMENT (OUTPATIENT)
Dept: LAB | Facility: MEDICAL CENTER | Age: 78
End: 2023-10-04
Payer: MEDICARE

## 2023-10-13 ENCOUNTER — APPOINTMENT (RX ONLY)
Dept: URBAN - METROPOLITAN AREA CLINIC 22 | Facility: CLINIC | Age: 78
Setting detail: DERMATOLOGY
End: 2023-10-13

## 2023-10-13 DIAGNOSIS — L72.8 OTHER FOLLICULAR CYSTS OF THE SKIN AND SUBCUTANEOUS TISSUE: ICD-10-CM

## 2023-10-13 DIAGNOSIS — L73.9 FOLLICULAR DISORDER, UNSPECIFIED: ICD-10-CM

## 2023-10-13 DIAGNOSIS — L81.4 OTHER MELANIN HYPERPIGMENTATION: ICD-10-CM

## 2023-10-13 DIAGNOSIS — L82.1 OTHER SEBORRHEIC KERATOSIS: ICD-10-CM

## 2023-10-13 DIAGNOSIS — Z85.828 PERSONAL HISTORY OF OTHER MALIGNANT NEOPLASM OF SKIN: ICD-10-CM

## 2023-10-13 DIAGNOSIS — L738 OTHER SPECIFIED DISEASES OF HAIR AND HAIR FOLLICLES: ICD-10-CM

## 2023-10-13 DIAGNOSIS — D22 MELANOCYTIC NEVI: ICD-10-CM

## 2023-10-13 DIAGNOSIS — L663 OTHER SPECIFIED DISEASES OF HAIR AND HAIR FOLLICLES: ICD-10-CM

## 2023-10-13 DIAGNOSIS — L57.0 ACTINIC KERATOSIS: ICD-10-CM

## 2023-10-13 PROBLEM — D22.5 MELANOCYTIC NEVI OF TRUNK: Status: ACTIVE | Noted: 2023-10-13

## 2023-10-13 PROBLEM — D22.4 MELANOCYTIC NEVI OF SCALP AND NECK: Status: ACTIVE | Noted: 2023-10-13

## 2023-10-13 PROBLEM — L02.421 FURUNCLE OF RIGHT AXILLA: Status: ACTIVE | Noted: 2023-10-13

## 2023-10-13 PROBLEM — L02.422 FURUNCLE OF LEFT AXILLA: Status: ACTIVE | Noted: 2023-10-13

## 2023-10-13 PROCEDURE — ? TREATMENT REGIMEN

## 2023-10-13 PROCEDURE — 10060 I&D ABSCESS SIMPLE/SINGLE: CPT

## 2023-10-13 PROCEDURE — ? LIQUID NITROGEN

## 2023-10-13 PROCEDURE — ? PRESCRIPTION

## 2023-10-13 PROCEDURE — ? COUNSELING

## 2023-10-13 PROCEDURE — ? INCISION AND DRAINAGE

## 2023-10-13 PROCEDURE — ? MEDICATION COUNSELING

## 2023-10-13 PROCEDURE — 17003 DESTRUCT PREMALG LES 2-14: CPT

## 2023-10-13 PROCEDURE — 99213 OFFICE O/P EST LOW 20 MIN: CPT | Mod: 25

## 2023-10-13 PROCEDURE — ? SUNSCREEN TREATMENT REGIMEN

## 2023-10-13 PROCEDURE — 17000 DESTRUCT PREMALG LESION: CPT

## 2023-10-13 RX ORDER — DOXYCYCLINE HYCLATE 100 MG/1
CAPSULE, GELATIN COATED ORAL
Qty: 20 | Refills: 0 | Status: ERX | COMMUNITY
Start: 2023-10-13

## 2023-10-13 RX ORDER — CLINDAMYCIN PHOSPHATE 10 MG/ML
SOLUTION TOPICAL
Qty: 60 | Refills: 0 | Status: ERX | COMMUNITY
Start: 2023-10-13

## 2023-10-13 RX ADMIN — CLINDAMYCIN PHOSPHATE: 10 SOLUTION TOPICAL at 00:00

## 2023-10-13 RX ADMIN — DOXYCYCLINE HYCLATE: 100 CAPSULE, GELATIN COATED ORAL at 00:00

## 2023-10-13 ASSESSMENT — LOCATION SIMPLE DESCRIPTION DERM
LOCATION SIMPLE: RIGHT FOREARM
LOCATION SIMPLE: UPPER BACK
LOCATION SIMPLE: LEFT AXILLARY VAULT
LOCATION SIMPLE: LEFT ANTERIOR NECK
LOCATION SIMPLE: RIGHT AXILLARY VAULT
LOCATION SIMPLE: INFERIOR FOREHEAD
LOCATION SIMPLE: SCALP
LOCATION SIMPLE: POSTERIOR NECK
LOCATION SIMPLE: LEFT CHEEK
LOCATION SIMPLE: LEFT FOREARM
LOCATION SIMPLE: RIGHT CHEEK

## 2023-10-13 ASSESSMENT — LOCATION ZONE DERM
LOCATION ZONE: FACE
LOCATION ZONE: ARM
LOCATION ZONE: NECK
LOCATION ZONE: TRUNK
LOCATION ZONE: SCALP
LOCATION ZONE: AXILLAE

## 2023-10-13 ASSESSMENT — LOCATION DETAILED DESCRIPTION DERM
LOCATION DETAILED: LEFT CLAVICULAR NECK
LOCATION DETAILED: LEFT VENTRAL PROXIMAL FOREARM
LOCATION DETAILED: LEFT AXILLARY VAULT
LOCATION DETAILED: RIGHT VENTRAL PROXIMAL FOREARM
LOCATION DETAILED: RIGHT POSTERIOR NECK
LOCATION DETAILED: RIGHT SUPERIOR PARIETAL SCALP
LOCATION DETAILED: RIGHT AXILLARY VAULT
LOCATION DETAILED: RIGHT SUPERIOR MEDIAL MALAR CHEEK
LOCATION DETAILED: INFERIOR MID FOREHEAD
LOCATION DETAILED: INFERIOR THORACIC SPINE
LOCATION DETAILED: SUPERIOR THORACIC SPINE
LOCATION DETAILED: LEFT SUPERIOR PREAURICULAR CHEEK

## 2023-10-13 NOTE — PROCEDURE: MEDICATION COUNSELING
Bexarotene Counseling:  I discussed with the patient the risks of bexarotene including but not limited to hair loss, dry lips/skin/eyes, liver abnormalities, hyperlipidemia, pancreatitis, depression/suicidal ideation, photosensitivity, drug rash/allergic reactions, hypothyroidism, anemia, leukopenia, infection, cataracts, and teratogenicity.  Patient understands that they will need regular blood tests to check lipid profile, liver function tests, white blood cell count, thyroid function tests and pregnancy test if applicable.
Libtayo Pregnancy And Lactation Text: This medication is contraindicated in pregnancy and when breast feeding.
VTAMA Counseling: I discussed with the patient that VTAMA is not for use in the eyes, mouth or mouth. They should call the office if they develop any signs of allergic reactions to VTAMA. The patient verbalized understanding of the proper use and possible adverse effects of VTAMA.  All of the patient's questions and concerns were addressed.
Protopic Counseling: Patient may experience a mild burning sensation during topical application. Protopic is not approved in children less than 2 years of age. There have been case reports of hematologic and skin malignancies in patients using topical calcineurin inhibitors although causality is questionable.
Itraconazole Counseling:  I discussed with the patient the risks of itraconazole including but not limited to liver damage, nausea/vomiting, neuropathy, and severe allergy.  The patient understands that this medication is best absorbed when taken with acidic beverages such as non-diet cola or ginger ale.  The patient understands that monitoring is required including baseline LFTs and repeat LFTs at intervals.  The patient understands that they are to contact us or the primary physician if concerning signs are noted.
Arava Counseling:  Patient counseled regarding adverse effects of Arava including but not limited to nausea, vomiting, abnormalities in liver function tests. Patients may develop mouth sores, rash, diarrhea, and abnormalities in blood counts. The patient understands that monitoring is required including LFTs and blood counts.  There is a rare possibility of scarring of the liver and lung problems that can occur when taking methotrexate. Persistent nausea, loss of appetite, pale stools, dark urine, cough, and shortness of breath should be reported immediately. Patient advised to discontinue Arava treatment and consult with a physician prior to attempting conception. The patient will have to undergo a treatment to eliminate Arava from the body prior to conception.
Azelaic Acid Counseling: Patient counseled that medicine may cause skin irritation and to avoid applying near the eyes.  In the event of skin irritation, the patient was advised to reduce the amount of the drug applied or use it less frequently.   The patient verbalized understanding of the proper use and possible adverse effects of azelaic acid.  All of the patient's questions and concerns were addressed.
Klisyri Pregnancy And Lactation Text: It is unknown if this medication can harm a developing fetus or if it is excreted in breast milk.
Olumiant Pregnancy And Lactation Text: Based on animal studies, Olumiant may cause embryo-fetal harm when administered to pregnant women.  The medication should not be used in pregnancy.  Breastfeeding is not recommended during treatment.
Gabapentin Pregnancy And Lactation Text: This medication is Pregnancy Category C and isn't considered safe during pregnancy. It is excreted in breast milk.
Niacinamide Counseling: I recommended taking niacin or niacinamide, also know as vitamin B3, twice daily. Recent evidence suggests that taking vitamin B3 (500 mg twice daily) can reduce the risk of actinic keratoses and non-melanoma skin cancers. Side effects of vitamin B3 include flushing and headache.
Humira Counseling:  I discussed with the patient the risks of adalimumab including but not limited to myelosuppression, immunosuppression, autoimmune hepatitis, demyelinating diseases, lymphoma, and serious infections.  The patient understands that monitoring is required including a PPD at baseline and must alert us or the primary physician if symptoms of infection or other concerning signs are noted.
Rifampin Pregnancy And Lactation Text: This medication is Pregnancy Category C and it isn't know if it is safe during pregnancy. It is also excreted in breast milk and should not be used if you are breast feeding.
Otezla Pregnancy And Lactation Text: This medication is Pregnancy Category C and it isn't known if it is safe during pregnancy. It is unknown if it is excreted in breast milk.
Cyclosporine Counseling:  I discussed with the patient the risks of cyclosporine including but not limited to hypertension, gingival hyperplasia,myelosuppression, immunosuppression, liver damage, kidney damage, neurotoxicity, lymphoma, and serious infections. The patient understands that monitoring is required including baseline blood pressure, CBC, CMP, lipid panel and uric acid, and then 1-2 times monthly CMP and blood pressure.
5-Fu Pregnancy And Lactation Text: This medication is Pregnancy Category X and contraindicated in pregnancy and in women who may become pregnant. It is unknown if this medication is excreted in breast milk.
Siliq Pregnancy And Lactation Text: The risk during pregnancy and breastfeeding is uncertain with this medication.
Drysol Counseling:  I discussed with the patient the risks of drysol/aluminum chloride including but not limited to skin rash, itching, irritation, burning.
Cyclosporine Pregnancy And Lactation Text: This medication is Pregnancy Category C and it isn't know if it is safe during pregnancy. This medication is excreted in breast milk.
Simponi Counseling:  I discussed with the patient the risks of golimumab including but not limited to myelosuppression, immunosuppression, autoimmune hepatitis, demyelinating diseases, lymphoma, and serious infections.  The patient understands that monitoring is required including a PPD at baseline and must alert us or the primary physician if symptoms of infection or other concerning signs are noted.
Glycopyrrolate Counseling:  I discussed with the patient the risks of glycopyrrolate including but not limited to skin rash, drowsiness, dry mouth, difficulty urinating, and blurred vision.
Doxepin Pregnancy And Lactation Text: This medication is Pregnancy Category C and it isn't known if it is safe during pregnancy. It is also excreted in breast milk and breast feeding isn't recommended.
Topical Retinoid counseling:  Patient advised to apply a pea-sized amount only at bedtime and wait 30 minutes after washing their face before applying.  If too drying, patient may add a non-comedogenic moisturizer. The patient verbalized understanding of the proper use and possible adverse effects of retinoids.  All of the patient's questions and concerns were addressed.
Oxybutynin Counseling:  I discussed with the patient the risks of oxybutynin including but not limited to skin rash, drowsiness, dry mouth, difficulty urinating, and blurred vision.
Adbry Pregnancy And Lactation Text: It is unknown if this medication will adversely affect pregnancy or breast feeding.
Topical Metronidazole Pregnancy And Lactation Text: This medication is Pregnancy Category B and considered safe during pregnancy.  It is also considered safe to use while breastfeeding.
Spironolactone Counseling: Patient advised regarding risks of diarrhea, abdominal pain, hyperkalemia, birth defects (for female patients), liver toxicity and renal toxicity. The patient may need blood work to monitor liver and kidney function and potassium levels while on therapy. The patient verbalized understanding of the proper use and possible adverse effects of spironolactone.  All of the patient's questions and concerns were addressed.
Doxycycline Counseling:  Patient counseled regarding possible photosensitivity and increased risk for sunburn.  Patient instructed to avoid sunlight, if possible.  When exposed to sunlight, patients should wear protective clothing, sunglasses, and sunscreen.  The patient was instructed to call the office immediately if the following severe adverse effects occur:  hearing changes, easy bruising/bleeding, severe headache, or vision changes.  The patient verbalized understanding of the proper use and possible adverse effects of doxycycline.  All of the patient's questions and concerns were addressed.
Tranexamic Acid Pregnancy And Lactation Text: It is unknown if this medication is safe during pregnancy or breast feeding.
Itraconazole Pregnancy And Lactation Text: This medication is Pregnancy Category C and it isn't know if it is safe during pregnancy. It is also excreted in breast milk.
Spironolactone Pregnancy And Lactation Text: This medication can cause feminization of the male fetus and should be avoided during pregnancy. The active metabolite is also found in breast milk.
Minoxidil Counseling: Minoxidil is a topical medication which can increase blood flow where it is applied. It is uncertain how this medication increases hair growth. Side effects are uncommon and include stinging and allergic reactions.
Vtama Pregnancy And Lactation Text: It is unknown if this medication can cause problems during pregnancy and breastfeeding.
Cimzia Counseling:  I discussed with the patient the risks of Cimzia including but not limited to immunosuppression, allergic reactions and infections.  The patient understands that monitoring is required including a PPD at baseline and must alert us or the primary physician if symptoms of infection or other concerning signs are noted.
Topical Steroids Counseling: I discussed with the patient that prolonged use of topical steroids can result in the increased appearance of superficial blood vessels (telangiectasias), lightening (hypopigmentation) and thinning of the skin (atrophy).  Patient understands to avoid using high potency steroids in skin folds, the groin or the face.  The patient verbalized understanding of the proper use and possible adverse effects of topical steroids.  All of the patient's questions and concerns were addressed.
Protopic Pregnancy And Lactation Text: This medication is Pregnancy Category C. It is unknown if this medication is excreted in breast milk when applied topically.
Rinvoq Counseling: I discussed with the patient the risks of Rinvoq therapy including but not limited to upper respiratory tract infections, shingles, cold sores, bronchitis, nausea, cough, fever, acne, and headache. Live vaccines should be avoided.  This medication has been linked to serious infections; higher rate of mortality; malignancy and lymphoproliferative disorders; major adverse cardiovascular events; thrombosis; thrombocytopenia, anemia, and neutropenia; lipid elevations; liver enzyme elevations; and gastrointestinal perforations.
Sarecycline Counseling: Patient advised regarding possible photosensitivity and discoloration of the teeth, skin, lips, tongue and gums.  Patient instructed to avoid sunlight, if possible.  When exposed to sunlight, patients should wear protective clothing, sunglasses, and sunscreen.  The patient was instructed to call the office immediately if the following severe adverse effects occur:  hearing changes, easy bruising/bleeding, severe headache, or vision changes.  The patient verbalized understanding of the proper use and possible adverse effects of sarecycline.  All of the patient's questions and concerns were addressed.
Humira Pregnancy And Lactation Text: This medication is Pregnancy Category B and is considered safe during pregnancy. It is unknown if this medication is excreted in breast milk.
Bexarotene Pregnancy And Lactation Text: This medication is Pregnancy Category X and should not be given to women who are pregnant or may become pregnant. This medication should not be used if you are breast feeding.
Odomzo Counseling- I discussed with the patient the risks of Odomzo including but not limited to nausea, vomiting, diarrhea, constipation, weight loss, changes in the sense of taste, decreased appetite, muscle spasms, and hair loss.  The patient verbalized understanding of the proper use and possible adverse effects of Odomzo.  All of the patient's questions and concerns were addressed.
Niacinamide Pregnancy And Lactation Text: These medications are considered safe during pregnancy.
Azelaic Acid Pregnancy And Lactation Text: This medication is considered safe during pregnancy and breast feeding.
Arava Pregnancy And Lactation Text: This medication is Pregnancy Category X and is absolutely contraindicated during pregnancy. It is unknown if it is excreted in breast milk.
Benzoyl Peroxide Counseling: Patient counseled that medicine may cause skin irritation and bleach clothing.  In the event of skin irritation, the patient was advised to reduce the amount of the drug applied or use it less frequently.   The patient verbalized understanding of the proper use and possible adverse effects of benzoyl peroxide.  All of the patient's questions and concerns were addressed.
Azithromycin Counseling:  I discussed with the patient the risks of azithromycin including but not limited to GI upset, allergic reaction, drug rash, diarrhea, and yeast infections.
Clofazimine Counseling:  I discussed with the patient the risks of clofazimine including but not limited to skin and eye pigmentation, liver damage, nausea/vomiting, gastrointestinal bleeding and allergy.
Topical Retinoid Pregnancy And Lactation Text: This medication is Pregnancy Category C. It is unknown if this medication is excreted in breast milk.
Methotrexate Counseling:  Patient counseled regarding adverse effects of methotrexate including but not limited to nausea, vomiting, abnormalities in liver function tests. Patients may develop mouth sores, rash, diarrhea, and abnormalities in blood counts. The patient understands that monitoring is required including LFT's and blood counts.  There is a rare possibility of scarring of the liver and lung problems that can occur when taking methotrexate. Persistent nausea, loss of appetite, pale stools, dark urine, cough, and shortness of breath should be reported immediately. Patient advised to discontinue methotrexate treatment at least three months before attempting to become pregnant.  I discussed the need for folate supplements while taking methotrexate.  These supplements can decrease side effects during methotrexate treatment. The patient verbalized understanding of the proper use and possible adverse effects of methotrexate.  All of the patient's questions and concerns were addressed.
Glycopyrrolate Pregnancy And Lactation Text: This medication is Pregnancy Category B and is considered safe during pregnancy. It is unknown if it is excreted breast milk.
Qbrexza Counseling:  I discussed with the patient the risks of Qbrexza including but not limited to headache, mydriasis, blurred vision, dry eyes, nasal dryness, dry mouth, dry throat, dry skin, urinary hesitation, and constipation.  Local skin reactions including erythema, burning, stinging, and itching can also occur.
Nsaids Counseling: NSAID Counseling: I discussed with the patient that NSAIDs should be taken with food. Prolonged use of NSAIDs can result in the development of stomach ulcers.  Patient advised to stop taking NSAIDs if abdominal pain occurs.  The patient verbalized understanding of the proper use and possible adverse effects of NSAIDs.  All of the patient's questions and concerns were addressed.
Hydroxyzine Counseling: Patient advised that the medication is sedating and not to drive a car after taking this medication.  Patient informed of potential adverse effects including but not limited to dry mouth, urinary retention, and blurry vision.  The patient verbalized understanding of the proper use and possible adverse effects of hydroxyzine.  All of the patient's questions and concerns were addressed.
Oxybutynin Pregnancy And Lactation Text: This medication is Pregnancy Category B and is considered safe during pregnancy. It is unknown if it is excreted in breast milk.
Valtrex Counseling: I discussed with the patient the risks of valacyclovir including but not limited to kidney damage, nausea, vomiting and severe allergy.  The patient understands that if the infection seems to be worsening or is not improving, they are to call.
Xolair Counseling:  Patient informed of potential adverse effects including but not limited to fever, muscle aches, rash and allergic reactions.  The patient verbalized understanding of the proper use and possible adverse effects of Xolair.  All of the patient's questions and concerns were addressed.
Doxycycline Pregnancy And Lactation Text: This medication is Pregnancy Category D and not consider safe during pregnancy. It is also excreted in breast milk but is considered safe for shorter treatment courses.
Xolair Pregnancy And Lactation Text: This medication is Pregnancy Category B and is considered safe during pregnancy. This medication is excreted in breast milk.
Erythromycin Counseling:  I discussed with the patient the risks of erythromycin including but not limited to GI upset, allergic reaction, drug rash, diarrhea, increase in liver enzymes, and yeast infections.
Minoxidil Pregnancy And Lactation Text: This medication has not been assigned a Pregnancy Risk Category but animal studies failed to show danger with the topical medication. It is unknown if the medication is excreted in breast milk.
Cimzia Pregnancy And Lactation Text: This medication crosses the placenta but can be considered safe in certain situations. Cimzia may be excreted in breast milk.
Isotretinoin Counseling: Patient should get monthly blood tests, not donate blood, not drive at night if vision affected, not share medication, and not undergo elective surgery for 6 months after tx completed. Side effects reviewed, pt to contact office should one occur.
Topical Steroids Applications Pregnancy And Lactation Text: Most topical steroids are considered safe to use during pregnancy and lactation.  Any topical steroid applied to the breast or nipple should be washed off before breastfeeding.
Hydroxyzine Pregnancy And Lactation Text: This medication is not safe during pregnancy and should not be taken. It is also excreted in breast milk and breast feeding isn't recommended.
Valtrex Pregnancy And Lactation Text: this medication is Pregnancy Category B and is considered safe during pregnancy. This medication is not directly found in breast milk but it's metabolite acyclovir is present.
Ilumya Counseling: I discussed with the patient the risks of tildrakizumab including but not limited to immunosuppression, malignancy, posterior leukoencephalopathy syndrome, and serious infections.  The patient understands that monitoring is required including a PPD at baseline and must alert us or the primary physician if symptoms of infection or other concerning signs are noted.
Sarecycline Pregnancy And Lactation Text: This medication is Pregnancy Category D and not consider safe during pregnancy. It is also excreted in breast milk.
Rinvoq Pregnancy And Lactation Text: Based on animal studies, Rinvoq may cause embryo-fetal harm when administered to pregnant women.  The medication should not be used in pregnancy.  Breastfeeding is not recommended during treatment and for 6 days after the last dose.
Zoryve Counseling:  I discussed with the patient that Zoryve is not for use in the eyes, mouth or vagina. The most commonly reported side effects include diarrhea, headache, insomnia, application site pain, upper respiratory tract infections, and urinary tract infections.  All of the patient's questions and concerns were addressed.
Ketoconazole Counseling:   Patient counseled regarding improving absorption with orange juice.  Adverse effects include but are not limited to breast enlargement, headache, diarrhea, nausea, upset stomach, liver function test abnormalities, taste disturbance, and stomach pain.  There is a rare possibility of liver failure that can occur when taking ketoconazole. The patient understands that monitoring of LFTs may be required, especially at baseline. The patient verbalized understanding of the proper use and possible adverse effects of ketoconazole.  All of the patient's questions and concerns were addressed.
Qbrexza Pregnancy And Lactation Text: There is no available data on Qbrexza use in pregnant women.  There is no available data on Qbrexza use in lactation.
Benzoyl Peroxide Pregnancy And Lactation Text: This medication is Pregnancy Category C. It is unknown if benzoyl peroxide is excreted in breast milk.
Skyrizi Counseling: I discussed with the patient the risks of risankizumab-rzaa including but not limited to immunosuppression, and serious infections.  The patient understands that monitoring is required including a PPD at baseline and must alert us or the primary physician if symptoms of infection or other concerning signs are noted.
Elidel Counseling: Patient may experience a mild burning sensation during topical application. Elidel is not approved in children less than 2 years of age. There have been case reports of hematologic and skin malignancies in patients using topical calcineurin inhibitors although causality is questionable.
Tetracycline Counseling: Patient counseled regarding possible photosensitivity and increased risk for sunburn.  Patient instructed to avoid sunlight, if possible.  When exposed to sunlight, patients should wear protective clothing, sunglasses, and sunscreen.  The patient was instructed to call the office immediately if the following severe adverse effects occur:  hearing changes, easy bruising/bleeding, severe headache, or vision changes.  The patient verbalized understanding of the proper use and possible adverse effects of tetracycline.  All of the patient's questions and concerns were addressed. Patient understands to avoid pregnancy while on therapy due to potential birth defects.
Sotyktu Counseling:  I discussed the most common side effects of Sotyktu including: common cold, sore throat, sinus infections, cold sores, canker sores, folliculitis, and acne.? I also discussed more serious side effects of Sotyktu including but not limited to: serious allergic reactions; increased risk for infections such as TB; cancers such as lymphomas; rhabdomyolysis and elevated CPK; and elevated triglycerides and liver enzymes.?
Tazorac Counseling:  Patient advised that medication is irritating and drying.  Patient may need to apply sparingly and wash off after an hour before eventually leaving it on overnight.  The patient verbalized understanding of the proper use and possible adverse effects of tazorac.  All of the patient's questions and concerns were addressed.
Nsaids Pregnancy And Lactation Text: These medications are considered safe up to 30 weeks gestation. It is excreted in breast milk.
Azithromycin Pregnancy And Lactation Text: This medication is considered safe during pregnancy and is also secreted in breast milk.
Azathioprine Counseling:  I discussed with the patient the risks of azathioprine including but not limited to myelosuppression, immunosuppression, hepatotoxicity, lymphoma, and infections.  The patient understands that monitoring is required including baseline LFTs, Creatinine, possible TPMP genotyping and weekly CBCs for the first month and then every 2 weeks thereafter.  The patient verbalized understanding of the proper use and possible adverse effects of azathioprine.  All of the patient's questions and concerns were addressed.
Propranolol Counseling:  I discussed with the patient the risks of propranolol including but not limited to low heart rate, low blood pressure, low blood sugar, restlessness and increased cold sensitivity. They should call the office if they experience any of these side effects.
Hydroxychloroquine Counseling:  I discussed with the patient that a baseline ophthalmologic exam is needed at the start of therapy and every year thereafter while on therapy. A CBC may also be warranted for monitoring.  The side effects of this medication were discussed with the patient, including but not limited to agranulocytosis, aplastic anemia, seizures, rashes, retinopathy, and liver toxicity. Patient instructed to call the office should any adverse effect occur.  The patient verbalized understanding of the proper use and possible adverse effects of Plaquenil.  All the patient's questions and concerns were addressed.
Methotrexate Pregnancy And Lactation Text: This medication is Pregnancy Category X and is known to cause fetal harm. This medication is excreted in breast milk.
Prednisone Counseling:  I discussed with the patient the risks of prolonged use of prednisone including but not limited to weight gain, insomnia, osteoporosis, mood changes, diabetes, susceptibility to infection, glaucoma and high blood pressure.  In cases where prednisone use is prolonged, patients should be monitored with blood pressure checks, serum glucose levels and an eye exam.  Additionally, the patient may need to be placed on GI prophylaxis, PCP prophylaxis, and calcium and vitamin D supplementation and/or a bisphosphonate.  The patient verbalized understanding of the proper use and the possible adverse effects of prednisone.  All of the patient's questions and concerns were addressed.
Erythromycin Pregnancy And Lactation Text: This medication is Pregnancy Category B and is considered safe during pregnancy. It is also excreted in breast milk.
Hydroxychloroquine Pregnancy And Lactation Text: This medication has been shown to cause fetal harm but it isn't assigned a Pregnancy Risk Category. There are small amounts excreted in breast milk.
Topical Sulfur Applications Counseling: Topical Sulfur Counseling: Patient counseled that this medication may cause skin irritation or allergic reactions.  In the event of skin irritation, the patient was advised to reduce the amount of the drug applied or use it less frequently.   The patient verbalized understanding of the proper use and possible adverse effects of topical sulfur application.  All of the patient's questions and concerns were addressed.
Propranolol Pregnancy And Lactation Text: This medication is Pregnancy Category C and it isn't known if it is safe during pregnancy. It is excreted in breast milk.
Tazorac Pregnancy And Lactation Text: This medication is not safe during pregnancy. It is unknown if this medication is excreted in breast milk.
Mirvaso Counseling: Mirvaso is a topical medication which can decrease superficial blood flow where applied. Side effects are uncommon and include stinging, redness and allergic reactions.
Cosentyx Counseling:  I discussed with the patient the risks of Cosentyx including but not limited to worsening of Crohn's disease, immunosuppression, allergic reactions and infections.  The patient understands that monitoring is required including a PPD at baseline and must alert us or the primary physician if symptoms of infection or other concerning signs are noted.
Use Enhanced Medication Counseling?: No
Isotretinoin Pregnancy And Lactation Text: This medication is Pregnancy Category X and is considered extremely dangerous during pregnancy. It is unknown if it is excreted in breast milk.
Ketoconazole Pregnancy And Lactation Text: This medication is Pregnancy Category C and it isn't know if it is safe during pregnancy. It is also excreted in breast milk and breast feeding isn't recommended.
High Dose Vitamin A Counseling: Side effects reviewed, pt to contact office should one occur.
Albendazole Counseling:  I discussed with the patient the risks of albendazole including but not limited to cytopenia, kidney damage, nausea/vomiting and severe allergy.  The patient understands that this medication is being used in an off-label manner.
Rhofade Counseling: Rhofade is a topical medication which can decrease superficial blood flow where applied. Side effects are uncommon and include stinging, redness and allergic reactions.
Zyclara Counseling:  I discussed with the patient the risks of imiquimod including but not limited to erythema, scaling, itching, weeping, crusting, and pain.  Patient understands that the inflammatory response to imiquimod is variable from person to person and was educated regarded proper titration schedule.  If flu-like symptoms develop, patient knows to discontinue the medication and contact us.
Infliximab Counseling:  I discussed with the patient the risks of infliximab including but not limited to myelosuppression, immunosuppression, autoimmune hepatitis, demyelinating diseases, lymphoma, and serious infections.  The patient understands that monitoring is required including a PPD at baseline and must alert us or the primary physician if symptoms of infection or other concerning signs are noted.
Azathioprine Pregnancy And Lactation Text: This medication is Pregnancy Category D and isn't considered safe during pregnancy. It is unknown if this medication is excreted in breast milk.
Colchicine Counseling:  Patient counseled regarding adverse effects including but not limited to stomach upset (nausea, vomiting, stomach pain, or diarrhea).  Patient instructed to limit alcohol consumption while taking this medication.  Colchicine may reduce blood counts especially with prolonged use.  The patient understands that monitoring of kidney function and blood counts may be required, especially at baseline. The patient verbalized understanding of the proper use and possible adverse effects of colchicine.  All of the patient's questions and concerns were addressed.
Carac Counseling:  I discussed with the patient the risks of Carac including but not limited to erythema, scaling, itching, weeping, crusting, and pain.
Mirvaso Pregnancy And Lactation Text: This medication has not been assigned a Pregnancy Risk Category. It is unknown if the medication is excreted in breast milk.
Erivedge Counseling- I discussed with the patient the risks of Erivedge including but not limited to nausea, vomiting, diarrhea, constipation, weight loss, changes in the sense of taste, decreased appetite, muscle spasms, and hair loss.  The patient verbalized understanding of the proper use and possible adverse effects of Erivedge.  All of the patient's questions and concerns were addressed.
Sotyktu Pregnancy And Lactation Text: There is insufficient data to evaluate whether or not Sotyktu is safe to use during pregnancy.? ?It is not known if Sotyktu passes into breast milk and whether or not it is safe to use when breastfeeding.??
Olanzapine Counseling- I discussed with the patient the common side effects of olanzapine including but are not limited to: lack of energy, dry mouth, increased appetite, sleepiness, tremor, constipation, dizziness, changes in behavior, or restlessness.  Explained that teenagers are more likely to experience headaches, abdominal pain, pain in the arms or legs, tiredness, and sleepiness.  Serious side effects include but are not limited: increased risk of death in elderly patients who are confused, have memory loss, or dementia-related psychosis; hyperglycemia; increased cholesterol and triglycerides; and weight gain.
Dutasteride Male Counseling: Dustasteride Counseling:  I discussed with the patient the risks of use of dutasteride including but not limited to decreased libido, decreased ejaculate volume, and gynecomastia. Women who can become pregnant should not handle medication.  All of the patient's questions and concerns were addressed.
Bactrim Counseling:  I discussed with the patient the risks of sulfa antibiotics including but not limited to GI upset, allergic reaction, drug rash, diarrhea, dizziness, photosensitivity, and yeast infections.  Rarely, more serious reactions can occur including but not limited to aplastic anemia, agranulocytosis, methemoglobinemia, blood dyscrasias, liver or kidney failure, lung infiltrates or desquamative/blistering drug rashes.
Eucrisa Counseling: Patient may experience a mild burning sensation during topical application. Eucrisa is not approved in children less than 2 years of age.
Bactrim Pregnancy And Lactation Text: This medication is Pregnancy Category D and is known to cause fetal risk.  It is also excreted in breast milk.
Stelara Counseling:  I discussed with the patient the risks of ustekinumab including but not limited to immunosuppression, malignancy, posterior leukoencephalopathy syndrome, and serious infections.  The patient understands that monitoring is required including a PPD at baseline and must alert us or the primary physician if symptoms of infection or other concerning signs are noted.
Low Dose Naltrexone Counseling- I discussed with the patient the potential risks and side effects of low dose naltrexone including but not limited to: more vivid dreams, headaches, nausea, vomiting, abdominal pain, fatigue, dizziness, and anxiety.
Xeljanz Counseling: I discussed with the patient the risks of Xeljanz therapy including increased risk of infection, liver issues, headache, diarrhea, or cold symptoms. Live vaccines should be avoided. They were instructed to call if they have any problems.
Topical Clindamycin Counseling: Patient counseled that this medication may cause skin irritation or allergic reactions.  In the event of skin irritation, the patient was advised to reduce the amount of the drug applied or use it less frequently.   The patient verbalized understanding of the proper use and possible adverse effects of clindamycin.  All of the patient's questions and concerns were addressed.
Dutasteride Pregnancy And Lactation Text: This medication is absolutely contraindicated in women, especially during pregnancy and breast feeding. Feminization of male fetuses is possible if taking while pregnant.
Topical Sulfur Applications Pregnancy And Lactation Text: This medication is Pregnancy Category C and has an unknown safety profile during pregnancy. It is unknown if this topical medication is excreted in breast milk.
SSKI Counseling:  I discussed with the patient the risks of SSKI including but not limited to thyroid abnormalities, metallic taste, GI upset, fever, headache, acne, arthralgias, paraesthesias, lymphadenopathy, easy bleeding, arrhythmias, and allergic reaction.
Metronidazole Counseling:  I discussed with the patient the risks of metronidazole including but not limited to seizures, nausea/vomiting, a metallic taste in the mouth, nausea/vomiting and severe allergy.
Terbinafine Counseling: Patient counseling regarding adverse effects of terbinafine including but not limited to headache, diarrhea, rash, upset stomach, liver function test abnormalities, itching, taste/smell disturbance, nausea, abdominal pain, and flatulence.  There is a rare possibility of liver failure that can occur when taking terbinafine.  The patient understands that a baseline LFT and kidney function test may be required. The patient verbalized understanding of the proper use and possible adverse effects of terbinafine.  All of the patient's questions and concerns were addressed.
Terbinafine Pregnancy And Lactation Text: This medication is Pregnancy Category B and is considered safe during pregnancy. It is also excreted in breast milk and breast feeding isn't recommended.
Opzelura Counseling:  I discussed with the patient the risks of Opzelura including but not limited to nasopharngitis, bronchitis, ear infection, eosinophila, hives, diarrhea, folliculitis, tonsillitis, and rhinorrhea.  Taken orally, this medication has been linked to serious infections; higher rate of mortality; malignancy and lymphoproliferative disorders; major adverse cardiovascular events; thrombosis; thrombocytopenia, anemia, and neutropenia; and lipid elevations.
Fluconazole Counseling:  Patient counseled regarding adverse effects of fluconazole including but not limited to headache, diarrhea, nausea, upset stomach, liver function test abnormalities, taste disturbance, and stomach pain.  There is a rare possibility of liver failure that can occur when taking fluconazole.  The patient understands that monitoring of LFTs and kidney function test may be required, especially at baseline. The patient verbalized understanding of the proper use and possible adverse effects of fluconazole.  All of the patient's questions and concerns were addressed.
High Dose Vitamin A Pregnancy And Lactation Text: High dose vitamin A therapy is contraindicated during pregnancy and breast feeding.
Dupixent Counseling: I discussed with the patient the risks of dupilumab including but not limited to eye infection and irritation, cold sores, injection site reactions, worsening of asthma, allergic reactions and increased risk of parasitic infection.  Live vaccines should be avoided while taking dupilumab. Dupilumab will also interact with certain medications such as warfarin and cyclosporine. The patient understands that monitoring is required and they must alert us or the primary physician if symptoms of infection or other concerning signs are noted.
Wartpeel Counseling:  I discussed with the patient the risks of Wartpeel including but not limited to erythema, scaling, itching, weeping, crusting, and pain.
Olanzapine Pregnancy And Lactation Text: This medication is pregnancy category C.   There are no adequate and well controlled trials with olanzapine in pregnant females.  Olanzapine should be used during pregnancy only if the potential benefit justifies the potential risk to the fetus.   In a study in lactating healthy women, olanzapine was excreted in breast milk.  It is recommended that women taking olanzapine should not breast feed.
Albendazole Pregnancy And Lactation Text: This medication is Pregnancy Category C and it isn't known if it is safe during pregnancy. It is also excreted in breast milk.
Cellcept Counseling:  I discussed with the patient the risks of mycophenolate mofetil including but not limited to infection/immunosuppression, GI upset, hypokalemia, hypercholesterolemia, bone marrow suppression, lymphoproliferative disorders, malignancy, GI ulceration/bleed/perforation, colitis, interstitial lung disease, kidney failure, progressive multifocal leukoencephalopathy, and birth defects.  The patient understands that monitoring is required including a baseline creatinine and regular CBC testing. In addition, patient must alert us immediately if symptoms of infection or other concerning signs are noted.
Calcipotriene Counseling:  I discussed with the patient the risks of calcipotriene including but not limited to erythema, scaling, itching, and irritation.
Rituxan Counseling:  I discussed with the patient the risks of Rituxan infusions. Side effects can include infusion reactions, severe drug rashes including mucocutaneous reactions, reactivation of latent hepatitis and other infections and rarely progressive multifocal leukoencephalopathy.  All of the patient's questions and concerns were addressed.
Dapsone Counseling: I discussed with the patient the risks of dapsone including but not limited to hemolytic anemia, agranulocytosis, rashes, methemoglobinemia, kidney failure, peripheral neuropathy, headaches, GI upset, and liver toxicity.  Patients who start dapsone require monitoring including baseline LFTs and weekly CBCs for the first month, then every month thereafter.  The patient verbalized understanding of the proper use and possible adverse effects of dapsone.  All of the patient's questions and concerns were addressed.
Solaraze Counseling:  I discussed with the patient the risks of Solaraze including but not limited to erythema, scaling, itching, weeping, crusting, and pain.
Oral Minoxidil Counseling- I discussed with the patient the risks of oral minoxidil including but not limited to shortness of breath, swelling of the feet or ankles, dizziness, lightheadedness, unwanted hair growth and allergic reaction.  The patient verbalized understanding of the proper use and possible adverse effects of oral minoxidil.  All of the patient's questions and concerns were addressed.
Finasteride Male Counseling: Finasteride Counseling:  I discussed with the patient the risks of use of finasteride including but not limited to decreased libido, decreased ejaculate volume, gynecomastia, and depression. Women should not handle medication.  All of the patient's questions and concerns were addressed.
Cephalexin Counseling: I counseled the patient regarding use of cephalexin as an antibiotic for prophylactic and/or therapeutic purposes. Cephalexin (commonly prescribed under brand name Keflex) is a cephalosporin antibiotic which is active against numerous classes of bacteria, including most skin bacteria. Side effects may include nausea, diarrhea, gastrointestinal upset, rash, hives, yeast infections, and in rare cases, hepatitis, kidney disease, seizures, fever, confusion, neurologic symptoms, and others. Patients with severe allergies to penicillin medications are cautioned that there is about a 10% incidence of cross-reactivity with cephalosporins. When possible, patients with penicillin allergies should use alternatives to cephalosporins for antibiotic therapy.
Sski Pregnancy And Lactation Text: This medication is Pregnancy Category D and isn't considered safe during pregnancy. It is excreted in breast milk.
Metronidazole Pregnancy And Lactation Text: This medication is Pregnancy Category B and considered safe during pregnancy.  It is also excreted in breast milk.
Minocycline Counseling: Patient advised regarding possible photosensitivity and discoloration of the teeth, skin, lips, tongue and gums.  Patient instructed to avoid sunlight, if possible.  When exposed to sunlight, patients should wear protective clothing, sunglasses, and sunscreen.  The patient was instructed to call the office immediately if the following severe adverse effects occur:  hearing changes, easy bruising/bleeding, severe headache, or vision changes.  The patient verbalized understanding of the proper use and possible adverse effects of minocycline.  All of the patient's questions and concerns were addressed.
Finasteride Pregnancy And Lactation Text: This medication is absolutely contraindicated during pregnancy. It is unknown if it is excreted in breast milk.
Dupixent Pregnancy And Lactation Text: This medication likely crosses the placenta but the risk for the fetus is uncertain. This medication is excreted in breast milk.
Imiquimod Counseling:  I discussed with the patient the risks of imiquimod including but not limited to erythema, scaling, itching, weeping, crusting, and pain.  Patient understands that the inflammatory response to imiquimod is variable from person to person and was educated regarded proper titration schedule.  If flu-like symptoms develop, patient knows to discontinue the medication and contact us.
Calcipotriene Pregnancy And Lactation Text: The use of this medication during pregnancy or lactation is not recommended as there is insufficient data.
Opzelura Pregnancy And Lactation Text: There is insufficient data to evaluate drug-associated risk for major birth defects, miscarriage, or other adverse maternal or fetal outcomes.  There is a pregnancy registry that monitors pregnancy outcomes in pregnant persons exposed to the medication during pregnancy.  It is unknown if this medication is excreted in breast milk.  Do not breastfeed during treatment and for about 4 weeks after the last dose.
Cibinqo Counseling: I discussed with the patient the risks of Cibinqo therapy including but not limited to common cold, nausea, headache, cold sores, increased blood CPK levels, dizziness, UTIs, fatigue, acne, and vomitting. Live vaccines should be avoided.  This medication has been linked to serious infections; higher rate of mortality; malignancy and lymphoproliferative disorders; major adverse cardiovascular events; thrombosis; thrombocytopenia and lymphopenia; lipid elevations; and retinal detachment.
Ivermectin Counseling:  Patient instructed to take medication on an empty stomach with a full glass of water.  Patient informed of potential adverse effects including but not limited to nausea, diarrhea, dizziness, itching, and swelling of the extremities or lymph nodes.  The patient verbalized understanding of the proper use and possible adverse effects of ivermectin.  All of the patient's questions and concerns were addressed.
Libtayo Counseling- I discussed with the patient the risks of Libtayo including but not limited to nausea, vomiting, diarrhea, and bone or muscle pain.  The patient verbalized understanding of the proper use and possible adverse effects of Libtayo.  All of the patient's questions and concerns were addressed.
Xelmiquelz Pregnancy And Lactation Text: This medication is Pregnancy Category D and is not considered safe during pregnancy.  The risk during breast feeding is also uncertain.
Rituxan Pregnancy And Lactation Text: This medication is Pregnancy Category C and it isn't know if it is safe during pregnancy. It is unknown if this medication is excreted in breast milk but similar antibodies are known to be excreted.
Solaraze Pregnancy And Lactation Text: This medication is Pregnancy Category B and is considered safe. There is some data to suggest avoiding during the third trimester. It is unknown if this medication is excreted in breast milk.
Dapsone Pregnancy And Lactation Text: This medication is Pregnancy Category C and is not considered safe during pregnancy or breast feeding.
Cephalexin Pregnancy And Lactation Text: This medication is Pregnancy Category B and considered safe during pregnancy.  It is also excreted in breast milk but can be used safely for shorter doses.
Cantharidin Counseling:  I discussed with the patient the risks of Cantharidin including but not limited to pain, redness, burning, itching, and blistering.
Picato Counseling:  I discussed with the patient the risks of Picato including but not limited to erythema, scaling, itching, weeping, crusting, and pain.
Topical Ketoconazole Counseling: Patient counseled that this medication may cause skin irritation or allergic reactions.  In the event of skin irritation, the patient was advised to reduce the amount of the drug applied or use it less frequently.   The patient verbalized understanding of the proper use and possible adverse effects of ketoconazole.  All of the patient's questions and concerns were addressed.
Cimetidine Counseling:  I discussed with the patient the risks of Cimetidine including but not limited to gynecomastia, headache, diarrhea, nausea, drowsiness, arrhythmias, pancreatitis, skin rashes, psychosis, bone marrow suppression and kidney toxicity.
Oral Minoxidil Pregnancy And Lactation Text: This medication should only be used when clearly needed if you are pregnant, attempting to become pregnant or breast feeding.
Cyclophosphamide Counseling:  I discussed with the patient the risks of cyclophosphamide including but not limited to hair loss, hormonal abnormalities, decreased fertility, abdominal pain, diarrhea, nausea and vomiting, bone marrow suppression and infection. The patient understands that monitoring is required while taking this medication.
Detail Level: Simple
Opioid Counseling: I discussed with the patient the potential side effects of opioids including but not limited to addiction, altered mental status, and depression. I stressed avoiding alcohol, benzodiazepines, muscle relaxants and sleep aids unless specifically okayed by a physician. The patient verbalized understanding of the proper use and possible adverse effects of opioids. All of the patient's questions and concerns were addressed. They were instructed to flush the remaining pills down the toilet if they did not need them for pain.
Hydroquinone Counseling:  Patient advised that medication may result in skin irritation, lightening (hypopigmentation), dryness, and burning.  In the event of skin irritation, the patient was advised to reduce the amount of the drug applied or use it less frequently.  Rarely, spots that are treated with hydroquinone can become darker (pseudoochronosis).  Should this occur, patient instructed to stop medication and call the office. The patient verbalized understanding of the proper use and possible adverse effects of hydroquinone.  All of the patient's questions and concerns were addressed.
Thalidomide Counseling: I discussed with the patient the risks of thalidomide including but not limited to birth defects, anxiety, weakness, chest pain, dizziness, cough and severe allergy.
Taltz Counseling: I discussed with the patient the risks of ixekizumab including but not limited to immunosuppression, serious infections, worsening of inflammatory bowel disease and drug reactions.  The patient understands that monitoring is required including a PPD at baseline and must alert us or the primary physician if symptoms of infection or other concerning signs are noted.
Griseofulvin Counseling:  I discussed with the patient the risks of griseofulvin including but not limited to photosensitivity, cytopenia, liver damage, nausea/vomiting and severe allergy.  The patient understands that this medication is best absorbed when taken with a fatty meal (e.g., ice cream or french fries).
Winlevi Counseling:  I discussed with the patient the risks of topical clascoterone including but not limited to erythema, scaling, itching, and stinging. Patient voiced their understanding.
Acitretin Counseling:  I discussed with the patient the risks of acitretin including but not limited to hair loss, dry lips/skin/eyes, liver damage, hyperlipidemia, depression/suicidal ideation, photosensitivity.  Serious rare side effects can include but are not limited to pancreatitis, pseudotumor cerebri, bony changes, clot formation/stroke/heart attack.  Patient understands that alcohol is contraindicated since it can result in liver toxicity and significantly prolong the elimination of the drug by many years.
Cantharidin Pregnancy And Lactation Text: This medication has not been proven safe during pregnancy. It is unknown if this medication is excreted in breast milk.
Birth Control Pills Counseling: Birth Control Pill Counseling: I discussed with the patient the potential side effects of OCPs including but not limited to increased risk of stroke, heart attack, thrombophlebitis, deep venous thrombosis, hepatic adenomas, breast changes, GI upset, headaches, and depression.  The patient verbalized understanding of the proper use and possible adverse effects of OCPs. All of the patient's questions and concerns were addressed.
Enbrel Counseling:  I discussed with the patient the risks of etanercept including but not limited to myelosuppression, immunosuppression, autoimmune hepatitis, demyelinating diseases, lymphoma, and infections.  The patient understands that monitoring is required including a PPD at baseline and must alert us or the primary physician if symptoms of infection or other concerning signs are noted.
Cibinqo Pregnancy And Lactation Text: It is unknown if this medication will adversely affect pregnancy or breast feeding.  You should not take this medication if you are currently pregnant or planning a pregnancy or while breastfeeding.
Aklief counseling:  Patient advised to apply a pea-sized amount only at bedtime and wait 30 minutes after washing their face before applying.  If too drying, patient may add a non-comedogenic moisturizer.  The most commonly reported side effects including irritation, redness, scaling, dryness, stinging, burning, itching, and increased risk of sunburn.  The patient verbalized understanding of the proper use and possible adverse effects of retinoids.  All of the patient's questions and concerns were addressed.
Aklief Pregnancy And Lactation Text: It is unknown if this medication is safe to use during pregnancy.  It is unknown if this medication is excreted in breast milk.  Breastfeeding women should use the topical cream on the smallest area of the skin for the shortest time needed while breastfeeding.  Do not apply to nipple and areola.
Siliq Counseling:  I discussed with the patient the risks of Siliq including but not limited to new or worsening depression, suicidal thoughts and behavior, immunosuppression, malignancy, posterior leukoencephalopathy syndrome, and serious infections.  The patient understands that monitoring is required including a PPD at baseline and must alert us or the primary physician if symptoms of infection or other concerning signs are noted. There is also a special program designed to monitor depression which is required with Siliq.
5-Fu Counseling: 5-Fluorouracil Counseling:  I discussed with the patient the risks of 5-fluorouracil including but not limited to erythema, scaling, itching, weeping, crusting, and pain.
Rifampin Counseling: I discussed with the patient the risks of rifampin including but not limited to liver damage, kidney damage, red-orange body fluids, nausea/vomiting and severe allergy.
Low Dose Naltrexone Pregnancy And Lactation Text: Naltrexone is pregnancy category C.  There have been no adequate and well-controlled studies in pregnant women.  It should be used in pregnancy only if the potential benefit justifies the potential risk to the fetus.   Limited data indicates that naltrexone is minimally excreted into breastmilk.
Olumiant Counseling: I discussed with the patient the risks of Olumiant therapy including but not limited to upper respiratory tract infections, shingles, cold sores, and nausea. Live vaccines should be avoided.  This medication has been linked to serious infections; higher rate of mortality; malignancy and lymphoproliferative disorders; major adverse cardiovascular events; thrombosis; gastrointestinal perforations; neutropenia; lymphopenia; anemia; liver enzyme elevations; and lipid elevations.
Soolantra Counseling: I discussed with the patients the risks of topial Soolantra. This is a medicine which decreases the number of mites and inflammation in the skin. You experience burning, stinging, eye irritation or allergic reactions.  Please call our office if you develop any problems from using this medication.
Otezla Counseling: The side effects of Otezla were discussed with the patient, including but not limited to worsening or new depression, weight loss, diarrhea, nausea, upper respiratory tract infection, and headache. Patient instructed to call the office should any adverse effect occur.  The patient verbalized understanding of the proper use and possible adverse effects of Otezla.  All the patient's questions and concerns were addressed.
Opioid Pregnancy And Lactation Text: These medications can lead to premature delivery and should be avoided during pregnancy. These medications are also present in breast milk in small amounts.
Gabapentin Counseling: I discussed with the patient the risks of gabapentin including but not limited to dizziness, somnolence, fatigue and ataxia.
Clindamycin Counseling: I counseled the patient regarding use of clindamycin as an antibiotic for prophylactic and/or therapeutic purposes. Clindamycin is active against numerous classes of bacteria, including skin bacteria. Side effects may include nausea, diarrhea, gastrointestinal upset, rash, hives, yeast infections, and in rare cases, colitis.
Cyclophosphamide Pregnancy And Lactation Text: This medication is Pregnancy Category D and it isn't considered safe during pregnancy. This medication is excreted in breast milk.
Clindamycin Pregnancy And Lactation Text: This medication can be used in pregnancy if certain situations. Clindamycin is also present in breast milk.
Tremfya Counseling: I discussed with the patient the risks of guselkumab including but not limited to immunosuppression, serious infections, worsening of inflammatory bowel disease and drug reactions.  The patient understands that monitoring is required including a PPD at baseline and must alert us or the primary physician if symptoms of infection or other concerning signs are noted.
Topical Metronidazole Counseling: Metronidazole is a topical antibiotic medication. You may experience burning, stinging, redness, or allergic reactions.  Please call our office if you develop any problems from using this medication.
Quinolones Counseling:  I discussed with the patient the risks of fluoroquinolones including but not limited to GI upset, allergic reaction, drug rash, diarrhea, dizziness, photosensitivity, yeast infections, liver function test abnormalities, tendonitis/tendon rupture.
Birth Control Pills Pregnancy And Lactation Text: This medication should be avoided if pregnant and for the first 30 days post-partum.
Soolantra Pregnancy And Lactation Text: This medication is Pregnancy Category C. This medication is considered safe during breast feeding.
Doxepin Counseling:  Patient advised that the medication is sedating and not to drive a car after taking this medication. Patient informed of potential adverse effects including but not limited to dry mouth, urinary retention, and blurry vision.  The patient verbalized understanding of the proper use and possible adverse effects of doxepin.  All of the patient's questions and concerns were addressed.
Klisyri Counseling:  I discussed with the patient the risks of Klisyri including but not limited to erythema, scaling, itching, weeping, crusting, and pain.
Tranexamic Acid Counseling:  Patient advised of the small risk of bleeding problems with tranexamic acid. They were also instructed to call if they developed any nausea, vomiting or diarrhea. All of the patient's questions and concerns were addressed.
Adbry Counseling: I discussed with the patient the risks of tralokinumab including but not limited to eye infection and irritation, cold sores, injection site reactions, worsening of asthma, allergic reactions and increased risk of parasitic infection.  Live vaccines should be avoided while taking tralokinumab. The patient understands that monitoring is required and they must alert us or the primary physician if symptoms of infection or other concerning signs are noted.
Griseofulvin Pregnancy And Lactation Text: This medication is Pregnancy Category X and is known to cause serious birth defects. It is unknown if this medication is excreted in breast milk but breast feeding should be avoided.
Acitretin Pregnancy And Lactation Text: This medication is Pregnancy Category X and should not be given to women who are pregnant or may become pregnant in the future. This medication is excreted in breast milk.
Winlevi Pregnancy And Lactation Text: This medication is considered safe during pregnancy and breastfeeding.

## 2023-10-13 NOTE — PROCEDURE: LIQUID NITROGEN
Detail Level: Detailed
Render Note In Bullet Format When Appropriate: No
Duration Of Freeze Thaw-Cycle (Seconds): 0
Post-Care Instructions: I reviewed with the patient in detail post-care instructions. Patient is to wear sunprotection, and avoid picking at any of the treated lesions. Pt may apply Vaseline to crusted or scabbing areas.
Show Applicator Variable?: Yes
Consent: The patient's consent was obtained including but not limited to risks of crusting, scabbing, blistering, scarring, darker or lighter pigmentary change, recurrence, incomplete removal and infection.
Number Of Freeze-Thaw Cycles: 2 freeze-thaw cycles

## 2023-10-13 NOTE — PROCEDURE: TREATMENT REGIMEN
Detail Level: Simple
Initiate Treatment: Clindamycin topical bid for 2 weeks with flares \\nDial or denisse 2000 soap

## 2023-10-13 NOTE — PROCEDURE: INCISION AND DRAINAGE
Detail Level: Detailed
Lesion Type: Cyst
Method: 11 blade
Curette: Yes
Include Sutures?: No
Anesthesia Type: 0.5% lidocaine without epinephrine
Anesthesia Volume In Cc: 1
Size Of Lesion In Cm (Optional But May Be Required For Some Insurances): 0
Wound Care: Petrolatum
Dressing: Band-Aid
Epidermal Sutures: 4-0 Ethilon
Epidermal Closure: simple interrupted
Suture Text: The incision was partially closed with
Preparation Text: The area was prepped in the usual clean fashion.
Curette Text (Optional): After the contents were expressed a curette was used to partially remove the cyst wall.
Consent was obtained and risks were reviewed including but not limited to delayed wound healing, infection, scar, need for multiple I and D's, and pain.
Post-Care Instructions: I reviewed with the patient in detail post-care instructions. Patient should keep wound covered and call the office should any redness, pain, swelling or worsening occur.

## 2023-11-29 LAB
25(OH)D3+25(OH)D2 SERPL-MCNC: 30.8 NG/ML (ref 30–100)
ALBUMIN SERPL-MCNC: 3.9 G/DL (ref 3.8–4.8)
ALBUMIN/CREAT UR: 3 MG/G CREAT (ref 0–29)
ALBUMIN/GLOB SERPL: 1.4 {RATIO} (ref 1.2–2.2)
ALP SERPL-CCNC: 61 IU/L (ref 44–121)
ALT SERPL-CCNC: 20 IU/L (ref 0–44)
AST SERPL-CCNC: 17 IU/L (ref 0–40)
BASOPHILS # BLD AUTO: 0 X10E3/UL (ref 0–0.2)
BASOPHILS NFR BLD AUTO: 1 %
BILIRUB SERPL-MCNC: 0.3 MG/DL (ref 0–1.2)
BUN SERPL-MCNC: 15 MG/DL (ref 8–27)
BUN/CREAT SERPL: 15 (ref 10–24)
CALCIUM SERPL-MCNC: 9.9 MG/DL (ref 8.6–10.2)
CHLORIDE SERPL-SCNC: 102 MMOL/L (ref 96–106)
CHOLEST SERPL-MCNC: 143 MG/DL (ref 100–199)
CO2 SERPL-SCNC: 27 MMOL/L (ref 20–29)
CREAT SERPL-MCNC: 0.98 MG/DL (ref 0.76–1.27)
CREAT UR-MCNC: 107 MG/DL
EGFRCR SERPLBLD CKD-EPI 2021: 79 ML/MIN/1.73
EOSINOPHIL # BLD AUTO: 0.3 X10E3/UL (ref 0–0.4)
EOSINOPHIL NFR BLD AUTO: 5 %
ERYTHROCYTE [DISTWIDTH] IN BLOOD BY AUTOMATED COUNT: 14.5 % (ref 11.6–15.4)
GLOBULIN SER CALC-MCNC: 2.8 G/DL (ref 1.5–4.5)
GLUCOSE SERPL-MCNC: 133 MG/DL (ref 70–99)
HBA1C MFR BLD: 8.6 % (ref 4.8–5.6)
HCT VFR BLD AUTO: 45.8 % (ref 37.5–51)
HDLC SERPL-MCNC: 61 MG/DL
HGB BLD-MCNC: 15 G/DL (ref 13–17.7)
IMM GRANULOCYTES # BLD AUTO: 0 X10E3/UL (ref 0–0.1)
IMM GRANULOCYTES NFR BLD AUTO: 0 %
IMMATURE CELLS  115398: NORMAL
LABORATORY COMMENT REPORT: NORMAL
LDLC SERPL CALC-MCNC: 67 MG/DL (ref 0–99)
LYMPHOCYTES # BLD AUTO: 2.6 X10E3/UL (ref 0.7–3.1)
LYMPHOCYTES NFR BLD AUTO: 45 %
MCH RBC QN AUTO: 29.4 PG (ref 26.6–33)
MCHC RBC AUTO-ENTMCNC: 32.8 G/DL (ref 31.5–35.7)
MCV RBC AUTO: 90 FL (ref 79–97)
MICROALBUMIN UR-MCNC: 3.3 UG/ML
MONOCYTES # BLD AUTO: 0.5 X10E3/UL (ref 0.1–0.9)
MONOCYTES NFR BLD AUTO: 9 %
MORPHOLOGY BLD-IMP: NORMAL
NEUTROPHILS # BLD AUTO: 2.3 X10E3/UL (ref 1.4–7)
NEUTROPHILS NFR BLD AUTO: 40 %
NRBC BLD AUTO-RTO: NORMAL %
PLATELET # BLD AUTO: 312 X10E3/UL (ref 150–450)
POTASSIUM SERPL-SCNC: 4.3 MMOL/L (ref 3.5–5.2)
PROT SERPL-MCNC: 6.7 G/DL (ref 6–8.5)
RBC # BLD AUTO: 5.11 X10E6/UL (ref 4.14–5.8)
SODIUM SERPL-SCNC: 142 MMOL/L (ref 134–144)
TRIGL SERPL-MCNC: 75 MG/DL (ref 0–149)
TSH SERPL DL<=0.005 MIU/L-ACNC: 2.63 UIU/ML (ref 0.45–4.5)
VLDLC SERPL CALC-MCNC: 15 MG/DL (ref 5–40)
WBC # BLD AUTO: 5.8 X10E3/UL (ref 3.4–10.8)

## 2023-12-05 ENCOUNTER — OFFICE VISIT (OUTPATIENT)
Dept: MEDICAL GROUP | Age: 78
End: 2023-12-05
Payer: MEDICARE

## 2023-12-05 VITALS
BODY MASS INDEX: 28.99 KG/M2 | OXYGEN SATURATION: 92 % | HEART RATE: 83 BPM | SYSTOLIC BLOOD PRESSURE: 122 MMHG | TEMPERATURE: 96.9 F | WEIGHT: 214 LBS | HEIGHT: 72 IN | DIASTOLIC BLOOD PRESSURE: 64 MMHG

## 2023-12-05 DIAGNOSIS — E11.8 CONTROLLED TYPE 2 DIABETES MELLITUS WITH COMPLICATION, WITH LONG-TERM CURRENT USE OF INSULIN (HCC): ICD-10-CM

## 2023-12-05 DIAGNOSIS — I10 ESSENTIAL HYPERTENSION: ICD-10-CM

## 2023-12-05 DIAGNOSIS — N40.0 BPH WITHOUT URINARY OBSTRUCTION: ICD-10-CM

## 2023-12-05 DIAGNOSIS — E03.4 HYPOTHYROIDISM DUE TO ACQUIRED ATROPHY OF THYROID: ICD-10-CM

## 2023-12-05 DIAGNOSIS — E78.5 DYSLIPIDEMIA: ICD-10-CM

## 2023-12-05 DIAGNOSIS — Z79.4 CONTROLLED TYPE 2 DIABETES MELLITUS WITH COMPLICATION, WITH LONG-TERM CURRENT USE OF INSULIN (HCC): ICD-10-CM

## 2023-12-05 DIAGNOSIS — E55.9 VITAMIN D DEFICIENCY: ICD-10-CM

## 2023-12-05 PROCEDURE — 3078F DIAST BP <80 MM HG: CPT | Performed by: INTERNAL MEDICINE

## 2023-12-05 PROCEDURE — 99214 OFFICE O/P EST MOD 30 MIN: CPT | Performed by: INTERNAL MEDICINE

## 2023-12-05 PROCEDURE — 3074F SYST BP LT 130 MM HG: CPT | Performed by: INTERNAL MEDICINE

## 2023-12-05 ASSESSMENT — ENCOUNTER SYMPTOMS
MUSCULOSKELETAL NEGATIVE: 1
CARDIOVASCULAR NEGATIVE: 1
CONSTITUTIONAL NEGATIVE: 1
RESPIRATORY NEGATIVE: 1
NEUROLOGICAL NEGATIVE: 1
EYES NEGATIVE: 1
PSYCHIATRIC NEGATIVE: 1
GASTROINTESTINAL NEGATIVE: 1

## 2023-12-05 ASSESSMENT — FIBROSIS 4 INDEX: FIB4 SCORE: 0.94

## 2023-12-06 NOTE — PROGRESS NOTES
Subjective     Kevin Jackson is a 78 y.o. male who presents with Follow-Up (Lab review )    The patient is here for followup of chronic medical problems listed below. The patient is compliant with medications and having no side effects from them. Denies chest pain, abdominal pain, dyspnea, myalgias, or cough.   Patient Active Problem List   Diagnosis    Essential hypertension    Controlled type 2 diabetes mellitus with complication, with long-term current use of insulin ([facility])    Benign non-nodular prostatic hyperplasia with lower urinary tract symptoms- [facility]    Gastroesophageal reflux disease with esophagitis- PPI PRN    Hypothyroidism due to acquired atrophy of thyroid- dr browning    Benign essential tremor    PVD (peripheral vascular disease) ([facility])- s/p right BKA    Diabetic mononeuropathy associated with diabetes mellitus due to underlying condition ([facility])-     Nonsustained ventricular tachycardia ([facility])    S/P BKA (below knee amputation) unilateral, right ([facility])    Dyslipidemia    Vitamin D deficiency    OAB (overactive bladder)    Candidal intertrigo     Outpatient Medications Prior to Visit   Medication Sig Dispense Refill    Insulin Degludec (TRESIBA FLEXTOUCH) 100 UNIT/ML Solution Pen-injector Inject 44 Units under the skin at bedtime. 15 Each 3    Semaglutide, 1 MG/DOSE, (OZEMPIC, 1 MG/DOSE,) 2 MG/1.5ML Solution Pen-injector Inject 2 mg under the skin every Monday. 4 Each 11    gabapentin (NEURONTIN) 300 MG Cap Take 1 Capsule by mouth 3 times a day. 270 Capsule 3    mirtazapine (REMERON) 15 MG Tab Take 1 Tablet by mouth at bedtime. 90 Tablet 3    propranolol CR (INDERAL LA) 120 MG CAPSULE SR 24 HR TAKE 1 CAPSULE BY MOUTH DAILY 90 Capsule 4    atorvastatin (LIPITOR) 80 MG tablet Take 80 mg by mouth every evening.      primidone (MYSOLINE) 50 MG Tab Take 50 mg by mouth every day. Once daily      Insulin Pen Needle 32G X 6 MM Misc 1-2      glucose blood strip 3-4      INSULIN LISP PROT & LISP, HUM,  (50-50) 100 UNIT/ML Suspension 30 u      aspirin EC 81 MG EC tablet Take 1 Tab by mouth every day. 100 Tab 2    glimepiride (AMARYL) 4 MG Tab Take 1 Tab by mouth every morning. 30 Tab 11    Empagliflozin (JARDIANCE) 25 MG Tab Take 25 mg by mouth every day. 90 Tab 4    metformin (GLUCOPHAGE) 1000 MG tablet Take 1 Tab by mouth 2 times a day, with meals. 60 Tab 11    levothyroxine (SYNTHROID) 50 MCG Tab Take 1 Tab by mouth every morning before breakfast. 30 Tab 11    triamcinolone acetonide (KENALOG) 0.1 % Cream AAA BID UP TO  14 DAYS THEN STOP (Patient not taking: Reported on 12/5/2023) 50 g 0    nystatin (MYCOSTATIN) powder Apply spoonful to area affected 4 times daily till healing is complete, then used 3 times weekly to prevent (Patient not taking: Reported on 12/5/2023) 30 g 6    clotrimazole (LOTRIMIN) 1 % Cream AAA BID x 4 wks (Patient not taking: Reported on 12/5/2023) 12 g 2    finasteride (PROSCAR) 5 MG Tab Take 1 Tablet by mouth every day. (Patient not taking: Reported on 12/5/2023) 90 Tablet 3    Mirabegron ER 25 MG TABLET SR 24 HR Take 25 mg by mouth every day. 30 Tablet 11    Misc. Devices Misc Diabetic shoes size 12- due to neuropathy, calluses and skin injuries. Needs fillers for left shoe to to mismatch in right and left shoe size due to BKA on right 2 Each 1    mupirocin (BACTROBAN) 2 % Ointment  (Patient not taking: Reported on 12/5/2023)      insulin regular (HUMULIN R) 100 Unit/mL Solution Inject 1-6 Units under the skin 4 Times a Day,Before Meals and at Bedtime. (Patient not taking: Reported on 12/5/2023) 10 mL     hydrocortisone 2.5 % Cream topical cream Apply 1 Application to affected area(s) 1 time daily as needed. (Patient not taking: Reported on 12/5/2023)      losartan (COZAAR) 50 MG Tab Take 1 Tab by mouth every day. 90 Tab 2     No facility-administered medications prior to visit.     Orders Only on 11/28/2023   Component Date Value    WBC 11/28/2023 5.8     RBC 11/28/2023 5.11      Hemoglobin 11/28/2023 15.0     Hematocrit 11/28/2023 45.8     MCV 11/28/2023 90     MCH 11/28/2023 29.4     MCHC 11/28/2023 32.8     RDW 11/28/2023 14.5     Platelet Count 11/28/2023 312     Neutrophils-Polys 11/28/2023 40     Lymphocytes 11/28/2023 45     Monocytes 11/28/2023 9     Eosinophils 11/28/2023 5     Basophils 11/28/2023 1     Immature Cells 11/28/2023 CANCELED     Neutrophils (Absolute) 11/28/2023 2.3     Lymphs (Absolute) 11/28/2023 2.6     Monos (Absolute) 11/28/2023 0.5     Eos (Absolute) 11/28/2023 0.3     Baso (Absolute) 11/28/2023 0.0     Immature Granulocytes 11/28/2023 0     Immature Granulocytes (a* 11/28/2023 0.0     Nucleated RBC 11/28/2023 CANCELED     Comments-Diff 11/28/2023 CANCELED     Glucose 11/28/2023 133 (H)     Bun 11/28/2023 15     Creatinine 11/28/2023 0.98     eGFR 11/28/2023 79     Bun-Creatinine Ratio 11/28/2023 15     Sodium 11/28/2023 142     Potassium 11/28/2023 4.3     Chloride 11/28/2023 102     Co2 11/28/2023 27     Calcium 11/28/2023 9.9     Total Protein 11/28/2023 6.7     Albumin 11/28/2023 3.9     Globulin 11/28/2023 2.8     A-G Ratio 11/28/2023 1.4     Total Bilirubin 11/28/2023 0.3     Alkaline Phosphatase 11/28/2023 61     AST(SGOT) 11/28/2023 17     ALT(SGPT) 11/28/2023 20     Cholesterol,Tot 11/28/2023 143     Triglycerides 11/28/2023 75     HDL 11/28/2023 61     VLDL Cholesterol Calc 11/28/2023 15     LDL Chol Calc (NIH) 11/28/2023 67     Comment: 11/28/2023 CANCELED     Creatinine, Random Urine 11/28/2023 107.0     Microalbumin, Urine Rand* 11/28/2023 3.3     Albumin / Creatinine Rat* 11/28/2023 3     Glycohemoglobin 11/28/2023 8.6 (H)     TSH 11/28/2023 2.630     25-Hydroxy   Vitamin D 25 11/28/2023 30.8       .madan  '  Lab Results   Component Value Date/Time    HBA1C 8.6 (H) 11/28/2023 04:19 AM    HBA1C 8.0 (H) 03/03/2023 06:26 AM    HBA1C 9.5 (H) 05/06/2022 08:53 AM     Lab Results   Component Value Date/Time    SODIUM 142 11/28/2023 04:19 AM    SODIUM 142  "03/03/2023 06:30 AM    POTASSIUM 4.3 11/28/2023 04:19 AM    POTASSIUM 4.8 03/03/2023 06:30 AM    CHLORIDE 102 11/28/2023 04:19 AM    CHLORIDE 102 03/03/2023 06:30 AM    CO2 27 11/28/2023 04:19 AM    CO2 28 03/03/2023 06:30 AM    GLUCOSE 133 (H) 11/28/2023 04:19 AM    GLUCOSE 125 (H) 03/03/2023 06:30 AM    BUN 15 11/28/2023 04:19 AM    BUN 23 (H) 03/03/2023 06:30 AM    CREATININE 0.98 11/28/2023 04:19 AM    CREATININE 0.98 03/03/2023 06:30 AM    BUNCREATRAT 15 11/28/2023 04:19 AM    ALKPHOSPHAT 61 11/28/2023 04:19 AM    ALKPHOSPHAT 46 03/03/2023 06:30 AM    ASTSGOT 17 11/28/2023 04:19 AM    ASTSGOT 13 03/03/2023 06:30 AM    ALTSGPT 20 11/28/2023 04:19 AM    ALTSGPT 18 03/03/2023 06:30 AM    TBILIRUBIN 0.3 11/28/2023 04:19 AM    TBILIRUBIN 0.6 03/03/2023 06:30 AM     Lab Results   Component Value Date/Time    INR 1.07 07/14/2021 01:28 PM    INR 0.97 07/06/2021 10:21 AM    INR 0.99 01/11/2017 09:05 PM     Lab Results   Component Value Date/Time    CHOLSTRLTOT 143 11/28/2023 04:19 AM    CHOLSTRLTOT 111 03/03/2023 06:30 AM    LDL 53 03/03/2023 06:30 AM    HDL 61 11/28/2023 04:19 AM    HDL 44 03/03/2023 06:30 AM    TRIGLYCERIDE 75 11/28/2023 04:19 AM    TRIGLYCERIDE 71 03/03/2023 06:30 AM       Lab Results   Component Value Date/Time    TESTOSTERONE 436 11/08/2019 10:50 AM     Lab Results   Component Value Date/Time    TSH 2.630 11/28/2023 04:19 AM    TSH 2.020 11/21/2017 07:21 AM     Lab Results   Component Value Date/Time    FREET4 1.40 03/03/2023 06:30 AM    FREET4 1.45 05/06/2022 08:10 AM     No results found for: \"URICACID\"  No components found for: \"VITB12\"  Lab Results   Component Value Date/Time    25HYDROXY 30.8 11/28/2023 04:19 AM    25HYDROXY 37 03/03/2023 06:26 AM    25HYDROXY 34 01/10/2023 07:48 AM     '        HPI    Review of Systems   Constitutional: Negative.    HENT: Negative.     Eyes: Negative.    Respiratory: Negative.     Cardiovascular: Negative.    Gastrointestinal: Negative.    Genitourinary: " Negative.    Musculoskeletal: Negative.    Skin: Negative.    Neurological: Negative.    Endo/Heme/Allergies: Negative.    Psychiatric/Behavioral: Negative.                Objective     /64 (BP Location: Right arm, Patient Position: Sitting, BP Cuff Size: Adult)   Pulse 83   Temp 36.1 °C (96.9 °F) (Temporal)   Ht 1.829 m (6')   Wt 97.1 kg (214 lb)   SpO2 92%   BMI 29.02 kg/m²      Physical Exam  Constitutional:       General: He is not in acute distress.     Appearance: He is well-developed. He is not diaphoretic.   HENT:      Head: Normocephalic and atraumatic.      Right Ear: External ear normal.      Left Ear: External ear normal.      Nose: Nose normal.      Mouth/Throat:      Pharynx: No oropharyngeal exudate.   Eyes:      General: No scleral icterus.        Right eye: No discharge.         Left eye: No discharge.      Conjunctiva/sclera: Conjunctivae normal.      Pupils: Pupils are equal, round, and reactive to light.   Neck:      Thyroid: No thyromegaly.      Vascular: No JVD.      Trachea: No tracheal deviation.   Cardiovascular:      Rate and Rhythm: Normal rate and regular rhythm.      Heart sounds: Normal heart sounds. No murmur heard.     No friction rub. No gallop.   Pulmonary:      Effort: Pulmonary effort is normal. No respiratory distress.      Breath sounds: Normal breath sounds. No stridor. No wheezing or rales.   Chest:      Chest wall: No tenderness.   Abdominal:      General: Bowel sounds are normal. There is no distension.      Palpations: Abdomen is soft. There is no mass.      Tenderness: There is no abdominal tenderness. There is no guarding or rebound.   Musculoskeletal:         General: No tenderness. Normal range of motion.      Cervical back: Normal range of motion and neck supple.   Lymphadenopathy:      Cervical: No cervical adenopathy.   Skin:     General: Skin is warm and dry.      Coloration: Skin is not pale.      Findings: No erythema or rash.   Neurological:       Mental Status: He is alert and oriented to person, place, and time.      Motor: No abnormal muscle tone.      Coordination: Coordination normal.      Deep Tendon Reflexes: Reflexes are normal and symmetric. Reflexes normal.   Psychiatric:         Behavior: Behavior normal.         Thought Content: Thought content normal.         Judgment: Judgment normal.                             Assessment & Plan        1. unControlled type 2 diabetes mellitus with complication, with long-term current use of insulin ([facility])  A1c is increased from 8.0-8.6.  Patient advised on increasing his Tresiba back to 44 units perhaps in divided doses to get the A1c back down below 8.  He is off of the 50-50 insulin.  We will follow-up with his endocrinologist within the next 3 months.    Continue Glucophage 1000 mg twice daily and glimepiride 4 mg daily.  And Jardiance 25 mg daily  - Comp Metabolic Panel; Future  - Lipid Profile; Future  - HEMOGLOBIN A1C; Future  - MICROALBUMIN CREAT RATIO URINE; Future    2. Vitamin D deficiency  Control continue vitamin D3 2000 units daily.  - VITAMIN D,25 HYDROXY (DEFICIENCY); Future    3. Hypothyroidism due to acquired atrophy of thyroid- dr browning  Good control continue current regimen with Synthroid 50 mcg daily.    4. Dyslipidemia     Good control continue current regimen with Lipitor 80 mg daily.  - Comp Metabolic Panel; Future  - CBC WITH DIFFERENTIAL; Future  - TSH; Future  - Lipid Profile; Future  - HEMOGLOBIN A1C; Future    5. Essential hypertension  Good control continue current regimen with losartan 50 mg daily.  - Comp Metabolic Panel; Future    6. BPH without urinary obstruction  Good control continue current regimen with mirabegron 25 mg daily  - PROSTATE SPECIFIC AG DIAGNOSTIC; Future

## 2023-12-27 DIAGNOSIS — F32.4 MAJOR DEPRESSIVE DISORDER WITH SINGLE EPISODE, IN PARTIAL REMISSION (HCC): ICD-10-CM

## 2023-12-27 DIAGNOSIS — F51.01 PRIMARY INSOMNIA: ICD-10-CM

## 2023-12-27 RX ORDER — MIRTAZAPINE 15 MG/1
15 TABLET, FILM COATED ORAL
Qty: 90 TABLET | Refills: 3 | Status: SHIPPED | OUTPATIENT
Start: 2023-12-27

## 2024-01-19 ENCOUNTER — OFFICE VISIT (OUTPATIENT)
Dept: MEDICAL GROUP | Age: 79
End: 2024-01-19
Payer: MEDICARE

## 2024-01-19 VITALS
HEIGHT: 72 IN | OXYGEN SATURATION: 96 % | WEIGHT: 211 LBS | HEART RATE: 92 BPM | BODY MASS INDEX: 28.58 KG/M2 | TEMPERATURE: 97 F | SYSTOLIC BLOOD PRESSURE: 118 MMHG | RESPIRATION RATE: 16 BRPM | DIASTOLIC BLOOD PRESSURE: 70 MMHG

## 2024-01-19 DIAGNOSIS — L03.012 PARONYCHIA OF FINGER OF LEFT HAND: ICD-10-CM

## 2024-01-19 DIAGNOSIS — L03.818 CELLULITIS OF OTHER SPECIFIED SITE: ICD-10-CM

## 2024-01-19 PROCEDURE — 99214 OFFICE O/P EST MOD 30 MIN: CPT | Performed by: PHYSICIAN ASSISTANT

## 2024-01-19 PROCEDURE — 3078F DIAST BP <80 MM HG: CPT | Performed by: PHYSICIAN ASSISTANT

## 2024-01-19 PROCEDURE — 3074F SYST BP LT 130 MM HG: CPT | Performed by: PHYSICIAN ASSISTANT

## 2024-01-19 RX ORDER — DOXYCYCLINE HYCLATE 100 MG/1
100 CAPSULE ORAL 2 TIMES DAILY
COMMUNITY
Start: 2024-01-15

## 2024-01-19 ASSESSMENT — FIBROSIS 4 INDEX: FIB4 SCORE: 0.94

## 2024-01-19 ASSESSMENT — PATIENT HEALTH QUESTIONNAIRE - PHQ9: CLINICAL INTERPRETATION OF PHQ2 SCORE: 0

## 2024-01-20 NOTE — PROGRESS NOTES
cc: ER follow-up    Subjective:     HPI    Kevin Jackson is a 78 y.o. male presenting for ER follow-up.  He was seen at [facility] ER 1/15.  He states that he consistently bites his nail, ended up pulling off a good piece of the nail and skin on the left fifth nail, subsequently started to have an infection.  He did have a paronychia, I&D was completed.  He was started on doxycycline.  He states that he has had significant proved in the swelling and redness.  He has been doing Epsom salt soaks.  Denies fever, chills, streaking erythema, warmth.        Review of systems:  See above.       Current Outpatient Medications:     doxycycline (VIBRAMYCIN) 100 MG Cap, Take 100 mg by mouth 2 times a day., Disp: , Rfl:     mirtazapine (REMERON) 15 MG Tab, Take 1 Tablet by mouth at bedtime., Disp: 90 Tablet, Rfl: 3    Insulin Degludec (TRESIBA FLEXTOUCH) 100 UNIT/ML Solution Pen-injector, Inject 44 Units under the skin at bedtime., Disp: 15 Each, Rfl: 3    Semaglutide, 1 MG/DOSE, (OZEMPIC, 1 MG/DOSE,) 2 MG/1.5ML Solution Pen-injector, Inject 2 mg under the skin every Monday., Disp: 4 Each, Rfl: 11    gabapentin (NEURONTIN) 300 MG Cap, Take 1 Capsule by mouth 3 times a day., Disp: 270 Capsule, Rfl: 3    Mirabegron ER 25 MG TABLET SR 24 HR, Take 25 mg by mouth every day., Disp: 30 Tablet, Rfl: 11    Misc. Devices Misc, Diabetic shoes size 12- due to neuropathy, calluses and skin injuries. 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 Once daily, Disp: , Rfl:     Insulin Pen Needle 32G X 6 MM Misc, 1-2, Disp: , Rfl:     glucose blood strip, 3-4, Disp: , Rfl:     INSULIN LISP PROT & LISP, HUM, (50-50) 100 UNIT/ML Suspension, 30 u, Disp: , Rfl:     aspirin EC 81 MG EC tablet, Take 1 Tab by mouth every  day., Disp: 100 Tab, Rfl: 2    losartan (COZAAR) 50 MG Tab, Take 1 Tab by mouth every day., Disp: 90 Tab, Rfl: 2    glimepiride (AMARYL) 4 MG Tab, Take 1 Tab by mouth every morning., Disp: 30 Tab, Rfl: 11    Empagliflozin (JARDIANCE) 25 MG Tab, Take 25 mg by mouth every day., Disp: 90 Tab, Rfl: 4    metformin (GLUCOPHAGE) 1000 MG tablet, Take 1 Tab by mouth 2 times a day, with meals., Disp: 60 Tab, Rfl: 11    levothyroxine (SYNTHROID) 50 MCG Tab, Take 1 Tab by mouth every morning before breakfast., Disp: 30 Tab, Rfl: 11    propranolol CR (INDERAL LA) 120 MG CAPSULE SR 24 HR, TAKE 1 CAPSULE BY MOUTH DAILY (Patient not taking: Reported on 1/19/2024), Disp: 90 Capsule, Rfl: 4    insulin regular (HUMULIN R) 100 Unit/mL Solution, Inject 1-6 Units under the skin 4 Times a Day,Before Meals and at Bedtime. (Patient not taking: Reported on 12/5/2023), Disp: 10 mL, Rfl:     Allergies, past medical history, past surgical history, family history, social history reviewed and updated    Objective:     Vitals: /70 (BP Location: Left arm, Patient Position: Sitting, BP Cuff Size: Adult)   Pulse 92   Temp 36.1 °C (97 °F) (Temporal)   Resp 16   Ht 1.829 m (6')   Wt 95.7 kg (211 lb)   SpO2 96%   BMI 28.62 kg/m²   General: Alert, pleasant, NAD  HEENT: Normocephalic. Neck supple.    Heart: Regular rate and rhythm.  S1 and S2 normal.  No murmurs appreciated.  Respiratory: Normal respiratory effort.  Clear to auscultation bilaterally.  Left fifth finger.  Remaining nail is wet/macerated.  On the pulp of the finger incision is healing, no redness or warmth.  Mildly erythematous on the dorsal side to the DIP joint.  Able to fully flex and extend the finger.  No drainage.  Capillary refill intact  Psych:  Affect/mood is normal, judgement is good, memory is intact, grooming is appropriate.    Assessment/Plan:     Kevin was seen today for nail problem.    Diagnoses and all orders for this visit:    Paronychia of finger of left  hand  Resolved.  Stressed the importance of not biting his nails.  Monitor if redness, swelling represents needs to follow-up in the ER immediately.    Cellulitis of other specified site  Significantly improved.  Still slightly erythematous on the dorsal portion.  Continue with antibiotics to completion.  Monitor for signs of worsening.  Will have him follow-up in 3 days for reevaluation.  Advised to keep the area open to air, unless he is going out in public.      Return in about 3 days (around 1/22/2024).

## 2024-01-22 ENCOUNTER — OFFICE VISIT (OUTPATIENT)
Dept: MEDICAL GROUP | Age: 79
End: 2024-01-22
Payer: MEDICARE

## 2024-01-22 ENCOUNTER — HOSPITAL ENCOUNTER (OUTPATIENT)
Facility: MEDICAL CENTER | Age: 79
End: 2024-01-22
Attending: PHYSICIAN ASSISTANT
Payer: MEDICARE

## 2024-01-22 VITALS
WEIGHT: 210 LBS | HEIGHT: 72 IN | BODY MASS INDEX: 28.44 KG/M2 | HEART RATE: 100 BPM | RESPIRATION RATE: 16 BRPM | SYSTOLIC BLOOD PRESSURE: 120 MMHG | DIASTOLIC BLOOD PRESSURE: 60 MMHG | OXYGEN SATURATION: 91 % | TEMPERATURE: 97 F

## 2024-01-22 DIAGNOSIS — L03.012 PARONYCHIA OF FINGER OF LEFT HAND: ICD-10-CM

## 2024-01-22 DIAGNOSIS — L03.818 CELLULITIS OF OTHER SPECIFIED SITE: ICD-10-CM

## 2024-01-22 PROCEDURE — 87205 SMEAR GRAM STAIN: CPT

## 2024-01-22 PROCEDURE — 99214 OFFICE O/P EST MOD 30 MIN: CPT | Performed by: PHYSICIAN ASSISTANT

## 2024-01-22 PROCEDURE — 87070 CULTURE OTHR SPECIMN AEROBIC: CPT

## 2024-01-22 PROCEDURE — 87186 SC STD MICRODIL/AGAR DIL: CPT

## 2024-01-22 PROCEDURE — 87077 CULTURE AEROBIC IDENTIFY: CPT | Mod: 91

## 2024-01-22 PROCEDURE — 3074F SYST BP LT 130 MM HG: CPT | Performed by: PHYSICIAN ASSISTANT

## 2024-01-22 PROCEDURE — 3078F DIAST BP <80 MM HG: CPT | Performed by: PHYSICIAN ASSISTANT

## 2024-01-22 ASSESSMENT — FIBROSIS 4 INDEX: FIB4 SCORE: 0.94

## 2024-01-22 NOTE — PROGRESS NOTES
cc: Follow-up cellulitis    Subjective:     HPI  Kevin Jackson is a 78 y.o. male presenting for follow-up cellulitis attributed to paronychia.  He was seen at [facility] ER 1/15.  He consistently bites his nails, ended up falling off a good piece of the nail and skin on the left fifth digit.  Ended up having a paronychia, with I&D.  He was started on doxycycline.  He was seen in the clinic 1/19, still mildly erythematous, but was improving.  Presents today for follow-up.    He has 1 more day left of the doxycycline.  He states that he was bending his finger this morning and noted moderate amount of purulent drainage from the proximal nailbed.  Not noted much improvement in the erythema.  Denies fever, chills, sweats, significant pain.  Swelling in the pulp of the finger        Review of systems:  See above.       Current Outpatient Medications:     doxycycline (VIBRAMYCIN) 100 MG Cap, Take 100 mg by mouth 2 times a day., Disp: , Rfl:     mirtazapine (REMERON) 15 MG Tab, Take 1 Tablet by mouth at bedtime., Disp: 90 Tablet, Rfl: 3    Insulin Degludec (TRESIBA FLEXTOUCH) 100 UNIT/ML Solution Pen-injector, Inject 44 Units under the skin at bedtime., Disp: 15 Each, Rfl: 3    Semaglutide, 1 MG/DOSE, (OZEMPIC, 1 MG/DOSE,) 2 MG/1.5ML Solution Pen-injector, Inject 2 mg under the skin every Monday., Disp: 4 Each, Rfl: 11    gabapentin (NEURONTIN) 300 MG Cap, Take 1 Capsule by mouth 3 times a day., Disp: 270 Capsule, Rfl: 3    propranolol CR (INDERAL LA) 120 MG CAPSULE SR 24 HR, TAKE 1 CAPSULE BY MOUTH DAILY, Disp: 90 Capsule, Rfl: 4    Mirabegron ER 25 MG TABLET SR 24 HR, Take 25 mg by mouth every day., Disp: 30 Tablet, Rfl: 11    Misc. Devices Misc, Diabetic shoes size 12- due to neuropathy, calluses and skin injuries. 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 Once daily, Disp: , Rfl:     Insulin Pen Needle 32G X 6 MM Misc, 1-2, Disp: , Rfl:     glucose blood strip, 3-4, Disp: , Rfl:     INSULIN LISP PROT & LISP, HUM, (50-50) 100 UNIT/ML Suspension, 30 u, Disp: , Rfl:     aspirin EC 81 MG EC tablet, Take 1 Tab by mouth every day., Disp: 100 Tab, Rfl: 2    losartan (COZAAR) 50 MG Tab, Take 1 Tab by mouth every day., Disp: 90 Tab, Rfl: 2    glimepiride (AMARYL) 4 MG Tab, Take 1 Tab by mouth every morning., Disp: 30 Tab, Rfl: 11    Empagliflozin (JARDIANCE) 25 MG Tab, Take 25 mg by mouth every day., Disp: 90 Tab, Rfl: 4    metformin (GLUCOPHAGE) 1000 MG tablet, Take 1 Tab by mouth 2 times a day, with meals., Disp: 60 Tab, Rfl: 11    levothyroxine (SYNTHROID) 50 MCG Tab, Take 1 Tab by mouth every morning before breakfast., Disp: 30 Tab, Rfl: 11    insulin regular (HUMULIN R) 100 Unit/mL Solution, Inject 1-6 Units under the skin 4 Times a Day,Before Meals and at Bedtime. (Patient not taking: Reported on 1/22/2024), Disp: 10 mL, Rfl:     Allergies, past medical history, past surgical history, family history, social history reviewed and updated    Objective:     Vitals: /60 (BP Location: Left arm, Patient Position: Sitting, BP Cuff Size: Adult)   Pulse 100   Temp 36.1 °C (97 °F) (Temporal)   Resp 16   Ht 1.829 m (6')   Wt 95.3 kg (210 lb)   SpO2 91%   BMI 28.48 kg/m²   General: Alert, pleasant, NAD  HEENT: Normocephalic. Neck supple.    Heart: Regular rate and rhythm.  S1 and S2 normal.  No murmurs appreciated.  Respiratory: Normal respiratory effort.  Clear to auscultation bilaterally.  Left fifth finger: Nail is macerated, when pressure is applied able to express mild amount of purulent drainage.  Culture taken.  Still mildly erythematous from the DIP down, some more edema compared to 3 days ago.  Psych:  Affect/mood is normal, judgement is good, memory is intact, grooming is  appropriate.    Assessment/Plan:     Kevin was seen today for wound check.    Diagnoses and all orders for this visit:    Paronychia of finger of left hand  -Worsening when compared to exam done 3 days ago.  Not much improvement with the doxycycline.  Culture was taken.  However due to the drainage, edema, did advise that he should be seen by orthopedics.  Recommend [facility].  He states he will go there now.  -     CULTURE WOUND W/ GRAM STAIN; Future    Cellulitis of other specified site  Slightly worsening.  Sent to [facility], if they are unable to evaluate him, recommended ER.      Return if symptoms worsen or fail to improve.

## 2024-01-23 LAB
GRAM STN SPEC: NORMAL
SIGNIFICANT IND 70042: NORMAL
SITE SITE: NORMAL
SOURCE SOURCE: NORMAL

## 2024-01-26 LAB
BACTERIA WND AEROBE CULT: ABNORMAL
GRAM STN SPEC: ABNORMAL
SIGNIFICANT IND 70042: ABNORMAL
SITE SITE: ABNORMAL
SOURCE SOURCE: ABNORMAL

## 2024-02-13 ENCOUNTER — HOSPITAL ENCOUNTER (OUTPATIENT)
Dept: LAB | Facility: MEDICAL CENTER | Age: 79
End: 2024-02-13
Attending: INTERNAL MEDICINE
Payer: MEDICARE

## 2024-02-13 LAB
ALBUMIN SERPL BCP-MCNC: 3.8 G/DL (ref 3.2–4.9)
ALBUMIN/GLOB SERPL: 1.3 G/DL
ALP SERPL-CCNC: 60 U/L (ref 30–99)
ALT SERPL-CCNC: 19 U/L (ref 2–50)
ANION GAP SERPL CALC-SCNC: 9 MMOL/L (ref 7–16)
AST SERPL-CCNC: 13 U/L (ref 12–45)
BASOPHILS # BLD AUTO: 0.6 % (ref 0–1.8)
BASOPHILS # BLD: 0.04 K/UL (ref 0–0.12)
BILIRUB SERPL-MCNC: 0.2 MG/DL (ref 0.1–1.5)
BUN SERPL-MCNC: 23 MG/DL (ref 8–22)
CALCIUM ALBUM COR SERPL-MCNC: 9.8 MG/DL (ref 8.5–10.5)
CALCIUM SERPL-MCNC: 9.6 MG/DL (ref 8.5–10.5)
CHLORIDE SERPL-SCNC: 103 MMOL/L (ref 96–112)
CHOLEST SERPL-MCNC: 130 MG/DL (ref 100–199)
CO2 SERPL-SCNC: 28 MMOL/L (ref 20–33)
CREAT SERPL-MCNC: 0.99 MG/DL (ref 0.5–1.4)
EOSINOPHIL # BLD AUTO: 0.19 K/UL (ref 0–0.51)
EOSINOPHIL NFR BLD: 3 % (ref 0–6.9)
ERYTHROCYTE [DISTWIDTH] IN BLOOD BY AUTOMATED COUNT: 52.9 FL (ref 35.9–50)
EST. AVERAGE GLUCOSE BLD GHB EST-MCNC: 169 MG/DL
FASTING STATUS PATIENT QL REPORTED: NORMAL
GFR SERPLBLD CREATININE-BSD FMLA CKD-EPI: 78 ML/MIN/1.73 M 2
GLOBULIN SER CALC-MCNC: 2.9 G/DL (ref 1.9–3.5)
GLUCOSE SERPL-MCNC: 147 MG/DL (ref 65–99)
HBA1C MFR BLD: 7.5 % (ref 4–5.6)
HCT VFR BLD AUTO: 45.2 % (ref 42–52)
HDLC SERPL-MCNC: 52 MG/DL
HGB BLD-MCNC: 14.7 G/DL (ref 14–18)
IMM GRANULOCYTES # BLD AUTO: 0.01 K/UL (ref 0–0.11)
IMM GRANULOCYTES NFR BLD AUTO: 0.2 % (ref 0–0.9)
LDLC SERPL CALC-MCNC: 65 MG/DL
LYMPHOCYTES # BLD AUTO: 2.64 K/UL (ref 1–4.8)
LYMPHOCYTES NFR BLD: 42 % (ref 22–41)
MCH RBC QN AUTO: 29.1 PG (ref 27–33)
MCHC RBC AUTO-ENTMCNC: 32.5 G/DL (ref 32.3–36.5)
MCV RBC AUTO: 89.3 FL (ref 81.4–97.8)
MONOCYTES # BLD AUTO: 0.74 K/UL (ref 0–0.85)
MONOCYTES NFR BLD AUTO: 11.8 % (ref 0–13.4)
NEUTROPHILS # BLD AUTO: 2.67 K/UL (ref 1.82–7.42)
NEUTROPHILS NFR BLD: 42.4 % (ref 44–72)
NRBC # BLD AUTO: 0 K/UL
NRBC BLD-RTO: 0 /100 WBC (ref 0–0.2)
PLATELET # BLD AUTO: 271 K/UL (ref 164–446)
PMV BLD AUTO: 10.5 FL (ref 9–12.9)
POTASSIUM SERPL-SCNC: 4.6 MMOL/L (ref 3.6–5.5)
PROT SERPL-MCNC: 6.7 G/DL (ref 6–8.2)
RBC # BLD AUTO: 5.06 M/UL (ref 4.7–6.1)
SODIUM SERPL-SCNC: 140 MMOL/L (ref 135–145)
T4 FREE SERPL-MCNC: 1.14 NG/DL (ref 0.93–1.7)
TRIGL SERPL-MCNC: 66 MG/DL (ref 0–149)
TSH SERPL DL<=0.005 MIU/L-ACNC: 2.33 UIU/ML (ref 0.38–5.33)
WBC # BLD AUTO: 6.3 K/UL (ref 4.8–10.8)

## 2024-02-13 PROCEDURE — 84439 ASSAY OF FREE THYROXINE: CPT

## 2024-02-13 PROCEDURE — 36415 COLL VENOUS BLD VENIPUNCTURE: CPT

## 2024-02-13 PROCEDURE — 83036 HEMOGLOBIN GLYCOSYLATED A1C: CPT | Mod: GA

## 2024-02-13 PROCEDURE — 80061 LIPID PANEL: CPT

## 2024-02-13 PROCEDURE — 80053 COMPREHEN METABOLIC PANEL: CPT

## 2024-02-13 PROCEDURE — 84443 ASSAY THYROID STIM HORMONE: CPT

## 2024-02-13 PROCEDURE — 85025 COMPLETE CBC W/AUTO DIFF WBC: CPT

## 2024-03-11 DIAGNOSIS — B37.2 CANDIDAL INTERTRIGO: ICD-10-CM

## 2024-03-11 RX ORDER — NYSTATIN 100000 [USP'U]/G
POWDER TOPICAL
Qty: 30 G | Refills: 6 | Status: SHIPPED | OUTPATIENT
Start: 2024-03-11

## 2024-03-14 ENCOUNTER — OFFICE VISIT (OUTPATIENT)
Dept: MEDICAL GROUP | Age: 79
End: 2024-03-14
Payer: MEDICARE

## 2024-03-14 VITALS
TEMPERATURE: 97.5 F | DIASTOLIC BLOOD PRESSURE: 68 MMHG | HEIGHT: 73 IN | SYSTOLIC BLOOD PRESSURE: 126 MMHG | RESPIRATION RATE: 12 BRPM | OXYGEN SATURATION: 94 % | HEART RATE: 95 BPM | WEIGHT: 206 LBS | BODY MASS INDEX: 27.3 KG/M2

## 2024-03-14 DIAGNOSIS — B37.2 CANDIDAL INTERTRIGO: ICD-10-CM

## 2024-03-14 PROCEDURE — 3078F DIAST BP <80 MM HG: CPT | Performed by: STUDENT IN AN ORGANIZED HEALTH CARE EDUCATION/TRAINING PROGRAM

## 2024-03-14 PROCEDURE — 3074F SYST BP LT 130 MM HG: CPT | Performed by: STUDENT IN AN ORGANIZED HEALTH CARE EDUCATION/TRAINING PROGRAM

## 2024-03-14 PROCEDURE — 99213 OFFICE O/P EST LOW 20 MIN: CPT | Performed by: STUDENT IN AN ORGANIZED HEALTH CARE EDUCATION/TRAINING PROGRAM

## 2024-03-14 RX ORDER — BENZOCAINE/MENTHOL 6 MG-10 MG
1 LOZENGE MUCOUS MEMBRANE 2 TIMES DAILY
Qty: 14 G | Refills: 0 | Status: SHIPPED | OUTPATIENT
Start: 2024-03-14 | End: 2024-03-28

## 2024-03-14 ASSESSMENT — ENCOUNTER SYMPTOMS
FEVER: 0
WEAKNESS: 0
CHILLS: 0
MYALGIAS: 0
BRUISES/BLEEDS EASILY: 0
BLURRED VISION: 0
HEARTBURN: 0
SHORTNESS OF BREATH: 0
PALPITATIONS: 0
ROS SKIN COMMENTS: RIGHT INGUINAL AREA
ABDOMINAL PAIN: 0
DIZZINESS: 0
DOUBLE VISION: 0
NECK PAIN: 0
DEPRESSION: 0
COUGH: 0
BLOOD IN STOOL: 0
HEADACHES: 0

## 2024-03-14 ASSESSMENT — FIBROSIS 4 INDEX: FIB4 SCORE: 0.85

## 2024-03-14 NOTE — PROGRESS NOTES
"Subjective:     CC: Rash    HPI:   Kevin presents today for evaluation of ongoing rash in his right inguinal area. He states that it has been present since January this year, it has been on and off worsening and getting better. He was treated with Steroid cream and nystatin powder. Treatment improved rash for few weeks and started becoming worse again.  Patient states that it feels like burning and itchy. He has been trying to keep area dry.    ROS:  Review of Systems   Constitutional:  Negative for chills, fever and malaise/fatigue.   HENT:  Negative for congestion and nosebleeds.    Eyes:  Negative for blurred vision and double vision.   Respiratory:  Negative for cough and shortness of breath.    Cardiovascular:  Negative for chest pain and palpitations.   Gastrointestinal:  Negative for abdominal pain, blood in stool, heartburn and melena.   Genitourinary:  Negative for dysuria and urgency.   Musculoskeletal:  Negative for myalgias and neck pain.   Skin:  Positive for itching and rash.        Right inguinal area   Neurological:  Negative for dizziness, weakness and headaches.   Endo/Heme/Allergies:  Does not bruise/bleed easily.   Psychiatric/Behavioral:  Negative for depression and suicidal ideas.        Objective:     Exam:  /68 (BP Location: Left arm, Patient Position: Sitting, BP Cuff Size: Adult)   Pulse 95   Temp 36.4 °C (97.5 °F) (Temporal)   Resp 12   Ht 1.854 m (6' 1\")   Wt 93.4 kg (206 lb) Comment: without his prothesis  SpO2 94%   BMI 27.18 kg/m²  Body mass index is 27.18 kg/m².    Physical Exam  Vitals reviewed.   Constitutional:       General: He is not in acute distress.  HENT:      Head: Normocephalic and atraumatic.      Nose: No congestion.      Mouth/Throat:      Mouth: Mucous membranes are moist.      Pharynx: No oropharyngeal exudate or posterior oropharyngeal erythema.   Eyes:      General: No scleral icterus.     Pupils: Pupils are equal, round, and reactive to light. "   Cardiovascular:      Rate and Rhythm: Normal rate and regular rhythm.      Heart sounds: Normal heart sounds. No murmur heard.  Pulmonary:      Effort: Pulmonary effort is normal. No respiratory distress.      Breath sounds: Normal breath sounds. No wheezing.   Abdominal:      General: Bowel sounds are normal. There is no distension.      Palpations: Abdomen is soft.      Tenderness: There is no abdominal tenderness. There is no guarding or rebound.   Musculoskeletal:      Cervical back: Normal range of motion and neck supple.      Right lower leg: No edema.      Left lower leg: No edema.   Skin:     General: Skin is warm.      Capillary Refill: Capillary refill takes less than 2 seconds.      Findings: Rash present. No bruising or erythema.      Comments: Moderate rash in right inguinal area, no loss of skin or purulence noted.   Neurological:      General: No focal deficit present.      Mental Status: He is alert.      Cranial Nerves: No cranial nerve deficit.      Sensory: No sensory deficit.   Psychiatric:         Mood and Affect: Mood normal.         Behavior: Behavior normal.       Labs: None     Assessment & Plan:     78 y.o. male with the following -     1. Candidal intertrigo  - Ongoing since January this year  - Was treated with Steroid cream and Nystatin powder. Improved for few weeks and then started to get worse again  - Patient states that is trying to keep area dry  - I will treat him again with Steroid cream but only to use for 1-2 weeks and Miconazole cream for 2-4 weeks   - Avoid moisture of area  - Showers every other day and must dry area completely   - Avoid tight underwear   - Use cotton underwear   - I told patient that this condition can be very difficult to treat and tends to recur if the area is not kept dry  - Follow up as needed     - miconazole (MICOTIN) 2 % Cream; Apply 2 g topically 2 times a day for 24 days.  Dispense: 28 g; Refill: 1  - hydrocortisone 1 % Cream; Apply 1 Application  topically 2 times a day for 14 days.  Dispense: 14 g; Refill: 0     Return if symptoms worsen or fail to improve.    Please note that this dictation was created using voice recognition software. I have made every reasonable attempt to correct obvious errors, but I expect that there are errors of grammar and possibly content that I did not discover before finalizing the note.

## 2024-04-09 ENCOUNTER — OFFICE VISIT (OUTPATIENT)
Dept: MEDICAL GROUP | Age: 79
End: 2024-04-09
Payer: MEDICARE

## 2024-04-09 VITALS
OXYGEN SATURATION: 94 % | HEART RATE: 114 BPM | HEIGHT: 73 IN | DIASTOLIC BLOOD PRESSURE: 70 MMHG | TEMPERATURE: 97.5 F | WEIGHT: 214 LBS | SYSTOLIC BLOOD PRESSURE: 120 MMHG | BODY MASS INDEX: 28.36 KG/M2

## 2024-04-09 DIAGNOSIS — E08.41 DIABETIC MONONEUROPATHY ASSOCIATED WITH DIABETES MELLITUS DUE TO UNDERLYING CONDITION (HCC): ICD-10-CM

## 2024-04-09 DIAGNOSIS — L30.4 INTERTRIGO: ICD-10-CM

## 2024-04-09 PROCEDURE — 3074F SYST BP LT 130 MM HG: CPT | Performed by: STUDENT IN AN ORGANIZED HEALTH CARE EDUCATION/TRAINING PROGRAM

## 2024-04-09 PROCEDURE — 99213 OFFICE O/P EST LOW 20 MIN: CPT | Performed by: STUDENT IN AN ORGANIZED HEALTH CARE EDUCATION/TRAINING PROGRAM

## 2024-04-09 PROCEDURE — 3078F DIAST BP <80 MM HG: CPT | Performed by: STUDENT IN AN ORGANIZED HEALTH CARE EDUCATION/TRAINING PROGRAM

## 2024-04-09 RX ORDER — PIOGLITAZONEHYDROCHLORIDE 30 MG/1
TABLET ORAL
COMMUNITY

## 2024-04-09 RX ORDER — LOSARTAN POTASSIUM AND HYDROCHLOROTHIAZIDE 12.5; 5 MG/1; MG/1
TABLET ORAL
COMMUNITY

## 2024-04-09 RX ORDER — CEPHALEXIN 250 MG/1
250 CAPSULE ORAL 4 TIMES DAILY
Qty: 40 CAPSULE | Refills: 0 | Status: SHIPPED | OUTPATIENT
Start: 2024-04-09

## 2024-04-09 RX ORDER — TROSPIUM CHLORIDE ER 60 MG/1
CAPSULE ORAL
COMMUNITY
Start: 2024-03-14

## 2024-04-09 ASSESSMENT — ENCOUNTER SYMPTOMS
ORTHOPNEA: 0
PALPITATIONS: 0
BLURRED VISION: 0
DIZZINESS: 0
WHEEZING: 0
FEVER: 0
HEADACHES: 0
COUGH: 0
WEAKNESS: 0
SHORTNESS OF BREATH: 0
NECK PAIN: 0
BLOOD IN STOOL: 0
DEPRESSION: 0
ABDOMINAL PAIN: 0
MYALGIAS: 0
HEARTBURN: 0
CHILLS: 0

## 2024-04-09 ASSESSMENT — FIBROSIS 4 INDEX: FIB4 SCORE: 0.85

## 2024-04-09 NOTE — PROGRESS NOTES
Subjective:     CC: Rectal pain    HPI:   Kevin presents today for acute abuse here for pain in the buttocks.  Patient states that he started having pain in the buttocks region about 2 weeks ago.  Patient was seen by me on March 14 for Candidal intertrigo in the right inguinal area.  He responded well to management with steroids and miconazole cream for 2 weeks.  This condition resolved completely.  However he started developing similar pain in his buttocks.  After these he started using the steroid cream and also the miconazole cream in his rectal area, but unfortunately he has not have any improvement.  Patient has had similar episodes in the past for intertrigo in the inguinal area, but apparently never to his buttocks area.  He is diabetic, most recent A1c in February 13 was 7.5%.  I told Mr. Jackson that his current condition might be both Candidal complicated with bacterial infection of that area, so I recommended to continue antifungal cream, but stop the steroid cream.  I also told him that I will give him oral antibiotic and also topical antibiotic.  I discussed with Mr. Jackson that if condition does not improve within 72 hours or if he feels that gets worse he should go for another evaluation to an urgent care or the ER, to rule out an abscess.    ROS:  Review of Systems   Constitutional:  Negative for chills, fever and malaise/fatigue.   HENT:  Negative for nosebleeds and tinnitus.    Eyes:  Negative for blurred vision.   Respiratory:  Negative for cough, shortness of breath and wheezing.    Cardiovascular:  Negative for chest pain, palpitations, orthopnea and leg swelling.   Gastrointestinal:  Negative for abdominal pain, blood in stool, heartburn and melena.   Genitourinary:  Negative for dysuria and urgency.   Musculoskeletal:  Negative for myalgias and neck pain.   Skin:  Positive for rash.   Neurological:  Negative for dizziness, weakness and headaches.   Psychiatric/Behavioral:  Negative for  "depression and suicidal ideas.        Objective:     Exam:  /70 (BP Location: Right arm, Patient Position: Sitting, BP Cuff Size: Adult)   Pulse (!) 114   Temp 36.4 °C (97.5 °F) (Temporal)   Ht 1.854 m (6' 1\")   Wt 97.1 kg (214 lb)   SpO2 94%   BMI 28.23 kg/m²  Body mass index is 28.23 kg/m².    Physical Exam  Vitals reviewed.   Constitutional:       General: He is not in acute distress.  HENT:      Head: Normocephalic and atraumatic.      Nose: No congestion.      Mouth/Throat:      Mouth: Mucous membranes are moist.      Pharynx: No oropharyngeal exudate or posterior oropharyngeal erythema.   Eyes:      General: No scleral icterus.     Pupils: Pupils are equal, round, and reactive to light.   Cardiovascular:      Rate and Rhythm: Normal rate and regular rhythm.      Heart sounds: Normal heart sounds. No murmur heard.  Pulmonary:      Effort: Pulmonary effort is normal. No respiratory distress.      Breath sounds: Normal breath sounds. No wheezing.   Abdominal:      General: Bowel sounds are normal. There is no distension.      Palpations: Abdomen is soft.      Tenderness: There is no abdominal tenderness. There is no guarding or rebound.   Genitourinary:         Comments: Significant erythema in the buttocks area, and also an indurated area slightly lateral to the anus in his right side, concerning for bacterial infection and I cannot rule out that he may be developing an abscess.  Musculoskeletal:      Cervical back: Normal range of motion and neck supple.      Right lower leg: No edema.      Left lower leg: No edema.   Skin:     General: Skin is warm.      Capillary Refill: Capillary refill takes less than 2 seconds.      Findings: No bruising or erythema.   Neurological:      General: No focal deficit present.      Mental Status: He is alert.      Cranial Nerves: No cranial nerve deficit.      Sensory: No sensory deficit.   Psychiatric:         Mood and Affect: Mood normal.         Behavior: " Behavior normal.         Labs: Reviewed    Assessment & Plan:     78 y.o. male with the following -     1. Intertrigo  -See HPI  -Patient treated for candidal intertrigo Last month.  He had intact to the right inguinal area and resolved with steroids and antifungal cream  -Right after the resolve he also started having similar symptoms in his buttocks and he has been using for about 2 weeks same therapy that I gave him his previous visit, but unfortunately at this time did not work  -On physical examination patient has significant erythema in the buttocks area and also a small indurated area slightly lateral to the anus and is concerning for a bacterial infection and I cannot rule out a developing abscess  -I told Mr. Jackson that I will treat him has a bacterial and fungal infection and if he does not get any better within 72 hours he should go and get reevaluated again in the emergency department since I am concerned that he could develop an abscess  -Continue antifungal cream, stop steroid cream  -Start topical antibiotic, start oral antibiotics for 10 days  -Make sure you buy over-the-counter probiotics  -If you develop diarrhea or significant GI symptoms please stop oral antibiotic and let me know  -If sees improvement continue to work for 10 days and make a follow-up appointment within 10 to 14 days    - cephALEXin (KEFLEX) 250 MG Cap; Take 1 Capsule by mouth 4 times a day.  Dispense: 40 Capsule; Refill: 0  - mupirocin (BACTROBAN) 2 % Ointment; Apply 1 Application topically 2 times a day for 10 days.  Dispense: 22 g; Refill: 0    2. 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    Please note that this dictation was created using voice recognition software. I have made every reasonable attempt to correct obvious errors, but I expect that there are errors of grammar and  possibly content that I did not discover before finalizing the note.

## 2024-04-09 NOTE — PATIENT INSTRUCTIONS
- Continue antifungal cream to the buttocks  -Start topical antibiotic twice daily  -Start oral antibiotics every 6 hours for 10 days  -If not better in 3 days please come for lower assessment or go to the ER to be examined aided in  -If you have significant pain or sensation of pressure/growth in the anal area this could be concerning for  developing abscess   -Make sure you take probiotics  -If diarrhea with significant GI distress stop oral antibiotics and let me know

## 2024-04-20 NOTE — TELEPHONE ENCOUNTER
1. Caller Name: Kevin Jackson                                           Call Back Number: [phone] (home)         Patient approves a detailed voicemail message: no    Pt called in for imaging results, I let him know that they have not been resulted yet. He is also asking if he may take his metformin.  
Normal x for slight benign enlargement of prostate and benign renal small cyst; see result note sent to [facility] from  9/5/17 (Elizabeth notified pt on 9/5/17).  Results released in my chart today. Ok to resume metformin now  
Phone Number Called: [phone] (home)     Message: LM for patient to return call regarding his referral.    Left Message for patient to call back: yes        
Phone Number Called: [phone] (home)    Message: Left VM for patient to call back, to inform about urology referral    Left Message for patient to call back: yes    
Phone Number Called: [phone] (home)    Message: Spoke with patient and he would like to know if his prostate condition can be corrected with oral medication or if he needs to go to a specialist to have it taken care of, please advise    Left Message for patient to call back: no  
Referred to urology  
Unscheduled

## 2024-04-24 ENCOUNTER — OFFICE VISIT (OUTPATIENT)
Dept: MEDICAL GROUP | Age: 79
End: 2024-04-24
Payer: MEDICARE

## 2024-04-24 VITALS
BODY MASS INDEX: 27.7 KG/M2 | SYSTOLIC BLOOD PRESSURE: 112 MMHG | HEIGHT: 73 IN | OXYGEN SATURATION: 95 % | WEIGHT: 209 LBS | HEART RATE: 86 BPM | DIASTOLIC BLOOD PRESSURE: 66 MMHG | TEMPERATURE: 97.4 F

## 2024-04-24 DIAGNOSIS — L30.4 INTERTRIGO: ICD-10-CM

## 2024-04-24 DIAGNOSIS — B37.2 CANDIDAL INTERTRIGO: ICD-10-CM

## 2024-04-24 PROCEDURE — 3078F DIAST BP <80 MM HG: CPT | Performed by: STUDENT IN AN ORGANIZED HEALTH CARE EDUCATION/TRAINING PROGRAM

## 2024-04-24 PROCEDURE — 3074F SYST BP LT 130 MM HG: CPT | Performed by: STUDENT IN AN ORGANIZED HEALTH CARE EDUCATION/TRAINING PROGRAM

## 2024-04-24 PROCEDURE — 99213 OFFICE O/P EST LOW 20 MIN: CPT | Performed by: STUDENT IN AN ORGANIZED HEALTH CARE EDUCATION/TRAINING PROGRAM

## 2024-04-24 RX ORDER — NYSTATIN 100000 [USP'U]/G
POWDER TOPICAL
Qty: 30 G | Refills: 6 | Status: SHIPPED | OUTPATIENT
Start: 2024-04-24

## 2024-04-24 ASSESSMENT — ENCOUNTER SYMPTOMS
BRUISES/BLEEDS EASILY: 0
MYALGIAS: 0
HEADACHES: 0
NECK PAIN: 0
DOUBLE VISION: 0
PALPITATIONS: 0
BLOOD IN STOOL: 0
HEARTBURN: 0
BLURRED VISION: 0
DEPRESSION: 0
COUGH: 0
ORTHOPNEA: 0
SENSORY CHANGE: 0
CHILLS: 0
WHEEZING: 0
WEAKNESS: 0
SHORTNESS OF BREATH: 0
ABDOMINAL PAIN: 0
FEVER: 0
DIZZINESS: 0

## 2024-04-24 ASSESSMENT — FIBROSIS 4 INDEX: FIB4 SCORE: 0.85

## 2024-04-24 NOTE — PROGRESS NOTES
Subjective:     CC: Intertrigo follow-up    HPI:   Kevin presents today for a scheduled physical examination follow-up.  Patient has been experiencing recurrences of intra-articular in his inguinal area and in his most recent visit also in the perianal region.  I treated Mr. Jackson for candidal intertrigo in March 14, this condition resolved, but unfortunately on April 9 here return with another case of recurrence but this time in his perianal region.  After physical examination of second episode of intertrigo I recommended to Mr. Jackson topical and oral antibiotics since I was very concerned that he could develop a perianal abscess.  Patient was given a 10-day antibiotic course with Keflex and also topical Bactroban.  Today Mr. Jackson comes for a follow-up assessment.  Patient states that his infection has improved significantly, the redness is almost gone, and he is pain-free.  On physical examination patient has mild erythema in the perianal region, however it looks significantly improved from his previous visit on April 9.  I recommended to Mr. Jackson to keep the area dry, he is aware the moisture in the area can make this problem to recur easily.  I refilled topical Bactroban and nystatin powder to continue using it as needed.    ROS:  Review of Systems   Constitutional:  Negative for chills, fever and malaise/fatigue.   HENT:  Negative for nosebleeds and tinnitus.    Eyes:  Negative for blurred vision and double vision.   Respiratory:  Negative for cough, shortness of breath and wheezing.    Cardiovascular:  Negative for chest pain, palpitations, orthopnea and leg swelling.   Gastrointestinal:  Negative for abdominal pain, blood in stool, heartburn and melena.   Genitourinary:  Negative for dysuria and urgency.   Musculoskeletal:  Negative for myalgias and neck pain.   Neurological:  Negative for dizziness, sensory change, weakness and headaches.   Endo/Heme/Allergies:  Does not bruise/bleed easily.  "  Psychiatric/Behavioral:  Negative for depression and suicidal ideas.        Objective:     Exam:  /66 (BP Location: Left arm, Patient Position: Sitting, BP Cuff Size: Large adult)   Pulse 86   Temp 36.3 °C (97.4 °F) (Temporal)   Ht 1.854 m (6' 1\")   Wt 94.8 kg (209 lb)   SpO2 95%   BMI 27.57 kg/m²  Body mass index is 27.57 kg/m².    Physical Exam  Vitals reviewed.   Constitutional:       General: He is not in acute distress.  HENT:      Head: Normocephalic and atraumatic.      Nose: No congestion.      Mouth/Throat:      Mouth: Mucous membranes are moist.      Pharynx: No oropharyngeal exudate or posterior oropharyngeal erythema.   Eyes:      General: No scleral icterus.     Extraocular Movements: Extraocular movements intact.      Pupils: Pupils are equal, round, and reactive to light.   Cardiovascular:      Rate and Rhythm: Normal rate and regular rhythm.      Pulses: Normal pulses.      Heart sounds: Normal heart sounds. No murmur heard.  Pulmonary:      Effort: Pulmonary effort is normal. No respiratory distress.      Breath sounds: Normal breath sounds. No wheezing.   Abdominal:      General: Bowel sounds are normal. There is no distension.      Palpations: Abdomen is soft.      Tenderness: There is no abdominal tenderness. There is no guarding or rebound.   Genitourinary:         Comments: Mild erythema in the perianal region and small indurated area just lateral of his anus on the right side, induration has decreased in size significantly from his previous visit, no tenderness to palpation at all noted, I do not  have concerns for this to be an abscess.  Musculoskeletal:      Cervical back: Normal range of motion and neck supple.      Left lower leg: No edema.      Comments: Right leg amputee   Lymphadenopathy:      Cervical: No cervical adenopathy.   Skin:     General: Skin is warm.      Capillary Refill: Capillary refill takes less than 2 seconds.      Coloration: Skin is not jaundiced.      " Findings: No bruising or erythema.   Neurological:      General: No focal deficit present.      Mental Status: He is alert.      Cranial Nerves: No cranial nerve deficit.      Sensory: No sensory deficit.   Psychiatric:         Mood and Affect: Mood normal.         Behavior: Behavior normal.         Labs: None    Assessment & Plan:     78 y.o. male with the following -     1. Intertrigo  2. Candidal intertrigo  -Patient treated his previous visit for candidal intertrigo with potential bacterial implication  -He was given Keflex for 10 days and topical Bactroban for 10 days  -Patient states significant improvement after antibiotic therapy  -Patient is now pain-free, he also states that his redness and perianal swelling has disappeared  -On physical exam patient has very mild erythema in the perianal area, but no evidence of abscesses or worsening intertrigo  -I recommended to Mr. Jackson to maintain perianal and groin area dry at all times, use boxers and not tight underwear  -Apply Bactroban and nystatin powder if you notice any worsening of the redness  -Follow-up as needed    - mupirocin (BACTROBAN) 2 % Ointment; Apply 1 Application topically 2 times a day for 10 days.  Dispense: 22 g; Refill: 1    - nystatin (NYAMYC) powder; APPLY SPOONFUL TO AFFECTED AREA 4 TIMES DAILY UNTIL HEALING IS COMPLETE, THEN USE 3 TIMES WEEKLY TO PREVENT  Dispense: 30 g; Refill: 6         Return if symptoms worsen or fail to improve.    Please note that this dictation was created using voice recognition software. I have made every reasonable attempt to correct obvious errors, but I expect that there are errors of grammar and possibly content that I did not discover before finalizing the note.

## 2024-04-24 NOTE — PATIENT INSTRUCTIONS
- Continue topical Abx as needed  - Continue nystatin topical as needed  - Make sure to keep groin region dry, avoid using tight underwear   - Follow up as needed

## 2024-05-20 ENCOUNTER — HOSPITAL ENCOUNTER (OUTPATIENT)
Dept: LAB | Facility: MEDICAL CENTER | Age: 79
End: 2024-05-20
Attending: PHYSICIAN ASSISTANT
Payer: MEDICARE

## 2024-05-20 ENCOUNTER — HOSPITAL ENCOUNTER (OUTPATIENT)
Dept: LAB | Facility: MEDICAL CENTER | Age: 79
End: 2024-05-20
Attending: INTERNAL MEDICINE
Payer: MEDICARE

## 2024-05-20 DIAGNOSIS — E11.8 CONTROLLED TYPE 2 DIABETES MELLITUS WITH COMPLICATION, WITH LONG-TERM CURRENT USE OF INSULIN (HCC): ICD-10-CM

## 2024-05-20 DIAGNOSIS — Z79.4 CONTROLLED TYPE 2 DIABETES MELLITUS WITH COMPLICATION, WITH LONG-TERM CURRENT USE OF INSULIN (HCC): ICD-10-CM

## 2024-05-20 DIAGNOSIS — E55.9 VITAMIN D DEFICIENCY: ICD-10-CM

## 2024-05-20 DIAGNOSIS — N40.0 BPH WITHOUT URINARY OBSTRUCTION: ICD-10-CM

## 2024-05-20 DIAGNOSIS — I10 ESSENTIAL HYPERTENSION: ICD-10-CM

## 2024-05-20 DIAGNOSIS — E78.5 DYSLIPIDEMIA: ICD-10-CM

## 2024-05-20 LAB
25(OH)D3 SERPL-MCNC: 34 NG/ML (ref 30–100)
ALBUMIN SERPL BCP-MCNC: 3.9 G/DL (ref 3.2–4.9)
ALBUMIN/GLOB SERPL: 1.1 G/DL
ALP SERPL-CCNC: 64 U/L (ref 30–99)
ALT SERPL-CCNC: 22 U/L (ref 2–50)
ANION GAP SERPL CALC-SCNC: 14 MMOL/L (ref 7–16)
AST SERPL-CCNC: 18 U/L (ref 12–45)
BASOPHILS # BLD AUTO: 0.8 % (ref 0–1.8)
BASOPHILS # BLD: 0.05 K/UL (ref 0–0.12)
BILIRUB SERPL-MCNC: 0.2 MG/DL (ref 0.1–1.5)
BUN SERPL-MCNC: 21 MG/DL (ref 8–22)
CALCIUM ALBUM COR SERPL-MCNC: 10.6 MG/DL (ref 8.5–10.5)
CALCIUM SERPL-MCNC: 10.5 MG/DL (ref 8.5–10.5)
CHLORIDE SERPL-SCNC: 101 MMOL/L (ref 96–112)
CHOLEST SERPL-MCNC: 133 MG/DL (ref 100–199)
CO2 SERPL-SCNC: 27 MMOL/L (ref 20–33)
CREAT SERPL-MCNC: 0.82 MG/DL (ref 0.5–1.4)
EOSINOPHIL # BLD AUTO: 0.21 K/UL (ref 0–0.51)
EOSINOPHIL NFR BLD: 3.3 % (ref 0–6.9)
ERYTHROCYTE [DISTWIDTH] IN BLOOD BY AUTOMATED COUNT: 53.9 FL (ref 35.9–50)
EST. AVERAGE GLUCOSE BLD GHB EST-MCNC: 192 MG/DL
FASTING STATUS PATIENT QL REPORTED: NORMAL
GFR SERPLBLD CREATININE-BSD FMLA CKD-EPI: 90 ML/MIN/1.73 M 2
GLOBULIN SER CALC-MCNC: 3.4 G/DL (ref 1.9–3.5)
GLUCOSE SERPL-MCNC: 206 MG/DL (ref 65–99)
HBA1C MFR BLD: 8.3 % (ref 4–5.6)
HCT VFR BLD AUTO: 47 % (ref 42–52)
HDLC SERPL-MCNC: 53 MG/DL
HGB BLD-MCNC: 15.4 G/DL (ref 14–18)
IMM GRANULOCYTES # BLD AUTO: 0.02 K/UL (ref 0–0.11)
IMM GRANULOCYTES NFR BLD AUTO: 0.3 % (ref 0–0.9)
LDLC SERPL CALC-MCNC: 61 MG/DL
LYMPHOCYTES # BLD AUTO: 2.53 K/UL (ref 1–4.8)
LYMPHOCYTES NFR BLD: 40.3 % (ref 22–41)
MCH RBC QN AUTO: 29.8 PG (ref 27–33)
MCHC RBC AUTO-ENTMCNC: 32.8 G/DL (ref 32.3–36.5)
MCV RBC AUTO: 91.1 FL (ref 81.4–97.8)
MONOCYTES # BLD AUTO: 0.72 K/UL (ref 0–0.85)
MONOCYTES NFR BLD AUTO: 11.5 % (ref 0–13.4)
NEUTROPHILS # BLD AUTO: 2.75 K/UL (ref 1.82–7.42)
NEUTROPHILS NFR BLD: 43.8 % (ref 44–72)
NRBC # BLD AUTO: 0 K/UL
NRBC BLD-RTO: 0 /100 WBC (ref 0–0.2)
PLATELET # BLD AUTO: 313 K/UL (ref 164–446)
PMV BLD AUTO: 10.9 FL (ref 9–12.9)
POTASSIUM SERPL-SCNC: 4.7 MMOL/L (ref 3.6–5.5)
PROT SERPL-MCNC: 7.3 G/DL (ref 6–8.2)
PSA SERPL-MCNC: 2.31 NG/ML (ref 0–4)
RBC # BLD AUTO: 5.16 M/UL (ref 4.7–6.1)
SODIUM SERPL-SCNC: 142 MMOL/L (ref 135–145)
T4 FREE SERPL-MCNC: 1.1 NG/DL (ref 0.93–1.7)
TRIGL SERPL-MCNC: 93 MG/DL (ref 0–149)
TSH SERPL DL<=0.005 MIU/L-ACNC: 2.01 UIU/ML (ref 0.38–5.33)
TSH SERPL DL<=0.005 MIU/L-ACNC: 2.03 UIU/ML (ref 0.38–5.33)
WBC # BLD AUTO: 6.3 K/UL (ref 4.8–10.8)

## 2024-05-21 LAB
CREAT UR-MCNC: 54.63 MG/DL
MICROALBUMIN UR-MCNC: <1.2 MG/DL
MICROALBUMIN/CREAT UR: NORMAL MG/G (ref 0–30)

## 2024-06-05 ENCOUNTER — OFFICE VISIT (OUTPATIENT)
Dept: MEDICAL GROUP | Age: 79
End: 2024-06-05
Payer: MEDICARE

## 2024-06-05 VITALS
SYSTOLIC BLOOD PRESSURE: 118 MMHG | DIASTOLIC BLOOD PRESSURE: 62 MMHG | WEIGHT: 214 LBS | HEIGHT: 73 IN | TEMPERATURE: 97.7 F | HEART RATE: 87 BPM | OXYGEN SATURATION: 94 % | BODY MASS INDEX: 28.36 KG/M2

## 2024-06-05 DIAGNOSIS — E55.9 VITAMIN D DEFICIENCY: ICD-10-CM

## 2024-06-05 DIAGNOSIS — I10 ESSENTIAL HYPERTENSION: ICD-10-CM

## 2024-06-05 DIAGNOSIS — E11.69 TYPE 2 DIABETES MELLITUS WITH OTHER SPECIFIED COMPLICATION, WITH LONG-TERM CURRENT USE OF INSULIN (HCC): ICD-10-CM

## 2024-06-05 DIAGNOSIS — D48.5 NEOPLASM OF UNCERTAIN BEHAVIOR OF SKIN OF HAND: ICD-10-CM

## 2024-06-05 DIAGNOSIS — E78.5 DYSLIPIDEMIA: ICD-10-CM

## 2024-06-05 DIAGNOSIS — N40.1 BENIGN NON-NODULAR PROSTATIC HYPERPLASIA WITH LOWER URINARY TRACT SYMPTOMS: ICD-10-CM

## 2024-06-05 DIAGNOSIS — Z79.4 TYPE 2 DIABETES MELLITUS WITH OTHER SPECIFIED COMPLICATION, WITH LONG-TERM CURRENT USE OF INSULIN (HCC): ICD-10-CM

## 2024-06-05 PROCEDURE — 3078F DIAST BP <80 MM HG: CPT | Performed by: INTERNAL MEDICINE

## 2024-06-05 PROCEDURE — 3074F SYST BP LT 130 MM HG: CPT | Performed by: INTERNAL MEDICINE

## 2024-06-05 PROCEDURE — 99214 OFFICE O/P EST MOD 30 MIN: CPT | Performed by: INTERNAL MEDICINE

## 2024-06-05 RX ORDER — INSULIN DEGLUDEC 200 U/ML
INJECTION, SOLUTION SUBCUTANEOUS
COMMUNITY
Start: 2024-03-26

## 2024-06-05 ASSESSMENT — FIBROSIS 4 INDEX: FIB4 SCORE: 0.96

## 2024-06-07 ASSESSMENT — ENCOUNTER SYMPTOMS
GASTROINTESTINAL NEGATIVE: 1
MUSCULOSKELETAL NEGATIVE: 1
CONSTITUTIONAL NEGATIVE: 1
PSYCHIATRIC NEGATIVE: 1
NEUROLOGICAL NEGATIVE: 1
EYES NEGATIVE: 1
CARDIOVASCULAR NEGATIVE: 1
RESPIRATORY NEGATIVE: 1

## 2024-06-07 NOTE — PROGRESS NOTES
Subjective     Kevin Yi is a 79 y.o. male who presents with Follow-Up (Lab review )  The patient is here for followup of chronic medical problems listed below. The patient is compliant with medications and having no side effects from them. Denies chest pain, abdominal pain, dyspnea, myalgias, or cough.   Patient Active Problem List   Diagnosis    Essential hypertension    Controlled type 2 diabetes mellitus with complication, with long-term current use of insulin ([facility])    Benign non-nodular prostatic hyperplasia with lower urinary tract symptoms- [facility]    Gastroesophageal reflux disease with esophagitis- PPI PRN    Hypothyroidism due to acquired atrophy of thyroid- dr browning    Benign essential tremor    PVD (peripheral vascular disease) ([facility])- s/p right BKA    Diabetic mononeuropathy associated with diabetes mellitus due to underlying condition ([facility])-     Nonsustained ventricular tachycardia ([facility])    S/P BKA (below knee amputation) unilateral, right ([facility])    Dyslipidemia    Vitamin D deficiency    OAB (overactive bladder)    Candidal intertrigo     Outpatient Medications Prior to Visit   Medication Sig Dispense Refill    TRESIBA FLEXTOUCH 200 UNIT/ML Solution Pen-injector       pioglitazone (ACTOS) 30 MG Tab TAKE ONE TABLET BY MOUTH EVERY DAY for 90      Trospium Chloride 60 MG CAPSULE SR 24 HR Take 1 capsule(s) every day by oral route for 30 days.      mirtazapine (REMERON) 15 MG Tab Take 1 Tablet by mouth at bedtime. 90 Tablet 3    Insulin Degludec (TRESIBA FLEXTOUCH) 100 UNIT/ML Solution Pen-injector Inject 44 Units under the skin at bedtime. 15 Each 3    gabapentin (NEURONTIN) 300 MG Cap Take 1 Capsule by mouth 3 times a day. 270 Capsule 3    atorvastatin (LIPITOR) 80 MG tablet Take 80 mg by mouth every evening.      primidone (MYSOLINE) 50 MG Tab Take 50 mg by mouth every day. Once daily      aspirin EC 81 MG EC tablet Take 1 Tab by mouth every day. 100 Tab 2    losartan (COZAAR) 50 MG Tab Take  1 Tab by mouth every day. 90 Tab 2    glimepiride (AMARYL) 4 MG Tab Take 1 Tab by mouth every morning. 30 Tab 11    Empagliflozin (JARDIANCE) 25 MG Tab Take 25 mg by mouth every day. 90 Tab 4    metformin (GLUCOPHAGE) 1000 MG tablet Take 1 Tab by mouth 2 times a day, with meals. 60 Tab 11    levothyroxine (SYNTHROID) 50 MCG Tab Take 1 Tab by mouth every morning before breakfast. 30 Tab 11    mupirocin (BACTROBAN) 2 % Ointment  (Patient not taking: Reported on 6/5/2024)      nystatin (NYAMYC) powder APPLY SPOONFUL TO AFFECTED AREA 4 TIMES DAILY UNTIL HEALING IS COMPLETE, THEN USE 3 TIMES WEEKLY TO PREVENT (Patient not taking: Reported on 6/5/2024) 30 g 6    losartan-hydrochlorothiazide (HYZAAR) 50-12.5 MG per tablet TAKE 1 TABLET BY MOUTH DAILY for 90 (Patient not taking: Reported on 6/5/2024)      cephALEXin (KEFLEX) 250 MG Cap Take 1 Capsule by mouth 4 times a day. (Patient not taking: Reported on 6/5/2024) 40 Capsule 0    doxycycline (VIBRAMYCIN) 100 MG Cap Take 100 mg by mouth 2 times a day. (Patient not taking: Reported on 6/5/2024)      Semaglutide, 1 MG/DOSE, (OZEMPIC, 1 MG/DOSE,) 2 MG/1.5ML Solution Pen-injector Inject 2 mg under the skin every Monday. 4 Each 11    propranolol CR (INDERAL LA) 120 MG CAPSULE SR 24 HR TAKE 1 CAPSULE BY MOUTH DAILY (Patient not taking: Reported on 6/5/2024) 90 Capsule 4    Mirabegron ER 25 MG TABLET SR 24 HR Take 25 mg by mouth every day. (Patient not taking: Reported on 6/5/2024) 30 Tablet 11    Misc. Devices Misc Diabetic shoes size 12- due to neuropathy, calluses and skin injuries. Needs fillers for left shoe to to mismatch in right and left shoe size due to BKA on right (Patient not taking: Reported on 6/5/2024) 2 Each 1    insulin regular (HUMULIN R) 100 Unit/mL Solution Inject 1-6 Units under the skin 4 Times a Day,Before Meals and at Bedtime. (Patient not taking: Reported on 6/5/2024) 10 mL     Insulin Pen Needle 32G X 6 MM Misc 1-2 (Patient not taking: Reported on  6/5/2024)      glucose blood strip 3-4      INSULIN LISP PROT & LISP, HUM, (50-50) 100 UNIT/ML Suspension 30 u (Patient not taking: Reported on 6/5/2024)       No facility-administered medications prior to visit.     Hospital Outpatient Visit on 05/20/2024   Component Date Value    TSH 05/20/2024 2.030     Free T-4 05/20/2024 1.10    Hospital Outpatient Visit on 05/20/2024   Component Date Value    Sodium 05/20/2024 142     Potassium 05/20/2024 4.7     Chloride 05/20/2024 101     Co2 05/20/2024 27     Anion Gap 05/20/2024 14.0     Glucose 05/20/2024 206 (H)     Bun 05/20/2024 21     Creatinine 05/20/2024 0.82     Calcium 05/20/2024 10.5     Correct Calcium 05/20/2024 10.6 (H)     AST(SGOT) 05/20/2024 18     ALT(SGPT) 05/20/2024 22     Alkaline Phosphatase 05/20/2024 64     Total Bilirubin 05/20/2024 0.2     Albumin 05/20/2024 3.9     Total Protein 05/20/2024 7.3     Globulin 05/20/2024 3.4     A-G Ratio 05/20/2024 1.1     WBC 05/20/2024 6.3     RBC 05/20/2024 5.16     Hemoglobin 05/20/2024 15.4     Hematocrit 05/20/2024 47.0     MCV 05/20/2024 91.1     MCH 05/20/2024 29.8     MCHC 05/20/2024 32.8     RDW 05/20/2024 53.9 (H)     Platelet Count 05/20/2024 313     MPV 05/20/2024 10.9     Neutrophils-Polys 05/20/2024 43.80 (L)     Lymphocytes 05/20/2024 40.30     Monocytes 05/20/2024 11.50     Eosinophils 05/20/2024 3.30     Basophils 05/20/2024 0.80     Immature Granulocytes 05/20/2024 0.30     Nucleated RBC 05/20/2024 0.00     Neutrophils (Absolute) 05/20/2024 2.75     Lymphs (Absolute) 05/20/2024 2.53     Monos (Absolute) 05/20/2024 0.72     Eos (Absolute) 05/20/2024 0.21     Baso (Absolute) 05/20/2024 0.05     Immature Granulocytes (a* 05/20/2024 0.02     NRBC (Absolute) 05/20/2024 0.00     TSH 05/20/2024 2.010     Cholesterol,Tot 05/20/2024 133     Triglycerides 05/20/2024 93     HDL 05/20/2024 53     LDL 05/20/2024 61     Glycohemoglobin 05/20/2024 8.3 (H)     Est Avg Glucose 05/20/2024 192     25-Hydroxy    "Vitamin D 25 05/20/2024 34     Prostatic Specific Antig* 05/20/2024 2.31     Creatinine, Urine 05/20/2024 54.63     Microalbumin, Urine Rand* 05/20/2024 <1.2     Micro Alb Creat Ratio 05/20/2024 see below     Fasting Status 05/20/2024 Fasting     GFR (CKD-EPI) 05/20/2024 90       Lab Results   Component Value Date/Time    HBA1C 8.3 (H) 05/20/2024 06:43 AM    HBA1C 7.5 (H) 02/13/2024 06:34 AM     Lab Results   Component Value Date/Time    SODIUM 142 05/20/2024 06:43 AM    POTASSIUM 4.7 05/20/2024 06:43 AM    CHLORIDE 101 05/20/2024 06:43 AM    CO2 27 05/20/2024 06:43 AM    GLUCOSE 206 (H) 05/20/2024 06:43 AM    BUN 21 05/20/2024 06:43 AM    CREATININE 0.82 05/20/2024 06:43 AM    BUNCREATRAT 15 11/28/2023 04:19 AM    ALKPHOSPHAT 64 05/20/2024 06:43 AM    ASTSGOT 18 05/20/2024 06:43 AM    ALTSGPT 22 05/20/2024 06:43 AM    TBILIRUBIN 0.2 05/20/2024 06:43 AM     Lab Results   Component Value Date/Time    INR 1.07 07/14/2021 01:28 PM    INR 0.97 07/06/2021 10:21 AM    INR 0.99 01/11/2017 09:05 PM     Lab Results   Component Value Date/Time    CHOLSTRLTOT 133 05/20/2024 06:43 AM    LDL 61 05/20/2024 06:43 AM    HDL 53 05/20/2024 06:43 AM    TRIGLYCERIDE 93 05/20/2024 06:43 AM       Lab Results   Component Value Date/Time    TESTOSTERONE 436 11/08/2019 10:50 AM     Lab Results   Component Value Date/Time    TSH 2.630 11/28/2023 04:19 AM    TSH 2.020 11/21/2017 07:21 AM     Lab Results   Component Value Date/Time    FREET4 1.10 05/20/2024 06:45 AM    FREET4 1.14 02/13/2024 06:34 AM     No results found for: \"URICACID\"  No components found for: \"VITB12\"  Lab Results   Component Value Date/Time    25HYDROXY 34 05/20/2024 06:43 AM    25HYDROXY 30.8 11/28/2023 04:19 AM    25HYDROXY 37 03/03/2023 06:26 AM   '            HPI    Review of Systems   Constitutional: Negative.    HENT: Negative.     Eyes: Negative.    Respiratory: Negative.     Cardiovascular: Negative.    Gastrointestinal: Negative.    Genitourinary: Negative.  " "  Musculoskeletal: Negative.    Skin: Negative.    Neurological: Negative.    Endo/Heme/Allergies: Negative.    Psychiatric/Behavioral: Negative.                Objective     /62 (BP Location: Right arm, Patient Position: Sitting, BP Cuff Size: Adult)   Pulse 87   Temp 36.5 °C (97.7 °F) (Temporal)   Ht 1.854 m (6' 1\")   Wt 97.1 kg (214 lb)   SpO2 94%   BMI 28.23 kg/m²      Physical Exam  Constitutional:       General: He is not in acute distress.     Appearance: He is well-developed. He is not diaphoretic.   HENT:      Head: Normocephalic and atraumatic.      Right Ear: External ear normal.      Left Ear: External ear normal.      Nose: Nose normal.      Mouth/Throat:      Pharynx: No oropharyngeal exudate.   Eyes:      General: No scleral icterus.        Right eye: No discharge.         Left eye: No discharge.      Conjunctiva/sclera: Conjunctivae normal.      Pupils: Pupils are equal, round, and reactive to light.   Neck:      Thyroid: No thyromegaly.      Vascular: No JVD.      Trachea: No tracheal deviation.   Cardiovascular:      Rate and Rhythm: Normal rate and regular rhythm.      Heart sounds: Normal heart sounds. No murmur heard.     No friction rub. No gallop.   Pulmonary:      Effort: Pulmonary effort is normal. No respiratory distress.      Breath sounds: Normal breath sounds. No stridor. No wheezing or rales.   Chest:      Chest wall: No tenderness.   Abdominal:      General: Bowel sounds are normal. There is no distension.      Palpations: Abdomen is soft. There is no mass.      Tenderness: There is no abdominal tenderness. There is no guarding or rebound.   Musculoskeletal:         General: No tenderness. Normal range of motion.      Cervical back: Normal range of motion and neck supple.   Lymphadenopathy:      Cervical: No cervical adenopathy.   Skin:     General: Skin is warm and dry.      Coloration: Skin is not pale.      Findings: No erythema or rash.   Neurological:      Mental " Status: He is alert and oriented to person, place, and time.      Motor: No abnormal muscle tone.      Coordination: Coordination normal.      Deep Tendon Reflexes: Reflexes are normal and symmetric. Reflexes normal.   Psychiatric:         Behavior: Behavior normal.         Thought Content: Thought content normal.         Judgment: Judgment normal.       Hospital Outpatient Visit on 05/20/2024   Component Date Value    TSH 05/20/2024 2.030     Free T-4 05/20/2024 1.10    Hospital Outpatient Visit on 05/20/2024   Component Date Value    Sodium 05/20/2024 142     Potassium 05/20/2024 4.7     Chloride 05/20/2024 101     Co2 05/20/2024 27     Anion Gap 05/20/2024 14.0     Glucose 05/20/2024 206 (H)     Bun 05/20/2024 21     Creatinine 05/20/2024 0.82     Calcium 05/20/2024 10.5     Correct Calcium 05/20/2024 10.6 (H)     AST(SGOT) 05/20/2024 18     ALT(SGPT) 05/20/2024 22     Alkaline Phosphatase 05/20/2024 64     Total Bilirubin 05/20/2024 0.2     Albumin 05/20/2024 3.9     Total Protein 05/20/2024 7.3     Globulin 05/20/2024 3.4     A-G Ratio 05/20/2024 1.1     WBC 05/20/2024 6.3     RBC 05/20/2024 5.16     Hemoglobin 05/20/2024 15.4     Hematocrit 05/20/2024 47.0     MCV 05/20/2024 91.1     MCH 05/20/2024 29.8     MCHC 05/20/2024 32.8     RDW 05/20/2024 53.9 (H)     Platelet Count 05/20/2024 313     MPV 05/20/2024 10.9     Neutrophils-Polys 05/20/2024 43.80 (L)     Lymphocytes 05/20/2024 40.30     Monocytes 05/20/2024 11.50     Eosinophils 05/20/2024 3.30     Basophils 05/20/2024 0.80     Immature Granulocytes 05/20/2024 0.30     Nucleated RBC 05/20/2024 0.00     Neutrophils (Absolute) 05/20/2024 2.75     Lymphs (Absolute) 05/20/2024 2.53     Monos (Absolute) 05/20/2024 0.72     Eos (Absolute) 05/20/2024 0.21     Baso (Absolute) 05/20/2024 0.05     Immature Granulocytes (a* 05/20/2024 0.02     NRBC (Absolute) 05/20/2024 0.00     TSH 05/20/2024 2.010     Cholesterol,Tot 05/20/2024 133     Triglycerides 05/20/2024 93   "   HDL 05/20/2024 53     LDL 05/20/2024 61     Glycohemoglobin 05/20/2024 8.3 (H)     Est Avg Glucose 05/20/2024 192     25-Hydroxy   Vitamin D 25 05/20/2024 34     Prostatic Specific Antig* 05/20/2024 2.31     Creatinine, Urine 05/20/2024 54.63     Microalbumin, Urine Rand* 05/20/2024 <1.2     Micro Alb Creat Ratio 05/20/2024 see below     Fasting Status 05/20/2024 Fasting     GFR (CKD-EPI) 05/20/2024 90       Lab Results   Component Value Date/Time    HBA1C 8.3 (H) 05/20/2024 06:43 AM    HBA1C 7.5 (H) 02/13/2024 06:34 AM     Lab Results   Component Value Date/Time    SODIUM 142 05/20/2024 06:43 AM    POTASSIUM 4.7 05/20/2024 06:43 AM    CHLORIDE 101 05/20/2024 06:43 AM    CO2 27 05/20/2024 06:43 AM    GLUCOSE 206 (H) 05/20/2024 06:43 AM    BUN 21 05/20/2024 06:43 AM    CREATININE 0.82 05/20/2024 06:43 AM    BUNCREATRAT 15 11/28/2023 04:19 AM    ALKPHOSPHAT 64 05/20/2024 06:43 AM    ASTSGOT 18 05/20/2024 06:43 AM    ALTSGPT 22 05/20/2024 06:43 AM    TBILIRUBIN 0.2 05/20/2024 06:43 AM     Lab Results   Component Value Date/Time    INR 1.07 07/14/2021 01:28 PM    INR 0.97 07/06/2021 10:21 AM    INR 0.99 01/11/2017 09:05 PM     Lab Results   Component Value Date/Time    CHOLSTRLTOT 133 05/20/2024 06:43 AM    LDL 61 05/20/2024 06:43 AM    HDL 53 05/20/2024 06:43 AM    TRIGLYCERIDE 93 05/20/2024 06:43 AM       Lab Results   Component Value Date/Time    TESTOSTERONE 436 11/08/2019 10:50 AM     Lab Results   Component Value Date/Time    TSH 2.630 11/28/2023 04:19 AM    TSH 2.020 11/21/2017 07:21 AM     Lab Results   Component Value Date/Time    FREET4 1.10 05/20/2024 06:45 AM    FREET4 1.14 02/13/2024 06:34 AM     No results found for: \"URICACID\"  No components found for: \"VITB12\"  Lab Results   Component Value Date/Time    25HYDROXY 34 05/20/2024 06:43 AM    25HYDROXY 30.8 11/28/2023 04:19 AM    25HYDROXY 37 03/03/2023 06:26 AM                             Assessment & Plan        1. Type 2 diabetes mellitus with other " specified complication, with long-term current use of insulin (HCC)   Under good control. Continue same regimen.    - Semaglutide, 2 MG/DOSE, 8 MG/3ML Solution Pen-injector; Inject 2 mg under the skin every 7 days.  - Lipid Profile; Future  - TSH; Future  - CBC WITH DIFFERENTIAL; Future  - Comp Metabolic Panel; Future  - HEMOGLOBIN A1C; Future  - MICROALBUMIN CREAT RATIO URINE; Future    2. 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 Continue same regimen.  - Referral to Dermatology  - PROSTATE SPECIFIC AG DIAGNOSTIC; Future    3. Vitamin D deficiency    Under good control. Continue same regimen.  - VITAMIN D,25 HYDROXY (DEFICIENCY); Future    4. Essential hypertension    Under good control. Continue same regimen.    5. Dyslipidemia    Under good control. Continue same regimen.  - Lipid Profile; Future  - TSH; Future  - CBC WITH DIFFERENTIAL; Future  - Comp Metabolic Panel; Future  - HEMOGLOBIN A1C; Future  - MICROALBUMIN CREAT RATIO URINE; Future    6. Benign non-nodular prostatic hyperplasia with lower urinary tract symptoms- [facility]    Under good control. Continue same regimen.

## 2024-07-16 ENCOUNTER — APPOINTMENT (RX ONLY)
Dept: URBAN - METROPOLITAN AREA CLINIC 22 | Facility: CLINIC | Age: 79
Setting detail: DERMATOLOGY
End: 2024-07-16

## 2024-07-16 DIAGNOSIS — L57.0 ACTINIC KERATOSIS: ICD-10-CM

## 2024-07-16 DIAGNOSIS — L82.1 OTHER SEBORRHEIC KERATOSIS: ICD-10-CM

## 2024-07-16 DIAGNOSIS — L81.4 OTHER MELANIN HYPERPIGMENTATION: ICD-10-CM

## 2024-07-16 DIAGNOSIS — L91.0 HYPERTROPHIC SCAR: ICD-10-CM

## 2024-07-16 DIAGNOSIS — D22 MELANOCYTIC NEVI: ICD-10-CM

## 2024-07-16 DIAGNOSIS — Z85.828 PERSONAL HISTORY OF OTHER MALIGNANT NEOPLASM OF SKIN: ICD-10-CM

## 2024-07-16 PROBLEM — D22.5 MELANOCYTIC NEVI OF TRUNK: Status: ACTIVE | Noted: 2024-07-16

## 2024-07-16 PROCEDURE — 17000 DESTRUCT PREMALG LESION: CPT

## 2024-07-16 PROCEDURE — 99213 OFFICE O/P EST LOW 20 MIN: CPT | Mod: 25

## 2024-07-16 PROCEDURE — 17003 DESTRUCT PREMALG LES 2-14: CPT

## 2024-07-16 PROCEDURE — ? LIQUID NITROGEN

## 2024-07-16 PROCEDURE — ? COUNSELING

## 2024-07-16 PROCEDURE — ? SUNSCREEN RECOMMENDATIONS

## 2024-07-16 ASSESSMENT — LOCATION SIMPLE DESCRIPTION DERM
LOCATION SIMPLE: RIGHT FOREHEAD
LOCATION SIMPLE: UPPER BACK
LOCATION SIMPLE: LEFT FOREARM
LOCATION SIMPLE: LEFT EAR
LOCATION SIMPLE: RIGHT FOREARM
LOCATION SIMPLE: CHEST
LOCATION SIMPLE: RIGHT EYEBROW
LOCATION SIMPLE: RIGHT ZYGOMA
LOCATION SIMPLE: POSTERIOR NECK
LOCATION SIMPLE: INFERIOR FOREHEAD
LOCATION SIMPLE: RIGHT CHEEK
LOCATION SIMPLE: LEFT TEMPLE

## 2024-07-16 ASSESSMENT — LOCATION DETAILED DESCRIPTION DERM
LOCATION DETAILED: SUPERIOR THORACIC SPINE
LOCATION DETAILED: LEFT CENTRAL TEMPLE
LOCATION DETAILED: RIGHT VENTRAL PROXIMAL FOREARM
LOCATION DETAILED: RIGHT SUPERIOR PREAURICULAR CHEEK
LOCATION DETAILED: RIGHT INFERIOR PREAURICULAR CHEEK
LOCATION DETAILED: RIGHT SUPERIOR MEDIAL MALAR CHEEK
LOCATION DETAILED: LEFT VENTRAL PROXIMAL FOREARM
LOCATION DETAILED: INFERIOR THORACIC SPINE
LOCATION DETAILED: LEFT SUPERIOR HELIX
LOCATION DETAILED: INFERIOR MID FOREHEAD
LOCATION DETAILED: RIGHT POSTERIOR NECK
LOCATION DETAILED: RIGHT CENTRAL MALAR CHEEK
LOCATION DETAILED: RIGHT FOREHEAD
LOCATION DETAILED: RIGHT LATERAL MANDIBULAR CHEEK
LOCATION DETAILED: RIGHT LATERAL ZYGOMA
LOCATION DETAILED: RIGHT LATERAL EYEBROW
LOCATION DETAILED: STERNUM

## 2024-07-16 ASSESSMENT — LOCATION ZONE DERM
LOCATION ZONE: FACE
LOCATION ZONE: NECK
LOCATION ZONE: TRUNK
LOCATION ZONE: ARM
LOCATION ZONE: EAR

## 2024-07-16 NOTE — PROCEDURE: SUNSCREEN RECOMMENDATIONS

## 2024-07-16 NOTE — PROCEDURE: LIQUID NITROGEN
Consent: The patient's consent was obtained including but not limited to risks of crusting, scabbing, blistering, scarring, darker or lighter pigmentary change, recurrence, incomplete removal and infection.
Show Applicator Variable?: Yes
Number Of Freeze-Thaw Cycles: 2 freeze-thaw cycles
Render Post-Care Instructions In Note?: no
Detail Level: Detailed
Duration Of Freeze Thaw-Cycle (Seconds): 0
Post-Care Instructions: I reviewed with the patient in detail post-care instructions. Patient is to wear sunprotection, and avoid picking at any of the treated lesions. Pt may apply Vaseline to crusted or scabbing areas.

## 2024-09-05 ENCOUNTER — HOSPITAL ENCOUNTER (OUTPATIENT)
Dept: LAB | Facility: MEDICAL CENTER | Age: 79
End: 2024-09-05
Attending: INTERNAL MEDICINE
Payer: MEDICARE

## 2024-09-05 DIAGNOSIS — E55.9 VITAMIN D DEFICIENCY: ICD-10-CM

## 2024-09-05 DIAGNOSIS — Z79.4 TYPE 2 DIABETES MELLITUS WITH OTHER SPECIFIED COMPLICATION, WITH LONG-TERM CURRENT USE OF INSULIN (HCC): ICD-10-CM

## 2024-09-05 DIAGNOSIS — E78.5 DYSLIPIDEMIA: ICD-10-CM

## 2024-09-05 DIAGNOSIS — D48.5 NEOPLASM OF UNCERTAIN BEHAVIOR OF SKIN OF HAND: ICD-10-CM

## 2024-09-05 DIAGNOSIS — E11.69 TYPE 2 DIABETES MELLITUS WITH OTHER SPECIFIED COMPLICATION, WITH LONG-TERM CURRENT USE OF INSULIN (HCC): ICD-10-CM

## 2024-09-05 LAB
25(OH)D3 SERPL-MCNC: 34 NG/ML (ref 30–100)
ALBUMIN SERPL BCP-MCNC: 3.7 G/DL (ref 3.2–4.9)
ALBUMIN/GLOB SERPL: 1.3 G/DL
ALP SERPL-CCNC: 105 U/L (ref 30–99)
ALT SERPL-CCNC: 26 U/L (ref 2–50)
ANION GAP SERPL CALC-SCNC: 12 MMOL/L (ref 7–16)
AST SERPL-CCNC: 15 U/L (ref 12–45)
BASOPHILS # BLD AUTO: 0.9 % (ref 0–1.8)
BASOPHILS # BLD: 0.06 K/UL (ref 0–0.12)
BILIRUB SERPL-MCNC: 0.2 MG/DL (ref 0.1–1.5)
BUN SERPL-MCNC: 16 MG/DL (ref 8–22)
CALCIUM ALBUM COR SERPL-MCNC: 10 MG/DL (ref 8.5–10.5)
CALCIUM SERPL-MCNC: 9.8 MG/DL (ref 8.5–10.5)
CHLORIDE SERPL-SCNC: 100 MMOL/L (ref 96–112)
CHOLEST SERPL-MCNC: 130 MG/DL (ref 100–199)
CO2 SERPL-SCNC: 28 MMOL/L (ref 20–33)
CREAT SERPL-MCNC: 0.72 MG/DL (ref 0.5–1.4)
EOSINOPHIL # BLD AUTO: 0.29 K/UL (ref 0–0.51)
EOSINOPHIL NFR BLD: 4.3 % (ref 0–6.9)
ERYTHROCYTE [DISTWIDTH] IN BLOOD BY AUTOMATED COUNT: 53.4 FL (ref 35.9–50)
EST. AVERAGE GLUCOSE BLD GHB EST-MCNC: 174 MG/DL
FASTING STATUS PATIENT QL REPORTED: NORMAL
GFR SERPLBLD CREATININE-BSD FMLA CKD-EPI: 93 ML/MIN/1.73 M 2
GLOBULIN SER CALC-MCNC: 2.8 G/DL (ref 1.9–3.5)
GLUCOSE SERPL-MCNC: 87 MG/DL (ref 65–99)
HBA1C MFR BLD: 7.7 % (ref 4–5.6)
HCT VFR BLD AUTO: 44.9 % (ref 42–52)
HDLC SERPL-MCNC: 53 MG/DL
HGB BLD-MCNC: 14.8 G/DL (ref 14–18)
IMM GRANULOCYTES # BLD AUTO: 0.02 K/UL (ref 0–0.11)
IMM GRANULOCYTES NFR BLD AUTO: 0.3 % (ref 0–0.9)
LDLC SERPL CALC-MCNC: 61 MG/DL
LYMPHOCYTES # BLD AUTO: 2.44 K/UL (ref 1–4.8)
LYMPHOCYTES NFR BLD: 36.4 % (ref 22–41)
MCH RBC QN AUTO: 30.3 PG (ref 27–33)
MCHC RBC AUTO-ENTMCNC: 33 G/DL (ref 32.3–36.5)
MCV RBC AUTO: 92 FL (ref 81.4–97.8)
MONOCYTES # BLD AUTO: 0.67 K/UL (ref 0–0.85)
MONOCYTES NFR BLD AUTO: 10 % (ref 0–13.4)
NEUTROPHILS # BLD AUTO: 3.23 K/UL (ref 1.82–7.42)
NEUTROPHILS NFR BLD: 48.1 % (ref 44–72)
NRBC # BLD AUTO: 0 K/UL
NRBC BLD-RTO: 0 /100 WBC (ref 0–0.2)
PLATELET # BLD AUTO: 301 K/UL (ref 164–446)
PMV BLD AUTO: 10.7 FL (ref 9–12.9)
POTASSIUM SERPL-SCNC: 4.3 MMOL/L (ref 3.6–5.5)
PROT SERPL-MCNC: 6.5 G/DL (ref 6–8.2)
PSA SERPL-MCNC: 2.09 NG/ML (ref 0–4)
RBC # BLD AUTO: 4.88 M/UL (ref 4.7–6.1)
SODIUM SERPL-SCNC: 140 MMOL/L (ref 135–145)
TRIGL SERPL-MCNC: 78 MG/DL (ref 0–149)
TSH SERPL-ACNC: 2.47 UIU/ML (ref 0.35–5.5)
WBC # BLD AUTO: 6.7 K/UL (ref 4.8–10.8)

## 2024-09-05 PROCEDURE — 80061 LIPID PANEL: CPT

## 2024-09-05 PROCEDURE — 85025 COMPLETE CBC W/AUTO DIFF WBC: CPT

## 2024-09-05 PROCEDURE — 84153 ASSAY OF PSA TOTAL: CPT | Mod: GA

## 2024-09-05 PROCEDURE — 84443 ASSAY THYROID STIM HORMONE: CPT

## 2024-09-05 PROCEDURE — 80053 COMPREHEN METABOLIC PANEL: CPT

## 2024-09-05 PROCEDURE — 36415 COLL VENOUS BLD VENIPUNCTURE: CPT | Mod: GA

## 2024-09-05 PROCEDURE — 82306 VITAMIN D 25 HYDROXY: CPT

## 2024-09-05 PROCEDURE — 83036 HEMOGLOBIN GLYCOSYLATED A1C: CPT | Mod: GA

## 2024-09-06 ENCOUNTER — HOSPITAL ENCOUNTER (OUTPATIENT)
Facility: MEDICAL CENTER | Age: 79
End: 2024-09-06
Attending: INTERNAL MEDICINE
Payer: MEDICARE

## 2024-09-06 LAB
CREAT UR-MCNC: 90.29 MG/DL
MICROALBUMIN UR-MCNC: <1.2 MG/DL
MICROALBUMIN/CREAT UR: NORMAL MG/G (ref 0–30)

## 2024-09-06 PROCEDURE — 82570 ASSAY OF URINE CREATININE: CPT

## 2024-09-06 PROCEDURE — 82043 UR ALBUMIN QUANTITATIVE: CPT

## 2024-10-09 ENCOUNTER — OFFICE VISIT (OUTPATIENT)
Dept: MEDICAL GROUP | Age: 79
End: 2024-10-09
Payer: MEDICARE

## 2024-10-09 VITALS
WEIGHT: 209 LBS | BODY MASS INDEX: 27.7 KG/M2 | HEART RATE: 84 BPM | OXYGEN SATURATION: 96 % | DIASTOLIC BLOOD PRESSURE: 62 MMHG | SYSTOLIC BLOOD PRESSURE: 100 MMHG | TEMPERATURE: 97 F | HEIGHT: 73 IN

## 2024-10-09 DIAGNOSIS — E78.5 DYSLIPIDEMIA: ICD-10-CM

## 2024-10-09 DIAGNOSIS — E03.4 HYPOTHYROIDISM DUE TO ACQUIRED ATROPHY OF THYROID: ICD-10-CM

## 2024-10-09 DIAGNOSIS — G25.0 BENIGN ESSENTIAL TREMOR: ICD-10-CM

## 2024-10-09 DIAGNOSIS — Z79.4 CONTROLLED TYPE 2 DIABETES MELLITUS WITH COMPLICATION, WITH LONG-TERM CURRENT USE OF INSULIN (HCC): ICD-10-CM

## 2024-10-09 DIAGNOSIS — N40.1 BENIGN NON-NODULAR PROSTATIC HYPERPLASIA WITH LOWER URINARY TRACT SYMPTOMS: ICD-10-CM

## 2024-10-09 DIAGNOSIS — E11.8 CONTROLLED TYPE 2 DIABETES MELLITUS WITH COMPLICATION, WITH LONG-TERM CURRENT USE OF INSULIN (HCC): ICD-10-CM

## 2024-10-09 DIAGNOSIS — I10 ESSENTIAL HYPERTENSION: ICD-10-CM

## 2024-10-09 PROCEDURE — 99214 OFFICE O/P EST MOD 30 MIN: CPT | Performed by: STUDENT IN AN ORGANIZED HEALTH CARE EDUCATION/TRAINING PROGRAM

## 2024-10-09 PROCEDURE — 3074F SYST BP LT 130 MM HG: CPT | Performed by: STUDENT IN AN ORGANIZED HEALTH CARE EDUCATION/TRAINING PROGRAM

## 2024-10-09 PROCEDURE — 3078F DIAST BP <80 MM HG: CPT | Performed by: STUDENT IN AN ORGANIZED HEALTH CARE EDUCATION/TRAINING PROGRAM

## 2024-10-09 ASSESSMENT — ENCOUNTER SYMPTOMS
WEAKNESS: 0
FEVER: 0
BACK PAIN: 0
CHILLS: 0
DIZZINESS: 0
HEADACHES: 0
ABDOMINAL PAIN: 0
BRUISES/BLEEDS EASILY: 0
BLURRED VISION: 0
BLOOD IN STOOL: 0
DOUBLE VISION: 0
DEPRESSION: 0
PALPITATIONS: 0
SHORTNESS OF BREATH: 0

## 2024-10-09 ASSESSMENT — FIBROSIS 4 INDEX: FIB4 SCORE: 0.76

## 2024-12-03 ENCOUNTER — APPOINTMENT (OUTPATIENT)
Dept: LAB | Facility: MEDICAL CENTER | Age: 79
End: 2024-12-03
Attending: STUDENT IN AN ORGANIZED HEALTH CARE EDUCATION/TRAINING PROGRAM
Payer: MEDICARE

## 2025-01-06 ENCOUNTER — HOSPITAL ENCOUNTER (OUTPATIENT)
Dept: LAB | Facility: MEDICAL CENTER | Age: 80
End: 2025-01-06
Attending: PHYSICIAN ASSISTANT
Payer: MEDICARE

## 2025-01-06 ENCOUNTER — HOSPITAL ENCOUNTER (OUTPATIENT)
Dept: LAB | Facility: MEDICAL CENTER | Age: 80
End: 2025-01-06
Attending: STUDENT IN AN ORGANIZED HEALTH CARE EDUCATION/TRAINING PROGRAM
Payer: MEDICARE

## 2025-01-06 DIAGNOSIS — E11.8 CONTROLLED TYPE 2 DIABETES MELLITUS WITH COMPLICATION, WITH LONG-TERM CURRENT USE OF INSULIN (HCC): ICD-10-CM

## 2025-01-06 DIAGNOSIS — Z79.4 CONTROLLED TYPE 2 DIABETES MELLITUS WITH COMPLICATION, WITH LONG-TERM CURRENT USE OF INSULIN (HCC): ICD-10-CM

## 2025-01-06 LAB
ALBUMIN SERPL BCP-MCNC: 4 G/DL (ref 3.2–4.9)
ALBUMIN/GLOB SERPL: 1.4 G/DL
ALP SERPL-CCNC: 89 U/L (ref 30–99)
ALT SERPL-CCNC: 28 U/L (ref 2–50)
ANION GAP SERPL CALC-SCNC: 12 MMOL/L (ref 7–16)
AST SERPL-CCNC: 16 U/L (ref 12–45)
BILIRUB SERPL-MCNC: <0.2 MG/DL (ref 0.1–1.5)
BUN SERPL-MCNC: 18 MG/DL (ref 8–22)
CALCIUM ALBUM COR SERPL-MCNC: 9.8 MG/DL (ref 8.5–10.5)
CALCIUM SERPL-MCNC: 9.8 MG/DL (ref 8.5–10.5)
CHLORIDE SERPL-SCNC: 103 MMOL/L (ref 96–112)
CO2 SERPL-SCNC: 27 MMOL/L (ref 20–33)
CREAT SERPL-MCNC: 0.93 MG/DL (ref 0.5–1.4)
CREAT UR-MCNC: 110.79 MG/DL
EST. AVERAGE GLUCOSE BLD GHB EST-MCNC: 166 MG/DL
EST. AVERAGE GLUCOSE BLD GHB EST-MCNC: 169 MG/DL
GFR SERPLBLD CREATININE-BSD FMLA CKD-EPI: 84 ML/MIN/1.73 M 2
GLOBULIN SER CALC-MCNC: 2.9 G/DL (ref 1.9–3.5)
GLUCOSE SERPL-MCNC: 136 MG/DL (ref 65–99)
HBA1C MFR BLD: 7.4 % (ref 4–5.6)
HBA1C MFR BLD: 7.5 % (ref 4–5.6)
MICROALBUMIN UR-MCNC: <1.2 MG/DL
MICROALBUMIN/CREAT UR: NORMAL MG/G (ref 0–30)
POTASSIUM SERPL-SCNC: 4.6 MMOL/L (ref 3.6–5.5)
PROT SERPL-MCNC: 6.9 G/DL (ref 6–8.2)
SODIUM SERPL-SCNC: 142 MMOL/L (ref 135–145)
T4 FREE SERPL-MCNC: 1.17 NG/DL (ref 0.93–1.7)
TSH SERPL-ACNC: 1.56 UIU/ML (ref 0.35–5.5)

## 2025-01-06 PROCEDURE — 82043 UR ALBUMIN QUANTITATIVE: CPT

## 2025-01-06 PROCEDURE — 84439 ASSAY OF FREE THYROXINE: CPT

## 2025-01-06 PROCEDURE — 82570 ASSAY OF URINE CREATININE: CPT

## 2025-01-06 PROCEDURE — 80053 COMPREHEN METABOLIC PANEL: CPT

## 2025-01-06 PROCEDURE — 84443 ASSAY THYROID STIM HORMONE: CPT

## 2025-01-06 PROCEDURE — 36415 COLL VENOUS BLD VENIPUNCTURE: CPT

## 2025-01-06 PROCEDURE — 83036 HEMOGLOBIN GLYCOSYLATED A1C: CPT | Mod: GA

## 2025-01-06 PROCEDURE — 83036 HEMOGLOBIN GLYCOSYLATED A1C: CPT | Mod: GA,91

## 2025-01-15 ENCOUNTER — APPOINTMENT (OUTPATIENT)
Dept: URBAN - METROPOLITAN AREA CLINIC 22 | Facility: CLINIC | Age: 80
Setting detail: DERMATOLOGY
End: 2025-01-15

## 2025-01-15 DIAGNOSIS — L82.0 INFLAMED SEBORRHEIC KERATOSIS: ICD-10-CM

## 2025-01-15 DIAGNOSIS — L81.4 OTHER MELANIN HYPERPIGMENTATION: ICD-10-CM

## 2025-01-15 DIAGNOSIS — L57.0 ACTINIC KERATOSIS: ICD-10-CM

## 2025-01-15 PROBLEM — D48.5 NEOPLASM OF UNCERTAIN BEHAVIOR OF SKIN: Status: ACTIVE | Noted: 2025-01-15

## 2025-01-15 PROCEDURE — 69100 BIOPSY OF EXTERNAL EAR: CPT | Mod: 59

## 2025-01-15 PROCEDURE — ? LIQUID NITROGEN

## 2025-01-15 PROCEDURE — 99212 OFFICE O/P EST SF 10 MIN: CPT | Mod: 25

## 2025-01-15 PROCEDURE — 17003 DESTRUCT PREMALG LES 2-14: CPT | Mod: 59

## 2025-01-15 PROCEDURE — 17110 DESTRUCTION B9 LES UP TO 14: CPT

## 2025-01-15 PROCEDURE — ? BIOPSY BY SHAVE METHOD

## 2025-01-15 PROCEDURE — 17000 DESTRUCT PREMALG LESION: CPT | Mod: 59

## 2025-01-15 PROCEDURE — ? COUNSELING

## 2025-01-15 ASSESSMENT — LOCATION DETAILED DESCRIPTION DERM
LOCATION DETAILED: LEFT SUPERIOR CRUS OF ANTIHELIX
LOCATION DETAILED: LEFT CENTRAL PARIETAL SCALP
LOCATION DETAILED: RIGHT PROXIMAL DORSAL FOREARM
LOCATION DETAILED: LEFT PROXIMAL DORSAL FOREARM
LOCATION DETAILED: RIGHT POSTERIOR PARIETAL SCALP
LOCATION DETAILED: RIGHT MEDIAL FRONTAL SCALP
LOCATION DETAILED: LEFT VENTRAL DISTAL FOREARM

## 2025-01-15 ASSESSMENT — LOCATION SIMPLE DESCRIPTION DERM
LOCATION SIMPLE: RIGHT FOREARM
LOCATION SIMPLE: SCALP
LOCATION SIMPLE: LEFT EAR
LOCATION SIMPLE: LEFT FOREARM
LOCATION SIMPLE: RIGHT SCALP
LOCATION SIMPLE: POSTERIOR SCALP

## 2025-01-15 ASSESSMENT — LOCATION ZONE DERM
LOCATION ZONE: EAR
LOCATION ZONE: SCALP
LOCATION ZONE: ARM

## 2025-01-15 NOTE — PROCEDURE: BIOPSY BY SHAVE METHOD
Detail Level: Detailed
Depth Of Biopsy: dermis
Was A Bandage Applied: Yes
Size Of Lesion In Cm: 0.2
X Size Of Lesion In Cm: 0
Biopsy Type: H and E
Biopsy Method: Personna blade
Anesthesia Type: 1% lidocaine without epinephrine
Anesthesia Volume In Cc: 1.5
Hemostasis: Aluminum Chloride
Wound Care: Petrolatum
Dressing: pressure dressing with telfa
Destruction After The Procedure: No
Type Of Destruction Used: Curettage
Curettage Text: The wound bed was treated with curettage after the biopsy was performed.
Cryotherapy Text: The wound bed was treated with cryotherapy after the biopsy was performed.
Electrodesiccation Text: The wound bed was treated with electrodesiccation after the biopsy was performed.
Electrodesiccation And Curettage Text: The wound bed was treated with electrodesiccation and curettage after the biopsy was performed.
Silver Nitrate Text: The wound bed was treated with silver nitrate after the biopsy was performed.
Lab: [id number]
Lab Facility: 
Medical Necessity Information: It is in your best interest to select a reason for this procedure from the list below. All of these items fulfill various CMS LCD requirements except the new and changing color options.
Consent: Written consent was obtained and risks were reviewed including but not limited to scarring, infection, bleeding, scabbing, incomplete removal, nerve damage and allergy to anesthesia.
Post-Care Instructions: I reviewed with the patient in detail post-care instructions. Patient is to keep the biopsy site dry overnight. Gentle cleansing daily.  Apply petroleum ointment daily until healed. Patient may apply hydrogen peroxide soaks to remove any crusting.
Notification Instructions: Patient will be notified of biopsy results. However, patient instructed to call the office if not contacted within 2 weeks.
Billing Type: Third-Party Bill
Information: Selecting Yes will display possible errors in your note based on the variables you have selected. This validation is only offered as a suggestion for you. PLEASE NOTE THAT THE VALIDATION TEXT WILL BE REMOVED WHEN YOU FINALIZE YOUR NOTE. IF YOU WANT TO FAX A PRELIMINARY NOTE YOU WILL NEED TO TOGGLE THIS TO 'NO' IF YOU DO NOT WANT IT IN YOUR FAXED NOTE.

## 2025-01-15 NOTE — PROCEDURE: LIQUID NITROGEN
Show Aperture Variable?: Yes
Number Of Freeze-Thaw Cycles: 2 freeze-thaw cycles
Detail Level: Detailed
Duration Of Freeze Thaw-Cycle (Seconds): 0
Render Note In Bullet Format When Appropriate: No
Post-Care Instructions: I reviewed with the patient in detail post-care instructions. Patient is to wear sunprotection, and avoid picking at any of the treated lesions. Pt may apply Vaseline to crusted or scabbing areas.
Consent: The patient's consent was obtained including but not limited to risks of crusting, scabbing, blistering, scarring, darker or lighter pigmentary change, recurrence, incomplete removal and infection.
Medical Necessity Information: It is in your best interest to select a reason for this procedure from the list below. All of these items fulfill various CMS LCD requirements except the new and changing color options.
Spray Paint Text: The liquid nitrogen was applied to the skin utilizing a spray paint frosting technique.
Medical Necessity Clause: This procedure was medically necessary because the lesions that were treated were:

## 2025-02-04 DIAGNOSIS — F51.01 PRIMARY INSOMNIA: ICD-10-CM

## 2025-02-04 DIAGNOSIS — F32.4 MAJOR DEPRESSIVE DISORDER WITH SINGLE EPISODE, IN PARTIAL REMISSION (HCC): ICD-10-CM

## 2025-02-04 RX ORDER — MIRTAZAPINE 15 MG/1
15 TABLET, FILM COATED ORAL
Qty: 90 TABLET | Refills: 3 | Status: SHIPPED | OUTPATIENT
Start: 2025-02-04

## 2025-02-04 NOTE — TELEPHONE ENCOUNTER
Received request via: Pharmacy    Was the patient seen in the last year in this department? Yes    Does the patient have an active prescription (recently filled or refills available) for medication(s) requested? No    [email or web address]     Does the patient have senior living Plus and need 100-day supply? (This applies to ALL medications) Patient does not have SCP

## 2025-04-01 ENCOUNTER — HOSPITAL ENCOUNTER (OUTPATIENT)
Dept: LAB | Facility: MEDICAL CENTER | Age: 80
End: 2025-04-01
Attending: PHYSICIAN ASSISTANT
Payer: MEDICARE

## 2025-04-01 LAB
EST. AVERAGE GLUCOSE BLD GHB EST-MCNC: 180 MG/DL
HBA1C MFR BLD: 7.9 % (ref 4–5.6)
T4 FREE SERPL-MCNC: 1.24 NG/DL (ref 0.93–1.7)
TSH SERPL-ACNC: 2.16 UIU/ML (ref 0.35–5.5)

## 2025-04-01 PROCEDURE — 83036 HEMOGLOBIN GLYCOSYLATED A1C: CPT | Mod: GA

## 2025-04-01 PROCEDURE — 84439 ASSAY OF FREE THYROXINE: CPT

## 2025-04-01 PROCEDURE — 36415 COLL VENOUS BLD VENIPUNCTURE: CPT | Mod: GA

## 2025-04-01 PROCEDURE — 84443 ASSAY THYROID STIM HORMONE: CPT

## 2025-04-10 ENCOUNTER — OFFICE VISIT (OUTPATIENT)
Dept: MEDICAL GROUP | Age: 80
End: 2025-04-10
Payer: MEDICARE

## 2025-04-10 VITALS
HEART RATE: 81 BPM | DIASTOLIC BLOOD PRESSURE: 72 MMHG | WEIGHT: 206 LBS | TEMPERATURE: 97.3 F | OXYGEN SATURATION: 97 % | SYSTOLIC BLOOD PRESSURE: 118 MMHG | HEIGHT: 73 IN | BODY MASS INDEX: 27.3 KG/M2

## 2025-04-10 DIAGNOSIS — E03.4 HYPOTHYROIDISM DUE TO ACQUIRED ATROPHY OF THYROID: ICD-10-CM

## 2025-04-10 DIAGNOSIS — E11.8 CONTROLLED TYPE 2 DIABETES MELLITUS WITH COMPLICATION, WITH LONG-TERM CURRENT USE OF INSULIN (HCC): ICD-10-CM

## 2025-04-10 DIAGNOSIS — E08.41 DIABETIC MONONEUROPATHY ASSOCIATED WITH DIABETES MELLITUS DUE TO UNDERLYING CONDITION (HCC): ICD-10-CM

## 2025-04-10 DIAGNOSIS — Z79.4 CONTROLLED TYPE 2 DIABETES MELLITUS WITH COMPLICATION, WITH LONG-TERM CURRENT USE OF INSULIN (HCC): ICD-10-CM

## 2025-04-10 PROCEDURE — 99214 OFFICE O/P EST MOD 30 MIN: CPT | Performed by: STUDENT IN AN ORGANIZED HEALTH CARE EDUCATION/TRAINING PROGRAM

## 2025-04-10 PROCEDURE — 3078F DIAST BP <80 MM HG: CPT | Performed by: STUDENT IN AN ORGANIZED HEALTH CARE EDUCATION/TRAINING PROGRAM

## 2025-04-10 PROCEDURE — 3074F SYST BP LT 130 MM HG: CPT | Performed by: STUDENT IN AN ORGANIZED HEALTH CARE EDUCATION/TRAINING PROGRAM

## 2025-04-10 ASSESSMENT — ENCOUNTER SYMPTOMS
BACK PAIN: 0
PALPITATIONS: 0
ABDOMINAL PAIN: 0
COUGH: 0
FEVER: 0
WHEEZING: 0
BLURRED VISION: 0
DEPRESSION: 0
WEAKNESS: 0
HEADACHES: 0
BRUISES/BLEEDS EASILY: 0
CHILLS: 0
DIZZINESS: 0
ORTHOPNEA: 0
DOUBLE VISION: 0
SHORTNESS OF BREATH: 0
BLOOD IN STOOL: 0

## 2025-04-10 ASSESSMENT — FIBROSIS 4 INDEX: FIB4 SCORE: 0.78

## 2025-04-10 ASSESSMENT — LIFESTYLE VARIABLES: SUBSTANCE_ABUSE: 0

## 2025-04-10 ASSESSMENT — PATIENT HEALTH QUESTIONNAIRE - PHQ9: CLINICAL INTERPRETATION OF PHQ2 SCORE: 0

## 2025-04-10 NOTE — PROGRESS NOTES
Subjective:     CC: Lab review follow-up    HPI:   History of Present Illness  The patient is a 79-year-old male presenting for lab review and follow-up.    Routine blood work showed an A1c of 7.9%.  Normal thyroid function.    He has been managing diabetes for the past 22 years.    His last colonoscopy was performed at the age of 75, during which a polyp was identified. Given his family history of colon cancer, he was advised to undergo colonoscopies every 4 years, a recommendation he has adhered to since the age of 45. However, there have been periods where he did not undergo these tests.    He tested out a bionic foot but did not like it because he had to turn it on and go through a certain sequence to get the foot to work. At night, he had to plug it in to charge it up. He was given a hydraulic ankle, which he has been testing for about 3 weeks now and likes it. He has to be careful when stepping up as it tends to balance him backwards slightly. If he leans that leg over too far, it can put a lot of stress on his knee.    FAMILY HISTORY  His father had colon cancer.       ROS:  Review of Systems   Constitutional:  Negative for chills, fever and malaise/fatigue.   HENT:  Negative for nosebleeds and tinnitus.    Eyes:  Negative for blurred vision and double vision.   Respiratory:  Negative for cough, shortness of breath and wheezing.    Cardiovascular:  Negative for chest pain, palpitations, orthopnea and leg swelling.   Gastrointestinal:  Negative for abdominal pain, blood in stool and melena.   Genitourinary:  Negative for dysuria, frequency and urgency.   Musculoskeletal:  Negative for back pain and joint pain.   Skin:  Negative for rash.   Neurological:  Negative for dizziness, weakness and headaches.   Endo/Heme/Allergies:  Does not bruise/bleed easily.   Psychiatric/Behavioral:  Negative for depression, substance abuse and suicidal ideas.        Objective:     Exam:  /72 (BP Location: Right arm,  "Patient Position: Sitting, BP Cuff Size: Adult)   Pulse 81   Temp 36.3 °C (97.3 °F) (Temporal)   Ht 1.854 m (6' 1\")   Wt 93.4 kg (206 lb)   SpO2 97%   BMI 27.18 kg/m²  Body mass index is 27.18 kg/m².    Physical Exam  Vitals reviewed.   Constitutional:       General: He is not in acute distress.     Appearance: He is not ill-appearing.   HENT:      Head: Normocephalic and atraumatic.      Nose: No congestion.      Mouth/Throat:      Mouth: Mucous membranes are moist.      Pharynx: No oropharyngeal exudate or posterior oropharyngeal erythema.   Eyes:      General: No scleral icterus.     Extraocular Movements: Extraocular movements intact.      Pupils: Pupils are equal, round, and reactive to light.   Cardiovascular:      Rate and Rhythm: Normal rate and regular rhythm.      Pulses: Normal pulses.      Heart sounds: Normal heart sounds. No murmur heard.  Pulmonary:      Effort: Pulmonary effort is normal. No respiratory distress.      Breath sounds: Normal breath sounds. No wheezing.   Abdominal:      General: Bowel sounds are normal. There is no distension.      Palpations: Abdomen is soft.      Tenderness: There is no abdominal tenderness. There is no guarding or rebound.   Musculoskeletal:      Cervical back: Normal range of motion and neck supple.      Right lower leg: No edema.      Left lower leg: No edema.   Skin:     General: Skin is warm.      Capillary Refill: Capillary refill takes less than 2 seconds.      Coloration: Skin is not jaundiced.      Findings: No bruising or erythema.   Neurological:      General: No focal deficit present.      Mental Status: He is alert.      Cranial Nerves: No cranial nerve deficit.      Sensory: No sensory deficit.   Psychiatric:         Mood and Affect: Mood normal.         Behavior: Behavior normal.       Labs: Reviewed    Assessment & Plan:       1. Controlled type 2 diabetes mellitus with complication, with long-term current use of insulin (HCC)  His A1c level is " currently at 7.8%, which is not optimal but acceptable given his long history of diabetes. He is advised to maintain his current diabetes management regimen with a goal to improve his A1c slightly. Blood work will be repeated before his next visit in 4 months, and a urine test will be added every 6 months to monitor protein levels.  Continue current regimen plan.    2. Diabetic mononeuropathy associated with diabetes mellitus due to underlying condition (HCC)  -Chronic, stable currently taking  -Gabapentin 300 mg times daily  - Diabetic Monofilament LE Exam  - Comp Metabolic Panel; Future  - HEMOGLOBIN A1C; Future  - MICROALBUMIN CREAT RATIO URINE; Future    3. Hypothyroidism due to acquired atrophy of thyroid- dr browning  -Chronic, stable  -Blood work showed normal thyroid function  -Currently taking Synthroid 50 mcg daily  -Continue same therapy      Return in about 4 months (around 8/10/2025) for Labs, Meds.    Please note that this dictation was created using voice recognition software. I have made every reasonable attempt to correct obvious errors, but I expect that there are errors of grammar and possibly content that I did not discover before finalizing the note.

## 2025-06-22 DIAGNOSIS — B37.2 CANDIDAL INTERTRIGO: ICD-10-CM

## 2025-06-23 RX ORDER — MICONAZOLE NITRATE 2 G/100G
CREAM TOPICAL
Qty: 30 G | Refills: 1 | Status: SHIPPED | OUTPATIENT
Start: 2025-06-23

## 2025-07-01 ENCOUNTER — APPOINTMENT (OUTPATIENT)
Dept: LAB | Facility: MEDICAL CENTER | Age: 80
End: 2025-07-01
Attending: STUDENT IN AN ORGANIZED HEALTH CARE EDUCATION/TRAINING PROGRAM
Payer: MEDICARE

## 2025-07-01 DIAGNOSIS — E08.41 DIABETIC MONONEUROPATHY ASSOCIATED WITH DIABETES MELLITUS DUE TO UNDERLYING CONDITION (HCC): ICD-10-CM

## 2025-07-01 LAB
ALBUMIN SERPL BCP-MCNC: 3.9 G/DL (ref 3.2–4.9)
ALBUMIN/GLOB SERPL: 1.3 G/DL
ALP SERPL-CCNC: 65 U/L (ref 30–99)
ALT SERPL-CCNC: 20 U/L (ref 2–50)
ANION GAP SERPL CALC-SCNC: 12 MMOL/L (ref 7–16)
AST SERPL-CCNC: 22 U/L (ref 12–45)
BILIRUB SERPL-MCNC: 0.2 MG/DL (ref 0.1–1.5)
BUN SERPL-MCNC: 19 MG/DL (ref 8–22)
CALCIUM ALBUM COR SERPL-MCNC: 10 MG/DL (ref 8.5–10.5)
CALCIUM SERPL-MCNC: 9.9 MG/DL (ref 8.5–10.5)
CHLORIDE SERPL-SCNC: 104 MMOL/L (ref 96–112)
CO2 SERPL-SCNC: 26 MMOL/L (ref 20–33)
CREAT SERPL-MCNC: 0.89 MG/DL (ref 0.5–1.4)
CREAT UR-MCNC: 120 MG/DL
EST. AVERAGE GLUCOSE BLD GHB EST-MCNC: 157 MG/DL
GFR SERPLBLD CREATININE-BSD FMLA CKD-EPI: 87 ML/MIN/1.73 M 2
GLOBULIN SER CALC-MCNC: 3 G/DL (ref 1.9–3.5)
GLUCOSE SERPL-MCNC: 116 MG/DL (ref 65–99)
HBA1C MFR BLD: 7.1 % (ref 4–5.6)
MICROALBUMIN UR-MCNC: <1.2 MG/DL
MICROALBUMIN/CREAT UR: NORMAL MG/G (ref 0–30)
POTASSIUM SERPL-SCNC: 4.3 MMOL/L (ref 3.6–5.5)
PROT SERPL-MCNC: 6.9 G/DL (ref 6–8.2)
SODIUM SERPL-SCNC: 142 MMOL/L (ref 135–145)

## 2025-07-01 PROCEDURE — 36415 COLL VENOUS BLD VENIPUNCTURE: CPT

## 2025-07-01 PROCEDURE — 80053 COMPREHEN METABOLIC PANEL: CPT

## 2025-07-01 PROCEDURE — 82043 UR ALBUMIN QUANTITATIVE: CPT

## 2025-07-01 PROCEDURE — 83036 HEMOGLOBIN GLYCOSYLATED A1C: CPT | Mod: GA

## 2025-07-01 PROCEDURE — 82570 ASSAY OF URINE CREATININE: CPT
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
## 2025-08-12 ENCOUNTER — APPOINTMENT (OUTPATIENT)
Dept: MEDICAL GROUP | Age: 80
End: 2025-08-12
Payer: MEDICARE

## 2025-08-12 ENCOUNTER — OFFICE VISIT (OUTPATIENT)
Dept: MEDICAL GROUP | Age: 80
End: 2025-08-12
Payer: MEDICARE

## 2025-08-12 VITALS
DIASTOLIC BLOOD PRESSURE: 58 MMHG | WEIGHT: 201.6 LBS | OXYGEN SATURATION: 92 % | BODY MASS INDEX: 26.72 KG/M2 | HEIGHT: 73 IN | SYSTOLIC BLOOD PRESSURE: 112 MMHG | TEMPERATURE: 97.6 F | HEART RATE: 82 BPM

## 2025-08-12 DIAGNOSIS — Z79.4 CONTROLLED TYPE 2 DIABETES MELLITUS WITH COMPLICATION, WITH LONG-TERM CURRENT USE OF INSULIN (HCC): Primary | ICD-10-CM

## 2025-08-12 DIAGNOSIS — E11.8 CONTROLLED TYPE 2 DIABETES MELLITUS WITH COMPLICATION, WITH LONG-TERM CURRENT USE OF INSULIN (HCC): Primary | ICD-10-CM

## 2025-08-12 DIAGNOSIS — Z89.511 S/P BKA (BELOW KNEE AMPUTATION) UNILATERAL, RIGHT (HCC): ICD-10-CM

## 2025-08-12 DIAGNOSIS — I10 ESSENTIAL HYPERTENSION: ICD-10-CM

## 2025-08-12 DIAGNOSIS — E03.4 HYPOTHYROIDISM DUE TO ACQUIRED ATROPHY OF THYROID: ICD-10-CM

## 2025-08-12 PROCEDURE — G2211 COMPLEX E/M VISIT ADD ON: HCPCS | Performed by: STUDENT IN AN ORGANIZED HEALTH CARE EDUCATION/TRAINING PROGRAM

## 2025-08-12 PROCEDURE — 3078F DIAST BP <80 MM HG: CPT | Performed by: STUDENT IN AN ORGANIZED HEALTH CARE EDUCATION/TRAINING PROGRAM

## 2025-08-12 PROCEDURE — 99214 OFFICE O/P EST MOD 30 MIN: CPT | Performed by: STUDENT IN AN ORGANIZED HEALTH CARE EDUCATION/TRAINING PROGRAM

## 2025-08-12 PROCEDURE — 3074F SYST BP LT 130 MM HG: CPT | Performed by: STUDENT IN AN ORGANIZED HEALTH CARE EDUCATION/TRAINING PROGRAM

## 2025-08-12 RX ORDER — DAPAGLIFLOZIN 10 MG/1
TABLET, FILM COATED ORAL
COMMUNITY
Start: 2025-04-09

## 2025-08-12 ASSESSMENT — ENCOUNTER SYMPTOMS
DOUBLE VISION: 0
ORTHOPNEA: 0
ABDOMINAL PAIN: 0
COUGH: 0
DIZZINESS: 0
BLURRED VISION: 0
SENSORY CHANGE: 0
PHOTOPHOBIA: 0
PALPITATIONS: 0
HEADACHES: 0
BACK PAIN: 0
CHILLS: 0
WHEEZING: 0
DEPRESSION: 0
WEAKNESS: 0
BRUISES/BLEEDS EASILY: 0
SHORTNESS OF BREATH: 0
FEVER: 0
BLOOD IN STOOL: 0

## 2025-08-12 ASSESSMENT — LIFESTYLE VARIABLES: SUBSTANCE_ABUSE: 0

## 2025-08-12 ASSESSMENT — FIBROSIS 4 INDEX: FIB4 SCORE: 1.29

## 2025-08-25 ENCOUNTER — OFFICE VISIT (OUTPATIENT)
Dept: MEDICAL GROUP | Age: 80
End: 2025-08-25
Payer: MEDICARE

## 2025-08-25 VITALS
SYSTOLIC BLOOD PRESSURE: 106 MMHG | OXYGEN SATURATION: 98 % | BODY MASS INDEX: 26.77 KG/M2 | HEART RATE: 95 BPM | TEMPERATURE: 97.4 F | HEIGHT: 73 IN | WEIGHT: 202 LBS | DIASTOLIC BLOOD PRESSURE: 62 MMHG

## 2025-08-25 DIAGNOSIS — I10 ESSENTIAL HYPERTENSION: ICD-10-CM

## 2025-08-25 DIAGNOSIS — Z89.511 S/P BKA (BELOW KNEE AMPUTATION) UNILATERAL, RIGHT (HCC): Primary | ICD-10-CM

## 2025-08-25 DIAGNOSIS — E78.5 DYSLIPIDEMIA: ICD-10-CM

## 2025-08-25 PROCEDURE — G2211 COMPLEX E/M VISIT ADD ON: HCPCS | Performed by: STUDENT IN AN ORGANIZED HEALTH CARE EDUCATION/TRAINING PROGRAM

## 2025-08-25 PROCEDURE — 99213 OFFICE O/P EST LOW 20 MIN: CPT | Performed by: STUDENT IN AN ORGANIZED HEALTH CARE EDUCATION/TRAINING PROGRAM

## 2025-08-25 PROCEDURE — 3074F SYST BP LT 130 MM HG: CPT | Performed by: STUDENT IN AN ORGANIZED HEALTH CARE EDUCATION/TRAINING PROGRAM

## 2025-08-25 PROCEDURE — 3078F DIAST BP <80 MM HG: CPT | Performed by: STUDENT IN AN ORGANIZED HEALTH CARE EDUCATION/TRAINING PROGRAM

## 2025-08-25 ASSESSMENT — FIBROSIS 4 INDEX: FIB4 SCORE: 1.29

## 2025-08-25 ASSESSMENT — ENCOUNTER SYMPTOMS
PHOTOPHOBIA: 0
WEAKNESS: 0
BRUISES/BLEEDS EASILY: 0
BLOOD IN STOOL: 0
BACK PAIN: 0
COUGH: 0
DOUBLE VISION: 0
SHORTNESS OF BREATH: 0
PALPITATIONS: 0
CHILLS: 0
FEVER: 0
DEPRESSION: 0
ABDOMINAL PAIN: 0
BLURRED VISION: 0
DIZZINESS: 0
HEADACHES: 0
ORTHOPNEA: 0

## 2025-08-25 ASSESSMENT — LIFESTYLE VARIABLES: SUBSTANCE_ABUSE: 0

## 2025-08-28 ENCOUNTER — APPOINTMENT (OUTPATIENT)
Dept: URBAN - METROPOLITAN AREA CLINIC 4 | Facility: CLINIC | Age: 80
Setting detail: DERMATOLOGY
End: 2025-08-28

## 2025-08-28 DIAGNOSIS — L30.9 DERMATITIS, UNSPECIFIED: ICD-10-CM | Status: INADEQUATELY CONTROLLED

## 2025-08-28 PROCEDURE — ? KOH PREP

## 2025-08-28 PROCEDURE — ? PRESCRIPTION

## 2025-08-28 PROCEDURE — ? MEDICATION COUNSELING

## 2025-08-28 PROCEDURE — ? COUNSELING

## 2025-08-28 PROCEDURE — ? PHOTO-DOCUMENTATION

## 2025-08-28 RX ORDER — TRIAMCINOLONE ACETONIDE 1 MG/G
CREAM TOPICAL BID
Qty: 30 | Refills: 0 | Status: ERX | COMMUNITY
Start: 2025-08-28

## 2025-08-28 RX ADMIN — TRIAMCINOLONE ACETONIDE: 1 CREAM TOPICAL at 00:00

## 2025-08-28 ASSESSMENT — LOCATION DETAILED DESCRIPTION DERM
LOCATION DETAILED: RIGHT PROXIMAL PRETIBIAL REGION
LOCATION DETAILED: RIGHT MEDIAL PROXIMAL PRETIBIAL REGION

## 2025-08-28 ASSESSMENT — LOCATION SIMPLE DESCRIPTION DERM: LOCATION SIMPLE: RIGHT PRETIBIAL REGION

## 2025-08-28 ASSESSMENT — LOCATION ZONE DERM: LOCATION ZONE: LEG

## 2025-10-28 ENCOUNTER — APPOINTMENT (OUTPATIENT)
Dept: LAB | Facility: MEDICAL CENTER | Age: 80
End: 2025-10-28
Payer: MEDICARE

## (undated) DEVICE — GLOVE BIOGEL PI INDICATOR SZ 8.0 SURGICAL PF LF -(50/BX 4BX/CA)

## (undated) DEVICE — CHLORAPREP 26 ML APPLICATOR - ORANGE TINT(25/CA)

## (undated) DEVICE — PEN SKIN MARKER W/RULER - (50EA/BX)

## (undated) DEVICE — TOWELS CLOTH SURGICAL - (4/PK 20PK/CA)

## (undated) DEVICE — SUTURE GENERAL

## (undated) DEVICE — MASK ANESTHESIA ADULT  - (100/CA)

## (undated) DEVICE — ELECTRODE DUAL RETURN W/ CORD - (50/PK)

## (undated) DEVICE — SUTURE 4-0 ETHILON FS-1 18 (36PK/BX)"

## (undated) DEVICE — GLOVE BIOGEL PI INDICATOR SZ 7.5 SURGICAL PF LF -(50/BX 4BX/CA)

## (undated) DEVICE — SODIUM CHL IRRIGATION 0.9% 1000ML (12EA/CA)

## (undated) DEVICE — PAD LAP STERILE 18 X 18 - (5/PK 40PK/CA)

## (undated) DEVICE — SUTURE 3-0 VICRYL PLUS - 12 X 18 INCH (12/BX)

## (undated) DEVICE — CLIP MED INTNL HRZN TI ESCP - (25/BX)

## (undated) DEVICE — SUCTION INSTRUMENT YANKAUER BULBOUS TIP W/O VENT (50EA/CA)

## (undated) DEVICE — BLADE OSCILLATING ZO-272

## (undated) DEVICE — CANISTER SUCTION 3000ML MECHANICAL FILTER AUTO SHUTOFF MEDI-VAC NONSTERILE LF DISP  (40EA/CA)

## (undated) DEVICE — HEAD HOLDER JUNIOR/ADULT

## (undated) DEVICE — DRESSING PETROLEUM GAUZE 5 X 9" (50EA/BX 4BX/CA)"

## (undated) DEVICE — NEPTUNE 4 PORT MANIFOLD - (20/PK)

## (undated) DEVICE — BAG, SPONGE COUNT 50600

## (undated) DEVICE — TUBE E-T HI-LO CUFF 8.0MM (10EA/PK)

## (undated) DEVICE — SUTURE 2-0 ETHILON FS - (36/BX) 18 INCH

## (undated) DEVICE — SPONGE GAUZE STER 4X4 8-PL - (2/PK 50PK/BX 12BX/CS)

## (undated) DEVICE — LACTATED RINGERS INJ 1000 ML - (14EA/CA 60CA/PF)

## (undated) DEVICE — IMMOBILIZER KNEE 20 INCH - (1/EA)

## (undated) DEVICE — SET EXTENSION WITH 2 PORTS (48EA/CA) ***PART #2C8610 IS A SUBSTITUTE*****

## (undated) DEVICE — BANDAGE ROLL STERILE BULKEE 4.5 IN X 4 YD (100EA/CA)

## (undated) DEVICE — KIT ANESTHESIA W/CIRCUIT & 3/LT BAG W/FILTER (20EA/CA)

## (undated) DEVICE — BANDAGE ELASTIC 4 IN X 5 YDS - LATEX FREE(10/BX 5BX/CA)

## (undated) DEVICE — ELECTRODE 850 FOAM ADHESIVE - HYDROGEL RADIOTRNSPRNT (50/PK)

## (undated) DEVICE — TUBING CLEARLINK DUO-VENT - C-FLO (48EA/CA)

## (undated) DEVICE — SENSOR SPO2 NEO LNCS ADHESIVE (20/BX) SEE USER NOTES

## (undated) DEVICE — TOURNIQUET CUFF 34 X 4 ONE PORT DISP - STERILE (10/BX)

## (undated) DEVICE — GOWN WARMING STANDARD FLEX - (30/CA)

## (undated) DEVICE — KIT ROOM DECONTAMINATION

## (undated) DEVICE — SUTURE 2-0 VICRYL PLUS CT-1 - 8 X 18 INCH(12/BX)

## (undated) DEVICE — GLOVE SZ 8 BIOGEL PI MICRO - PF LF (50PR/BX)

## (undated) DEVICE — PROTECTOR ULNA NERVE - (36PR/CA)

## (undated) DEVICE — SUTURE 2-0 VICRYL PLUS SH - 8 X 18 INCH (12/BX)

## (undated) DEVICE — GLOVE BIOGEL SZ 7.5 SURGICAL PF LTX - (50PR/BX 4BX/CA)

## (undated) DEVICE — GAUZE FLUFF STERILE 2-PLY 36 X 36 (100EA/CA)

## (undated) DEVICE — BLADE SURGICAL #10 - (50/BX)

## (undated) DEVICE — BLADE SAGITTAL 6 SYSTEM 25MM

## (undated) DEVICE — CLIP SM INTNL HRZN TI ESCP LGT - (24EA/PK 25PK/BX)

## (undated) DEVICE — PACK LOWER EXTREMITY - (2/CA)

## (undated) DEVICE — CLIP MED LG INTNL HRZN TI ESCP - (20/BX)

## (undated) DEVICE — SET LEADWIRE 5 LEAD BEDSIDE DISPOSABLE ECG (1SET OF 5/EA)

## (undated) DEVICE — SUTURE 2-0 COATED VICRYL PLUS - 12 X 18 INCH (12/BX)